# Patient Record
Sex: MALE | Race: WHITE | NOT HISPANIC OR LATINO | Employment: FULL TIME | URBAN - METROPOLITAN AREA
[De-identification: names, ages, dates, MRNs, and addresses within clinical notes are randomized per-mention and may not be internally consistent; named-entity substitution may affect disease eponyms.]

---

## 2018-07-14 ENCOUNTER — OFFICE VISIT (OUTPATIENT)
Dept: URGENT CARE | Facility: CLINIC | Age: 73
End: 2018-07-14
Payer: COMMERCIAL

## 2018-07-14 ENCOUNTER — APPOINTMENT (OUTPATIENT)
Dept: RADIOLOGY | Facility: CLINIC | Age: 73
End: 2018-07-14
Payer: COMMERCIAL

## 2018-07-14 VITALS
OXYGEN SATURATION: 98 % | RESPIRATION RATE: 18 BRPM | HEART RATE: 83 BPM | SYSTOLIC BLOOD PRESSURE: 170 MMHG | DIASTOLIC BLOOD PRESSURE: 85 MMHG | BODY MASS INDEX: 25.84 KG/M2 | WEIGHT: 195 LBS | TEMPERATURE: 98.7 F | HEIGHT: 73 IN

## 2018-07-14 DIAGNOSIS — M25.531 ACUTE PAIN OF RIGHT WRIST: Primary | ICD-10-CM

## 2018-07-14 PROCEDURE — 99203 OFFICE O/P NEW LOW 30 MIN: CPT | Performed by: PHYSICIAN ASSISTANT

## 2018-07-14 PROCEDURE — 73110 X-RAY EXAM OF WRIST: CPT

## 2018-07-14 PROCEDURE — S9088 SERVICES PROVIDED IN URGENT: HCPCS | Performed by: PHYSICIAN ASSISTANT

## 2018-07-14 NOTE — PATIENT INSTRUCTIONS
X-ray right wrist performed in office-no acute osseous abnormality visualized  Official radiology report pending  Will call if there are positive results  Apply ice to affected area, elevate right hand  Ace wrap applied to wrist in office  Tylenol/ibuprofen as needed for pain  Follow-up with an orthopedic physician if pain persists  Follow up with PCP in 3-5 days  Proceed to  ER if symptoms worsen  Wrist Sprain   AMBULATORY CARE:   A wrist sprain  happens when one or more ligaments in your wrist stretch or tear  Ligaments are tough tissues that connect bones and keep them in place, and support your joints  A fall onto your outstretched hand can cause a wrist sprain  An injury that causes your wrist to twist can also cause a sprain  Common symptoms include the following:   · Bruising or changes in skin color    · Pain and stiffness     · Popping sound in your wrist when you move it    · Swelling and tenderness  Seek care immediately if:   · You have severe pain or swelling  · Your injured wrist is red or has red streaks spreading from the injured area  · You have new trouble moving your hands, fingers, or wrist     · Your wrist, hand, or fingers feel cold or numb  · Your fingernails turn blue or gray  Contact your healthcare provider if:   · Your symptoms get worse  · Your sprain does not get better within 2 weeks  · You have questions or concerns about your condition or care  Treatment for a wrist sprain  depends on how severe your sprain is  You may need any of the following:  · NSAIDs , such as ibuprofen, help decrease swelling, pain, and fever  NSAIDs can cause stomach bleeding or kidney problems in certain people  If you take blood thinner medicine, always ask your healthcare provider if NSAIDs are safe for you  Always read the medicine label and follow directions  · Acetaminophen  decreases pain and fever  It is available without a doctor's order   Ask how much to take and how often to take it  Follow directions  Read the labels of all other medicines you are using to see if they also contain acetaminophen, or ask your doctor or pharmacist  Acetaminophen can cause liver damage if not taken correctly  Do not use more than 4 grams (4,000 milligrams) total of acetaminophen in one day  · A splint or cast  helps support your wrist and prevent more damage  Have your child wear his or her splint as directed  Ask for instructions on how your child should bathe while wearing a splint or cast     · Surgery  may be needed if you have a severe sprain  Arthroscopy may be done to examine the inside of your wrist joint and repair ligament damage  Arthroscopy uses a scope that is inserted through a small incision  You may need open surgery to reconnect torn ligaments to the bone  · Physical therapy  may be recommended  A physical therapist teaches you exercises to help improve movement and strength, and to decrease pain  Care for a wrist sprain:   · Rest  your wrist for at least 48 hours  Avoid activities that cause pain  · Ice  your wrist for 15 to 20 minutes every hour or as directed  Use an ice pack, or put crushed ice in a plastic bag  Cover it with a towel before you put it on your wrist  Ice helps prevent tissue damage and decreases swelling and pain  · Compress  your wrist with an elastic bandage  This will help decrease swelling, support your wrist, and help it heal  Wear your wrist wrap as directed  The elastic bandage should be snug but not tight  · Elevate  your wrist above the level of your heart as often as you can  This will help decrease swelling and pain  Prop your wrist on pillows or blankets to keep it elevated comfortably  Follow up with your healthcare provider as directed:  Write down your questions so you remember to ask them during your visits     © 2017 [employer] Information is for End User's use only and may not be sold, redistributed or otherwise used for commercial purposes  All illustrations and images included in CareNotes® are the copyrighted property of A [employer]  or Augustine Carrero  The above information is an  only  It is not intended as medical advice for individual conditions or treatments  Talk to your doctor, nurse or pharmacist before following any medical regimen to see if it is safe and effective for you

## 2018-07-14 NOTE — PROGRESS NOTES
[facility]        NAME: Kirby Arevalo is a 67 y o  male  : 1945    MRN: [id number]  DATE: 2018  TIME: 3:02 PM    Assessment and Plan   Acute pain of right wrist [M25 531]  1  Acute pain of right wrist  XR wrist 3+ vw right         Patient Instructions   X-ray right wrist performed in office-no acute osseous abnormality visualized  There appears to be an old fracture of the distal portion of the ulna  Official radiology report pending  Will call if there are positive results  Apply ice to affected area, elevate right hand  Ace wrap applied to wrist in office  Tylenol/ibuprofen as needed for pain  Follow-up with an orthopedic physician if pain persists  Follow up with PCP in 3-5 days  Proceed to  ER if symptoms worsen  Chief Complaint     Chief Complaint   Patient presents with    Wrist Injury     R wrist injury 10 a today  PT fell to ground in his home while goiing outside wiht his dog  History of Present Illness   The patient is a 42-year-old male who presents with right wrist pain for 1 day  He states that he tripped and fell with an outstretched hand onto his right wrist   He had immediate pain and continues to have pain  Negative obvious deformity  Positive swelling  Negative ecchymosis  He is unable to move his right wrist   Negative radiating pain  Negative numbness or tingling  Sensation is intact  Negative color change  The patient denies syncopal event or head trauma  There is no other injury to any other part of his body  HPI    Review of Systems   Review of Systems   Musculoskeletal: Positive for joint swelling  Right wrist pain     Skin: Negative for color change and wound  Neurological: Negative for dizziness, syncope and weakness  All other systems reviewed and are negative  Current Medications     No current outpatient prescriptions on file      Current Allergies     Allergies as of 2018    (No Known Allergies) The following portions of the patient's history were reviewed and updated as appropriate: allergies, current medications, past family history, past medical history, past social history, past surgical history and problem list      No past medical history on file  No past surgical history on file  No family history on file  Medications have been verified  Objective   /85   Pulse 83   Temp 98 7 °F (37 1 °C) (Temporal)   Resp 18   Ht 6' 1" (1 854 m)   Wt 88 5 kg (195 lb)   SpO2 98%   BMI 25 73 kg/m²        Physical Exam     Physical Exam   Constitutional: He is oriented to person, place, and time  He appears well-developed and well-nourished  No distress  Pulmonary/Chest: Effort normal  No respiratory distress  Musculoskeletal:        Right wrist: He exhibits decreased range of motion, tenderness, bony tenderness and swelling  He exhibits no effusion, no crepitus, no deformity and no laceration  Arms:  Neurological: He is alert and oriented to person, place, and time  Skin: Skin is warm and dry  No rash noted  He is not diaphoretic  No erythema  No pallor  Psychiatric: He has a normal mood and affect  His behavior is normal  Judgment and thought content normal    Nursing note and vitals reviewed

## 2018-07-15 ENCOUNTER — TELEPHONE (OUTPATIENT)
Dept: URGENT CARE | Facility: CLINIC | Age: 73
End: 2018-07-15

## 2018-07-15 ENCOUNTER — CLINICAL SUPPORT (OUTPATIENT)
Dept: URGENT CARE | Facility: CLINIC | Age: 73
End: 2018-07-15

## 2018-07-15 DIAGNOSIS — S52.501A NONDISPLACED FRACTURE OF DISTAL END OF RIGHT RADIUS: Primary | ICD-10-CM

## 2018-07-15 DIAGNOSIS — Z97.8 CAST IN PLACE ON EXTREMITY: ICD-10-CM

## 2018-07-15 NOTE — TELEPHONE ENCOUNTER
Patient presented to [facility] urgent care 7/14/2018 with a right wrist injury  I spoke with the radiologist this morning regarding the x-ray that was performed and he is unsure if there is a new nondisplaced fracture of the distal radius  The patient does have a history of a previous right radius fracture  I discussed the options with the patient regarding the uncertain findings  The patient will come to the office today to have a splint placed on his right wrist with referral to an orthopedic physician on Monday  The patient may require repeat imaging in 1 week or additional diagnostic imaging

## 2018-07-15 NOTE — PROGRESS NOTES
Assessment/Plan:      Diagnoses and all orders for this visit:    Nondisplaced fracture of distal end of right radius          Subjective:     Patient ID: Genevieve Mata is a 67 y o  male who return to the office today for placement of a right hand splint  X-ray right hand performed yesterday showed a potential nondisplaced fracture distal radius  The patient does have a history of a prior fracture of the distal radius  After speaking with the radiologist he is unsure if this is an acute or chronic fracture  The patient returns to the office today for splinting and follow-up with an orthopedic physician tomorrow for further evaluation  Cast application  Date/Time: 7/15/2018 9:52 AM  Performed by: José Luis Monte  Authorized by: José Luis Monte     Consent:     Consent obtained:  Verbal    Consent given by:  Patient    Risks discussed:  Discoloration, numbness, pain and swelling    Alternatives discussed:  Observation  Pre-procedure details:     Sensation:  Normal  Procedure details:     Laterality:  Right    Location:  Wrist    Wrist:  R wristCast type:  Short arm    Splint type:  Volar short arm    Supplies:  Cotton padding, elastic bandage and sling (Form splint)  Post-procedure details:     Pain:  Unchanged    Sensation:  Normal    Patient tolerance of procedure:   Tolerated well, no immediate complications      HPI    Review of Systems      Objective:     Physical Exam

## 2018-07-15 NOTE — LETTER
July 15, 2018     Patient: Benja Bailey   YOB: 1945   Date of Visit: 7/15/2018       To Whom it May Concern:    Jayde Guerrier is under my professional care  He was seen in my office on 7/15/2018  He should not participate in activities requiring use of his right hand until evaluated by a physician  If you have any questions or concerns, please don't hesitate to call           Sincerely,          GINNY Cole        CC: No Recipients

## 2018-07-16 ENCOUNTER — OFFICE VISIT (OUTPATIENT)
Dept: OBGYN CLINIC | Facility: MEDICAL CENTER | Age: 73
End: 2018-07-16
Payer: COMMERCIAL

## 2018-07-16 VITALS
DIASTOLIC BLOOD PRESSURE: 79 MMHG | HEART RATE: 85 BPM | SYSTOLIC BLOOD PRESSURE: 146 MMHG | HEIGHT: 73 IN | BODY MASS INDEX: 24.92 KG/M2 | WEIGHT: 188 LBS

## 2018-07-16 DIAGNOSIS — S52.501A CLOSED FRACTURE OF DISTAL ENDS OF RIGHT RADIUS AND ULNA, INITIAL ENCOUNTER: ICD-10-CM

## 2018-07-16 DIAGNOSIS — M25.531 RIGHT WRIST PAIN: Primary | ICD-10-CM

## 2018-07-16 DIAGNOSIS — S52.601A CLOSED FRACTURE OF DISTAL ENDS OF RIGHT RADIUS AND ULNA, INITIAL ENCOUNTER: ICD-10-CM

## 2018-07-16 PROCEDURE — 99204 OFFICE O/P NEW MOD 45 MIN: CPT | Performed by: FAMILY MEDICINE

## 2018-07-16 PROCEDURE — 25600 CLTX DST RDL FX/EPHYS SEP WO: CPT | Performed by: FAMILY MEDICINE

## 2018-07-16 RX ORDER — MULTIVIT-MIN/IRON FUM/FOLIC AC 7.5 MG-4
1 TABLET ORAL DAILY
COMMUNITY

## 2018-07-16 RX ORDER — ASPIRIN 325 MG
325 TABLET ORAL 2 TIMES DAILY
COMMUNITY
End: 2022-06-02

## 2018-07-16 NOTE — PROGRESS NOTES
Assessment:     1  Right wrist pain    2  Closed fracture of distal ends of right radius and ulna, initial encounter        Plan:     Problem List Items Addressed This Visit     Right wrist pain - Primary    Closed fracture of distal ends of right radius and ulna     Stable, nondisplaced fracture of the distal radius and ulna  Recommend short arm hard cast at this time  Return to the office for follow-up in 4 weeks for repeat x-rays with cast off to assess for callus formation              Subjective:     Patient ID: Chris Linda is a 67 y o  male  Chief Complaint:  Patient is a 70-year-old male right-hand dominant presenting today for evaluation of right wrist pain  He reports 2 days ago, falling on his outstretched right hand while carrying his dog through the doorway  He reported immediate onset pain in the hand and wrist regions  His pain continues today as a throbbing, achy pain along the dorsal aspect of the wrist  There is no radiation of symptoms  Anti-inflammatories and has provided minimal relief  Pain is reproduced with any attempted movement of the hand or wrist  He denies any numbness or tingling  Denies any warmth or crepitus  Allergy:  No Known Allergies  Medications:  all current active meds have been reviewed  Past Medical History:  No past medical history on file  Past Surgical History:  No past surgical history on file  Family History:  Family History   Problem Relation Age of Onset    Dementia Mother     Heart disease Father      Social History:  History   Alcohol Use No     History   Drug Use No     History   Smoking Status    Current Every Day Smoker   Smokeless Tobacco    Never Used     Review of Systems   Constitutional: Negative  HENT: Negative  Eyes: Negative  Respiratory: Negative  Cardiovascular: Negative  Gastrointestinal: Negative  Genitourinary: Negative  Musculoskeletal: Positive for arthralgias and myalgias  Skin: Negative  Allergic/Immunologic: Negative  Neurological: Negative  Hematological: Negative  Psychiatric/Behavioral: Negative  Objective:  BP Readings from Last 1 Encounters:   07/16/18 146/79      Wt Readings from Last 1 Encounters:   07/16/18 85 3 kg (188 lb)      BMI:   Estimated body mass index is 24 8 kg/m² as calculated from the following:    Height as of this encounter: 6' 1" (1 854 m)  Weight as of this encounter: 85 3 kg (188 lb)  BSA:   Estimated body surface area is 2 1 meters squared as calculated from the following:    Height as of this encounter: 6' 1" (1 854 m)  Weight as of this encounter: 85 3 kg (188 lb)  Physical Exam   Constitutional: He is oriented to person, place, and time  He appears well-developed and well-nourished  HENT:   Head: Normocephalic  Eyes: Pupils are equal, round, and reactive to light  Neck: Normal range of motion  Pulmonary/Chest: Effort normal    Musculoskeletal: He exhibits edema and tenderness  He exhibits no deformity  Neurological: He is alert and oriented to person, place, and time  Skin: Skin is warm  Psychiatric: He has a normal mood and affect  Right Hand Exam     Tenderness   The patient is experiencing tenderness in the dorsal area, joya area and radial area  Range of Motion     Wrist   Extension: abnormal   Flexion: abnormal   Pronation: abnormal   Supination: abnormal     Muscle Strength   Wrist Extension: 4/5   Wrist Flexion: 4/5   : 4/5     Tests   Phalens Sign: negative  Tinels Sign (Medial Nerve): negative  Finkelstein: negative    Other   Erythema: absent  Sensation: normal  Pulse: present      Left Hand Exam   Left hand exam is normal     Tenderness   The patient is experiencing no tenderness  Range of Motion   The patient has normal left wrist ROM  I have personally reviewed pertinent films in PACS  Stable, nondisplaced fracture along the medial aspect of the distal radius

## 2018-07-16 NOTE — ASSESSMENT & PLAN NOTE
Stable, nondisplaced fracture of the distal radius and ulna  Recommend short arm hard cast at this time    Return to the office for follow-up in 4 weeks for repeat x-rays with cast off to assess for callus formation

## 2018-08-13 ENCOUNTER — APPOINTMENT (OUTPATIENT)
Dept: RADIOLOGY | Facility: MEDICAL CENTER | Age: 73
End: 2018-08-13
Payer: COMMERCIAL

## 2018-08-13 ENCOUNTER — OFFICE VISIT (OUTPATIENT)
Dept: OBGYN CLINIC | Facility: MEDICAL CENTER | Age: 73
End: 2018-08-13

## 2018-08-13 VITALS
DIASTOLIC BLOOD PRESSURE: 96 MMHG | WEIGHT: 192.4 LBS | SYSTOLIC BLOOD PRESSURE: 193 MMHG | HEIGHT: 73 IN | BODY MASS INDEX: 25.5 KG/M2 | HEART RATE: 75 BPM

## 2018-08-13 DIAGNOSIS — S52.601D CLOSED FRACTURE OF DISTAL ENDS OF RIGHT RADIUS AND ULNA WITH ROUTINE HEALING, SUBSEQUENT ENCOUNTER: ICD-10-CM

## 2018-08-13 DIAGNOSIS — M25.531 RIGHT WRIST PAIN: Primary | ICD-10-CM

## 2018-08-13 DIAGNOSIS — S52.501D CLOSED FRACTURE OF DISTAL ENDS OF RIGHT RADIUS AND ULNA WITH ROUTINE HEALING, SUBSEQUENT ENCOUNTER: ICD-10-CM

## 2018-08-13 DIAGNOSIS — M25.531 RIGHT WRIST PAIN: ICD-10-CM

## 2018-08-13 PROCEDURE — 99024 POSTOP FOLLOW-UP VISIT: CPT | Performed by: FAMILY MEDICINE

## 2018-08-13 PROCEDURE — 73110 X-RAY EXAM OF WRIST: CPT

## 2018-08-13 NOTE — PROGRESS NOTES
Assessment:     1  Right wrist pain    2  Closed fracture of distal ends of right radius and ulna with routine healing, subsequent encounter        Plan:     Problem List Items Addressed This Visit     Right wrist pain - Primary    Relevant Orders    XR wrist 3+ vw right    Ambulatory referral to Physical Therapy    Closed fracture of distal ends of right radius and ulna    Relevant Orders    Ambulatory referral to Physical Therapy         Subjective:     Patient ID: Nga Lowery is a 68 y o  male  Chief Complaint:  Patient reports complete resolution of symptoms in the right wrist since being immobilized in a short-arm hard cast for the past 4 weeks  He is no longer experiencing any swelling in the wrist   He does reports some stiffness with wrist extension  He denies any numbness or tingling  He denies any warmth or crepitus  Allergy:  No Known Allergies  Medications:  all current active meds have been reviewed  Past Medical History:  No past medical history on file  Past Surgical History:  No past surgical history on file  Family History:  Family History   Problem Relation Age of Onset    Dementia Mother     Heart disease Father      Social History:  History   Alcohol Use No     History   Drug Use No     History   Smoking Status    Current Every Day Smoker   Smokeless Tobacco    Never Used     Review of Systems   Constitutional: Negative  HENT: Negative  Eyes: Negative  Respiratory: Negative  Cardiovascular: Negative  Gastrointestinal: Negative  Genitourinary: Negative  Musculoskeletal: Positive for arthralgias and myalgias  Skin: Negative  Allergic/Immunologic: Negative  Neurological: Negative  Hematological: Negative  Psychiatric/Behavioral: Negative            Objective:  BP Readings from Last 1 Encounters:   08/13/18 (!) 193/96      Wt Readings from Last 1 Encounters:   08/13/18 87 3 kg (192 lb 6 4 oz)      BMI:   Estimated body mass index is 25 38 kg/m² as calculated from the following:    Height as of this encounter: 6' 1" (1 854 m)  Weight as of this encounter: 87 3 kg (192 lb 6 4 oz)  BSA:   Estimated body surface area is 2 12 meters squared as calculated from the following:    Height as of this encounter: 6' 1" (1 854 m)  Weight as of this encounter: 87 3 kg (192 lb 6 4 oz)  Physical Exam   Constitutional: He is oriented to person, place, and time  He appears well-developed and well-nourished  HENT:   Head: Normocephalic  Eyes: Pupils are equal, round, and reactive to light  Neck: Normal range of motion  Pulmonary/Chest: Effort normal    Musculoskeletal: He exhibits no tenderness  Neurological: He is alert and oriented to person, place, and time  Skin: Skin is warm  Psychiatric: He has a normal mood and affect  Right Hand Exam     Tenderness   The patient is experiencing tenderness in the dorsal area  Range of Motion     Wrist   Extension: abnormal   Flexion: abnormal   Pronation: normal   Supination: normal     Muscle Strength   Wrist Extension: 4/5   Wrist Flexion: 4/5   : 4/5     Tests   Phalens Sign: negative    Other   Erythema: absent  Sensation: normal  Pulse: present            I have personally reviewed pertinent films in PACS     Appropriate callus formation and healing

## 2018-12-23 ENCOUNTER — OFFICE VISIT (OUTPATIENT)
Dept: URGENT CARE | Facility: CLINIC | Age: 73
End: 2018-12-23
Payer: COMMERCIAL

## 2018-12-23 ENCOUNTER — APPOINTMENT (OUTPATIENT)
Dept: RADIOLOGY | Facility: CLINIC | Age: 73
End: 2018-12-23
Payer: COMMERCIAL

## 2018-12-23 VITALS
WEIGHT: 182 LBS | HEART RATE: 128 BPM | BODY MASS INDEX: 24.12 KG/M2 | HEIGHT: 73 IN | TEMPERATURE: 99.4 F | RESPIRATION RATE: 16 BRPM | OXYGEN SATURATION: 99 % | DIASTOLIC BLOOD PRESSURE: 68 MMHG | SYSTOLIC BLOOD PRESSURE: 118 MMHG

## 2018-12-23 DIAGNOSIS — J18.9 COMMUNITY ACQUIRED PNEUMONIA OF RIGHT LUNG, UNSPECIFIED PART OF LUNG: Primary | ICD-10-CM

## 2018-12-23 DIAGNOSIS — R05.9 COUGH: ICD-10-CM

## 2018-12-23 PROCEDURE — S9088 SERVICES PROVIDED IN URGENT: HCPCS | Performed by: PHYSICIAN ASSISTANT

## 2018-12-23 PROCEDURE — 71046 X-RAY EXAM CHEST 2 VIEWS: CPT

## 2018-12-23 PROCEDURE — 99213 OFFICE O/P EST LOW 20 MIN: CPT | Performed by: PHYSICIAN ASSISTANT

## 2018-12-23 RX ORDER — ALBUTEROL SULFATE 90 UG/1
2 AEROSOL, METERED RESPIRATORY (INHALATION) EVERY 6 HOURS PRN
Qty: 8 G | Refills: 0 | Status: SHIPPED | OUTPATIENT
Start: 2018-12-23 | End: 2021-01-07 | Stop reason: ALTCHOICE

## 2018-12-23 RX ORDER — AMOXICILLIN AND CLAVULANATE POTASSIUM 875; 125 MG/1; MG/1
1 TABLET, FILM COATED ORAL EVERY 12 HOURS SCHEDULED
Qty: 14 TABLET | Refills: 0 | Status: SHIPPED | OUTPATIENT
Start: 2018-12-23 | End: 2018-12-30

## 2018-12-23 NOTE — PROGRESS NOTES
[facility]        NAME: Debbie Rojas is a 68 y o  male  : 1945    MRN: [id number]  DATE: 2018  TIME: 12:37 PM    Assessment and Plan   Community acquired pneumonia of right lung, unspecified part of lung [J18 9]  1  Community acquired pneumonia of right lung, unspecified part of lung  amoxicillin-clavulanate (AUGMENTIN) 875-125 mg per tablet    albuterol (PROVENTIL HFA,VENTOLIN HFA) 90 mcg/act inhaler   2  Cough  XR chest pa & lateral         Patient Instructions     Patient Instructions     Start on antibiotics  He can use albuterol for the wheezing and shortness of breath  Mucinex over-the-counter  Follow-up with PCP next week  If worse or fever go to the ER  Chest x-ray shows right middle lobe consolidation    Follow up with PCP in 3-5 days  Proceed to  ER if symptoms worsen  Chief Complaint     Chief Complaint   Patient presents with    Cough     x5 days with a productive cough, sob , there are chills/sweats and constant fatigue         History of Present Illness        61-year-old male presents with cough congestion shortness of breath chills and sweats for 5 days  He says when he lays down he feels pretty short of breath  No cough meds over-the-counter  No nausea or vomiting  He says when he takes a deep breath it feels tight  No history of inhaler use or asthma  No  D  He smokes occasionally at most 5 cigarettes a day  He has been smoking less since he got sick          Review of Systems   Review of Systems      Current Medications       Current Outpatient Prescriptions:     aspirin 325 mg tablet, Take 325 mg by mouth 2 (two) times a day, Disp: , Rfl:     Multiple Vitamins-Minerals (MULTIVITAMIN WITH MINERALS) tablet, Take 1 tablet by mouth daily, Disp: , Rfl:     albuterol (PROVENTIL HFA,VENTOLIN HFA) 90 mcg/act inhaler, Inhale 2 puffs every 6 (six) hours as needed for wheezing, Disp: 8 g, Rfl: 0    amoxicillin-clavulanate (AUGMENTIN) 875-125 mg per tablet, Take 1 tablet by mouth every 12 (twelve) hours for 7 days, Disp: 14 tablet, Rfl: 0    Current Allergies     Allergies as of 12/23/2018    (No Known Allergies)            The following portions of the patient's history were reviewed and updated as appropriate: allergies, current medications, past family history, past medical history, past social history, past surgical history and problem list      History reviewed  No pertinent past medical history  History reviewed  No pertinent surgical history  Family History   Problem Relation Age of Onset    Dementia Mother     Heart disease Father          Medications have been verified  Objective   /68   Pulse (!) 128   Temp 99 4 °F (37 4 °C) (Temporal)   Resp 16   Ht 6' 1" (1 854 m)   Wt 82 6 kg (182 lb)   SpO2 99%   BMI 24 01 kg/m²        Physical Exam     Physical Exam   Constitutional: He appears well-developed and well-nourished  No distress  HENT:   Right Ear: Tympanic membrane, external ear and ear canal normal    Left Ear: Tympanic membrane, external ear and ear canal normal    Nose: Nose normal  Right sinus exhibits no maxillary sinus tenderness and no frontal sinus tenderness  Left sinus exhibits no maxillary sinus tenderness and no frontal sinus tenderness  Mouth/Throat: Oropharynx is clear and moist  No posterior oropharyngeal erythema  Eyes: Pupils are equal, round, and reactive to light  Conjunctivae and EOM are normal  No scleral icterus  Neck: Normal range of motion  Neck supple  Cardiovascular: Normal rate, regular rhythm and normal heart sounds  Pulmonary/Chest: Effort normal  No respiratory distress  He has no decreased breath sounds  He has wheezes  He has no rhonchi  He has rales in the right lower field and the left lower field  Abdominal: Soft  Bowel sounds are normal  He exhibits no distension and no mass  There is no tenderness  There is no rebound and no guarding     Lymphadenopathy:     He has no cervical adenopathy  Skin: Skin is warm and dry  No rash noted

## 2018-12-23 NOTE — PATIENT INSTRUCTIONS
Start on antibiotics  He can use albuterol for the wheezing and shortness of breath  Mucinex over-the-counter  Follow-up with PCP next week  If worse or fever go to the ER

## 2019-01-03 ENCOUNTER — OFFICE VISIT (OUTPATIENT)
Dept: FAMILY MEDICINE CLINIC | Facility: CLINIC | Age: 74
End: 2019-01-03
Payer: COMMERCIAL

## 2019-01-03 VITALS
TEMPERATURE: 99 F | BODY MASS INDEX: 24.39 KG/M2 | DIASTOLIC BLOOD PRESSURE: 88 MMHG | HEIGHT: 73 IN | HEART RATE: 96 BPM | WEIGHT: 184 LBS | OXYGEN SATURATION: 96 % | SYSTOLIC BLOOD PRESSURE: 138 MMHG

## 2019-01-03 DIAGNOSIS — F17.200 CURRENT EVERY DAY SMOKER: ICD-10-CM

## 2019-01-03 DIAGNOSIS — Z76.89 ENCOUNTER TO ESTABLISH CARE: Primary | ICD-10-CM

## 2019-01-03 DIAGNOSIS — J18.9 PNEUMONIA OF RIGHT LOWER LOBE DUE TO INFECTIOUS ORGANISM: ICD-10-CM

## 2019-01-03 PROBLEM — S52.601A CLOSED FRACTURE OF DISTAL ENDS OF RIGHT RADIUS AND ULNA: Status: RESOLVED | Noted: 2018-07-16 | Resolved: 2019-01-03

## 2019-01-03 PROBLEM — M25.531 RIGHT WRIST PAIN: Status: RESOLVED | Noted: 2018-07-16 | Resolved: 2019-01-03

## 2019-01-03 PROBLEM — S52.501A CLOSED FRACTURE OF DISTAL ENDS OF RIGHT RADIUS AND ULNA: Status: RESOLVED | Noted: 2018-07-16 | Resolved: 2019-01-03

## 2019-01-03 PROCEDURE — 99203 OFFICE O/P NEW LOW 30 MIN: CPT | Performed by: FAMILY MEDICINE

## 2019-01-03 PROCEDURE — 3008F BODY MASS INDEX DOCD: CPT | Performed by: FAMILY MEDICINE

## 2019-01-03 NOTE — PROGRESS NOTES
Assessment/Plan:    No problem-specific Assessment & Plan notes found for this encounter  Diagnoses and all orders for this visit:    Encounter to establish care    Current every day smoker    Pneumonia of right lower lobe due to infectious organism ([facility] [address] )  -     XR chest pa & lateral; Future        Repeat CXR in 4 wks  Advised pneumonia vaccine at that time  Declined flu vaccine  Declined screening labs  Is not willing to quit smoking  Counseled  Subjective:      Patient ID: Holly Franklin is a 68 y o  male  Here to establish care  Was recently in care now on 12/23/18, had pneumonia  Treated with Augmentin  He feels much better  Denies cough, fever, or SOB  He is a smoker, does not want to quit  No other complaints  The following portions of the patient's history were reviewed and updated as appropriate:   He  has no past medical history on file  He   Patient Active Problem List    Diagnosis Date Noted    Encounter to establish care 01/03/2019    Current every day smoker 01/03/2019    Pneumonia of right lower lobe due to infectious organism ([facility] [address] ) 01/03/2019     He  has no past surgical history on file  His family history includes Dementia in his mother; Heart disease in his father  He  reports that he has been smoking  He has never used smokeless tobacco  He reports that he does not drink alcohol or use drugs  Current Outpatient Prescriptions   Medication Sig Dispense Refill    albuterol (PROVENTIL HFA,VENTOLIN HFA) 90 mcg/act inhaler Inhale 2 puffs every 6 (six) hours as needed for wheezing 8 g 0    aspirin 325 mg tablet Take 325 mg by mouth 2 (two) times a day      Multiple Vitamins-Minerals (MULTIVITAMIN WITH MINERALS) tablet Take 1 tablet by mouth daily       No current facility-administered medications for this visit        Current Outpatient Prescriptions on File Prior to Visit   Medication Sig    albuterol (PROVENTIL HFA,VENTOLIN HFA) 90 mcg/act inhaler Inhale 2 puffs every 6 (six) hours as needed for wheezing    aspirin 325 mg tablet Take 325 mg by mouth 2 (two) times a day    Multiple Vitamins-Minerals (MULTIVITAMIN WITH MINERALS) tablet Take 1 tablet by mouth daily     No current facility-administered medications on file prior to visit  He has No Known Allergies       Review of Systems   Constitutional: Negative for activity change, appetite change, chills, fatigue, fever and unexpected weight change  HENT: Negative for congestion, ear discharge, ear pain, postnasal drip, sinus pressure and sore throat  Eyes: Negative for discharge and visual disturbance  Respiratory: Negative for cough, shortness of breath and wheezing  Cardiovascular: Negative for chest pain, palpitations and leg swelling  Gastrointestinal: Negative for abdominal pain, constipation, diarrhea, nausea and vomiting  Endocrine: Negative for cold intolerance, heat intolerance, polydipsia and polyuria  Genitourinary: Negative for difficulty urinating and frequency  Musculoskeletal: Negative for arthralgias, back pain, joint swelling and myalgias  Skin: Negative for rash  Neurological: Negative for dizziness, weakness, light-headedness, numbness and headaches  Hematological: Negative for adenopathy  Psychiatric/Behavioral: Negative for behavioral problems, confusion, dysphoric mood, sleep disturbance and suicidal ideas  The patient is not nervous/anxious  Objective:      /88   Pulse 96   Temp 99 °F (37 2 °C)   Ht 6' 1" (1 854 m)   Wt 83 5 kg (184 lb)   SpO2 96%   BMI 24 28 kg/m²          Physical Exam   Constitutional: He is oriented to person, place, and time  He appears well-developed and well-nourished  No distress  HENT:   Head: Normocephalic and atraumatic  Cardiovascular: Normal rate, regular rhythm and normal heart sounds  Exam reveals no gallop and no friction rub  No murmur heard    Pulmonary/Chest: Effort normal and breath sounds normal  No respiratory distress  He has no wheezes  He has no rales  He exhibits no tenderness  Musculoskeletal: He exhibits no edema  Neurological: He is alert and oriented to person, place, and time  Skin: He is not diaphoretic  Psychiatric: He has a normal mood and affect  His behavior is normal  Judgment and thought content normal    Nursing note and vitals reviewed

## 2019-01-14 ENCOUNTER — TELEPHONE (OUTPATIENT)
Dept: FAMILY MEDICINE CLINIC | Facility: CLINIC | Age: 74
End: 2019-01-14

## 2019-01-14 DIAGNOSIS — R05.9 COUGH: Primary | ICD-10-CM

## 2019-01-14 RX ORDER — AMOXICILLIN AND CLAVULANATE POTASSIUM 875; 125 MG/1; MG/1
1 TABLET, FILM COATED ORAL EVERY 12 HOURS SCHEDULED
Qty: 14 TABLET | Refills: 0 | Status: SHIPPED | OUTPATIENT
Start: 2019-01-14 | End: 2019-01-14 | Stop reason: SINTOL

## 2019-01-14 NOTE — TELEPHONE ENCOUNTER
He had pneumonia recently  Now starting with head cold has runny nose and watery eyes and would like another antibiotic   Has appt here on the 31st for pneumovax    Can we rx antibiotic

## 2019-01-14 NOTE — TELEPHONE ENCOUNTER
I spoke to the patient discussed this is a viral URI   I had sent in Augmentin by mistake to [facility] can you please cancel it

## 2019-01-31 ENCOUNTER — CLINICAL SUPPORT (OUTPATIENT)
Dept: FAMILY MEDICINE CLINIC | Facility: CLINIC | Age: 74
End: 2019-01-31
Payer: COMMERCIAL

## 2019-01-31 DIAGNOSIS — Z23 NEED FOR PNEUMOCOCCAL VACCINATION: Primary | ICD-10-CM

## 2019-01-31 PROCEDURE — G0009 ADMIN PNEUMOCOCCAL VACCINE: HCPCS | Performed by: NURSE PRACTITIONER

## 2019-01-31 PROCEDURE — 4040F PNEUMOC VAC/ADMIN/RCVD: CPT | Performed by: NURSE PRACTITIONER

## 2019-01-31 PROCEDURE — 90670 PCV13 VACCINE IM: CPT | Performed by: NURSE PRACTITIONER

## 2019-10-10 ENCOUNTER — APPOINTMENT (EMERGENCY)
Dept: CT IMAGING | Facility: HOSPITAL | Age: 74
DRG: 185 | End: 2019-10-10
Payer: COMMERCIAL

## 2019-10-10 ENCOUNTER — HOSPITAL ENCOUNTER (INPATIENT)
Facility: HOSPITAL | Age: 74
LOS: 1 days | Discharge: HOME HEALTH | DRG: 185 | End: 2019-10-11
Attending: EMERGENCY MEDICINE | Admitting: INTERNAL MEDICINE
Payer: COMMERCIAL

## 2019-10-10 DIAGNOSIS — Z72.0 TOBACCO ABUSE: Chronic | ICD-10-CM

## 2019-10-10 DIAGNOSIS — R93.89 ABNORMAL CT OF THE CHEST: ICD-10-CM

## 2019-10-10 DIAGNOSIS — S22.49XA MULTIPLE RIB FRACTURES INVOLVING FOUR OR MORE RIBS: Primary | ICD-10-CM

## 2019-10-10 DIAGNOSIS — N40.0 ENLARGED PROSTATE: ICD-10-CM

## 2019-10-10 LAB
ALBUMIN SERPL BCP-MCNC: 4.2 G/DL (ref 3.5–5)
ALP SERPL-CCNC: 68 U/L (ref 46–116)
ALT SERPL W P-5'-P-CCNC: 16 U/L (ref 12–78)
ANION GAP SERPL CALCULATED.3IONS-SCNC: 13 MMOL/L (ref 4–13)
APTT PPP: 28 SECONDS (ref 23–37)
AST SERPL W P-5'-P-CCNC: 12 U/L (ref 5–45)
ATRIAL RATE: 91 BPM
BASOPHILS # BLD AUTO: 0.06 THOUSANDS/ΜL (ref 0–0.1)
BASOPHILS NFR BLD AUTO: 1 % (ref 0–1)
BILIRUB DIRECT SERPL-MCNC: 0.16 MG/DL (ref 0–0.2)
BILIRUB SERPL-MCNC: 0.6 MG/DL (ref 0.2–1)
BUN SERPL-MCNC: 13 MG/DL (ref 5–25)
CALCIUM SERPL-MCNC: 9.1 MG/DL (ref 8.3–10.1)
CHLORIDE SERPL-SCNC: 102 MMOL/L (ref 100–108)
CO2 SERPL-SCNC: 25 MMOL/L (ref 21–32)
CREAT SERPL-MCNC: 0.72 MG/DL (ref 0.6–1.3)
EOSINOPHIL # BLD AUTO: 0.1 THOUSAND/ΜL (ref 0–0.61)
EOSINOPHIL NFR BLD AUTO: 1 % (ref 0–6)
ERYTHROCYTE [DISTWIDTH] IN BLOOD BY AUTOMATED COUNT: 14.2 % (ref 11.6–15.1)
GFR SERPL CREATININE-BSD FRML MDRD: 92 ML/MIN/1.73SQ M
GLUCOSE SERPL-MCNC: 150 MG/DL (ref 65–140)
HCT VFR BLD AUTO: 43.4 % (ref 36.5–49.3)
HGB BLD-MCNC: 14.8 G/DL (ref 12–17)
IMM GRANULOCYTES # BLD AUTO: 0.07 THOUSAND/UL (ref 0–0.2)
IMM GRANULOCYTES NFR BLD AUTO: 1 % (ref 0–2)
INR PPP: 1.04 (ref 0.84–1.19)
LYMPHOCYTES # BLD AUTO: 1.22 THOUSANDS/ΜL (ref 0.6–4.47)
LYMPHOCYTES NFR BLD AUTO: 11 % (ref 14–44)
MCH RBC QN AUTO: 31.5 PG (ref 26.8–34.3)
MCHC RBC AUTO-ENTMCNC: 34.1 G/DL (ref 31.4–37.4)
MCV RBC AUTO: 92 FL (ref 82–98)
MONOCYTES # BLD AUTO: 1.15 THOUSAND/ΜL (ref 0.17–1.22)
MONOCYTES NFR BLD AUTO: 11 % (ref 4–12)
NEUTROPHILS # BLD AUTO: 8.31 THOUSANDS/ΜL (ref 1.85–7.62)
NEUTS SEG NFR BLD AUTO: 75 % (ref 43–75)
NRBC BLD AUTO-RTO: 0 /100 WBCS
P AXIS: 39 DEGREES
PLATELET # BLD AUTO: 236 THOUSANDS/UL (ref 149–390)
PLATELET # BLD AUTO: 293 THOUSANDS/UL (ref 149–390)
PMV BLD AUTO: 10.7 FL (ref 8.9–12.7)
PMV BLD AUTO: 11.2 FL (ref 8.9–12.7)
POTASSIUM SERPL-SCNC: 4 MMOL/L (ref 3.5–5.3)
PR INTERVAL: 198 MS
PROT SERPL-MCNC: 7.7 G/DL (ref 6.4–8.2)
PROTHROMBIN TIME: 13.6 SECONDS (ref 11.6–14.5)
QRS AXIS: 38 DEGREES
QRSD INTERVAL: 134 MS
QT INTERVAL: 400 MS
QTC INTERVAL: 492 MS
RBC # BLD AUTO: 4.7 MILLION/UL (ref 3.88–5.62)
SODIUM SERPL-SCNC: 140 MMOL/L (ref 136–145)
T WAVE AXIS: 20 DEGREES
TROPONIN I SERPL-MCNC: <0.02 NG/ML
VENTRICULAR RATE: 91 BPM
WBC # BLD AUTO: 10.91 THOUSAND/UL (ref 4.31–10.16)

## 2019-10-10 PROCEDURE — 70450 CT HEAD/BRAIN W/O DYE: CPT

## 2019-10-10 PROCEDURE — 85610 PROTHROMBIN TIME: CPT | Performed by: EMERGENCY MEDICINE

## 2019-10-10 PROCEDURE — G8988 SELF CARE GOAL STATUS: HCPCS

## 2019-10-10 PROCEDURE — 99285 EMERGENCY DEPT VISIT HI MDM: CPT

## 2019-10-10 PROCEDURE — 85049 AUTOMATED PLATELET COUNT: CPT | Performed by: INTERNAL MEDICINE

## 2019-10-10 PROCEDURE — 93005 ELECTROCARDIOGRAM TRACING: CPT

## 2019-10-10 PROCEDURE — 74176 CT ABD & PELVIS W/O CONTRAST: CPT

## 2019-10-10 PROCEDURE — 80048 BASIC METABOLIC PNL TOTAL CA: CPT | Performed by: EMERGENCY MEDICINE

## 2019-10-10 PROCEDURE — 80076 HEPATIC FUNCTION PANEL: CPT | Performed by: EMERGENCY MEDICINE

## 2019-10-10 PROCEDURE — 93010 ELECTROCARDIOGRAM REPORT: CPT | Performed by: INTERNAL MEDICINE

## 2019-10-10 PROCEDURE — 84484 ASSAY OF TROPONIN QUANT: CPT | Performed by: EMERGENCY MEDICINE

## 2019-10-10 PROCEDURE — 97535 SELF CARE MNGMENT TRAINING: CPT

## 2019-10-10 PROCEDURE — 36415 COLL VENOUS BLD VENIPUNCTURE: CPT | Performed by: EMERGENCY MEDICINE

## 2019-10-10 PROCEDURE — 99285 EMERGENCY DEPT VISIT HI MDM: CPT | Performed by: EMERGENCY MEDICINE

## 2019-10-10 PROCEDURE — 85025 COMPLETE CBC W/AUTO DIFF WBC: CPT | Performed by: EMERGENCY MEDICINE

## 2019-10-10 PROCEDURE — 85730 THROMBOPLASTIN TIME PARTIAL: CPT | Performed by: EMERGENCY MEDICINE

## 2019-10-10 PROCEDURE — 99223 1ST HOSP IP/OBS HIGH 75: CPT | Performed by: INTERNAL MEDICINE

## 2019-10-10 PROCEDURE — 71250 CT THORAX DX C-: CPT

## 2019-10-10 PROCEDURE — 97166 OT EVAL MOD COMPLEX 45 MIN: CPT

## 2019-10-10 PROCEDURE — G8987 SELF CARE CURRENT STATUS: HCPCS

## 2019-10-10 RX ORDER — OXYCODONE HYDROCHLORIDE 5 MG/1
5 TABLET ORAL EVERY 6 HOURS PRN
Status: DISCONTINUED | OUTPATIENT
Start: 2019-10-10 | End: 2019-10-11 | Stop reason: HOSPADM

## 2019-10-10 RX ORDER — ACETAMINOPHEN 325 MG/1
975 TABLET ORAL ONCE
Status: COMPLETED | OUTPATIENT
Start: 2019-10-10 | End: 2019-10-10

## 2019-10-10 RX ORDER — HEPARIN SODIUM 5000 [USP'U]/ML
5000 INJECTION, SOLUTION INTRAVENOUS; SUBCUTANEOUS EVERY 8 HOURS SCHEDULED
Status: DISCONTINUED | OUTPATIENT
Start: 2019-10-10 | End: 2019-10-11 | Stop reason: HOSPADM

## 2019-10-10 RX ORDER — OXYCODONE HYDROCHLORIDE AND ACETAMINOPHEN 5; 325 MG/1; MG/1
1 TABLET ORAL EVERY 4 HOURS PRN
Status: DISCONTINUED | OUTPATIENT
Start: 2019-10-10 | End: 2019-10-10

## 2019-10-10 RX ORDER — FENTANYL CITRATE 50 UG/ML
25 INJECTION, SOLUTION INTRAMUSCULAR; INTRAVENOUS EVERY 2 HOUR PRN
Status: DISCONTINUED | OUTPATIENT
Start: 2019-10-10 | End: 2019-10-11 | Stop reason: HOSPADM

## 2019-10-10 RX ORDER — ASPIRIN 325 MG
325 TABLET ORAL 2 TIMES DAILY
Status: DISCONTINUED | OUTPATIENT
Start: 2019-10-10 | End: 2019-10-11 | Stop reason: HOSPADM

## 2019-10-10 RX ORDER — HYDRALAZINE HYDROCHLORIDE 20 MG/ML
5 INJECTION INTRAMUSCULAR; INTRAVENOUS EVERY 6 HOURS PRN
Status: DISCONTINUED | OUTPATIENT
Start: 2019-10-10 | End: 2019-10-11 | Stop reason: HOSPADM

## 2019-10-10 RX ORDER — ACETAMINOPHEN 325 MG/1
975 TABLET ORAL EVERY 6 HOURS SCHEDULED
Status: DISCONTINUED | OUTPATIENT
Start: 2019-10-10 | End: 2019-10-11 | Stop reason: HOSPADM

## 2019-10-10 RX ORDER — ALBUTEROL SULFATE 90 UG/1
2 AEROSOL, METERED RESPIRATORY (INHALATION) EVERY 6 HOURS PRN
Status: DISCONTINUED | OUTPATIENT
Start: 2019-10-10 | End: 2019-10-11 | Stop reason: HOSPADM

## 2019-10-10 RX ORDER — LIDOCAINE 50 MG/G
1 PATCH TOPICAL ONCE
Status: COMPLETED | OUTPATIENT
Start: 2019-10-10 | End: 2019-10-10

## 2019-10-10 RX ADMIN — ACETAMINOPHEN 975 MG: 325 TABLET ORAL at 18:08

## 2019-10-10 RX ADMIN — LIDOCAINE 1 PATCH: 50 PATCH TOPICAL at 06:40

## 2019-10-10 RX ADMIN — HEPARIN SODIUM 5000 UNITS: 5000 INJECTION INTRAVENOUS; SUBCUTANEOUS at 09:45

## 2019-10-10 RX ADMIN — HEPARIN SODIUM 5000 UNITS: 5000 INJECTION INTRAVENOUS; SUBCUTANEOUS at 22:17

## 2019-10-10 RX ADMIN — Medication 1 TABLET: at 09:45

## 2019-10-10 RX ADMIN — ASPIRIN 325 MG ORAL TABLET 325 MG: 325 PILL ORAL at 18:08

## 2019-10-10 RX ADMIN — HEPARIN SODIUM 5000 UNITS: 5000 INJECTION INTRAVENOUS; SUBCUTANEOUS at 14:30

## 2019-10-10 RX ADMIN — ACETAMINOPHEN 975 MG: 325 TABLET, FILM COATED ORAL at 06:40

## 2019-10-10 RX ADMIN — ASPIRIN 325 MG ORAL TABLET 325 MG: 325 PILL ORAL at 09:45

## 2019-10-10 NOTE — OCCUPATIONAL THERAPY NOTE
OccupationalTherapy Evaluation and Treatment Note     Patient Name: Saroj Noel  ZHTQY'M Date: 10/10/2019  Problem List  Principal Problem:    Rib fractures  Active Problems:    Tobacco abuse    Past Medical History  History reviewed  No pertinent past medical history  Past Surgical History  History reviewed  No pertinent surgical history  10/10/19 1435   Note Type   Note type Eval/Treat   Restrictions/Precautions   Other Precautions Pain; Fall Risk  (back safety/log roll technique for bed mobility d/t rib frx)   Pain Assessment   Pain Assessment 0-10   Pain Score 4   Pain Type Acute pain   Pain Location Rib cage   Pain Orientation Right   Pain Descriptors [facility] Pain Intervention(s) Repositioned; Emotional support; Ambulation/increased activity   Multiple Pain Sites No   Home Living   Type of Home House   Home Layout Stairs to enter with rails; Laundry in basement;Performs ADLs on one level; Able to live on main level with bedroom/bathroom  (bi-level; 1 + 8 TANI with b/l rails or 5 TANI with b/l rails)   Bathroom Shower/Tub Tub/shower unit  (has shower stall too)   Bathroom Toilet Raised   Bathroom Equipment Toilet raiser;Grab bars in [employer] [address] Other (Comment)  (no AD for functional mobility)   Prior Function   Level of [place] Independent with ADLs and functional mobility   Lives With Alone  (lost spouse in April)   Receives Help From [address]  (friends/neighbors "keep an eye on me")   ADL [address] in the last 6 months 1 to 4   Vocational Full time employment   Comments Pt drives     Lifestyle   Autonomy Pt is INDEP with ADLs, IADLs, and functional mobility   Reciprocal Relationships Pt reports supportive dtrs; friends/neighbors are "a phone call away"   Service to Others 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[employer]; 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   Intrinsic Gratification Pt enjoys fishing   Psychosocial   Psychosocial (WDL) WDL   Subjective   Subjective Pt is very pleasant and cooperative   ADL   Eating Assistance 7  Independent   Grooming Assistance 5  Supervision/Setup   Grooming Deficit Setup   UB Bathing Assistance 5  Supervision/Setup   UB Bathing Deficit Setup   LB Bathing Assistance 4  Minimal Assistance   LB Bathing Deficit Right lower leg including foot  (simulated w/o AE)   UB Dressing Assistance 5  Supervision/Setup   UB Dressing Deficit Setup   LB Dressing Assistance 4  Minimal Assistance   LB Dressing Deficit Don/doff R sock   Toileting Assistance  4  Minimal Assistance   Toileting Deficit Other (Comment);Steadying;Supervison/safety;Grab bar use  (CGA standard height toilet)   Bed Mobility   Rolling L 4  Minimal assistance   Additional items Assist x 1;LE management;Verbal cues  (VCs for log roll technique)   Supine to Sit 4  Minimal assistance   Additional items Assist x 1; Increased time required;Verbal cues;LE management   Sit to Supine 4  Minimal assistance   Additional items Assist x 1; Increased time required;Verbal cues;LE management   Additional Comments Pt supine in bed at start of session and agreeable to OT  Pt required Min A for bed mobility tasks for safe technique and sequencing of Log roll technique for joint protection; as well as for LE management  Pt supine in bed at end of session with all needs met, call bell within reach  Transfers   Sit to Stand 5  Supervision  (close SBA)   Additional items Assist x 1; Increased time required;Verbal cues   Stand to Sit 5  Supervision  (close SBA)   Additional items Assist x 1; Increased time required;Verbal cues   Toilet transfer 4  Minimal assistance   Additional items Assist x 1; Increased time required;Verbal cues;Standard toilet  (CGA using grab bars)   Functional Mobility   Functional Mobility 4  Minimal assistance   Additional Comments CGA without AD from pt room <> bathroom; no overt LOB noted however appeared to be shuffling   Balance   Static Sitting Good   Dynamic Sitting Fair +   Static Standing Fair   Dynamic Standing Fair -   Ambulatory Fair -   Activity Tolerance   Activity Tolerance Patient limited by fatigue;Patient limited by pain   Nurse Made Aware JUDSON Christopher   RUE Assessment   RUE Assessment X   RUE Overall AROM   R Shoulder Flexion WFL   R Elbow Flexion WFL   R Wrist Flexion WFL   R Mass Grasp WFL   RUE Strength   RUE Overall Strength Within Functional Limits - strength 5/5   R Shoulder Flexion 5/5   R Elbow Extension 5/5   LUE Assessment   LUE Assessment X   LUE Overall AROM   L Shoulder Flexion impaired to ~15*; PROM limited to ~90*; residual deficits present from hx of polio   L Elbow Flexion impaired   L Wrist Flexion WFL   L Mass Grasp WFL   LUE Strength   LUE Overall Strength Deficits   L Shoulder Flexion 3-/5   L Elbow Extension 2+/5   Hand Function   Gross Motor Coordination Functional   Fine Motor Coordination Functional   Vision-Basic Assessment   Current Vision Wears glasses only for reading   Patient Visual Report Other (Comment)  (reports no acute visual changes)   Vision - Complex Assessment   Acuity Able to read clock/calendar on wall without difficulty; Able to read employee name badge without difficulty   Cognition   Overall Cognitive Status [facility]   Arousal/Participation Alert; Responsive;Arousable; Cooperative   Attention Within functional limits   Orientation Level Oriented X4   Memory Within functional limits   Following Commands Follows all commands and directions without difficulty   Comments Pt able to identify self by full name and birthdate  Pt able to provide detailed social hx without difficulty  Pt demo'd no safety awareness concerns  Assessment   Limitation Decreased ADL status; Decreased endurance;Decreased high-level ADLs   Assessment Pt is a 76 y o  male seen for OT evaluation (time 3315-9173) s/p admit to [facility] on 10/10/2019 w/ Rib fractures    Comorbidities affecting pt's functional performance at time of assessment include: tobacco abuse, hx of polio  Personal factors affecting pt at time of IE include:steps to enter environment, limited home support, difficulty performing ADLS, difficulty performing IADLS , health management  and environment  Prior to admission, pt was INDEP with functional mobility, ADLs, and IADLs  Upon evaluation: Pt requires S for grooming and UB ADLs, Min A for LB ADLs, CGA for toileting, Min A x 1 for bed mobility to utilize log roll technique, SBA x 1 for sit<>stand transfers, CGA for functional mobility without AD <>bathroom 2* the following deficits impacting occupational performance: weakness, decreased ROM, decreased strength, decreased balance, decreased tolerance, increased pain and decreased coping skills  Cognition appears to be [facility]  Vision is [facility]  Development of pt POC requires clinical decision making of moderate complexity  Pt to benefit from continued skilled OT tx while in the hospital to address deficits as defined above and maximize level of functional independence w ADL's and functional mobility  Occupational Performance areas to address include: grooming, bathing/shower, toilet hygiene, dressing, medication management, socialization, health maintenance, functional mobility, community mobility, clothing management, cleaning, meal prep, money management, household maintenance, job performance/volunteering and social participation  From OT standpoint, recommendation at time of d/c would be short term rehab vs 24/7 supervision and assistance, as pt is requiring hands-on assistance for safety due to fall risk during daily routine, and pt reported that he lives alone; recommendation pending progression during acute care stay as well as interdisciplinary recommendations  Goals   Patient Goals "to get back to building a memorial garden for my wife, she loved ruano'   Plan   Treatment Interventions ADL retraining; Endurance training;Equipment evaluation/education; Compensatory technique education;Continued evaluation; Energy conservation; Activityengagement   Goal Expiration Date 10/18/19   OT Treatment Day 1   OT Frequency 3-5x/wk   Additional Treatment Session   Start Time 9813   End Time 5413   Treatment Assessment Patient participated in Skilled OT session (time 0705-5390) this date with interventions consisting of ADL retraining with the use of correct body mechanics, long handled adaptive equipment training and maintaining back safety precautions  Educated pt on purpose of all items in hip kit, due to pt having difficulty with LB dressing due to increased pain and to ensure adequate joint protection  Educated pt on use of dressing stick to doff socks, sock aid to don socks, reacher for light item retrieval (enforced no heavy items for safety), and long handled sponge to limit twisting when washing back and to limit bending when washing feet  Pt receptive to education and appeared enthusiastic to utilize sock aid especially  Patient continues to be functioning below baseline level, occupational performance remains limited secondary to factors listed above and increased risk for falls and injury  From OT standpoint, recommendation at time of d/c would be short term rehab vs 24/7 supervision and assistance, as pt is requiring hands-on assistance for safety due to fall risk during daily routine, and pt reported that he lives alone; recommendation pending progression during acute care stay as well as interdisciplinary recommendations  Patient to benefit from continued Occupational Therapy treatment while in the hospital to address deficits as defined above and maximize level of functional independence with ADLs and functional mobility      Recommendation   OT Discharge Recommendation Other (Comment)  (please refer to assessment)   Equipment Recommended Tub seat with back   OT - OK to Discharge   (once medically cleared)   Barthel Index   Feeding 10 Bathing 0   Grooming Score 5   Dressing Score 5   Bladder Score 10   Bowels Score 10   Toilet Use Score 5   Transfers (Bed/Chair) Score 10   Mobility (Level Surface) Score 0   Stairs Score 0   Barthel Index Score 55   Modified [place] Scale   Modified [place] Scale 4     Goals to be met within 8 days:      Pt will complete grooming/oral hygiene tasks Mod I while standing at sink    Pt will complete UB bathing/dressing Mod I while standing    Pt will complete LB bathing/dressing Mod I while seated using LHAE    Pt will improve functional activity tolerance while standing at sink to 10+ minutes in order to increase safety and independence during functional transfers and ADL tasks  Pt will improve dynamic sitting/standing balance to good to increase safety and independence during functional transfers and ADL and decrease risk for falls  Pt will increase independence during STS transfers with or without AD Mod I     Pt will complete transfer to raised toilet with grab bars with Mod I     Pt will complete toileting tasks (clothing management up/down, hygiene) Mod I     Pt will complete tub/shower transfer without LOB with Mod I using grab bars    Pt will attend to continued cognitive assessment 100% of the time in order to provide most appropriate recommendations for d/c plans  Pt will complete simulated meal prep task Mod I without LOB or concerns for safety with 100% carryover of learned energy conservation and work simplification techniques  Pt will identify and implement at least 3 healthy coping strategies to increase participation in meaningful activities and decrease risk for readmission      ALBERTA Thomas/BRAULIO

## 2019-10-10 NOTE — ED NOTES
Pt with no complaints at this time, resting comfortably in bed         Magnolia Sales RN  10/10/19 5315

## 2019-10-10 NOTE — H&P
H&P- Jayce Villalpando 1945, 76 y o  male MRN: [id number]    Unit/Bed#: ED 29 Encounter: [id number]    Primary Care Provider: Vee Bauman MD   Date and time admitted to hospital: 10/10/2019  4:43 AM        Tobacco abuse  Assessment & Plan  Cessation counseled  Replacement offered and refused    * Rib fractures  Assessment & Plan  Secondary to mechanical fall  PT OT evaluate and treat  Pain control  Would recommend contacting anesthesia see if therapy willing to do a nerve block for better control the patient's pain  Incentive spirometry to bedside  Fall precautions      VTE Prophylaxis: Heparin  / sequential compression device   Code Status:  Full code  POLST: There is no POLST form on file for this patient (pre-hospital)  Discussion with family:     Anticipated Length of Stay:  Patient will be admitted on an Inpatient basis with an anticipated length of stay of  more than 2 midnights  Justification for Hospital Stay:  Mechanical fall with 5 right ribs fracture    Total Time for Visit, including Counseling / Coordination of Care: 30 minutes  Greater than 50% of this total time spent on direct patient counseling and coordination of care  Chief Complaint:   Fall    History of Present Illness:    Jayce Villalpando is a 76 y o  male no significant past medical history presents with complaint of fall from home  Patient was in his usual state of health, he ambulates without assist device and lives independently  He was coming down stairs with a hose when his feet became entangled in the hose and he fell 3 steps onto a lawn chair onto his right side  Patient presented post fall with right-sided pain CT of the chest results reveals 5 nondisplaced rib fractures without evidence of pneumothorax    Patient self is seen at bedside reports that the pain on his right side as well controlled, he denies shortness of breath he denies loss of consciousness lightheadedness dizziness vision loss or change, abdominal pain, bowel disturbance, trauma to the head     Patient was initially offered transfer to [place] but refused due to social issues  Patient will be admitted for management of mechanical fall with right-sided nondisplaced rib fractures  Review of Systems:    Review of Systems   Constitutional: Negative  Negative for activity change, appetite change, chills, diaphoresis, fatigue, fever and unexpected weight change  HENT: Negative  Negative for congestion, rhinorrhea, sinus pressure, sinus pain and sore throat  Eyes: Negative  Negative for photophobia, pain, discharge and visual disturbance  Respiratory: Negative  Negative for cough, chest tightness, shortness of breath, wheezing and stridor  Cardiovascular: Negative  Negative for chest pain, palpitations and leg swelling  Gastrointestinal: Negative  Negative for abdominal distention, abdominal pain, constipation, diarrhea, nausea and vomiting  Endocrine: Negative  Negative for cold intolerance, heat intolerance and polyuria  Genitourinary: Negative  Negative for difficulty urinating, dysuria, flank pain, frequency, hematuria and urgency  Musculoskeletal: Negative  Negative for arthralgias, gait problem, joint swelling, myalgias and neck stiffness  Skin: Negative  Negative for color change, pallor, rash and wound  Allergic/Immunologic: Negative  Negative for immunocompromised state  Neurological: Negative  Negative for dizziness, seizures, syncope, weakness, light-headedness, numbness and headaches  Hematological: Negative  Negative for adenopathy  Does not bruise/bleed easily  Psychiatric/Behavioral: Negative  Negative for confusion and hallucinations  The patient is not nervous/anxious  Past Medical and Surgical History:     History reviewed  No pertinent past medical history  History reviewed  No pertinent surgical history      Meds/Allergies:    Prior to Admission medications    Medication Sig Start Date End Date Taking? Authorizing Provider   albuterol (PROVENTIL HFA,VENTOLIN HFA) 90 mcg/act inhaler Inhale 2 puffs every 6 (six) hours as needed for wheezing 12/23/18   Shania Kapoor PA-C   aspirin 325 mg tablet Take 325 mg by mouth 2 (two) times a day    Historical Provider, MD   Multiple Vitamins-Minerals (MULTIVITAMIN WITH MINERALS) tablet Take 1 tablet by mouth daily    Historical Provider, MD     I have reviewed home medications using allscripts  Allergies: No Known Allergies    Social History:     Marital Status: /Civil Union   Occupation:  Patient works in parts delivery  Patient Pre-hospital Living Situation:  Independent  Patient Pre-hospital Level of Mobility:  Ambulates without assist device  Patient Pre-hospital Diet Restrictions:  None  Substance Use History:   Social History     Substance and Sexual Activity   Alcohol Use No     Social History     Tobacco Use   Smoking Status Current Every Day Smoker    Packs/day: 1 00    Types: Cigarettes   Smokeless Tobacco Never Used     Social History     Substance and Sexual Activity   Drug Use No       Family History:    non-contributory    Physical Exam:     Vitals:   Blood Pressure: (!) 193/147 (10/10/19 0630)  Pulse: 96 (10/10/19 0630)  Temperature: 98 °F (36 7 °C) (10/10/19 0529)  Temp Source: Oral (10/10/19 0529)  Respirations: 20 (10/10/19 0630)  Height: 6' 1" (185 4 cm) (10/10/19 0448)  Weight - Scale: 78 9 kg (173 lb 15 1 oz) (10/10/19 0448)  SpO2: 97 % (10/10/19 0630)    Physical Exam   Constitutional: He is oriented to person, place, and time  He appears well-developed and well-nourished  No distress  HENT:   Head: Normocephalic and atraumatic  Right Ear: External ear normal    Left Ear: External ear normal    Nose: Nose normal    Mouth/Throat: Oropharynx is clear and moist  No oropharyngeal exudate  Eyes: Conjunctivae are normal  Right eye exhibits no discharge  Left eye exhibits no discharge  No scleral icterus  Neck: Normal range of motion  No JVD present  No tracheal deviation present  No thyromegaly present  Cardiovascular: Normal rate, regular rhythm, normal heart sounds and intact distal pulses  Exam reveals no gallop and no friction rub  No murmur heard  Pulmonary/Chest: Effort normal and breath sounds normal  No stridor  No respiratory distress  He has no wheezes  He has no rales  Abdominal: Soft  Bowel sounds are normal  He exhibits no distension  There is no tenderness  There is no rebound and no guarding  Musculoskeletal: Normal range of motion  He exhibits no edema, tenderness or deformity  Neurological: He is alert and oriented to person, place, and time  He has normal reflexes  He exhibits normal muscle tone  Coordination normal    Skin: Skin is warm and dry  No rash noted  He is not diaphoretic  No erythema  No pallor  Psychiatric: He has a normal mood and affect  His behavior is normal  Judgment and thought content normal    Nursing note and vitals reviewed  Additional Data:     Lab Results: I have personally reviewed pertinent reports  Results from last 7 days   Lab Units 10/10/19  0527   WBC Thousand/uL 10 91*   HEMOGLOBIN g/dL 14 8   HEMATOCRIT % 43 4   PLATELETS Thousands/uL 293   NEUTROS PCT % 75   LYMPHS PCT % 11*   MONOS PCT % 11   EOS PCT % 1     Results from last 7 days   Lab Units 10/10/19  0702   SODIUM mmol/L 140   POTASSIUM mmol/L 4 0   CHLORIDE mmol/L 102   CO2 mmol/L 25   BUN mg/dL 13   CREATININE mg/dL 0 72   ANION GAP mmol/L 13   CALCIUM mg/dL 9 1   ALBUMIN g/dL 4 2   TOTAL BILIRUBIN mg/dL 0 60   ALK PHOS U/L 68   ALT U/L 16   AST U/L 12   GLUCOSE RANDOM mg/dL 150*     Results from last 7 days   Lab Units 10/10/19  0701   INR  1 04                   Imaging: I have personally reviewed pertinent reports  CT chest abdomen pelvis wo contrast   Final Result by Kartik Hernandez DO (10/10 5081)   1    No traumatic solid or visceral organ injury, however intravenous contrast material not administered  2   Nondisplaced fractures of the right ribs without evidence of pneumothorax  3   Nodular infiltrate right lower lobe  Differential would include atelectasis, pulmonary contusion or chronic infection such as FRANCISCO  Follow-up CT scan of the chest in 3-6 months is recommended for further evaluation  4   Prostatomegaly with bilateral ureteroceles, left side larger than right  Follow-up urology consultation recommended  I personally discussed this study with [address] on 10/10/2019 at 6:27 AM                Workstation performed: [id number]         CT head without contrast   Final Result by Natalio Brown DO (10/10 4440)   1  Cerebral atrophy with chronic small vessel ischemic change  No acute intracranial abnormality  2   Bifrontal encephalomalacia and gliosis, suggesting the presence of prior trauma  Workstation performed: [id number]             EKG, Pathology, and Other Studies Reviewed on Admission:   · EKG:      Allscripts / Epic Records Reviewed: Yes     ** Please Note: This note has been constructed using a voice recognition system   **

## 2019-10-10 NOTE — ED NOTES
Unable to obtain IV and blood work at this time, provider made aware          Kaya Turner, JUDSON  10/10/19 9201

## 2019-10-10 NOTE — ED NOTES
Pt ambulatory to bathroom with steady gait, given menu to order lunch      Rosio Salazar, RN  10/10/19 6065

## 2019-10-10 NOTE — ED NOTES
CC- Fall    Admission related to injury?- Fractures of the R ribs    Orientation status- A&Ox4    Abnormal labs/abnormal focused assessment/vitals- Refer to recent lab values    Medication/drips- N/A    Last time narcotics given- N/A     IV lines/drains/etc- 20 R AC    Isolation status- N/A    Skin- Intact    Ambulation- Ambulatory     ED nurse's name and phone number- #[phone]       Ulysses Manis, RN  10/10/19 1954

## 2019-10-10 NOTE — ED NOTES
Unable to obtain IV access, provider made aware   Pt agreeable to another attempt at IV access     Diamond Espinoza RN  10/10/19 6645

## 2019-10-10 NOTE — ASSESSMENT & PLAN NOTE
Secondary to mechanical fall  PT OT evaluate and treat  Pain control  Would recommend contacting anesthesia see if therapy willing to do a nerve block for better control the patient's pain  Incentive spirometry to bedside  Fall precautions

## 2019-10-10 NOTE — ED PROVIDER NOTES
History  Chief Complaint   Patient presents with   Coretta Minor     pt reports tripping over a hose and falling at home around 5pm  pt reports right sided rib pain, pt denies LOC and denies hitting his head     History of Present Illness   76 y o  male presents to the ED after fall  Patient denies striking his head and denies any loss of consciousness  Patient states the fall occurred at home at 1700 hours when he tripped over a hose and struck his right side on a chair while walking outside  Patient currently complains of right anteriolateral inferior chest pain in the area of the inferior ribs  ROS: Patient denies any headache, extremity pain, fever/chills, neck pain, back pain, nausea/vomiting, visual changes, diarrhea, dyspnea, cough, arthralgia, dysuria  All other systems reviewed and are negative  PHYSICAL EXAM:   Primary Exam   A: Patent   B: Bilateral equal breath sounds   C: Pulses intact in all extremities, no active bleeding   D: No signs of gross motor or cognitive neurologic impairment     Secondary Exam   Constitutional: No acute distress  HENT: Normocephalic and atraumatic  Normal pharyngeal exam  No hemotympanum, raccoon eyes or Borrero sign  Eyes: No hyphema  EOMI  PERRL  Neck: No midline tenderness, supple  CV: Regular rate and rhythm, no murmur  Peripheral pulses intact  Respiratory: No traumatic findings  Lungs clear to auscultation bilaterally  Chest exquisitely tender to palpation on the right anteriolateral inferior lower ribs to palpation  Abdomen: No traumatic findings  Soft, Non-tender, non-distended  Back: No vertebral tenderness, step-offs or crepitus  Skin: Normal color, warm and dry   Extremities: Non-tender, no deformities     Neuro: Awake, alert, no gross sensory or motor deficits     Medical Decision Making   70-year-old male presenting status post fall at home approximately 12 hours ago with right-sided chest wall pain clinically concerning for rib fractures  Regarding patient's traumatic injuries:     Head: Will obtain CT imaging of patient's head to evaluate for intracranial process though patient denies striking his head and has no headache at present  Patient is on aspirin daily and is over 65 making clinical clearance not possible  Neck: Patient is alert and without any evidence of neurologic injury, intoxication, altered mental status, or distracting injury  Patient has no cervical spine tenderness upon physical exam and was able to range neck 45 degrees in each direction  Patients cervical spine was cleared by NEXUS criteria  Patient does not have injury mechanism that would warrant imaging despite nexus clearance  Chest:  Patient has discrete tenderness concerning for potential rib fracture, will obtain CT imaging of patient's chest to evaluate for intrathoracic injury  Will obtain EKG and troponin per [place] guidelines to evaluate for potential blunt cardiac injury that this is less likely based upon initial evaluation  Patient started with incentive spirometer with concerns for potential rib or cartilaginous injuries  Patient currently declining analgesia though I have encouraged him to take pain medicine to allow his breathing to improve and I have discussed the risks of inadequate pain control  Abdomen/pelvis:  Patient does not have discrete abdominal tenderness though patient's pain is located directly over the area of the hepatic flexure so will include CT imaging of patient's abdomen and pelvis to evaluate for potential intra-abdominal injury  Extremities:  Nontender and without pain  Patient ambulating only with difficulty secondary to chest pain  Will monitor and reassess          History provided by:  Patient  Fall   Mechanism of injury: fall    Fall:     Fall occurred:  Down stairs    Impact surface:  [employer] of impact: chest   Associated symptoms: chest pain    Associated symptoms: no abdominal pain, no back pain, no blindness, no difficulty breathing, no headaches, no hearing loss, no loss of consciousness, no nausea, no neck pain, no seizures and no vomiting        Prior to Admission Medications   Prescriptions Last Dose Informant Patient Reported? Taking? Multiple Vitamins-Minerals (MULTIVITAMIN WITH MINERALS) tablet  Self Yes No   Sig: Take 1 tablet by mouth daily   albuterol (PROVENTIL HFA,VENTOLIN HFA) 90 mcg/act inhaler   No No   Sig: Inhale 2 puffs every 6 (six) hours as needed for wheezing   aspirin 325 mg tablet  Self Yes No   Sig: Take 325 mg by mouth 2 (two) times a day      Facility-Administered Medications: None       History reviewed  No pertinent past medical history  History reviewed  No pertinent surgical history  Family History   Problem Relation Age of Onset    Dementia Mother     Heart disease Father      I have reviewed and agree with the history as documented  Social History     Tobacco Use    Smoking status: Current Every Day Smoker     Packs/day: 1 00     Types: Cigarettes    Smokeless tobacco: Never Used   Substance Use Topics    Alcohol use: Never     Alcohol/week: 0 0 standard drinks     Frequency: Never     Binge frequency: Never     Comment: 0    Drug use: No        Review of Systems   HENT: Negative for hearing loss  Eyes: Negative for blindness  Cardiovascular: Positive for chest pain  Gastrointestinal: Negative for abdominal pain, nausea and vomiting  Musculoskeletal: Negative for back pain and neck pain  Neurological: Negative for seizures, loss of consciousness and headaches  All other systems reviewed and are negative  Physical Exam  Physical Exam   Constitutional: He appears well-developed and well-nourished  He appears distressed  HENT:   Head: Normocephalic and atraumatic  Eyes: Pupils are equal, round, and reactive to light  EOM are normal    Neck: Neck supple  Cardiovascular: Normal rate and regular rhythm     Pulmonary/Chest: Effort normal and breath sounds normal  He exhibits tenderness  Abdominal: Soft  Bowel sounds are normal  He exhibits no distension  There is no tenderness  Musculoskeletal: He exhibits no tenderness or deformity  Neurological: He is alert  Skin: Skin is warm and dry  He is not diaphoretic  Psychiatric: He has a normal mood and affect  Vitals reviewed        Vital Signs  ED Triage Vitals   Temperature Pulse Respirations Blood Pressure SpO2   10/10/19 0529 10/10/19 0448 10/10/19 0448 10/10/19 0448 10/10/19 0448   98 °F (36 7 °C) 90 18 (!) 219/107 99 %      Temp Source Heart Rate Source Patient Position - Orthostatic VS BP Location FiO2 (%)   10/10/19 0529 10/10/19 0448 10/10/19 0448 10/10/19 0448 --   Oral Monitor Sitting Right arm       Pain Score       10/10/19 0448       8           Vitals:    10/10/19 1416 10/10/19 1956 10/10/19 2219 10/11/19 0742   BP: 140/71 162/79 170/81 157/84   Pulse: 81 71 70    Patient Position - Orthostatic VS: Sitting Lying           Visual Acuity  Visual Acuity      Most Recent Value   L Pupil Size (mm)  2   R Pupil Size (mm)  2          ED Medications  Medications   lidocaine (LIDODERM) 5 % patch 1 patch (1 patch Topical Patch Removed 10/10/19 1840)   acetaminophen (TYLENOL) tablet 975 mg (975 mg Oral Given 10/10/19 0640)       Diagnostic Studies  Results Reviewed     Procedure Component Value Units Date/Time    Basic metabolic panel [[id number]] Collected:  10/11/19 0531    Lab Status:  Final result Specimen:  Blood from Arm, Right Updated:  10/11/19 6421     Sodium 141 mmol/L      Potassium 4 4 mmol/L      Chloride 106 mmol/L      CO2 24 mmol/L      ANION GAP 11 mmol/L      BUN 13 mg/dL      Creatinine 0 76 mg/dL      Glucose 130 mg/dL      Calcium 8 9 mg/dL      eGFR 90 ml/min/1 73sq m     Narrative:       [place] guidelines for Chronic Kidney Disease (CKD):     Stage 1 with normal or high GFR (GFR > 90 mL/min/1 73 square meters)    Stage 2 Mild CKD (GFR = 60-89 mL/min/1 73 square meters)    Stage 3A Moderate CKD (GFR = 45-59 mL/min/1 73 square meters)    Stage 3B Moderate CKD (GFR = 30-44 mL/min/1 73 square meters)    Stage 4 Severe CKD (GFR = 15-29 mL/min/1 73 square meters)    Stage 5 End Stage CKD (GFR <15 mL/min/1 73 square meters)  Note: GFR calculation is accurate only with a steady state creatinine    CBC and differential [[id number]] Collected:  10/11/19 0531    Lab Status:  Final result Specimen:  Blood from Arm, Right Updated:  10/11/19 0538     WBC 7 60 Thousand/uL      RBC 4 55 Million/uL      Hemoglobin 14 3 g/dL      Hematocrit 42 3 %      MCV 93 fL      MCH 31 4 pg      MCHC 33 8 g/dL      RDW 14 2 %      MPV 10 7 fL      Platelets 326 Thousands/uL      nRBC 0 /100 WBCs      Neutrophils Relative 61 %      Immat GRANS % 1 %      Lymphocytes Relative 23 %      Monocytes Relative 12 %      Eosinophils Relative 2 %      Basophils Relative 1 %      Neutrophils Absolute 4 73 Thousands/µL      Immature Grans Absolute 0 06 Thousand/uL      Lymphocytes Absolute 1 74 Thousands/µL      Monocytes Absolute 0 88 Thousand/µL      Eosinophils Absolute 0 14 Thousand/µL      Basophils Absolute 0 05 Thousands/µL     Platelet count [[id number]]  (Normal) Collected:  10/10/19 2000    Lab Status:  Final result Specimen:  Blood from Arm, Right Updated:  10/10/19 2004     Platelets 410 Thousands/uL      MPV 10 7 fL     Troponin I [[id number]]  (Normal) Collected:  10/10/19 0659    Lab Status:  Final result Specimen:  Blood from Arm, Right Updated:  10/10/19 0728     Troponin I <0 02 ng/mL     Basic metabolic panel [[id number]]  (Abnormal) Collected:  10/10/19 0702    Lab Status:  Final result Specimen:  Blood from Arm, Right Updated:  10/10/19 0726     Sodium 140 mmol/L      Potassium 4 0 mmol/L      Chloride 102 mmol/L      CO2 25 mmol/L      ANION GAP 13 mmol/L      BUN 13 mg/dL      Creatinine 0 72 mg/dL      Glucose 150 mg/dL      Calcium 9 1 mg/dL      eGFR 92 ml/min/1 73sq m     Narrative:       National Kidney Disease Foundation guidelines for Chronic Kidney Disease (CKD):     Stage 1 with normal or high GFR (GFR > 90 mL/min/1 73 square meters)    Stage 2 Mild CKD (GFR = 60-89 mL/min/1 73 square meters)    Stage 3A Moderate CKD (GFR = 45-59 mL/min/1 73 square meters)    Stage 3B Moderate CKD (GFR = 30-44 mL/min/1 73 square meters)    Stage 4 Severe CKD (GFR = 15-29 mL/min/1 73 square meters)    Stage 5 End Stage CKD (GFR <15 mL/min/1 73 square meters)  Note: GFR calculation is accurate only with a steady state creatinine    Hepatic function panel [[id number]]  (Normal) Collected:  10/10/19 0702    Lab Status:  Final result Specimen:  Blood from Arm, Right Updated:  10/10/19 0726     Total Bilirubin 0 60 mg/dL      Bilirubin, Direct 0 16 mg/dL      Alkaline Phosphatase 68 U/L      AST 12 U/L      ALT 16 U/L      Total Protein 7 7 g/dL      Albumin 4 2 g/dL     Protime-INR [[id number]]  (Normal) Collected:  10/10/19 0701    Lab Status:  Final result Specimen:  Blood from Arm, Right Updated:  10/10/19 0720     Protime 13 6 seconds      INR 1 04    APTT [[id number]]  (Normal) Collected:  10/10/19 0701    Lab Status:  Final result Specimen:  Blood from Arm, Right Updated:  10/10/19 0720     PTT 28 seconds     CBC and differential [[id number]]  (Abnormal) Collected:  10/10/19 0527    Lab Status:  Final result Specimen:  Blood from Arm, Right Updated:  10/10/19 0541     WBC 10 91 Thousand/uL      RBC 4 70 Million/uL      Hemoglobin 14 8 g/dL      Hematocrit 43 4 %      MCV 92 fL      MCH 31 5 pg      MCHC 34 1 g/dL      RDW 14 2 %      MPV 11 2 fL      Platelets 484 Thousands/uL      nRBC 0 /100 WBCs      Neutrophils Relative 75 %      Immat GRANS % 1 %      Lymphocytes Relative 11 %      Monocytes Relative 11 %      Eosinophils Relative 1 %      Basophils Relative 1 %      Neutrophils Absolute 8 31 Thousands/µL      Immature Grans Absolute 0 07 Thousand/uL      Lymphocytes Absolute 1 22 Thousands/µL      Monocytes Absolute 1 15 Thousand/µL      Eosinophils Absolute 0 10 Thousand/µL      Basophils Absolute 0 06 Thousands/µL                  CT chest abdomen pelvis wo contrast   Final Result by Fanny Padilla DO (10/10 Edi Rubinstein)   1  No traumatic solid or visceral organ injury, however intravenous contrast material not administered  2   Nondisplaced fractures of the right ribs without evidence of pneumothorax  3   Nodular infiltrate right lower lobe  Differential would include atelectasis, pulmonary contusion or chronic infection such as FRANCISCO  Follow-up CT scan of the chest in 3-6 months is recommended for further evaluation  4   Prostatomegaly with bilateral ureteroceles, left side larger than right  Follow-up urology consultation recommended  I personally discussed this study with [address] on 10/10/2019 at 6:27 AM                Workstation performed: [id number]         CT head without contrast   Final Result by Fanny Padilla DO (10/10 1921)   1  Cerebral atrophy with chronic small vessel ischemic change  No acute intracranial abnormality  2   Bifrontal encephalomalacia and gliosis, suggesting the presence of prior trauma  Workstation performed: [id number]                    Procedures  Procedures       ED Course  ED Course as of Oct 13 2232   Thu Oct 10, 2019   0500 EKG demonstrates normal sinus rhythm with a right bundle-branch block  No prior to compare  No acute TANI       0533 Multiple attempts to obtain IV access by nursing were unsuccessful  Patient adamantly refusing to allow us to obtain additional blood work or IV access  Explained limitations of CT imaging without contrast the patient in detail and he is adamantly refusing additional attempts  Offered additional anesthesia or alternatives any continues to refuse after discussion of the risks associated with limited evaluation without full diagnostic evaluation  Patient appears clear and competent making medical decisions and is continuing to refuse full evaluation  Obtain CT imaging without contrast though I have explained to the patient the limitations of the study  8683 Patient has multiple nondisplaced rib fractures  I explained this to the patient, suggested transfer to [facility] for continued monitoring and evaluation  Patient does not wish to be transferred as he has familial obligations in the area  He is amenable to admission at [facility]  I discussed with Trauma who agrees patient would be better served with transfer to [facility] however they agree it would be in the patient's interest if he stays at [facility] if he continues to refuse transfer to [facility]  Again discussed with the patient and again emphasized the benefit of transfer to [facility] however he continues to decline transfer to [facility] but is agreeable for admission and at [facility]  Will order appropriate analgesia for the patient and admit to Medicine for continued monitoring and evaluation  Identification of Seniors at Risk      Most Recent Value   (ISAR) Identification of Seniors at Risk   Before the illness or injury that brought you to the Emergency, did you need someone to help you on a regular basis? 0 Filed at: 10/10/2019 0451   In the last 24 hours, have you needed more help than usual?  0 Filed at: 10/10/2019 0451   Have you been hospitalized for one or more nights during the past 6 months? 1 Filed at: 10/10/2019 0451   In general, do you see well?  0 Filed at: 10/10/2019 0451   In general, do you have serious problems with your memory? 0 Filed at: 10/10/2019 0451   Do you take more than three different medications every day?  0 Filed at: 10/10/2019 0451   ISAR Score  1 Filed at: 10/10/2019 0451              Initial Sepsis Screening     Row Name 10/10/19 8664                Is the patient's history suggestive of a new or worsening infection?   No  -CE Suspected source of infection          Are two or more of the following signs & symptoms of infection both present and new to the patient? No  -CE        Indicate SIRS criteria          If the answer is yes to both questions, suspicion of sepsis is present          If severe sepsis is present AND tissue hypoperfusion perists in the hour after fluid resuscitation or lactate > 4, the patient meets criteria for SEPTIC SHOCK          Are any of the following organ dysfunction criteria present within 6 hours of suspected infection and SIRS criteria that are NOT considered to be chronic conditions?         Organ dysfunction          Date of presentation of severe sepsis          Time of presentation of severe sepsis          Tissue hypoperfusion persists in the hour after crystalloid fluid administration, evidenced, by either:          Was hypotension present within one hour of the conclusion of crystalloid fluid administration?           Date of presentation of septic shock          Time of presentation of septic shock            User Key  (r) = Recorded By, (t) = Taken By, (c) = Cosigned By    Initials Name Provider Type    CE Angel Gilbert MD Physician                  MDM    Disposition  Final diagnoses:   Multiple rib fractures involving four or more ribs   Enlarged prostate   Abnormal CT of the chest     Time reflects when diagnosis was documented in both MDM as applicable and the Disposition within this note     Time User Action Codes Description Comment    10/10/2019  6:53 AM Spring Ochoa Add [S22 49XA] Multiple rib fractures involving four or more ribs     10/10/2019  6:53 AM Spring Ochoa Add [N40 0] Enlarged prostate     10/10/2019  6:53 AM Spring Ochoa Add [R93 89] Abnormal CT of the chest     10/11/2019  2:48 PM Abraham RAMSEY Add [Z72 0] Tobacco abuse       ED Disposition     ED Disposition Condition Date/Time Comment    Admit Stable Thu Oct 10, 2019  6:53 AM Case was discussed with SLIM and the patient's admission status was agreed to be Admission Status: inpatient status to the service of   [facility]           Follow-up Information    None         Discharge Medication List as of 10/11/2019  2:12 PM      CONTINUE these medications which have NOT CHANGED    Details   albuterol (PROVENTIL HFA,VENTOLIN HFA) 90 mcg/act inhaler Inhale 2 puffs every 6 (six) hours as needed for wheezing, Starting Sun 12/23/2018, Normal      aspirin 325 mg tablet Take 325 mg by mouth 2 (two) times a day, Historical Med      Multiple Vitamins-Minerals (MULTIVITAMIN WITH MINERALS) tablet Take 1 tablet by mouth daily, Historical Med           Outpatient Discharge Orders   Discharge Diet     Activity as tolerated     Lifting restrictions       ED Provider  Electronically Signed by           Flaco Zaman MD  10/13/19 2205

## 2019-10-10 NOTE — PLAN OF CARE
Problem: OCCUPATIONAL THERAPY ADULT  Goal: Performs self-care activities at highest level of function for planned discharge setting  See evaluation for individualized goals  Description  Treatment Interventions: ADL retraining, Endurance training, Equipment evaluation/education, Compensatory technique education, Continued evaluation, Energy conservation, Activityengagement  Equipment Recommended: Tub seat with back       See flowsheet documentation for full assessment, interventions and recommendations  Note:   Limitation: Decreased ADL status, Decreased endurance, Decreased high-level ADLs     Assessment: Pt is a 76 y o  male seen for OT evaluation (time 3929-8415) [place] [facility] [place] w/ Rib fractures  Comorbidities affecting pt's functional performance at time of assessment include: tobacco abuse, hx of polio  Personal factors affecting pt at time of IE include:steps to enter environment, limited home support, difficulty performing ADLS, difficulty performing IADLS , health management  and environment  Prior to admission, pt was INDEP with functional mobility, ADLs, and IADLs  Upon evaluation: Pt requires S for grooming and UB ADLs, Min A for LB ADLs, CGA for toileting, Min A x 1 for bed mobility to utilize log roll technique, SBA for sit<>stand transfers, CGA for functional mobility without AD <>bathroom 2* the following deficits impacting occupational performance: weakness, decreased ROM, decreased strength, decreased balance, decreased tolerance, increased pain and decreased coping skills  Cognition appears to be [facility]  Vision is [facility]  Development of pt POC requires clinical decision making of moderate complexity  Pt to benefit from continued skilled OT tx while in the hospital to address deficits as defined above and maximize level of functional independence w ADL's and functional mobility   Occupational Performance areas to address include: grooming, bathing/shower, toilet hygiene, dressing, medication management, socialization, health maintenance, functional mobility, community mobility, clothing management, cleaning, meal prep, money management, household maintenance, job performance/volunteering and social participation  From OT standpoint, recommendation at time of d/c would be short term rehab vs 24/7 supervision and assistance, as pt is requiring hands-on assistance for safety due to fall risk during daily routine, and pt reported that he lives alone; recommendation pending progression during acute care stay as well as interdisciplinary recommendations  Tx assessment:  Patient participated in Skilled OT session (time 2211-0723) this date with interventions consisting of ADL retraining with the use of correct body mechanics, long handled adaptive equipment training and maintaining back safety precautions  Educated pt on purpose of all items in hip kit, due to pt having difficulty with LB dressing due to increased pain and to ensure adequate joint protection  Educated pt on use of dressing stick to doff socks, sock aid to don socks, reacher for light item retrieval (enforced no heavy items for safety), and long handled sponge to limit twisting when washing back and to limit bending when washing feet  Pt receptive to education and appeared enthusiastic to utilize sock aid especially  Patient continues to be functioning below baseline level, occupational performance remains limited secondary to factors listed above and increased risk for falls and injury  From OT standpoint, recommendation at time of d/c would be short term rehab vs 24/7 supervision and assistance, as pt is requiring hands-on assistance for safety due to fall risk during daily routine, and pt reported that he lives alone; recommendation pending progression during acute care stay as well as interdisciplinary recommendations   Patient to benefit from continued Occupational Therapy treatment while in the hospital to address deficits as defined above and maximize level of functional independence with ADLs and functional mobility       OT Discharge Recommendation: Other (Comment)(please refer to assessment)  OT - OK to Discharge: (once medically cleared)

## 2019-10-11 ENCOUNTER — TRANSITIONAL CARE MANAGEMENT (OUTPATIENT)
Dept: FAMILY MEDICINE CLINIC | Facility: CLINIC | Age: 74
End: 2019-10-11

## 2019-10-11 VITALS
HEIGHT: 73 IN | DIASTOLIC BLOOD PRESSURE: 84 MMHG | RESPIRATION RATE: 18 BRPM | SYSTOLIC BLOOD PRESSURE: 157 MMHG | TEMPERATURE: 97 F | BODY MASS INDEX: 23.05 KG/M2 | OXYGEN SATURATION: 99 % | HEART RATE: 70 BPM | WEIGHT: 173.94 LBS

## 2019-10-11 LAB
ANION GAP SERPL CALCULATED.3IONS-SCNC: 11 MMOL/L (ref 4–13)
BASOPHILS # BLD AUTO: 0.05 THOUSANDS/ΜL (ref 0–0.1)
BASOPHILS NFR BLD AUTO: 1 % (ref 0–1)
BUN SERPL-MCNC: 13 MG/DL (ref 5–25)
CALCIUM SERPL-MCNC: 8.9 MG/DL (ref 8.3–10.1)
CHLORIDE SERPL-SCNC: 106 MMOL/L (ref 100–108)
CO2 SERPL-SCNC: 24 MMOL/L (ref 21–32)
CREAT SERPL-MCNC: 0.76 MG/DL (ref 0.6–1.3)
EOSINOPHIL # BLD AUTO: 0.14 THOUSAND/ΜL (ref 0–0.61)
EOSINOPHIL NFR BLD AUTO: 2 % (ref 0–6)
ERYTHROCYTE [DISTWIDTH] IN BLOOD BY AUTOMATED COUNT: 14.2 % (ref 11.6–15.1)
GFR SERPL CREATININE-BSD FRML MDRD: 90 ML/MIN/1.73SQ M
GLUCOSE SERPL-MCNC: 130 MG/DL (ref 65–140)
HCT VFR BLD AUTO: 42.3 % (ref 36.5–49.3)
HGB BLD-MCNC: 14.3 G/DL (ref 12–17)
IMM GRANULOCYTES # BLD AUTO: 0.06 THOUSAND/UL (ref 0–0.2)
IMM GRANULOCYTES NFR BLD AUTO: 1 % (ref 0–2)
LYMPHOCYTES # BLD AUTO: 1.74 THOUSANDS/ΜL (ref 0.6–4.47)
LYMPHOCYTES NFR BLD AUTO: 23 % (ref 14–44)
MCH RBC QN AUTO: 31.4 PG (ref 26.8–34.3)
MCHC RBC AUTO-ENTMCNC: 33.8 G/DL (ref 31.4–37.4)
MCV RBC AUTO: 93 FL (ref 82–98)
MONOCYTES # BLD AUTO: 0.88 THOUSAND/ΜL (ref 0.17–1.22)
MONOCYTES NFR BLD AUTO: 12 % (ref 4–12)
NEUTROPHILS # BLD AUTO: 4.73 THOUSANDS/ΜL (ref 1.85–7.62)
NEUTS SEG NFR BLD AUTO: 61 % (ref 43–75)
NRBC BLD AUTO-RTO: 0 /100 WBCS
PLATELET # BLD AUTO: 241 THOUSANDS/UL (ref 149–390)
PMV BLD AUTO: 10.7 FL (ref 8.9–12.7)
POTASSIUM SERPL-SCNC: 4.4 MMOL/L (ref 3.5–5.3)
RBC # BLD AUTO: 4.55 MILLION/UL (ref 3.88–5.62)
SODIUM SERPL-SCNC: 141 MMOL/L (ref 136–145)
WBC # BLD AUTO: 7.6 THOUSAND/UL (ref 4.31–10.16)

## 2019-10-11 PROCEDURE — G8978 MOBILITY CURRENT STATUS: HCPCS

## 2019-10-11 PROCEDURE — 97110 THERAPEUTIC EXERCISES: CPT

## 2019-10-11 PROCEDURE — G8979 MOBILITY GOAL STATUS: HCPCS

## 2019-10-11 PROCEDURE — 97163 PT EVAL HIGH COMPLEX 45 MIN: CPT

## 2019-10-11 PROCEDURE — 85025 COMPLETE CBC W/AUTO DIFF WBC: CPT | Performed by: INTERNAL MEDICINE

## 2019-10-11 PROCEDURE — 99238 HOSP IP/OBS DSCHRG MGMT 30/<: CPT | Performed by: INTERNAL MEDICINE

## 2019-10-11 PROCEDURE — 80048 BASIC METABOLIC PNL TOTAL CA: CPT | Performed by: INTERNAL MEDICINE

## 2019-10-11 PROCEDURE — 97530 THERAPEUTIC ACTIVITIES: CPT

## 2019-10-11 PROCEDURE — 97535 SELF CARE MNGMENT TRAINING: CPT

## 2019-10-11 RX ORDER — NICOTINE 21 MG/24HR
1 PATCH, TRANSDERMAL 24 HOURS TRANSDERMAL DAILY
Qty: 28 PATCH | Refills: 0 | Status: SHIPPED | OUTPATIENT
Start: 2019-10-11 | End: 2019-10-18 | Stop reason: ALTCHOICE

## 2019-10-11 RX ADMIN — ACETAMINOPHEN 975 MG: 325 TABLET ORAL at 01:05

## 2019-10-11 RX ADMIN — ACETAMINOPHEN 975 MG: 325 TABLET ORAL at 11:02

## 2019-10-11 RX ADMIN — Medication 1 TABLET: at 08:21

## 2019-10-11 RX ADMIN — HEPARIN SODIUM 5000 UNITS: 5000 INJECTION INTRAVENOUS; SUBCUTANEOUS at 05:41

## 2019-10-11 RX ADMIN — ASPIRIN 325 MG ORAL TABLET 325 MG: 325 PILL ORAL at 08:21

## 2019-10-11 NOTE — OCCUPATIONAL THERAPY NOTE
OCCUPATIONAL THERAPY TREATMENT  NOTE       10/11/19 0932   Restrictions/Precautions   Weight Bearing Precautions Per Order No   Other Precautions Fall Risk;Pain   Pain Assessment   Pain Assessment 0-10   Pain Score 5   Pain Type Acute pain   Pain Location Rib cage   Pain Orientation Right   Pain Descriptors Aching   Pain Frequency Constant/continuous   ADL   Where Assessed Sitting at sink   Equipment Provided Reacher;Sock aid   Grooming Assistance 6  Modified Independent   Grooming Deficit Setup; Increased time to complete   UB Bathing Assistance 5  Supervision/Setup   UB Bathing Deficit Setup;Verbal cueing;Supervision/safety; Increased time to complete   LB Bathing Assistance 4  Minimal Assistance   LB Bathing Deficit Setup;Verbal cueing;Supervision/safety; Increased time to complete  (use of LHS rec )   UB Dressing Assistance 5  Supervision/Setup   UB Dressing Deficit Setup;Verbal cueing;Supervision/safety; Increased time to complete   LB Dressing Assistance 4  Minimal Assistance   LB Dressing Deficit Setup;Verbal cueing;Supervision/safety; Increased time to complete; Don/doff R sock; Thread RLE into pants;Use of adaptive equipment   Bed Mobility   Additional Comments pt recv'd OOB   Transfers   Sit to Stand 5  Supervision   Additional items Assist x 1; Armrests;Trapeze bar  (CGA)   Stand to Sit 5  Supervision   Additional items Assist x 1; Armrests; Increased time required;Verbal cues  (vc's to sit "softly")   Functional Mobility   Functional Mobility 4  Minimal assistance   Additional Comments CGA    Additional items   (No AD needed)   Therapeutic Excerise-Strength   UE Strength Yes   Right Upper Extremity- Strength   R Shoulder Flexion;ABduction; Extension;Horizontal ABduction; External rotation; Internal rotation   R Elbow Elbow flexion;Elbow extension   R Weight/Reps/Sets 10 reps x 2   Left Upper Extremity-Strength   L Shoulder Flexion;ABduction; Extension;Horizontal ABduction; External rotation; Internal rotation  (due to impaired ROM , pt uses RUE to A with LUE mvmt )   L Elbow Elbow flexion;Elbow extension   L Weights/Reps/Sets 10 reps x 2   Cognition   Overall Cognitive Status WFL   Arousal/Participation Alert; Cooperative   Attention Within functional limits   Orientation Level Oriented X4   Memory Within functional limits   Following Commands Follows all commands and directions without difficulty   Comments pt agreeable to skilled OT tx   Additional Activities   Additional Activities Other (Comment)   Additional Activities Comments Ed /trialing of SA and LHR for LB dressing   Activity Tolerance   Activity Tolerance Patient tolerated treatment well   Medical Staff Made Aware Yes  JUDSON Smith made aware   Assessment   Assessment Pt cooperative and agreeable to skilled OT services with focus on improving safety and independence during functional mobility and self care skills  Pt seated in recliner  upon start of session with no report of pain Sit >stand with CGA with pt stating that his muscles felt stiff from sitting too long  Pt able to ambulate in/out bathroom without AD/CGA demonstrating no LOB, however gait was slow and stiff  Pt performed ADL seated at sink   See note for details  Pt ambulate > recliner with CGA and vc's to sit "softly" due to pt's tendency to flop back into chair even with use of hands on arm rests to A  Pt utilized LHR to don R leg into pants and SA to A with donning R sock  Pt demonstrated good during presented tasks  Pt reviewed/ performed HEP previously recommended to her by prior therapist See note for details  Pt  educated in energy conservation techniques and reinforcement of safe txfer technique  Pt performance demonstrated improving carryover of learned techniques and strategies to facilitate safety during self care and daily routine, however pt continues to demonstrate deficits in the areas of self care and functional mobility   Pt would continue to benefit from skilled OT POC to facilitate return to PLOF  Plan   Treatment Interventions ADL retraining;Visual perceptual retraining;Functional transfer training;UE strengthening/ROM; Endurance training;Patient/family training;Equipment evaluation/education; Fine motor coordination activities; Compensatory technique education;Continued evaluation; Energy conservation; Activityengagement   Goal Expiration Date 10/18/19   OT Frequency 3-5x/wk   Recommendation   OT Discharge Recommendation Short Term Rehab  (vs 24 -hr supervision)   OT - OK to Discharge Yes  (when medically cleared)                                                       PIPPA Bajwa/L

## 2019-10-11 NOTE — PHYSICAL THERAPY NOTE
Physical Therapy Evaluation     Patient's Name: Marquita Nolan    Admitting Diagnosis  Enlarged prostate [N40 0]  Side pain [R10 9]  Abnormal CT of the chest [R93 89]  Multiple rib fractures involving four or more ribs [S22 49XA]    Problem List  Patient Active Problem List   Diagnosis    Encounter to establish care    Current every day smoker    Pneumonia of right lower lobe due to infectious organism ([address] )    Tobacco abuse    Rib fractures       Past Medical History  History reviewed  No pertinent past medical history  Past Surgical History  History reviewed  No pertinent surgical history  10/11/19 0841   Note Type   Note type Eval/Treat   Pain Assessment   Pain Assessment 0-10   Pain Score 4   Pain Type Acute pain   Pain Location Rib cage   Pain Orientation Right   Pain Descriptors Aching   Pain Frequency Constant/continuous   Pain Onset Ongoing   Clinical Progression Gradually improving   Patient's Stated Pain Goal No pain   Hospital Pain Intervention(s) Ambulation/increased activity;Repositioned; Rest   Home Living   Type of [place] to enter with rails; Laundry in basement;Performs ADLs on one level; Able to live on main level with bedroom/bathroom  (bi-level; 1 + 8 TNAI with b/l rails or 5 TANI with b/l rails)   Bathroom Shower/Tub Tub/shower unit   Bathroom Toilet Raised   Bathroom Equipment Toilet raiser;Grab bars in shower   [address]   (no AD for functional mobility)   Prior Function   Level of [place] Independent with ADLs and functional mobility   Lives With Alone   Receives Help From Neighbor  (friends/neighbors "keep an eye on me")   ADL Assistance Independent   IADLs Independent   Falls in the last 6 months 1 to 4   Vocational Full time employment   Comments pt drives   Restrictions/Precautions   Weight Bearing Precautions Per Order No   Other Precautions Fall Risk;Pain   General   Family/Caregiver Present No   Cognition Overall Cognitive Status WFL   Arousal/Participation Alert   Attention Within functional limits   Orientation Level Oriented X4   Memory Within functional limits   Following Commands Follows all commands and directions without difficulty   Comments Pt able to identify self by full name and birthdate  Pt able to provide detailed social hx without difficulty  Pt demo'd no safety awareness concerns  RUE Assessment   RUE Assessment WFL   LUE Assessment   LUE Assessment WFL   RLE Assessment   RLE Assessment WFL   LLE Assessment   LLE Assessment WFL   Coordination   Movements are Fluid and Coordinated 1   Sensation WFL  (pt denies numbness/tingling)   Light Touch   RLE Light Touch Grossly intact   LLE Light Touch Grossly intact   Bed Mobility   Supine to Sit 6  Modified independent   Additional items HOB elevated   Sit to Supine 6  Modified independent   Additional items HOB elevated   Transfers   Sit to Stand 5  Supervision   Additional items Assist x 1;Verbal cues   Stand to Sit 5  Supervision   Additional items Assist x 1;Verbal cues   Ambulation/Elevation   Gait pattern R Knee [place]; Improper Weight shift; Shuffling; Short stride   Gait Assistance 5  Supervision   Additional items Assist x 1;Verbal cues   Assistive Device None   Distance 150 ft   Stair Management Assistance Not tested   Balance   Static Sitting Normal   Dynamic Sitting Good   Static Standing Fair +   Dynamic Standing Fair   Ambulatory Fair   Endurance Deficit   Endurance Deficit Yes   Activity Tolerance   Activity Tolerance Patient limited by fatigue;Patient limited by pain   Nurse Made Aware Yes, RN Matt Champ verbalized pt appropriate for PT session, made aware of outcomes   Assessment   Prognosis Good   Problem List Decreased strength;Decreased endurance;Decreased mobility;Pain   Assessment Pt is 76 y o  male seen for PT evaluation on 10/11/2019 s/p admit to [facility] on 10/10/2019 w/ Rib fractures 2/2 mechanical fall   PT consulted to assess pt's functional mobility and d/c needs  Order placed for PT eval and tx, w/ up w/ A order  Performed at least 2 patient identifiers during session: Name and wristband  Comorbidities affecting pt's physical performance at time of assessment include: tobacco abuse, hx of polio  PTA, pt was independent w/ all functional mobility w/ no AD, ambulates unrestricted distances and all terrain, has 8+1 vs 5 TANI, lives alone in bi- level home and works full time  Personal factors affecting pt at time of IE include: unable to perform dynamic tasks in community, positive fall history and unable to perform physical activity  Please find objective findings from PT assessment regarding body systems outlined above with impairments and limitations including impaired balance, decreased endurance, pain, decreased activity tolerance, decreased functional mobility tolerance and fall risk, as well as mobility assessment (need for cueing for mobility technique)  The following objective measures performed on IE also reveal limitations: Barthel Index: 65/100  Pt's clinical presentation is currently unstable/unpredictable seen in pt's presentation of ongoing medical assessment  Pt to benefit from continued PT tx to address deficits as defined above and maximize level of functional independent mobility and consistency  From PT/mobility standpoint, recommendation at time of d/c would be anticipate no needs pending progress in order to facilitate return to PLOF     Barriers to Discharge None   Goals   Patient Goals "to return home"   STG Expiration Date 10/21/19   Short Term Goal #1 In 7-10 days: Increase bilateral LE strength 1/2 grade to facilitate independent mobility, Perform all transfers modified independent to improve independence, Ambulate > 250 ft  with least restrictive assistive device modified independent w/o LOB and w/ normalized gait pattern 100% of the time, Navigate 8+1 stairs modified independent with unilateral handrail to facilitate return to previous living environment, Increase all balance 1/2 grade to decrease risk for falls, Complete exercise program independently and Tolerate 4 hr OOB to faciliate upright tolerance   PT Treatment Day 0   Plan   Treatment/Interventions Functional transfer training;Elevations; Therapeutic exercise;Patient/family training;Gait training;Spoke to nursing   PT Frequency 2-3x/wk   Recommendation   Recommendation Home with family support  (anticipate no skilled PT needs)   Equipment Recommended   (none at this time)   PT - OK to Discharge Yes  (when medically cleared)   Barthel Index   Feeding 10   Bathing 0   Grooming Score 5   Dressing Score 5   Bladder Score 10   Bowels Score 10   Toilet Use Score 5   Transfers (Bed/Chair) Score 10   Mobility (Level Surface) Score 10   Stairs Score 0  (DNT)   Barthel Index Score 65         Braxton Pablo, PT

## 2019-10-11 NOTE — DISCHARGE SUMMARY
Discharge Summary - [address] [facility]    Patient Information: Zuleima Davey 76 y o  male MRN: [id number]  Unit/Bed#: -01 Encounter: [id number]    Discharging Physician / Practitioner: Avni Condon MD  PCP: Jey Suh MD  Admission Date: 10/10/2019  Discharge Date: 10/11/19    Reason for Admission: right side pain    Discharge Diagnoses:     Principal Problem:  ·   Rib fractures: clinically stable overnight  Pain reasonably managed  Patient feels confident going home  Will follow up with his pcp in next week   To check on blood pressure  Instructed not do lifting for at lest next two weeks  Active Problems:  ·   Tobacco abuse; counseled against        Consultations During Hospital Stay:  · None    Procedures Performed:     · CT head:  Cerebral atrophy with chronic small vessel ischemic change  On bifrontal encephalomalacia with gliosis suggesting presence of prior trauma  · CT chest abdomen pelvis:  1  No traumatic solid or visceral organ injury, however intravenous contrast material not administered  2   Nondisplaced fractures of the right ribs without evidence of pneumothorax  3   Nodular infiltrate right lower lobe  Differential would include atelectasis, pulmonary contusion or chronic infection such as FRANCISCO  Follow-up CT scan of the chest in 3-6 months is recommended for further evaluation  · 4  Prostatomegaly with bilateral ureteroceles, left side larger than right  Follow-up urology consultation recommended    Significant Findings / Test Results:     · As above  · Discharging creatinine 0 76  · Discharging white count 7 60  · Discharging hemoglobin 14 3    Incidental Findings:   · Bilateral ureterocele is a left larger than right (patient reports no discomfort)    Test Results Pending at Discharge (will require follow up):    · None     Outpatient Tests Requested:  · None    Complications:  None    Hospital Course:     Zuleima Davey is a 76 y o  male patient who originally presented to the hospital on 10/10/2019 due to right-sided pain  Patient evidently took a spill down the stairs holding a garden hose and hit his side on the porch chair  Patient states he went inside and took care of his dogs and subsequently fell asleep  When he woke up he was in excruciating pain and so he brought himself to the emergency room  Patient was found to have 3 cracked ribs on the right nondisplaced  He was monitored overnight  Pain is reasonably controlled  Patient without evidence for pneumonia  Course was complicated by slight blood pressure elevations which have resolved  Patient should follow up with his PCP to re-evaluate his blood pressures in the next couple of weeks  Condition at Discharge: good     Discharge Day Visit / Exam:     Subjective:  Patient states pain is reasonably controlled no new complaints  Eight well  Vitals: Blood Pressure: 157/84 (10/11/19 0742)  Pulse: 70 (10/10/19 2219)  Temperature: (!) 97 °F (36 1 °C) (10/11/19 0742)  Temp Source: Oral (10/10/19 0529)  Respirations: 18 (10/11/19 0742)  Height: 6' 1" (185 4 cm) (10/10/19 2220)  Weight - Scale: 78 9 kg (173 lb 15 1 oz) (10/10/19 2220)  SpO2: 99 % (10/10/19 2219)  Exam:   Physical Exam    General well-developed reasonably nourished male no acute distress normocephalic atraumatic pupils equal round reactive to light extraocular muscles intact mucous membranes are moist neck is supple there is no JVD no lymph nodes no carotid bruits chest is decreased but clear to auscultation is no rhonchi rales or wheezes  Cardiovascular regular rate rhythm positive S1 and S2 no S3-S4 murmur or gallop  Abdomen soft nontender nondistended with positive bowel sounds no hepatosplenomegaly no guarding or rebound  Neurologically awake alert oriented cranial nerves 2-12 intact    Discharge instructions/Information to patient and family:   See after visit summary for information provided to patient and family  Provisions for Follow-Up Care:  See after visit summary for information related to follow-up care and any pertinent home health orders  Disposition:     Home    For Discharges to [address] [facility]:   · Not Applicable to this Patient - Not Applicable to this Patient    Planned Readmission:  None     Discharge Statement:  I spent 25 minutes discharging the patient  This time was spent on the day of discharge  I had direct contact with the patient on the day of discharge  Greater than 50% of the total time was spent examining patient, answering all patient questions, arranging and discussing plan of care with patient as well as directly providing post-discharge instructions  Additional time then spent on discharge activities  Discharge Medications:  See after visit summary for reconciled discharge medications provided to patient and family        ** Please Note: This note has been constructed using a voice recognition system **

## 2019-10-11 NOTE — UTILIZATION REVIEW
Initial Clinical Review    Admission: Date/Time/Statement: Inpatient Admission Orders (From admission, onward)     Ordered        10/10/19 0656  Inpatient Admission  Once                   Orders Placed This Encounter   Procedures    Inpatient Admission     Standing Status:   Standing     Number of Occurrences:   1     Order Specific Question:   Admitting Physician     Answer:   Azul Grossman [[id number]]     Order Specific Question:   Level of Care     Answer:   Med Surg [[id number]]     Order Specific Question:   Estimated length of stay     Answer:   More than 2 Midnights     Order Specific Question:   Certification     Answer:   I certify that inpatient services are medically necessary for this patient for a duration of greater than two midnights  See H&P and MD Progress Notes for additional information about the patient's course of treatment  ED Arrival Information     Expected Arrival Acuity Means of Arrival Escorted By Service Admission Type    - 10/10/2019 04:36 Urgent Walk-In Self General Medicine Urgent    Arrival Complaint    fall/side pain        Chief Complaint   Patient presents with   Lulu Garciaad     pt reports tripping over a hose and falling at home around 5pm  pt reports right sided rib pain, pt denies LOC and denies hitting his head     Assessment/Plan: 76year old male to the ED from home after sustaining fall after tripping, sustaining rib injuries about 12 hours prior to his arrival   Admitted to inpatient for rib fractures  He has chest pian and tenderness  Lungs clear  Found to have 5 rib fractures  PT/OT evaluation  Found to have elevated BP  Treat with prn IV medications  PRN pain medications     ED Triage Vitals   Temperature Pulse Respirations Blood Pressure SpO2   10/10/19 0529 10/10/19 0448 10/10/19 0448 10/10/19 0448 10/10/19 0448   98 °F (36 7 °C) 90 18 (!) 219/107 99 %      Temp Source Heart Rate Source Patient Position - Orthostatic VS BP Location FiO2 (%)   10/10/19 0529 10/10/19 0448 10/10/19 0448 10/10/19 0448 --   Oral Monitor Sitting Right arm       Pain Score       10/10/19 0448       8        Wt Readings from Last 1 Encounters:   10/10/19 78 9 kg (173 lb 15 1 oz)     Additional Vital Signs:   /Time  Temp  Pulse  Resp  BP  MAP (mmHg)  SpO2  O2 Device  Patient Position - Orthostatic VS   10/11/19 0825              None (Room air)     10/11/19 07:42:17  97 °F (36 1 °C)Abnormal     18  157/84  108         10/11/19 0106              None (Room air)     10/10/19 22:19:13  97 1 °F (36 2 °C)Abnormal   70  19  170/81  111  99 %       10/10/19 1956    71  18  162/79    97 %  None (Room air)  Lying   10/10/19 1416    81  16  140/71    98 %  None (Room air)  Sitting   10/10/19 0630    96  20  193/147Abnormal     97 %    Sitting   10/10/19 0600    87  19  194/93Abnormal     96 %  None (Room air)  Lying   10/10/19 0530    88  23Abnormal   190/89Abnormal     96 %    Lying   10/10/19 0529  98 °F (36 7 °C)                 10/10/19 0500    96  13  219/107Abnormal     96 %    Lying   10/10/19 0448    90  18  219/107Abnormal              Pain Assessment     Row Name  10/11/19  1102  10/11/19  0932  10/11/19  0841   10/11/19  0105   Pain Assessment   Pain Assessment  0-10  0-10  0-10      Pain Score  4  5  4   5   Pain Type    Acute pain  Acute pain      Pain Location  Rib cage  Rib cage  Rib cage   Rib cage   Pain Orientation  Right  Right  Right   Right   Pain Descriptors    Aching  Aching (P)    Aching   Pain Frequency    Constant/gabby-  nuous  Constant/gabby-  nuous (P)       Pain Onset      Ongoing (P)       Clinical Progression      Gradually impr-  oving (P)       Patient's Stated Pain Goal      No pain (P)       Hospital Pain Intervention(s)      Ambulation/inc-  reased activit-  y;Repositioned;   Rest (P)         Pertinent Labs/Diagnostic Test Results:   CT CEst/A/P 10/10:  No traumatic solid or visceral organ injury, however intravenous contrast material not administered   Nondisplaced fractures of the right ribs without evidence of pneumothorax  Nodular infiltrate right lower lobe   Differential would include atelectasis, pulmonary contusion or chronic infection such as FRANCISCO     Prostatomegaly with bilateral ureteroceles, left side larger than right  CT head 10/10: Cerebral atrophy with chronic small vessel ischemic change   No acute intracranial abnormality     Bifrontal encephalomalacia and gliosis, suggesting the presence of prior trauma    Results from last 7 days   Lab Units 10/11/19  0531 10/10/19  2000 10/10/19  0527   WBC Thousand/uL 7 60  --  10 91*   HEMOGLOBIN g/dL 14 3  --  14 8   HEMATOCRIT % 42 3  --  43 4   PLATELETS Thousands/uL 241 236 293   NEUTROS ABS Thousands/µL 4 73  --  8 31*         Results from last 7 days   Lab Units 10/11/19  0531 10/10/19  0702   SODIUM mmol/L 141 140   POTASSIUM mmol/L 4 4 4 0   CHLORIDE mmol/L 106 102   CO2 mmol/L 24 25   ANION GAP mmol/L 11 13   BUN mg/dL 13 13   CREATININE mg/dL 0 76 0 72   EGFR ml/min/1 73sq m 90 92   CALCIUM mg/dL 8 9 9 1     Results from last 7 days   Lab Units 10/10/19  0702   AST U/L 12   ALT U/L 16   ALK PHOS U/L 68   TOTAL PROTEIN g/dL 7 7   ALBUMIN g/dL 4 2   TOTAL BILIRUBIN mg/dL 0 60   BILIRUBIN DIRECT mg/dL 0 16         Results from last 7 days   Lab Units 10/11/19  0531 10/10/19  0702   GLUCOSE RANDOM mg/dL 130 150*       Results from last 7 days   Lab Units 10/10/19  0659   TROPONIN I ng/mL <0 02         Results from last 7 days   Lab Units 10/10/19  0701   PROTIME seconds 13 6   INR  1 04   PTT seconds 28       ED Treatment:   Medication Administration from 10/10/2019 0436 to 10/10/2019 2159       Date/Time Order Dose Route Action     10/10/2019 1840 lidocaine (LIDODERM) 5 % patch 1 patch 1 patch Topical Patch Removed     10/10/2019 0640 lidocaine (LIDODERM) 5 % patch 1 patch 1 patch Topical Medication Applied     10/10/2019 0640 acetaminophen (TYLENOL) tablet 975 mg 975 mg Oral Given     10/10/2019 1808 acetaminophen (TYLENOL) tablet 975 mg 975 mg Oral Given     10/10/2019 1215 acetaminophen (TYLENOL) tablet 975 mg 0 mg Oral Hold     10/10/2019 1808 aspirin tablet 325 mg 325 mg Oral Given     10/10/2019 0945 aspirin tablet 325 mg 325 mg Oral Given     10/10/2019 0945 multivitamin-minerals (CENTRUM) tablet 1 tablet 1 tablet Oral Given     10/10/2019 1430 heparin (porcine) subcutaneous injection 5,000 Units 5,000 Units Subcutaneous Given     10/10/2019 0945 heparin (porcine) subcutaneous injection 5,000 Units 5,000 Units Subcutaneous Given          Admitting Diagnosis: Enlarged prostate [N40 0]  Side pain [R10 9]  Abnormal CT of the chest [R93 89]  Multiple rib fractures involving four or more ribs [S22 49XA]  Age/Sex: 76 y o  male  Admission Orders:  Incentive spirometer  I/O  SCds  Up with assist    Medications:  acetaminophen 975 mg Oral Q6H [address]   aspirin 325 mg Oral BID   heparin (porcine) 5,000 Units Subcutaneous Q8H [address]   multivitamin-minerals 1 tablet Oral Daily          albuterol 2 puff Inhalation Q6H PRN   fentanyl citrate (PF) 25 mcg Intravenous Q2H PRN   hydrALAZINE 5 mg Intravenous Q6H PRN   oxyCODONE 5 mg Oral Q6H PRN     Network Utilization Review Department  Phone: [phone]; Fax [phone]  [email or web address]  ATTENTION: Please call with any questions or concerns to [phone]  and carefully listen to the prompts so that you are directed to the right person  Send all requests for admission clinical reviews, approved or denied determinations and any other requests to fax [phone]   All voicemails are confidential

## 2019-10-11 NOTE — PLAN OF CARE
Problem: Potential for Falls  Goal: Patient will remain free of falls  Description  INTERVENTIONS:  - Assess patient frequently for physical needs  -  Identify cognitive and physical deficits and behaviors that affect risk of falls    -  [place] fall precautions as indicated by assessment   - Educate patient/family on patient safety including physical limitations  - Instruct patient to call for assistance with activity based on assessment  - Modify environment to reduce risk of injury  - Consider OT/PT consult to assist with strengthening/mobility  Outcome: Progressing     Problem: RESPIRATORY - ADULT  Goal: Achieves optimal ventilation and oxygenation  Description  INTERVENTIONS:  - Assess for changes in respiratory status  - Assess for changes in mentation and behavior  - Position to facilitate oxygenation and minimize respiratory effort  - Oxygen administered by appropriate delivery if ordered  - Initiate smoking cessation education as indicated  - Encourage broncho-pulmonary hygiene including cough, deep breathe, Incentive Spirometry  - Assess the need for suctioning and aspirate as needed  - Assess and instruct to report SOB or any respiratory difficulty  - Respiratory Therapy support as indicated  Outcome: Progressing

## 2019-10-11 NOTE — PLAN OF CARE
Problem: PHYSICAL THERAPY ADULT  Goal: Performs mobility at highest level of function for planned discharge setting  See evaluation for individualized goals  Description  Treatment/Interventions: (P) Functional transfer training, Elevations, Therapeutic exercise, Patient/family training, Gait training, Spoke to nursing  Equipment Recommended: (P) (none at this time)       See flowsheet documentation for full assessment, interventions and recommendations  Note:   Prognosis: (P) Good  Problem List: (P) Decreased strength, Decreased endurance, Decreased mobility, Pain  Assessment: (P) Pt is 76 y o  male seen for PT evaluation on 10/11/2019 s/p admit to [facility] on 10/10/2019 w/ Rib fractures 2/2 mechanical fall  PT consulted to assess pt's functional mobility and d/c needs  Order placed for PT eval and tx, w/ up w/ A order  Performed at least 2 patient identifiers during session: Name and wristband  Comorbidities affecting pt's physical performance at time of assessment include: tobacco abuse, hx of polio  PTA, pt was independent w/ all functional mobility w/ no AD, ambulates unrestricted distances and all terrain, has 8+1 vs 5 TANI, lives alone in bi- level home and works full time  Personal factors affecting pt at time of IE include: unable to perform dynamic tasks in community, positive fall history and unable to perform physical activity  Please find objective findings from PT assessment regarding body systems outlined above with impairments and limitations including impaired balance, decreased endurance, pain, decreased activity tolerance, decreased functional mobility tolerance and fall risk, as well as mobility assessment (need for cueing for mobility technique)  The following objective measures performed on IE also reveal limitations: Barthel Index: 65/100  Pt's clinical presentation is currently unstable/unpredictable seen in pt's presentation of ongoing medical assessment   Pt to benefit from continued PT tx to address deficits as defined above and maximize level of functional independent mobility and consistency  From PT/mobility standpoint, recommendation at time of d/c would be anticipate no needs pending progress in order to facilitate return to PLOF  Barriers to Discharge: (P) None     Recommendation: (P) Home with family support(anticipate no skilled PT needs)     PT - OK to Discharge: (P) Yes(when medically cleared)    See flowsheet documentation for full assessment

## 2019-10-11 NOTE — PLAN OF CARE
Problem: OCCUPATIONAL THERAPY ADULT  Goal: Performs self-care activities at highest level of function for planned discharge setting  See evaluation for individualized goals  Description  Treatment Interventions: ADL retraining, Endurance training, Equipment evaluation/education, Compensatory technique education, Continued evaluation, Energy conservation, Activityengagement  Equipment Recommended: Tub seat with back       See flowsheet documentation for full assessment, interventions and recommendations  Outcome: Progressing  Note:   Limitation: Decreased ADL status, Decreased endurance, Decreased high-level ADLs     Assessment: Pt cooperative and agreeable to skilled OT services with focus on improving safety and independence during functional mobility and self care skills  Pt seated in recliner  upon start of session with no report of pain Sit >stand with CGA with pt stating that his muscles felt stiff from sitting too long  Pt able to ambulate in/out bathroom without AD/CGA demonstrating no LOB, however gait was slow and stiff  Pt performed ADL seated at sink   See note for details  Pt ambulate > recliner with CGA and vc's to sit "softly" due to pt's tendency to flop back into chair even with use of hands on arm rests to A  Pt utilized LHR to don R leg into pants and SA to A with donning R sock  Pt demonstrated good during presented tasks  Pt reviewed/ performed HEP previously recommended to her by prior therapist See note for details  Pt  educated in energy conservation techniques and reinforcement of safe txfer technique  Pt performance demonstrated improving carryover of learned techniques and strategies to facilitate safety during self care and daily routine, however pt continues to demonstrate deficits in the areas of self care and functional mobility  Pt would continue to benefit from skilled OT POC to facilitate return to PLOF        OT Discharge Recommendation: Short Term Rehab(vs 24 -hr supervision)  OT - OK to Discharge: Yes(when medically cleared)

## 2019-10-11 NOTE — QUICK NOTE
Patient seen in follow up to this early am admission  Describes pain as 4/10   No new issues over the course of the day  Noted to have elevated BP which we will be watching and treating with prn medications   No new recommendations at this time

## 2019-10-12 ENCOUNTER — TELEPHONE (OUTPATIENT)
Dept: PHYSICAL THERAPY | Facility: HOSPITAL | Age: 74
End: 2019-10-12

## 2019-10-12 NOTE — TELEPHONE ENCOUNTER
Telephone call was received from pt @ 10:01AM 10/12/19  Pt had question regarding set up for [facility]  PT communicated pt's concerns to Michelle Ha to follow up with pt   Pt also had question whether he could take excedrin, PT informed pt that this will be a question for his MD Johnnie Leiva, PT, DPT

## 2019-10-14 NOTE — UTILIZATION REVIEW
Notification of Discharge  This is a Notification of Discharge from our facility [address]  Please be advised that this patient has been discharge from our facility  Below you will find the admission and discharge date and time including the patients disposition  PRESENTATION DATE: 10/10/2019  4:43 AM  OBS ADMISSION DATE:   IP ADMISSION DATE: 10/10/19 0655   DISCHARGE DATE: 10/11/2019  3:06 PM  DISPOSITION: Home/Self Care Home/Self Care   Admission Orders listed below:  Admission Orders (From admission, onward)     Ordered        10/10/19 0656  Inpatient Admission  Once                   Please contact the UR Department if additional information is required to close this patient's authorization/case  [facility]  Phone: [phone]; Fax [phone]  [email or web address]  ATTENTION: Please call with any questions or concerns to [phone]  and carefully listen to the prompts so that you are directed to the right person  Send all requests for admission clinical reviews, approved or denied determinations and any other requests to fax [phone]   All voicemails are confidential

## 2019-10-18 ENCOUNTER — OFFICE VISIT (OUTPATIENT)
Dept: FAMILY MEDICINE CLINIC | Facility: CLINIC | Age: 74
End: 2019-10-18
Payer: COMMERCIAL

## 2019-10-18 VITALS
WEIGHT: 173.8 LBS | OXYGEN SATURATION: 98 % | TEMPERATURE: 96.8 F | HEIGHT: 73 IN | RESPIRATION RATE: 18 BRPM | HEART RATE: 76 BPM | BODY MASS INDEX: 23.03 KG/M2 | DIASTOLIC BLOOD PRESSURE: 70 MMHG | SYSTOLIC BLOOD PRESSURE: 150 MMHG

## 2019-10-18 DIAGNOSIS — S22.41XD CLOSED FRACTURE OF MULTIPLE RIBS OF RIGHT SIDE WITH ROUTINE HEALING, SUBSEQUENT ENCOUNTER: Primary | ICD-10-CM

## 2019-10-18 DIAGNOSIS — Z23 NEED FOR INFLUENZA VACCINATION: ICD-10-CM

## 2019-10-18 PROBLEM — Z76.89 ENCOUNTER TO ESTABLISH CARE: Status: RESOLVED | Noted: 2019-01-03 | Resolved: 2019-10-18

## 2019-10-18 PROCEDURE — 90662 IIV NO PRSV INCREASED AG IM: CPT | Performed by: FAMILY MEDICINE

## 2019-10-18 PROCEDURE — G0008 ADMIN INFLUENZA VIRUS VAC: HCPCS | Performed by: FAMILY MEDICINE

## 2019-10-18 PROCEDURE — 99495 TRANSJ CARE MGMT MOD F2F 14D: CPT | Performed by: FAMILY MEDICINE

## 2019-10-18 NOTE — LETTER
October 18, 2019     Patient: Disha Ibarra   YOB: 1945   Date of Visit: 10/18/2019       To Whom it May Concern:    Funmi Solomon is under my professional care  He was seen in my office on 10/18/2019  He may return to work on 10/21/2019  If you have any questions or concerns, please don't hesitate to call           Sincerely,          Juan M Galvin MD        CC: No Recipients

## 2019-10-18 NOTE — PROGRESS NOTES
Assessment/Plan:          Problem List Items Addressed This Visit        Musculoskeletal and Integument    Rib fractures - Primary     Progressing well, pain well controlled, some bruising noted on exam  Pt to return to work on 10/21           Other Visit Diagnoses     Need for influenza vaccination        Relevant Orders    influenza vaccine, [id number], high-dose, PF 0 5 mL (FLUZONE HIGH-DOSE) (Completed)           Subjective:     Patient ID: Pamela Byrne is a 76 y o  male  Pamela Byrne is a 76 y o  male patient who originally presented to the hospital on 10/10/2019 due to right-sided pain  Patient took a spill down the stairs holding a garden hose and hit his side on the porch chair  Patient states he went inside and took care of his dogs and subsequently fell asleep  When he woke up he was in excruciating pain and so he brought himself to the emergency room  Patient was found to have multiple cracked ribs on the right nondisplaced  He was monitored overnight and pain was controlled  He had slightly elevated BP in the hospital     10/19/2019  Pt here for TCM in the office today  His pain is well controlled  He states his pain is 2/10  He has not taken anything for pain  Denies SOB or dyspnea  Sleeping well  No back pain or difficulty with moving his bowels or bladder  Denies numbness  He received a home assessment and fall assessment in the home and he has since completed this course without issue  His BP today in the office is 150/70  He denies elevated blood pressures during his home readings  Review of Systems   Constitutional: Negative for activity change, appetite change, chills, fatigue, fever and unexpected weight change  HENT: Negative for congestion, ear discharge, ear pain, postnasal drip, sinus pressure and sore throat  Eyes: Negative for discharge and visual disturbance  Respiratory: Negative for cough, shortness of breath and wheezing      Cardiovascular: Negative for chest pain, palpitations and leg swelling  Gastrointestinal: Negative for abdominal pain, constipation, diarrhea, nausea and vomiting  Endocrine: Negative for cold intolerance, heat intolerance, polydipsia and polyuria  Genitourinary: Negative for difficulty urinating, frequency and hematuria  Musculoskeletal: Negative for arthralgias, back pain, joint swelling and myalgias  Chest wall tenderness on lateral aspect consistent with his rib fx diagnosis   Skin: Negative for rash  Neurological: Negative for dizziness, weakness, light-headedness, numbness and headaches  Hematological: Negative for adenopathy  Psychiatric/Behavioral: Negative for behavioral problems, confusion, dysphoric mood, sleep disturbance and suicidal ideas  The patient is not nervous/anxious  Objective:     Physical Exam   Constitutional: He is oriented to person, place, and time  He appears well-developed and well-nourished  No distress  HENT:   Head: Normocephalic and atraumatic  Cardiovascular: Normal rate, regular rhythm and normal heart sounds  Exam reveals no gallop and no friction rub  No murmur heard  Pulmonary/Chest: Effort normal and breath sounds normal  No respiratory distress  He has no wheezes  He has no rales  He exhibits tenderness  Abdominal: Soft  He exhibits no distension and no mass  There is no tenderness  There is no rebound and no guarding  No hernia  Musculoskeletal: He exhibits tenderness  He exhibits no edema  Chest wall tenderness over lateral aspect of thoracic area consistent with his rib fractures  There is some bruising in this area  Neurological: He is alert and oriented to person, place, and time  Skin: He is not diaphoretic  Ecchymosis R lateral ribs   Psychiatric: He has a normal mood and affect  His behavior is normal  Judgment and thought content normal    Nursing note and vitals reviewed          Vitals:    10/18/19 1150   BP: 150/70   Pulse: 76   Resp: 18   Temp: (!) 96 8 °F (36 °C)   SpO2: 98%   Weight: 78 8 kg (173 lb 12 8 oz)   Height: 6' 1" (1 854 m)       Transitional Care Management Review:  Thi Valadez is a 76 y o  male here for TCM follow up  During the TCM phone call patient stated:    TCM Call (since 9/17/2019)     Date and time call was made  10/11/2019  4:29 PM    Hospital care reviewed  Records reviewed    Patient [facility] hospitial[facility]  [facility]    Date of Admission  10/10/19    Date of discharge  10/11/19    Diagnosis  RIB FX    Disposition  Home    Were the patients medications reviewed and updated  No    Current Symptoms  None      TCM Call (since 9/17/2019)     Post hospital issues  Reduced activity    Should patient be enrolled in anticoag monitoring? No    Scheduled for follow up? Yes    Did you obtain your prescribed medications  Yes    Do you need help managing your prescriptions or medications  No    Is transportation to your appointment needed  No    I have advised the patient to call PCP with any new or worsening symptoms  MARCIA GALINDO MA        Falls Plan of Care: Balance, strength, and gait training instructions were provided  Patient referred to physical therapy    he is doing home PT    Carlos Vang MD

## 2020-09-29 ENCOUNTER — TELEPHONE (OUTPATIENT)
Dept: FAMILY MEDICINE CLINIC | Facility: CLINIC | Age: 75
End: 2020-09-29

## 2020-10-01 ENCOUNTER — IMMUNIZATIONS (OUTPATIENT)
Dept: FAMILY MEDICINE CLINIC | Facility: CLINIC | Age: 75
End: 2020-10-01
Payer: COMMERCIAL

## 2020-10-01 DIAGNOSIS — Z23 ENCOUNTER FOR IMMUNIZATION: ICD-10-CM

## 2020-10-01 PROCEDURE — G0008 ADMIN INFLUENZA VIRUS VAC: HCPCS

## 2020-10-01 PROCEDURE — 90662 IIV NO PRSV INCREASED AG IM: CPT

## 2020-12-09 ENCOUNTER — TELEMEDICINE (OUTPATIENT)
Dept: FAMILY MEDICINE CLINIC | Facility: CLINIC | Age: 75
End: 2020-12-09
Payer: COMMERCIAL

## 2020-12-09 VITALS — HEIGHT: 73 IN | BODY MASS INDEX: 23.19 KG/M2 | WEIGHT: 175 LBS

## 2020-12-09 DIAGNOSIS — Z20.822 EXPOSURE TO COVID-19 VIRUS: ICD-10-CM

## 2020-12-09 DIAGNOSIS — J02.9 SORE THROAT: ICD-10-CM

## 2020-12-09 DIAGNOSIS — Z20.822 EXPOSURE TO COVID-19 VIRUS: Primary | ICD-10-CM

## 2020-12-09 PROBLEM — J18.9 PNEUMONIA OF RIGHT LOWER LOBE DUE TO INFECTIOUS ORGANISM: Status: RESOLVED | Noted: 2019-01-03 | Resolved: 2020-12-09

## 2020-12-09 PROBLEM — S22.49XA RIB FRACTURES: Status: RESOLVED | Noted: 2019-10-10 | Resolved: 2020-12-09

## 2020-12-09 PROCEDURE — U0003 INFECTIOUS AGENT DETECTION BY NUCLEIC ACID (DNA OR RNA); SEVERE ACUTE RESPIRATORY SYNDROME CORONAVIRUS 2 (SARS-COV-2) (CORONAVIRUS DISEASE [COVID-19]), AMPLIFIED PROBE TECHNIQUE, MAKING USE OF HIGH THROUGHPUT TECHNOLOGIES AS DESCRIBED BY CMS-2020-01-R: HCPCS | Performed by: FAMILY MEDICINE

## 2020-12-09 PROCEDURE — 3725F SCREEN DEPRESSION PERFORMED: CPT | Performed by: FAMILY MEDICINE

## 2020-12-09 PROCEDURE — 3008F BODY MASS INDEX DOCD: CPT | Performed by: FAMILY MEDICINE

## 2020-12-09 PROCEDURE — 99214 OFFICE O/P EST MOD 30 MIN: CPT | Performed by: FAMILY MEDICINE

## 2020-12-09 PROCEDURE — 1160F RVW MEDS BY RX/DR IN RCRD: CPT | Performed by: FAMILY MEDICINE

## 2020-12-10 LAB — SARS-COV-2 RNA SPEC QL NAA+PROBE: NOT DETECTED

## 2021-01-07 ENCOUNTER — OFFICE VISIT (OUTPATIENT)
Dept: FAMILY MEDICINE CLINIC | Facility: CLINIC | Age: 76
End: 2021-01-07
Payer: COMMERCIAL

## 2021-01-07 VITALS
SYSTOLIC BLOOD PRESSURE: 136 MMHG | HEIGHT: 73 IN | HEART RATE: 87 BPM | WEIGHT: 178.4 LBS | TEMPERATURE: 97.8 F | DIASTOLIC BLOOD PRESSURE: 84 MMHG | OXYGEN SATURATION: 97 % | BODY MASS INDEX: 23.64 KG/M2

## 2021-01-07 DIAGNOSIS — F17.200 CURRENT EVERY DAY SMOKER: ICD-10-CM

## 2021-01-07 DIAGNOSIS — Z00.00 MEDICARE ANNUAL WELLNESS VISIT, INITIAL: Primary | ICD-10-CM

## 2021-01-07 DIAGNOSIS — Z23 NEED FOR PNEUMOCOCCAL VACCINATION: ICD-10-CM

## 2021-01-07 PROBLEM — J02.9 SORE THROAT: Status: RESOLVED | Noted: 2020-12-09 | Resolved: 2021-01-07

## 2021-01-07 PROBLEM — Z72.0 TOBACCO ABUSE: Chronic | Status: RESOLVED | Noted: 2019-10-10 | Resolved: 2021-01-07

## 2021-01-07 PROBLEM — Z20.822 EXPOSURE TO COVID-19 VIRUS: Status: RESOLVED | Noted: 2020-12-09 | Resolved: 2021-01-07

## 2021-01-07 PROCEDURE — 1125F AMNT PAIN NOTED PAIN PRSNT: CPT | Performed by: FAMILY MEDICINE

## 2021-01-07 PROCEDURE — G0438 PPPS, INITIAL VISIT: HCPCS | Performed by: FAMILY MEDICINE

## 2021-01-07 PROCEDURE — 3288F FALL RISK ASSESSMENT DOCD: CPT | Performed by: FAMILY MEDICINE

## 2021-01-07 PROCEDURE — 90732 PPSV23 VACC 2 YRS+ SUBQ/IM: CPT

## 2021-01-07 PROCEDURE — 1170F FXNL STATUS ASSESSED: CPT | Performed by: FAMILY MEDICINE

## 2021-01-07 PROCEDURE — 3725F SCREEN DEPRESSION PERFORMED: CPT | Performed by: FAMILY MEDICINE

## 2021-01-07 PROCEDURE — 4040F PNEUMOC VAC/ADMIN/RCVD: CPT | Performed by: FAMILY MEDICINE

## 2021-01-07 PROCEDURE — 1160F RVW MEDS BY RX/DR IN RCRD: CPT | Performed by: FAMILY MEDICINE

## 2021-01-07 PROCEDURE — G0009 ADMIN PNEUMOCOCCAL VACCINE: HCPCS

## 2021-01-07 RX ORDER — ASCORBIC ACID 500 MG
500 TABLET ORAL DAILY
COMMUNITY

## 2021-01-07 NOTE — PROGRESS NOTES
Assessment and Plan:     Problem List Items Addressed This Visit        Other    Current every day smoker     Patient is unwilling to quit  Counseling provided  Declined lung CA screening           Other Visit Diagnoses     Medicare annual wellness visit, initial    -  Primary    Need for pneumococcal vaccination        Relevant Orders    PNEUMOCOCCAL POLYSACCHARIDE VACCINE 23-VALENT =>3YO SQ IM (Completed)           Preventive health issues were discussed with patient, and age appropriate screening tests were ordered as noted in patient's After Visit Summary  Personalized health advice and appropriate referrals for health education or preventive services given if needed, as noted in patient's After Visit Summary  History of Present Illness:     Patient presents for Medicare Annual Wellness visit    Patient Care Team:  Ryan Blancas MD as PCP - General (Family Medicine)    Review of Systems   Constitutional: Negative for activity change, appetite change, chills, fatigue, fever and unexpected weight change  HENT: Negative for congestion, ear discharge, ear pain, postnasal drip, sinus pressure and sore throat  Eyes: Negative for discharge and visual disturbance  Respiratory: Negative for cough, shortness of breath and wheezing  Cardiovascular: Negative for chest pain, palpitations and leg swelling  Gastrointestinal: Negative for abdominal pain, constipation, diarrhea, nausea and vomiting  Endocrine: Negative for cold intolerance, heat intolerance, polydipsia and polyuria  Genitourinary: Negative for difficulty urinating and frequency  Musculoskeletal: Positive for back pain  Negative for arthralgias, joint swelling and myalgias  Skin: Negative for rash  Neurological: Negative for dizziness, weakness, light-headedness, numbness and headaches  Hematological: Negative for adenopathy     Psychiatric/Behavioral: Negative for behavioral problems, confusion, dysphoric mood, sleep disturbance and suicidal ideas  The patient is not nervous/anxious  Problem List:     Patient Active Problem List   Diagnosis    Current every day smoker      Past Medical and Surgical History:     History reviewed  No pertinent past medical history  History reviewed  No pertinent surgical history     Family History:     Family History   Problem Relation Age of Onset    Dementia Mother     Heart disease Father       Social History:        Social History     Socioeconomic History    Marital status: /Civil Union     Spouse name: None    Number of children: None    Years of education: None    Highest education level: None   Occupational History    None   Social Needs    Financial resource strain: None    Food insecurity     Worry: None     Inability: None    Transportation needs     Medical: None     Non-medical: None   Tobacco Use    Smoking status: Current Every Day Smoker     Packs/day: 1 00     Types: Cigarettes    Smokeless tobacco: Never Used   Substance and Sexual Activity    Alcohol use: Never     Alcohol/week: 0 0 standard drinks     Frequency: Never     Binge frequency: Never     Comment: 0    Drug use: No    Sexual activity: Not Currently   Lifestyle    Physical activity     Days per week: None     Minutes per session: None    Stress: None   Relationships    Social connections     Talks on phone: None     Gets together: None     Attends [employer] service: None     Active member of club or organization: None     Attends meetings of clubs or organizations: None     Relationship status: None    Intimate partner violence     Fear of current or ex partner: None     Emotionally abused: None     Physically abused: None     Forced sexual activity: None   Other Topics Concern    None   Social History Narrative    None      Medications and Allergies:     Current Outpatient Medications   Medication Sig Dispense Refill    ascorbic acid (VITAMIN C) 500 mg tablet Take 500 mg by mouth daily      aspirin 325 mg tablet Take 325 mg by mouth 2 (two) times a day      Cholecalciferol (VITAMIN D3 PO) Take by mouth      cyanocobalamin (VITAMIN B-12) 500 MCG tablet Take 500 mcg by mouth daily      Multiple Vitamins-Minerals (MULTIVITAMIN WITH MINERALS) tablet Take 1 tablet by mouth daily       No current facility-administered medications for this visit  No Known Allergies   Immunizations:     Immunization History   Administered Date(s) Administered    Influenza, high dose seasonal 0 7 mL 10/18/2019, 10/01/2020    Pneumococcal Conjugate 13-Valent 01/31/2019    Pneumococcal Polysaccharide PPV23 01/07/2021      Health Maintenance:         Topic Date Due    Hepatitis C Screening  1945    Colorectal Cancer Screening  07/18/1995         Topic Date Due    DTaP,Tdap,and Td Vaccines (1 - Tdap) 07/18/1966      Medicare Health Risk Assessment:     /84   Pulse 87   Temp 97 8 °F (36 6 °C) (Temporal)   Ht 6' 1" (1 854 m)   Wt 80 9 kg (178 lb 6 4 oz)   SpO2 97%   BMI 23 54 kg/m²      Geoff Record is here for his Subsequent Wellness visit  Last Medicare Wellness visit information reviewed, patient interviewed, no change since last AWV  Health Risk Assessment:   Patient rates overall health as very good  Patient feels that their physical health rating is same  Eyesight was rated as same  Hearing was rated as same  Patient feels that their emotional and mental health rating is same  Pain experienced in the last 7 days has been some  Patient's pain rating has been 1/10  Patient states that he has experienced no weight loss or gain in last 6 months  Sciatic nerve pain    Depression Screening:   PHQ-2 Score: 0      Fall Risk Screening: In the past year, patient has experienced: no history of falling in past year      Home Safety:  Patient has trouble with stairs inside or outside of their home  Patient has working smoke alarms and has working carbon monoxide detector       Nutrition:   Current diet is Regular  Medications:   Patient is not currently taking any over-the-counter supplements  Patient is able to manage medications  Activities of Daily Living (ADLs)/Instrumental Activities of Daily Living (IADLs):   Walk and transfer into and out of bed and chair?: Yes  Dress and groom yourself?: Yes    Bathe or shower yourself?: Yes    Feed yourself? Yes  Do your laundry/housekeeping?: Yes  Manage your money, pay your bills and track your expenses?: Yes  Make your own meals?: Yes    Do your own shopping?: Yes    Previous Hospitalizations:   Any hospitalizations or ED visits within the last 12 months?: No      PREVENTIVE SCREENINGS      Cardiovascular Screening:    General: Risks and Benefits Discussed and Patient Declines      Diabetes Screening:     General: Risks and Benefits Discussed and Patient Declines      Colorectal Cancer Screening:     General: Patient Declines and Risks and Benefits Discussed      Prostate Cancer Screening:    General: Screening Not Indicated, Risks and Benefits Discussed and Patient Declines      Osteoporosis Screening:    General: Screening Not Indicated      Abdominal Aortic Aneurysm (AAA) Screening:    Risk factors include: age between 73-69 yo and tobacco use        General: Screening Not Indicated      Lung Cancer Screening:     General: Screening Not Indicated and Risks and Benefits Discussed      Hepatitis C Screening:    General: Patient Declines and Risks and Benefits Discussed    Other Counseling Topics:   Calcium and vitamin D intake and regular weightbearing exercise  Physical Exam  Vitals signs and nursing note reviewed  Constitutional:       General: He is not in acute distress  Appearance: He is well-developed  He is not diaphoretic  HENT:      Head: Normocephalic and atraumatic  Right Ear: Tympanic membrane, ear canal and external ear normal  There is no impacted cerumen        Left Ear: Tympanic membrane, ear canal and external ear normal  There is no impacted cerumen  Cardiovascular:      Rate and Rhythm: Normal rate and regular rhythm  Heart sounds: Normal heart sounds  No murmur  No friction rub  No gallop  Comments: No carotid bruits  Pulmonary:      Effort: Pulmonary effort is normal  No respiratory distress  Breath sounds: Normal breath sounds  No stridor  No wheezing or rales  Chest:      Chest wall: No tenderness  Musculoskeletal:      Lumbar back: He exhibits decreased range of motion  Skin:     Findings: No rash  Neurological:      General: No focal deficit present  Mental Status: He is alert  Mental status is at baseline  Cranial Nerves: No cranial nerve deficit  Psychiatric:         Behavior: Behavior normal          Thought Content:  Thought content normal          Judgment: Judgment normal          Sarah Butcher MD

## 2021-01-07 NOTE — PATIENT INSTRUCTIONS
Medicare Preventive Visit Patient Instructions  Thank you for completing your Welcome to Medicare Visit or Medicare Annual Wellness Visit today  Your next wellness visit will be due in one year (1/7/2022)  The screening/preventive services that you may require over the next 5-10 years are detailed below  Some tests may not apply to you based off risk factors and/or age  Screening tests ordered at today's visit but not completed yet may show as past due  Also, please note that scanned in results may not display below  Preventive Screenings:  Service Recommendations Previous Testing/Comments   Colorectal Cancer Screening  · Colonoscopy    · Fecal Occult Blood Test (FOBT)/Fecal Immunochemical Test (FIT)  · Fecal DNA/Cologuard Test  · Flexible Sigmoidoscopy Age: 54-65 years old   Colonoscopy: every 10 years (May be performed more frequently if at higher risk)  OR  FOBT/FIT: every 1 year  OR  Cologuard: every 3 years  OR  Sigmoidoscopy: every 5 years  Screening may be recommended earlier than age 48 if at higher risk for colorectal cancer  Also, an individualized decision between you and your healthcare provider will decide whether screening between the ages of 74-80 would be appropriate   Colonoscopy: Not on file  FOBT/FIT: Not on file  Cologuard: Not on file  Sigmoidoscopy: Not on file         Prostate Cancer Screening Individualized decision between patient and health care provider in men between ages of 53-78   Medicare will cover every 12 months beginning on the day after your 50th birthday PSA: No results in last 5 years     Screening Not Indicated     Hepatitis C Screening Once for adults born between [facility]  More frequently in patients at high risk for Hepatitis C Hep C Antibody: Not on file       Diabetes Screening 1-2 times per year if you're at risk for diabetes or have pre-diabetes Fasting glucose: No results in last 5 years   A1C: No results in last 5 years       Cholesterol Screening Once every 5 years if you don't have a lipid disorder  May order more often based on risk factors  Lipid panel: Not on file          Other Preventive Screenings Covered by Medicare:  1  Abdominal Aortic Aneurysm (AAA) Screening: covered once if your at risk  You're considered to be at risk if you have a family history of AAA or a male between the age of 73-68 who smoking at least 100 cigarettes in your lifetime  2  Lung Cancer Screening: covers low dose CT scan once per year if you meet all of the following conditions: (1) Age 50-69; (2) No signs or symptoms of lung cancer; (3) Current smoker or have quit smoking within the last 15 years; (4) You have a tobacco smoking history of at least 30 pack years (packs per day x number of years you smoked); (5) You get a written order from a healthcare provider  3  Glaucoma Screening: covered annually if you're considered high risk: (1) You have diabetes OR (2) Family history of glaucoma OR (3)  aged 48 and older OR (3)  American aged 72 and older  3  Osteoporosis Screening: covered every 2 years if you meet one of the following conditions: (1) Have a vertebral abnormality; (2) On glucocorticoid therapy for more than 3 months; (3) Have primary hyperparathyroidism; (4) On osteoporosis medications and need to assess response to drug therapy  5  HIV Screening: covered annually if you're between the age of 12-76  Also covered annually if you are younger than 13 and older than 72 with risk factors for HIV infection  For pregnant patients, it is covered up to 3 times per pregnancy      Immunizations:  Immunization Recommendations   Influenza Vaccine Annual influenza vaccination during flu season is recommended for all persons aged >= 6 months who do not have contraindications   Pneumococcal Vaccine (Prevnar and Pneumovax)  * Prevnar = PCV13  * Pneumovax = PPSV23 Adults 25-60 years old: 1-3 doses may be recommended based on certain risk factors  Adults 72 years old: Prevnar (PCV13) vaccine recommended followed by Pneumovax (PPSV23) vaccine  If already received PPSV23 since turning 65, then PCV13 recommended at least one year after PPSV23 dose  Hepatitis B Vaccine 3 dose series if at intermediate or high risk (ex: diabetes, end stage renal disease, liver disease)   Tetanus (Td) Vaccine - COST NOT COVERED BY MEDICARE PART B Following completion of primary series, a booster dose should be given every 10 years to maintain immunity against tetanus  Td may also be given as tetanus wound prophylaxis  Tdap Vaccine - COST NOT COVERED BY MEDICARE PART B Recommended at least once for all adults  For pregnant patients, recommended with each pregnancy  Shingles Vaccine (Shingrix) - COST NOT COVERED BY MEDICARE PART B  2 shot series recommended in those aged 48 and above     Health Maintenance Due:      Topic Date Due    Hepatitis C Screening  1945    Colorectal Cancer Screening  07/18/1995     Immunizations Due:      Topic Date Due    DTaP,Tdap,and Td Vaccines (1 - Tdap) 07/18/1966    Pneumococcal Vaccine: 65+ Years (2 of 2 - PPSV23) 01/31/2020     Advance Directives   What are advance directives? Advance directives are legal documents that state your wishes and plans for medical care  These plans are made ahead of time in case you lose your ability to make decisions for yourself  Advance directives can apply to any medical decision, such as the treatments you want, and if you want to donate organs  What are the types of advance directives? There are many types of advance directives, and each state has rules about how to use them  You may choose a combination of any of the following:  · Living will: This is a written record of the treatment you want  You can also choose which treatments you do not want, which to limit, and which to stop at a certain time  This includes surgery, medicine, IV fluid, and tube feedings     · Durable power of  for healthcare [facility]): This is a written record that states who you want to make healthcare choices for you when you are unable to make them for yourself  This person, called a proxy, is usually a family member or a friend  You may choose more than 1 proxy  · Do not resuscitate (DNR) order:  A DNR order is used in case your heart stops beating or you stop breathing  It is a request not to have certain forms of treatment, such as CPR  A DNR order may be included in other types of advance directives  · Medical directive: This covers the care that you want if you are in a coma, near death, or unable to make decisions for yourself  You can list the treatments you want for each condition  Treatment may include pain medicine, surgery, blood transfusions, dialysis, IV or tube feedings, and a ventilator (breathing machine)  · Values history: This document has questions about your views, beliefs, and how you feel and think about life  This information can help others choose the care that you would choose  Why are advance directives important? An advance directive helps you control your care  Although spoken wishes may be used, it is better to have your wishes written down  Spoken wishes can be misunderstood, or not followed  Treatments may be given even if you do not want them  An advance directive may make it easier for your family to make difficult choices about your care  Cigarette Smoking and Your Health   Risks to your health if you smoke:  Nicotine and other chemicals found in tobacco damage every cell in your body  Even if you are a light smoker, you have an increased risk for cancer, heart disease, and lung disease  If you are pregnant or have diabetes, smoking increases your risk for complications  Benefits to your health if you stop smoking:   · You decrease respiratory symptoms such as coughing, wheezing, and shortness of breath     · You reduce your risk for cancers of the lung, mouth, throat, kidney, bladder, pancreas, stomach, and cervix  If you already have cancer, you increase the benefits of chemotherapy  You also reduce your risk for cancer returning or a second cancer from developing  · You reduce your risk for heart disease, blood clots, heart attack, and stroke  · You reduce your risk for lung infections, and diseases such as pneumonia, asthma, chronic bronchitis, and emphysema  · Your circulation improves  More oxygen can be delivered to your body  If you have diabetes, you lower your risk for complications, such as kidney, artery, and eye diseases  You also lower your risk for nerve damage  Nerve damage can lead to amputations, poor vision, and blindness  · You improve your body's ability to heal and to fight infections  For more information and support to stop smoking:   · Smokefree  gov  Phone: 0- 200 - 757-6774  Web Address: www [employer]  [facility] [employer] 2018 Information is for End User's use only and may not be sold, redistributed or otherwise used for commercial purposes   All illustrations and images included in CareNotes® are the copyrighted property of A D A M , Inc  or [address] [place] [address]

## 2021-02-09 ENCOUNTER — IMMUNIZATIONS (OUTPATIENT)
Dept: FAMILY MEDICINE CLINIC | Facility: HOSPITAL | Age: 76
End: 2021-02-09

## 2021-02-09 DIAGNOSIS — Z23 ENCOUNTER FOR IMMUNIZATION: Primary | ICD-10-CM

## 2021-02-09 PROCEDURE — 0011A SARS-COV-2 / COVID-19 MRNA VACCINE (MODERNA) 100 MCG: CPT

## 2021-02-09 PROCEDURE — 91301 SARS-COV-2 / COVID-19 MRNA VACCINE (MODERNA) 100 MCG: CPT

## 2021-03-10 ENCOUNTER — IMMUNIZATIONS (OUTPATIENT)
Dept: FAMILY MEDICINE CLINIC | Facility: HOSPITAL | Age: 76
End: 2021-03-10

## 2021-03-10 DIAGNOSIS — Z23 ENCOUNTER FOR IMMUNIZATION: Primary | ICD-10-CM

## 2021-03-10 PROCEDURE — 0012A SARS-COV-2 / COVID-19 MRNA VACCINE (MODERNA) 100 MCG: CPT

## 2021-03-10 PROCEDURE — 91301 SARS-COV-2 / COVID-19 MRNA VACCINE (MODERNA) 100 MCG: CPT

## 2021-12-15 ENCOUNTER — CLINICAL SUPPORT (OUTPATIENT)
Dept: FAMILY MEDICINE CLINIC | Facility: CLINIC | Age: 76
End: 2021-12-15
Payer: COMMERCIAL

## 2021-12-15 DIAGNOSIS — Z23 NEED FOR INFLUENZA VACCINATION: Primary | ICD-10-CM

## 2021-12-15 PROCEDURE — G0008 ADMIN INFLUENZA VIRUS VAC: HCPCS | Performed by: FAMILY MEDICINE

## 2021-12-15 PROCEDURE — 90662 IIV NO PRSV INCREASED AG IM: CPT | Performed by: FAMILY MEDICINE

## 2021-12-29 ENCOUNTER — TELEPHONE (OUTPATIENT)
Dept: FAMILY MEDICINE CLINIC | Facility: CLINIC | Age: 76
End: 2021-12-29

## 2021-12-29 PROCEDURE — U0005 INFEC AGEN DETEC AMPLI PROBE: HCPCS | Performed by: FAMILY MEDICINE

## 2021-12-29 PROCEDURE — U0003 INFECTIOUS AGENT DETECTION BY NUCLEIC ACID (DNA OR RNA); SEVERE ACUTE RESPIRATORY SYNDROME CORONAVIRUS 2 (SARS-COV-2) (CORONAVIRUS DISEASE [COVID-19]), AMPLIFIED PROBE TECHNIQUE, MAKING USE OF HIGH THROUGHPUT TECHNOLOGIES AS DESCRIBED BY CMS-2020-01-R: HCPCS | Performed by: FAMILY MEDICINE

## 2021-12-30 ENCOUNTER — VBI (OUTPATIENT)
Dept: ADMINISTRATIVE | Facility: OTHER | Age: 76
End: 2021-12-30

## 2022-01-01 ENCOUNTER — LAB REQUISITION (OUTPATIENT)
Dept: LAB | Facility: HOSPITAL | Age: 77
End: 2022-01-01

## 2022-01-01 DIAGNOSIS — C78.6 SECONDARY MALIGNANT NEOPLASM OF RETROPERITONEUM AND PERITONEUM (HCC): ICD-10-CM

## 2022-01-01 LAB — SCAN RESULT: NORMAL

## 2022-01-03 ENCOUNTER — TELEPHONE (OUTPATIENT)
Dept: FAMILY MEDICINE CLINIC | Facility: CLINIC | Age: 77
End: 2022-01-03

## 2022-01-06 ENCOUNTER — APPOINTMENT (OUTPATIENT)
Dept: LAB | Facility: CLINIC | Age: 77
End: 2022-01-06
Payer: COMMERCIAL

## 2022-01-06 ENCOUNTER — RA CDI HCC (OUTPATIENT)
Dept: OTHER | Facility: HOSPITAL | Age: 77
End: 2022-01-06

## 2022-01-06 ENCOUNTER — OFFICE VISIT (OUTPATIENT)
Dept: FAMILY MEDICINE CLINIC | Facility: CLINIC | Age: 77
End: 2022-01-06
Payer: COMMERCIAL

## 2022-01-06 VITALS
OXYGEN SATURATION: 99 % | WEIGHT: 145 LBS | DIASTOLIC BLOOD PRESSURE: 82 MMHG | HEART RATE: 84 BPM | BODY MASS INDEX: 19.22 KG/M2 | SYSTOLIC BLOOD PRESSURE: 132 MMHG | HEIGHT: 73 IN | TEMPERATURE: 98.2 F

## 2022-01-06 DIAGNOSIS — R13.10 DYSPHAGIA, UNSPECIFIED TYPE: Primary | ICD-10-CM

## 2022-01-06 DIAGNOSIS — R63.4 WEIGHT LOSS: ICD-10-CM

## 2022-01-06 DIAGNOSIS — L30.9 DERMATITIS: ICD-10-CM

## 2022-01-06 LAB
ALBUMIN SERPL BCP-MCNC: 4.2 G/DL (ref 3.5–5)
ALP SERPL-CCNC: 75 U/L (ref 46–116)
ALT SERPL W P-5'-P-CCNC: 17 U/L (ref 12–78)
ANION GAP SERPL CALCULATED.3IONS-SCNC: 6 MMOL/L (ref 4–13)
AST SERPL W P-5'-P-CCNC: 10 U/L (ref 5–45)
BASOPHILS # BLD AUTO: 0.12 THOUSANDS/ΜL (ref 0–0.1)
BASOPHILS NFR BLD AUTO: 1 % (ref 0–1)
BILIRUB SERPL-MCNC: 1.44 MG/DL (ref 0.2–1)
BUN SERPL-MCNC: 14 MG/DL (ref 5–25)
CALCIUM SERPL-MCNC: 10 MG/DL (ref 8.3–10.1)
CHLORIDE SERPL-SCNC: 104 MMOL/L (ref 100–108)
CO2 SERPL-SCNC: 29 MMOL/L (ref 21–32)
CREAT SERPL-MCNC: 0.77 MG/DL (ref 0.6–1.3)
EOSINOPHIL # BLD AUTO: 0.1 THOUSAND/ΜL (ref 0–0.61)
EOSINOPHIL NFR BLD AUTO: 1 % (ref 0–6)
ERYTHROCYTE [DISTWIDTH] IN BLOOD BY AUTOMATED COUNT: 14.7 % (ref 11.6–15.1)
GFR SERPL CREATININE-BSD FRML MDRD: 88 ML/MIN/1.73SQ M
GLUCOSE SERPL-MCNC: 116 MG/DL (ref 65–140)
HCT VFR BLD AUTO: 40.7 % (ref 36.5–49.3)
HCV AB SER QL: NORMAL
HGB BLD-MCNC: 13.3 G/DL (ref 12–17)
IMM GRANULOCYTES # BLD AUTO: 0.13 THOUSAND/UL (ref 0–0.2)
IMM GRANULOCYTES NFR BLD AUTO: 1 % (ref 0–2)
LYMPHOCYTES # BLD AUTO: 1.28 THOUSANDS/ΜL (ref 0.6–4.47)
LYMPHOCYTES NFR BLD AUTO: 11 % (ref 14–44)
MCH RBC QN AUTO: 29 PG (ref 26.8–34.3)
MCHC RBC AUTO-ENTMCNC: 32.7 G/DL (ref 31.4–37.4)
MCV RBC AUTO: 89 FL (ref 82–98)
MONOCYTES # BLD AUTO: 1.04 THOUSAND/ΜL (ref 0.17–1.22)
MONOCYTES NFR BLD AUTO: 9 % (ref 4–12)
NEUTROPHILS # BLD AUTO: 9.25 THOUSANDS/ΜL (ref 1.85–7.62)
NEUTS SEG NFR BLD AUTO: 77 % (ref 43–75)
NRBC BLD AUTO-RTO: 0 /100 WBCS
PLATELET # BLD AUTO: 362 THOUSANDS/UL (ref 149–390)
PMV BLD AUTO: 11.3 FL (ref 8.9–12.7)
POTASSIUM SERPL-SCNC: 4 MMOL/L (ref 3.5–5.3)
PROT SERPL-MCNC: 7.8 G/DL (ref 6.4–8.2)
RBC # BLD AUTO: 4.58 MILLION/UL (ref 3.88–5.62)
SODIUM SERPL-SCNC: 139 MMOL/L (ref 136–145)
TSH SERPL DL<=0.05 MIU/L-ACNC: 1.52 UIU/ML (ref 0.36–3.74)
WBC # BLD AUTO: 11.92 THOUSAND/UL (ref 4.31–10.16)

## 2022-01-06 PROCEDURE — 80053 COMPREHEN METABOLIC PANEL: CPT

## 2022-01-06 PROCEDURE — 36415 COLL VENOUS BLD VENIPUNCTURE: CPT

## 2022-01-06 PROCEDURE — 84443 ASSAY THYROID STIM HORMONE: CPT

## 2022-01-06 PROCEDURE — 86803 HEPATITIS C AB TEST: CPT

## 2022-01-06 PROCEDURE — 99214 OFFICE O/P EST MOD 30 MIN: CPT | Performed by: FAMILY MEDICINE

## 2022-01-06 PROCEDURE — 85025 COMPLETE CBC W/AUTO DIFF WBC: CPT

## 2022-01-06 RX ORDER — TRIAMCINOLONE ACETONIDE 5 MG/G
CREAM TOPICAL 2 TIMES DAILY
Qty: 30 G | Refills: 0 | Status: SHIPPED | OUTPATIENT
Start: 2022-01-06 | End: 2022-06-02

## 2022-01-06 RX ORDER — PANTOPRAZOLE SODIUM 40 MG/1
40 TABLET, DELAYED RELEASE ORAL
Qty: 90 TABLET | Refills: 0 | Status: SHIPPED | OUTPATIENT
Start: 2022-01-06 | End: 2022-02-12 | Stop reason: HOSPADM

## 2022-01-06 NOTE — ASSESSMENT & PLAN NOTE
Recommend Gastro consult for possible EGD  Patient is reluctant - advised him this is very important  Start Protonix

## 2022-01-06 NOTE — PROGRESS NOTES
Assessment/Plan:         Problem List Items Addressed This Visit        Digestive    Dysphagia - Primary     Recommend Gastro consult for possible EGD  Patient is reluctant - advised him this is very important  Start Protonix  Relevant Medications    pantoprazole (PROTONIX) 40 mg tablet    Other Relevant Orders    Ambulatory referral to Gastroenterology    CT chest abdomen pelvis w contrast       Musculoskeletal and Integument    Dermatitis     Start triamcinolone          Relevant Medications    triamcinolone (KENALOG) 0 5 % cream       Other    Weight loss     Check labs and CT scan  Referral to gastro         Relevant Orders    CBC and differential    Comprehensive metabolic panel    TSH, 3rd generation with Free T4 reflex    Hepatitis C antibody    CT chest abdomen pelvis w contrast            Subjective:      Patient ID: Honorio Sanchez is a 68 y o  male  68year old here states about 6 weeks ago he was eating and "excess fluid" came up  Came and went over next several days  Last week he says he couldn't keep down more than 2 bites  +nausea  Regurgitating liquid (clear)  Non bloody  No diarrhea  No abdominal pain  No sore throat  No cough  Some nasal drip  He has lost 30 lbs over the past year, but states he intentionally dropped weight in the beginning of the year  He lost 15 lbs since November (unintentional)  +Smoker  Not a drinker  He also has a rash on the L ankle  Itches it and it hurts         The following portions of the patient's history were reviewed and updated as appropriate:   Past Medical History:  He has no past medical history on file ,  _______________________________________________________________________  Medical Problems:  does not have any pertinent problems on file ,  _______________________________________________________________________  Past Surgical History:   has no past surgical history on file ,  _______________________________________________________________________  Family History:  family history includes Dementia in his mother; Heart disease in his father ,  _______________________________________________________________________  Social History:   reports that he has been smoking cigarettes  He has been smoking about 1 00 pack per day  He has never used smokeless tobacco  He reports that he does not drink alcohol and does not use drugs  ,  _______________________________________________________________________  Allergies:  has No Known Allergies     _______________________________________________________________________  Current Outpatient Medications   Medication Sig Dispense Refill    ascorbic acid (VITAMIN C) 500 mg tablet Take 500 mg by mouth daily      aspirin 325 mg tablet Take 325 mg by mouth 2 (two) times a day      Cholecalciferol (VITAMIN D3 PO) Take by mouth      cyanocobalamin (VITAMIN B-12) 500 MCG tablet Take 500 mcg by mouth daily      Multiple Vitamins-Minerals (MULTIVITAMIN WITH MINERALS) tablet Take 1 tablet by mouth daily      pantoprazole (PROTONIX) 40 mg tablet Take 1 tablet (40 mg total) by mouth daily before breakfast 90 tablet 0    triamcinolone (KENALOG) 0 5 % cream Apply topically 2 (two) times a day 30 g 0     No current facility-administered medications for this visit      _______________________________________________________________________  Review of Systems   Constitutional: Positive for unexpected weight change  Negative for appetite change, chills, fatigue and fever  HENT: Positive for postnasal drip  Negative for congestion  Respiratory: Negative for cough  Gastrointestinal: Positive for nausea  Negative for abdominal distention, abdominal pain, anal bleeding, blood in stool, constipation, diarrhea, rectal pain and vomiting  Regurgitation   Skin: Positive for rash           Objective:  Vitals:    01/06/22 1343   BP: 132/82   Pulse: 84   Temp: 98 2 °F (36 8 °C)   SpO2: 99%   Weight: 65 8 kg (145 lb)   Height: 6' 1" (1 854 m)     Body mass index is 19 13 kg/m²  Physical Exam  Vitals and nursing note reviewed  Constitutional:       General: He is not in acute distress  Appearance: Normal appearance  He is well-developed  He is not ill-appearing or diaphoretic  Comments: Thin male   HENT:      Head: Normocephalic and atraumatic  Right Ear: Tympanic membrane, ear canal and external ear normal       Left Ear: Tympanic membrane, ear canal and external ear normal       Mouth/Throat:      Mouth: Mucous membranes are moist       Dentition: Has dentures  Pharynx: Oropharynx is clear  No oropharyngeal exudate  Cardiovascular:      Rate and Rhythm: Normal rate and regular rhythm  Heart sounds: Normal heart sounds  No murmur heard  No friction rub  No gallop  Pulmonary:      Effort: Pulmonary effort is normal  No respiratory distress  Breath sounds: Normal breath sounds  No wheezing or rales  Chest:      Chest wall: No tenderness  Abdominal:      General: There is no distension  Palpations: Abdomen is soft  Tenderness: There is no abdominal tenderness  Musculoskeletal:         General: No swelling  Right lower leg: No edema  Left lower leg: No edema  Neurological:      General: No focal deficit present  Mental Status: He is alert and oriented to person, place, and time  Mental status is at baseline  Psychiatric:         Mood and Affect: Mood normal          Behavior: Behavior normal          Thought Content:  Thought content normal          Judgment: Judgment normal

## 2022-01-07 NOTE — PROGRESS NOTES
[facility]  coding opportunities       Chart reviewed, no opportunity found: CHART REVIEWED, NO OPPORTUNITY FOUND                        Patients insurance company: [employer]

## 2022-01-10 ENCOUNTER — OFFICE VISIT (OUTPATIENT)
Dept: GASTROENTEROLOGY | Facility: CLINIC | Age: 77
End: 2022-01-10
Payer: COMMERCIAL

## 2022-01-10 VITALS
HEIGHT: 73 IN | WEIGHT: 146.4 LBS | DIASTOLIC BLOOD PRESSURE: 92 MMHG | BODY MASS INDEX: 19.4 KG/M2 | SYSTOLIC BLOOD PRESSURE: 170 MMHG

## 2022-01-10 DIAGNOSIS — R13.10 DYSPHAGIA, UNSPECIFIED TYPE: Primary | ICD-10-CM

## 2022-01-10 DIAGNOSIS — R11.0 NAUSEA: ICD-10-CM

## 2022-01-10 DIAGNOSIS — R11.10 REGURGITATION OF FOOD: ICD-10-CM

## 2022-01-10 PROCEDURE — 99203 OFFICE O/P NEW LOW 30 MIN: CPT | Performed by: PHYSICIAN ASSISTANT

## 2022-01-10 PROCEDURE — 1160F RVW MEDS BY RX/DR IN RCRD: CPT | Performed by: PHYSICIAN ASSISTANT

## 2022-01-10 NOTE — PROGRESS NOTES
[facility] - Outpatient Consultation  Wilberto Crawford 68 y o  male MRN: [id number]  Encounter: [id number]          ASSESSMENT AND PLAN:      1  Dysphagia, unspecified type  2  Regurgitation of food  3  Nausea  -Will plan EGD with biopsy     -Patient will continue pantoprazole 40 mg daily     -Further recommendations will be made after patient's EGD is complete     -Patient will continue to monitor weight   ______________________________________________________________________    HPI:    68-year-old male who presents with a past medical history significant for tobacco abuse and dermatitis  Patient reports that the week before Thanksgiving he started to experience an issue with eating  He reports that when he would go to ED would take several bites and then he would have significant regurgitation of a clear liquid  He reports that he did not regurgitate solid food  He reports an associated nausea  He reports when this 1st started he felt like the food could not go down  He reports that his symptoms are sporadic and do not happen every day  He reports that it has happened several more times over the past month and a half  He is also reporting an 18 lb weight loss  Patient reports he has not been utilizing NSAIDs  Patient has never had upper endoscopy  Patient has never had a colonoscopy  Patient reports a family history of peptic ulcer disease in his father  REVIEW OF SYSTEMS:    CONSTITUTIONAL: Denies any fever, chills, rigors, and weight loss  HEENT: No earache or tinnitus  Denies hearing loss or visual disturbances  CARDIOVASCULAR: No chest pain or palpitations  RESPIRATORY: Denies any cough, hemoptysis, shortness of breath or dyspnea on exertion  GASTROINTESTINAL: As noted in the History of Present Illness  GENITOURINARY: No problems with urination  Denies any hematuria or dysuria  NEUROLOGIC: No dizziness or vertigo, denies headaches     MUSCULOSKELETAL: Denies any muscle or joint pain  SKIN: Denies skin rashes or itching  ENDOCRINE: Denies excessive thirst  Denies intolerance to heat or cold  PSYCHOSOCIAL: Denies depression or anxiety  Denies any recent memory loss  Historical Information   Past Medical History:   Diagnosis Date    Pneumonia     Rib fractures      History reviewed  No pertinent surgical history  Social History   Social History     Substance and Sexual Activity   Alcohol Use Never    Alcohol/week: 0 0 standard drinks    Comment: 0     Social History     Substance and Sexual Activity   Drug Use No     Social History     Tobacco Use   Smoking Status Current Every Day Smoker    Packs/day: 1 00    Types: Cigarettes   Smokeless Tobacco Never Used     Family History   Problem Relation Age of Onset    Dementia Mother     Heart disease Father        Meds/Allergies       Current Outpatient Medications:     ascorbic acid (VITAMIN C) 500 mg tablet    aspirin 325 mg tablet    Cholecalciferol (VITAMIN D3 PO)    cyanocobalamin (VITAMIN B-12) 500 MCG tablet    Multiple Vitamins-Minerals (MULTIVITAMIN WITH MINERALS) tablet    pantoprazole (PROTONIX) 40 mg tablet    triamcinolone (KENALOG) 0 5 % cream    No Known Allergies        Objective     Blood pressure 170/92, height 6' 1" (1 854 m), weight 66 4 kg (146 lb 6 4 oz)  Body mass index is 19 32 kg/m²  PHYSICAL EXAM:      General Appearance:   Alert, cooperative, no distress   HEENT:   Normocephalic, atraumatic, anicteric      Neck:  Supple, symmetrical, trachea midline   Lungs:   Clear to auscultation bilaterally; no rales, rhonchi or wheezing; respirations unlabored    Heart[de-identified]   Regular rate and rhythm; no murmur, rub, or gallop     Abdomen:   Soft, non-tender, non-distended; normal bowel sounds; no masses, no organomegaly    Genitalia:   Deferred    Rectal:   Deferred    Extremities:  No cyanosis, clubbing or edema    Pulses:  2+ and symmetric    Skin:  No jaundice, rashes, or lesions  Lymph nodes:  No palpable cervical lymphadenopathy        Lab Results:   No visits with results within 1 Day(s) from this visit  Latest known visit with results is:   Appointment on 01/06/2022   Component Date Value    WBC 01/06/2022 11 92*    RBC 01/06/2022 4 58     Hemoglobin 01/06/2022 13 3     Hematocrit 01/06/2022 40 7     MCV 01/06/2022 89     MCH 01/06/2022 29 0     MCHC 01/06/2022 32 7     RDW 01/06/2022 14 7     MPV 01/06/2022 11 3     Platelets 76/62/4156 362     nRBC 01/06/2022 0     Neutrophils Relative 01/06/2022 77*    Immat GRANS % 01/06/2022 1     Lymphocytes Relative 01/06/2022 11*    Monocytes Relative 01/06/2022 9     Eosinophils Relative 01/06/2022 1     Basophils Relative 01/06/2022 1     Neutrophils Absolute 01/06/2022 9 25*    Immature Grans Absolute 01/06/2022 0 13     Lymphocytes Absolute 01/06/2022 1 28     Monocytes Absolute 01/06/2022 1 04     Eosinophils Absolute 01/06/2022 0 10     Basophils Absolute 01/06/2022 0 12*    Sodium 01/06/2022 139     Potassium 01/06/2022 4 0     Chloride 01/06/2022 104     CO2 01/06/2022 29     ANION GAP 01/06/2022 6     BUN 01/06/2022 14     Creatinine 01/06/2022 0 77     Glucose 01/06/2022 116     Calcium 01/06/2022 10 0     AST 01/06/2022 10     ALT 01/06/2022 17     Alkaline Phosphatase 01/06/2022 75     Total Protein 01/06/2022 7 8     Albumin 01/06/2022 4 2     Total Bilirubin 01/06/2022 1 44*    eGFR 01/06/2022 88     TSH 3RD GENERATON 01/06/2022 1 520     Hepatitis C Ab 01/06/2022 Non-reactive          Radiology Results:   No results found

## 2022-01-10 NOTE — LETTER
January 10, 2022     Jabier Fernández MD  [address]  [facility]  [address]    Patient: Shari Arreaga   YOB: 1945   Date of Visit: 1/10/2022       Dear Dr Geeta Crenshaw: Thank you for referring Hillary Opitz to me for evaluation  Below are my notes for this consultation  If you have questions, please do not hesitate to call me  I look forward to following your patient along with you  Sincerely,        Danay Garcia PA-C        CC: No Recipients  Danay Garcia [place]  1/10/2022  1:31 PM  Sign when Signing Visit  [facility] - Outpatient Consultation  Shari Arreaga 68 y o  male MRN: [id number]  Encounter: [id number]          ASSESSMENT AND PLAN:      1  Dysphagia, unspecified type  2  Regurgitation of food  3  Nausea  -Will plan EGD with biopsy     -Patient will continue pantoprazole 40 mg daily     -Further recommendations will be made after patient's EGD is complete     -Patient will continue to monitor weight   ______________________________________________________________________    HPI:    44-year-old male who presents with a past medical history significant for tobacco abuse and dermatitis  Patient reports that the week before Thanksgiving he started to experience an issue with eating  He reports that when he would go to ED would take several bites and then he would have significant regurgitation of a clear liquid  He reports that he did not regurgitate solid food  He reports an associated nausea  He reports when this 1st started he felt like the food could not go down  He reports that his symptoms are sporadic and do not happen every day  He reports that it has happened several more times over the past month and a half  He is also reporting an 18 lb weight loss  Patient reports he has not been utilizing NSAIDs  Patient has never had upper endoscopy  Patient has never had a colonoscopy    Patient reports a family history of peptic ulcer disease in his father  REVIEW OF SYSTEMS:    CONSTITUTIONAL: Denies any fever, chills, rigors, and weight loss  HEENT: No earache or tinnitus  Denies hearing loss or visual disturbances  CARDIOVASCULAR: No chest pain or palpitations  RESPIRATORY: Denies any cough, hemoptysis, shortness of breath or dyspnea on exertion  GASTROINTESTINAL: As noted in the History of Present Illness  GENITOURINARY: No problems with urination  Denies any hematuria or dysuria  NEUROLOGIC: No dizziness or vertigo, denies headaches  MUSCULOSKELETAL: Denies any muscle or joint pain  SKIN: Denies skin rashes or itching  ENDOCRINE: Denies excessive thirst  Denies intolerance to heat or cold  PSYCHOSOCIAL: Denies depression or anxiety  Denies any recent memory loss  Historical Information   Past Medical History:   Diagnosis Date    Pneumonia     Rib fractures      History reviewed  No pertinent surgical history  Social History   Social History     Substance and Sexual Activity   Alcohol Use Never    Alcohol/week: 0 0 standard drinks    Comment: 0     Social History     Substance and Sexual Activity   Drug Use No     Social History     Tobacco Use   Smoking Status Current Every Day Smoker    Packs/day: 1 00    Types: Cigarettes   Smokeless Tobacco Never Used     Family History   Problem Relation Age of Onset    Dementia Mother     Heart disease Father        Meds/Allergies       Current Outpatient Medications:     ascorbic acid (VITAMIN C) 500 mg tablet    aspirin 325 mg tablet    Cholecalciferol (VITAMIN D3 PO)    cyanocobalamin (VITAMIN B-12) 500 MCG tablet    Multiple Vitamins-Minerals (MULTIVITAMIN WITH MINERALS) tablet    pantoprazole (PROTONIX) 40 mg tablet    triamcinolone (KENALOG) 0 5 % cream    No Known Allergies        Objective     Blood pressure 170/92, height 6' 1" (1 854 m), weight 66 4 kg (146 lb 6 4 oz)  Body mass index is 19 32 kg/m²          PHYSICAL EXAM:      General Appearance:   Alert, cooperative, no distress   HEENT:   Normocephalic, atraumatic, anicteric      Neck:  Supple, symmetrical, trachea midline   Lungs:   Clear to auscultation bilaterally; no rales, rhonchi or wheezing; respirations unlabored    Heart[de-identified]   Regular rate and rhythm; no murmur, rub, or gallop  Abdomen:   Soft, non-tender, non-distended; normal bowel sounds; no masses, no organomegaly    Genitalia:   Deferred    Rectal:   Deferred    Extremities:  No cyanosis, clubbing or edema    Pulses:  2+ and symmetric    Skin:  No jaundice, rashes, or lesions    Lymph nodes:  No palpable cervical lymphadenopathy        Lab Results:   No visits with results within 1 Day(s) from this visit     Latest known visit with results is:   Appointment on 01/06/2022   Component Date Value    WBC 01/06/2022 11 92*    RBC 01/06/2022 4 58     Hemoglobin 01/06/2022 13 3     Hematocrit 01/06/2022 40 7     MCV 01/06/2022 89     MCH 01/06/2022 29 0     MCHC 01/06/2022 32 7     RDW 01/06/2022 14 7     MPV 01/06/2022 11 3     Platelets 03/26/5152 362     nRBC 01/06/2022 0     Neutrophils Relative 01/06/2022 77*    Immat GRANS % 01/06/2022 1     Lymphocytes Relative 01/06/2022 11*    Monocytes Relative 01/06/2022 9     Eosinophils Relative 01/06/2022 1     Basophils Relative 01/06/2022 1     Neutrophils Absolute 01/06/2022 9 25*    Immature Grans Absolute 01/06/2022 0 13     Lymphocytes Absolute 01/06/2022 1 28     Monocytes Absolute 01/06/2022 1 04     Eosinophils Absolute 01/06/2022 0 10     Basophils Absolute 01/06/2022 0 12*    Sodium 01/06/2022 139     Potassium 01/06/2022 4 0     Chloride 01/06/2022 104     CO2 01/06/2022 29     ANION GAP 01/06/2022 6     BUN 01/06/2022 14     Creatinine 01/06/2022 0 77     Glucose 01/06/2022 116     Calcium 01/06/2022 10 0     AST 01/06/2022 10     ALT 01/06/2022 17     Alkaline Phosphatase 01/06/2022 75     Total Protein 01/06/2022 7 8     Albumin 01/06/2022 4 2     Total Bilirubin 01/06/2022 1 44*    eGFR 01/06/2022 88     TSH 3RD GENERATON 01/06/2022 1 520     Hepatitis C Ab 01/06/2022 Non-reactive          Radiology Results:   No results found

## 2022-01-10 NOTE — PATIENT INSTRUCTIONS
GERD (Gastroesophageal Reflux Disease)   WHAT YOU NEED TO KNOW:   Gastroesophageal reflux disease (GERD) is reflux that occurs more than twice a week for a few weeks  Reflux means acid and food in the stomach back up into the esophagus  It usually causes heartburn and other symptoms  GERD can cause other health problems over time if it is not treated  DISCHARGE INSTRUCTIONS:   Call your local emergency number (911 in the [address] [facility]) if:   · You have severe chest pain and sudden trouble breathing  Return to the emergency department if:   · You have trouble breathing after you vomit  · You have trouble swallowing, or pain with swallowing  · Your bowel movements are black, bloody, or tarry-looking  · Your vomit looks like coffee grounds or has blood in it  Call your doctor or gastroenterologist if:   · You feel full and cannot burp or vomit  · You vomit large amounts, or you vomit often  · You are losing weight without trying  · Your symptoms get worse or do not improve with treatment  · You have questions or concerns about your condition or care  Medicines:   · Medicines  are used to decrease stomach acid  Medicine may also be used to help your lower esophageal sphincter and stomach contract (tighten) more  · Take your medicine as directed  Contact your healthcare provider if you think your medicine is not helping or if you have side effects  Tell him of her if you are allergic to any medicine  Keep a list of the medicines, vitamins, and herbs you take  Include the amounts, and when and why you take them  Bring the list or the pill bottles to follow-up visits  Carry your medicine list with you in case of an emergency  Manage GERD:       · Do not have foods or drinks that may increase heartburn  These include chocolate, peppermint, fried or fatty foods, drinks that contain caffeine, or carbonated drinks (soda)   Other foods include spicy foods, onions, tomatoes, and tomato-based foods  Do not have foods or drinks that can irritate your esophagus, such as citrus fruits, juices, and alcohol  · Do not eat large meals  When you eat a lot of food at one time, your stomach needs more acid to digest it  Eat 6 small meals each day instead of 3 large ones, and eat slowly  Do not eat meals 2 to 3 hours before bedtime  · Elevate the head of your bed  Place 6-inch blocks under the head of your bed frame  You may also use more than one pillow under your head and shoulders while you sleep  · Maintain a healthy weight  If you are overweight, weight loss may help relieve symptoms of GERD  · Do not smoke  Smoking weakens the lower esophageal sphincter and increases the risk of GERD  Ask your healthcare provider for information if you currently smoke and need help to quit  E-cigarettes or smokeless tobacco still contain nicotine  Talk to your healthcare provider before you use these products  · Do not wear clothing that is tight around your waist   Tight clothing can put pressure on your stomach and cause or worsen GERD symptoms  Follow up with your doctor or gastroenterologist as directed:  Write down your questions so you remember to ask them during your visits  © Copyright [employer] 2021 Information is for End User's use only and may not be sold, redistributed or otherwise used for commercial purposes  All illustrations and images included in CareNotes® are the copyrighted property of [employer]  or Bethanie Bhat  The above information is an  only  It is not intended as medical advice for individual conditions or treatments  Talk to your doctor, nurse or pharmacist before following any medical regimen to see if it is safe and effective for you

## 2022-01-12 ENCOUNTER — TELEPHONE (OUTPATIENT)
Dept: SURGERY | Facility: HOSPITAL | Age: 77
End: 2022-01-12

## 2022-01-13 ENCOUNTER — HOSPITAL ENCOUNTER (OUTPATIENT)
Dept: GASTROENTEROLOGY | Facility: HOSPITAL | Age: 77
Setting detail: OUTPATIENT SURGERY
Discharge: HOME | End: 2022-01-13
Payer: COMMERCIAL

## 2022-01-13 ENCOUNTER — TREATMENT (OUTPATIENT)
Dept: GASTROENTEROLOGY | Facility: CLINIC | Age: 77
End: 2022-01-13

## 2022-01-13 ENCOUNTER — ANESTHESIA EVENT (OUTPATIENT)
Dept: GASTROENTEROLOGY | Facility: HOSPITAL | Age: 77
End: 2022-01-13

## 2022-01-13 ENCOUNTER — ANESTHESIA (OUTPATIENT)
Dept: GASTROENTEROLOGY | Facility: HOSPITAL | Age: 77
End: 2022-01-13

## 2022-01-13 VITALS
HEIGHT: 73 IN | DIASTOLIC BLOOD PRESSURE: 75 MMHG | OXYGEN SATURATION: 96 % | SYSTOLIC BLOOD PRESSURE: 158 MMHG | HEART RATE: 75 BPM | WEIGHT: 147.71 LBS | RESPIRATION RATE: 18 BRPM | BODY MASS INDEX: 19.58 KG/M2 | TEMPERATURE: 97.4 F

## 2022-01-13 DIAGNOSIS — C15.5 MALIGNANT NEOPLASM OF LOWER THIRD OF ESOPHAGUS (HCC): Primary | ICD-10-CM

## 2022-01-13 DIAGNOSIS — R13.10 DYSPHAGIA, UNSPECIFIED TYPE: ICD-10-CM

## 2022-01-13 PROCEDURE — 43239 EGD BIOPSY SINGLE/MULTIPLE: CPT | Performed by: INTERNAL MEDICINE

## 2022-01-13 PROCEDURE — 88342 IMHCHEM/IMCYTCHM 1ST ANTB: CPT | Performed by: PATHOLOGY

## 2022-01-13 PROCEDURE — 88305 TISSUE EXAM BY PATHOLOGIST: CPT | Performed by: PATHOLOGY

## 2022-01-13 PROCEDURE — 88360 TUMOR IMMUNOHISTOCHEM/MANUAL: CPT | Performed by: PATHOLOGY

## 2022-01-13 PROCEDURE — 88341 IMHCHEM/IMCYTCHM EA ADD ANTB: CPT | Performed by: PATHOLOGY

## 2022-01-13 RX ORDER — SODIUM CHLORIDE, SODIUM LACTATE, POTASSIUM CHLORIDE, CALCIUM CHLORIDE 600; 310; 30; 20 MG/100ML; MG/100ML; MG/100ML; MG/100ML
125 INJECTION, SOLUTION INTRAVENOUS CONTINUOUS
Status: DISCONTINUED | OUTPATIENT
Start: 2022-01-13 | End: 2022-01-17 | Stop reason: HOSPADM

## 2022-01-13 RX ORDER — SODIUM CHLORIDE, SODIUM LACTATE, POTASSIUM CHLORIDE, CALCIUM CHLORIDE 600; 310; 30; 20 MG/100ML; MG/100ML; MG/100ML; MG/100ML
INJECTION, SOLUTION INTRAVENOUS CONTINUOUS PRN
Status: DISCONTINUED | OUTPATIENT
Start: 2022-01-13 | End: 2022-01-13

## 2022-01-13 RX ORDER — PROPOFOL 10 MG/ML
INJECTION, EMULSION INTRAVENOUS AS NEEDED
Status: DISCONTINUED | OUTPATIENT
Start: 2022-01-13 | End: 2022-01-13

## 2022-01-13 RX ORDER — LIDOCAINE HYDROCHLORIDE 20 MG/ML
INJECTION, SOLUTION EPIDURAL; INFILTRATION; INTRACAUDAL; PERINEURAL AS NEEDED
Status: DISCONTINUED | OUTPATIENT
Start: 2022-01-13 | End: 2022-01-13

## 2022-01-13 RX ADMIN — SODIUM CHLORIDE, SODIUM LACTATE, POTASSIUM CHLORIDE, AND CALCIUM CHLORIDE: .6; .31; .03; .02 INJECTION, SOLUTION INTRAVENOUS at 12:26

## 2022-01-13 RX ADMIN — SODIUM CHLORIDE, SODIUM LACTATE, POTASSIUM CHLORIDE, AND CALCIUM CHLORIDE 125 ML/HR: .6; .31; .03; .02 INJECTION, SOLUTION INTRAVENOUS at 11:52

## 2022-01-13 RX ADMIN — LIDOCAINE HYDROCHLORIDE 100 MG: 20 INJECTION, SOLUTION EPIDURAL; INFILTRATION; INTRACAUDAL; PERINEURAL at 12:34

## 2022-01-13 RX ADMIN — PROPOFOL 130 MG: 10 INJECTION, EMULSION INTRAVENOUS at 12:34

## 2022-01-13 RX ADMIN — PROPOFOL 70 MG: 10 INJECTION, EMULSION INTRAVENOUS at 12:38

## 2022-01-13 RX ADMIN — PROPOFOL 50 MG: 10 INJECTION, EMULSION INTRAVENOUS at 12:41

## 2022-01-13 NOTE — H&P
History and Physical - [facility]  Honorio Sanchze 68 y o  male MRN: [id number]      HPI: Honorio Sanchez is a 68y o  year old male who presents for evaluation of nausea and dysphagia       REVIEW OF SYSTEMS: Per the HPI, and otherwise unremarkable  Historical Information   Past Medical History:   Diagnosis Date    Pneumonia     Polio     Rib fractures      History reviewed  No pertinent surgical history  Social History   Social History     Substance and Sexual Activity   Alcohol Use Never    Alcohol/week: 0 0 standard drinks    Comment: 0     Social History     Substance and Sexual Activity   Drug Use No     Social History     Tobacco Use   Smoking Status Current Every Day Smoker    Packs/day: 1 00    Types: Cigarettes   Smokeless Tobacco Never Used     Family History   Problem Relation Age of Onset    Dementia Mother     Heart disease Father        Meds/Allergies     (Not in a hospital admission)      No Known Allergies    Objective     Blood pressure (!) 180/79, pulse 80, temperature 97 8 °F (36 6 °C), temperature source Temporal, resp  rate 18, height 6' 1" (1 854 m), weight 67 kg (147 lb 11 3 oz), SpO2 99 %  PHYSICAL EXAM    Gen: NAD  CV: RRR  CHEST: Clear  ABD: soft, NT/ND  EXT: no edema      ASSESSMENT/PLAN:  This is a 68y o  year old male here for EGD with possible biopsy and dilation, and he is stable and optimized for his procedure

## 2022-01-13 NOTE — ANESTHESIA PREPROCEDURE EVALUATION
Procedure:  EGD    Relevant Problems   GI/HEPATIC   (+) Dysphagia        Physical Exam    Airway    Mallampati score: III  TM Distance: >3 FB  Neck ROM: full     Dental   upper dentures and lower dentures,     Cardiovascular  Rhythm: regular, Rate: normal, No murmur, Cardiovascular exam normal    Pulmonary  Pulmonary exam normal Breath sounds clear to auscultation, No wheezes,     Other Findings        Anesthesia Plan  ASA Score- 2     Anesthesia Type- IV sedation with anesthesia with ASA Monitors  Additional Monitors:   Airway Plan:           Plan Factors-Exercise tolerance (METS): >4 METS  Chart reviewed  EKG reviewed  Existing labs reviewed  Patient is a current smoker  Patient instructed to abstain from smoking on day of procedure  Patient smoked on day of surgery  There is medical exclusion for perioperative obstructive sleep apnea risk education  Induction- intravenous  Postoperative Plan-     Informed Consent- Anesthetic plan and risks discussed with patient  I personally reviewed this patient with the CRNA  Discussed and agreed on the Anesthesia Plan with the AARON Estes

## 2022-01-13 NOTE — ANESTHESIA POSTPROCEDURE EVALUATION
Post-Op Assessment Note    CV Status:  Stable  Pain Score: 0    Pain management: adequate     Mental Status:  Sleepy   Hydration Status:  Euvolemic   PONV Controlled:  Controlled   Airway Patency:  Patent      Post Op Vitals Reviewed: Yes      Staff: Anesthesiologist, CRNA         No complications documented      /72 (01/13/22 1243)    Temp      Pulse 78 (01/13/22 1243)   Resp      SpO2 95 % (01/13/22 1243)

## 2022-01-19 ENCOUNTER — TELEPHONE (OUTPATIENT)
Dept: HEMATOLOGY ONCOLOGY | Facility: CLINIC | Age: 77
End: 2022-01-19

## 2022-01-19 NOTE — TELEPHONE ENCOUNTER
Soft Intake Form   Patient Details   Blossom Harris     1945     [id number]     Reason For Appointment   Who is Calling? Patient    If not patient, Name? Who is the Referring Doctor? Dr Ilda العراقي    What is the diagnosis? esophageal ca    Has this diagnosis been confirmed by a biopsy/surgery? If yes, what is the date it was done? Yes 1/13   Biopsy done at [facility]? If not, where was it done? yes   Was imaging done, and was it done at [facility]? If not, where was it done? yes   Have you been seen by another Oncologist?  If so, who and where (name of facility, city and state) no   For 2nd Opinions Only: Are you currently undergoing treatment, or are you scheduled to start treatment? If yes, name of facility, city and state N/a    For "History Of" only: Have you completed treatment? N/a    Have you had Genetic Testing done in the past?  If so, advise to bring test results to their visit N/a    Record Gathering Information   Did you advise to have records faxed to [phone]? N/a    Did you advise to have disks sent to the proper address with imaging? ("History of" Patients)  5 years of imaging for breast patients-Mammos, US etc N/a    Scheduling Information   What is the best call back number? (If the [facility] is calling, please use their number) [phone]   Miscellaneous Information      scheduled by Leeann CULP RN

## 2022-01-20 NOTE — TELEPHONE ENCOUNTER
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

## 2022-01-24 ENCOUNTER — TELEPHONE (OUTPATIENT)
Dept: HEMATOLOGY ONCOLOGY | Facility: CLINIC | Age: 77
End: 2022-01-24

## 2022-01-24 ENCOUNTER — HOSPITAL ENCOUNTER (OUTPATIENT)
Dept: CT IMAGING | Facility: CLINIC | Age: 77
Discharge: HOME | End: 2022-01-24
Payer: COMMERCIAL

## 2022-01-24 DIAGNOSIS — R63.4 WEIGHT LOSS: ICD-10-CM

## 2022-01-24 DIAGNOSIS — K22.89 ESOPHAGEAL MASS: Primary | ICD-10-CM

## 2022-01-24 DIAGNOSIS — R13.10 DYSPHAGIA, UNSPECIFIED TYPE: ICD-10-CM

## 2022-01-24 PROCEDURE — 74177 CT ABD & PELVIS W/CONTRAST: CPT

## 2022-01-24 PROCEDURE — 71260 CT THORAX DX C+: CPT

## 2022-01-24 PROCEDURE — G1004 CDSM NDSC: HCPCS

## 2022-01-24 RX ADMIN — IOHEXOL 100 ML: 350 INJECTION, SOLUTION INTRAVENOUS at 12:40

## 2022-01-25 ENCOUNTER — TELEPHONE (OUTPATIENT)
Dept: GYNECOLOGIC ONCOLOGY | Facility: CLINIC | Age: 77
End: 2022-01-25

## 2022-01-25 NOTE — TELEPHONE ENCOUNTER
Intra-Department Referral    Patient Details:  Rodrigo Armijo  1945  [id number]   Background Information:  [address] starts by opening a telephone encounter and gathering the following information   What department is patient being referred to? Med onc    Who is calling to schedule and relationship?    Patient    Diagnosis Esophageal cancer    What department was patient seen in last?       surg onc 2/3   Miscellaneous:

## 2022-01-26 ENCOUNTER — TELEPHONE (OUTPATIENT)
Dept: GASTROENTEROLOGY | Facility: CLINIC | Age: 77
End: 2022-01-26

## 2022-01-26 NOTE — TELEPHONE ENCOUNTER
[employer], called     sending clinicals  Tracking number [id number], faxed to [phone] IMPRESSION:    #Hypotension secondary to sepsis vs hypovolemia   # JEEVAN w/out hyperkalemia or acidosis likely pre-renal secondary to diarrhea + overdiuresis   # Hyponatremia secondary to Hypovolemia vs hypothyroidism vs less likely SIADH (pt on SSRI/Lyrica)  #hx of GIB secondary to duodenal ulcer s/p EGD (clean based ulcer without intervention)  # Acute Anemia   #Hypothyroidism  #High lactate    PLAN:    CNS: Avoid sedative. Hold Lyrica/SSRI    HEENT: HOB at 45degrees. Oral care    PULMONARY: C/w inhalers. CXR in AM. Repeat ABG. Bipap prn    CARDIOVASCULAR:  Hold lasix and metoprolol. Accurate I&O's.     GI: Protonix + sucralfate.  NPO     RENAL: s/p 1.5L LR. Repeat BMP at 11.30. IF hyponatremia improves, start NS @60cc/hr. Serum osm, Uosm, u Na, UCL-, Urine protein:creatinine. renal US.     INFECTIOUS DISEASE: Send U/A, ucx, and blood culture. Send C.Diff/stool culture. Cefepime [id number]. Flagyl 500mg q8hr.     HEMATOLOGICAL:  SCD's     ENDOCRINE:  s/p solucortef and IV synthroid. Check TSH/freeT4. FS q4hr. Insulin ICU protocol.  c/w PO synthroid.    MUSCULOSKELETAL: Arterial Duplex to r/o PAD.     Diet: NPO IMPRESSION:    #sepsis present on admission  # JEEVAN w/out hyperkalemia or acidosis likely pre-renal secondary to diarrhea + overdiuresis   # Hyponatremia secondary to Hypovolemia vs hypothyroidism vs less likely SIADH (pt on SSRI/Lyrica)  #hx of GIB secondary to duodenal ulcer s/p EGD (clean based ulcer without intervention)  # Acute Anemia   #Hypothyroidism  #High lactate    PLAN:    CNS: Avoid sedative. Hold Lyrica/SSRI    HEENT: HOB at 45degrees. Oral care    PULMONARY: C/w inhalers. CXR in AM. Repeat ABG. Bipap prn    CARDIOVASCULAR:  Hold lasix and metoprolol. Accurate I&O's.     GI: Protonix + sucralfate.  feeding    RENAL: s/p 1.5L LR. Repeat BMP at 11.30. IF hyponatremia improves, start NS @60cc/hr. Serum osm, Uosm, u Na, UCL-, Urine protein:creatinine. renal US.     INFECTIOUS DISEASE: Send U/A, ucx, and blood culture. Send C.Diff/stool culture. Cefepime [id number]. Flagyl 500mg q8hr.     HEMATOLOGICAL:  SCD's     ENDOCRINE:  s/p solucortef and IV synthroid. Check TSH/freeT4. FS q4hr. Insulin ICU protocol.  c/w PO synthroid.    MUSCULOSKELETAL: Arterial Duplex to r/o PAD.     Diet: clear liquid

## 2022-01-26 NOTE — TELEPHONE ENCOUNTER
Called Nely for the [employer] in chart and no member is found    LMOM on patients voice mail stating important he calls back with current medical and prescription insurance information

## 2022-01-26 NOTE — TELEPHONE ENCOUNTER
Patient called back LMOM that he was RTRN your call about the insurance  Please RTRN call to [phone]    Thx

## 2022-01-27 ENCOUNTER — PATIENT OUTREACH (OUTPATIENT)
Dept: HEMATOLOGY ONCOLOGY | Facility: CLINIC | Age: 77
End: 2022-01-27

## 2022-01-27 ENCOUNTER — TELEPHONE (OUTPATIENT)
Dept: GASTROENTEROLOGY | Facility: CLINIC | Age: 77
End: 2022-01-27

## 2022-01-27 NOTE — TELEPHONE ENCOUNTER
Called [facility] again, spoke to a nurse reviewer and was approved until July 26, 2022  Auth:  [id number]

## 2022-01-27 NOTE — PROGRESS NOTES
Phone outreach to the pt to introduce myself and my role, go over upcoming appts as well as complete general assessment  The pt was driving, on the job when I called and had pulled over to talk  I asked him when a better time to talk would be, he stated that he works Monday through Friday from 730AM to 430PM  He asked if I could call later today and late tomorrow but I will be unavailable  We agreed that I would call him on Monday afternoon when he is off from work to complete the general assessment  I briefly d/w him his appts and he was aware of all of them  I will provide my contact information when I speak to him again since he had no way of taking it down  Pt was pleasant to speak with

## 2022-01-28 ENCOUNTER — TELEPHONE (OUTPATIENT)
Dept: GASTROENTEROLOGY | Facility: CLINIC | Age: 77
End: 2022-01-28

## 2022-01-28 NOTE — TELEPHONE ENCOUNTER
Jeremiah/Elyse patient - Patient RTRN call to inquire why we called and explained PET scan approved  He acknowledged he understood and said it was scheduled    Thx

## 2022-01-31 ENCOUNTER — PATIENT OUTREACH (OUTPATIENT)
Dept: HEMATOLOGY ONCOLOGY | Facility: CLINIC | Age: 77
End: 2022-01-31

## 2022-01-31 DIAGNOSIS — C15.9 ESOPHAGEAL ADENOCARCINOMA (HCC): Primary | ICD-10-CM

## 2022-01-31 NOTE — PROGRESS NOTES
Phone outreach to the pt, we completed the general assessment  Pt denies pain, states he has a support system (two daughters), describes his mood as an "emotional roller coaster"  Pt reports  that he is hungry but when he eats something solid he can only get two bites in and then he has to vomit  He is drinking Orgain- nutritional drink- he can eat foods that are soft  Pt states that he has lost about 30 pounds over the last 4 months  Referral put in for oncology dietician  I discussed with the pt about a port and that the appt details would be set up at his consult with Dr Ervin Mcdaniel on 2/3  Pt understood

## 2022-02-01 ENCOUNTER — RA CDI HCC (OUTPATIENT)
Dept: OTHER | Facility: HOSPITAL | Age: 77
End: 2022-02-01

## 2022-02-01 ENCOUNTER — HOSPITAL ENCOUNTER (OUTPATIENT)
Dept: RADIOLOGY | Age: 77
Discharge: HOME | End: 2022-02-01
Payer: COMMERCIAL

## 2022-02-01 ENCOUNTER — TELEPHONE (OUTPATIENT)
Dept: GASTROENTEROLOGY | Facility: CLINIC | Age: 77
End: 2022-02-01

## 2022-02-01 DIAGNOSIS — C15.5 MALIGNANT NEOPLASM OF LOWER THIRD OF ESOPHAGUS (HCC): ICD-10-CM

## 2022-02-01 LAB — GLUCOSE SERPL-MCNC: 141 MG/DL (ref 65–140)

## 2022-02-01 PROCEDURE — 78815 PET IMAGE W/CT SKULL-THIGH: CPT

## 2022-02-01 PROCEDURE — G1004 CDSM NDSC: HCPCS

## 2022-02-01 PROCEDURE — 82948 REAGENT STRIP/BLOOD GLUCOSE: CPT

## 2022-02-01 PROCEDURE — A9552 F18 FDG: HCPCS

## 2022-02-01 NOTE — PROGRESS NOTES
[facility]  [facility]       Chart reviewed, no opportunity found: CHART REVIEWED, NO OPPORTUNITY FOUND                        Patients insurance company: [employer]

## 2022-02-01 NOTE — TELEPHONE ENCOUNTER
Jeremiah Lamar from radiology phoned     There were findings on patient's pet scan  Malou Garzon [phone]

## 2022-02-02 PROBLEM — C15.5 MALIGNANT NEOPLASM OF LOWER THIRD OF ESOPHAGUS (HCC): Status: ACTIVE | Noted: 2022-01-13

## 2022-02-03 ENCOUNTER — TELEPHONE (OUTPATIENT)
Dept: NUTRITION | Facility: CLINIC | Age: 77
End: 2022-02-03

## 2022-02-03 ENCOUNTER — OFFICE VISIT (OUTPATIENT)
Dept: URGENT CARE | Facility: CLINIC | Age: 77
End: 2022-02-03
Payer: COMMERCIAL

## 2022-02-03 ENCOUNTER — TELEPHONE (OUTPATIENT)
Dept: GASTROENTEROLOGY | Facility: CLINIC | Age: 77
End: 2022-02-03

## 2022-02-03 ENCOUNTER — PATIENT OUTREACH (OUTPATIENT)
Dept: OTHER | Facility: CLINIC | Age: 77
End: 2022-02-03

## 2022-02-03 ENCOUNTER — CONSULT (OUTPATIENT)
Dept: SURGICAL ONCOLOGY | Facility: CLINIC | Age: 77
End: 2022-02-03
Payer: COMMERCIAL

## 2022-02-03 ENCOUNTER — PREP FOR PROCEDURE (OUTPATIENT)
Dept: GASTROENTEROLOGY | Facility: CLINIC | Age: 77
End: 2022-02-03

## 2022-02-03 VITALS
HEART RATE: 96 BPM | SYSTOLIC BLOOD PRESSURE: 134 MMHG | BODY MASS INDEX: 19.48 KG/M2 | DIASTOLIC BLOOD PRESSURE: 86 MMHG | TEMPERATURE: 98.2 F | OXYGEN SATURATION: 96 % | RESPIRATION RATE: 16 BRPM | HEIGHT: 73 IN | WEIGHT: 147 LBS

## 2022-02-03 VITALS — RESPIRATION RATE: 14 BRPM | OXYGEN SATURATION: 96 % | TEMPERATURE: 98 F | HEART RATE: 86 BPM

## 2022-02-03 DIAGNOSIS — L03.116 CELLULITIS OF LEFT ANKLE: Primary | ICD-10-CM

## 2022-02-03 DIAGNOSIS — E44.1 MILD PROTEIN-CALORIE MALNUTRITION (HCC): ICD-10-CM

## 2022-02-03 DIAGNOSIS — C15.5 MALIGNANT NEOPLASM OF LOWER THIRD OF ESOPHAGUS (HCC): Primary | ICD-10-CM

## 2022-02-03 DIAGNOSIS — C15.5 MALIGNANT NEOPLASM OF LOWER THIRD OF ESOPHAGUS (HCC): ICD-10-CM

## 2022-02-03 DIAGNOSIS — R63.4 WEIGHT LOSS: ICD-10-CM

## 2022-02-03 DIAGNOSIS — M25.572 ACUTE LEFT ANKLE PAIN: ICD-10-CM

## 2022-02-03 LAB
ATRIAL RATE: 84 BPM
ATRIAL RATE: 84 BPM
P AXIS: 92 DEGREES
P AXIS: 92 DEGREES
PR INTERVAL: 174 MS
PR INTERVAL: 174 MS
QRS AXIS: 26 DEGREES
QRS AXIS: 26 DEGREES
QRSD INTERVAL: 128 MS
QRSD INTERVAL: 128 MS
QT INTERVAL: 390 MS
QT INTERVAL: 390 MS
QTC INTERVAL: 460 MS
QTC INTERVAL: 460 MS
T WAVE AXIS: 23 DEGREES
T WAVE AXIS: 23 DEGREES
VENTRICULAR RATE: 84 BPM
VENTRICULAR RATE: 84 BPM

## 2022-02-03 PROCEDURE — 93010 ELECTROCARDIOGRAM REPORT: CPT

## 2022-02-03 PROCEDURE — 99213 OFFICE O/P EST LOW 20 MIN: CPT

## 2022-02-03 PROCEDURE — 99205 OFFICE O/P NEW HI 60 MIN: CPT | Performed by: SURGERY

## 2022-02-03 PROCEDURE — S9088 SERVICES PROVIDED IN URGENT: HCPCS

## 2022-02-03 PROCEDURE — 93005 ELECTROCARDIOGRAM TRACING: CPT

## 2022-02-03 RX ORDER — CEPHALEXIN 500 MG/1
500 CAPSULE ORAL EVERY 6 HOURS SCHEDULED
Qty: 28 CAPSULE | Refills: 0 | Status: SHIPPED | OUTPATIENT
Start: 2022-02-03 | End: 2022-02-12 | Stop reason: HOSPADM

## 2022-02-03 RX ORDER — HYDROCODONE BITARTRATE AND ACETAMINOPHEN 5; 325 MG/1; MG/1
1 TABLET ORAL EVERY 6 HOURS PRN
Qty: 10 TABLET | Refills: 0 | Status: SHIPPED | OUTPATIENT
Start: 2022-02-03 | End: 2022-02-03 | Stop reason: CLARIF

## 2022-02-03 RX ORDER — HYDROCODONE BITARTRATE AND ACETAMINOPHEN 5; 325 MG/1; MG/1
1 TABLET ORAL EVERY 6 HOURS PRN
Qty: 10 TABLET | Refills: 0 | Status: SHIPPED | OUTPATIENT
Start: 2022-02-03 | End: 2022-02-12 | Stop reason: HOSPADM

## 2022-02-03 NOTE — PROGRESS NOTES
[facility]        NAME: Elda Álvarez is a 68 y o  male  : 1945    MRN: [id number]  DATE: February 3, 2022  TIME: 1:30 PM    Assessment and Plan   Cellulitis of left ankle [L03 116]  1  Cellulitis of left ankle  cephalexin (KEFLEX) 500 mg capsule   2  Acute left ankle pain       Infection to posterior and medial aspect of foot  No drainage available to culture wound  Will start on antibiotics for symptoms  Educated to start ibuprofen for symptoms to decrease the swelling to the area -- also educated that he has a procedure upcoming and to follow their recommendations for stopping this medication before procedure start  Hopefully with decrease in infection and swelling, mobility to the foot will increase  Has an appointment with PCP tomorrow, will delay a few days to week to do a wound recheck  Patient Instructions     -Complete entire course of antibiotics  -Keep area clean and dry  -Go to emergency room if symptoms worsen such as area is warm to touch, fevers, or increased pain to site    -Follow up with PCP in 3-5 days   -Proceed to ER if symptoms worsen  Chief Complaint     Chief Complaint   Patient presents with    Ankle Pain     left ankle  3 weeks ago noted itchy bumps  Unable to bend or apply pressure, painful to touch  Open wound with slough noted on medial ankle, warm to touch  Patient states there is no ROM with ankle         History of Present Illness       Symptoms began 3 weeks ago, when he noted itchy bumps to the back/medial side of his left ankle  The area has gotten increasingly more painful despite trying topical antibiotics  He and his daughter feel, they are unable to bend or apply pressure, painful to touch  Claims there is not range of motion to the site  Has tried over the counter mupirocin for, without relief  Denies chills or fever  Review of Systems   Review of Systems   Constitutional: Negative for chills, fatigue and fever     Respiratory: Negative for cough and shortness of breath  Cardiovascular: Negative for chest pain, palpitations and leg swelling  Gastrointestinal: Negative for abdominal pain  Genitourinary: Negative for dysuria  Skin: Positive for wound  Negative for color change  Psychiatric/Behavioral: Negative for confusion  Current Medications       Current Outpatient Medications:     ascorbic acid (VITAMIN C) 500 mg tablet, Take 500 mg by mouth daily, Disp: , Rfl:     aspirin 325 mg tablet, Take 325 mg by mouth 2 (two) times a day, Disp: , Rfl:     Cholecalciferol (VITAMIN D3 PO), Take by mouth, Disp: , Rfl:     cyanocobalamin (VITAMIN B-12) 500 MCG tablet, Take 500 mcg by mouth daily, Disp: , Rfl:     HYDROcodone-acetaminophen (Norco) 5-325 mg per tablet, Take 1 tablet by mouth every 6 (six) hours as needed for pain Max Daily Amount: 4 tablets, Disp: 10 tablet, Rfl: 0    Multiple Vitamins-Minerals (MULTIVITAMIN WITH MINERALS) tablet, Take 1 tablet by mouth daily, Disp: , Rfl:     pantoprazole (PROTONIX) 40 mg tablet, Take 1 tablet (40 mg total) by mouth daily before breakfast, Disp: 90 tablet, Rfl: 0    triamcinolone (KENALOG) 0 5 % cream, Apply topically 2 (two) times a day, Disp: 30 g, Rfl: 0    cephalexin (KEFLEX) 500 mg capsule, Take 1 capsule (500 mg total) by mouth every 6 (six) hours for 7 days, Disp: 28 capsule, Rfl: 0    Current Allergies     Allergies as of 02/03/2022    (No Known Allergies)            The following portions of the patient's history were reviewed and updated as appropriate: allergies, current medications, past family history, past medical history, past social history, past surgical history and problem list      Past Medical History:   Diagnosis Date    Cancer ([facility] [address] )     esophageal ca    Pneumonia     Polio     Rib fractures        History reviewed  No pertinent surgical history      Family History   Problem Relation Age of Onset    Dementia Mother     Heart disease Father Medications have been verified  Objective   Pulse 86   Temp 98 °F (36 7 °C)   Resp 14   SpO2 96%   134/86 BP - from today     Physical Exam     Physical Exam  Vitals reviewed  Constitutional:       Appearance: Normal appearance  Cardiovascular:      Rate and Rhythm: Normal rate and regular rhythm  Pulses: Normal pulses  Dorsalis pedis pulses are 2+ on the right side and 2+ on the left side  Heart sounds: Normal heart sounds  No murmur heard  Pulmonary:      Effort: Pulmonary effort is normal  No respiratory distress  Breath sounds: Normal breath sounds  Musculoskeletal:        Feet:    Feet:      Left foot:      Skin integrity: Skin breakdown and warmth present  No erythema  Toenail Condition: Left toenails are normal       Comments: Wound bed is white, yellow and reddened  Left foot sensation intact  Able to wiggle toes, able to flex foot, limited extension of foot  Limited ability to bend at ankle  Full NIMCO 5/5 strength of knee  No calf tenderness, negative Homans sign  No discoloration to foot/toes   Skin:     General: Skin is warm and dry  Capillary Refill: Capillary refill takes less than 2 seconds  Neurological:      Mental Status: He is alert and oriented to person, place, and time  Gait: Abnormal gait: uses cane at baseline     Psychiatric:         Mood and Affect: Mood normal          Behavior: Behavior normal

## 2022-02-03 NOTE — PATIENT INSTRUCTIONS
-Complete entire course of antibiotics  -Keep area clean and dry  -Go to emergency room if symptoms worsen such as area is warm to touch, fevers, or increased pain to site  Cellulitis   AMBULATORY CARE:   Cellulitis  is a skin infection caused by bacteria  Cellulitis is common and can become severe  Cellulitis usually appears on the lower legs  It can also appear on the arms, face, and other areas  Cellulitis develops when bacteria enter a crack or break in your skin, such as a scratch, bite, or cut  Common signs and symptoms:  Signs and symptoms usually appear on one side of your body  You may have any of the following:  · A fever    · A red, warm, swollen area on your skin    · Pain when the area is touched    · Red spots, bumps, or blisters that may drain pus    · Bumpy, raised skin that feels like an orange peel    Seek care immediately if:   · Your wound gets larger and more painful  · You feel a crackling under your skin when you touch it  · You have purple dots or bumps on your skin, or you see bleeding under your skin  · You see red streaks coming from the infected area  Call your doctor if:   · The red, warm, swollen area gets larger  · Your fever or pain does not go away or gets worse  · The area does not get smaller after 3 days of antibiotics  · You have questions or concerns about your condition or care  Treatment:  You should start to see improvement in 3 days  If your cellulitis is severe, you may need IV antibiotics in the hospital  If cellulitis is not treated, the infection can spread through your body and become life-threatening  You may need any of the following medicines:  · Antibiotics  help treat the bacterial infection  · Acetaminophen  decreases pain and fever  It is available without a doctor's order  Ask how much to take and how often to take it  Follow directions   Read the labels of all other medicines you are using to see if they also contain acetaminophen, or ask your doctor or pharmacist  Acetaminophen can cause liver damage if not taken correctly  Do not use more than 4 grams (4,000 milligrams) total of acetaminophen in one day  · NSAIDs , such as ibuprofen, help decrease swelling, pain, and fever  This medicine is available with or without a doctor's order  NSAIDs can cause stomach bleeding or kidney problems in certain people  If you take blood thinner medicine, always ask your healthcare provider if NSAIDs are safe for you  Always read the medicine label and follow directions  · Take your medicine as directed  Contact your healthcare provider if you think your medicine is not helping or if you have side effects  Tell him or her if you are allergic to any medicine  Keep a list of the medicines, vitamins, and herbs you take  Include the amounts, and when and why you take them  Bring the list or the pill bottles to follow-up visits  Carry your medicine list with you in case of an emergency  Self-care:   · Wash the area with soap and water every day  Gently pat dry  Use bandages if directed by your healthcare provider  · Elevate the area above the level of your heart  as often as you can  This will help decrease swelling and pain  Prop the area on pillows or blankets to keep it elevated comfortably  · Place a cool, damp cloth on the area  Use clean cloths and clean water  You can do this as often as you need to  Cool, damp cloths may help decrease pain  · Apply cream or ointment as directed  These help protect the area  Most over-the-counter products, such as petroleum jelly, are good to use  Ask your healthcare provider about specific creams or ointments you should use  Prevent cellulitis:   · Do not scratch bug bites or areas of injury  You increase your risk for cellulitis by scratching these areas  · Do not share personal items, such as towels, clothing, and razors      · Clean exercise equipment  with germ-killing  before and after you use it  · Treat athlete's foot  This can help prevent the spread of a bacterial skin infection  · Wash your hands often  Use soap and water  Wash your hands after you use the bathroom, change a child's diapers, or sneeze  Wash your hands before you prepare or eat food  Use lotion to prevent dry, cracked skin  Follow up with your doctor within 3 days, or as directed:  He or she will check if your cellulitis is getting better  Write down your questions so you remember to ask them during your visits  © Copyright [employer] 2021 Information is for End User's use only and may not be sold, redistributed or otherwise used for commercial purposes  All illustrations and images included in CareNotes® are the copyrighted property of [employer]  or Bethanie Bhat  The above information is an  only  It is not intended as medical advice for individual conditions or treatments  Talk to your doctor, nurse or pharmacist before following any medical regimen to see if it is safe and effective for you

## 2022-02-03 NOTE — PROGRESS NOTES
Surgical Oncology Consult       [facility] [place]  [address] [facility]  TANI [address]  [address] [place] [id number] [phone]    Fatoumata Sequeira  1945  [id number]  [facility] [place]  600 [facility] 203  [place] [address]  [phone]    Diagnoses and all orders for this visit:    Malignant neoplasm of lower third of esophagus ([facility] )  -     Case request operating room: INSERTION VENOUS PORT (PORT-A-CATH), INSERTION JEJUNOSTOMY TUBE OPEN; Standing  -     EKG 12 lead; Future  -     HYDROcodone-acetaminophen (Norco) 5-325 mg per tablet; Take 1 tablet by mouth every 6 (six) hours as needed for pain Max Daily Amount: 4 tablets  -     Ambulatory Referral to Oncology Social Worker; Future  -     Ambulatory referral to Radiation Oncology; Future  -     Case request operating room: INSERTION VENOUS PORT (PORT-A-CATH), INSERTION JEJUNOSTOMY TUBE OPEN  -     Ambulatory Referral to Smoking Cessation Program; Future    Mild protein-calorie malnutrition [facility])        Chief Complaint   Patient presents with    Consult       No follow-ups on file  Oncology History   Malignant neoplasm of lower third of esophagus ([facility] )   1/13/2022 Initial Diagnosis    Malignant neoplasm of lower third of esophagus ([facility] )     1/13/2022 Biopsy    EGD:   Esophagus, distal:  - 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  RESULTS OF IMMUNOHISTOCHEMICAL ANALYSIS FOR MISMATCH REPAIR PROTEIN LOSS     INTERPRETATION: No loss of nuclear expression of MMR proteins: Low probability of MSI-H     Note: Background non-neoplastic tissue and/or internal control with intact nuclear expression        RESULTS:  Antibody          Clone               Description                           Results  MLH1               M1                   Mismatch repair protein Intact nuclear expression  MSH2              [id number]       Mismatch repair protein       Intact nuclear expression  MSH6              40                     Mismatch repair protein       Intact nuclear expression  PMS2              [id number]           Mismatch repair protein       Intact nuclear expression         History of Present Illness:  79-year-old male who in November of 2021 started to have some dysphagia where he could not handle his secretions and felt food would get stuck  He would then bring this back up, but the next day he would be completely fine  This progressively became worse to where he could only swallow soft foods or liquids  He has lost approximately 30 lb over the last 6 months  EGD from January 13, 2022 reveals a malignant appearing mass, 7 cm in size in the lower 3rd of the esophagus, 34 cm from the incisors  This involved the entire circumference  There were 3 fungating masses in the cardia covering 1/3 of the circumference as well  Pathology on the esophageal mass revealed 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  Mismatch repair genes were expressed  HER2 Kiley was negative  CT from January 24th revealed mid to distal esophageal wall thickening with pathologically enlarged lymph nodes  There was also mucus plugging  PET-CT from February 1st 2022 revealed FDG activity in the distal esophagus, proximal stomach as well as the [address] off a chill and celiac lymph nodes  There was also FDG activity in the colon  There was indeterminate a filling defect in the right lower lobe  The etiology was unclear  I personally reviewed the films  He comes in now to discuss further therapy  Review of Systems  Complete ROS Surg Onc:   Constitutional: The patient denies new or recent history of general fatigue, no recent weight loss, no change in appetite  Eyes: No complaints of visual problems, no scleral icterus     ENT: no complaints of ear pain, no hoarseness, no difficulty swallowing,  no tinnitus and no new masses in head, oral cavity, or neck  Cardiovascular: No complaints of chest pain, no palpitations, no ankle edema  Respiratory: No complaints of shortness of breath, no cough  Gastrointestinal: No complaints of jaundice, no bloody stools, no pale stools  Genitourinary: No complaints of dysuria, no hematuria, no nocturia, no frequent urination, no urethral discharge  Musculoskeletal: No complaints of weakness, paralysis, joint stiffness or arthralgias  Integumentary: No complaints of rash, no new lesions  Neurological: No complaints of convulsions, no seizures, no dizziness  Hematologic/Lymphatic: No complaints of easy bruising  Endocrine:  No hot or cold intolerance  No polydipsia, polyphagia, or polyuria  Allergy/immunology:  No environmental allergies  No food allergies  Not immunocompromised  Skin:  No pallor or rash  No wound  Patient Active Problem List   Diagnosis    Current every day smoker    Weight loss    Dysphagia    Dermatitis    Malignant neoplasm of lower third of esophagus (HCC)    Mild protein-calorie malnutrition (HCC)     Past Medical History:   Diagnosis Date    Pneumonia     Polio     Rib fractures      No past surgical history on file    Family History   Problem Relation Age of Onset    Dementia Mother     Heart disease Father      Social History     Socioeconomic History    Marital status: /Civil Union     Spouse name: Not on file    Number of children: Not on file    Years of education: Not on file    Highest education level: Not on file   Occupational History    Not on file   Tobacco Use    Smoking status: Current Every Day Smoker     Packs/day: 1 00     Types: Cigarettes    Smokeless tobacco: Never Used   Vaping Use    Vaping Use: Never used   Substance and Sexual Activity    Alcohol use: Never     Alcohol/week: 0 0 standard drinks     Comment: 0    Drug use: No    Sexual activity: Not Currently   Other Topics Concern    Not on file   Social History Narrative    Not on file     Social Determinants of Health     Financial Resource Strain: Not on file   Food Insecurity: Not on file   Transportation Needs: Not on file   Physical Activity: Not on file   Stress: Not on file   Social Connections: Not on file   Intimate Partner Violence: Not on file   Housing Stability: Not on file       Current Outpatient Medications:     ascorbic acid (VITAMIN C) 500 mg tablet, Take 500 mg by mouth daily, Disp: , Rfl:     aspirin 325 mg tablet, Take 325 mg by mouth 2 (two) times a day, Disp: , Rfl:     Cholecalciferol (VITAMIN D3 PO), Take by mouth, Disp: , Rfl:     cyanocobalamin (VITAMIN B-12) 500 MCG tablet, Take 500 mcg by mouth daily, Disp: , Rfl:     Multiple Vitamins-Minerals (MULTIVITAMIN WITH MINERALS) tablet, Take 1 tablet by mouth daily, Disp: , Rfl:     pantoprazole (PROTONIX) 40 mg tablet, Take 1 tablet (40 mg total) by mouth daily before breakfast, Disp: 90 tablet, Rfl: 0    triamcinolone (KENALOG) 0 5 % cream, Apply topically 2 (two) times a day, Disp: 30 g, Rfl: 0    HYDROcodone-acetaminophen (Norco) 5-325 mg per tablet, Take 1 tablet by mouth every 6 (six) hours as needed for pain Max Daily Amount: 4 tablets, Disp: 10 tablet, Rfl: 0  No Known Allergies  Vitals:    02/03/22 0956   BP: 134/86   Pulse: 96   Resp: 16   Temp: 98 2 °F (36 8 °C)   SpO2: 96%       Physical Exam   Constitutional: General appearance: The Patient is well-developed and well-nourished who appears the stated age in no acute distress  Patient is pleasant and talkative  HEENT:  Normocephalic  Sclerae are anicteric  Mucous membranes are moist  Neck is supple without adenopathy  No JVD  Chest: The lungs are clear to auscultation  Cardiac: Heart is regular rate  Abdomen: Abdomen is soft, non-tender, non-distended and without masses  Extremities: There is no clubbing or cyanosis  There is no edema  Symmetric  He has tenderness by his left ankle  Neuro: Grossly nonfocal  Gait is normal      Lymphatic: No evidence of cervical adenopathy bilaterally  No evidence of axillary adenopathy bilaterally  No evidence of inguinal adenopathy bilaterally  Skin: Warm, anicteric  Psych:  Patient is pleasant and talkative  Breasts:      Pathology:  Addendum   RESULTS OF IMMUNOHISTOCHEMICAL ANALYSIS FOR MISMATCH REPAIR PROTEIN LOSS     INTERPRETATION: No loss of nuclear expression of MMR proteins: Low probability of MSI-H     Note: Background non-neoplastic tissue and/or internal control with intact nuclear expression       RESULTS:  Antibody          Clone               Description                           Results  MLH1               M1                   Mismatch repair protein       Intact nuclear expression  MSH2              [id number]       Mismatch repair protein       Intact nuclear expression  OCB6              08                     Mismatch repair protein       Intact nuclear expression  BQX3              [id number]           Mismatch repair protein       Intact nuclear expression     Comment:   A positive control for each antibody has been reviewed and accepted      [facility] Specimen ID: [id number]  Evaluator:  BRAULIO England MD     These tests were developed and their performance characteristics determined by [facility]  Almita Yuen may not be cleared or approved by the [place]  Food and Drug Administration   The FDA determined that such clearance or approval is not necessary   These tests are used for clinical purposes  Almita Yuen should not be regarded as investigational or for research   This laboratory has been approved by CLIA 88, designated as a high-complexity laboratory and is qualified to perform these tests      Comments: Patients whose tumors demonstrate lack of expression of one or more DNA mismatch repair proteins might be at risk for Vega Syndrome   This cancer susceptibility syndrome greatly increases the risk of synchronous and/or metachronous cancers in the affected patients and their family members  Normal expression of all proteins does not completely rule out familial cancer predisposition      The [facility] Vega Syndrome Surveillance Program Task Forces recommends that all patients with lack of expression of one or more DNA mismatch repair proteins and those with concerning personal or family history should undergo thorough evaluation, counseling and possibly genetic testing      HER2 IMMUNOHISTOCHEMICAL ANALYSIS FOR GASTRIC/ESOPHAGEAL ADENOCARCINOMA     Test Description                                        Result                                                     HER2/AMIE by IHC (clone 4B5)                  Negative, 0            These immunohistochemical tests were performed at [facility] in [place], [place] and interpreted by Dr Casie Shelton   An electronic copy of this report will be kept on file in the Medical Records Department at [facility]      Comment:      Immunohistochemistry scoring for Her2 in gastric and gastroesophageal carcinoma (2016 CAP/ASCO guidelines)     Biopsy specimen staining pattern:     0 No reactivity or no membranous reactivity in any tumor cell  1+ Tumor cell cluster with a faint or barely perceptible membranous reactivity irrespective of percentage of tumor cells  2+ Tumor cell cluster with a weak to moderate complete, basolateral, or lateral membranous reactivity irrespective of  percentage of tumor cells stained  3+ Tumor cell cluster with a strong complete, basolateral, or lateral membranous reactivity irrespective of tumor cells stained     Surgical specimen staining pattern:     0 No reactivity or membranous reactivity in less than 10% of tumor cells  1+ Faint or barely perceptible membranous reactivity in 10% or more of tumor cells; cells are reactive only in part of their  membrane    2+ Weak to moderate complete, basolateral, or lateral membranous reactivity in 10% or more of tumor cells  3+ Strong complete, basolateral, or lateral membranous reactivity in 10% or more of tumor cells     A positive control for each antibody has been reviewed and accepted       Reference:   1  Natalie Cantu Ventura CB et al  HER2 Testing and Clinical Decision Making in Gastroesophageal Adenocarcinoma: Guideline from the [address], [address] for Clinical Pathology, and [address] of Clinical Oncology  Arch Pathol Lab Med  [facility]: 10 [id number]/arpa  [id number]  Addendum electronically signed by Anna Jose MD on 1/21/2022 at  3:39 PM   Final Diagnosis   A  Stomach, cardia:  - Gastric mucosa with reactive changes   - Separate fragment of necrotic debris with bacterial and fungal aggregates  - No definite malignancy identified       B  Esophagus, distal:  - 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  - MMR and Her2 studies are pending with results to follow in an addendum  Electronically signed by Anna Jose MD on 1/19/2022 at  1:10 PM         Labs:      Imaging  EGD    Addendum Date: 1/23/2022 Addendum:    [facility] [address] [place] [id number] [phone] DATE OF SERVICE: 1/13/22 PHYSICIAN(S): Mayela Guy DO - Attending Physician INDICATION: Dysphagia, unspecified type POST-OP DIAGNOSIS: See the impression below  PREPROCEDURE: Informed consent was obtained for the procedure, including sedation  Risks of perforation, hemorrhage, adverse drug reaction and aspiration were discussed  The patient was placed in the left lateral decubitus position  Patient was explained about the risks and benefits of the procedure  Risks including but not limited to bleeding, infection, and perforation were explained in detail   Also explained about less than 100% sensitivity with the exam and other alternatives  DETAILS OF PROCEDURE: Patient was taken to the procedure room where a time out was performed to confirm correct patient and correct procedure  The patient underwent monitored anesthesia care, which was administered by an anesthesia professional  The patient's blood pressure, heart rate, level of consciousness, respirations and oxygen were monitored throughout the procedure  The scope was advanced to the second part of the duodenum  Retroflexion was performed in the cardia and fundus  Prior to the procedure, the patient's H  Pylori status was unknown  The patient's estimated blood loss was minimal (<5 mL)  The procedure was not difficult  The patient tolerated the procedure well  There were no apparent complications  ANESTHESIA INFORMATION: ASA: II Anesthesia Type: IV Sedation with Anesthesia MEDICATIONS: No administrations occurring from 1230 to 1245 on 01/13/22 FINDINGS: Fungating and malignant-appearing mass (traversable) measuring 70 mm in the lower third of the esophagus (34 cm from the incisors), covering the whole circumference,; bleeding occurred before and after intervention; performed cold forceps biopsy 3 fungating and multilobular masses (traversable) in the cardia, covering one third of the circumference,; bleeding occurred after intervention; performed partial removal by cold forceps biopsy The fundus of the stomach, body of the stomach, incisura, antrum, prepyloric region and pylorus appeared normal  The duodenal bulb and 2nd part of the duodenum appeared normal  SPECIMENS: ID Type Source Tests Collected by Time Destination 1 : cardia Tissue Stomach TISSUE EXAM Pratibha Herron DO 1/13/2022 12:44 PM  2 : distal Tissue Esophagus TISSUE EXAM Pratibha Herron,  1/13/2022 12:45 PM  IMPRESSION: 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 with additional nodular lesions in the cardia of the stomach, biopsies pending, most likely adenocarcinoma RECOMMENDATION: 1   Patient will need a CT scan of the chest, abdomen, and pelvis with oral and IV contrast 2  Schedule a CT PET scan 3  Referral to Surgical Oncology  Janet Mccormick DO, [place], [place]    Result Date: 1/23/2022  Narrative:  [facility] [address] [phone] DATE OF SERVICE: 1/13/22 PHYSICIAN(S): Janet Mccormick DO - Attending Physician INDICATION: Dysphagia, unspecified type POST-OP DIAGNOSIS: See the impression below  PREPROCEDURE: Informed consent was obtained for the procedure, including sedation  Risks of perforation, hemorrhage, adverse drug reaction and aspiration were discussed  The patient was placed in the left lateral decubitus position  Patient was explained about the risks and benefits of the procedure  Risks including but not limited to bleeding, infection, and perforation were explained in detail  Also explained about less than 100% sensitivity with the exam and other alternatives  DETAILS OF PROCEDURE: Patient was taken to the procedure room where a time out was performed to confirm correct patient and correct procedure  The patient underwent monitored anesthesia care, which was administered by an anesthesia professional  The patient's blood pressure, heart rate, level of consciousness, respirations and oxygen were monitored throughout the procedure  The scope was advanced to the second part of the duodenum  Retroflexion was performed in the cardia and fundus  Prior to the procedure, the patient's H  Pylori status was unknown  The patient's estimated blood loss was minimal (<5 mL)  The procedure was not difficult  The patient tolerated the procedure well  There were no apparent complications   ANESTHESIA INFORMATION: ASA: II Anesthesia Type: IV Sedation with Anesthesia MEDICATIONS: No administrations occurring from 1230 to 1245 on 01/13/22 FINDINGS: Fungating and malignant-appearing mass (traversable) measuring 70 mm in the lower third of the esophagus (34 cm from the incisors), covering the whole circumference,; bleeding occurred before and after intervention; performed cold forceps biopsy 3 fungating and multilobular masses (traversable) in the cardia, covering one third of the circumference,; bleeding occurred after intervention; performed partial removal by cold forceps biopsy The fundus of the stomach, body of the stomach, incisura, antrum, prepyloric region and pylorus appeared normal  The duodenal bulb and 2nd part of the duodenum appeared normal  SPECIMENS: ID Type Source Tests Collected by Time Destination 1 : cardia Tissue Stomach TISSUE EXAM Pratibha Herron DO 1/13/2022 12:44 PM  2 : distal Tissue Esophagus TISSUE EXAM Pratibha Herron DO 1/13/2022 12:45 PM      Impression: 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 with additional nodular lesions in the cardia of the stomach, biopsies pending, most likely adenocarcinoma RECOMMENDATION: 1  Patient will need a CT scan of the chest, abdomen, and pelvis with oral and IV contrast 2  Schedule a CT PET scan 3  Referral to Surgical Oncology  Pratibha Herron DO, [place], [place]     CT chest abdomen pelvis w contrast    Result Date: 1/26/2022  Narrative: CT CHEST, ABDOMEN AND PELVIS WITH IV CONTRAST INDICATION:   R13 10: Dysphagia, unspecified R63 4: Abnormal weight loss  COMPARISON:  Compared with 10/10/2019 TECHNIQUE: CT examination of the chest, abdomen and pelvis was performed  Axial, sagittal, and coronal 2D reformatted images were created from the source data and submitted for interpretation  Radiation dose length product (DLP) for this visit:  812 mGy-cm   This examination, like all CT scans performed in the [facility], was performed utilizing techniques to minimize radiation dose exposure, including the use of iterative reconstruction and automated exposure control  IV Contrast:  100 mL of iohexol (OMNIPAQUE) Enteric Contrast: Enteric contrast was administered  FINDINGS: CHEST LUNGS:  Lungs are clear    There is no tracheal or endobronchial lesion  PLEURA:  Unremarkable  HEART/GREAT VESSELS: Heart is unremarkable for patient's age  No thoracic aortic aneurysm  MEDIASTINUM AND BRANDEE:  Esophagus is dilated with proximal and mid esophageal circumferential wall thickening with distal esophagus demonstrating an eccentric mass posteriorly measuring 1 8 x 2 2 x 3 6 cm extending into the GE junction  Posterior to this however this is a 1 3 cm soft tissue nodule in series 2 image 37  Lymph node with central necrosis anterior to the GE junction measuring 1 2 x 2 4 x 3 5 cm  Extension beyond the esophageal wall seen  Smaller lymph node measuring 1 0 cm seen inferiorly  Right infrahilar soft tissue density measuring 1 0 x 1 4 cm in series 301 image 33 appears to be within the bronchial lumen and demonstrates a branching pattern on the coronal views and was seen on prior CT  This is nonocclusive    CHEST WALL AND LOWER NECK:   Unremarkable  ABDOMEN LIVER/BILIARY TREE:  Unremarkable  GALLBLADDER:  No calcified gallstones  No pericholecystic inflammatory change  SPLEEN:  Unremarkable  PANCREAS:  Unremarkable  ADRENAL GLANDS:  Unremarkable  KIDNEYS/URETERS:  Both kidneys enhance symmetrically  Left kidney is smaller with a lobulated contour and unchanged  No hydronephrosis  STOMACH AND BOWEL:  Stool filled colon  APPENDIX:  No findings to suggest appendicitis  ABDOMINOPELVIC CAVITY:  No ascites  No pneumoperitoneum  No lymphadenopathy  VESSELS:  Aneurysmal infrarenal abdominal aorta measuring 3 3 cm and unchanged PELVIS REPRODUCTIVE ORGANS:  Enlarged heterogeneous prostate measuring 6 0 x 5 4 cm  URINARY BLADDER:  Unremarkable  ABDOMINAL WALL/INGUINAL REGIONS:  Unremarkable  OSSEOUS STRUCTURES: 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 and unchanged  Severe right hip degenerative changes  No acute fracture or destructive osseous lesion       Impression: New mid to distal esophageal wall thickening with large eccentric mass in the posterior distal esophagus extending into the GE junction and beyond the esophageal wall with pathologic lymph nodes anteriorly with central necrosis of esophageal malignancy   This correlates with the recent EGD finding of 1/13/2022  Right lower lobe endobronchial mass possibly mucous plugging with extension into the bronchial branches is chronic and was seen on prior CT of 10/10/2019 with no obstruction  This can be evaluated when PET scan is performed as indicated on EGD report  The study was marked in EPIC for significant notification  Workstation performed: [id number]     NM PET CT skull base to mid thigh    Result Date: 2/1/2022  Narrative: PET/CT SCAN INDICATION:  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   , distal esophageal carcinoma for staging MODIFIER: PI COMPARISON: CT of chest, abdomen, and pelvis dated 1/24/2022 CELL TYPE:  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 (bx distal esophagus 1/13/22) TECHNIQUE:   8 7 mCi F-18-FDG administered IV  Multiplanar attenuation corrected and non attenuation corrected PET images were acquired 60 minutes post injection  Contiguous, low dose, axial CT sections were obtained from the skull base through the femurs   Intravenous contrast material was not utilized  This examination, like all CT scans performed in the [facility], was performed utilizing techniques to minimize radiation dose exposure, including the use of iterative reconstruction and automated exposure control  Fasting serum glucose: 141 mg/dl FINDINGS: VISUALIZED BRAIN:   No acute abnormalities are seen  HEAD/NECK:   There is a physiologic distribution of FDG  No FDG avid cervical adenopathy is seen  CT images: Unremarkable   CHEST:   There is segmental FDG activity associated with mural thickening extending from the distal esophagus through the proximal stomach with max SUV of 10 9 consistent with patient's biopsy-proven esophageal carcinoma  On image 104 of series 3, there is mild FDG activity associated with soft tissue density posterior to the mid to distal esophagus and adjacent to the descending thoracic aorta measuring approximately 1 2 cm with Max SUV of 2 8 suspicious for possible posterior mediastinal/ paraesophageal andry metastatic disease  On image 97 of series 12, there is a small focus of mild FDG activity in the right hilar region without definite soft tissue correlate and with max SUV of 2 9 of uncertain clinical significance  This finding could reflect reactive/inflammatory lymph node although attention to this region on follow-up imaging is recommended to exclude developing andry metastatic disease  CT images: Atherosclerotic vascular calcifications including those of the coronary arteries are noted  Non-FDG avid filling defect within right lower lobe bronchus is noted on image 98 of series 3 with associated reticulonodular/tree-in-bud infiltrate most consistent with an infectious/inflammatory process such as pneumonia  ABDOMEN:   Best seen on image 135 is mildly hypermetabolic perigastric/gastrohepatic ligament soft tissue measuring 1 9 x 2 8 cm with Max SUV of 3 5 consistent with andry metastatic disease  Additional smaller non-FDG avid upper abdominal lymph nodes are noted which are suspicious for andry metastatic disease  There is nonuniform FDG activity involving the bowel with more prominent concentric FDG activity involving the cecum/proximal ascending colon with max SUV of 8 8 on image 196 of series 12  Consider correlation with screening colonoscopy as clinically indicated given the more localized nature of this FDG activity  CT images: Abdominal aortic aneurysm measuring 3 8 cm  Atherosclerotic vascular calcifications are noted  Asymmetric lobulated contour to the left kidney which appears mildly atrophic, particularly towards the upper pole  PELVIS: No FDG avid soft tissue lesions are seen   CT images: Enlarged prostate gland  OSSEOUS STRUCTURES: No FDG avid lesions are seen  CT images: Severe degenerative changes involving the right hip  Degenerative changes involving the spine  Possible osteopenia  Impression: 1  Hypermetabolic distal esophageal carcinoma which extends into the proximal stomach with paraesophageal/perigastric/upper abdominal andry metastatic disease  2   Concentric FDG activity involving the cecum/proximal ascending colon of uncertain clinical significance  Given the localized nature of FDG activity, consider correlation with screening colonoscopy to exclude underlying colonic malignancy  3   Indeterminant non-FDG avid avid filling defect within right lower lobe bronchus with associated more distal tree-in-bud infiltrate  Findings could reflect mucous plugging with obstructive malignant process of low metabolic activity not excluded  4   3 8 cm infrarenal abdominal aortic aneurysm  Please see above for details and additional findings  The study was marked in EPIC for significant notification  Workstation performed: [id number]     I reviewed the above laboratory and imaging data  Discussion/Summary:  68-year-old male with a clinical T3N1M0 adenocarcinoma of the esophagus  Based on his PET-CT, he has local regional disease  I would recommend that he undergo neoadjuvant chemo radiation  Follow-up imaging would then determine surgical intervention  I suspect he would tolerate esophagectomy, but he should have chemo radiation 1st   I explained the reasoning and rationale behind this approach  He is currently drinking nutritional supplements and is unsure how he is doing in terms of maintaining his weight  He may need a feeding tube to get through chemo radiation, but we will have him set up to discuss this with Radiation Oncology    I explained the risks of port placement and feeding jejunostomy to include bleeding, infection, need for further surgery, wound complications, port malfunction, DVT, pneumothorax, and adjacent organ injury  Informed consent was obtained  We will schedule this at our earliest mutual convenience  We will also get him set up with smoking cessation  I will see him back in 3 months  He is agreeable to this  All of his and his family's questions were answered

## 2022-02-03 NOTE — TELEPHONE ENCOUNTER
----- Message from Cathy Rodrigez, DO sent at 2/2/2022  8:00 AM EST -----  Please call the patient with a PET scan results  The PET scan shows uptake in the distal esophagus consistent with the cancer that I have already described  There is evidence of upper abdominal as where a as paraesophageal metastatic disease  This also evidence of uptake in the colon involving the cecum and the proximal ascending colon  This uptake is of uncertain clinical significance  We should proceed to a colonoscopy as well      Milad Gallo, please schedule this patient for colonoscopy

## 2022-02-03 NOTE — TELEPHONE ENCOUNTER
Outpatient Oncology Nutrition Education  Type of Consult:  Nutrition Education  Care Location: Telephone Call  - met w/pt & daughter Pauly Edwards)    Reason for referral: From SurgOn on 2/3/22 (Reason: Here is a new patient referred by Dr Elizabet Elizondo  Patient has esophageal cancer and can only eat soft food or liquids  He is drinking nutrition shakes with fruit and ice cream for extra calories  He stated he lost about 30 pounds since thanksgiving  Dr Elizabet Elizondo is placing a feeding tube on 2/16 at the [facility], we will need tube feed recommendations  Please let me know when you see him and I will continue the process to set him up with tube feeds)  Oncology Diagnosis & Treatments: Diagnosed with cancer of the lower third of esophagus 1/13/22  Pt is being recommended for neoadjuvant chemo radiation, followed by possible surgery  He has a consultation with [facility] on 2/14/22  Oncology History   Malignant neoplasm of lower third of esophagus ([address] )   1/13/2022 Initial Diagnosis    Malignant neoplasm of lower third of esophagus ([facility] [address] )     1/13/2022 Biopsy    EGD:   Esophagus, distal:  - 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  RESULTS OF IMMUNOHISTOCHEMICAL ANALYSIS FOR MISMATCH REPAIR PROTEIN LOSS     INTERPRETATION: No loss of nuclear expression of MMR proteins: Low probability of MSI-H     Note: Background non-neoplastic tissue and/or internal control with intact nuclear expression        RESULTS:  Antibody          Clone               Description                           Results  MLH1               M1                   Mismatch repair protein       Intact nuclear expression  MSH2              [id number]       Mismatch repair protein       Intact nuclear expression  MSH6              40                     Mismatch repair protein       Intact nuclear expression  PMS2              [id number]           Mismatch repair protein       Intact nuclear expression       Past Medical & Surgical Hx:   Patient Active Problem List   Diagnosis    Current every day smoker    Weight loss    Dysphagia    Dermatitis    Malignant neoplasm of lower third of esophagus (HCC)    Mild protein-calorie malnutrition (HCC)     Past Medical History:   Diagnosis Date    Pneumonia     Polio     Rib fractures      No past surgical history on file  Review of Medications:     Current Outpatient Medications:     ascorbic acid (VITAMIN C) 500 mg tablet, Take 500 mg by mouth daily, Disp: , Rfl:     aspirin 325 mg tablet, Take 325 mg by mouth 2 (two) times a day, Disp: , Rfl:     Cholecalciferol (VITAMIN D3 PO), Take by mouth, Disp: , Rfl:     cyanocobalamin (VITAMIN B-12) 500 MCG tablet, Take 500 mcg by mouth daily, Disp: , Rfl:     HYDROcodone-acetaminophen (Norco) 5-325 mg per tablet, Take 1 tablet by mouth every 6 (six) hours as needed for pain Max Daily Amount: 4 tablets, Disp: 10 tablet, Rfl: 0    Multiple Vitamins-Minerals (MULTIVITAMIN WITH MINERALS) tablet, Take 1 tablet by mouth daily, Disp: , Rfl:     pantoprazole (PROTONIX) 40 mg tablet, Take 1 tablet (40 mg total) by mouth daily before breakfast, Disp: 90 tablet, Rfl: 0    triamcinolone (KENALOG) 0 5 % cream, Apply topically 2 (two) times a day, Disp: 30 g, Rfl: 0    Most Recent Lab Results:   Lab Results   Component Value Date    WBC 11 92 (H) 01/06/2022    ALT 17 01/06/2022    AST 10 01/06/2022    K 4 0 01/06/2022    K 4 4 10/11/2019    BUN 14 01/06/2022    BUN 13 10/11/2019    CREATININE 0 77 01/06/2022    CREATININE 0 76 10/11/2019    CALCIUM 10 0 01/06/2022       Anthropometric Measurements:   Ht Readings from Last 1 Encounters:   02/03/22 6' 1" (1 854 m)     -Weight History:    Wt Readings from Last 15 Encounters:   02/03/22 66 7 kg (147 lb)   01/13/22 67 kg (147 lb 11 3 oz)   01/10/22 66 4 kg (146 lb 6 4 oz)   01/06/22 65 8 kg (145 lb)   01/07/21 80 9 kg (178 lb 6 4 oz)   12/09/20 79 4 kg (175 lb)   10/18/19 78 8 kg (173 lb 12 8 oz) 10/10/19 78 9 kg (173 lb 15 1 oz)   19 83 5 kg (184 lb)   18 82 6 kg (182 lb)   18 87 3 kg (192 lb 6 4 oz)   18 85 3 kg (188 lb)   18 88 5 kg (195 lb)     Home weights: (2021) ~177-180#    Oncology Nutrition-Anthropometrics      Telephone from 2/3/2022 in [address] [place] [address]   Patient age (years): 68 years   Patient (male) height (in): 68 in   Current weight (lbs): 147 lbs   Current weight to be used for anthropometric calculations (kg) 66 8 kg   BMI: 19 4   IBW male 184 lb   IBW (kg) male 83 6 kg   IBW % (male) 79 9 %   Adjusted BW (male): 174 8 lbs   Adjusted BW in kg (male): 79 5 kg   % weight change after 1 month: 1 4 %   Weight change after 1 month (lbs) 2 lbs   % weight change after 3 months: -16 9 %   Weight change after 3 months (lbs) -30 lbs   % weight change after 1 year: -17 6 %   Weight change after 1 year (lbs) -31 4 lbs        Oncology Nutrition-Estimated Needs      Telephone from 2/3/2022 in [address] [place] [address]   Weight type used Actual weight   Weight in kilograms (kg) used for estimated needs 66 8 kg   Energy needs formula:  35-40 kcal/kg   Energy needs based on 35 kcal/k kcal   Energy needs based on 40 kcal/k kcal   Protein needs formula: 1 5-2 g/kg   Protein needs based on 1 5 g/kg 100 g   Protein needs based on 2 g/kg 134 g   Fluid needs formula: 30-35 mL/kg   Fluid needs based on 30 mL/kg 2004 mL   Fluid needs in ounces 68 oz   Fluid needs based on 35 mL/kg 2338 mL   Fluid needs in ounces 79 oz        Discussion/Summary:  Saeed Souza was contacted today for nutrition education regarding wt loss, poor po intake and Esophageal Cancer  Spoke with Saeed Souza and his daughter Michelle Azar today  Saeed Souza is planned to undergo treatment for esophageal cancer in the near future   Pt is currently having significant difficulty eating solid foods, he is consuming mainly liquids and soft/pureed solids (ex: applesauce, banana, scrambled eggs, milkshakes)  Per [facility]urg[facility] pt is planned for a feeding tube placement on 2/16/22  [facility] states that they are unsure if Jemal Martinez will definitely have TF placed, they are waiting to discuss chemo/RT plan during these consultations  Pt is scheduled for HemOnc consultation on 2/14/22  Reviewed the importance of wt management throughout the tx process and the role of a high kcal/ high protein diet in managing wt and overall health  Discussed ways to increase calorie and protein intake, suggestions include: eating smaller/more frequent meals, including high calorie foods in diet (cheese, whole milk, peanut butter, heavy cream, sour cream), eating when feeling most hungry, utilizing oral nutrition supplements  Vanessaleilani London explains that Jemal Martinez is currently drinking Or[employer] nutrition drinks and sometimes mixes these with ice cream for additional calories  Will send Oncology Nutrition Milkshake and Smoothie Recipe packet for additional ideas for high calorie/protein smoothies  Reviewed more suggestions for liquids with calories to try: whole milk, Fairlife milk (higher protein/lactose-free milk), chocolate milk, 100% fruit juice, diluted juice, bone broth (higher protein broth), creamy soups, sports drinks (Gatorade, Poweraide, Pedialyte, etc ), [place] ice, popsicles, gelatin/Jello, etc  Reviewed soft/easy to swallow foods that are high in calories and protein to try, suggestions include: casseroles, chicken/egg/tuna salad with extra clark to add calories and moisture, oatmeal/cream of wheat made with whole milk, cottage cheese, greek yogurt, scrambled eggs with cheese, macaroni and cheese, mashed potatoes made with butter and whole milk, peanut butter, cream soups (cream of chicken, cream of mushroom, broccoli cheddar), pudding, custard, ice cream, banana, Review page 52 of Eating Hints Book for more ideas  Hermila Aguilar are appreciative of suggestions/information   Pt and daughter agreeable to RD reaching out to touch base x2 weeks after HemOnc consult before possible TF placement  In the event enteral nutrition is needed for Toro's sole source of nutrition, recommend:  Initiation: Recommend initiating Jevity 1 5 at 60 mL/hr via J-tube cycled over 16 hours (as tolerated) and advance rate by 10 mL every 8 hours (as tolerated) until goal rate is achieved  TF Goal: Jevity 1 5 at 110 mL/hr via J-tube cycled over 16-16 5 hours daily (until 7 5 cartons is infused)  Water Flushin mL free water flush at the beginning and end of TF infusion (250 mL total) plus 125 mL free water flush 5 times per day (625 mL total); (total of 875 mL/day in flushes; flushes should be spread throughout the day and every 2-3 hours during TF infusion; will need to adjust flushes prn for IV fluids)  Enteral Nutrition goal to provide: 1777 mL volume (7 5 cartons day), 2662 kcal, 113 grams protein, 1350 mL free water, 2225 mL total water daily  *Pt will require an enteral feeding pump to administer TF due to J-Tube  Materials Provided: Tube Feeding at Home booklet, Feeding Tube Use & Care booklet, Easy-to-Chew-and-Swallow Foods, Oncology Milkshake &  Smoothie Recipe Packet and How to Make a High Calorie Shake handout- emailed to [employer] email address '[email or web address] ' Orgain coupons mailed to pts home  All questions and concerns addressed during todays visit  Rut Gallardo has RD contact information        Follow Up Plan:  RD to reach out x2 weeks  Recommend Referral to Other Providers: none at this time

## 2022-02-03 NOTE — LETTER
February 3, 2022     Jerry Mcmillan MD  [address]  [address] [facility]  [address]    Patient: Heraclio Wiley   YOB: 1945   Date of Visit: 2/3/2022       Dear Dr Butch Yousif: Thank you for referring Amelia Borrego to me for evaluation  Below are my notes for this consultation  If you have questions, please do not hesitate to call me  I look forward to following your patient along with you  Sincerely,        René Buchanan MD        CC: Jennye Babinski, RN Geraline Chen, MD Judeen Rede, MD  2/3/2022 10:37 AM  Sign when Signing Visit               Surgical Oncology Consult       [facility] [address] [facility]  [address] [facility]  [address]  [place] [id number]  [address]  1945  [phone]  [facility] [address] [facility]  [address] [facility]  [address]  [place] [address] [place]  [phone]    Diagnoses and all orders for this visit:    Malign[facility] ne[facility] of lower third of es[facility] ([facility] )  -     Case request operating room: INSERTION VENOUS PORT (PORT-A-CATH), INSERTION JEJUNOSTOMY TUBE OPEN; Standing  -     EKG 12 lead; Future  -     HYDROcodone-acetaminophen (Norco) 5-325 mg per tablet; Take 1 tablet by mouth every 6 (six) hours as needed for pain Max Daily Amount: 4 tablets  -     Ambulatory Referral to Oncology Social Worker; Future  -     Ambulatory referral to Radiation Oncology; Future  -     Case request operating room: INSERTION VENOUS PORT (PORT-A-CATH), INSERTION JEJUNOSTOMY TUBE OPEN  -     Ambulatory Referral to Smoking Cessation Program; Future    Mild protein-calorie malnutrition [facility])        Chief Complaint   Patient presents with    Consult       No follow-ups on file      Oncology History   Malignant neoplasm of lower third of esophagus ([facility] )   1/13/2022 Initial Diagnosis    Malignant neoplasm of lower third of esophagus ([facility] [address] ) 1/13/2022 Biopsy    EGD:   Esophagus, distal:  - 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  RESULTS OF IMMUNOHISTOCHEMICAL ANALYSIS FOR MISMATCH REPAIR PROTEIN LOSS     INTERPRETATION: No loss of nuclear expression of MMR proteins: Low probability of MSI-H     Note: Background non-neoplastic tissue and/or internal control with intact nuclear expression  RESULTS:  Antibody          Clone               Description                           Results  MLH1               M1                   Mismatch repair protein       Intact nuclear expression  MSH2              [id number]       Mismatch repair protein       Intact nuclear expression  MSH6              40                     Mismatch repair protein       Intact nuclear expression  PMS2              [id number]           Mismatch repair protein       Intact nuclear expression         History of Present Illness:  68-year-old male who in November of 2021 started to have some dysphagia where he could not handle his secretions and felt food would get stuck  He would then bring this back up, but the next day he would be completely fine  This progressively became worse to where he could only swallow soft foods or liquids  He has lost approximately 30 lb over the last 6 months  EGD from January 13, 2022 reveals a malignant appearing mass, 7 cm in size in the lower 3rd of the esophagus, 34 cm from the incisors  This involved the entire circumference  There were 3 fungating masses in the cardia covering 1/3 of the circumference as well  Pathology on the esophageal mass revealed 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  Mismatch repair genes were expressed  HER2 Kiley was negative  CT from January 24th revealed mid to distal esophageal wall thickening with pathologically enlarged lymph nodes  There was also mucus plugging    PET-CT from February 1st 2022 revealed FDG activity in the distal esophagus, proximal stomach as well as the [address] off a chill and celiac lymph nodes  There was also FDG activity in the colon  There was indeterminate a filling defect in the right lower lobe  The etiology was unclear  I personally reviewed the films  He comes in now to discuss further therapy  Review of Systems  Complete ROS Surg Onc:   Constitutional: The patient denies new or recent history of general fatigue, no recent weight loss, no change in appetite  Eyes: No complaints of visual problems, no scleral icterus  ENT: no complaints of ear pain, no hoarseness, no difficulty swallowing,  no tinnitus and no new masses in head, oral cavity, or neck  Cardiovascular: No complaints of chest pain, no palpitations, no ankle edema  Respiratory: No complaints of shortness of breath, no cough  Gastrointestinal: No complaints of jaundice, no bloody stools, no pale stools  Genitourinary: No complaints of dysuria, no hematuria, no nocturia, no frequent urination, no urethral discharge  Musculoskeletal: No complaints of weakness, paralysis, joint stiffness or arthralgias  Integumentary: No complaints of rash, no new lesions  Neurological: No complaints of convulsions, no seizures, no dizziness  Hematologic/Lymphatic: No complaints of easy bruising  Endocrine:  No hot or cold intolerance  No polydipsia, polyphagia, or polyuria  Allergy/immunology:  No environmental allergies  No food allergies  Not immunocompromised  Skin:  No pallor or rash  No wound  Patient Active Problem List   Diagnosis    Current every day smoker    Weight loss    Dysphagia    Dermatitis    Malignant neoplasm of lower third of esophagus (HCC)    Mild protein-calorie malnutrition (HCC)     Past Medical History:   Diagnosis Date    Pneumonia     Polio     Rib fractures      No past surgical history on file    Family History   Problem Relation Age of Onset    Dementia Mother     Heart disease Father      Social History     Socioeconomic History    Marital status: /Civil Union     Spouse name: Not on file    Number of children: Not on file    Years of education: Not on file    Highest education level: Not on file   Occupational History    Not on file   Tobacco Use    Smoking status: Current Every Day Smoker     Packs/day: 1 00     Types: Cigarettes    Smokeless tobacco: Never Used   Vaping Use    Vaping Use: Never used   Substance and Sexual Activity    Alcohol use: Never     Alcohol/week: 0 0 standard drinks     Comment: 0    Drug use: No    Sexual activity: Not Currently   Other Topics Concern    Not on file   Social History Narrative    Not on file     Social Determinants of Health     Financial Resource Strain: Not on file   Food Insecurity: Not on file   Transportation Needs: Not on file   Physical Activity: Not on file   Stress: Not on file   Social Connections: Not on file   Intimate Partner Violence: Not on file   Housing Stability: Not on file       Current Outpatient Medications:     ascorbic acid (VITAMIN C) 500 mg tablet, Take 500 mg by mouth daily, Disp: , Rfl:     aspirin 325 mg tablet, Take 325 mg by mouth 2 (two) times a day, Disp: , Rfl:     Cholecalciferol (VITAMIN D3 PO), Take by mouth, Disp: , Rfl:     cyanocobalamin (VITAMIN B-12) 500 MCG tablet, Take 500 mcg by mouth daily, Disp: , Rfl:     Multiple Vitamins-Minerals (MULTIVITAMIN WITH MINERALS) tablet, Take 1 tablet by mouth daily, Disp: , Rfl:     pantoprazole (PROTONIX) 40 mg tablet, Take 1 tablet (40 mg total) by mouth daily before breakfast, Disp: 90 tablet, Rfl: 0    triamcinolone (KENALOG) 0 5 % cream, Apply topically 2 (two) times a day, Disp: 30 g, Rfl: 0    HYDROcodone-acetaminophen (Norco) 5-325 mg per tablet, Take 1 tablet by mouth every 6 (six) hours as needed for pain Max Daily Amount: 4 tablets, Disp: 10 tablet, Rfl: 0  No Known Allergies  Vitals:    02/03/22 0956   BP: 134/86   Pulse: 96 Resp: 16   Temp: 98 2 °F (36 8 °C)   SpO2: 96%       Physical Exam   Constitutional: General appearance: The Patient is well-developed and well-nourished who appears the stated age in no acute distress  Patient is pleasant and talkative  HEENT:  Normocephalic  Sclerae are anicteric  Mucous membranes are moist  Neck is supple without adenopathy  No JVD  Chest: The lungs are clear to auscultation  Cardiac: Heart is regular rate  Abdomen: Abdomen is soft, non-tender, non-distended and without masses  Extremities: There is no clubbing or cyanosis  There is no edema  Symmetric  He has tenderness by his left ankle  Neuro: Grossly nonfocal  Gait is normal      Lymphatic: No evidence of cervical adenopathy bilaterally  No evidence of axillary adenopathy bilaterally  No evidence of inguinal adenopathy bilaterally  Skin: Warm, anicteric  Psych:  Patient is pleasant and talkative  Breasts:      Pathology:  Addendum   RESULTS OF IMMUNOHISTOCHEMICAL ANALYSIS FOR MISMATCH REPAIR PROTEIN LOSS     INTERPRETATION: No loss of nuclear expression of MMR proteins: Low probability of MSI-H     Note: Background non-neoplastic tissue and/or internal control with intact nuclear expression       RESULTS:  Antibody          Clone               Description                           Results  MLH1               M1                   Mismatch repair protein       Intact nuclear expression  MSH2              [id number]       Mismatch repair protein       Intact nuclear expression  WUJ6              80                     Mismatch repair protein       Intact nuclear expression  QCU2              [id number]           Mismatch repair protein       Intact nuclear expression     Comment:   A positive control for each antibody has been reviewed and accepted      [facility] Specimen ID: [id number]  Evaluator:  BRAULIO Lozada MD     These tests were developed and their performance characteristics determined by [facility]  [facility] may not be cleared or approved by the [place]  Food and Drug Administration   The FDA determined that such clearance or approval is not necessary   These tests are used for clinical purposes  [facility] should not be regarded as investigational or for research   This laboratory has been approved by CLIA 88, designated as a high-complexity laboratory and is qualified to perform these tests      Comments: Patients whose tumors demonstrate lack of expression of one or more DNA mismatch repair proteins might be at risk for Vega Syndrome  This cancer susceptibility syndrome greatly increases the risk of synchronous and/or metachronous cancers in the affected patients and their family members  Normal expression of all proteins does not completely rule out familial cancer predisposition      The [facility] Vega Syndrome Surveillance Program Task Forces recommends that all patients with lack of expression of one or more DNA mismatch repair proteins and those with concerning personal or family history should undergo thorough evaluation, counseling and possibly genetic testing      HER2 IMMUNOHISTOCHEMICAL ANALYSIS FOR GASTRIC/ESOPHAGEAL ADENOCARCINOMA     Test Description                                        Result                                                     HER2/AMIE by IHC (clone 4B5)                  Negative, 0            These immunohistochemical tests were performed at [facility] in [facility] and interpreted by Dr Kandis Corbin    An electronic copy of this report will be kept on file in the Medical Records Department at [facility]      Comment:      Immunohistochemistry scoring for Her2 in gastric and gastroesophageal carcinoma (2016 CAP/ASCO guidelines)     Biopsy specimen staining pattern:     0 No reactivity or no membranous reactivity in any tumor cell  1+ Tumor cell cluster with a faint or barely perceptible membranous reactivity irrespective of percentage of tumor cells  2+ Tumor cell cluster with a weak to moderate complete, basolateral, or lateral membranous reactivity irrespective of  percentage of tumor cells stained  3+ Tumor cell cluster with a strong complete, basolateral, or lateral membranous reactivity irrespective of tumor cells stained     Surgical specimen staining pattern:     0 No reactivity or membranous reactivity in less than 10% of tumor cells  1+ Faint or barely perceptible membranous reactivity in 10% or more of tumor cells; cells are reactive only in part of their  membrane  2+ Weak to moderate complete, basolateral, or lateral membranous reactivity in 10% or more of tumor cells  3+ Strong complete, basolateral, or lateral membranous reactivity in 10% or more of tumor cells     A positive control for each antibody has been reviewed and accepted       Reference:   1  Pamela AN, Natalie, Js CB et al  HER2 Testing and Clinical Decision Making in Gastroesophageal Adenocarcinoma: Guideline from the [address], [address] for Clinical Pathology, and [address] of Clinical Oncology  [facility] Heri: 10 [phone]/arpa  [id number]  Addendum electronically signed by Yenni Coreas MD on 1/21/2022 at  3:39 PM   Final Diagnosis   A  Stomach, cardia:  - Gastric mucosa with reactive changes   - Separate fragment of necrotic debris with bacterial and fungal aggregates  - No definite malignancy identified       B  Esophagus, distal:  - 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 [phone] DATE OF SERVICE: 1/13/22 PHYSICIAN(S): Everton Boyer DO - Attending Physician INDICATION: Dysphagia, unspecified type POST-OP DIAGNOSIS: See the impression below  PREPROCEDURE: Informed consent was obtained for the procedure, including sedation  Risks of perforation, hemorrhage, adverse drug reaction and aspiration were discussed  The patient was placed in the left lateral decubitus position  Patient was explained about the risks and benefits of the procedure  Risks including but not limited to bleeding, infection, and perforation were explained in detail  Also explained about less than 100% sensitivity with the exam and other alternatives  DETAILS OF PROCEDURE: Patient was taken to the procedure room where a time out was performed to confirm correct patient and correct procedure  The patient underwent monitored anesthesia care, which was administered by an anesthesia professional  The patient's blood pressure, heart rate, level of consciousness, respirations and oxygen were monitored throughout the procedure  The scope was advanced to the second part of the duodenum  Retroflexion was performed in the cardia and fundus  Prior to the procedure, the patient's H  Pylori status was unknown  The patient's estimated blood loss was minimal (<5 mL)  The procedure was not difficult  The patient tolerated the procedure well  There were no apparent complications   ANESTHESIA INFORMATION: ASA: II Anesthesia Type: IV Sedation with Anesthesia MEDICATIONS: No administrations occurring from 1230 to 1245 on 01/13/22 FINDINGS: Fungating and malignant-appearing mass (traversable) measuring 70 mm in the lower third of the esophagus (34 cm from the incisors), covering the whole circumference,; bleeding occurred before and after intervention; performed cold forceps biopsy 3 fungating and multilobular masses (traversable) in the cardia, covering one third of the circumference,; bleeding occurred after intervention; performed partial removal by cold forceps biopsy The fundus of the stomach, body of the stomach, incisura, antrum, prepyloric region and pylorus appeared normal  The duodenal bulb and 2nd part of the duodenum appeared normal  SPECIMENS: ID Type Source Tests Collected by Time Destination 1 : cardia Tissue Stomach TISSUE EXAM Jennifer Woods DO 1/13/2022 12:44 PM  2 : distal Tissue Esophagus TISSUE EXAM Jennifer Woods DO 1/13/2022 12:45 PM  IMPRESSION: 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 with additional nodular lesions in the cardia of the stomach, biopsies pending, most likely adenocarcinoma RECOMMENDATION: 1  Patient will need a CT scan of the chest, abdomen, and pelvis with oral and IV contrast 2  Schedule a CT PET scan 3  Referral to Surgical Oncology  Jennifer Woods DO, [facility], [place]    Result Date: 1/23/2022  Narrative:  [facility] [address] [place] [id number] [phone] DATE OF SERVICE: 1/13/22 PHYSICIAN(S): Jennifer Woods DO - Attending Physician INDICATION: Dysphagia, unspecified type POST-OP DIAGNOSIS: See the impression below  PREPROCEDURE: Informed consent was obtained for the procedure, including sedation  Risks of perforation, hemorrhage, adverse drug reaction and aspiration were discussed  The patient was placed in the left lateral decubitus position  Patient was explained about the risks and benefits of the procedure  Risks including but not limited to bleeding, infection, and perforation were explained in detail  Also explained about less than 100% sensitivity with the exam and other alternatives  DETAILS OF PROCEDURE: Patient was taken to the procedure room where a time out was performed to confirm correct patient and correct procedure  The patient underwent monitored anesthesia care, which was administered by an anesthesia professional  The patient's blood pressure, heart rate, level of consciousness, respirations and oxygen were monitored throughout the procedure  The scope was advanced to the second part of the duodenum   Retroflexion was performed in the cardia and fundus  Prior to the procedure, the patient's H  Pylori status was unknown  The patient's estimated blood loss was minimal (<5 mL)  The procedure was not difficult  The patient tolerated the procedure well  There were no apparent complications  ANESTHESIA INFORMATION: ASA: II Anesthesia Type: IV Sedation with Anesthesia MEDICATIONS: No administrations occurring from 1230 to 1245 on 01/13/22 FINDINGS: Fungating and malignant-appearing mass (traversable) measuring 70 mm in the lower third of the esophagus (34 cm from the incisors), covering the whole circumference,; bleeding occurred before and after intervention; performed cold forceps biopsy 3 fungating and multilobular masses (traversable) in the cardia, covering one third of the circumference,; bleeding occurred after intervention; performed partial removal by cold forceps biopsy The fundus of the stomach, body of the stomach, incisura, antrum, prepyloric region and pylorus appeared normal  The duodenal bulb and 2nd part of the duodenum appeared normal  SPECIMENS: ID Type Source Tests Collected by Time Destination 1 : cardia Tissue Stomach TISSUE EXAM Marcello Tracy DO 1/13/2022 12:44 PM  2 : distal Tissue Esophagus TISSUE EXAM Marcello Tracy DO 1/13/2022 12:45 PM      Impression: 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 with additional nodular lesions in the cardia of the stomach, biopsies pending, most likely adenocarcinoma RECOMMENDATION: 1  Patient will need a CT scan of the chest, abdomen, and pelvis with oral and IV contrast 2  Schedule a CT PET scan 3  Referral to Surgical Oncology  Marcello Tracy DO, [facility], [place]     CT chest abdomen pelvis w contrast    Result Date: 1/26/2022  Narrative: CT CHEST, ABDOMEN AND PELVIS WITH IV CONTRAST INDICATION:   R13 10: Dysphagia, unspecified R63 4: Abnormal weight loss  COMPARISON:  Compared with 10/10/2019 TECHNIQUE: CT examination of the chest, abdomen and pelvis was performed   Axial, sagittal, and coronal 2D reformatted images were created from the source data and submitted for interpretation  Radiation dose length product (DLP) for this visit:  812 mGy-cm   This examination, like all CT scans performed in the [facility], was performed utilizing techniques to minimize radiation dose exposure, including the use of iterative reconstruction and automated exposure control  IV Contrast:  100 mL of iohexol (OMNIPAQUE) Enteric Contrast: Enteric contrast was administered  FINDINGS: CHEST LUNGS:  Lungs are clear  There is no tracheal or endobronchial lesion  PLEURA:  Unremarkable  HEART/GREAT VESSELS: Heart is unremarkable for patient's age  No thoracic aortic aneurysm  MEDIASTINUM AND BRANDEE:  Esophagus is dilated with proximal and mid esophageal circumferential wall thickening with distal esophagus demonstrating an eccentric mass posteriorly measuring 1 8 x 2 2 x 3 6 cm extending into the GE junction  Posterior to this however this is a 1 3 cm soft tissue nodule in series 2 image 37  Lymph node with central necrosis anterior to the GE junction measuring 1 2 x 2 4 x 3 5 cm  Extension beyond the esophageal wall seen  Smaller lymph node measuring 1 0 cm seen inferiorly  Right infrahilar soft tissue density measuring 1 0 x 1 4 cm in series 301 image 33 appears to be within the bronchial lumen and demonstrates a branching pattern on the coronal views and was seen on prior CT  This is nonocclusive    CHEST WALL AND LOWER NECK:   Unremarkable  ABDOMEN LIVER/BILIARY TREE:  Unremarkable  GALLBLADDER:  No calcified gallstones  No pericholecystic inflammatory change  SPLEEN:  Unremarkable  PANCREAS:  Unremarkable  ADRENAL GLANDS:  Unremarkable  KIDNEYS/URETERS:  Both kidneys enhance symmetrically  Left kidney is smaller with a lobulated contour and unchanged  No hydronephrosis  STOMACH AND BOWEL:  Stool filled colon  APPENDIX:  No findings to suggest appendicitis  ABDOMINOPELVIC CAVITY:  No ascites  No pneumoperitoneum  No lymphadenopathy  VESSELS:  Aneurysmal infrarenal abdominal aorta measuring 3 3 cm and unchanged PELVIS REPRODUCTIVE ORGANS:  Enlarged heterogeneous prostate measuring 6 0 x 5 4 cm  URINARY BLADDER:  Unremarkable  ABDOMINAL WALL/INGUINAL REGIONS:  Unremarkable  OSSEOUS STRUCTURES: 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 and unchanged  Severe right hip degenerative changes  No acute fracture or destructive osseous lesion  Impression: New mid to distal esophageal wall thickening with large eccentric mass in the posterior distal esophagus extending into the GE junction and beyond the esophageal wall with pathologic lymph nodes anteriorly with central necrosis of esophageal malignancy   This correlates with the recent EGD finding of 1/13/2022  Right lower lobe endobronchial mass possibly mucous plugging with extension into the bronchial branches is chronic and was seen on prior CT of 10/10/2019 with no obstruction  This can be evaluated when PET scan is performed as indicated on EGD report  The study was marked in EPIC for significant notification  Workstation performed: [id number]     NM PET CT skull base to mid thigh    Result Date: 2/1/2022  Narrative: PET/CT SCAN INDICATION:  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   , distal esophageal carcinoma for staging MODIFIER: PI COMPARISON: CT of chest, abdomen, and pelvis dated 1/24/2022 CELL TYPE:  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 (bx distal esophagus 1/13/22) TECHNIQUE:   8 7 mCi F-18-FDG administered IV  Multiplanar attenuation corrected and non attenuation corrected PET images were acquired 60 minutes post injection  Contiguous, low dose, axial CT sections were obtained from the skull base through the femurs   Intravenous contrast material was not utilized    This examination, like all CT scans performed in the [facility], was performed utilizing techniques to minimize radiation dose exposure, including the use of iterative reconstruction and automated exposure control  Fasting serum glucose: 141 mg/dl FINDINGS: VISUALIZED BRAIN:   No acute abnormalities are seen  HEAD/NECK:   There is a physiologic distribution of FDG  No FDG avid cervical adenopathy is seen  CT images: Unremarkable  CHEST:   There is segmental FDG activity associated with mural thickening extending from the distal esophagus through the proximal stomach with max SUV of 10 9 consistent with patient's biopsy-proven esophageal carcinoma  On image 104 of series 3, there is mild FDG activity associated with soft tissue density posterior to the mid to distal esophagus and adjacent to the descending thoracic aorta measuring approximately 1 2 cm with Max SUV of 2 8 suspicious for possible posterior mediastinal/ paraesophageal andry metastatic disease  On image 97 of series 12, there is a small focus of mild FDG activity in the right hilar region without definite soft tissue correlate and with max SUV of 2 9 of uncertain clinical significance  This finding could reflect reactive/inflammatory lymph node although attention to this region on follow-up imaging is recommended to exclude developing andry metastatic disease  CT images: Atherosclerotic vascular calcifications including those of the coronary arteries are noted  Non-FDG avid filling defect within right lower lobe bronchus is noted on image 98 of series 3 with associated reticulonodular/tree-in-bud infiltrate most consistent with an infectious/inflammatory process such as pneumonia  ABDOMEN:   Best seen on image 135 is mildly hypermetabolic perigastric/gastrohepatic ligament soft tissue measuring 1 9 x 2 8 cm with Max SUV of 3 5 consistent with andry metastatic disease  Additional smaller non-FDG avid upper abdominal lymph nodes are noted which are suspicious for andry metastatic disease    There is nonuniform FDG activity involving the bowel with more prominent concentric FDG activity involving the cecum/proximal ascending colon with max SUV of 8 8 on image 196 of series 12  Consider correlation with screening colonoscopy as clinically indicated given the more localized nature of this FDG activity  CT images: Abdominal aortic aneurysm measuring 3 8 cm  Atherosclerotic vascular calcifications are noted  Asymmetric lobulated contour to the left kidney which appears mildly atrophic, particularly towards the upper pole  PELVIS: No FDG avid soft tissue lesions are seen  CT images: Enlarged prostate gland  OSSEOUS STRUCTURES: No FDG avid lesions are seen  CT images: Severe degenerative changes involving the right hip  Degenerative changes involving the spine  Possible osteopenia  Impression: 1  Hypermetabolic distal esophageal carcinoma which extends into the proximal stomach with paraesophageal/perigastric/upper abdominal andry metastatic disease  2   Concentric FDG activity involving the cecum/proximal ascending colon of uncertain clinical significance  Given the localized nature of FDG activity, consider correlation with screening colonoscopy to exclude underlying colonic malignancy  3   Indeterminant non-FDG avid avid filling defect within right lower lobe bronchus with associated more distal tree-in-bud infiltrate  Findings could reflect mucous plugging with obstructive malignant process of low metabolic activity not excluded  4   3 8 cm infrarenal abdominal aortic aneurysm  Please see above for details and additional findings  The study was marked in EPIC for significant notification  Workstation performed: [id number]     I reviewed the above laboratory and imaging data  Discussion/Summary:  59-year-old male with a clinical [id number] adenocarcinoma of the esophagus  Based on his PET-CT, he has local regional disease  I would recommend that he undergo neoadjuvant chemo radiation    Follow-up imaging would then determine surgical intervention  I suspect he would tolerate esophagectomy, but he should have chemo radiation 1st   I explained the reasoning and rationale behind this approach  He is currently drinking nutritional supplements and is unsure how he is doing in terms of maintaining his weight  He may need a feeding tube to get through chemo radiation, but we will have him set up to discuss this with Radiation Oncology  I explained the risks of port placement and feeding jejunostomy to include bleeding, infection, need for further surgery, wound complications, port malfunction, DVT, pneumothorax, and adjacent organ injury  Informed consent was obtained  We will schedule this at our earliest mutual convenience  We will also get him set up with smoking cessation  I will see him back in 3 months  He is agreeable to this  All of his and his family's questions were answered

## 2022-02-03 NOTE — TELEPHONE ENCOUNTER
lmom with results advised to call with any questions and that Lisa Fuentes would be calling to schedule colonoscopy

## 2022-02-03 NOTE — H&P (VIEW-ONLY)
Surgical Oncology Consult       [facility]  [address] [facility]  [address]  [address] [address] [phone]    Debbie Rojas  1945  [id number]  [facility]  [address] [facility]  T[address]  Carol Watts [address] [place]  [phone]    Diagnoses and all orders for this visit:    Malignant neoplasm of lower third of esophagus ([facility] )  -     Case request operating room: INSERTION VENOUS PORT (PORT-A-CATH), INSERTION JEJUNOSTOMY TUBE OPEN; Standing  -     EKG 12 lead; Future  -     HYDROcodone-acetaminophen (Norco) 5-325 mg per tablet; Take 1 tablet by mouth every 6 (six) hours as needed for pain Max Daily Amount: 4 tablets  -     Ambulatory Referral to Oncology Social Worker; Future  -     Ambulatory referral to Radiation Oncology; Future  -     Case request operating room: INSERTION VENOUS PORT (PORT-A-CATH), INSERTION JEJUNOSTOMY TUBE OPEN  -     Ambulatory Referral to Smoking Cessation Program; Future    Mild protein-calorie malnutrition [facility])        Chief Complaint   Patient presents with    Consult       No follow-ups on file  Oncology History   Malignant neoplasm of lower third of esophagus ([facility] )   1/13/2022 Initial Diagnosis    Malignant neoplasm of lower third of esophagus ([facility] 75 )     1/13/2022 Biopsy    EGD:   Esophagus, distal:  - 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  RESULTS OF IMMUNOHISTOCHEMICAL ANALYSIS FOR MISMATCH REPAIR PROTEIN LOSS     INTERPRETATION: No loss of nuclear expression of MMR proteins: Low probability of MSI-H     Note: Background non-neoplastic tissue and/or internal control with intact nuclear expression        RESULTS:  Antibody          Clone               Description                           Results  MLH1               M1                   Mismatch repair protein Intact nuclear expression  MSH2              [id number]       Mismatch repair protein       Intact nuclear expression  MSH6              40                     Mismatch repair protein       Intact nuclear expression  PMS2              [id number]           Mismatch repair protein       Intact nuclear expression         History of Present Illness:  75-year-old male who in November of 2021 started to have some dysphagia where he could not handle his secretions and felt food would get stuck  He would then bring this back up, but the next day he would be completely fine  This progressively became worse to where he could only swallow soft foods or liquids  He has lost approximately 30 lb over the last 6 months  EGD from January 13, 2022 reveals a malignant appearing mass, 7 cm in size in the lower 3rd of the esophagus, 34 cm from the incisors  This involved the entire circumference  There were 3 fungating masses in the cardia covering 1/3 of the circumference as well  Pathology on the esophageal mass revealed 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  Mismatch repair genes were expressed  HER2 Kiley was negative  CT from January 24th revealed mid to distal esophageal wall thickening with pathologically enlarged lymph nodes  There was also mucus plugging  PET-CT from February 1st 2022 revealed FDG activity in the distal esophagus, proximal stomach as well as the [address] off a chill and celiac lymph nodes  There was also FDG activity in the colon  There was indeterminate a filling defect in the right lower lobe  The etiology was unclear  I personally reviewed the films  He comes in now to discuss further therapy  Review of Systems  Complete ROS Surg Onc:   Constitutional: The patient denies new or recent history of general fatigue, no recent weight loss, no change in appetite  Eyes: No complaints of visual problems, no scleral icterus     ENT: no complaints of ear pain, no hoarseness, no difficulty swallowing,  no tinnitus and no new masses in head, oral cavity, or neck  Cardiovascular: No complaints of chest pain, no palpitations, no ankle edema  Respiratory: No complaints of shortness of breath, no cough  Gastrointestinal: No complaints of jaundice, no bloody stools, no pale stools  Genitourinary: No complaints of dysuria, no hematuria, no nocturia, no frequent urination, no urethral discharge  Musculoskeletal: No complaints of weakness, paralysis, joint stiffness or arthralgias  Integumentary: No complaints of rash, no new lesions  Neurological: No complaints of convulsions, no seizures, no dizziness  Hematologic/Lymphatic: No complaints of easy bruising  Endocrine:  No hot or cold intolerance  No polydipsia, polyphagia, or polyuria  Allergy/immunology:  No environmental allergies  No food allergies  Not immunocompromised  Skin:  No pallor or rash  No wound  Patient Active Problem List   Diagnosis    Current every day smoker    Weight loss    Dysphagia    Dermatitis    Malignant neoplasm of lower third of esophagus (HCC)    Mild protein-calorie malnutrition (HCC)     Past Medical History:   Diagnosis Date    Pneumonia     Polio     Rib fractures      No past surgical history on file    Family History   Problem Relation Age of Onset    Dementia Mother     Heart disease Father      Social History     Socioeconomic History    Marital status: /Civil Union     Spouse name: Not on file    Number of children: Not on file    Years of education: Not on file    Highest education level: Not on file   Occupational History    Not on file   Tobacco Use    Smoking status: Current Every Day Smoker     Packs/day: 1 00     Types: Cigarettes    Smokeless tobacco: Never Used   Vaping Use    Vaping Use: Never used   Substance and Sexual Activity    Alcohol use: Never     Alcohol/week: 0 0 standard drinks     Comment: 0    Drug use: No    Sexual activity: Not Currently   Other Topics Concern    Not on file   Social History Narrative    Not on file     Social Determinants of Health     Financial Resource Strain: Not on file   Food Insecurity: Not on file   Transportation Needs: Not on file   Physical Activity: Not on file   Stress: Not on file   Social Connections: Not on file   Intimate Partner Violence: Not on file   Housing Stability: Not on file       Current Outpatient Medications:     ascorbic acid (VITAMIN C) 500 mg tablet, Take 500 mg by mouth daily, Disp: , Rfl:     aspirin 325 mg tablet, Take 325 mg by mouth 2 (two) times a day, Disp: , Rfl:     Cholecalciferol (VITAMIN D3 PO), Take by mouth, Disp: , Rfl:     cyanocobalamin (VITAMIN B-12) 500 MCG tablet, Take 500 mcg by mouth daily, Disp: , Rfl:     Multiple Vitamins-Minerals (MULTIVITAMIN WITH MINERALS) tablet, Take 1 tablet by mouth daily, Disp: , Rfl:     pantoprazole (PROTONIX) 40 mg tablet, Take 1 tablet (40 mg total) by mouth daily before breakfast, Disp: 90 tablet, Rfl: 0    triamcinolone (KENALOG) 0 5 % cream, Apply topically 2 (two) times a day, Disp: 30 g, Rfl: 0    HYDROcodone-acetaminophen (Norco) 5-325 mg per tablet, Take 1 tablet by mouth every 6 (six) hours as needed for pain Max Daily Amount: 4 tablets, Disp: 10 tablet, Rfl: 0  No Known Allergies  Vitals:    02/03/22 0956   BP: 134/86   Pulse: 96   Resp: 16   Temp: 98 2 °F (36 8 °C)   SpO2: 96%       Physical Exam   Constitutional: General appearance: The Patient is well-developed and well-nourished who appears the stated age in no acute distress  Patient is pleasant and talkative  HEENT:  Normocephalic  Sclerae are anicteric  Mucous membranes are moist  Neck is supple without adenopathy  No JVD  Chest: The lungs are clear to auscultation  Cardiac: Heart is regular rate  Abdomen: Abdomen is soft, non-tender, non-distended and without masses  Extremities: There is no clubbing or cyanosis  There is no edema  Symmetric  He has tenderness by his left ankle  Neuro: Grossly nonfocal  Gait is normal      Lymphatic: No evidence of cervical adenopathy bilaterally  No evidence of axillary adenopathy bilaterally  No evidence of inguinal adenopathy bilaterally  Skin: Warm, anicteric  Psych:  Patient is pleasant and talkative  Breasts:      Pathology:  Addendum   RESULTS OF IMMUNOHISTOCHEMICAL ANALYSIS FOR MISMATCH REPAIR PROTEIN LOSS     INTERPRETATION: No loss of nuclear expression of MMR proteins: Low probability of MSI-H     Note: Background non-neoplastic tissue and/or internal control with intact nuclear expression       RESULTS:  Antibody          Clone               Description                           Results  MLH1               M1                   Mismatch repair protein       Intact nuclear expression  MSH2              [id number]       Mismatch repair protein       Intact nuclear expression  LXI8              73                     Mismatch repair protein       Intact nuclear expression  GHM2              [id number]           Mismatch repair protein       Intact nuclear expression     Comment:   A positive control for each antibody has been reviewed and accepted      [facility] Specimen ID: [id number]  Evaluator:  BRAULIO Hale MD     These tests were developed and their performance characteristics determined by [facility]  Gaynelle Barefoot may not be cleared or approved by the [place]  Food and Drug Administration   The FDA determined that such clearance or approval is not necessary   These tests are used for clinical purposes  Gaynelle Barefoot should not be regarded as investigational or for research   This laboratory has been approved by CLIA 88, designated as a high-complexity laboratory and is qualified to perform these tests      Comments: Patients whose tumors demonstrate lack of expression of one or more DNA mismatch repair proteins might be at risk for Vega Syndrome   This cancer susceptibility syndrome greatly increases the risk of synchronous and/or metachronous cancers in the affected patients and their family members  Normal expression of all proteins does not completely rule out familial cancer predisposition      The [facility] Vega Syndrome Surveillance Program Task Forces recommends that all patients with lack of expression of one or more DNA mismatch repair proteins and those with concerning personal or family history should undergo thorough evaluation, counseling and possibly genetic testing      HER2 IMMUNOHISTOCHEMICAL ANALYSIS FOR GASTRIC/ESOPHAGEAL ADENOCARCINOMA     Test Description                                        Result                                                     HER2/AMIE by IHC (clone 4B5)                  Negative, 0            These immunohistochemical tests were performed at [facility] in [place], [place] and interpreted by Dr Blaine Carrillo   An electronic copy of this report will be kept on file in the Medical Records Department at [facility]      Comment:      Immunohistochemistry scoring for Her2 in gastric and gastroesophageal carcinoma (2016 CAP/ASCO guidelines)     Biopsy specimen staining pattern:     0 No reactivity or no membranous reactivity in any tumor cell  1+ Tumor cell cluster with a faint or barely perceptible membranous reactivity irrespective of percentage of tumor cells  2+ Tumor cell cluster with a weak to moderate complete, basolateral, or lateral membranous reactivity irrespective of  percentage of tumor cells stained  3+ Tumor cell cluster with a strong complete, basolateral, or lateral membranous reactivity irrespective of tumor cells stained     Surgical specimen staining pattern:     0 No reactivity or membranous reactivity in less than 10% of tumor cells  1+ Faint or barely perceptible membranous reactivity in 10% or more of tumor cells; cells are reactive only in part of their  membrane    2+ Weak to moderate complete, basolateral, or lateral membranous reactivity in 10% or more of tumor cells  3+ Strong complete, basolateral, or lateral membranous reactivity in 10% or more of tumor cells     A positive control for each antibody has been reviewed and accepted       Reference:   1  Natalie Cantu Ventura CB et al  HER2 Testing and Clinical Decision Making in Gastroesophageal Adenocarcinoma: Guideline from the [address], [address] for Clinical Pathology, and [address] of Clinical Oncology  Arch Pathol [facility] [place]: [id number]  [id number]  Addendum electronically signed by Savanna Pitt MD on 1/21/2022 at  3:39 PM   Final Diagnosis   A  Stomach, cardia:  - Gastric mucosa with reactive changes   - Separate fragment of necrotic debris with bacterial and fungal aggregates  - No definite malignancy identified       B  Esophagus, distal:  - 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  - MMR and Her2 studies are pending with results to follow in an addendum  Electronically signed by Savanna Pitt MD on 1/19/2022 at  1:10 PM         Labs:      Imaging  EGD    Addendum Date: 1/23/2022 Addendum:    [address] [facility] Endoscopy [address] [place] [address] [phone] DATE OF SERVICE: 1/13/22 PHYSICIAN(S): Deep Kunz DO - Attending Physician INDICATION: Dysphagia, unspecified type POST-OP DIAGNOSIS: See the impression below  PREPROCEDURE: Informed consent was obtained for the procedure, including sedation  Risks of perforation, hemorrhage, adverse drug reaction and aspiration were discussed  The patient was placed in the left lateral decubitus position  Patient was explained about the risks and benefits of the procedure  Risks including but not limited to bleeding, infection, and perforation were explained in detail   Also explained about less than 100% sensitivity with the exam and other alternatives  DETAILS OF PROCEDURE: Patient was taken to the procedure room where a time out was performed to confirm correct patient and correct procedure  The patient underwent monitored anesthesia care, which was administered by an anesthesia professional  The patient's blood pressure, heart rate, level of consciousness, respirations and oxygen were monitored throughout the procedure  The scope was advanced to the second part of the duodenum  Retroflexion was performed in the cardia and fundus  Prior to the procedure, the patient's H  Pylori status was unknown  The patient's estimated blood loss was minimal (<5 mL)  The procedure was not difficult  The patient tolerated the procedure well  There were no apparent complications  ANESTHESIA INFORMATION: ASA: II Anesthesia Type: IV Sedation with Anesthesia MEDICATIONS: No administrations occurring from 1230 to 1245 on 01/13/22 FINDINGS: Fungating and malignant-appearing mass (traversable) measuring 70 mm in the lower third of the esophagus (34 cm from the incisors), covering the whole circumference,; bleeding occurred before and after intervention; performed cold forceps biopsy 3 fungating and multilobular masses (traversable) in the cardia, covering one third of the circumference,; bleeding occurred after intervention; performed partial removal by cold forceps biopsy The fundus of the stomach, body of the stomach, incisura, antrum, prepyloric region and pylorus appeared normal  The duodenal bulb and 2nd part of the duodenum appeared normal  SPECIMENS: ID Type Source Tests Collected by Time Destination 1 : cardia Tissue Stomach TISSUE EXAM [place],  1/13/2022 12:44 PM  2 : distal Tissue Esophagus TISSUE EXAM [facility],  1/13/2022 12:45 PM  IMPRESSION: 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 with additional nodular lesions in the cardia of the stomach, biopsies pending, most likely adenocarcinoma RECOMMENDATION: 1   Patient will need a CT scan of the chest, abdomen, and pelvis with oral and IV contrast 2  Schedule a CT PET scan 3  Referral to Surgical Oncology  Latricia Avendaño DO, [place], [place]    Result Date: 1/23/2022  Narrative:  [facility] [address] [facility] [address] [phone] DATE OF SERVICE: 1/13/22 PHYSICIAN(S): Latricia Avendaño DO - Attending Physician INDICATION: Dysphagia, unspecified type POST-OP DIAGNOSIS: See the impression below  PREPROCEDURE: Informed consent was obtained for the procedure, including sedation  Risks of perforation, hemorrhage, adverse drug reaction and aspiration were discussed  The patient was placed in the left lateral decubitus position  Patient was explained about the risks and benefits of the procedure  Risks including but not limited to bleeding, infection, and perforation were explained in detail  Also explained about less than 100% sensitivity with the exam and other alternatives  DETAILS OF PROCEDURE: Patient was taken to the procedure room where a time out was performed to confirm correct patient and correct procedure  The patient underwent monitored anesthesia care, which was administered by an anesthesia professional  The patient's blood pressure, heart rate, level of consciousness, respirations and oxygen were monitored throughout the procedure  The scope was advanced to the second part of the duodenum  Retroflexion was performed in the cardia and fundus  Prior to the procedure, the patient's H  Pylori status was unknown  The patient's estimated blood loss was minimal (<5 mL)  The procedure was not difficult  The patient tolerated the procedure well  There were no apparent complications   ANESTHESIA INFORMATION: ASA: II Anesthesia Type: IV Sedation with Anesthesia MEDICATIONS: No administrations occurring from 1230 to 1245 on 01/13/22 FINDINGS: Fungating and malignant-appearing mass (traversable) measuring 70 mm in the lower third of the esophagus (34 cm from the incisors), covering the whole circumference,; bleeding occurred before and after intervention; performed cold forceps biopsy 3 fungating and multilobular masses (traversable) in the cardia, covering one third of the circumference,; bleeding occurred after intervention; performed partial removal by cold forceps biopsy The fundus of the stomach, body of the stomach, incisura, antrum, prepyloric region and pylorus appeared normal  The duodenal bulb and 2nd part of the duodenum appeared normal  SPECIMENS: ID Type Source Tests Collected by Time Destination 1 : cardia Tissue Stomach TISSUE EXAM Charlene Lee DO 1/13/2022 12:44 PM  2 : distal Tissue Esophagus TISSUE EXAM Charlene Lee DO 1/13/2022 12:45 PM      Impression: 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 with additional nodular lesions in the cardia of the stomach, biopsies pending, most likely adenocarcinoma RECOMMENDATION: 1  Patient will need a CT scan of the chest, abdomen, and pelvis with oral and IV contrast 2  Schedule a CT PET scan 3  Referral to Surgical Oncology  Charlene Lee DO, [place], [place]     CT chest abdomen pelvis w contrast    Result Date: 1/26/2022  Narrative: CT CHEST, ABDOMEN AND PELVIS WITH IV CONTRAST INDICATION:   R13 10: Dysphagia, unspecified R63 4: Abnormal weight loss  COMPARISON:  Compared with 10/10/2019 TECHNIQUE: CT examination of the chest, abdomen and pelvis was performed  Axial, sagittal, and coronal 2D reformatted images were created from the source data and submitted for interpretation  Radiation dose length product (DLP) for this visit:  812 mGy-cm   This examination, like all CT scans performed in the [facility], was performed utilizing techniques to minimize radiation dose exposure, including the use of iterative reconstruction and automated exposure control  IV Contrast:  100 mL of iohexol (OMNIPAQUE) Enteric Contrast: Enteric contrast was administered  FINDINGS: CHEST LUNGS:  Lungs are clear    There is no tracheal or endobronchial lesion  PLEURA:  Unremarkable  HEART/GREAT VESSELS: Heart is unremarkable for patient's age  No thoracic aortic aneurysm  MEDIASTINUM AND BRANDEE:  Esophagus is dilated with proximal and mid esophageal circumferential wall thickening with distal esophagus demonstrating an eccentric mass posteriorly measuring 1 8 x 2 2 x 3 6 cm extending into the GE junction  Posterior to this however this is a 1 3 cm soft tissue nodule in series 2 image 37  Lymph node with central necrosis anterior to the GE junction measuring 1 2 x 2 4 x 3 5 cm  Extension beyond the esophageal wall seen  Smaller lymph node measuring 1 0 cm seen inferiorly  Right infrahilar soft tissue density measuring 1 0 x 1 4 cm in series 301 image 33 appears to be within the bronchial lumen and demonstrates a branching pattern on the coronal views and was seen on prior CT  This is nonocclusive    CHEST WALL AND LOWER NECK:   Unremarkable  ABDOMEN LIVER/BILIARY TREE:  Unremarkable  GALLBLADDER:  No calcified gallstones  No pericholecystic inflammatory change  SPLEEN:  Unremarkable  PANCREAS:  Unremarkable  ADRENAL GLANDS:  Unremarkable  KIDNEYS/URETERS:  Both kidneys enhance symmetrically  Left kidney is smaller with a lobulated contour and unchanged  No hydronephrosis  STOMACH AND BOWEL:  Stool filled colon  APPENDIX:  No findings to suggest appendicitis  ABDOMINOPELVIC CAVITY:  No ascites  No pneumoperitoneum  No lymphadenopathy  VESSELS:  Aneurysmal infrarenal abdominal aorta measuring 3 3 cm and unchanged PELVIS REPRODUCTIVE ORGANS:  Enlarged heterogeneous prostate measuring 6 0 x 5 4 cm  URINARY BLADDER:  Unremarkable  ABDOMINAL WALL/INGUINAL REGIONS:  Unremarkable  OSSEOUS STRUCTURES: 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 and unchanged  Severe right hip degenerative changes  No acute fracture or destructive osseous lesion       Impression: New mid to distal esophageal wall thickening with large eccentric mass in the posterior distal esophagus extending into the GE junction and beyond the esophageal wall with pathologic lymph nodes anteriorly with central necrosis of esophageal malignancy   This correlates with the recent EGD finding of 1/13/2022  Right lower lobe endobronchial mass possibly mucous plugging with extension into the bronchial branches is chronic and was seen on prior CT of 10/10/2019 with no obstruction  This can be evaluated when PET scan is performed as indicated on EGD report  The study was marked in EPIC for significant notification  Workstation performed: [id number]     NM PET CT skull base to mid thigh    Result Date: 2/1/2022  Narrative: PET/CT SCAN INDICATION:  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   , distal esophageal carcinoma for staging MODIFIER: PI COMPARISON: CT of chest, abdomen, and pelvis dated 1/24/2022 CELL TYPE:  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 (bx distal esophagus 1/13/22) TECHNIQUE:   8 7 mCi F-18-FDG administered IV  Multiplanar attenuation corrected and non attenuation corrected PET images were acquired 60 minutes post injection  Contiguous, low dose, axial CT sections were obtained from the skull base through the femurs   Intravenous contrast material was not utilized  This examination, like all CT scans performed in the [facility], was performed utilizing techniques to minimize radiation dose exposure, including the use of iterative reconstruction and automated exposure control  Fasting serum glucose: 141 mg/dl FINDINGS: VISUALIZED BRAIN:   No acute abnormalities are seen  HEAD/NECK:   There is a physiologic distribution of FDG  No FDG avid cervical adenopathy is seen  CT images: Unremarkable   CHEST:   There is segmental FDG activity associated with mural thickening extending from the distal esophagus through the proximal stomach with max SUV of 10 9 consistent with patient's biopsy-proven esophageal carcinoma  On image 104 of series 3, there is mild FDG activity associated with soft tissue density posterior to the mid to distal esophagus and adjacent to the descending thoracic aorta measuring approximately 1 2 cm with Max SUV of 2 8 suspicious for possible posterior mediastinal/ paraesophageal andry metastatic disease  On image 97 of series 12, there is a small focus of mild FDG activity in the right hilar region without definite soft tissue correlate and with max SUV of 2 9 of uncertain clinical significance  This finding could reflect reactive/inflammatory lymph node although attention to this region on follow-up imaging is recommended to exclude developing andry metastatic disease  CT images: Atherosclerotic vascular calcifications including those of the coronary arteries are noted  Non-FDG avid filling defect within right lower lobe bronchus is noted on image 98 of series 3 with associated reticulonodular/tree-in-bud infiltrate most consistent with an infectious/inflammatory process such as pneumonia  ABDOMEN:   Best seen on image 135 is mildly hypermetabolic perigastric/gastrohepatic ligament soft tissue measuring 1 9 x 2 8 cm with Max SUV of 3 5 consistent with andry metastatic disease  Additional smaller non-FDG avid upper abdominal lymph nodes are noted which are suspicious for andry metastatic disease  There is nonuniform FDG activity involving the bowel with more prominent concentric FDG activity involving the cecum/proximal ascending colon with max SUV of 8 8 on image 196 of series 12  Consider correlation with screening colonoscopy as clinically indicated given the more localized nature of this FDG activity  CT images: Abdominal aortic aneurysm measuring 3 8 cm  Atherosclerotic vascular calcifications are noted  Asymmetric lobulated contour to the left kidney which appears mildly atrophic, particularly towards the upper pole  PELVIS: No FDG avid soft tissue lesions are seen   CT images: Enlarged prostate gland  OSSEOUS STRUCTURES: No FDG avid lesions are seen  CT images: Severe degenerative changes involving the right hip  Degenerative changes involving the spine  Possible osteopenia  Impression: 1  Hypermetabolic distal esophageal carcinoma which extends into the proximal stomach with paraesophageal/perigastric/upper abdominal andry metastatic disease  2   Concentric FDG activity involving the cecum/proximal ascending colon of uncertain clinical significance  Given the localized nature of FDG activity, consider correlation with screening colonoscopy to exclude underlying colonic malignancy  3   Indeterminant non-FDG avid avid filling defect within right lower lobe bronchus with associated more distal tree-in-bud infiltrate  Findings could reflect mucous plugging with obstructive malignant process of low metabolic activity not excluded  4   3 8 cm infrarenal abdominal aortic aneurysm  Please see above for details and additional findings  The study was marked in EPIC for significant notification  Workstation performed: [id number]     I reviewed the above laboratory and imaging data  Discussion/Summary:  77-year-old male with a clinical T3N1M0 adenocarcinoma of the esophagus  Based on his PET-CT, he has local regional disease  I would recommend that he undergo neoadjuvant chemo radiation  Follow-up imaging would then determine surgical intervention  I suspect he would tolerate esophagectomy, but he should have chemo radiation 1st   I explained the reasoning and rationale behind this approach  He is currently drinking nutritional supplements and is unsure how he is doing in terms of maintaining his weight  He may need a feeding tube to get through chemo radiation, but we will have him set up to discuss this with Radiation Oncology    I explained the risks of port placement and feeding jejunostomy to include bleeding, infection, need for further surgery, wound complications, port malfunction, DVT, pneumothorax, and adjacent organ injury  Informed consent was obtained  We will schedule this at our earliest mutual convenience  We will also get him set up with smoking cessation  I will see him back in 3 months  He is agreeable to this  All of his and his family's questions were answered

## 2022-02-05 ENCOUNTER — HOSPITAL ENCOUNTER (INPATIENT)
Facility: HOSPITAL | Age: 77
LOS: 6 days | Discharge: NURSING, REHAB OR LONG-TERM CARE | DRG: 557 | End: 2022-02-12
Attending: EMERGENCY MEDICINE | Admitting: INTERNAL MEDICINE
Payer: COMMERCIAL

## 2022-02-05 ENCOUNTER — APPOINTMENT (EMERGENCY)
Dept: RADIOLOGY | Facility: HOSPITAL | Age: 77
DRG: 557 | End: 2022-02-05
Payer: COMMERCIAL

## 2022-02-05 DIAGNOSIS — C15.9 ESOPHAGEAL CANCER (HCC): ICD-10-CM

## 2022-02-05 DIAGNOSIS — S91.002A WOUND OF LEFT ANKLE, INITIAL ENCOUNTER: ICD-10-CM

## 2022-02-05 DIAGNOSIS — R13.10 DYSPHAGIA, UNSPECIFIED TYPE: ICD-10-CM

## 2022-02-05 DIAGNOSIS — R11.2 NAUSEA & VOMITING: ICD-10-CM

## 2022-02-05 DIAGNOSIS — M25.572 LEFT ANKLE PAIN: Primary | ICD-10-CM

## 2022-02-05 DIAGNOSIS — C15.5 MALIGNANT NEOPLASM OF LOWER THIRD OF ESOPHAGUS (HCC): ICD-10-CM

## 2022-02-05 DIAGNOSIS — E44.1 MILD PROTEIN-CALORIE MALNUTRITION (HCC): ICD-10-CM

## 2022-02-05 DIAGNOSIS — R63.4 WEIGHT LOSS: ICD-10-CM

## 2022-02-05 DIAGNOSIS — R26.2 AMBULATORY DYSFUNCTION: ICD-10-CM

## 2022-02-05 LAB
ALBUMIN SERPL BCP-MCNC: 3.7 G/DL (ref 3.5–5)
ALP SERPL-CCNC: 66 U/L (ref 46–116)
ALT SERPL W P-5'-P-CCNC: 14 U/L (ref 12–78)
ANION GAP SERPL CALCULATED.3IONS-SCNC: 7 MMOL/L (ref 4–13)
AST SERPL W P-5'-P-CCNC: 10 U/L (ref 5–45)
ATRIAL RATE: 70 BPM
BASOPHILS # BLD AUTO: 0.07 THOUSANDS/ΜL (ref 0–0.1)
BASOPHILS NFR BLD AUTO: 1 % (ref 0–1)
BILIRUB DIRECT SERPL-MCNC: 0.14 MG/DL (ref 0–0.2)
BILIRUB SERPL-MCNC: 0.58 MG/DL (ref 0.2–1)
BUN SERPL-MCNC: 28 MG/DL (ref 5–25)
CALCIUM SERPL-MCNC: 9.6 MG/DL (ref 8.3–10.1)
CHLORIDE SERPL-SCNC: 103 MMOL/L (ref 100–108)
CO2 SERPL-SCNC: 31 MMOL/L (ref 21–32)
CREAT SERPL-MCNC: 1.09 MG/DL (ref 0.6–1.3)
EOSINOPHIL # BLD AUTO: 0.06 THOUSAND/ΜL (ref 0–0.61)
EOSINOPHIL NFR BLD AUTO: 1 % (ref 0–6)
ERYTHROCYTE [DISTWIDTH] IN BLOOD BY AUTOMATED COUNT: 14.6 % (ref 11.6–15.1)
GFR SERPL CREATININE-BSD FRML MDRD: 65 ML/MIN/1.73SQ M
GLUCOSE SERPL-MCNC: 133 MG/DL (ref 65–140)
HCT VFR BLD AUTO: 39.8 % (ref 36.5–49.3)
HGB BLD-MCNC: 12.9 G/DL (ref 12–17)
IMM GRANULOCYTES # BLD AUTO: 0.15 THOUSAND/UL (ref 0–0.2)
IMM GRANULOCYTES NFR BLD AUTO: 1 % (ref 0–2)
LACTATE SERPL-SCNC: 1.3 MMOL/L (ref 0.5–2)
LYMPHOCYTES # BLD AUTO: 0.79 THOUSANDS/ΜL (ref 0.6–4.47)
LYMPHOCYTES NFR BLD AUTO: 7 % (ref 14–44)
MAGNESIUM SERPL-MCNC: 2.3 MG/DL (ref 1.6–2.6)
MCH RBC QN AUTO: 28.7 PG (ref 26.8–34.3)
MCHC RBC AUTO-ENTMCNC: 32.4 G/DL (ref 31.4–37.4)
MCV RBC AUTO: 88 FL (ref 82–98)
MONOCYTES # BLD AUTO: 0.98 THOUSAND/ΜL (ref 0.17–1.22)
MONOCYTES NFR BLD AUTO: 9 % (ref 4–12)
NEUTROPHILS # BLD AUTO: 9.18 THOUSANDS/ΜL (ref 1.85–7.62)
NEUTS SEG NFR BLD AUTO: 81 % (ref 43–75)
NRBC BLD AUTO-RTO: 0 /100 WBCS
PLATELET # BLD AUTO: 290 THOUSANDS/UL (ref 149–390)
PMV BLD AUTO: 10.9 FL (ref 8.9–12.7)
POTASSIUM SERPL-SCNC: 3.8 MMOL/L (ref 3.5–5.3)
PROT SERPL-MCNC: 7.5 G/DL (ref 6.4–8.2)
QRS AXIS: 71 DEGREES
QRSD INTERVAL: 128 MS
QT INTERVAL: 426 MS
QTC INTERVAL: 460 MS
RBC # BLD AUTO: 4.5 MILLION/UL (ref 3.88–5.62)
SODIUM SERPL-SCNC: 141 MMOL/L (ref 136–145)
T WAVE AXIS: 28 DEGREES
VENTRICULAR RATE: 70 BPM
WBC # BLD AUTO: 11.23 THOUSAND/UL (ref 4.31–10.16)

## 2022-02-05 PROCEDURE — 99220 PR INITIAL OBSERVATION CARE/DAY 70 MINUTES: CPT | Performed by: INTERNAL MEDICINE

## 2022-02-05 PROCEDURE — 83605 ASSAY OF LACTIC ACID: CPT | Performed by: EMERGENCY MEDICINE

## 2022-02-05 PROCEDURE — 73610 X-RAY EXAM OF ANKLE: CPT

## 2022-02-05 PROCEDURE — 93010 ELECTROCARDIOGRAM REPORT: CPT | Performed by: INTERNAL MEDICINE

## 2022-02-05 PROCEDURE — 36415 COLL VENOUS BLD VENIPUNCTURE: CPT | Performed by: EMERGENCY MEDICINE

## 2022-02-05 PROCEDURE — 85025 COMPLETE CBC W/AUTO DIFF WBC: CPT | Performed by: EMERGENCY MEDICINE

## 2022-02-05 PROCEDURE — 96366 THER/PROPH/DIAG IV INF ADDON: CPT

## 2022-02-05 PROCEDURE — 93005 ELECTROCARDIOGRAM TRACING: CPT

## 2022-02-05 PROCEDURE — 96375 TX/PRO/DX INJ NEW DRUG ADDON: CPT

## 2022-02-05 PROCEDURE — 83735 ASSAY OF MAGNESIUM: CPT | Performed by: EMERGENCY MEDICINE

## 2022-02-05 PROCEDURE — 99285 EMERGENCY DEPT VISIT HI MDM: CPT | Performed by: EMERGENCY MEDICINE

## 2022-02-05 PROCEDURE — 99285 EMERGENCY DEPT VISIT HI MDM: CPT

## 2022-02-05 PROCEDURE — 80076 HEPATIC FUNCTION PANEL: CPT | Performed by: EMERGENCY MEDICINE

## 2022-02-05 PROCEDURE — 80048 BASIC METABOLIC PNL TOTAL CA: CPT | Performed by: EMERGENCY MEDICINE

## 2022-02-05 PROCEDURE — 96365 THER/PROPH/DIAG IV INF INIT: CPT

## 2022-02-05 RX ORDER — CEFAZOLIN SODIUM 2 G/50ML
2000 SOLUTION INTRAVENOUS EVERY 8 HOURS
Status: DISCONTINUED | OUTPATIENT
Start: 2022-02-05 | End: 2022-02-06

## 2022-02-05 RX ORDER — ONDANSETRON 2 MG/ML
4 INJECTION INTRAMUSCULAR; INTRAVENOUS ONCE
Status: COMPLETED | OUTPATIENT
Start: 2022-02-05 | End: 2022-02-05

## 2022-02-05 RX ORDER — ACETAMINOPHEN 325 MG/1
650 TABLET ORAL EVERY 6 HOURS PRN
Status: DISCONTINUED | OUTPATIENT
Start: 2022-02-05 | End: 2022-02-12 | Stop reason: HOSPADM

## 2022-02-05 RX ORDER — OXYCODONE HCL 5 MG/5 ML
5 SOLUTION, ORAL ORAL EVERY 4 HOURS PRN
Status: DISCONTINUED | OUTPATIENT
Start: 2022-02-05 | End: 2022-02-12 | Stop reason: HOSPADM

## 2022-02-05 RX ORDER — ONDANSETRON 2 MG/ML
4 INJECTION INTRAMUSCULAR; INTRAVENOUS EVERY 6 HOURS PRN
Status: DISCONTINUED | OUTPATIENT
Start: 2022-02-05 | End: 2022-02-12 | Stop reason: HOSPADM

## 2022-02-05 RX ORDER — KETOROLAC TROMETHAMINE 30 MG/ML
15 INJECTION, SOLUTION INTRAMUSCULAR; INTRAVENOUS ONCE
Status: COMPLETED | OUTPATIENT
Start: 2022-02-05 | End: 2022-02-05

## 2022-02-05 RX ADMIN — CEFAZOLIN SODIUM 2000 MG: 2 SOLUTION INTRAVENOUS at 18:08

## 2022-02-05 RX ADMIN — KETOROLAC TROMETHAMINE 15 MG: 30 INJECTION, SOLUTION INTRAMUSCULAR at 14:51

## 2022-02-05 RX ADMIN — ONDANSETRON 4 MG: 2 INJECTION INTRAMUSCULAR; INTRAVENOUS at 12:02

## 2022-02-05 RX ADMIN — ONDANSETRON 4 MG: 2 INJECTION INTRAMUSCULAR; INTRAVENOUS at 20:35

## 2022-02-05 RX ADMIN — ONDANSETRON 4 MG: 2 INJECTION INTRAMUSCULAR; INTRAVENOUS at 16:40

## 2022-02-05 RX ADMIN — SODIUM CHLORIDE, SODIUM LACTATE, POTASSIUM CHLORIDE, AND CALCIUM CHLORIDE 1000 ML: .6; .31; .03; .02 INJECTION, SOLUTION INTRAVENOUS at 12:01

## 2022-02-05 NOTE — ED PROVIDER NOTES
History  Chief Complaint   Patient presents with    Weakness - Generalized     pt c/o wekaness and nausea that started a few days ago, pt was dx with esophageal cancer 2 weeks ago    Cellulitis     pt also c/o cellulitis on left leg      HPI    Prior to Admission Medications   Prescriptions Last Dose Informant Patient Reported? Taking? Cholecalciferol (VITAMIN D3 PO)  Self Yes No   Sig: Take by mouth   HYDROcodone-acetaminophen (Norco) 5-325 mg per tablet   No No   Sig: Take 1 tablet by mouth every 6 (six) hours as needed for pain Max Daily Amount: 4 tablets   Multiple Vitamins-Minerals (MULTIVITAMIN WITH MINERALS) tablet  Self Yes No   Sig: Take 1 tablet by mouth daily   ascorbic acid (VITAMIN C) 500 mg tablet  Self Yes No   Sig: Take 500 mg by mouth daily   aspirin 325 mg tablet  Self Yes No   Sig: Take 325 mg by mouth 2 (two) times a day   cephalexin (KEFLEX) 500 mg capsule   No No   Sig: Take 1 capsule (500 mg total) by mouth every 6 (six) hours for 7 days   cyanocobalamin (VITAMIN B-12) 500 MCG tablet  Self Yes No   Sig: Take 500 mcg by mouth daily   pantoprazole (PROTONIX) 40 mg tablet  Self No No   Sig: Take 1 tablet (40 mg total) by mouth daily before breakfast   triamcinolone (KENALOG) 0 5 % cream  Self No No   Sig: Apply topically 2 (two) times a day      Facility-Administered Medications: None       Past Medical History:   Diagnosis Date    Cancer ([facility] [address] )     esophageal ca    Pneumonia     Polio     Rib fractures        History reviewed  No pertinent surgical history  Family History   Problem Relation Age of Onset    Dementia Mother     Heart disease Father      I have reviewed and agree with the history as documented      E-Cigarette/Vaping    E-Cigarette Use Never User      E-Cigarette/Vaping Substances    Nicotine No     THC No     CBD No     Flavoring No     Other No     Unknown No      Social History     Tobacco Use    Smoking status: Current Every Day Smoker     Packs/day: 1 00 Types: Cigarettes    Smokeless tobacco: Never Used   Vaping Use    Vaping Use: Never used   Substance Use Topics    Alcohol use: Never     Alcohol/week: 0 0 standard drinks     Comment: 0    Drug use: No       Review of Systems    Physical Exam  Physical Exam  Vitals and nursing note reviewed  Constitutional:       General: He is not in acute distress  Appearance: He is well-developed  He is cachectic  HENT:      Head: Normocephalic and atraumatic  Eyes:      Conjunctiva/sclera: Conjunctivae normal       Pupils: Pupils are equal, round, and reactive to light  Neck:      Trachea: No tracheal deviation  Cardiovascular:      Rate and Rhythm: Normal rate and regular rhythm  Heart sounds: Normal heart sounds  Pulmonary:      Effort: Pulmonary effort is normal  No respiratory distress  Breath sounds: Normal breath sounds  Abdominal:      General: There is no distension  Palpations: Abdomen is soft  Tenderness: There is no abdominal tenderness  Musculoskeletal:      Cervical back: Normal range of motion  Left lower leg: Tenderness (distal third of calf) present  No swelling or bony tenderness  No edema  Left ankle: No swelling, deformity or ecchymosis  Tenderness (Achilles, posterior ankle, to calcaneus >>medial ankle) present  Decreased range of motion (significant, due to pain, improves with passive movement but still limited)  Left Achilles Tendon: Tenderness present  No defects  Benavidez's test negative  Right foot: Deformity (chronic of toes; patient says is from polio) present  Left foot: Normal range of motion and normal capillary refill  Deformity (chronic of toes; patient says is from polio) present  No swelling, tenderness, bony tenderness or crepitus  Legs:         Feet:    Skin:     General: Skin is warm and dry  Neurological:      Mental Status: He is alert and oriented to person, place, and time  GCS: GCS eye subscore is 4   GCS verbal subscore is 5  GCS motor subscore is 6  Psychiatric:         Behavior: Behavior normal          Vital Signs  ED Triage Vitals   Temperature Pulse Respirations Blood Pressure SpO2   02/05/22 1115 02/05/22 1115 02/05/22 1115 02/05/22 1115 02/05/22 1115   98 1 °F (36 7 °C) 84 17 161/68 100 %      Temp Source Heart Rate Source Patient Position - Orthostatic VS BP Location FiO2 (%)   02/05/22 1115 02/05/22 1115 02/05/22 1115 02/05/22 1115 --   Oral Monitor Sitting Right arm       Pain Score       02/05/22 1451       8           Vitals:    02/05/22 1115 02/05/22 1454   BP: 161/68 149/67   Pulse: 84 72   Patient Position - Orthostatic VS: Sitting Sitting         Visual Acuity      ED Medications  Medications   acetaminophen (TYLENOL) tablet 650 mg (has no administration in time range)   ondansetron (ZOFRAN) injection 4 mg (has no administration in time range)   enoxaparin (LOVENOX) subcutaneous injection 40 mg (has no administration in time range)   ceFAZolin (ANCEF) IVPB (premix in dextrose) 2,000 mg 50 mL (2,000 mg Intravenous New Bag 2/5/22 1808)   oxyCODONE (ROXICODONE) oral solution 5 mg (has no administration in time range)   ondansetron (ZOFRAN) injection 4 mg (4 mg Intravenous Given 2/5/22 1202)   lactated ringers bolus 1,000 mL (0 mL Intravenous Stopped 2/5/22 1451)   ketorolac (TORADOL) injection 15 mg (15 mg Intravenous Given 2/5/22 1451)   ondansetron (ZOFRAN) injection 4 mg (4 mg Intravenous Given 2/5/22 1640)       Diagnostic Studies  Results Reviewed     Procedure Component Value Units Date/Time    Lactic acid [[id number]]  (Normal) Collected: 02/05/22 1203    Lab Status: Final result Specimen: Blood from Arm, Right Updated: 02/05/22 1232     LACTIC ACID 1 3 mmol/L     Narrative:      Result may be elevated if tourniquet was used during collection      Hepatic function panel [[id number]]  (Normal) Collected: 02/05/22 1203    Lab Status: Final result Specimen: Blood from Arm, Right Updated: 02/05/22 1226     Total Bilirubin 0 58 mg/dL      Bilirubin, Direct 0 14 mg/dL      Alkaline Phosphatase 66 U/L      AST 10 U/L      ALT 14 U/L      Total Protein 7 5 g/dL      Albumin 3 7 g/dL     Magnesium [[id number]]  (Normal) Collected: 02/05/22 1203    Lab Status: Final result Specimen: Blood from Arm, Right Updated: 02/05/22 1226     Magnesium 2 3 mg/dL     Basic metabolic panel [[id number]]  (Abnormal) Collected: 02/05/22 1203    Lab Status: Final result Specimen: Blood from Arm, Right Updated: 02/05/22 1225     Sodium 141 mmol/L      Potassium 3 8 mmol/L      Chloride 103 mmol/L      CO2 31 mmol/L      ANION GAP 7 mmol/L      BUN 28 mg/dL      Creatinine 1 09 mg/dL      Glucose 133 mg/dL      Calcium 9 6 mg/dL      eGFR 65 ml/min/1 73sq m     Narrative:      [place] guidelines for Chronic Kidney Disease (CKD):     Stage 1 with normal or high GFR (GFR > 90 mL/min/1 73 square meters)    Stage 2 Mild CKD (GFR = 60-89 mL/min/1 73 square meters)    Stage 3A Moderate CKD (GFR = 45-59 mL/min/1 73 square meters)    Stage 3B Moderate CKD (GFR = 30-44 mL/min/1 73 square meters)    Stage 4 Severe CKD (GFR = 15-29 mL/min/1 73 square meters)    Stage 5 End Stage CKD (GFR <15 mL/min/1 73 square meters)  Note: GFR calculation is accurate only with a steady state creatinine    CBC and differential [[id number]]  (Abnormal) Collected: 02/05/22 1203    Lab Status: Final result Specimen: Blood from Arm, Right Updated: 02/05/22 1209     WBC 11 23 Thousand/uL      RBC 4 50 Million/uL      Hemoglobin 12 9 g/dL      Hematocrit 39 8 %      MCV 88 fL      MCH 28 7 pg      MCHC 32 4 g/dL      RDW 14 6 %      MPV 10 9 fL      Platelets 572 Thousands/uL      nRBC 0 /100 WBCs      Neutrophils Relative 81 %      Immat GRANS % 1 %      Lymphocytes Relative 7 %      Monocytes Relative 9 %      Eosinophils Relative 1 %      Basophils Relative 1 %      Neutrophils Absolute 9 18 Thousands/µL      Immature Grans Absolute 0 15 Thousand/uL      Lymphocytes Absolute 0 79 Thousands/µL      Monocytes Absolute 0 98 Thousand/µL      Eosinophils Absolute 0 06 Thousand/µL      Basophils Absolute 0 07 Thousands/µL                  XR ankle 3+ views LEFT    (Results Pending)              Procedures  ECG 12 Lead Documentation Only    Date/Time: 2/5/2022 2:17 PM  Performed by: Cori Henderson MD  Authorized by: Cori Henderson MD     Indications / Diagnosis:  Generalized weakness  ECG reviewed by me, the ED Provider: yes    Patient location:  ED  Previous ECG:     Previous ECG:  Compared to current    Comparison ECG info:  02/03/22    Similarity:  No change  Interpretation:     Interpretation: abnormal    Rate:     ECG rate:  70    ECG rate assessment: normal    Rhythm:     Rhythm: sinus rhythm    Ectopy:     Ectopy: none    QRS:     QRS axis:  Normal    QRS intervals: Wide  Conduction:     Conduction: abnormal      Abnormal conduction: complete RBBB    ST segments:     ST segments:  Normal  T waves:     T waves: normal               ED Course                                             MDM  Number of Diagnoses or Management Options  Ambulatory dysfunction: new and requires workup  Esophageal cancer [facility]): new and requires workup  Left ankle pain: new and requires workup  Nausea & vomiting: new and requires workup  Wound of left ankle, initial encounter: new and requires workup  Diagnosis management comments: This is a 51-year-old male who presents here today with left ankle pain and nausea and vomiting  He says he was diagnosed with esophageal cancer a couple of weeks ago, but has been having issues with eating, nausea and vomiting since before Thanksgiving  Says he is able to tolerate liquids okay but is having a hard time with solids and some pills  He had been on a nausea medication that he does not know the name of that was not effective, so he stopped taking it    He also notes that for about a month and half he has been having pain in his left ankle  He says it started with a "corn" to the ball of his foot and blisters on his toes (he has a chronic deformity due to prior polio)  He says he was having pain with walking so was walking "funny" and began developing pain in his ankle  He had equal rash" associated with this  He says when he saw the podiatrist about the corn they did not seem very concerned about the rash by his ankle  He said he saw his primary care doctor who gave him a cream to put on it that did not seem to help  He says initially it was very itchy, but is less so  He says pain has been progressing  He denies swelling, redness, trauma to the area, though does scratch at it  He denies prior problems with the ankle  He has significant pain with touching it or trying to walk  He says the pain with walking or moving it makes it difficult for him to ambulate to try to be able to eat, as well as get around the house  He denies any falls related to this  He says he was seen at urgent care two days ago, diagnosed with "cellulitis" and prescribed antibiotics  He says since then he has vomited every attempt at taking the antibiotic, and is concerned the infection is going to get worse if he is not taking the antibiotic, prompting him to come today for evaluation  He denies fevers, rapid spread of redness, any significant change in appearance of the rash, other systemic symptoms  She was seen on 1/6/22 by her PCP with a diagnosis of "dermatitis and "for which she was prescribed triamcinolone  The note mentions that he was endorsing an itchy rash, however there is no mention or description of the rash in the physical exam     Review of systems:  Otherwise negative unless stated above    He is well-appearing, in no acute distress  He does dry heave while I am talking with him  He has significant tenderness to the ankle, worst over the Achilles tendon and lower calf muscles    He has decreased range of motion of the ankle due to pain, and though it improves with passive range of motion he still has significant pain with this  He has some scaling of the skin with minimal redness to the medial ankle, and a small circular scab to the center of this, which is not draining, indurated, fluctuant  Tenderness is more to the posterior ankle than it is over the area of cellulitis  He is neurovascularly intact distally  Exam is otherwise unremarkable  His dysphagia and vomiting are likely related to his cancer  We will get lab work to evaluate for dehydration, electrolyte abnormalities related to vomiting and decreased oral intake  The ankle does not appear significantly cellulitic, and degree of pain is out of proportion to the appearance of the rash  He probably has at least a component of Achilles tendinitis from walking abnormally due to the corn on the ball of his foot  He does not have any findings to suggest septic arthritis  Given duration of symptoms gout is less likely  We will get an x-ray to evaluate for significant bony abnormalities  Creatinine is up slightly from baseline, but he does not have any associated electrolyte abnormalities  Mild leukocytosis is unchanged from prior  X-ray was reviewed by myself, and shows no acute abnormalities  Despite pain medication and bracing, the patient is unable to bear weight in order to walk  He will be admitted for further management of his symptoms  I updated him on findings and plan of care, and he expresses understanding         Amount and/or Complexity of Data Reviewed  Clinical lab tests: ordered and reviewed  Tests in the radiology section of CPT®: reviewed and ordered  Decide to obtain previous medical records or to obtain history from someone other than the patient: yes  Review and summarize past medical records: yes  Discuss the patient with other providers: yes  Independent visualization of images, tracings, or specimens: yes        Disposition  Final diagnoses:   Left ankle pain   Ambulatory dysfunction   Wound of left ankle, initial encounter   Nausea & vomiting   Esophageal cancer ([facility] [address] )     Time reflects when diagnosis was documented in both MDM as applicable and the Disposition within this note     Time User Action Codes Description Comment    2/5/2022  4:37 PM Bhumika Nunn Add [M25 572] Left ankle pain     2/5/2022  4:37 PM Bhumika Nunn Add [R26 2] Ambulatory dysfunction     2/5/2022  4:37 PM Bhumika Nunn Add [S91 002A] Wound of left ankle, initial encounter     2/5/2022  4:37 PM Bhumika Nunn Add [R11 2] Nausea & vomiting     2/5/2022  4:37 PM Bhumika Nunn Add [C15 9] Esophageal cancer [facility])       ED Disposition     ED Disposition Condition Date/Time Comment    Admit Stable Sat Feb 5, 2022  4:37 PM Case was discussed with Dr Hazel Cochran and the patient's admission status was agreed to be Admission Status: observation status to the service of Dr Hazel Cochran  Follow-up Information    None         Patient's Medications   Discharge Prescriptions    No medications on file       No discharge procedures on file      PDMP Review       Value Time User    PDMP Reviewed  Yes 2/3/2022 10:34 AM Joce Booker MD          ED Provider  Electronically Signed by           Victoriano New MD  02/05/22 5880

## 2022-02-05 NOTE — ASSESSMENT & PLAN NOTE
- presents with worsening left ankle pain  - currently being treated as an outpatient with p o  Keflex for presumed cellulitis, however given his dysphagia nausea is not been able to keep antibiotics down    - severity of cellulitis does not correlate with severity of patient's pain  X-rays have been negative    Will consult Podiatry for further evaluation  - continue on IV cefazolin for now  - PT/OT eval given the ambulatory dysfunction

## 2022-02-05 NOTE — ASSESSMENT & PLAN NOTE
- recently diagnosed with esophageal cancer via EGD biopsy  - follows with Medical Oncology and Surgical Oncology as an outpatient  - does not have a formal treatment plan to date    - will trial on dysphagia 3 diet given his difficulty swallowing  - mealtime supplementations  - outpatient follow-up as previously scheduled

## 2022-02-05 NOTE — H&P
[address] 1945, 68 y o  male MRN: [id number]  Unit/Bed#: ED 22 Encounter: [id number]  Primary Care Provider: Enrique Cuello MD   Date and time admitted to hospital: 2/5/2022 11:08 AM    Left ankle pain  Assessment & Plan  - presents with worsening left ankle pain  - currently being treated as an outpatient with p o  Keflex for presumed cellulitis, however given his dysphagia nausea is not been able to keep antibiotics down    - severity of cellulitis does not correlate with severity of patient's pain  X-rays have been negative  Will consult Podiatry for further evaluation  - continue on IV cefazolin for now  - PT/OT eval given the ambulatory dysfunction    Malignant neoplasm of lower third of esophagus ([facility] )  Assessment & Plan  - recently diagnosed with esophageal cancer via EGD biopsy  - follows with Medical Oncology and Surgical Oncology as an outpatient  - does not have a formal treatment plan to date    - will trial on dysphagia 3 diet given his difficulty swallowing  - mealtime supplementations  - outpatient follow-up as previously scheduled      Mild protein-calorie malnutrition ([facility] )  Assessment & Plan  - will provide mealtime supplementation  - nutrition consult          VTE Pharmacologic Prophylaxis:   Subcutaneous Lovenox  Code Status: Level 1 - Full Code   Discussion with family: Patient declined call to   Anticipated Length of Stay: Patient will be admitted on an observation basis with an anticipated length of stay of less than 2 midnights secondary to Ambulatory dysfunction, ankle pain, dysphagia  Total Time for Visit, including Counseling / Coordination of Care: 45 minutes Greater than 50% of this total time spent on direct patient counseling and coordination of care      Chief Complaint:  Difficulty walking, ankle pain, nausea    History of Present Illness:  Sindy Aguero is a 68 y o  male with a PMH of esophageal cancer who presents to the [facility] ER on 02/05/2022 with chief complaint difficulty ambulating due to ankle pain, and nausea/vomiting  Patient recently was diagnosed with esophageal cancer 2 weeks ago and follows with Oncology as an outpatient  Sometime over the last week or so as well he developed severe left ankle pain and has been treated with p o  Keflex as an outpatient for cellulitis  However, he has not been able to keep any of the antibiotics down due to his persistent dysphagia and nausea from his esophageal cancer  He presents to the hospital due to the severity of his ankle pain and inability to ambulate  He has also been unable to keep anything down  In the emergency room workup is essentially unremarkable though given these underlying issues and inability to maintain adequate care at home he has been referred to the hospital service for observation and further management  REVIEW OF SYSTEMS  General Denies fevers or chills  Denies generalized weakness or fatigue  HEENT Denies hearing or vision changes  Cardiovascular Denies chest pain  Denies LE swelling  Denies palpitations  Denies dyspnea on exertion  Respiratory Denies cough  Denies difficulty breathing  Denies shortness of breath  Genitourinary Denies hematuria  Denies dysuria  Denies difficulty voiding  Denies incontinence  Gastrointestinal Denies or diarrhea  Denies hematochezia, melena, or hematemesis  Positive for nausea and vomiting  Positive for weight loss  Positive for difficulty swallowing  Musculoskeletal Denies arthralgias or myalgias  Denies joint swelling  Positive for left ankle pain  Psychiatric  Denies changes in mood  Denies anxiety or depression  Neurologic Denies headache  Denies lightheadedness/dizziness  Denies numbness/tingling  Denies weakness  Endocrine Denies weight loss or weight gain  Denies excessive thirst, sweating, urination         Past Medical and Surgical History:   Past Medical History:   Diagnosis Date    Cancer [facility])     esophageal ca    Pneumonia     Polio     Rib fractures        History reviewed  No pertinent surgical history  Meds/Allergies:  Prior to Admission medications    Medication Sig Start Date End Date Taking? Authorizing Provider   ascorbic acid (VITAMIN C) 500 mg tablet Take 500 mg by mouth daily    Historical Provider, MD   aspirin 325 mg tablet Take 325 mg by mouth 2 (two) times a day    Historical Provider, MD   cephalexin (KEFLEX) 500 mg capsule Take 1 capsule (500 mg total) by mouth every 6 (six) hours for 7 days 2/3/22 2/10/22  MADELINE Holt   Cholecalciferol (VITAMIN D3 PO) Take by mouth    Historical Provider, MD   cyanocobalamin (VITAMIN B-12) 500 MCG tablet Take 500 mcg by mouth daily    Historical Provider, MD   HYDROcodone-acetaminophen (Norco) 5-325 mg per tablet Take 1 tablet by mouth every 6 (six) hours as needed for pain Max Daily Amount: 4 tablets 2/3/22   Perry Sow MD   Multiple Vitamins-Minerals (MULTIVITAMIN WITH MINERALS) tablet Take 1 tablet by mouth daily    Historical Provider, MD   pantoprazole (PROTONIX) 40 mg tablet Take 1 tablet (40 mg total) by mouth daily before breakfast 1/6/22   Ginna Horton MD   triamcinolone (KENALOG) 0 5 % cream Apply topically 2 (two) times a day 1/6/22   Ginna Horton MD     I have reviewed home medications using recent Epic encounter      Allergies: No Known Allergies    Social History:  Marital Status: /Civil Union   Occupation: retired  Patient Pre-hospital Living Situation: Home  Patient Pre-hospital Level of Mobility: walks  Patient Pre-hospital Diet Restrictions: none  Substance Use History:   Social History     Substance and Sexual Activity   Alcohol Use Never    Alcohol/week: 0 0 standard drinks    Comment: 0     Social History     Tobacco Use   Smoking Status Current Every Day Smoker    Packs/day: 1 00    Types: Cigarettes   Smokeless Tobacco Never Used     Social History     Substance and Sexual Activity   Drug Use No       Family History:  Family History   Problem Relation Age of Onset    Dementia Mother     Heart disease Father        Physical Exam:     Vitals:   Blood Pressure: 149/67 (02/05/22 1454)  Pulse: 72 (02/05/22 1454)  Temperature: 98 1 °F (36 7 °C) (02/05/22 1115)  Temp Source: Oral (02/05/22 1115)  Respirations: 17 (02/05/22 1454)  Height: 6' 1" (185 4 cm) (02/05/22 1115)  Weight - Scale: 59 5 kg (131 lb 2 8 oz) (02/05/22 1115)  SpO2: 99 % (02/05/22 1454)    PHYSICAL EXAM:    Vitals signs reviewed  Constitutional   Awake and cooperative  NAD  Frail and ill nourished  Head/Neck   Normocephalic  Atraumatic  HEENT   No scleral icterus  EOMI  Heart   Regular rate and rhythm  No murmers  Lungs   Clear to auscultation bilaterally  Respirations unlaboured  Abdomen   Soft  Nontender  Nondistended  Skin   Skin color normal  No rashes  Extremities   Mild erythema with scab on the medial left ankle  No surrounding swelling  Severe tenderness to palpation in the Achilles region extending over the calcaneus  Neuro   Alert and oriented  No new deficits  Psych   Mood stable   Affect normal          Additional Data:     Lab Results:  Results from last 7 days   Lab Units 02/05/22  1203   WBC Thousand/uL 11 23*   HEMOGLOBIN g/dL 12 9   HEMATOCRIT % 39 8   PLATELETS Thousands/uL 290   NEUTROS PCT % 81*   LYMPHS PCT % 7*   MONOS PCT % 9   EOS PCT % 1     Results from last 7 days   Lab Units 02/05/22  1203   SODIUM mmol/L 141   POTASSIUM mmol/L 3 8   CHLORIDE mmol/L 103   CO2 mmol/L 31   BUN mg/dL 28*   CREATININE mg/dL 1 09   ANION GAP mmol/L 7   CALCIUM mg/dL 9 6   ALBUMIN g/dL 3 7   TOTAL BILIRUBIN mg/dL 0 58   ALK PHOS U/L 66   ALT U/L 14   AST U/L 10   GLUCOSE RANDOM mg/dL 133         Results from last 7 days   Lab Units 02/01/22  0930   POC GLUCOSE mg/dl 141*         Results from last 7 days   Lab Units 02/05/22  1203   LACTIC ACID mmol/L 1 3 Imaging: No pertinent imaging reviewed  XR ankle 3+ views LEFT    (Results Pending)         ** Please Note: This note has been constructed using a voice recognition system   **

## 2022-02-05 NOTE — Clinical Note
Case was discussed with Dr Roldan Joseph and the patient's admission status was agreed to be Admission Status: inpatient status to the service of Dr Roldan Joseph

## 2022-02-06 LAB
ANION GAP SERPL CALCULATED.3IONS-SCNC: 6 MMOL/L (ref 4–13)
BUN SERPL-MCNC: 33 MG/DL (ref 5–25)
CALCIUM SERPL-MCNC: 9.2 MG/DL (ref 8.3–10.1)
CHLORIDE SERPL-SCNC: 104 MMOL/L (ref 100–108)
CO2 SERPL-SCNC: 32 MMOL/L (ref 21–32)
CREAT SERPL-MCNC: 1.12 MG/DL (ref 0.6–1.3)
ERYTHROCYTE [DISTWIDTH] IN BLOOD BY AUTOMATED COUNT: 14.5 % (ref 11.6–15.1)
FLUAV RNA RESP QL NAA+PROBE: NEGATIVE
FLUBV RNA RESP QL NAA+PROBE: NEGATIVE
GFR SERPL CREATININE-BSD FRML MDRD: 63 ML/MIN/1.73SQ M
GLUCOSE SERPL-MCNC: 128 MG/DL (ref 65–140)
HCT VFR BLD AUTO: 37 % (ref 36.5–49.3)
HGB BLD-MCNC: 12 G/DL (ref 12–17)
MCH RBC QN AUTO: 29.4 PG (ref 26.8–34.3)
MCHC RBC AUTO-ENTMCNC: 32.4 G/DL (ref 31.4–37.4)
MCV RBC AUTO: 91 FL (ref 82–98)
PLATELET # BLD AUTO: 281 THOUSANDS/UL (ref 149–390)
PMV BLD AUTO: 11.3 FL (ref 8.9–12.7)
POTASSIUM SERPL-SCNC: 3.7 MMOL/L (ref 3.5–5.3)
RBC # BLD AUTO: 4.08 MILLION/UL (ref 3.88–5.62)
RSV RNA RESP QL NAA+PROBE: NEGATIVE
SARS-COV-2 RNA RESP QL NAA+PROBE: NEGATIVE
SODIUM SERPL-SCNC: 142 MMOL/L (ref 136–145)
WBC # BLD AUTO: 10.08 THOUSAND/UL (ref 4.31–10.16)

## 2022-02-06 PROCEDURE — 99222 1ST HOSP IP/OBS MODERATE 55: CPT | Performed by: NURSE PRACTITIONER

## 2022-02-06 PROCEDURE — 85027 COMPLETE CBC AUTOMATED: CPT | Performed by: INTERNAL MEDICINE

## 2022-02-06 PROCEDURE — C9113 INJ PANTOPRAZOLE SODIUM, VIA: HCPCS | Performed by: NURSE PRACTITIONER

## 2022-02-06 PROCEDURE — 0241U HB NFCT DS VIR RESP RNA 4 TRGT: CPT | Performed by: PHYSICIAN ASSISTANT

## 2022-02-06 PROCEDURE — 99232 SBSQ HOSP IP/OBS MODERATE 35: CPT | Performed by: NURSE PRACTITIONER

## 2022-02-06 PROCEDURE — 99223 1ST HOSP IP/OBS HIGH 75: CPT | Performed by: INTERNAL MEDICINE

## 2022-02-06 PROCEDURE — 36415 COLL VENOUS BLD VENIPUNCTURE: CPT | Performed by: INTERNAL MEDICINE

## 2022-02-06 PROCEDURE — 80048 BASIC METABOLIC PNL TOTAL CA: CPT | Performed by: INTERNAL MEDICINE

## 2022-02-06 RX ORDER — PANTOPRAZOLE SODIUM 40 MG/1
40 INJECTION, POWDER, FOR SOLUTION INTRAVENOUS EVERY 12 HOURS SCHEDULED
Status: DISCONTINUED | OUTPATIENT
Start: 2022-02-06 | End: 2022-02-12 | Stop reason: HOSPADM

## 2022-02-06 RX ADMIN — CEFAZOLIN SODIUM 2000 MG: 2 SOLUTION INTRAVENOUS at 01:12

## 2022-02-06 RX ADMIN — PANTOPRAZOLE SODIUM 40 MG: 40 INJECTION, POWDER, FOR SOLUTION INTRAVENOUS at 13:23

## 2022-02-06 RX ADMIN — PANTOPRAZOLE SODIUM 40 MG: 40 INJECTION, POWDER, FOR SOLUTION INTRAVENOUS at 20:00

## 2022-02-06 RX ADMIN — CEFAZOLIN SODIUM 2000 MG: 2 SOLUTION INTRAVENOUS at 10:04

## 2022-02-06 RX ADMIN — ONDANSETRON 4 MG: 2 INJECTION INTRAMUSCULAR; INTRAVENOUS at 13:28

## 2022-02-06 RX ADMIN — OXYCODONE HYDROCHLORIDE 5 MG: 5 SOLUTION ORAL at 23:54

## 2022-02-06 RX ADMIN — ENOXAPARIN SODIUM 40 MG: 40 INJECTION SUBCUTANEOUS at 10:04

## 2022-02-06 NOTE — ASSESSMENT & PLAN NOTE
Background:  Presented to the ED with worsening left ankle pain that he had been taking Keflex PO as an outpatient for however unable to keep it down  · Initially with IV cefazolin; discontinued on 02/06  · XR without osseous abnormality  · Podiatry consulted; input as noted below  · Pain and discoloration of thought to be secondary to vascular etiology  · LEADS ordered  · Vascular surgery consulted  · Do not feel Infectious  · PT/OT consulted; highly suspect will require rehab stay

## 2022-02-06 NOTE — ASSESSMENT & PLAN NOTE
68-year-old male, smoker, with recently diagnosed esophageal cancer, protein-calorie malnutrition and h/o polio presented with c/o N/V and LLE ankle pain (worse with applied pressure and standing) w/ decreased ROM  Vascular was consulted for evaluation and recommendations  Diagnostics:  - LEAD- ordered  - x-ray 2/5/22: No acute osseous abnormality    Plan:  - LLE ankle pain (worse when standing/ applied pressure) with medial, stable, superficial wound  Left foot is warm, perfused with diminished range of motion, and normal sensation w/ biphasic PT/ DP Doppler pulses     - LEAD ordered  - consider musculoskeletal/ortho Achilles involvement  - consider ASA/statin for optimal management of PAD   - formal plan/recommendations to follow s/p imaging  - will d/w Dr Jade Henry

## 2022-02-06 NOTE — CONSULTS
Consultation -  [facility]  Blossom Harris 68 y o  male MRN: [id number]  Unit/Bed#: ED 22 Encounter: [id number]         Reason for Consult / Principal Problem:  Newly diagnosed esophageal cancer, regurgitation of food    HPI:  Bhumi Mcintyre is a 63-year-old male with recently diagnosed esophageal adenocarcinoma  Patient presents to the hospital for continue regurgitation of food and liquid  Patient was diagnosed with esophageal malignancy in January after he had EGD with Dr Glynn Cole revealing a distal esophageal mass measuring 70 mm, with additional 3 fungating masses in the cardia covering 1/3 of the circumference  Biopsies revealing moderate to poorly differentiated adenocarcinoma  Patient then saw Dr Ilda العراقي last week for consultation  He suggested possibility of G-tube with chemo/radiation prior to considering esophagectomy  Patient reports that his 2 daughters are his support system after his wife passed away  Patient seen examined the bedside, and was regurgitating up scrambled eggs  He reports that he does better with liquid consistencies like cream of wheat and juice  Review of Systems:    CONSTITUTIONAL: Denies any fever, chills, or rigors  Good appetite, and no recent weight loss  HEENT: No earache or tinnitus  Denies hearing loss or visual disturbances  CARDIOVASCULAR: No chest pain or palpitations  RESPIRATORY: Denies any cough, hemoptysis, shortness of breath or dyspnea on exertion  GASTROINTESTINAL: As noted in the History of Present Illness  GENITOURINARY: No problems with urination  Denies any hematuria or dysuria  NEUROLOGIC: No dizziness or vertigo, denies headaches  MUSCULOSKELETAL: Denies any muscle or joint pain  SKIN: Denies skin rashes or itching  ENDOCRINE: Denies excessive thirst  Denies intolerance to heat or cold  PSYCHOSOCIAL: Denies depression or anxiety  Denies any recent memory loss         Historical Information   Past Medical History:   Diagnosis Date    Cancer [facility])     esophageal ca    Pneumonia     Polio     Rib fractures      History reviewed  No pertinent surgical history  Social History   Social History     Substance and Sexual Activity   Alcohol Use Never    Alcohol/week: 0 0 standard drinks    Comment: 0     Social History     Substance and Sexual Activity   Drug Use No     Social History     Tobacco Use   Smoking Status Current Every Day Smoker    Packs/day: 1 00    Types: Cigarettes   Smokeless Tobacco Never Used     Family History   Problem Relation Age of Onset    Dementia Mother     Heart disease Father         Meds/Allergies     Current Facility-Administered Medications   Medication Dose Route Frequency    acetaminophen (TYLENOL) tablet 650 mg  650 mg Oral Q6H PRN    ceFAZolin (ANCEF) IVPB (premix in dextrose) 2,000 mg 50 mL  2,000 mg Intravenous Q8H    enoxaparin (LOVENOX) subcutaneous injection 40 mg  40 mg Subcutaneous Daily    ondansetron (ZOFRAN) injection 4 mg  4 mg Intravenous Q6H PRN    oxyCODONE (ROXICODONE) oral solution 5 mg  5 mg Oral Q4H PRN       No Known Allergies      Objective     Blood pressure 135/65, pulse 68, temperature 98 1 °F (36 7 °C), temperature source Oral, resp  rate 15, height 6' 1" (1 854 m), weight 59 5 kg (131 lb 2 8 oz), SpO2 99 %  Intake/Output Summary (Last 24 hours) at 2/6/2022 1015  Last data filed at 2/5/2022 2022  Gross per 24 hour   Intake 1050 ml   Output --   Net 1050 ml         PHYSICAL EXAM:      General Appearance:   Alert and oriented x 3  Cooperative, and in no respiratory distress  Cachectic appearing  HEENT:   Normocephalic, atraumatic, anicteric      Neck:  Supple, symmetrical, trachea midline   Lungs:   Clear to auscultation bilaterally; no rales, rhonchi or wheezing; respirations unlabored    Heart[de-identified]   S1 and S2 normal; regular rate and rhythm; no murmur, rub, or gallop     Abdomen:   Soft, non-tender, non-distended; normal bowel sounds; no masses, no organomegaly  Genitalia:   Deferred    Rectal:   Deferred    Extremities:  No cyanosis, clubbing or edema    Pulses:  2+ and symmetric all extremities    Skin:  Skin color, texture, turgor normal, no rashes or lesions    Lymph nodes:  No palpable cervical or supraclavicular lymphadenopathy        Lab Results:   Results from last 7 days   Lab Units 02/06/22  0509 02/05/22  1203 02/05/22  1203   WBC Thousand/uL 10 08   < > 11 23*   HEMOGLOBIN g/dL 12 0   < > 12 9   HEMATOCRIT % 37 0   < > 39 8   PLATELETS Thousands/uL 281   < > 290   NEUTROS PCT %  --   --  81*   LYMPHS PCT %  --   --  7*   MONOS PCT %  --   --  9   EOS PCT %  --   --  1    < > = values in this interval not displayed  Results from last 7 days   Lab Units 02/06/22  0509 02/05/22  1203 02/05/22  1203   POTASSIUM mmol/L 3 7   < > 3 8   CHLORIDE mmol/L 104   < > 103   CO2 mmol/L 32   < > 31   BUN mg/dL 33*   < > 28*   CREATININE mg/dL 1 12   < > 1 09   CALCIUM mg/dL 9 2   < > 9 6   ALK PHOS U/L  --   --  66   ALT U/L  --   --  14   AST U/L  --   --  10    < > = values in this interval not displayed  Imaging Studies: I have personally reviewed pertinent imaging studies  XR ankle 3+ views LEFT    Result Date: 2/6/2022  Impression: No acute osseous abnormality  Workstation performed: [id number]       ASSESSMENT and PLAN:      1) Esophageal adenocarcinoma with additional masses in the cardia the stomach, regurgitation of food and failure to thrive - Patient had initial consultation with Dr Marlen Arriaga  Patient to see medical oncology to determine plans for chemo and radiation  Patient continues to regurgitate soft consistencies of food     - Patient cannot have PEG tube given history of masses in the cardia and plans for a ventral esophagectomy, therefore will speak with the patient regarding J-tube placement which can be done as early as tomorrow with Dr Damián Leger  - Liquid diet  - Would recommend patient remain upright, at least at a 45 degree angle even at night  - Continue follow-up with Surgical Oncology and Medical Oncology    The patient was seen and examined by Dr Haydee Buchanan, all mcdaniel medical decisions were made with Dr Haydee Buchanan  Thank you for allowing us to participate in the care of this pleasant patient  We will follow up with you closely

## 2022-02-06 NOTE — ASSESSMENT & PLAN NOTE
· BMI noted to be 17 31  · Supplementation ordered on admission  · Nutrition consulted; awaiting formal evaluation

## 2022-02-06 NOTE — CONSULTS
[address] 1945, 68 y o  male MRN: [id number]  Unit/Bed#: ED 22 Encounter: [id number]  Primary Care Provider: Jacki Da Silva MD   Date and time admitted to hospital: 2/5/2022 11:08 AM    Inpatient consult to Vascular Surgery  Consult performed by: MADELINE Lozada  Consult ordered by: Lois Colin DPM      Current every day smoker  Assessment & Plan  - current 1 5pk/day smoker  -discussed the pathophysiology and relationship of smoking and arterial occlusive disease  -encourage smoking cessation      * Left ankle pain  Assessment & Plan  51-year-old male, smoker, with recently diagnosed esophageal cancer, protein-calorie malnutrition and h/o polio presented with c/o N/V and LLE ankle pain (worse with applied pressure and standing) w/ decreased ROM  Vascular was consulted for evaluation and recommendations  Diagnostics:  - LEAD- ordered  - x-ray 2/5/22: No acute osseous abnormality    Plan:  - LLE ankle pain (worse when standing/ applied pressure) with medial, stable, superficial wound  Left foot is warm, perfused with diminished range of motion, and normal sensation w/ biphasic PT/ DP Doppler pulses  - LEAD ordered  - consider musculoskeletal/ortho Achilles involvement  - consider ASA/statin for optimal management of PAD   - formal plan/recommendations to follow s/p imaging  - will d/w Dr Kaye Smith        Consulting Service: Podiatry    Chief Complaint:  LLE ankle pain with wound and decreased ROM    HPI: Lizzie Bassett is a 68 y o  male who presents with complaints of left ankle/foot pain when standing and bearing weight  Patient reports this began few days ago  Patient was treated for cellulitis with Keflex as an outpatient  He denies claudication or rest pain  Patient has superficial stable wound to medial left ankle after scratching"  Biphasic Doppler PT/DP pulses bilaterally    Patient has foot contracture/hammertoes with remote history of polio  Patient was recently diagnosed with esophageal cancer and reports he has had about a 30 lb weight loss since Thanksgiving  He is a current everyday 1 5 pack/day smoker  On exam there is not concern for CLI, left foot is warm and perfused with full sensation  Patient has diminished range of motion/flexation at level of ankle  He reports pain from Achilles tendon to toes  We discussed pathophysiology of peripheral arterial disease  There is an arterial duplex ordered by Podiatry which is pending  Will make formal recommendations after imaging  Patient is without questions  Recommend ortho evaluation for musculoskeletal/Achilles etiology pending review of lower extremity arterial duplex  Review of Systems:  General: negative  Cardiovascular: no chest pain or dyspnea on exertion  Respiratory: no cough, shortness of breath, or wheezing  Gastrointestinal: positive for - appetite loss, nausea/vomiting and swallowing difficulty/pain  Genitourinary ROS: no dysuria, trouble voiding, or hematuria  Musculoskeletal ROS: positive for - gait disturbance, joint pain and pain in ankle - left  Neurological ROS: no TIA or stroke symptoms  Hematological and Lymphatic ROS: negative  Dermatological ROS: Venous stasis changes, left medial ankle wound  Psychological ROS: negative  Ophthalmic ROS: negative  ENT ROS: negative    Past Medical History:  Past Medical History:   Diagnosis Date    [facility]     es[facility]    Pneumonia     Polio     Rib fractures        Past Surgical History:  History reviewed  No pertinent surgical history      Social History:  Social History     Substance and Sexual Activity   Alcohol Use Never    Alcohol/week: 0 0 standard drinks    Comment: 0     Social History     Substance and Sexual Activity   Drug Use No     Social History     Tobacco Use   Smoking Status Current Every Day Smoker    Packs/day: 1 00    Types: Cigarettes   Smokeless Tobacco Never Used       Family History:  Family History   Problem Relation Age of Onset    Dementia Mother     Heart disease Father        Allergies:  No Known Allergies    Medications:  Current Facility-Administered Medications   Medication Dose Route Frequency    acetaminophen (TYLENOL) tablet 650 mg  650 mg Oral Q6H PRN    enoxaparin (LOVENOX) subcutaneous injection 40 mg  40 mg Subcutaneous Daily    ondansetron (ZOFRAN) injection 4 mg  4 mg Intravenous Q6H PRN    oxyCODONE (ROXICODONE) oral solution 5 mg  5 mg Oral Q4H PRN    pantoprazole (PROTONIX) injection 40 mg  40 mg Intravenous Q12H [address]       Vitals:  /65 (BP Location: Right arm)   Pulse 68   Temp 98 1 °F (36 7 °C) (Oral)   Resp 15   Ht 6' 1" (1 854 m)   Wt 59 5 kg (131 lb 2 8 oz)   SpO2 99%   BMI 17 31 kg/m²     I/Os:  I/O last 24 hours:   In: 1100 [IV Piggyback:1100]  Out: -     Lab Results and Cultures:   Lab Results   Component Value Date    WBC 10 08 02/06/2022    HGB 12 0 02/06/2022    HCT 37 0 02/06/2022    MCV 91 02/06/2022     02/06/2022     Lab Results   Component Value Date    CALCIUM 9 2 02/06/2022    K 3 7 02/06/2022    CO2 32 02/06/2022     02/06/2022    BUN 33 (H) 02/06/2022    CREATININE 1 12 02/06/2022     Lab Results   Component Value Date    INR 1 04 10/10/2019    PROTIME 13 6 10/10/2019       Lipid Panel: No results found for: CHOL,     Blood Culture: No results found for: BLOODCX,   Urinalysis: No results found for: Verta Wilkins, SPECGRAV, PHUR, LEUKOCYTESUR, NITRITE, PROTEINUA, GLUCOSEU, KETONESU, BILIRUBINUR, BLOODU,   Urine Culture: No results found for: URINECX,   Wound Culure: No results found for: WOUNDCULT    Imaging:  As above    Physical Exam:    General appearance: alert and oriented, in no acute distress , frail, weight loss cachectic,   Skin: Skin color, texture, turgor normal  No rashes or lesions or Left medial ankle wound  Neurologic: Mental status: Alert, oriented, thought content appropriate  Head: Normocephalic, without obvious abnormality, atraumatic  Lungs: clear to auscultation bilaterally  Chest wall: no tenderness  Heart: regular rate and rhythm, S1, S2 normal, no murmur, click, rub or gallop  Abdomen: soft, non-tender; bowel sounds normal; no masses,  no organomegaly  Extremities: extremities normal, warm and well-perfused; no cyanosis, clubbing, or edema and Contractures/hammertoes to bilateral feet  Left medial ankle with superficial wound    Left shoulder diminished range of motion the 2/2 polio   ROM/ankle flex motor diminished, sensory intact    Wound/Incision:    L medial ankle        Pulse exam:  Radial: Right: 2+ Left[de-identified] 2+  Femoral: Right: 2+ Left: 2+  DP: Right: doppler signal Left: doppler signal  PT: Right: doppler signal Left: doppler signal  Biphasic DP/PT Doppler signals    MADELINE Padilla  2/6/2022  [facility], [phone]

## 2022-02-06 NOTE — ASSESSMENT & PLAN NOTE
· Recently diagnosed with esophageal cancer via EGD biopsy  · Follows with Medical Oncology and Surgical Oncology OP;  Does not have a formal treatment plan to date  · With persistent dysphagia and ongoing emesis and the inability to hold down much nutrition at all  · Gastroenterology consulted; input as noted  · NPO at midnight  · Plan for EGD enteroscopy with J-tube placement on 02/07  · Liquid diet for now  · Nutritional consultation for initiation of tube feedings

## 2022-02-06 NOTE — PROGRESS NOTES
[facility]  Progress Note - Darrell Keen 1945, 68 y o  male MRN: [id number]  Unit/Bed#: ED 22 Encounter: [id number]  Primary Care Provider: Artis Leon MD   Date and time admitted to hospital: 2/5/2022 11:08 AM    * Left ankle pain  Assessment & Plan  Background:  Presented to the ED with worsening left ankle pain that he had been taking Keflex PO as an outpatient for however unable to keep it down  · Initially with IV cefazolin; discontinued on 02/06  · XR without osseous abnormality  · Podiatry consulted; input as noted below  · Pain and discoloration of thought to be secondary to vascular etiology  · LEADS ordered  · Vascular surgery consulted  · Do not feel Infectious  · PT/OT consulted; highly suspect will require rehab stay    Malignant neoplasm of lower third of esophagus ([facility] [address] )  Assessment & Plan  · Recently diagnosed with esophageal cancer via EGD biopsy  · Follows with Medical Oncology and Surgical Oncology OP; Does not have a formal treatment plan to date  · With persistent dysphagia and ongoing emesis and the inability to hold down much nutrition at all  · Gastroenterology consulted; input as noted  · NPO at midnight  · Plan for EGD enteroscopy with J-tube placement on 02/07  · Liquid diet for now  · Nutritional consultation for initiation of tube feedings    Mild protein-calorie malnutrition ([address] )  Assessment & Plan  · BMI noted to be 17 31  · Supplementation ordered on admission  · Nutrition consulted; awaiting formal evaluation       VTE Pharmacologic Prophylaxis:   Moderate Risk (Score 3-4) - Pharmacological DVT Prophylaxis Ordered: enoxaparin (Lovenox)  Patient Centered Rounds: I performed bedside rounds with nursing staff today  Discussions with Specialists or Other Care Team Provider:  Nursing staff, case management, Gastroenterology, and Podiatry    Education and Discussions with Family / Patient: Updated  (daughter) via phone      Time Spent for Care: 30 minutes  More than 50% of total time spent on counseling and coordination of care as described above  Current Length of Stay: 0 day(s)  Current Patient Status: Observation   Certification Statement: The patient will continue to require additional inpatient hospital stay due to Need for surgical intervention on  as well as ongoing vascular workup as above  Discharge Plan: Anticipate discharge in 48-72 hrs to Pending therapy evaluations however highly suspect will require rehab    Code Status: Level 1 - Full Code    Subjective:   Patient reporting ongoing inability to hold anything down however denies any abdominal pain  Reported profound left ankle pain and could barely tolerate mild palpation  Objective:     Vitals:   Temp (24hrs), Av 1 °F (36 7 °C), Min:98 1 °F (36 7 °C), Max:98 1 °F (36 7 °C)    Temp:  [98 1 °F (36 7 °C)] 98 1 °F (36 7 °C)  HR:  [68-84] 68  Resp:  [15-17] 15  BP: (135-161)/(65-68) 135/65  SpO2:  [99 %-100 %] 99 %  Body mass index is 17 31 kg/m²  Input and Output Summary (last 24 hours): Intake/Output Summary (Last 24 hours) at 2022 1114  Last data filed at 2022 1034  Gross per 24 hour   Intake 1100 ml   Output --   Net 1100 ml       Physical Exam:   Physical Exam  Constitutional:       Appearance: He is underweight  He is ill-appearing  Cardiovascular:      Rate and Rhythm: Normal rate and regular rhythm  Pulses: Normal pulses  Heart sounds: Normal heart sounds  Pulmonary:      Effort: Pulmonary effort is normal       Breath sounds: Normal breath sounds  Abdominal:      General: Bowel sounds are normal  There is no distension  Palpations: Abdomen is soft  Tenderness: There is no abdominal tenderness  Musculoskeletal:         General: Tenderness present  No swelling  Skin:     General: Skin is warm and dry  Capillary Refill: Capillary refill takes 2 to 3 seconds     Neurological:      Mental Status: He is alert and oriented to person, place, and time  Psychiatric:         Mood and Affect: Mood normal          Behavior: Behavior normal  Behavior is cooperative  Additional Data:     Labs:  Results from last 7 days   Lab Units 02/06/22  0509 02/05/22  1203 02/05/22  1203   WBC Thousand/uL 10 08   < > 11 23*   HEMOGLOBIN g/dL 12 0   < > 12 9   HEMATOCRIT % 37 0   < > 39 8   PLATELETS Thousands/uL 281   < > 290   NEUTROS PCT %  --   --  81*   LYMPHS PCT %  --   --  7*   MONOS PCT %  --   --  9   EOS PCT %  --   --  1    < > = values in this interval not displayed  Results from last 7 days   Lab Units 02/06/22  0509 02/05/22  1203 02/05/22  1203   SODIUM mmol/L 142   < > 141   POTASSIUM mmol/L 3 7   < > 3 8   CHLORIDE mmol/L 104   < > 103   CO2 mmol/L 32   < > 31   BUN mg/dL 33*   < > 28*   CREATININE mg/dL 1 12   < > 1 09   ANION GAP mmol/L 6   < > 7   CALCIUM mg/dL 9 2   < > 9 6   ALBUMIN g/dL  --   --  3 7   TOTAL BILIRUBIN mg/dL  --   --  0 58   ALK PHOS U/L  --   --  66   ALT U/L  --   --  14   AST U/L  --   --  10   GLUCOSE RANDOM mg/dL 128   < > 133    < > = values in this interval not displayed           Results from last 7 days   Lab Units 02/01/22  0930   POC GLUCOSE mg/dl 141*         Results from last 7 days   Lab Units 02/05/22  1203   LACTIC ACID mmol/L 1 3       Lines/Drains:  Invasive Devices  Report    Peripheral Intravenous Line            Peripheral IV 02/05/22 Right Antecubital <1 day              Imaging: Reviewed radiology reports from this admission including: xray(s)    Recent Cultures (last 7 days):         Last 24 Hours Medication List:   Current Facility-Administered Medications   Medication Dose Route Frequency Provider Last Rate    acetaminophen  650 mg Oral Q6H PRN Marija Asper Starsinic, DO      enoxaparin  40 mg Subcutaneous Daily Marija Asper Starsinic, DO      ondansetron  4 mg Intravenous Q6H PRN Marija Asper Starsinic, DO      oxyCODONE  5 mg Oral Q4H PRN Marija Asper Starsinic, DO Today, Patient Was Seen By: MADELINE Gunter    **Please Note: This note may have been constructed using a voice recognition system  **

## 2022-02-06 NOTE — PLAN OF CARE
Problem: PAIN - ADULT  Goal: Verbalizes/displays adequate comfort level or baseline comfort level  Description: Interventions:  - Encourage patient to monitor pain and request assistance  - Assess pain using appropriate pain scale  - Administer analgesics based on type and severity of pain and evaluate response  - Implement non-pharmacological measures as appropriate and evaluate response  - Consider cultural and social influences on pain and pain management  - Notify physician/advanced practitioner if interventions unsuccessful or patient reports new pain  Outcome: Progressing     Problem: INFECTION - ADULT  Goal: Absence or prevention of progression during hospitalization  Description: INTERVENTIONS:  - Assess and monitor for signs and symptoms of infection  - Monitor lab/diagnostic results  - Monitor all insertion sites, i e  indwelling lines, tubes, and drains  - Monitor endotracheal if appropriate and nasal secretions for changes in amount and color  - [place] appropriate cooling/warming therapies per order  - Administer medications as ordered  - Instruct and encourage patient and family to use good hand hygiene technique  - Identify and instruct in appropriate isolation precautions for identified infection/condition  Outcome: Progressing  Goal: Absence of fever/infection during neutropenic period  Description: INTERVENTIONS:  - Monitor WBC    Outcome: Progressing

## 2022-02-06 NOTE — UTILIZATION REVIEW
Initial Clinical Review    Admission: Date/Time/Statement:   Admission Orders (From admission, onward)     Ordered        02/05/22 1636  Place in Observation  Once                      02/06/22 1119  Inpatient Admission  Once        Transfer Service: Hospitalist       Question Answer Comment   Level of Care Med Surg    Estimated length of stay More than 2 Midnights    Certification I certify that inpatient services are medically necessary for this patient for a duration of greater than two midnights  See H&P and MD Progress Notes for additional information about the patient's course of treatment  02/06/22 1119   OBSERVATION 2/5  @  1637 CHANGED TO IP ADMISSION 2/6  @  1119   The patient will continue to require additional inpatient hospital stay due to Need for surgical intervention on 02/07 as well as ongoing vascular workup as above    ED Arrival Information     Expected Arrival Acuity    - 2/5/2022 11:08 Urgent         Means of arrival Escorted by Service Admission type    Ambulance [address]  [facility] Hospitalist Urgent         Arrival complaint    weakness,failure to thrive        Chief Complaint   Patient presents with    Weakness - Generalized     pt c/o wekaness and nausea that started a few days ago, pt was dx with esophageal cancer 2 weeks ago    Cellulitis     pt also c/o cellulitis on left leg        Initial Presentation: 68  Y O male presents to ED via  EMS from home with difficulty ambulating due to ankle pain, nausea and vomiting  Pain started abou 1 week ago, treated Op with po antibiotics for cellulitis  Recently diagnosed with esophageal cancer, has not been able to keep  meds down   Due to persistent dysphagia and nausea  Now unable to walk or keep any po down  ED work up essentially unremarkable  Admit  Observation with Left ankle pain and plan is  PT/OT, JATINDER for now, monitor labs and temps and podiatry consult           ED Triage Vitals   Temperature Pulse Respirations Blood Pressure SpO2   02/05/22 1115 02/05/22 1115 02/05/22 1115 02/05/22 1115 02/05/22 1115   98 1 °F (36 7 °C) 84 17 161/68 100 %      Temp Source Heart Rate Source Patient Position - Orthostatic VS BP Location FiO2 (%)   02/05/22 1115 02/05/22 1115 02/05/22 1115 02/05/22 1115 --   Oral Monitor Sitting Right arm       Pain Score       02/05/22 1451       8          Wt Readings from Last 1 Encounters:   02/05/22 59 5 kg (131 lb 2 8 oz)     Additional Vital Signs:    Temp Pulse Resp BP SpO2 O2 Device Patient Position - Orthostatic VS   02/05/22 2025 -- 68 15 135/65 99 % None (Room air) Sitting   02/05/22 1454 -- 72 17 149/67 99 % None (Room air) Sitting   02/05/22 1115 98 1 °F (36 7 °C) 84 17 161/68 100 % None (Room air) Sitting       Pertinent Labs/Diagnostic Test Results:       Results from last 7 days   Lab Units 02/06/22  0509 02/05/22  1203   WBC Thousand/uL 10 08 11 23*   HEMOGLOBIN g/dL 12 0 12 9   HEMATOCRIT % 37 0 39 8   PLATELETS Thousands/uL 281 290   NEUTROS ABS Thousands/µL  --  9 18*         Results from last 7 days   Lab Units 02/06/22  0509 02/05/22  1203   SODIUM mmol/L 142 141   POTASSIUM mmol/L 3 7 3 8   CHLORIDE mmol/L 104 103   CO2 mmol/L 32 31   ANION GAP mmol/L 6 7   BUN mg/dL 33* 28*   CREATININE mg/dL 1 12 1 09   EGFR ml/min/1 73sq m 63 65   CALCIUM mg/dL 9 2 9 6   MAGNESIUM mg/dL  --  2 3     Results from last 7 days   Lab Units 02/05/22  1203   AST U/L 10   ALT U/L 14   ALK PHOS U/L 66   TOTAL PROTEIN g/dL 7 5   ALBUMIN g/dL 3 7   TOTAL BILIRUBIN mg/dL 0 58   BILIRUBIN DIRECT mg/dL 0 14       Results from last 7 days   Lab Units 02/06/22  0509 02/05/22  1203   GLUCOSE RANDOM mg/dL 128 133           Results from last 7 days   Lab Units 02/05/22  1203   LACTIC ACID mmol/L 1 3                 ED Treatment:   Medication Administration from 02/05/2022 1108 to 02/06/2022 0905       Date/Time Order Dose Route Action Comments     02/05/2022 1202 ondansetron (ZOFRAN) injection 4 mg 4 mg Intravenous Given 02/05/2022 1451 lactated ringers bolus 1,000 mL 0 mL Intravenous Stopped      02/05/2022 1201 lactated ringers bolus 1,000 mL 1,000 mL Intravenous New Bag      02/05/2022 1451 ketorolac (TORADOL) injection 15 mg 15 mg Intravenous Given      02/05/2022 1640 ondansetron (ZOFRAN) injection 4 mg 4 mg Intravenous Given      02/05/2022 2035 ondansetron (ZOFRAN) injection 4 mg 4 mg Intravenous Given      02/06/2022 0112 ceFAZolin (ANCEF) IVPB (premix in dextrose) 2,000 mg 50 mL 2,000 mg Intravenous New Bag      02/05/2022 2022 ceFAZolin (ANCEF) IVPB (premix in dextrose) 2,000 mg 50 mL 0 mg Intravenous Stopped      02/05/2022 1808 ceFAZolin (ANCEF) IVPB (premix in dextrose) 2,000 mg 50 mL 2,000 mg Intravenous New Bag         Present on Admission:   Mild protein-calorie malnutrition ([address] )   Malignant neoplasm of lower third of esophagus ([facility] [address] )   Left ankle pain      Admitting Diagnosis: Weakness [R53 1]  Failure to thrive in adult [R62 7]  Age/Sex: 68 y o  male  Admission Orders:  Scheduled Medications:  cefazolin, 2,000 mg, Intravenous, Q8H  enoxaparin, 40 mg, Subcutaneous, Daily      Continuous IV Infusions:     PRN Meds:  acetaminophen, 650 mg, Oral, Q6H PRN  ondansetron, 4 mg, Intravenous, Q6H PRN  oxyCODONE, 5 mg, Oral, Q4H PRN        IP CONSULT TO PODIATRY    Network Utilization Review Department  ATTENTION: Please call with any questions or concerns to [phone] and carefully listen to the prompts so that you are directed to the right person  All voicemails are confidential   Meseret Justice all requests for admission clinical reviews, approved or denied determinations and any other requests to dedicated fax number below belonging to the campus where the patient is receiving treatment   List of dedicated fax numbers for the Facilities:  [address] DENIALS (Administrative/Medical Necessity) [phone]   [address] (Maternity/NICU/Pediatrics) [phone] 5000 [facility] Glenn Lawson [phone]   [address]  [phone]   [address] [facility] [address] [address] [place] [address] [address] Sánchez [address] [phone]   [address] [phone]

## 2022-02-06 NOTE — CONSULTS
Consultation - Wilberto Crawford 68 y o  male MRN: [id number]    Unit/Bed#: ED 22 Encounter: [id number]      Assessment/Plan     Assessment:  - Pain in the left ankle  - PAD  - Stable open lesion to the left ankle medially    Plan:  - Discussed with the patient that I believe that his pain is secondary to a Vascular issue, no pulses and color change with severe pain can alert a blockage either venous or arterial  - LEADs pending  - Vascular consult is ordered  - Reviewed Xrays, no osseous abnormality  - Discussed with Medicine  - Will follow     History of Present Illness     HPI: Wilberto Crawford is a 68y o  year old male who presents with pain in the left ankle  He states that he thought it was a rash as it was itchy  He scratched open two areas on the inner ankle  He saw his primary care who thought it might be dermatitis secondary to the rash  He saw Urgent care who thought it might be cellulitis  He was placed on antibiotics that made him vomit so he presented here  Consults    Review of Systems   No n/v/f/c/s  No dysuria  No CP  No SOB    Historical Information   Past Medical History:   Diagnosis Date    Cancer ([facility] [address] )     esophageal ca    Pneumonia     Polio     Rib fractures      History reviewed  No pertinent surgical history    Social History   Social History     Substance and Sexual Activity   Alcohol Use Never    Alcohol/week: 0 0 standard drinks    Comment: 0     Social History     Substance and Sexual Activity   Drug Use No     Social History     Tobacco Use   Smoking Status Current Every Day Smoker    Packs/day: 1 00    Types: Cigarettes   Smokeless Tobacco Never Used     E-Cigarette/Vaping    E-Cigarette Use Never User      E-Cigarette/Vaping Substances    Nicotine No     THC No     CBD No     Flavoring No     Other No     Unknown No       Family History:   Family History   Problem Relation Age of Onset    Dementia Mother     Heart disease Father        Meds/Allergies   all current active meds have been reviewed, current meds:   Current Facility-Administered Medications   Medication Dose Route Frequency    acetaminophen (TYLENOL) tablet 650 mg  650 mg Oral Q6H PRN    ceFAZolin (ANCEF) IVPB (premix in dextrose) 2,000 mg 50 mL  2,000 mg Intravenous Q8H    enoxaparin (LOVENOX) subcutaneous injection 40 mg  40 mg Subcutaneous Daily    ondansetron (ZOFRAN) injection 4 mg  4 mg Intravenous Q6H PRN    oxyCODONE (ROXICODONE) oral solution 5 mg  5 mg Oral Q4H PRN    and PTA meds:   Prior to Admission Medications   Prescriptions Last Dose Informant Patient Reported? Taking? Cholecalciferol (VITAMIN D3 PO)  Self Yes No   Sig: Take by mouth   HYDROcodone-acetaminophen (Norco) 5-325 mg per tablet   No No   Sig: Take 1 tablet by mouth every 6 (six) hours as needed for pain Max Daily Amount: 4 tablets   Multiple Vitamins-Minerals (MULTIVITAMIN WITH MINERALS) tablet  Self Yes No   Sig: Take 1 tablet by mouth daily   ascorbic acid (VITAMIN C) 500 mg tablet  Self Yes No   Sig: Take 500 mg by mouth daily   aspirin 325 mg tablet  Self Yes No   Sig: Take 325 mg by mouth 2 (two) times a day   cephalexin (KEFLEX) 500 mg capsule   No No   Sig: Take 1 capsule (500 mg total) by mouth every 6 (six) hours for 7 days   cyanocobalamin (VITAMIN B-12) 500 MCG tablet  Self Yes No   Sig: Take 500 mcg by mouth daily   pantoprazole (PROTONIX) 40 mg tablet  Self No No   Sig: Take 1 tablet (40 mg total) by mouth daily before breakfast   triamcinolone (KENALOG) 0 5 % cream  Self No No   Sig: Apply topically 2 (two) times a day      Facility-Administered Medications: None     No Known Allergies    Objective   Vitals: Blood pressure 135/65, pulse 68, temperature 98 1 °F (36 7 °C), temperature source Oral, resp  rate 15, height 6' 1" (1 854 m), weight 59 5 kg (131 lb 2 8 oz), SpO2 99 %      Intake/Output Summary (Last 24 hours) at 2/6/2022 1034  Last data filed at 2/5/2022 2022  Gross per 24 hour   Intake 1050 ml   Output -- Net 1050 ml     Invasive Devices  Report    Peripheral Intravenous Line            Peripheral IV 02/05/22 Right Antecubital <1 day                Physical Exam  Pulses are nonpalpable, no pedal hair, thin shiny skin, color change, darkening to the left LE, open lesions stable x 2 to the left ankle medially at the posterior triangle of the achilles, no purulence, no fluctuence, no probing, no tracking, no erythema, POP to the left ankle, no babinski, sensation is intact       XRAY  Narrative & Impression   LEFT ANKLE     INDICATION:   pain, decreased ROM      COMPARISON:  None     VIEWS:  XR ANKLE 3+ VW LEFT         FINDINGS:     There is no acute fracture or dislocation      No significant degenerative changes      No lytic or blastic osseous lesion      Soft tissues are unremarkable      IMPRESSION:     No acute osseous abnormality

## 2022-02-06 NOTE — ASSESSMENT & PLAN NOTE
- current 1 5pk/day smoker  -discussed the pathophysiology and relationship of smoking and arterial occlusive disease  -encourage smoking cessation

## 2022-02-07 ENCOUNTER — ANESTHESIA EVENT (INPATIENT)
Dept: GASTROENTEROLOGY | Facility: HOSPITAL | Age: 77
DRG: 557 | End: 2022-02-07
Payer: COMMERCIAL

## 2022-02-07 ENCOUNTER — APPOINTMENT (INPATIENT)
Dept: VASCULAR ULTRASOUND | Facility: HOSPITAL | Age: 77
DRG: 557 | End: 2022-02-07
Payer: COMMERCIAL

## 2022-02-07 ENCOUNTER — ANESTHESIA (INPATIENT)
Dept: GASTROENTEROLOGY | Facility: HOSPITAL | Age: 77
DRG: 557 | End: 2022-02-07
Payer: COMMERCIAL

## 2022-02-07 ENCOUNTER — APPOINTMENT (INPATIENT)
Dept: GASTROENTEROLOGY | Facility: HOSPITAL | Age: 77
DRG: 557 | End: 2022-02-07
Payer: COMMERCIAL

## 2022-02-07 PROBLEM — E43 SEVERE PROTEIN-CALORIE MALNUTRITION (HCC): Status: ACTIVE | Noted: 2022-02-07

## 2022-02-07 LAB
ALBUMIN SERPL BCP-MCNC: 3.5 G/DL (ref 3.5–5)
ALP SERPL-CCNC: 66 U/L (ref 46–116)
ALT SERPL W P-5'-P-CCNC: 9 U/L (ref 12–78)
ANION GAP SERPL CALCULATED.3IONS-SCNC: 10 MMOL/L (ref 4–13)
AST SERPL W P-5'-P-CCNC: 7 U/L (ref 5–45)
BASOPHILS # BLD AUTO: 0.07 THOUSANDS/ΜL (ref 0–0.1)
BASOPHILS NFR BLD AUTO: 1 % (ref 0–1)
BILIRUB SERPL-MCNC: 0.73 MG/DL (ref 0.2–1)
BUN SERPL-MCNC: 37 MG/DL (ref 5–25)
CALCIUM SERPL-MCNC: 9.6 MG/DL (ref 8.3–10.1)
CHLORIDE SERPL-SCNC: 108 MMOL/L (ref 100–108)
CO2 SERPL-SCNC: 29 MMOL/L (ref 21–32)
CREAT SERPL-MCNC: 1.04 MG/DL (ref 0.6–1.3)
EOSINOPHIL # BLD AUTO: 0.06 THOUSAND/ΜL (ref 0–0.61)
EOSINOPHIL NFR BLD AUTO: 1 % (ref 0–6)
ERYTHROCYTE [DISTWIDTH] IN BLOOD BY AUTOMATED COUNT: 14.4 % (ref 11.6–15.1)
GFR SERPL CREATININE-BSD FRML MDRD: 69 ML/MIN/1.73SQ M
GLUCOSE SERPL-MCNC: 129 MG/DL (ref 65–140)
HCT VFR BLD AUTO: 39.3 % (ref 36.5–49.3)
HGB BLD-MCNC: 12.6 G/DL (ref 12–17)
IMM GRANULOCYTES # BLD AUTO: 0.16 THOUSAND/UL (ref 0–0.2)
IMM GRANULOCYTES NFR BLD AUTO: 1 % (ref 0–2)
LYMPHOCYTES # BLD AUTO: 0.66 THOUSANDS/ΜL (ref 0.6–4.47)
LYMPHOCYTES NFR BLD AUTO: 6 % (ref 14–44)
MAGNESIUM SERPL-MCNC: 2.4 MG/DL (ref 1.6–2.6)
MCH RBC QN AUTO: 29 PG (ref 26.8–34.3)
MCHC RBC AUTO-ENTMCNC: 32.1 G/DL (ref 31.4–37.4)
MCV RBC AUTO: 91 FL (ref 82–98)
MONOCYTES # BLD AUTO: 0.94 THOUSAND/ΜL (ref 0.17–1.22)
MONOCYTES NFR BLD AUTO: 9 % (ref 4–12)
NEUTROPHILS # BLD AUTO: 9.22 THOUSANDS/ΜL (ref 1.85–7.62)
NEUTS SEG NFR BLD AUTO: 82 % (ref 43–75)
NRBC BLD AUTO-RTO: 0 /100 WBCS
PHOSPHATE SERPL-MCNC: 3.5 MG/DL (ref 2.3–4.1)
PLATELET # BLD AUTO: 331 THOUSANDS/UL (ref 149–390)
PMV BLD AUTO: 11.3 FL (ref 8.9–12.7)
POTASSIUM SERPL-SCNC: 3.7 MMOL/L (ref 3.5–5.3)
PROT SERPL-MCNC: 7.2 G/DL (ref 6.4–8.2)
RBC # BLD AUTO: 4.34 MILLION/UL (ref 3.88–5.62)
SODIUM SERPL-SCNC: 147 MMOL/L (ref 136–145)
WBC # BLD AUTO: 11.11 THOUSAND/UL (ref 4.31–10.16)

## 2022-02-07 PROCEDURE — 97163 PT EVAL HIGH COMPLEX 45 MIN: CPT

## 2022-02-07 PROCEDURE — 93923 UPR/LXTR ART STDY 3+ LVLS: CPT

## 2022-02-07 PROCEDURE — 93922 UPR/L XTREMITY ART 2 LEVELS: CPT | Performed by: SURGERY

## 2022-02-07 PROCEDURE — 93925 LOWER EXTREMITY STUDY: CPT | Performed by: SURGERY

## 2022-02-07 PROCEDURE — 97167 OT EVAL HIGH COMPLEX 60 MIN: CPT

## 2022-02-07 PROCEDURE — C9113 INJ PANTOPRAZOLE SODIUM, VIA: HCPCS | Performed by: NURSE PRACTITIONER

## 2022-02-07 PROCEDURE — 0D738ZZ DILATION OF LOWER ESOPHAGUS, VIA NATURAL OR ARTIFICIAL OPENING ENDOSCOPIC: ICD-10-PCS | Performed by: INTERNAL MEDICINE

## 2022-02-07 PROCEDURE — 99232 SBSQ HOSP IP/OBS MODERATE 35: CPT | Performed by: PHYSICIAN ASSISTANT

## 2022-02-07 PROCEDURE — 84100 ASSAY OF PHOSPHORUS: CPT | Performed by: NURSE PRACTITIONER

## 2022-02-07 PROCEDURE — C9113 INJ PANTOPRAZOLE SODIUM, VIA: HCPCS | Performed by: INTERNAL MEDICINE

## 2022-02-07 PROCEDURE — C1726 CATH, BAL DIL, NON-VASCULAR: HCPCS

## 2022-02-07 PROCEDURE — 80053 COMPREHEN METABOLIC PANEL: CPT | Performed by: NURSE PRACTITIONER

## 2022-02-07 PROCEDURE — 85025 COMPLETE CBC W/AUTO DIFF WBC: CPT | Performed by: NURSE PRACTITIONER

## 2022-02-07 PROCEDURE — 92610 EVALUATE SWALLOWING FUNCTION: CPT

## 2022-02-07 PROCEDURE — 0DHA3UZ INSERTION OF FEEDING DEVICE INTO JEJUNUM, PERCUTANEOUS APPROACH: ICD-10-PCS | Performed by: INTERNAL MEDICINE

## 2022-02-07 PROCEDURE — 83735 ASSAY OF MAGNESIUM: CPT | Performed by: NURSE PRACTITIONER

## 2022-02-07 PROCEDURE — 44372 SMALL BOWEL ENDOSCOPY: CPT | Performed by: INTERNAL MEDICINE

## 2022-02-07 PROCEDURE — 93925 LOWER EXTREMITY STUDY: CPT

## 2022-02-07 RX ORDER — SODIUM CHLORIDE, SODIUM LACTATE, POTASSIUM CHLORIDE, CALCIUM CHLORIDE 600; 310; 30; 20 MG/100ML; MG/100ML; MG/100ML; MG/100ML
INJECTION, SOLUTION INTRAVENOUS CONTINUOUS PRN
Status: DISCONTINUED | OUTPATIENT
Start: 2022-02-07 | End: 2022-02-07

## 2022-02-07 RX ORDER — LIDOCAINE HYDROCHLORIDE 20 MG/ML
INJECTION, SOLUTION EPIDURAL; INFILTRATION; INTRACAUDAL; PERINEURAL AS NEEDED
Status: DISCONTINUED | OUTPATIENT
Start: 2022-02-07 | End: 2022-02-07

## 2022-02-07 RX ORDER — HYDROMORPHONE HCL/PF 1 MG/ML
0.5 SYRINGE (ML) INJECTION ONCE
Status: COMPLETED | OUTPATIENT
Start: 2022-02-07 | End: 2022-02-07

## 2022-02-07 RX ORDER — CEFAZOLIN SODIUM 1 G/50ML
1000 SOLUTION INTRAVENOUS ONCE
Status: COMPLETED | OUTPATIENT
Start: 2022-02-07 | End: 2022-02-07

## 2022-02-07 RX ORDER — PROPOFOL 10 MG/ML
INJECTION, EMULSION INTRAVENOUS AS NEEDED
Status: DISCONTINUED | OUTPATIENT
Start: 2022-02-07 | End: 2022-02-07

## 2022-02-07 RX ORDER — DEXTROSE MONOHYDRATE 50 MG/ML
75 INJECTION, SOLUTION INTRAVENOUS CONTINUOUS
Status: DISCONTINUED | OUTPATIENT
Start: 2022-02-07 | End: 2022-02-08

## 2022-02-07 RX ADMIN — PROPOFOL 20 MG: 10 INJECTION, EMULSION INTRAVENOUS at 11:42

## 2022-02-07 RX ADMIN — SODIUM CHLORIDE, SODIUM LACTATE, POTASSIUM CHLORIDE, AND CALCIUM CHLORIDE: .6; .31; .03; .02 INJECTION, SOLUTION INTRAVENOUS at 11:20

## 2022-02-07 RX ADMIN — PROPOFOL 30 MG: 10 INJECTION, EMULSION INTRAVENOUS at 11:48

## 2022-02-07 RX ADMIN — LIDOCAINE HYDROCHLORIDE 80 MG: 20 INJECTION, SOLUTION EPIDURAL; INFILTRATION; INTRACAUDAL; PERINEURAL at 11:24

## 2022-02-07 RX ADMIN — PROPOFOL 20 MG: 10 INJECTION, EMULSION INTRAVENOUS at 11:45

## 2022-02-07 RX ADMIN — PANTOPRAZOLE SODIUM 40 MG: 40 INJECTION, POWDER, FOR SOLUTION INTRAVENOUS at 09:09

## 2022-02-07 RX ADMIN — PROPOFOL 20 MG: 10 INJECTION, EMULSION INTRAVENOUS at 11:34

## 2022-02-07 RX ADMIN — PROPOFOL 150 MG: 10 INJECTION, EMULSION INTRAVENOUS at 11:24

## 2022-02-07 RX ADMIN — ONDANSETRON 4 MG: 2 INJECTION INTRAMUSCULAR; INTRAVENOUS at 05:29

## 2022-02-07 RX ADMIN — PROPOFOL 20 MG: 10 INJECTION, EMULSION INTRAVENOUS at 11:38

## 2022-02-07 RX ADMIN — ONDANSETRON 4 MG: 2 INJECTION INTRAMUSCULAR; INTRAVENOUS at 11:14

## 2022-02-07 RX ADMIN — PANTOPRAZOLE SODIUM 40 MG: 40 INJECTION, POWDER, FOR SOLUTION INTRAVENOUS at 22:41

## 2022-02-07 RX ADMIN — PROPOFOL 20 MG: 10 INJECTION, EMULSION INTRAVENOUS at 11:29

## 2022-02-07 RX ADMIN — CEFAZOLIN SODIUM 1000 MG: 1 SOLUTION INTRAVENOUS at 11:18

## 2022-02-07 RX ADMIN — HYDROMORPHONE HYDROCHLORIDE 0.5 MG: 1 INJECTION, SOLUTION INTRAMUSCULAR; INTRAVENOUS; SUBCUTANEOUS at 05:29

## 2022-02-07 NOTE — OCCUPATIONAL THERAPY NOTE
Occupational Therapy Evaluation        Patient Name: Honorio Sanchez  [id number] Date: 2/7/2022 02/07/22 0837   OT Last Visit   OT Visit Date 02/07/22   Note Type   Note type Evaluation   Restrictions/Precautions   Weight Bearing Precautions Per Order No  (Left ankle NWB due to pain)   Braces or Orthoses Other (Comment)  (none at baseline, attempted use of air cast for ankle in ED)   Other Precautions Chair Alarm; Fall Risk;Pain   Pain Assessment   Pain Assessment Tool 0-10   Pain Score 5   Pain Location/Orientation Other (Comment); Orientation: Left; Location: Foot  (ankle)   Pain Onset/Description Onset: Ongoing;Frequency: Constant/Continuous   Home Living   Type of Home Apartment   Home Layout Multi-level;Bed/bath upstairs  (2 TANI, 8 interior stairs to bed/bath)   Bathroom Shower/Tub Tub/shower unit   Bathroom Toilet Standard   Bathroom Equipment Shower chair   Bathroom Accessibility Accessible   Home Equipment Cane   Additional Comments ambulatory with cane up to 2 weeks ago   Prior Function   Level of [place] Independent with ADLs and functional mobility   Lives With Alone   Receives Help From Neighbor;Family  (daughter currently staying with patient prior to admission)   ADL Assistance Independent   IADLs Independent   Falls in the last 6 months 0   Vocational Part time employment   Comments pt's daughter provides transportation   Lifestyle   Autonomy Patient reported he resides alone in a multi level apartment, 2 TANI, 8 interior stairs to bed/bath  patient was ambulatory with SPC, reported steady decline in strength and ability to ambulate in the last 2 months  Patient's daughter provides transportation and assistance with shopping     Reciprocal Relationships Supportive family   Service to Others Part time employment    Psychosocial   Psychosocial (WDL) WDL   ADL   Eating Assistance 5  Supervision/Setup   Eating Deficit Other (Comment)  (currently NPO for procedure)   Grooming Assistance 5 Supervision/Setup   Grooming Deficit Increased time to complete   UB Bathing Assistance 3  Moderate Assistance   UB Bathing Deficit Setup;Supervision/safety; Increased time to complete   LB Bathing Assistance 2  Maximal Assistance   LB Bathing Deficit Other (Comment)  (dependent for lower leg and foot)   UB Dressing Assistance 3  Moderate Assistance   LB Dressing Assistance 2  Maximal Assistance   LB Dressing Deficit Other (Comment)  (dependent for lower leg and foot)   Toileting Assistance  3  Moderate Assistance   Functional Assistance 2  Maximal Assistance   Bed Mobility   Rolling L 4  Minimal assistance   Additional items Assist x 2;HOB elevated; Increased time required;Verbal cues   Supine to Sit 4  Minimal assistance   Additional items Assist x 2; Increased time required;Verbal cues   Transfers   Sit to Stand 2  Maximal assistance   Additional items Assist x 2; Increased time required;Verbal cues  (able to stand briefly with assist of 2- RW/ LLE NWB/ pain)   Stand to Sit 3  Moderate assistance   Additional items Assist x 2; Increased time required;Verbal cues   Balance   Static Sitting Fair +   Dynamic Sitting Fair   Static Standing Poor +   Activity Tolerance   Activity Tolerance Patient limited by pain; Patient limited by fatigue   RUE Assessment   RUE Assessment X  (limited overhead movements)   RUE Overall PROM   R Shoulder Flexion ~90 degrees of flexion, limited overhead movements- chronic   RUE Strength   RUE Overall Strength Deficits  (3+/5)   LUE Assessment   LUE Assessment X  (limited overhead movements)   LUE Overall PROM   L Shoulder Flexion ~90 degrees of flexion, limited overhead movements- chronic   LUE Strength   LUE Overall Strength Deficits  (3+/5)   Hand Function   Gross Motor Coordination Impaired   Fine Motor Coordination Impaired   Sensation   Light Touch No apparent deficits  (BUEs)   Vision-Basic Assessment   Current Vision Does not wear glasses   Cognition   Overall Cognitive Status [facility] Arousal/Participation Alert; Responsive; Cooperative   Attention Within functional limits   Orientation Level Oriented X4   Memory Within functional limits   Following Commands Follows all commands and directions without difficulty   Assessment   Limitation Decreased ADL status; Decreased UE ROM; Decreased UE strength;Decreased endurance;Decreased self-care trans;Decreased high-level ADLs   Prognosis Good   Assessment Patient is a 68 y o  male seen for OT evaluation s/p admit to [id number] [facility] on 2/5/2022 w/Left ankle pain  Commorbidities affecting patient's functional performance at time of assessment include: Left ankle pain, Malignant neoplasm of lower third of esophagus, mild protein calorie malnutrition, presented to ED with  Difficulty walking, ankle pain, nausea  Orders placed for OT evaluation and treatment  Performed at least two patient identifiers during session including name and wristband  Prior to admission, Patient reported he resides alone in a multi level apartment, 2 TANI, 8 interior stairs to bed/bath  patient was ambulatory with SPC, reported steady decline in strength and ability to ambulate in the last 2 months  Patient's daughter provides transportation and assistance with shopping  Personal factors affecting patient at time of initial evaluation include: limited caregiver support, steps to enter, difficulty performing ADLs and difficulty performing IADLs  Upon evaluation, patient requires moderate assist for UB ADLs, maximal and total assist for LB ADLs  Occupational performance is affected by the following deficits: decreased functional use of BUEs, limited active ROM, decreased muscle strength, degenerative arthritic joint changes, impaired gross motor coordination, dynamic sit/ stand balance deficit with poor standing tolerance time for self care and functional mobility, decreased activity tolerance and increased pain    Patient to benefit from continued Occupational Therapy treatment while in the hospital to address deficits as defined above and maximize level of functional independence with ADLs and functional mobility  Occupational Performance areas to address include: grooming , bathing/ shower, dressing, toilet hygiene, transfer to all surfaces, functional ambulation, functional mobility, emergency response, health maintenance, IADLs: safety procedures and Leisure Participation  From OT standpoint, recommendation at time of d/c would be Short Term Rehab  Goals   Patient Goals "to get back to normal, I need to beat this cancer"   Plan   Treatment Interventions ADL retraining;Functional transfer training;UE strengthening/ROM; Endurance training;Patient/family training; Compensatory technique education;Continued evaluation;Cardiac education; Energy conservation   Goal Expiration Date 02/21/22   OT Frequency 3-5x/wk   Recommendation   OT Discharge Recommendation Post acute rehabilitation services   AM-PAC Daily Activity Inpatient   Lower Body Dressing 2   Bathing 2   Toileting 2   Upper Body Dressing 2   Grooming 3   Eating 4   Daily Activity Raw Score 15   Daily Activity Standardized Score (Calc for Raw Score >=11) 34 69   AM-PAC Applied Cognition Inpatient   Following a Speech/Presentation 4   Understanding Ordinary Conversation 4   Taking Medications 3   Remembering Where Things Are Placed or Put Away 4   Remembering List of 4-5 Errands 3   Taking Care of Complicated Tasks 3   Applied Cognition Raw Score 21   Applied Cognition Standardized Score 44 3   Barthel Index   Feeding 10   Bathing 0   Grooming Score 0   Dressing Score 5   Bladder Score 5   Bowels Score 10   Toilet Use Score 5   Transfers (Bed/Chair) Score 5   Mobility (Level Surface) Score 0   Stairs Score 0   Barthel Index Score 40       1 - Patient will verbalize and demonstrate use of energy conservation/ deep breathing technique and work simplification skills during functional activity with no verbal cues      2 - Patient will verbalize and demonstrate good body mechanics and joint protection techniques during  ADLs/ IADLs with no verbal cues  3 - Patient will increase OOB/ sitting tolerance to 2-4 hours per day for increased participation in self care and leisure tasks with no s/s of exertion  4 - Patient will increase standing tolerance time to 5  minutes with unilateral UE support to complete sink level ADLs@ mod I level  5 - Patient will increase sitting tolerance at edge of bed to 20 minutes to complete UB ADLs @ set up assist level  6 - Patient will transfer bed to Chair / toilet at Set up assist level with AD as indicated  7 - Patient will complete UB ADLs with set up assist      8 - Patient will complete LB ADLs with min assist with the use of adaptive equipment       9 - Patient will complete toileting hygiene with set up assist/ supervision for thoroughness    10 - Patient/ Family  will demonstrate competency with UE Home Exercise Program

## 2022-02-07 NOTE — SPEECH THERAPY NOTE
Speech-Language Pathology Bedside Swallow Evaluation        Patient Name: Darrell ROJO Date: 2/7/2022     Problem List  Principal Problem:    Left ankle pain  Active Problems:    Malignant neoplasm of lower third of esophagus ([facility] [address] )    Severe protein-calorie malnutrition [facility])         Past Medical History  Past Medical History:   Diagnosis Date    Cancer ([facility] )     esophageal ca    Pneumonia     Polio     Rib fractures        Past Surgical History  History reviewed  No pertinent surgical history  Summary/Impressions:     Bedside observations support grossly intact oropharyngeal swallow function across small quantities of thin liquids and ice chips  Self-fed trials with no s/s of dysphagia or distress  Patient denies difficulties or changes from baseline function  Recommendations:   Diet: thin liquids   Meds: non-oral if possible   Feeding assistance: As needed  Frequent Oral care: 2-4x/day  Aspiration precautions and compensatory swallowing strategies: upright posture, slow rate of feeding and small bites/sips  Other Recommendations/ considerations: Maintain strict reflux precautions  Current Medical Status  Pt is a 63-year-old male who was 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 with Dr Savanah Ortega who came into our emergency room yesterday reporting inability to eat and ankle pain  The patient reports for the last few days he has been able to drink liquids if he drinks then very slowly but if he eats solids at all he regurgitates or vomits them   I suspect he has an obstruction at the GE junction secondary to his known malignancy   The patient has seen Dr Codie Harden and had PET-CT imaging that shows no metastases   He is a surgical candidate and neoadjuvant chemotherapy and radiation has been recommended  Patient seen for bedside dysphagia evaluation following J-Tube placement       Past medical history:  Please see H&P for details    Special Studies:  PET CT 2/1/22: IMPRESSION:  1  Hypermetabolic distal esophageal carcinoma which extends into the proximal stomach with paraesophageal/perigastric/upper abdominal andry metastatic disease  2   Concentric FDG activity involving the cecum/proximal ascending colon of uncertain clinical significance  Given the localized nature of FDG activity, consider correlation with screening colonoscopy to exclude underlying colonic malignancy  3   Indeterminant non-FDG avid avid filling defect within right lower lobe bronchus with associated more distal tree-in-bud infiltrate  Findings could reflect mucous plugging with obstructive malignant process of low metabolic activity not excluded  4   3 8 cm infrarenal abdominal aortic aneurysm  Social/Education/Vocational Hx:  Pt lives alone    Swallow Information   Current Risks for Dysphagia & Aspiration: known history of dysphagia  Current Symptoms/Concerns: vomiting/regurgitation 2/2 esophgeal dysphagia  Current Diet: thin liquids / J tube  Baseline Diet: thin liquids - patient reports he is unable to tolerate anything other than thin liquids  Baseline Assessment   Behavior/Cognition: alert  Speech/Language Status: able to participate in conversation  Patient Positioning: upright in bed    Swallow Mechanism Exam   Comfortable on room air  All structures appear grossly functional for oral feeding  Consistencies Assessed and Performance   Consistencies Administered: ice chips and thin liquids    Oral Stage: WFL  Patient presents with adequate bolus acceptance, containment and transit  Mastication/manipuatlion of ice judged to be efficient and complete  Pharyngeal Stage: WFL  Swallow response appears timely upon laryngeal palpation  Hyolaryngeal excursion seems slightly reduced, though adequate  No overt s/s of aspiration or distress  Vocal quality remains dry  Esophageal Concerns: Esophageal dysphagia secondary to malignancy in lower 1/3 portion of esophagus  Patient did not exhibit signs/symptoms of regurgitation, or backflow during this evaluation  Reports good understanding of limites well as safe feeding strategies and reflux precautions  See chart for additional details re: esophogeal dysphagia  Plan  No additional follow-up at this time  Please re-order should pt exhibit change in status or concerns arise       Reviewed various strategies including small quantities, sticking with thin liquids, upright positioning, etc      Results Reviewed with: patient and MD       Speech Therapy Prognosis   Prognosis: deferred  Prognosis Considerations: medical status      Ashley Smith [address], [address] [facility]  Speech-Language Pathologist  [place] #[id number]  [place] #[id number]

## 2022-02-07 NOTE — ANESTHESIA PREPROCEDURE EVALUATION
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 with Dr Maria Kawasaki who came into our emergency room yesterday reporting inability to eat  The patient reports for the last few days he has been able to drink liquids if he drinks then very slowly but if he eats solids at all he regurgitates or vomits them  I suspect he has an obstruction at the GE junction secondary to his known malignancy  The patient has seen Dr Porter Toney and had PET-CT imaging that shows no metastases  He is a surgical candidate and neoadjuvant chemotherapy and radiation has been recommended  We will schedule an EGD enteroscopy with J-tube placement; it is imperative that he have a J-tube and not a G tube so that he remains a surgical candidate  His medical oncology appointment is February 14th for neoadjuvant chemotherapy and radiation coordination and treatment  Liquid diet today  NPO after midnight  PPI  Nutrition consultation       Physical Exam    Airway    Mallampati score: III  TM Distance: >3 FB  Neck ROM: full     Dental       Cardiovascular  Cardiovascular exam normal    Pulmonary  Pulmonary exam normal     Other Findings      Left ankle pain  Assessment & Plan  - presents with worsening left ankle pain  - currently being treated as an outpatient with p o  Keflex for presumed cellulitis, however given his dysphagia nausea is not been able to keep antibiotics down     - severity of cellulitis does not correlate with severity of patient's pain  X-rays have been negative    Will consult Podiatry for further evaluation  - continue on IV cefazolin for now  - PT/OT eval given the ambulatory dysfunction     Malignant neoplasm of lower third of esophagus ([address] )  Assessment & Plan  - recently diagnosed with esophageal cancer via EGD biopsy  - follows with Medical Oncology and Surgical Oncology as an outpatient  - does not have a formal treatment plan to date     - will trial on dysphagia 3 diet given his difficulty swallowing  - mealtime supplementations  - outpatient follow-up as previously scheduled        Mild protein-calorie malnutrition ([address] )  Assessment & Plan  - will provide mealtime supplementation  - nutrition consult  Anesthesia Plan  ASA Score- 4     Anesthesia Type- IV sedation with anesthesia with ASA Monitors  Additional Monitors:   Airway Plan:           Plan Factors-Exercise tolerance (METS): <4 METS  Chart reviewed  EKG reviewed  Existing labs reviewed  Patient summary reviewed  Patient is a current smoker  Induction- intravenous  Postoperative Plan-     Informed Consent- Anesthetic plan and risks discussed with patient  I personally reviewed this patient with the CRNA  Discussed and agreed on the Anesthesia Plan with the CRNA  Sylvia Hernandez

## 2022-02-07 NOTE — ASSESSMENT & PLAN NOTE
· Recently diagnosed with esophageal cancer via EGD biopsy  · Follows with Medical Oncology and Surgical Oncology OP;  Does not have a formal treatment plan to date  · With persistent dysphagia and ongoing emesis and the inability to hold down much nutrition at all  · Gastroenterology consulted; input as noted  · Plan for EGD enteroscopy with J-tube placement today 02/07  · Liquid oral intake as tolerated  · Nutritional consultation for initiation of tube feedings

## 2022-02-07 NOTE — SPEECH THERAPY NOTE
Speech Language/Pathology Missed Visit Note    Dysphagia evaluation orders received and appreciated  Evaluation deferred as patient is NPO for J-tube placement today  Will re-attempt as patient status and scheduling permits          Ashley Silva [address], [address] [facility]  Speech-Language Pathologist  [place] #[id number]  [place] #[id number]

## 2022-02-07 NOTE — PLAN OF CARE
Problem: OCCUPATIONAL THERAPY ADULT  Goal: Performs self-care activities at highest level of function for planned discharge setting  See evaluation for individualized goals  Description: Treatment Interventions: ADL retraining,Functional transfer training,UE strengthening/ROM,Endurance training,Patient/family training,Compensatory technique education,Continued evaluation,Cardiac education,Energy conservation          See flowsheet documentation for full assessment, interventions and recommendations  Note: Limitation: Decreased ADL status,Decreased UE ROM,Decreased UE strength,Decreased endurance,Decreased self-care trans,Decreased high-level ADLs  Prognosis: Good  Assessment: Patient is a 68 y o  male seen for OT evaluation s/p admit to [id number] [place] [address] on 2/5/2022 w/Left ankle pain  Commorbidities affecting patient's functional performance at time of assessment include: Left ankle pain, Malignant neoplasm of lower third of esophagus, mild protein calorie malnutrition, presented to ED with  Difficulty walking, ankle pain, nausea  Orders placed for OT evaluation and treatment  Performed at least two patient identifiers during session including name and wristband  Prior to admission, Patient reported he resides alone in a multi level apartment, 2 TANI, 8 interior stairs to bed/bath  patient was ambulatory with SPC, reported steady decline in strength and ability to ambulate in the last 2 months  Patient's daughter provides transportation and assistance with shopping  Personal factors affecting patient at time of initial evaluation include: limited caregiver support, steps to enter, difficulty performing ADLs and difficulty performing IADLs  Upon evaluation, patient requires moderate assist for UB ADLs, maximal and total assist for LB ADLs   Occupational performance is affected by the following deficits: decreased functional use of BUEs, limited active ROM, decreased muscle strength, degenerative arthritic joint changes, impaired gross motor coordination, dynamic sit/ stand balance deficit with poor standing tolerance time for self care and functional mobility, decreased activity tolerance and increased pain  Patient to benefit from continued Occupational Therapy treatment while in the hospital to address deficits as defined above and maximize level of functional independence with ADLs and functional mobility  Occupational Performance areas to address include: grooming , bathing/ shower, dressing, toilet hygiene, transfer to all surfaces, functional ambulation, functional mobility, emergency response, health maintenance, IADLs: safety procedures and Leisure Participation  From OT standpoint, recommendation at time of d/c would be Short Term Rehab         OT Discharge Recommendation: Post acute rehabilitation services

## 2022-02-07 NOTE — ASSESSMENT & PLAN NOTE
Malnutrition Findings:   Adult Malnutrition type: Chronic illness  Adult Degree of Malnutrition: Other severe protein calorie malnutrition (related to inadequate energy intakes of less than 75% of estimated needs for >1 month, significant weight loss of 11 2% x 3 weeks, 26 4% x 1 year, visible wasting around Clavicle, Shoulders, Ribs; Treat-Enteral Nutrition via planned J-Tube)    BMI Findings:  Adult BMI Classifications: Underweight < 18 5     Body mass index is 17 31 kg/m²       J tube for feeding, to be placed today  Nutrition consult appreciated

## 2022-02-07 NOTE — PROGRESS NOTES
Appreciate plan for J-Tube placement for Enteral Nutrition  When able to start EN, recommend Jevity 1 5 start at 20 ml/hour, increasing as tolerates to goal 95 ml/hour x 16 hours (off from [id number]), with 250 ml free water flushes every 6 hours  Please see Nutrition Note 2/7/22 for more details

## 2022-02-07 NOTE — PROGRESS NOTES
[facility] Progress Note - Wilberto Rome 1945, 68 y o  male MRN: [id number]  Unit/Bed#: -[place] Encounter: [id number]  Primary Care Provider: Melyssa Ramirez MD   Date and time admitted to hospital: 2/5/2022 11:08 AM    * Left ankle pain  Assessment & Plan  Background:  Presented to the ED with worsening left ankle pain that he had been taking Keflex PO as an outpatient for however unable to keep it down  · Initially with IV cefazolin; discontinued on 02/06; unlikely that this is infectious  · XR without osseous abnormality  · Podiatry consulted; input as noted below  · Pain and discoloration of thought to be secondary to vascular etiology  · Vascular surgery consulted  · Consider musculoskeletal/ortho achilles involvement  · LEADS ordered, not yet completed   · PT/OT consulted    Malignant neoplasm of lower third of esophagus [facility])  Assessment & Plan  · Recently diagnosed with esophageal cancer via EGD biopsy  · Follows with Medical Oncology and Surgical Oncology OP; Does not have a formal treatment plan to date  · With persistent dysphagia and ongoing emesis and the inability to hold down much nutrition at all  · Gastroenterology consulted; input as noted  · Plan for EGD enteroscopy with J-tube placement today 02/07  · Liquid oral intake as tolerated  · Nutritional consultation for initiation of tube feedings    Severe protein-calorie malnutrition ([address] )  Assessment & Plan  Malnutrition Findings:   Adult Malnutrition type: Chronic illness  Adult Degree of Malnutrition: Other severe protein calorie malnutrition (related to inadequate energy intakes of less than 75% of estimated needs for >1 month, significant weight loss of 11 2% x 3 weeks, 26 4% x 1 year, visible wasting around Clavicle, Shoulders, Ribs; Treat-Enteral Nutrition via planned J-Tube)    BMI Findings:  Adult BMI Classifications: Underweight < 18 5     Body mass index is 17 31 kg/m²       J tube for feeding, to be placed today  Nutrition consult appreciated      Mild protein-calorie malnutrition ([facility] [address] )  Assessment & Plan  · BMI noted to be 17 31  · Supplementation ordered on admission  · Nutrition consulted; awaiting formal evaluation     VTE Pharmacologic Prophylaxis: VTE Score: 3 Moderate Risk (Score 3-4) - Pharmacological DVT Prophylaxis Ordered: enoxaparin (Lovenox)  Patient Centered Rounds: I performed bedside rounds with nursing staff today  Discussions with Specialists or Other Care Team Provider: vascular pending; noted reviewed    Education and Discussions with Family / Patient: Updated  (daughter, Leo Armenta) via phone  Time Spent for Care: 20 minutes  More than 50% of total time spent on counseling and coordination of care as described above  Current Length of Stay: 1 day(s)  Current Patient Status: Inpatient   Certification Statement: The patient will continue to require additional inpatient hospital stay due to EGD today for J tube placement, vascular workup pending   Discharge Plan: Anticipate discharge in 48-72 hrs to discharge location to be determined pending rehab evaluations  high suspicion will require rehab on discharge     Code Status: Level 1 - Full Code    Subjective:   CC "Can I have ice chips when I get back?"    Patient seen and examined just prior to going for EGD  He wants to know when he will be able to have ice chips and try water again, otherwise denies any new pain or vomiting  He does have nausea  Per nursing staff, no acute issues overnight  Objective:     Vitals:   Temp (24hrs), Av 9 °F (36 6 °C), Min:97 6 °F (36 4 °C), Max:98 2 °F (36 8 °C)    Temp:  [97 6 °F (36 4 °C)-98 2 °F (36 8 °C)] 97 7 °F (36 5 °C)  HR:  [74-89] 86  Resp:  [18] 18  BP: (103-147)/(68-89) 121/69  SpO2:  [96 %-100 %] 100 %  Body mass index is 17 31 kg/m²  Input and Output Summary (last 24 hours):      Intake/Output Summary (Last 24 hours) at 2022 1148  Last data filed at 2022 4287  Gross per 24 hour   Intake 0 ml   Output 300 ml   Net -300 ml       Physical Exam:   Physical Exam  Vitals and nursing note reviewed  Constitutional:       General: He is not in acute distress  Appearance: He is cachectic  He is ill-appearing  He is not toxic-appearing  Cardiovascular:      Rate and Rhythm: Normal rate and regular rhythm  Pulmonary:      [place]: Pulmonary effort is normal       Breath sounds: Normal breath sounds  Abdominal:      General: Abdomen is flat  Bowel sounds are normal  There is no distension  Palpations: Abdomen is soft  Musculoskeletal:      Right lower leg: No edema  Left lower leg: No edema  Neurological:      Mental Status: He is alert  Additional Data:     Labs:  Results from last 7 days   Lab Units 02/07/22  0535   WBC Thousand/uL 11 11*   HEMOGLOBIN g/dL 12 6   HEMATOCRIT % 39 3   PLATELETS Thousands/uL 331   NEUTROS PCT % 82*   LYMPHS PCT % 6*   MONOS PCT % 9   EOS PCT % 1     Results from last 7 days   Lab Units 02/07/22  0535   SODIUM mmol/L 147*   POTASSIUM mmol/L 3 7   CHLORIDE mmol/L 108   CO2 mmol/L 29   BUN mg/dL 37*   CREATININE mg/dL 1 04   ANION GAP mmol/L 10   CALCIUM mg/dL 9 6   ALBUMIN g/dL 3 5   TOTAL BILIRUBIN mg/dL 0 73   ALK PHOS U/L 66   ALT U/L 9*   AST U/L 7   GLUCOSE RANDOM mg/dL 129         Results from last 7 days   Lab Units 02/01/22  0930   POC GLUCOSE mg/dl 141*         Results from last 7 days   Lab Units 02/05/22  1203   LACTIC ACID mmol/L 1 3       Lines/Drains:  Invasive Devices  Report    Peripheral Intravenous Line            Peripheral IV 02/05/22 Right Antecubital 1 day    Peripheral IV 02/07/22 Left Forearm <1 day                      Imaging: No pertinent imaging reviewed      Recent Cultures (last 7 days):         Last 24 Hours Medication List:   Current Facility-Administered Medications   Medication Dose Route Frequency Provider Last Rate    acetaminophen  650 mg Oral Q6H PRN Jania Pollen Ambrosio DO  dextrose  75 mL/hr Intravenous Continuous Gayle Ellison PA-C      enoxaparin  40 mg Subcutaneous Daily Richmondhaseeb Valente, DO      ondansetron  4 mg Intravenous Q6H PRN Richmond Luis Eduardo Valente, DO      oxyCODONE  5 mg Oral Q4H PRN Eduardohaseeb Valente, DO      pantoprazole  40 mg Intravenous Q12H [facility]       Facility-Administered Medications Ordered in Other Encounters   Medication Dose Route Frequency Provider Last Rate    lactated ringers   Intravenous Continuous PRN Kaleta Genera, CRNA      lidocaine (PF)   Intravenous PRN Kaleta Genera, CRNA      propofol   Intravenous PRN Kaleta Genera, CRNA          Today, Patient Was Seen By: Corin Mccall PA-C    **Please Note: This note may have been constructed using a voice recognition system  **

## 2022-02-07 NOTE — PLAN OF CARE
Problem: PHYSICAL THERAPY ADULT  Goal: Performs mobility at highest level of function for planned discharge setting  See evaluation for individualized goals  Description: Treatment/Interventions: Functional transfer training,LE strengthening/ROM,Therapeutic exercise,Endurance training,Patient/family training,Equipment eval/education,Bed mobility,Continued evaluation,Spoke to nursing,OT          See flowsheet documentation for full assessment, interventions and recommendations  Note: Prognosis: Fair  Problem List: Decreased strength,Impaired balance,Decreased mobility,Decreased range of motion,Decreased endurance,Pain  Assessment: Pt is 68 y o  male seen for PT evaluation s/p admit to [facility] on 2022 w/ Left ankle pain  PT consulted to assess pt's functional mobility and d/c needs  Order placed for PT eval and tx, w/ up w/ A order  Performed at least 2 patient identifiers during session: Name and   Comorbidities affecting pt's physical performance at time of assessment include: Severe protein-calorie malnutrition, Malignant neoplasm of lower third of esophagus , Dysphagia, dermatitis  PTA, pt was independent w/ all functional mobility w/ SPC, ambulates community distances and elevations, has 2 TANI, lives alone in multi-level house and works part time  Personal factors affecting pt at time of IE include: inaccessible home environment, stairs to enter home, inability to ambulate household distances, inability to navigate community distances, inability to navigate level surfaces w/o external assistance, unable to perform dynamic tasks in community, inability to perform current job functions, inability to perform IADLs and inability to perform ADLs   Please find objective findings from PT assessment regarding body systems outlined above with impairments and limitations including weakness, decreased ROM, impaired balance, decreased endurance, pain, decreased activity tolerance, decreased functional mobility tolerance and fall risk  The following objective measures performed on IE also reveal limitations: Barthel Index: 35/100, Modified Hemphill: 4 (moderate/severe disability) and AM-PAC 6-Clicks: 6/08  Pt's clinical presentation is currently unstable/unpredictable seen in pt's presentation of ongoing medical management/monitoring, evident in need for assist w/ all phases of mobility when usually mobilizing independently, and fatigue and pain impacting overall mobility status  Pt to benefit from continued PT tx to address deficits as defined above and maximize level of functional independent mobility and consistency  From PT/mobility standpoint, recommendation at time of d/c would be post acute rehabilitation services pending progress in order to facilitate return to PLOF  Barriers to Discharge: Decreased caregiver support,Inaccessible home environment        PT Discharge Recommendation: Post acute rehabilitation services          See flowsheet documentation for full assessment

## 2022-02-07 NOTE — CASE MANAGEMENT
Case Management Assessment & Discharge Planning Note    Patient name Carmen Santoro  Location [address] 317/-[id number] MRN [id number]  : 1945 Date 2022       Current Admission Date: 2022  Current Admission Diagnosis:Left ankle pain   Patient Active Problem List    Diagnosis Date Noted    Severe protein-calorie malnutrition ([facility] [address] ) 2022    Left ankle pain 2022    Mild protein-calorie malnutrition ([facility] [address] ) 2022    Malignant neoplasm of lower third of esophagus ([facility] [address] ) 2022    Weight loss 2022    Dysphagia 2022    Dermatitis 2022    Current every day smoker 2019      LOS (days): 1  Geometric Mean LOS (GMLOS) (days):   Days to GMLOS:     OBJECTIVE:    Risk of Unplanned Readmission Score: 13         Current admission status: Inpatient       Preferred Pharmacy:   [facility]/pharmacy #[id number]- [place], [place] - [address]  Phone: [phone] Fax: [phone]    Primary Care Provider: Savita Sebastian MD    Primary Insurance: Karen Resendiz [facility]  Secondary Insurance:     ASSESSMENT:  [address] [facility], [address] - Daughter   Primary Phone: [phone] (Mobile)               Advance Directives  Does patient have a [address]?: Yes  Does patient have Advance Directives?: Yes  Advance Directives: Power of  for health care,Living will  Primary Contact: Shannen Dale (Daughter) [phone] (M)         Readmission Root Cause  30 Day Readmission: No    Patient Information  Admitted from[de-identified] Home  Mental Status: Alert  During Assessment patient was accompanied by: Not accompanied during assessment  Assessment information provided by[de-identified] Daughter  Primary Caregiver: Self  Support Systems: [address] of Residence: [place] [address] do you live in?: Chika Marie entry access options   Select all that apply : Stairs  Number of steps to enter home : 7  Do the steps have railings?: Yes  Type of Current Residence: Bi-level  Upon entering residence, is there a bedroom on the main floor (no further steps)?: No  A bedroom is located on the following floor levels of residence (select all that apply):: 2nd Floor  Upon entering residence, is there a bathroom on the main floor (no further steps)?: Yes  Number of steps to 2nd floor from main floor: 6  In the last 12 months, was there a time when you were not able to pay the mortgage or rent on time?: No  In the last 12 months, how many places have you lived?: 1  In the last 12 months, was there a time when you did not have a steady place to sleep or slept in a shelter (including now)?: No  Homeless/housing insecurity resource given?: N/A  Living Arrangements: Lives Alone  Is patient a ?: No    Activities of Daily Living Prior to Admission  Functional Status: Independent  Ambulates independently?: Yes  Does patient use assisted devices?: Yes  Assisted Devices (DME) used: Straight Cane  Does patient currently own DME?: Yes  What DME does the patient currently own?: Straight Cane  Does patient have a history of Outpatient Therapy (PT/OT)?: No  Does the patient have a history of Short-Term Rehab?: No  Does patient have a history of HHC?: No  Does patient currently have [facility]?: No         Patient Information Continued  Income Source: Employed  Does patient have prescription coverage?: Yes  Within the past 12 months, you worried that your food would run out before you got the money to buy more : Never true  Within the past 12 months, the food you bought just didnt last and you didnt have money to get more : Never true  Food insecurity resource given?: N/A  Does patient receive dialysis treatments?: No  Does patient have a history of substance abuse?: No  Does patient have a history of Mental Health Diagnosis?: No         Means of Transportation  In the past 12 months, has lack of transportation kept you from medical appointments or from getting medications?: No  In the past 12 months, has lack of transportation kept you from meetings, work, or from getting things needed for daily living?: No  Was application for public transport provided?: N/A        DISCHARGE DETAILS:    Discharge planning discussed with[de-identified] Shannen Dale (Daughter) [phone] Miki Hernandez  Freedom of Choice: Yes  Comments - Freedom of Choice: CM DISCUSSED FREEDOM OF CHOICE   WANTS [facility] FOR PT   CM contacted family/caregiver?: Yes  Were Treatment Team discharge recommendations reviewed with patient/caregiver?: Yes  Did patient/caregiver verbalize understanding of patient care needs?: Yes  Were patient/caregiver advised of the risks associated with not following Treatment Team discharge recommendations?: Yes    Contacts  Patient Contacts: Emilia:Daughter  Relationship to Patient[de-identified] Family  Contact Method: Phone  Phone Number: Shannen Dale (Daughter) [phone] Miki Hernandez  Reason/Outcome: Continuity of [address]         Is the patient interested in [address] at discharge?: Yes  [address] requested[de-identified] [address] Name[de-identified] [address] [place] [address] Provider[de-identified] Referring Provider  Home Health Services Needed[de-identified] Evaluate Functional Status and Safety,Gait/ADL Training,Strengthening/Theraputic Exercises to Improve Function  Homebound Criteria Met[de-identified] Uses an Assist Device (i e  cane, walker, etc)  Supporting Clincal Findings[de-identified] Limited Endurance    DME Referral Provided  Referral made for DME?: No    Other Referral/Resources/Interventions Provided:  Interventions: C         Treatment Team Recommendation: Home,Home with Home Health Care  Discharge Destination Plan[de-identified] Home,Home with [place] at Discharge : Family

## 2022-02-07 NOTE — PHYSICAL THERAPY NOTE
Physical Therapy Evaluation     Patient's Name: Hussein Sainz    Admitting Diagnosis  Malignant neoplasm of lower third of esophagus ([address] ) [C15 5]  Esophageal cancer ([address] ) [C15 9]  Weakness [R53 1]  Nausea & vomiting [R11 2]  Weight loss [R63 4]  Left ankle pain [M25 572]  Failure to thrive in adult [R62 7]  Mild protein-calorie malnutrition ([address] ) [E44 1]  Ambulatory dysfunction [R26 2]  Wound of left ankle, initial encounter [S91 002A]  Dysphagia, unspecified type [R13 10]    Problem List  Patient Active Problem List   Diagnosis    Current every day smoker    Weight loss    Dysphagia    Dermatitis    Malignant neoplasm of lower third of esophagus ([address] )    Mild protein-calorie malnutrition ([address] )    Left ankle pain    Severe protein-calorie malnutrition ([address] )       Past Medical History  Past Medical History:   Diagnosis Date    Cancer ([address] )     esophageal ca    Pneumonia     Polio     Rib fractures        Past Surgical History  History reviewed  No pertinent surgical history  02/07/22 0823   PT Last Visit   PT Visit Date 02/07/22   Note Type   Note type Evaluation   Pain Assessment   Pain Assessment Tool 0-10   Pain Score 5   Pain Location/Orientation Other (Comment); Orientation: Left; Location: Foot  (Location: Ankle)   Pain Onset/Description Onset: Ongoing;Frequency: Constant/Continuous   Hospital Pain Intervention(s) Repositioned   Multiple Pain Sites No   Restrictions/Precautions   Weight Bearing Precautions Per Order No  (maintained NWB LLE secondary to ongoing pain)   Braces or Orthoses Other (Comment)  (none at baseline, attempted use of air cast for ankle in ED)   Other Precautions Chair Alarm; Fall Risk;Pain;Bed Alarm;Multiple lines   Home Living   Type of Home Apartment   Home Layout Multi-level;Bed/bath upstairs  (2 TANI, 8 interior stairs to bed/bath)   Bathroom Shower/Tub Tub/shower unit   Bathroom Toilet Standard   Bathroom Equipment Shower chair   Bathroom Accessibility Accessible Home Equipment Cane   Prior Function   Level of [place] Independent with ADLs and functional mobility   Lives With Alone   Receives Help From Neighbor;Family  (daughter currently staying with patient prior to admission)   ADL Assistance Independent   IADLs Independent   Falls in the last 6 months 0   Vocational Part time employment   Comments pt's daughter provides transportation   General   Family/Caregiver Present No   Cognition   Overall Cognitive Status WFL   Arousal/Participation Cooperative   Attention Within functional limits   Orientation Level Oriented X4   Memory Within functional limits   Following Commands Follows all commands and directions without difficulty   Comments patient agreeable to PT eval   RUE Assessment   RUE Assessment X   RUE Strength   RUE Overall Strength Deficits   LUE Assessment   LUE Assessment X   LUE Strength   LUE Overall Strength Deficits   RLE Assessment   RLE Assessment X  (grossly assessed with functional mobility: at least 3/5)   LLE Assessment   LLE Assessment X   Strength LLE   L Ankle Dorsiflexion 2+/5   L Ankle Plantar Flexion 2+/5   Vision-Basic Assessment   Current Vision Does not wear glasses   Bed Mobility   Rolling L 4  Minimal assistance   Additional items Assist x 2; Increased time required;Verbal cues;LE management;HOB elevated   Supine to Sit 4  Minimal assistance   Additional items Increased time required;Verbal cues;HOB elevated;Assist x 1   Transfers   Sit to Stand 2  Maximal assistance   Additional items Assist x 2; Increased time required;Verbal cues  (maintained NWB LLE secondary to ankle pain)   Stand to Sit 3  Moderate assistance   Additional items Assist x 2; Increased time required;Verbal cues   Ambulation/Elevation   Gait pattern Not appropriate; Not tested   Balance   Static Sitting Fair +   Dynamic Sitting Fair   Static Standing Poor   Endurance Deficit   Endurance Deficit Yes   Activity Tolerance   Activity Tolerance Patient limited by pain;Patient limited by fatigue   Medical Staff Made Aware MADY Mott   Nurse Made Aware JUDSON Clark   Assessment   Prognosis Fair   Problem List Decreased strength; Impaired balance;Decreased mobility; Decreased range of motion;Decreased endurance;Pain   Assessment Pt is 68 y o  male seen for PT evaluation s/p admit to [facility] on 2022 w/ Left ankle pain  PT consulted to assess pt's functional mobility and d/c needs  Order placed for PT eval and tx, w/ up w/ A order  Performed at least 2 patient identifiers during session: Name and   Comorbidities affecting pt's physical performance at time of assessment include: Severe protein-calorie malnutrition, Malignant neoplasm of lower third of esophagus , Dysphagia, dermatitis  PTA, pt was independent w/ all functional mobility w/ SPC, ambulates community distances and elevations, has 2 TANI, lives alone in multi-level house and works part time  Personal factors affecting pt at time of IE include: inaccessible home environment, stairs to enter home, inability to ambulate household distances, inability to navigate community distances, inability to navigate level surfaces w/o external assistance, unable to perform dynamic tasks in community, inability to perform current job functions, inability to perform IADLs and inability to perform ADLs  Please find objective findings from PT assessment regarding body systems outlined above with impairments and limitations including weakness, decreased ROM, impaired balance, decreased endurance, pain, decreased activity tolerance, decreased functional mobility tolerance and fall risk  The following objective measures performed on IE also reveal limitations: Barthel Index: 35/100, Modified [place]: 4 (moderate/severe disability) and AM-PAC 6-Clicks:    Pt's clinical presentation is currently unstable/unpredictable seen in pt's presentation of ongoing medical management/monitoring, evident in need for assist w/ all phases of mobility when usually mobilizing independently, and fatigue and pain impacting overall mobility status  Pt to benefit from continued PT tx to address deficits as defined above and maximize level of functional independent mobility and consistency  From PT/mobility standpoint, recommendation at time of d/c would be post acute rehabilitation services pending progress in order to facilitate return to PLOF  Barriers to Discharge Decreased caregiver support; Inaccessible home environment   Goals   Patient Goals "to get back to normal, I need to beat this cancer"   STG Expiration Date 02/17/22   Short Term Goal #1 In 7-10 days: Increase bilateral LE strength 1/2 grade to facilitate independent mobility, Perform all bed mobility tasks modified independent to decrease caregiver burden, Perform all transfers with min A of 1 to improve independence and PT to see and establish goals for dynamic standing balance, ambulation and elevations when appropriate   PT Treatment Day 0   Plan   Treatment/Interventions Functional transfer training;LE strengthening/ROM; Therapeutic exercise; Endurance training;Patient/family training;Equipment eval/education; Bed mobility;Continued evaluation;Spoke to nursing;OT   PT Frequency 3-5x/wk   Recommendation   PT Discharge Recommendation Post acute rehabilitation services   Additional Comments The patient's AM-PAC Basic Mobility Inpatient Short Form Raw Score is 9  A Raw score of less than or equal to 16 suggests the patient may benefit from discharge to post-acute rehabilitation services  Please also refer to the recommendation of the Physical Therapist for safe discharge planning     AM-PAC Basic Mobility Inpatient   Turning in Bed Without Bedrails 3   Lying on Back to Sitting on Edge of Flat Bed 2   Moving Bed to Chair 1   Standing Up From Chair 1   Walk in Room 1   Climb 3-5 Stairs 1   Basic Mobility Inpatient Raw Score 9   Turning Head Towards Sound 4   Follow Simple Instructions 4   Low Function Basic Mobility Raw Score 17   Low Function Basic Mobility Standardized Score 27 46   Highest Level Of Mobility   [facility] Goal 3: Sit at edge of bed   [facility] Highest Level of Mobility 3: Sit at edge of bed   [facility] Goal Achieved Yes   Modified [place] Scale   Modified [place] Scale 4   Barthel Index   Feeding 0  (NPO - intermittent vomiting during evaluation)   Bathing 0   Grooming Score 5   Dressing Score 5   Bladder Score 5   Bowels Score 10   Toilet Use Score 5   Transfers (Bed/Chair) Score 5   Mobility (Level Surface) Score 0   Stairs Score 0   Barthel Index Score 35       Dat Sethi, PT, DPT

## 2022-02-07 NOTE — ASSESSMENT & PLAN NOTE
Background:  Presented to the ED with worsening left ankle pain that he had been taking Keflex PO as an outpatient for however unable to keep it down  · Initially with IV cefazolin; discontinued on 02/06; unlikely that this is infectious  · XR without osseous abnormality  · Podiatry consulted; input as noted below  · Pain and discoloration of thought to be secondary to vascular etiology  · Vascular surgery consulted  · Consider musculoskeletal/ortho achilles involvement    · LEADS ordered, not yet completed   · PT/OT consulted

## 2022-02-07 NOTE — UTILIZATION REVIEW
Inpatient Admission Authorization Request   NOTIFICATION OF INPATIENT ADMISSION/INPATIENT AUTHORIZATION REQUEST   SERVICING FACILITY:   [address] [place] [address]  Tax ID: [id number]  NPI: [id number]  Place of Service: Inpatient [facility]  [address]  Place of Service Code: 24     ATTENDING PROVIDER:  Attending Name and NPI#: Edi Palm [[id number]]  Address: [address] [place] [address]  Phone: [phone]     UTILIZATION REVIEW CONTACT:  Sean Hoffman Utilization   Network Utilization Review Department  Phone: [phone]  Fax [phone]  Email: [email or web address]     PHYSICIAN ADVISORY SERVICES:  FOR JBAI-UI-BKPJ REVIEW - MEDICAL NECESSITY DENIAL  Phone: [phone]  Fax: [phone]  Email: [email or web address]     TYPE OF REQUEST:  Inpatient Status     ADMISSION INFORMATION:  ADMISSION DATE/TIME: 2/6/22 11:19 AM  PATIENT DIAGNOSIS CODE/DESCRIPTION:  Malignant neoplasm of lower third of esophagus (HCC) [C15 5]  Esophageal cancer (HCC) [C15 9]  Weakness [R53 1]  Nausea & vomiting [R11 2]  Weight loss [R63 4]  Left ankle pain [M25 572]  Failure to thrive in adult [R62 7]  Mild protein-calorie malnutrition (HCC) [E44 1]  Ambulatory dysfunction [R26 2]  Wound of left ankle, initial encounter [S91 002A]  Dysphagia, unspecified type [R13 10]  DISCHARGE DATE/TIME: No discharge date for patient encounter  DISCHARGE DISPOSITION (IF DISCHARGED): Home with Home Health Care     IMPORTANT INFORMATION:  Please contact the Sean Hoffman directly with any questions or concerns regarding this request  Department voicemails are confidential     Send requests for admission clinical reviews, concurrent reviews, approvals, and administrative denials due to lack of clinical to fax [phone]

## 2022-02-07 NOTE — UTILIZATION REVIEW
Continued Stay Review    Date: 2/7                          Current Patient Class: IP  Current Level of Care: MS    HPI:76 y o  male initially admitted on  2/6    Assessment/Plan: IV abx stopped on 2/6 , L ankle that it is infectious  LEADs not yet completed  Plan for J tube placement 2/7 2/6 GI Consult   Recent dx esophageal adenocarcinoma  Cont regurgitation of food and liquid   Failure to thrive  Plan   schedule an EGD enteroscopy with J-tube placement; it is imperative that he have a J-tube and not a G tube so that he remains a surgical candidate  Cont liq diet , NPO after MN , PPI       2/6 Podiatry consult   Pain L ankle , PAD , stable open lesion L ankle   LEADs pending , vascular consult   2/6 Vascular Consult   c/o N/V and LLE ankle pain (worse with applied pressure and standing) w/ decreased ROM  Plan LEAD , consider asa, statin , f/u imaging        Vital Signs:     Pertinent Labs/Diagnostic Results:   Results from last 7 days   Lab Units 02/06/22  1304   SARS-COV-2  Negative     Results from last 7 days   Lab Units 02/07/22  0535 02/06/22  0509 02/05/22  1203   WBC Thousand/uL 11 11* 10 08 11 23*   HEMOGLOBIN g/dL 12 6 12 0 12 9   HEMATOCRIT % 39 3 37 0 39 8   PLATELETS Thousands/uL 331 281 290   NEUTROS ABS Thousands/µL 9 22*  --  9 18*     Results from last 7 days   Lab Units 02/07/22  0535 02/06/22  0509 02/05/22  1203   SODIUM mmol/L 147* 142 141   POTASSIUM mmol/L 3 7 3 7 3 8   CHLORIDE mmol/L 108 104 103   CO2 mmol/L 29 32 31   ANION GAP mmol/L 10 6 7   BUN mg/dL 37* 33* 28*   CREATININE mg/dL 1 04 1 12 1 09   EGFR ml/min/1 73sq m 69 63 65   CALCIUM mg/dL 9 6 9 2 9 6   MAGNESIUM mg/dL 2 4  --  2 3   PHOSPHORUS mg/dL 3 5  --   --      Results from last 7 days   Lab Units 02/07/22  0535 02/05/22  1203   AST U/L 7 10   ALT U/L 9* 14   ALK PHOS U/L 66 66   TOTAL PROTEIN g/dL 7 2 7 5   ALBUMIN g/dL 3 5 3 7   TOTAL BILIRUBIN mg/dL 0 73 0 58   BILIRUBIN DIRECT mg/dL  --  0 14     Results from last 7 days   Lab Units 02/01/22  0930   POC GLUCOSE mg/dl 141*     Results from last 7 days   Lab Units 02/07/22  0535 02/06/22  0509 02/05/22  1203   GLUCOSE RANDOM mg/dL 129 128 133     Results from last 7 days   Lab Units 02/05/22  1203   LACTIC ACID mmol/L 1 3     Results from last 7 days   Lab Units 02/06/22  1304   INFLUENZA A PCR  Negative   INFLUENZA B PCR  Negative   RSV PCR  Negative       Medications:   Scheduled Medications:  enoxaparin, 40 mg, Subcutaneous, Daily  pantoprazole, 40 mg, Intravenous, Q12H [facility]      Continuous IV Infusions:     PRN Meds:  acetaminophen, 650 mg, Oral, Q6H PRN  ondansetron, 4 mg, Intravenous, Q6H PRN  2/7 x2  oxyCODONE, 5 mg, Oral, Q4H PRN        Discharge Plan: TBD     Network Utilization Review Department  ATTENTION: Please call with any questions or concerns to [phone] and carefully listen to the prompts so that you are directed to the right person  All voicemails are confidential   Beth Roe all requests for admission clinical reviews, approved or denied determinations and any other requests to dedicated fax number below belonging to the campus where the patient is receiving treatment   List of dedicated fax numbers for the Facilities:  [address] DENIALS (Administrative/Medical Necessity) [phone]   [address] (Maternity/NICU/Pediatrics) [phone]   [address]  [phone]   [address] [address] [place] [address] [address] [phone] [address] [place] [address] [phone]

## 2022-02-07 NOTE — MALNUTRITION/BMI
This medical record reflects one or more clinical indicators suggestive of malnutrition and/or morbid obesity  Malnutrition Findings:   Adult Malnutrition type: Chronic illness  Adult Degree of Malnutrition: Other severe protein calorie malnutrition (related to inadequate energy intakes of less than 75% of estimated needs for >1 month, significant weight loss of 11 2% x 3 weeks, 26 4% x 1 year, visible wasting around Clavicle, Shoulders, Ribs; Treat-Enteral Nutrition via planned J-Tube)  Malnutrition Characteristics: Inadequate energy,Weight loss,Muscle loss,Fat loss    BMI Findings:  Adult BMI Classifications: Underweight < 18 5     Body mass index is 17 31 kg/m²  See Nutrition note dated 2/7/22 for additional details  Completed nutrition assessment is viewable in the nutrition documentation

## 2022-02-08 ENCOUNTER — TELEPHONE (OUTPATIENT)
Dept: VASCULAR SURGERY | Facility: CLINIC | Age: 77
End: 2022-02-08

## 2022-02-08 LAB
ANION GAP SERPL CALCULATED.3IONS-SCNC: 7 MMOL/L (ref 4–13)
BUN SERPL-MCNC: 35 MG/DL (ref 5–25)
CALCIUM SERPL-MCNC: 9 MG/DL (ref 8.3–10.1)
CHLORIDE SERPL-SCNC: 105 MMOL/L (ref 100–108)
CO2 SERPL-SCNC: 25 MMOL/L (ref 21–32)
CREAT SERPL-MCNC: 0.9 MG/DL (ref 0.6–1.3)
ERYTHROCYTE [DISTWIDTH] IN BLOOD BY AUTOMATED COUNT: 14.3 % (ref 11.6–15.1)
GFR SERPL CREATININE-BSD FRML MDRD: 82 ML/MIN/1.73SQ M
GLUCOSE SERPL-MCNC: 154 MG/DL (ref 65–140)
HCT VFR BLD AUTO: 43.7 % (ref 36.5–49.3)
HGB BLD-MCNC: 12.9 G/DL (ref 12–17)
MCH RBC QN AUTO: 29.9 PG (ref 26.8–34.3)
MCHC RBC AUTO-ENTMCNC: 29.5 G/DL (ref 31.4–37.4)
MCV RBC AUTO: 101 FL (ref 82–98)
PLATELET # BLD AUTO: 311 THOUSANDS/UL (ref 149–390)
PMV BLD AUTO: 11.4 FL (ref 8.9–12.7)
POTASSIUM SERPL-SCNC: 4.1 MMOL/L (ref 3.5–5.3)
RBC # BLD AUTO: 4.31 MILLION/UL (ref 3.88–5.62)
SODIUM SERPL-SCNC: 137 MMOL/L (ref 136–145)
WBC # BLD AUTO: 14.66 THOUSAND/UL (ref 4.31–10.16)

## 2022-02-08 PROCEDURE — 99232 SBSQ HOSP IP/OBS MODERATE 35: CPT | Performed by: NURSE PRACTITIONER

## 2022-02-08 PROCEDURE — 85027 COMPLETE CBC AUTOMATED: CPT | Performed by: INTERNAL MEDICINE

## 2022-02-08 PROCEDURE — 99231 SBSQ HOSP IP/OBS SF/LOW 25: CPT | Performed by: INTERNAL MEDICINE

## 2022-02-08 PROCEDURE — 80048 BASIC METABOLIC PNL TOTAL CA: CPT | Performed by: INTERNAL MEDICINE

## 2022-02-08 PROCEDURE — NC001 PR NO CHARGE: Performed by: PHYSICIAN ASSISTANT

## 2022-02-08 PROCEDURE — 99232 SBSQ HOSP IP/OBS MODERATE 35: CPT | Performed by: PHYSICIAN ASSISTANT

## 2022-02-08 PROCEDURE — C9113 INJ PANTOPRAZOLE SODIUM, VIA: HCPCS | Performed by: INTERNAL MEDICINE

## 2022-02-08 RX ORDER — LIDOCAINE 50 MG/G
1 PATCH TOPICAL DAILY
Status: DISCONTINUED | OUTPATIENT
Start: 2022-02-08 | End: 2022-02-12 | Stop reason: HOSPADM

## 2022-02-08 RX ORDER — ASPIRIN 81 MG/1
81 TABLET, CHEWABLE ORAL DAILY
Status: DISCONTINUED | OUTPATIENT
Start: 2022-02-08 | End: 2022-02-12 | Stop reason: HOSPADM

## 2022-02-08 RX ORDER — FAMOTIDINE 20 MG/1
20 TABLET, FILM COATED ORAL DAILY
Status: DISCONTINUED | OUTPATIENT
Start: 2022-02-08 | End: 2022-02-08

## 2022-02-08 RX ORDER — FAMOTIDINE 40 MG/5ML
20 POWDER, FOR SUSPENSION ORAL DAILY
Status: DISCONTINUED | OUTPATIENT
Start: 2022-02-08 | End: 2022-02-12 | Stop reason: HOSPADM

## 2022-02-08 RX ADMIN — PANTOPRAZOLE SODIUM 40 MG: 40 INJECTION, POWDER, FOR SOLUTION INTRAVENOUS at 08:00

## 2022-02-08 RX ADMIN — ASPIRIN 81 MG: 81 TABLET, CHEWABLE ORAL at 12:49

## 2022-02-08 RX ADMIN — DICLOFENAC SODIUM 2 G: 10 GEL TOPICAL at 17:40

## 2022-02-08 RX ADMIN — LIDOCAINE 5% 1 PATCH: 700 PATCH TOPICAL at 11:03

## 2022-02-08 RX ADMIN — PANTOPRAZOLE SODIUM 40 MG: 40 INJECTION, POWDER, FOR SOLUTION INTRAVENOUS at 22:16

## 2022-02-08 RX ADMIN — FAMOTIDINE 20 MG: 40 POWDER, FOR SUSPENSION ORAL at 12:49

## 2022-02-08 RX ADMIN — ENOXAPARIN SODIUM 40 MG: 40 INJECTION SUBCUTANEOUS at 08:00

## 2022-02-08 RX ADMIN — DICLOFENAC SODIUM 2 G: 10 GEL TOPICAL at 22:16

## 2022-02-08 RX ADMIN — DICLOFENAC SODIUM 2 G: 10 GEL TOPICAL at 12:49

## 2022-02-08 NOTE — ASSESSMENT & PLAN NOTE
Malnutrition Findings:   Adult Malnutrition type: Chronic illness  Adult Degree of Malnutrition: Other severe protein calorie malnutrition (related to inadequate energy intakes of less than 75% of estimated needs for >1 month, significant weight loss of 11 2% x 3 weeks, 26 4% x 1 year, visible wasting around Clavicle, Shoulders, Ribs; Treat-Enteral Nutrition via planned J-Tube)    BMI Findings:  Adult BMI Classifications: Underweight < 18 5     Body mass index is 17 31 kg/m²       J tube for feeding  Nutrition consult appreciated

## 2022-02-08 NOTE — PROGRESS NOTES
Progress Note  Wilberto Crawford 68 y o  male MRN: [id number]  Unit/Bed#: -01 Encounter: [id number]    Subjective:  Patient denies abdominal pain with the exception of some soreness at the PEJ site  No nausea/vomiting  No fevers  Objective:     Vitals:   Vitals:    02/08/22 0811   BP: 116/72   Pulse: 76   Resp:    Temp: 97 6 °F (36 4 °C)   SpO2: 98%   ,Body mass index is 17 31 kg/m²        Intake/Output Summary (Last 24 hours) at 2/8/2022 0942  Last data filed at 2/7/2022 2111  Gross per 24 hour   Intake 380 ml   Output 400 ml   Net -20 ml       Physical Exam:     General Appearance: Alert, appears stated age and cooperative  Lungs: Clear to auscultation bilaterally, no rales or rhonchi, no labored breathing/accessory muscle use  Heart: Regular rate and rhythm, S1, S2 normal, no murmur, click, rub or gallop  Abdomen: Soft, non-tender, non-distended; bowel sounds normal; no masses or no organomegaly, s/p PEJ c/d/i  Extremities: No cyanosis, edema    Invasive Devices  Report    Peripheral Intravenous Line            Peripheral IV 02/05/22 Right Antecubital 2 days    Peripheral IV 02/07/22 Left Forearm <1 day          Drain            Gastrostomy/Enterostomy PEG-jejunostomy 20 Fr  LLQ <1 day                Lab Results:  Admission on 02/05/2022   Component Date Value    WBC 02/05/2022 11 23*    RBC 02/05/2022 4 50     Hemoglobin 02/05/2022 12 9     Hematocrit 02/05/2022 39 8     MCV 02/05/2022 88     MCH 02/05/2022 28 7     MCHC 02/05/2022 32 4     RDW 02/05/2022 14 6     MPV 02/05/2022 10 9     Platelets 24/74/7909 290     nRBC 02/05/2022 0     Neutrophils Relative 02/05/2022 81*    Immat GRANS % 02/05/2022 1     Lymphocytes Relative 02/05/2022 7*    Monocytes Relative 02/05/2022 9     Eosinophils Relative 02/05/2022 1     Basophils Relative 02/05/2022 1     Neutrophils Absolute 02/05/2022 9 18*    Immature Grans Absolute 02/05/2022 0 15     Lymphocytes Absolute 02/05/2022 0 79     Monocytes Absolute 02/05/2022 0 98     Eosinophils Absolute 02/05/2022 0 06     Basophils Absolute 02/05/2022 0 07     Sodium 02/05/2022 141     Potassium 02/05/2022 3 8     Chloride 02/05/2022 103     CO2 02/05/2022 31     ANION GAP 02/05/2022 7     BUN 02/05/2022 28*    Creatinine 02/05/2022 1 09     Glucose 02/05/2022 133     Calcium 02/05/2022 9 6     eGFR 02/05/2022 65     Total Bilirubin 02/05/2022 0 58     Bilirubin, Direct 02/05/2022 0 14     Alkaline Phosphatase 02/05/2022 66     AST 02/05/2022 10     ALT 02/05/2022 14     Total Protein 02/05/2022 7 5     Albumin 02/05/2022 3 7     LACTIC ACID 02/05/2022 1 3     Magnesium 02/05/2022 2 3     Ventricular Rate 02/05/2022 70     Atrial Rate 02/05/2022 70     QRSD Interval 02/05/2022 128     QT Interval 02/05/2022 426     QTC Interval 02/05/2022 460     QRS Axis 02/05/2022 71     T Wave Axis 02/05/2022 28     Sodium 02/06/2022 142     Potassium 02/06/2022 3 7     Chloride 02/06/2022 104     CO2 02/06/2022 32     ANION GAP 02/06/2022 6     BUN 02/06/2022 33*    Creatinine 02/06/2022 1 12     Glucose 02/06/2022 128     Calcium 02/06/2022 9 2     eGFR 02/06/2022 63     WBC 02/06/2022 10 08     RBC 02/06/2022 4 08     Hemoglobin 02/06/2022 12 0     Hematocrit 02/06/2022 37 0     MCV 02/06/2022 91     MCH 02/06/2022 29 4     MCHC 02/06/2022 32 4     RDW 02/06/2022 14 5     Platelets 54/89/3059 281     MPV 02/06/2022 11 3     SARS-CoV-2 02/06/2022 Negative     INFLUENZA A PCR 02/06/2022 Negative     INFLUENZA B PCR 02/06/2022 Negative     RSV PCR 02/06/2022 Negative     WBC 02/07/2022 11 11*    RBC 02/07/2022 4 34     Hemoglobin 02/07/2022 12 6     Hematocrit 02/07/2022 39 3     MCV 02/07/2022 91     MCH 02/07/2022 29 0     MCHC 02/07/2022 32 1     RDW 02/07/2022 14 4     MPV 02/07/2022 11 3     Platelets 49/43/4000 331     nRBC 02/07/2022 0     Neutrophils Relative 02/07/2022 82*    Immat GRANS % 02/07/2022 1  Lymphocytes Relative 02/07/2022 6*    Monocytes Relative 02/07/2022 9     Eosinophils Relative 02/07/2022 1     Basophils Relative 02/07/2022 1     Neutrophils Absolute 02/07/2022 9 22*    Immature Grans Absolute 02/07/2022 0 16     Lymphocytes Absolute 02/07/2022 0 66     Monocytes Absolute 02/07/2022 0 94     Eosinophils Absolute 02/07/2022 0 06     Basophils Absolute 02/07/2022 0 07     Sodium 02/07/2022 147*    Potassium 02/07/2022 3 7     Chloride 02/07/2022 108     CO2 02/07/2022 29     ANION GAP 02/07/2022 10     BUN 02/07/2022 37*    Creatinine 02/07/2022 1 04     Glucose 02/07/2022 129     Calcium 02/07/2022 9 6     AST 02/07/2022 7     ALT 02/07/2022 9*    Alkaline Phosphatase 02/07/2022 66     Total Protein 02/07/2022 7 2     Albumin 02/07/2022 3 5     Total Bilirubin 02/07/2022 0 73     eGFR 02/07/2022 69     Magnesium 02/07/2022 2 4     Phosphorus 02/07/2022 3 5     Sodium 02/08/2022 137     Potassium 02/08/2022 4 1     Chloride 02/08/2022 105     CO2 02/08/2022 25     ANION GAP 02/08/2022 7     BUN 02/08/2022 35*    Creatinine 02/08/2022 0 90     Glucose 02/08/2022 154*    Calcium 02/08/2022 9 0     eGFR 02/08/2022 82     WBC 02/08/2022 14 66*    RBC 02/08/2022 4 31     Hemoglobin 02/08/2022 12 9     Hematocrit 02/08/2022 43 7     MCV 02/08/2022 101*    MCH 02/08/2022 29 9     MCHC 02/08/2022 29 5*    RDW 02/08/2022 14 3     Platelets 55/81/6172 311     MPV 02/08/2022 11 4        Imaging Studies:   I have personally reviewed pertinent imaging studies  EGD    Addendum Date: 1/23/2022 Addendum:    [address] [address] [phone] DATE OF SERVICE: 1/13/22 PHYSICIAN(S): Deep Kunz DO - Attending Physician INDICATION: Dysphagia, unspecified type POST-OP DIAGNOSIS: See the impression below  PREPROCEDURE: Informed consent was obtained for the procedure, including sedation    Risks of perforation, hemorrhage, adverse drug reaction and aspiration were discussed  The patient was placed in the left lateral decubitus position  Patient was explained about the risks and benefits of the procedure  Risks including but not limited to bleeding, infection, and perforation were explained in detail  Also explained about less than 100% sensitivity with the exam and other alternatives  DETAILS OF PROCEDURE: Patient was taken to the procedure room where a time out was performed to confirm correct patient and correct procedure  The patient underwent monitored anesthesia care, which was administered by an anesthesia professional  The patient's blood pressure, heart rate, level of consciousness, respirations and oxygen were monitored throughout the procedure  The scope was advanced to the second part of the duodenum  Retroflexion was performed in the cardia and fundus  Prior to the procedure, the patient's H  Pylori status was unknown  The patient's estimated blood loss was minimal (<5 mL)  The procedure was not difficult  The patient tolerated the procedure well  There were no apparent complications   ANESTHESIA INFORMATION: ASA: II Anesthesia Type: IV Sedation with Anesthesia MEDICATIONS: No administrations occurring from 1230 to 1245 on 01/13/22 FINDINGS: Fungating and malignant-appearing mass (traversable) measuring 70 mm in the lower third of the esophagus (34 cm from the incisors), covering the whole circumference,; bleeding occurred before and after intervention; performed cold forceps biopsy 3 fungating and multilobular masses (traversable) in the cardia, covering one third of the circumference,; bleeding occurred after intervention; performed partial removal by cold forceps biopsy The fundus of the stomach, body of the stomach, incisura, antrum, prepyloric region and pylorus appeared normal  The duodenal bulb and 2nd part of the duodenum appeared normal  SPECIMENS: ID Type Source Tests Collected by Time Destination 1 : cardia Tissue Stomach TISSUE Desirae Beth DO 1/13/2022 12:44 PM  2 : distal Tissue Esophagus TISSUE EXAM Layo Harden DO 1/13/2022 12:45 PM  IMPRESSION: 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 with additional nodular lesions in the cardia of the stomach, biopsies pending, most likely adenocarcinoma RECOMMENDATION: 1  Patient will need a CT scan of the chest, abdomen, and pelvis with oral and IV contrast 2  Schedule a CT PET scan 3  Referral to Surgical Oncology  Layo Harden DO, [facility] [place]    Result Date: 1/23/2022  Narrative:  [id number] [facility] [address] [place] [address] [phone] DATE OF SERVICE: 1/13/22 PHYSICIAN(S): Layo Harden DO - Attending Physician INDICATION: Dysphagia, unspecified type POST-OP DIAGNOSIS: See the impression below  PREPROCEDURE: Informed consent was obtained for the procedure, including sedation  Risks of perforation, hemorrhage, adverse drug reaction and aspiration were discussed  The patient was placed in the left lateral decubitus position  Patient was explained about the risks and benefits of the procedure  Risks including but not limited to bleeding, infection, and perforation were explained in detail  Also explained about less than 100% sensitivity with the exam and other alternatives  DETAILS OF PROCEDURE: Patient was taken to the procedure room where a time out was performed to confirm correct patient and correct procedure  The patient underwent monitored anesthesia care, which was administered by an anesthesia professional  The patient's blood pressure, heart rate, level of consciousness, respirations and oxygen were monitored throughout the procedure  The scope was advanced to the second part of the duodenum  Retroflexion was performed in the cardia and fundus  Prior to the procedure, the patient's H  Pylori status was unknown  The patient's estimated blood loss was minimal (<5 mL)  The procedure was not difficult   The patient tolerated the procedure well  There were no apparent complications  ANESTHESIA INFORMATION: ASA: II Anesthesia Type: IV Sedation with Anesthesia MEDICATIONS: No administrations occurring from 1230 to 1245 on 01/13/22 FINDINGS: Fungating and malignant-appearing mass (traversable) measuring 70 mm in the lower third of the esophagus (34 cm from the incisors), covering the whole circumference,; bleeding occurred before and after intervention; performed cold forceps biopsy 3 fungating and multilobular masses (traversable) in the cardia, covering one third of the circumference,; bleeding occurred after intervention; performed partial removal by cold forceps biopsy The fundus of the stomach, body of the stomach, incisura, antrum, prepyloric region and pylorus appeared normal  The duodenal bulb and 2nd part of the duodenum appeared normal  SPECIMENS: ID Type Source Tests Collected by Time Destination 1 : cardia Tissue Stomach TISSUE EXAM Layo Harden DO 1/13/2022 12:44 PM  2 : distal Tissue Esophagus TISSUE EXAM Layo Harden DO 1/13/2022 12:45 PM      Impression: 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 with additional nodular lesions in the cardia of the stomach, biopsies pending, most likely adenocarcinoma RECOMMENDATION: 1  Patient will need a CT scan of the chest, abdomen, and pelvis with oral and IV contrast 2  Schedule a CT PET scan 3  Referral to Surgical Oncology  Layo Harden DO, [facility] [place], [place]     XR ankle 3+ views LEFT    Result Date: 2/6/2022  Narrative: LEFT ANKLE INDICATION:   pain, decreased ROM  COMPARISON:  None VIEWS:  XR ANKLE 3+ VW LEFT FINDINGS: There is no acute fracture or dislocation  No significant degenerative changes  No lytic or blastic osseous lesion  Soft tissues are unremarkable  Impression: No acute osseous abnormality   Workstation performed: [id number]     CT chest abdomen pelvis w contrast    Result Date: 1/26/2022  Narrative: CT CHEST, ABDOMEN AND PELVIS WITH IV CONTRAST INDICATION: R13 10: Dysphagia, unspecified R63 4: Abnormal weight loss  COMPARISON:  Compared with 10/10/2019 TECHNIQUE: CT examination of the chest, abdomen and pelvis was performed  Axial, sagittal, and coronal 2D reformatted images were created from the source data and submitted for interpretation  Radiation dose length product (DLP) for this visit:  812 mGy-cm   This examination, like all CT scans performed in the [facility], was performed utilizing techniques to minimize radiation dose exposure, including the use of iterative reconstruction and automated exposure control  IV Contrast:  100 mL of iohexol (OMNIPAQUE) Enteric Contrast: Enteric contrast was administered  FINDINGS: CHEST LUNGS:  Lungs are clear  There is no tracheal or endobronchial lesion  PLEURA:  Unremarkable  HEART/GREAT VESSELS: Heart is unremarkable for patient's age  No thoracic aortic aneurysm  MEDIASTINUM AND BRANDEE:  Esophagus is dilated with proximal and mid esophageal circumferential wall thickening with distal esophagus demonstrating an eccentric mass posteriorly measuring 1 8 x 2 2 x 3 6 cm extending into the GE junction  Posterior to this however this is a 1 3 cm soft tissue nodule in series 2 image 37  Lymph node with central necrosis anterior to the GE junction measuring 1 2 x 2 4 x 3 5 cm  Extension beyond the esophageal wall seen  Smaller lymph node measuring 1 0 cm seen inferiorly  Right infrahilar soft tissue density measuring 1 0 x 1 4 cm in series 301 image 33 appears to be within the bronchial lumen and demonstrates a branching pattern on the coronal views and was seen on prior CT  This is nonocclusive    CHEST WALL AND LOWER NECK:   Unremarkable  ABDOMEN LIVER/BILIARY TREE:  Unremarkable  GALLBLADDER:  No calcified gallstones  No pericholecystic inflammatory change  SPLEEN:  Unremarkable  PANCREAS:  Unremarkable  ADRENAL GLANDS:  Unremarkable  KIDNEYS/URETERS:  Both kidneys enhance symmetrically    Left kidney is smaller with a lobulated contour and unchanged  No hydronephrosis  STOMACH AND BOWEL:  Stool filled colon  APPENDIX:  No findings to suggest appendicitis  ABDOMINOPELVIC CAVITY:  No ascites  No pneumoperitoneum  No lymphadenopathy  VESSELS:  Aneurysmal infrarenal abdominal aorta measuring 3 3 cm and unchanged PELVIS REPRODUCTIVE ORGANS:  Enlarged heterogeneous prostate measuring 6 0 x 5 4 cm  URINARY BLADDER:  Unremarkable  ABDOMINAL WALL/INGUINAL REGIONS:  Unremarkable  OSSEOUS STRUCTURES: 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 and unchanged  Severe right hip degenerative changes  No acute fracture or destructive osseous lesion  Impression: New mid to distal esophageal wall thickening with large eccentric mass in the posterior distal esophagus extending into the GE junction and beyond the esophageal wall with pathologic lymph nodes anteriorly with central necrosis of esophageal malignancy   This correlates with the recent EGD finding of 1/13/2022  Right lower lobe endobronchial mass possibly mucous plugging with extension into the bronchial branches is chronic and was seen on prior CT of 10/10/2019 with no obstruction  This can be evaluated when PET scan is performed as indicated on EGD report  The study was marked in EPIC for significant notification  Workstation performed: [id number]     EGD with Push Enteroscopy    Addendum Date: 2/7/2022 Addendum:    [address] [facility] [address] [place] [id number] [phone] DATE OF SERVICE: 2/07/22 PHYSICIAN(S): Sujey Lim DO Proceduralist Obstructing esophageal cancer INDICATION: Malignant neoplasm of lower third of esophagus ([address] ), Weight loss, Mild protein-calorie malnutrition ([address] ), Dysphagia, unspecified type POST-OP DIAGNOSIS: See the impression below  PREPROCEDURE: Informed consent was obtained for the procedure, including sedation    Risks of perforation, hemorrhage, adverse drug reaction and aspiration were discussed  The patient was placed in the left lateral decubitus position  Patient was explained about the risks and benefits of the procedure  Risks including but not limited to bleeding, infection, and perforation were explained in detail  Also explained about less than 100% sensitivity with the exam and other alternatives  DETAILS OF PROCEDURE: Patient was taken to the procedure room where a time out was performed to confirm correct patient and correct procedure  The patient underwent monitored anesthesia care, which was administered by an anesthesia professional  The patient's blood pressure, oxygen, respirations, heart rate and level of consciousness were monitored throughout the procedure  The scope was advanced to the proximal part of the jejunum  The procedure was performed without an overtube  Fluoroscopy was not used  Retroflexion was performed in the cardia and fundus  The patient's estimated blood loss was minimal (<5 mL)  The procedure was moderately difficult due to esophageal cancer  The patient tolerated the procedure well  There were no apparent complications   ANESTHESIA INFORMATION: ASA: IV Anesthesia Type: IV Sedation with Anesthesia MEDICATIONS: No administrations occurring from 1121 to 1154 on 02/07/22 FINDINGS: Mass (traversable after intervention) measuring 8 mm in the lower third of the esophagus (32 cm from the incisors), covering the whole circumference; dilated with balloon dilator from 12 mm starting size Multiple fungating and malignant-appearing masses (traversable) in the cardia; bleeding occurred after intervention The fundus of the stomach, body of the stomach, incisura, antrum, prepyloric region and pylorus appeared normal  The duodenal bulb, 2nd part of the duodenum, 3rd part of the duodenum and 4th part of the duodenum appeared normal  The proximal jejunum appeared normal  PEJ tube placed using a deformable internal bolster via the pull technique after the site was identified via transillumination and needle passed through abdominal wall; distance from external bolster to external end of tube: 2 5 cm; scope reinserted to confirm placement SPECIMENS: * No specimens in log * IMPRESSION: 1  Obstructing esophageal cancer from 32-40 cm from the incisors; small masses appear in the cardia as well, status post through the scope balloon dilation of the esophagus to facilitate passage of the enteroscope 2  Status post feeding tube placement either into the distal duodenum or proximal jejunum RECOMMENDATION: 1  Can begin enteral feedings tomorrow 2  Okay to give water and meds through the PEJ tube  Woo Ortiz DO [facility] [place], [place]    Result Date: 2/7/2022  Narrative:  [facility] [address] [place] [id number] [phone] DATE OF SERVICE: 2/07/22 PHYSICIAN(S): Woo Ortiz DO Proceduralist Obstructing esophageal cancer INDICATION: Malignant neoplasm of lower third of esophagus ([address] ), Weight loss, Mild protein-calorie malnutrition ([address] ), Dysphagia, unspecified type POST-OP DIAGNOSIS: See the impression below  PREPROCEDURE: Informed consent was obtained for the procedure, including sedation  Risks of perforation, hemorrhage, adverse drug reaction and aspiration were discussed  The patient was placed in the left lateral decubitus position  Patient was explained about the risks and benefits of the procedure  Risks including but not limited to bleeding, infection, and perforation were explained in detail  Also explained about less than 100% sensitivity with the exam and other alternatives  DETAILS OF PROCEDURE: Patient was taken to the procedure room where a time out was performed to confirm correct patient and correct procedure   The patient underwent monitored anesthesia care, which was administered by an anesthesia professional  The patient's blood pressure, oxygen, respirations, heart rate and level of consciousness were monitored throughout the procedure  The scope was advanced to the proximal part of the jejunum  The procedure was performed without an overtube  Fluoroscopy was not used  Retroflexion was performed in the cardia and fundus  The patient's estimated blood loss was minimal (<5 mL)  The procedure was moderately difficult due to esophageal cancer  The patient tolerated the procedure well  There were no apparent complications  ANESTHESIA INFORMATION: ASA: IV Anesthesia Type: IV Sedation with Anesthesia MEDICATIONS: No administrations occurring from 1121 to 1154 on 02/07/22 FINDINGS: Mass (traversable after intervention) measuring 8 mm in the lower third of the esophagus (32 cm from the incisors), covering the whole circumference; dilated with balloon dilator from 12 mm starting size Multiple fungating and malignant-appearing masses (traversable) in the cardia; bleeding occurred after intervention The fundus of the stomach, body of the stomach, incisura, antrum, prepyloric region and pylorus appeared normal  The duodenal bulb, 2nd part of the duodenum, 3rd part of the duodenum and 4th part of the duodenum appeared normal  The proximal jejunum appeared normal  PEJ tube placed using a deformable internal bolster via the pull technique after the site was identified via transillumination and needle passed through abdominal wall; distance from external bolster to external end of tube: 2 5 cm; scope reinserted to confirm placement SPECIMENS: * No specimens in log *     Impression: 1  Obstructing esophageal cancer from 32-40 cm from the incisors; small masses appear in the cardia as well 2  Status post feeding tube placement either into the distal duodenum or proximal jejunum RECOMMENDATION: 1  Can begin enteral feedings tomorrow 2   Okay to give water and meds through the PEJ tube  Mason Callow, DO [facility] PET CT skull base to mid thigh    Result Date: 2/1/2022  Narrative: PET/CT SCAN INDICATION:  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   , distal esophageal carcinoma for staging MODIFIER: PI COMPARISON: CT of chest, abdomen, and pelvis dated 1/24/2022 CELL TYPE:  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 (bx distal esophagus 1/13/22) TECHNIQUE:   8 7 mCi F-18-FDG administered IV  Multiplanar attenuation corrected and non attenuation corrected PET images were acquired 60 minutes post injection  Contiguous, low dose, axial CT sections were obtained from the skull base through the femurs   Intravenous contrast material was not utilized  This examination, like all CT scans performed in the [facility], was performed utilizing techniques to minimize radiation dose exposure, including the use of iterative reconstruction and automated exposure control  Fasting serum glucose: 141 mg/dl FINDINGS: VISUALIZED BRAIN:   No acute abnormalities are seen  HEAD/NECK:   There is a physiologic distribution of FDG  No FDG avid cervical adenopathy is seen  CT images: Unremarkable  CHEST:   There is segmental FDG activity associated with mural thickening extending from the distal esophagus through the proximal stomach with max SUV of 10 9 consistent with patient's biopsy-proven esophageal carcinoma  On image 104 of series 3, there is mild FDG activity associated with soft tissue density posterior to the mid to distal esophagus and adjacent to the descending thoracic aorta measuring approximately 1 2 cm with Max SUV of 2 8 suspicious for possible posterior mediastinal/ paraesophageal andry metastatic disease  On image 97 of series 12, there is a small focus of mild FDG activity in the right hilar region without definite soft tissue correlate and with max SUV of 2 9 of uncertain clinical significance  This finding could reflect reactive/inflammatory lymph node although attention to this region on follow-up imaging is recommended to exclude developing andry metastatic disease   CT images: Atherosclerotic vascular calcifications including those of the coronary arteries are noted  Non-FDG avid filling defect within right lower lobe bronchus is noted on image 98 of series 3 with associated reticulonodular/tree-in-bud infiltrate most consistent with an infectious/inflammatory process such as pneumonia  ABDOMEN:   Best seen on image 135 is mildly hypermetabolic perigastric/gastrohepatic ligament soft tissue measuring 1 9 x 2 8 cm with Max SUV of 3 5 consistent with andry metastatic disease  Additional smaller non-FDG avid upper abdominal lymph nodes are noted which are suspicious for andry metastatic disease  There is nonuniform FDG activity involving the bowel with more prominent concentric FDG activity involving the cecum/proximal ascending colon with max SUV of 8 8 on image 196 of series 12  Consider correlation with screening colonoscopy as clinically indicated given the more localized nature of this FDG activity  CT images: Abdominal aortic aneurysm measuring 3 8 cm  Atherosclerotic vascular calcifications are noted  Asymmetric lobulated contour to the left kidney which appears mildly atrophic, particularly towards the upper pole  PELVIS: No FDG avid soft tissue lesions are seen  CT images: Enlarged prostate gland  OSSEOUS STRUCTURES: No FDG avid lesions are seen  CT images: Severe degenerative changes involving the right hip  Degenerative changes involving the spine  Possible osteopenia  Impression: 1  Hypermetabolic distal esophageal carcinoma which extends into the proximal stomach with paraesophageal/perigastric/upper abdominal andry metastatic disease  2   Concentric FDG activity involving the cecum/proximal ascending colon of uncertain clinical significance  Given the localized nature of FDG activity, consider correlation with screening colonoscopy to exclude underlying colonic malignancy   3   Indeterminant non-FDG avid avid filling defect within right lower lobe bronchus with associated more distal tree-in-bud infiltrate  Findings could reflect mucous plugging with obstructive malignant process of low metabolic activity not excluded  4   3 8 cm infrarenal abdominal aortic aneurysm  Please see above for details and additional findings  The study was marked in EPIC for significant notification  Workstation performed: [id number]     VAS lower limb arterial duplex, complete bilateral    Result Date: 2/7/2022  Narrative:  [facility] REPORT CLINICAL: Indications: Patient presents with an ulcer on his left ankle which started two months ago  The patient reports left lower extremity calf pain with rest  Operative History: No prior cardiovascular surgeries Risk Factors The patient has history of esophageal cancer and smoking (current) 1-2 ppd  Clinical Right Pressure:  141/ mm Hg, Left Pressure: IV site  FINDINGS:  Segment                Right                        Left                                          Impression  PSV (cm/s)  EDV  Impression  PSV (cm/s)  EDV  Common Femoral Artery                      76    0                      77    6  Prox Profunda          50-75%             153    0                      63    0  Prox SFA               Occluded             0    0                      79   14  Mid SFA                Occluded             0    0                     110   25  Dist SFA               Occluded             0    0  >75%               621  148  Proximal Pop                               65   10                      34    8  Distal Pop             >75%               136   21                      37   12  Tibioperoneal                              25    4                      70   16  Prox Post Tibial                            9    0                               Dist Post Tibial       Occluded             0    0                      28   18  Prox  Ant  Tibial                          25    6  >75%               199   48  Dist  Ant   Tibial 29   10                      32   17  Dist Peroneal                              20    6                                  CONCLUSION:  Impression:  RIGHT LOWER LIMB: Evidence suggestive of high grade stenosis vs occlusion of the proximal-distal superficial femoral artery with reconstitution at the proximal popliteal artery  50-75% stenosis noted in the profunda femoral artery and >75% stenosis noted in the distal popliteal artery  There is high grade stenosis vs occlusion of the distal posterior tibial artery  Evidence suggestive of Tibial-Peroneal diffuse disease  Ankle/Brachial index: 0 74 which is in the moderate disease category PVR/ PPG tracings are dampened  Metatarsal pressure of 99 mmHg Great toe pressure of 60 mmHg, within the healing range  LEFT LOWER LIMB: >75% stenosis noted in the distal superficial femoral artery and at the origin of the anterior tibial artery  Evidence suggestive of Tibial-Peroneal diffuse disease  Ankle/Brachial index: 0 56 which is in the severe disease category PVR/ PPG tracings are dampened  Metatarsal pressure of 51 mmHg Great toe pressure of 26 mmHg, below the healing range  No prior arterial duplex study for comparison  SIGNATURE: Electronically Signed by: Mellisa Canela MD, RPVI on 2022-02-07 09:34:18 PM        Assessment & Plan    GE junction adenocarcinoma  S/p PEJ    - Patient is status post push enteroscopy with PEJ placement 2/7   - Okay to begin enteral feedings today per nutrition recommendations   - PEJ care  - Follow up with medical oncology and surgical oncology as an outpatient for treatment of his GE junction adenocarcinoma  GI will sign off, please contact with any concerns/questions       Betsy Guillory PA-C  2/8/2022,9:42 AM

## 2022-02-08 NOTE — ASSESSMENT & PLAN NOTE
80-year-old male, smoker, with recently diagnosed esophageal cancer, protein-calorie malnutrition and h/o polio presented with c/o N/V and LLE ankle pain (worse with applied pressure and standing) w/ decreased ROM  Vascular was consulted for evaluation and recommendations  Diagnostics:  - LEAD 2/7/22: RIGHT:Evidence suggestive of high grade stenosis vs occlusion of the proximal-distal superficial femoral artery with reconstitution at the proximal popliteal artery  50-75% stenosis noted in the profunda femoral artery and >75% stenosis noted in  the distal popliteal artery  There is high grade stenosis vs occlusion of the distal posterior tibial artery  Evidence suggestive of Tibial-Peroneal diffuse disease  SUMMER 0 74/99/60   LEFT:>75% stenosis noted in the distal superficial femoral artery and at the origin of the anterior tibial artery  Evidence suggestive of Tibial-Peroneal diffuse disease  SUMMER: 0 56/51/26  - x-ray 2/5/22: No acute osseous abnormality    Reccomendations:    - Bilateral lower extremity PAD most likely chronic  LLE ankle pain unlikely r/t underlying PAD  Left foot is warm, perfused with diminished range of motion, and normal sensation w/ PT/ DP Doppler signals w/o rest pain  - consider Ortho consult for ankle/ achilles pain  - s/p J-tube placement 2/7/22  - local wound care per podiatry/medicine team  - consider ASA IF ok per GI and not otherwise contraindicated 2/2 comorbidities / esophageal ca  - consider statin  - smoking cessation   - will follow as out patient,scheduling in process  - d/w [facility] team  - d/w Podiatry   - discsused with Dr Susie Chadwick  - Please reach out if anything further is needed

## 2022-02-08 NOTE — ASSESSMENT & PLAN NOTE
Background:  Presented to the ED with worsening left ankle pain that he had been taking Keflex PO as an outpatient for however unable to keep it down  · Initially with IV cefazolin; discontinued on 02/06; unlikely that this is infectious  · XR without osseous abnormality  · Podiatry consulted; input as noted below  · Pain and discoloration of thought to be secondary to vascular etiology  · Vascular surgery consulted  · Consider musculoskeletal/ortho achilles involvement    · Arterial duplex 2/7 showing significant PAD, vascular to follow up outpatient   · Resume aspirin  · PT/OT consulted - recommending STR   · Suspect ankle pain component of plantar fasciitis - trial voltaren, lidoderm, ice, and therapy

## 2022-02-08 NOTE — WOUND OSTOMY CARE
Progress Note - Wound   Darrell Keen 68 y o  male MRN: [id number]  Unit/Bed#: -01 Encounter: [id number]      Assessment:   Patient admitted due to left ankle pain  History of cancer and polio  Wound care consulted for sacral wound  Patient agreeable to assessment, alert and oriented x4, continent of bowel and bladder, assist of 1-2 to turn for assessment, heels elevated off of mattress, PEG tube in place with tube feeds being administered, is and assist with care  Primary RN at bedside for assessment  Nutrition is following  Podiatry and vascular are following for left ankle wound  1  Pressure injury mid sacrum, stage 2-HA- Wound is oval/linear in shape, moist, partial thickness, 100% non-blanchable pink tissue, with scant amount of serous drainage  Cecile-wound is dry, intact, blanchable pink skin  2  Left buttock wound- Suspect due to friction based on appearance of wound  Wound is irregular in shape, has an abraded/bumpy appearance as if due to shearing  Scattered open aspects that are partial thickness, with 100% slow to christian beefy red tissue, scant amount of serosanguineous drainage  Cecile-wound is dry, intact, hyperpigmented skin  3  Left medial ankle- Being followed by podiatry and vascular surgery  Two small oval wounds that are covered in a yellow/brown scabbing, no drainage noted  Cecile-wound is dry, intact, hemosiderin staining  Educated patient on importance of frequent offloading of pressure via turning, repositioning, and weight-shifting  Verbalized understanding of plan of care  No induration, fluctuance, odor, warmth, redness, or purulence noted to the above noted wounds  New dressings applied  Patient tolerated well  Primary nurse aware of plan of care  See flow sheets for more detailed assessment findings  Will follow along for sacral and buttock wounds  Skin care Plan:  1-Cleanse sacro-buttocks with soap and water   Apply Allevyn foam to cover sacral and left buttock wounds (please ensure Ronelle Cassette makes contact with all skin and does not leave gaps or get placed over gluteal crease)  Hira with T for treatment  Change every three days and PRN  Peel back and check skin q-shift  2-Turn/reposition q2h for pressure re-distribution on skin using green wedges (place one green wedge approx  2 inches above buttock and another approx  2 inches below buttock so the buttock/sacrum is "floating" with no pressure on it from wedges)  3-Elevate heels to offload pressure  4-Moisturize skin daily with skin nourishing cream  5-Ehob cushion in chair when out of bed  6-Hydraguard to bilateral heels BID and PRN  7-Left ankle-Per podiatry  8-Wound care will follow along with patient weekly, please call or tiger text with questions and concerns       Wound 02/06/22 Foot Anterior; Left (Active)   Wound Image   02/08/22 1254   Wound Description Brown;Dry; Intact 02/08/22 1254   Cecile-wound Assessment Dry; Intact; Yellow-brown 02/08/22 1254   Wound Length (cm) 2 cm 02/08/22 1254   Wound Width (cm) 0 5 cm 02/08/22 1254   Wound Surface Area (cm^2) 1 cm^2 02/08/22 1254   Tunneling 0 cm 02/08/22 1254   Undermining 0 02/08/22 1254   Drainage Amount None 02/08/22 1254   Non-staged Wound Description Not applicable 39/18/60 9882   Treatments Cleansed;Site care 02/08/22 1254   Dressing Other (Comment) 02/08/22 1254   Wound packed? No 02/08/22 1254       Wound 02/08/22 Pressure Injury Sacrum Mid (Active)   Wound Image   02/08/22 1255   Wound Description Pink 02/08/22 1255   Pressure Injury Stage 2 02/08/22 1255   Cecile-wound Assessment Dry; Intact 02/08/22 1255   Wound Length (cm) 2 1 cm 02/08/22 1255   Wound Width (cm) 0 2 cm 02/08/22 1255   Wound Depth (cm) 0 1 cm 02/08/22 1255   Wound Surface Area (cm^2) 0 42 cm^2 02/08/22 1255   Wound Volume (cm^3) 0 042 cm^3 02/08/22 1255   Calculated Wound Volume (cm^3) 0 04 cm^3 02/08/22 1255   Tunneling 0 cm 02/08/22 1255   Undermining 0 02/08/22 1255   Drainage Amount Scant 02/08/22 1255   Drainage Description Serous 02/08/22 1255   Non-staged Wound Description Partial thickness 02/08/22 1255   Treatments Cleansed;Site care 02/08/22 1255   Dressing Foam, Silicon (eg  Allevyn, etc) 02/08/22 1255   Wound packed? No 02/08/22 1255   Dressing Changed Reinforced 02/08/22 1255   Patient Tolerance Tolerated well 02/08/22 1255   Dressing Status Clean;Dry; Intact 02/08/22 1255       Wound 02/08/22 Buttocks Left (Active)   Wound Image   02/08/22 1258   Wound Description Beefy red;Pink 02/08/22 1258   Cecile-wound Assessment Dry; Intact; Hyperpigmented 02/08/22 1258   Wound Length (cm) 1 5 cm 02/08/22 1258   Wound Width (cm) 0 5 cm 02/08/22 1258   Wound Depth (cm) 0 1 cm 02/08/22 1258   Wound Surface Area (cm^2) 0 75 cm^2 02/08/22 1258   Wound Volume (cm^3) 0 075 cm^3 02/08/22 1258   Calculated Wound Volume (cm^3) 0 08 cm^3 02/08/22 1258   Tunneling 0 cm 02/08/22 1258   Undermining 0 02/08/22 1258   Drainage Amount Scant 02/08/22 1258   Drainage Description Serosanguineous 02/08/22 1258   Non-staged Wound Description Partial thickness 02/08/22 1258   Treatments Cleansed;Site care 02/08/22 1258   Dressing Foam, Silicon (eg  Allevyn, etc) 02/08/22 1258   Wound packed? No 02/08/22 1258   Dressing Changed Reinforced 02/08/22 1258   Patient Tolerance Tolerated well 02/08/22 1258   Dressing Status Clean;Dry; Intact 02/08/22 1258     Call or tigertext with any questions  Wound Care will continue to follow    Duane White RN BSN  Wound care

## 2022-02-08 NOTE — DISCHARGE INSTR - OTHER ORDERS
Skin care Plan:  1-Cleanse sacro-buttocks with soap and water  Apply Allevyn foam to cover sacral and left buttock wounds (please ensure allevyn makes contact with all skin and does not leave gaps or get place over gluteal crease)  Hira with T for treatment  Change every three days and PRN  Peel back and check skin q-shift  2-Turn/reposition q2h for pressure re-distribution on skin using green wedges (place one green wedge approx  2 inches above buttock and another approx  2 inches below buttock so the buttock/sacrum is "floating" with no pressure on it from wedges)  3-Elevate heels to offload pressure  4-Moisturize skin daily with skin nourishing cream  5-Ehob cushion in chair when out of bed  6-Hydraguard to bilateral heels BID and PRN

## 2022-02-08 NOTE — ASSESSMENT & PLAN NOTE
· Recently diagnosed with esophageal cancer via EGD biopsy  · Follows with Medical Oncology and Surgical Oncology OP;  Does not have a formal treatment plan to date  · With persistent dysphagia and ongoing emesis and the inability to hold down much nutrition at all  · Gastroenterology consulted; input as noted  · s/p EGD and J-tube placement 2/7  · Liquid oral intake as tolerated - supervised, maintain aspiration precautions  · Nutritional consultation for initiation of tube feedings  · Start Jevity 1 5 rebekah today, goal 95 ml/hr x 16 hrs overnight; free water bolus 250 ml q6h

## 2022-02-08 NOTE — PROGRESS NOTES
[facility] Progress Note - Rehana Masker 1945, 68 y o  male MRN: [id number]  Unit/Bed#: -01 Encounter: [id number]  Primary Care Provider: Chris Chapa MD   Date and time admitted to hospital: 2/5/2022 11:08 AM    * Left ankle pain  Assessment & Plan  Background:  Presented to the ED with worsening left ankle pain that he had been taking Keflex PO as an outpatient for however unable to keep it down  · Initially with IV cefazolin; discontinued on 02/06; unlikely that this is infectious  · XR without osseous abnormality  · Podiatry consulted; input as noted below  · Pain and discoloration of thought to be secondary to vascular etiology  · Vascular surgery consulted  · Consider musculoskeletal/ortho achilles involvement  · Arterial duplex 2/7 showing significant PAD, vascular to follow up outpatient   · Start baby aspirin  · PT/OT consulted - recommending STR   · Suspect ankle pain component of plantar fasciitis - trial voltaren, lidoderm, ice, and therapy    Malignant neoplasm of lower third of esophagus (HCC)  Assessment & Plan  · Recently diagnosed with esophageal cancer via EGD biopsy  · Follows with Medical Oncology and Surgical Oncology OP;  Does not have a formal treatment plan to date  · With persistent dysphagia and ongoing emesis and the inability to hold down much nutrition at all  · Gastroenterology consulted; input as noted  · s/p EGD and J-tube placement 2/7  · Liquid oral intake as tolerated - supervised, maintain aspiration precautions  · Nutritional consultation for initiation of tube feedings  · Start Jevity 1 5 rebekah today, goal 95 ml/hr x 16 hrs overnight; free water bolus 250 ml q6h    Severe protein-calorie malnutrition ([address] )  Assessment & Plan  Malnutrition Findings:   Adult Malnutrition type: Chronic illness  Adult Degree of Malnutrition: Other severe protein calorie malnutrition (related to inadequate energy intakes of less than 75% of estimated needs for >1 month, significant weight loss of 11 2% x 3 weeks, 26 4% x 1 year, visible wasting around Clavicle, Shoulders, Ribs; Treat-Enteral Nutrition via planned J-Tube)    BMI Findings:  Adult BMI Classifications: Underweight < 18 5     Body mass index is 17 31 kg/m²  J tube for feeding  Nutrition consult appreciated          VTE Pharmacologic Prophylaxis: VTE Score: 3 Moderate Risk (Score 3-4) - Pharmacological DVT Prophylaxis Ordered: enoxaparin (Lovenox)  Patient Centered Rounds: I performed bedside rounds with nursing staff today  Discussions with Specialists or Other Care Team Provider: RACHEL re: dispo, vascular surgery     Education and Discussions with Family / Patient: Updated  (daughter, [place]) via phone  Time Spent for Care: 20 minutes  More than 50% of total time spent on counseling and coordination of care as described above  Current Length of Stay: 2 day(s)  Current Patient Status: Inpatient   Certification Statement: The patient will continue to require additional inpatient hospital stay due to pending vascular recommendations, STR placement and tube feed tolerance  Discharge Plan: Anticipate discharge in 48-72 hrs to rehab facility  Code Status: Level 1 - Full Code    Subjective:   CC "I'm doing alright"    Patient seen alongside primary nurse this morning  He is receiving wound care for his heels and his backside  Continues to have the left ankle pain  Continues to have some nausea though much better  Has a little bit of pain around the J-tube site but this is tolerable  Spoke with daughterDaisha by phone  She expresses concern about how patient will get 2 appointments  At his oncology appointment, they did apply for the shuttle service  She is unsure where he is in that process    She is also worried because the patient does live home alone, and the previously was able to care for himself, she is worried that during chemotherapy he may have trouble, especially if any adverse reactions such as nausea/vomiting  Objective:     Vitals:   Temp (24hrs), Av 1 °F (36 7 °C), Min:97 7 °F (36 5 °C), Max:98 6 °F (37 °C)    Temp:  [97 7 °F (36 5 °C)-98 6 °F (37 °C)] 98 1 °F (36 7 °C)  HR:  [77-86] 79  Resp:  [14-18] 18  BP: (121-163)/(68-82) 147/82  SpO2:  [95 %-100 %] 100 %  Body mass index is 17 31 kg/m²  Input and Output Summary (last 24 hours): Intake/Output Summary (Last 24 hours) at 2022 0747  Last data filed at 2022 2111  Gross per 24 hour   Intake 380 ml   Output 400 ml   Net -20 ml       Physical Exam:   Physical Exam  Vitals and nursing note reviewed  Constitutional:       General: He is not in acute distress  Appearance: He is ill-appearing  He is not toxic-appearing  Cardiovascular:      Rate and Rhythm: Normal rate and regular rhythm  Pulmonary:      Effort: Pulmonary effort is normal       Breath sounds: Normal breath sounds  Abdominal:      General: Bowel sounds are normal       Palpations: Abdomen is soft  Comments: J tube noted   Musculoskeletal:         General: Tenderness (left ankle, plantar foot, heal) present  No swelling  Skin:     Coloration: Skin is pale  Comments: Rutty arterial disease skin changes bilateral LEs   Neurological:      Mental Status: He is alert and oriented to person, place, and time  Psychiatric:         Mood and Affect: Mood normal          Behavior: Behavior normal            Additional Data:     Labs:  Results from last 7 days   Lab Units 22  0609 22  0535 22  0535   WBC Thousand/uL 14 66*   < > 11 11*   HEMOGLOBIN g/dL 12 9   < > 12 6   HEMATOCRIT % 43 7   < > 39 3   PLATELETS Thousands/uL 311   < > 331   NEUTROS PCT %  --   --  82*   LYMPHS PCT %  --   --  6*   MONOS PCT %  --   --  9   EOS PCT %  --   --  1    < > = values in this interval not displayed       Results from last 7 days   Lab Units 22  0609 22  0535 22  0535   SODIUM mmol/L 137   < > 147* POTASSIUM mmol/L 4 1   < > 3 7   CHLORIDE mmol/L 105   < > 108   CO2 mmol/L 25   < > 29   BUN mg/dL 35*   < > 37*   CREATININE mg/dL 0 90   < > 1 04   ANION GAP mmol/L 7   < > 10   CALCIUM mg/dL 9 0   < > 9 6   ALBUMIN g/dL  --   --  3 5   TOTAL BILIRUBIN mg/dL  --   --  0 73   ALK PHOS U/L  --   --  66   ALT U/L  --   --  9*   AST U/L  --   --  7   GLUCOSE RANDOM mg/dL 154*   < > 129    < > = values in this interval not displayed  Results from last 7 days   Lab Units 02/01/22  0930   POC GLUCOSE mg/dl 141*         Results from last 7 days   Lab Units 02/05/22  1203   LACTIC ACID mmol/L 1 3       Lines/Drains:  Invasive Devices  Report    Peripheral Intravenous Line            Peripheral IV 02/05/22 Right Antecubital 2 days    Peripheral IV 02/07/22 Left Forearm <1 day          Drain            Gastrostomy/Enterostomy PEG-jejunostomy 20 Fr  LLQ <1 day                Imaging: No pertinent imaging reviewed  Recent Cultures (last 7 days):         Last 24 Hours Medication List:   Current Facility-Administered Medications   Medication Dose Route Frequency Provider Last Rate    acetaminophen  650 mg Oral Q6H PRN Jarome Risen, DO      dextrose  75 mL/hr Intravenous Continuous Jarome Risen, DO Stopped (02/07/22 1902)    enoxaparin  40 mg Subcutaneous Daily Jarome Risen, DO      ondansetron  4 mg Intravenous Q6H PRN Jarome Risen, DO      oxyCODONE  5 mg Oral Q4H PRN Jarome Risen, DO      pantoprazole  40 mg Intravenous Q12H [address], DO          Today, Patient Was Seen By: Roscoe Cannon PA-C    **Please Note: This note may have been constructed using a voice recognition system  **

## 2022-02-08 NOTE — PROGRESS NOTES
[facility]  Progress Note - Wilberto  1945, 68 y o  male MRN: [id number]  Unit/Bed#: -[place] Encounter: [id number]  Primary Care Provider: Sharon Yuen MD   Date and time admitted to hospital: 2/5/2022 11:08 AM    * Left ankle pain  Assessment & Plan  27-year-old male, smoker, with recently diagnosed esophageal cancer, protein-calorie malnutrition and h/o polio presented with c/o N/V and LLE ankle pain (worse with applied pressure and standing) w/ decreased ROM  Vascular was consulted for evaluation and recommendations  Diagnostics:  - LEAD 2/7/22: RIGHT:Evidence suggestive of high grade stenosis vs occlusion of the proximal-distal superficial femoral artery with reconstitution at the proximal popliteal artery  50-75% stenosis noted in the profunda femoral artery and >75% stenosis noted in  the distal popliteal artery  There is high grade stenosis vs occlusion of the distal posterior tibial artery  Evidence suggestive of Tibial-Peroneal diffuse disease  SUMMER 0 74/99/60   LEFT:>75% stenosis noted in the distal superficial femoral artery and at the origin of the anterior tibial artery  Evidence suggestive of Tibial-Peroneal diffuse disease  SUMMER: 0 56/51/26  - x-ray 2/5/22: No acute osseous abnormality    Reccomendations:    - Bilateral lower extremity PAD most likely chronic  LLE ankle pain unlikely r/t underlying PAD  Left foot is warm, perfused with diminished range of motion, and normal sensation w/ PT/ DP Doppler signals w/o rest pain  - consider Ortho consult for ankle/ achilles pain  - s/p J-tube placement 2/7/22  - local wound care per podiatry/medicine team  - consider ASA IF ok per GI and not otherwise contraindicated 2/2 comorbidities / esophageal ca     - consider statin  - smoking cessation   - will follow as out patient,scheduling in process  - d/w SLIM team  - d/w Podiatry   - discsused with Dr Reg Ball  - Please reach out if anything further is needed  Vitals:  /72   Pulse 76   Temp 97 6 °F (36 4 °C)   Resp 18   Ht 6' 1" (1 854 m)   Wt 59 5 kg (131 lb 2 8 oz)   SpO2 98%   BMI 17 31 kg/m²     I/Os:  I/O last 3 completed shifts: In: 380 [P O :180; I V :200]  Out: 700 [Urine:700]  No intake/output data recorded      Lab Results and Cultures:   Lab Results   Component Value Date    WBC 14 66 (H) 02/08/2022    HGB 12 9 02/08/2022    HCT 43 7 02/08/2022     (H) 02/08/2022     02/08/2022     Lab Results   Component Value Date    CALCIUM 9 0 02/08/2022    K 4 1 02/08/2022    CO2 25 02/08/2022     02/08/2022    BUN 35 (H) 02/08/2022    CREATININE 0 90 02/08/2022     Lab Results   Component Value Date    INR 1 04 10/10/2019    PROTIME 13 6 10/10/2019        Blood Culture: No results found for: BLOODCX,   Urinalysis: No results found for: COLORU, CLARITYU, SPECGRAV, PHUR, LEUKOCYTESUR, NITRITE, PROTEINUA, GLUCOSEU, KETONESU, BILIRUBINUR, BLOODU,   Urine Culture: No results found for: URINECX,   Wound Culure: No results found for: WOUNDCULT    Medications:  Current Facility-Administered Medications   Medication Dose Route Frequency    acetaminophen (TYLENOL) tablet 650 mg  650 mg Oral Q6H PRN    dextrose 5 % infusion  75 mL/hr Intravenous Continuous    enoxaparin (LOVENOX) subcutaneous injection 40 mg  40 mg Subcutaneous Daily    ondansetron (ZOFRAN) injection 4 mg  4 mg Intravenous Q6H PRN    oxyCODONE (ROXICODONE) oral solution 5 mg  5 mg Oral Q4H PRN    pantoprazole (PROTONIX) injection 40 mg  40 mg Intravenous Q12H [facility]       MADELINE Veloz  2/8/2022  [facility], [phone]

## 2022-02-08 NOTE — PLAN OF CARE
Problem: Nutrition/Hydration-ADULT  Goal: Nutrient/Hydration intake appropriate for improving, restoring or maintaining nutritional needs  Description: Monitor and assess patient's nutrition/hydration status for malnutrition  Collaborate with interdisciplinary team and initiate plan and interventions as ordered  Monitor patient's weight and dietary intake as ordered or per policy  Utilize nutrition screening tool and intervene as necessary  Determine patient's food preferences and provide high-protein, high-caloric foods as appropriate       INTERVENTIONS:  - Monitor oral intake, urinary output, labs, and treatment plans  - Assess nutrition and hydration status and recommend course of action  - Evaluate amount of meals eaten  - Assist patient with eating if necessary   - Allow adequate time for meals  - Recommend/ encourage appropriate diets, oral nutritional supplements, and vitamin/mineral supplements  - Order, calculate, and assess calorie counts as needed  - Recommend, monitor, and adjust tube feedings and TPN/PPN based on assessed needs  - Assess need for intravenous fluids  - Provide specific nutrition/hydration education as appropriate  - Include patient/family/caregiver in decisions related to nutrition  Outcome: Progressing     Problem: PAIN - ADULT  Goal: Verbalizes/displays adequate comfort level or baseline comfort level  Description: Interventions:  - Encourage patient to monitor pain and request assistance  - Assess pain using appropriate pain scale  - Administer analgesics based on type and severity of pain and evaluate response  - Implement non-pharmacological measures as appropriate and evaluate response  - Consider cultural and social influences on pain and pain management  - Notify physician/advanced practitioner if interventions unsuccessful or patient reports new pain  Outcome: Progressing     Problem: INFECTION - ADULT  Goal: Absence or prevention of progression during hospitalization  Description: INTERVENTIONS:  - Assess and monitor for signs and symptoms of infection  - Monitor lab/diagnostic results  - Monitor all insertion sites, i e  indwelling lines, tubes, and drains  - Monitor endotracheal if appropriate and nasal secretions for changes in amount and color  - Green appropriate cooling/warming therapies per order  - Administer medications as ordered  - Instruct and encourage patient and family to use good hand hygiene technique  - Identify and instruct in appropriate isolation precautions for identified infection/condition  Outcome: Progressing  Goal: Absence of fever/infection during neutropenic period  Description: INTERVENTIONS:  - Monitor WBC    Outcome: Progressing     Problem: SAFETY ADULT  Goal: Patient will remain free of falls  Description: INTERVENTIONS:  - Educate patient/family on patient safety including physical limitations  - Instruct patient to call for assistance with activity   - Consult OT/PT to assist with strengthening/mobility   - Keep Call bell within reach  - Keep bed low and locked with side rails adjusted as appropriate  - Keep care items and personal belongings within reach  - Initiate and maintain comfort rounds  - Make Fall Risk Sign visible to staff  - Offer Toileting every 2 Hours, in advance of need  - Initiate/Maintain Bed alarm  - Obtain necessary fall risk management equipment: Bed Alarm  - Apply yellow socks and bracelet for high fall risk patients  - Consider moving patient to room near nurses station  Outcome: Progressing  Goal: Maintain or return to baseline ADL function  Description: INTERVENTIONS:  -  Assess patient's ability to carry out ADLs; assess patient's baseline for ADL function and identify physical deficits which impact ability to perform ADLs (bathing, care of mouth/teeth, toileting, grooming, dressing, etc )  - Assess/evaluate cause of self-care deficits   - Assess range of motion  - Assess patient's mobility; develop plan if impaired  - Assess patient's need for assistive devices and provide as appropriate  - Encourage maximum independence but intervene and supervise when necessary  - Involve family in performance of ADLs  - Assess for home care needs following discharge   - Consider OT consult to assist with ADL evaluation and planning for discharge  - Provide patient education as appropriate  Outcome: Progressing     Problem: DISCHARGE PLANNING  Goal: Discharge to home or other facility with appropriate resources  Description: INTERVENTIONS:  - Identify barriers to discharge w/patient and caregiver  - Arrange for needed discharge resources and transportation as appropriate  - Identify discharge learning needs (meds, wound care, etc )  - Arrange for interpretive services to assist at discharge as needed  - Refer to Case Management Department for coordinating discharge planning if the patient needs post-hospital services based on physician/advanced practitioner order or complex needs related to functional status, cognitive ability, or social support system  Outcome: Progressing     Problem: Knowledge Deficit  Goal: Patient/family/caregiver demonstrates understanding of disease process, treatment plan, medications, and discharge instructions  Description: Complete learning assessment and assess knowledge base    Interventions:  - Provide teaching at level of understanding  - Provide teaching via preferred learning methods  Outcome: Progressing     Problem: MOBILITY - ADULT  Goal: Maintain or return to baseline ADL function  Description: INTERVENTIONS:  -  Assess patient's ability to carry out ADLs; assess patient's baseline for ADL function and identify physical deficits which impact ability to perform ADLs (bathing, care of mouth/teeth, toileting, grooming, dressing, etc )  - Assess/evaluate cause of self-care deficits   - Assess range of motion  - Assess patient's mobility; develop plan if impaired  - Assess patient's need for assistive devices and provide as appropriate  - Encourage maximum independence but intervene and supervise when necessary  - Involve family in performance of ADLs  - Assess for home care needs following discharge   - Consider OT consult to assist with ADL evaluation and planning for discharge  - Provide patient education as appropriate  Outcome: Progressing  Goal: Maintains/Returns to pre admission functional level  Description: INTERVENTIONS:  - Perform BMAT or MOVE assessment daily    - Set and communicate daily mobility goal to care team and patient/family/caregiver  - Collaborate with rehabilitation services on mobility goals if consulted  - Perform Range of Motion 4 times a day  - Reposition patient every 2 hours    - Dangle patient 4 times a day  - Stand patient 4 times a day  - Ambulate patient 4 times a day  - Out of bed to chair 3 times a day   - Out of bed for meals 3 times a day  - Out of bed for toileting  - Record patient progress and toleration of activity level   Outcome: Progressing       Problem: Prexisting or High Potential for Compromised Skin Integrity  Goal: Skin integrity is maintained or improved  Description: INTERVENTIONS:  - Identify patients at risk for skin breakdown  - Assess and monitor skin integrity  - Assess and monitor nutrition and hydration status  - Monitor labs   - Assess for incontinence   - Turn and reposition patient  - Assist with mobility/ambulation  - Relieve pressure over bony prominences  - Avoid friction and shearing  - Provide appropriate hygiene as needed including keeping skin clean and dry  - Evaluate need for skin moisturizer/barrier cream  - Collaborate with interdisciplinary team   - Patient/family teaching  - Consider wound care consult   Outcome: Progressing

## 2022-02-08 NOTE — TELEPHONE ENCOUNTER
2/28/22 @ 10:15 am with Ananth Kay in [facility]      From: Uyen Romo <[email or web address]   Sent: Tuesday, February 8, 2022 10:18 AM  To: [facility] [email or web address]  Subject: out pt follow up     Hi! The below patient is a current in-pt at [facility]  Please set up for out pt office visit for L foot pain/PAD/ review NICK  Non-emergent, a few weeks out is ok  Thanks  Marlyn Schneider (Legal Name)

## 2022-02-09 ENCOUNTER — APPOINTMENT (INPATIENT)
Dept: CT IMAGING | Facility: HOSPITAL | Age: 77
DRG: 557 | End: 2022-02-09
Payer: COMMERCIAL

## 2022-02-09 LAB
ANION GAP SERPL CALCULATED.3IONS-SCNC: 6 MMOL/L (ref 4–13)
BUN SERPL-MCNC: 26 MG/DL (ref 5–25)
CALCIUM SERPL-MCNC: 8.9 MG/DL (ref 8.3–10.1)
CHLORIDE SERPL-SCNC: 103 MMOL/L (ref 100–108)
CO2 SERPL-SCNC: 30 MMOL/L (ref 21–32)
CREAT SERPL-MCNC: 0.83 MG/DL (ref 0.6–1.3)
ERYTHROCYTE [DISTWIDTH] IN BLOOD BY AUTOMATED COUNT: 14.2 % (ref 11.6–15.1)
GFR SERPL CREATININE-BSD FRML MDRD: 85 ML/MIN/1.73SQ M
GLUCOSE SERPL-MCNC: 148 MG/DL (ref 65–140)
HCT VFR BLD AUTO: 36 % (ref 36.5–49.3)
HGB BLD-MCNC: 11.5 G/DL (ref 12–17)
MCH RBC QN AUTO: 28.6 PG (ref 26.8–34.3)
MCHC RBC AUTO-ENTMCNC: 31.9 G/DL (ref 31.4–37.4)
MCV RBC AUTO: 90 FL (ref 82–98)
PLATELET # BLD AUTO: 275 THOUSANDS/UL (ref 149–390)
PMV BLD AUTO: 12 FL (ref 8.9–12.7)
POTASSIUM SERPL-SCNC: 3.8 MMOL/L (ref 3.5–5.3)
RBC # BLD AUTO: 4.02 MILLION/UL (ref 3.88–5.62)
SODIUM SERPL-SCNC: 139 MMOL/L (ref 136–145)
WBC # BLD AUTO: 10.15 THOUSAND/UL (ref 4.31–10.16)

## 2022-02-09 PROCEDURE — 73701 CT LOWER EXTREMITY W/DYE: CPT

## 2022-02-09 PROCEDURE — G1004 CDSM NDSC: HCPCS

## 2022-02-09 PROCEDURE — 80048 BASIC METABOLIC PNL TOTAL CA: CPT | Performed by: PHYSICIAN ASSISTANT

## 2022-02-09 PROCEDURE — 99232 SBSQ HOSP IP/OBS MODERATE 35: CPT | Performed by: NURSE PRACTITIONER

## 2022-02-09 PROCEDURE — C9113 INJ PANTOPRAZOLE SODIUM, VIA: HCPCS | Performed by: INTERNAL MEDICINE

## 2022-02-09 PROCEDURE — 85027 COMPLETE CBC AUTOMATED: CPT | Performed by: PHYSICIAN ASSISTANT

## 2022-02-09 PROCEDURE — 99222 1ST HOSP IP/OBS MODERATE 55: CPT | Performed by: PHYSICIAN ASSISTANT

## 2022-02-09 RX ADMIN — DICLOFENAC SODIUM 2 G: 10 GEL TOPICAL at 22:10

## 2022-02-09 RX ADMIN — PANTOPRAZOLE SODIUM 40 MG: 40 INJECTION, POWDER, FOR SOLUTION INTRAVENOUS at 21:59

## 2022-02-09 RX ADMIN — ENOXAPARIN SODIUM 40 MG: 40 INJECTION SUBCUTANEOUS at 10:28

## 2022-02-09 RX ADMIN — DICLOFENAC SODIUM 2 G: 10 GEL TOPICAL at 10:29

## 2022-02-09 RX ADMIN — IOHEXOL 100 ML: 350 INJECTION, SOLUTION INTRAVENOUS at 11:05

## 2022-02-09 RX ADMIN — ASPIRIN 81 MG: 81 TABLET, CHEWABLE ORAL at 10:28

## 2022-02-09 RX ADMIN — DICLOFENAC SODIUM 2 G: 10 GEL TOPICAL at 17:52

## 2022-02-09 RX ADMIN — PANTOPRAZOLE SODIUM 40 MG: 40 INJECTION, POWDER, FOR SOLUTION INTRAVENOUS at 10:28

## 2022-02-09 RX ADMIN — FAMOTIDINE 20 MG: 40 POWDER, FOR SUSPENSION ORAL at 10:32

## 2022-02-09 RX ADMIN — LIDOCAINE 5% 1 PATCH: 700 PATCH TOPICAL at 10:28

## 2022-02-09 NOTE — CASE MANAGEMENT
Case Management Discharge Planning Note    Patient name Ebenezer Washington  Location /-83 MRN [id number]  : 1945 Date 2022       Current Admission Date: 2022  Current Admission Diagnosis:Left ankle pain   Patient Active Problem List    Diagnosis Date Noted    Severe protein-calorie malnutrition [facility] [address] ) 2022    Left ankle pain 2022    Mild protein-calorie malnutrition ([facility] [address] ) 2022    Malignant neoplasm of lower third of esophagus ([facility] [address] ) 2022    Weight loss 2022    Dysphagia 2022    Dermatitis 2022    Current every day smoker 2019      LOS (days): 3  Geometric Mean LOS (GMLOS) (days): 4 60  Days to GMLOS:1 5     OBJECTIVE:  Risk of Unplanned Readmission Score: 15         Current admission status: Inpatient   Preferred Pharmacy:   [facility]/pharmacy #[id number]- [place], [place] - [address] [employer]  Phone: [phone] Fax: [phone]    Primary Care Provider: Gogo Rg MD    Primary Insurance: [facility]  Secondary Insurance:     DISCHARGE DETAILS:    Discharge planning discussed with[de-identified] Petey Cardoso (Daughter) [phone] (M)  Freedom of Choice: Yes  Comments - Freedom of Choice: cm discussed freedom of choice  wants str for pt no preference    CM contacted family/caregiver?: Yes  Were Treatment Team discharge recommendations reviewed with patient/caregiver?: Yes  Did patient/caregiver verbalize understanding of patient care needs?: Yes  Were patient/caregiver advised of the risks associated with not following Treatment Team discharge recommendations?: Yes    Contacts  Patient Contacts: Emilia:Daughter  Relationship to Patient[de-identified] Family  Contact Method: Phone  Phone Number: Petey Cardoso (Daughter) [phone] Monisha Michel  Reason/Outcome: Continuity of [address]         Is the patient interested in [address] at discharge?: No    DME Referral Provided  Referral made for DME?: No    Other Referral/Resources/Interventions Provided:  Interventions: Short Term Rehab  Referral Comments: REFERRAL SENT FOR STR [facility]IA [facility]  AWAIT ADMISSION DETERMINATION           Treatment Team Recommendation: Short Term Rehab  Discharge Destination Plan[de-identified] Short Term Rehab  Transport at Discharge : [facility]

## 2022-02-09 NOTE — ASSESSMENT & PLAN NOTE
Background:  Presented to the ED with worsening left ankle pain that he had been taking Keflex PO as an outpatient for however unable to keep it down  · Initially with IV cefazolin; discontinued on 02/06; unlikely that this is infectious  · XR without osseous abnormality  · Podiatry consulted; input as noted below  · Pain and discoloration of thought to be secondary to vascular etiology  · Vascular surgery consulted  · Consider musculoskeletal/ortho achilles involvement  · Arterial duplex 2/7 showing significant PAD, vascular to follow up outpatient   · Resume aspirin  · PT/OT consulted - recommending STR   · Suspect ankle pain component of plantar fasciitis - trial voltaren, lidoderm, ice, and therapy  · Due to ongoing pain/lack of ability to stand; Orthopedic surgery consult, appreciate input  · Will also check CT left lower extremity

## 2022-02-09 NOTE — CONSULTS
Orthopedics   Mellissa Forrest 68 y o  male MRN: [id number]  Unit/Bed#: -01      Chief Complaint:   Left ankle pain    HPI:  68 y o  male complaining of atraumatic ankle pain initiating approximately 2 months ago  Patient reports recent diagnosis of esophageal cancer s/p jejunstomy tube insertion as patient was c/o dysphagia  He admits to medial Left ankle pain after he noted two distinct lesions near medial malleolus  He states he did scratch them which made his ankle pain worse  He locates his pain to medial and posterior aspects of ankle and difficulty with range of motion  He states difficulty with weightbearing  Review Of Systems:   · Skin: Normal  · Neuro: See HPI  · Musculoskeletal: See HPI  · 14 point review of systems negative except as stated above     Past Medical History:   Past Medical History:   Diagnosis Date    Cancer ([facility] [address] )     esophageal ca    Pneumonia     Polio     Rib fractures        Past Surgical History:   History reviewed  No pertinent surgical history      Family History:  Family history reviewed and non-contributory  Family History   Problem Relation Age of Onset    Dementia Mother     Heart disease Father        Social History:  Social History     Socioeconomic History    Marital status: /Civil Union     Spouse name: None    Number of children: None    Years of education: None    Highest education level: None   Occupational History    None   Tobacco Use    Smoking status: Current Every Day Smoker     Packs/day: 1 00     Years: 40 00     Pack years: 40 00     Types: Cigarettes    Smokeless tobacco: Never Used   Vaping Use    Vaping Use: Never used   Substance and Sexual Activity    Alcohol use: Never     Alcohol/week: 0 0 standard drinks     Comment: 0    Drug use: No    Sexual activity: Not Currently   Other Topics Concern    None   Social History Narrative    None     Social Determinants of Health     Financial Resource Strain: Not on file   Food Insecurity: No Food Insecurity    Worried About Running Out of Food in the Last Year: Never true    Ran Out of Food in the Last Year: Never true   Transportation Needs: No Transportation Needs    Lack of Transportation (Medical): No    Lack of Transportation (Non-Medical):  No   Physical Activity: Not on file   Stress: Not on file   Social Connections: Not on file   Intimate Partner Violence: Not on file   Housing Stability: Low Risk     Unable to Pay for Housing in the Last Year: No    Number of Places Lived in the Last Year: 1    Unstable Housing in the Last Year: No       Allergies:   No Known Allergies        Labs:  0   Lab Value Date/Time    HCT 36 0 (L) 02/09/2022 0552    HCT 43 7 02/08/2022 0609    HCT 39 3 02/07/2022 0535    HGB 11 5 (L) 02/09/2022 0552    HGB 12 9 02/08/2022 0609    HGB 12 6 02/07/2022 0535    INR 1 04 10/10/2019 0701    WBC 10 15 02/09/2022 0552    WBC 14 66 (H) 02/08/2022 0609    WBC 11 11 (H) 02/07/2022 0535       Meds:    Current Facility-Administered Medications:     acetaminophen (TYLENOL) tablet 650 mg, 650 mg, Oral, Q6H PRN, Bisi Thibodeaux DO    aspirin chewable tablet 81 mg, 81 mg, Per J Tube, Daily, Gayle Ellison PA-C, 81 mg at 02/09/22 1028    Diclofenac Sodium (VOLTAREN) 1 % topical gel 2 g, 2 g, Topical, 4x Daily, Gayle Ellison PA-C, 2 g at 02/09/22 1029    enoxaparin (LOVENOX) subcutaneous injection 40 mg, 40 mg, Subcutaneous, Daily, Bisi Thibodeaux DO, 40 mg at 02/09/22 1028    famotidine (PEPCID) oral suspension 20 mg, 20 mg, Per J Tube, Daily, Gayle Ellison PA-C, 20 mg at 02/09/22 1032    lidocaine (LIDODERM) 5 % patch 1 patch, 1 patch, Topical, Daily, Gayle Ellison PA-C, 1 patch at 02/09/22 1028    ondansetron (ZOFRAN) injection 4 mg, 4 mg, Intravenous, Q6H PRN, Bisi Thibodeaux DO, 4 mg at 02/07/22 1114    oxyCODONE (ROXICODONE) oral solution 5 mg, 5 mg, Oral, Q4H PRN, Bisi Thibodeaux DO, 5 mg at 02/06/22 9494    pantoprazole (PROTONIX) injection 40 mg, 40 mg, Intravenous, Q12H [address], Kamran Daniels, , 40 mg at 02/09/22 1028    Blood Culture:   No results found for: BLOODCX    Wound Culture:   No results found for: WOUNDCULT    Ins and Outs:  I/O last 24 hours: In: 120 [P O :120]  Out: 1000 [Urine:1000]          Physical Exam:   /63   Pulse 77   Temp 97 7 °F (36 5 °C)   Resp 19   Ht 6' 1" (1 854 m)   Wt 59 5 kg (131 lb 2 8 oz)   SpO2 97%   BMI 17 31 kg/m²   Gen: Alert and oriented to person, place, time  HEENT: EOMI, eyes clear, moist mucus membranes, hearing intact  Respiratory: Bilateral chest rise  No audible wheezing found  Cardiovascular: Regular Rate and Rhythm  Abdomen: soft nontender/nondistended      Musculoskeletal:  Left ankle  · Skin as depicted below in clinical images  Two lesions at medial malleolus with scab formation  One lesion noted posterolateral aspect of ankle  These are tender to touch  No obvious signs of active drainage  · Mild tenderness over calf, yet soft and compressible  · Mild ATFL tenderness  · SILT s/s/sp/dp/t  · Motor intact with hip flexion/extension, knee flexion/extension, ankle dorsi/plantar flexion although painful, EHL/FHL  · 2+ DP pulse                      Tertiary: no tenderness over all other joints/long bones as except already stated  Radiology:   I personally reviewed the films  CT scan reviewed demonstrating tendonitis of peroneal tendons and subluxation of peroneus brevis  No acute fracture or dislocation      _*_*_*_*_*_*_*_*_*_*_*_*_*_*_*_*_*_*_*_*_*_*_*_*_*_*_*_*_*_*_*_*_*_*_*_*_*_*_*_*_*    Assessment:  76 y o male Left ankle lesions, peroneal tendonitis      Plan:   · Weightbearing as tolerated Left lower extremity, encouraged range of motion   · PT/OT  · Pain control per primary team  · DVT ppx per primary team  · Dispo: In depth discussion had with patient regarding CT scan and x-rays of Left ankle, no obvious fracture or signs of infection at this time   Encouraged patient to continue to perform range of motion activities with PT/OT in addition to ambulation in room and halls  Verbalized understanding of the above  Will defer continued evaluation to Podiatry and will place Wound care consult for continued treatment of lesions  No further orthopedic intervention necessary at this time  If additional questions or concerns, can reach out         Hansel Card PA-C

## 2022-02-09 NOTE — PLAN OF CARE
Problem: Nutrition/Hydration-ADULT  Goal: Nutrient/Hydration intake appropriate for improving, restoring or maintaining nutritional needs  Description: Monitor and assess patient's nutrition/hydration status for malnutrition  Collaborate with interdisciplinary team and initiate plan and interventions as ordered  Monitor patient's weight and dietary intake as ordered or per policy  Utilize nutrition screening tool and intervene as necessary  Determine patient's food preferences and provide high-protein, high-caloric foods as appropriate       INTERVENTIONS:  - Monitor oral intake, urinary output, labs, and treatment plans  - Assess nutrition and hydration status and recommend course of action  - Evaluate amount of meals eaten  - Assist patient with eating if necessary   - Allow adequate time for meals  - Recommend/ encourage appropriate diets, oral nutritional supplements, and vitamin/mineral supplements  - Order, calculate, and assess calorie counts as needed  - Recommend, monitor, and adjust tube feedings and TPN/PPN based on assessed needs  - Assess need for intravenous fluids  - Provide specific nutrition/hydration education as appropriate  - Include patient/family/caregiver in decisions related to nutrition  Outcome: Progressing     Problem: PAIN - ADULT  Goal: Verbalizes/displays adequate comfort level or baseline comfort level  Description: Interventions:  - Encourage patient to monitor pain and request assistance  - Assess pain using appropriate pain scale  - Administer analgesics based on type and severity of pain and evaluate response  - Implement non-pharmacological measures as appropriate and evaluate response  - Consider cultural and social influences on pain and pain management  - Notify physician/advanced practitioner if interventions unsuccessful or patient reports new pain  Outcome: Progressing     Problem: INFECTION - ADULT  Goal: Absence or prevention of progression during hospitalization  Description: INTERVENTIONS:  - Assess and monitor for signs and symptoms of infection  - Monitor lab/diagnostic results  - Monitor all insertion sites, i e  indwelling lines, tubes, and drains  - Monitor endotracheal if appropriate and nasal secretions for changes in amount and color  - [place] appropriate cooling/warming therapies per order  - Administer medications as ordered  - Instruct and encourage patient and family to use good hand hygiene technique  - Identify and instruct in appropriate isolation precautions for identified infection/condition  Outcome: Progressing  Goal: Absence of fever/infection during neutropenic period  Description: INTERVENTIONS:  - Monitor WBC  Goal: Maintain or return to baseline ADL function  Description: INTERVENTIONS:  -  Assess patient's ability to carry out ADLs; assess patient's baseline for ADL function and identify physical deficits which impact ability to perform ADLs (bathing, care of mouth/teeth, toileting, grooming, dressing, etc )  - Assess/evaluate cause of self-care deficits   - Assess range of motion  - Assess patient's mobility; develop plan if impaired  - Assess patient's need for assistive devices and provide as appropriate  - Encourage maximum independence but intervene and supervise when necessary  - Involve family in performance of ADLs  - Assess for home care needs following discharge   - Consider OT consult to assist with ADL evaluation and planning for discharge  - Provide patient education as appropriate  Outcome: Progressing     Problem: DISCHARGE PLANNING  Goal: Discharge to home or other facility with appropriate resources  Description: INTERVENTIONS:  - Identify barriers to discharge w/patient and caregiver  - Arrange for needed discharge resources and transportation as appropriate  - Identify discharge learning needs (meds, wound care, etc )  - Arrange for interpretive services to assist at discharge as needed  - Refer to Case Management Department for coordinating discharge planning if the patient needs post-hospital services based on physician/advanced practitioner order or complex needs related to functional status, cognitive ability, or social support system  Outcome: Progressing     Problem: Knowledge Deficit  Goal: Patient/family/caregiver demonstrates understanding of disease process, treatment plan, medications, and discharge instructions  Description: Complete learning assessment and assess knowledge base  Interventions:  - Provide teaching at level of understanding  - Provide teaching via preferred learning methods  Outcome: Progressing     Problem: MOBILITY - ADULT  Goal: Maintain or return to baseline ADL function  Description: INTERVENTIONS:  -  Assess patient's ability to carry out ADLs; assess patient's baseline for ADL function and identify physical deficits which impact ability to perform ADLs (bathing, care of mouth/teeth, toileting, grooming, dressing, etc )  - Assess/evaluate cause of self-care deficits   - Assess range of motion  - Assess patient's mobility; develop plan if impaired  - Assess patient's need for assistive devices and provide as appropriate  - Encourage maximum independence but intervene and supervise when necessary  - Involve family in performance of ADLs  - Assess for home care needs following discharge   - Consider OT consult to assist with ADL evaluation and planning for discharge  - Provide patient education as appropriate  Outcome: Progressing  Goal: Maintains/Returns to pre admission functional level  Description: INTERVENTIONS:  - Perform BMAT or MOVE assessment daily    - Set and communicate daily mobility goal to care team and patient/family/caregiver  - Collaborate with rehabilitation services on mobility goals if consulted  - Perform Range of Motion 4 times a day  - Reposition patient every 2 hours    - Dangle patient 4 times a day  - Stand patient 4 times a day  - Ambulate patient 3 times a day  - Out of bed to chair 3 times a day   - Out of bed for meals 3 times a day  - Out of bed for toileting  - Record patient progress and toleration of activity level   Outcome: Progressing     Problem: Prexisting or High Potential for Compromised Skin Integrity  Goal: Skin integrity is maintained or improved  Description: INTERVENTIONS:  - Identify patients at risk for skin breakdown  - Assess and monitor skin integrity  - Assess and monitor nutrition and hydration status  - Monitor labs   - Assess for incontinence   - Turn and reposition patient  - Assist with mobility/ambulation  - Relieve pressure over bony prominences  - Avoid friction and shearing  - Provide appropriate hygiene as needed including keeping skin clean and dry  - Evaluate need for skin moisturizer/barrier cream  - Collaborate with interdisciplinary team   - Patient/family teaching  - Consider wound care consult   Outcome: Progressing

## 2022-02-09 NOTE — PROGRESS NOTES
[facility]  Progress Note - Matteo Sisters 1945, 68 y o  male MRN: [id number]  Unit/Bed#: -[place] Encounter: [id number]  Primary Care Provider: Ginna Horton MD   Date and time admitted to hospital: 2/5/2022 11:08 AM    * Left ankle pain  Assessment & Plan  Background:  Presented to the ED with worsening left ankle pain that he had been taking Keflex PO as an outpatient for however unable to keep it down  · Initially with IV cefazolin; discontinued on 02/06; unlikely that this is infectious  · XR without osseous abnormality  · Podiatry consulted; input as noted below  · Pain and discoloration of thought to be secondary to vascular etiology  · Vascular surgery consulted  · Consider musculoskeletal/ortho achilles involvement  · Arterial duplex 2/7 showing significant PAD, vascular to follow up outpatient   · Resume aspirin  · PT/OT consulted - recommending STR   · Suspect ankle pain component of plantar fasciitis - trial voltaren, lidoderm, ice, and therapy  · Due to ongoing pain/lack of ability to stand; Orthopedic surgery consult, appreciate input  · Will also check CT left lower extremity  Malignant neoplasm of lower third of esophagus [facility])  Assessment & Plan  · Recently diagnosed with esophageal cancer via EGD biopsy  · Follows with Medical Oncology and Surgical Oncology OP;  Does not have a formal treatment plan to date  · With persistent dysphagia and ongoing emesis and the inability to hold down much nutrition at all  · Gastroenterology consulted; input as noted  · s/p EGD and J-tube placement 2/7  · Liquid oral intake as tolerated - supervised, maintain aspiration precautions  · Nutritional consultation for initiation of tube feedings  · Initiated on Jevity 1 5 rebekah on 2/8, goal 95 ml/hr x 16 hrs overnight; free water bolus 250 ml q6h    Severe protein-calorie malnutrition ([address] )  Assessment & Plan  Malnutrition Findings:   Adult Malnutrition type: Chronic illness  Adult Degree of Malnutrition: Other severe protein calorie malnutrition (related to inadequate energy intakes of less than 75% of estimated needs for >1 month, significant weight loss of 11 2% x 3 weeks, 26 4% x 1 year, visible wasting around Clavicle, Shoulders, Ribs; Treat-Enteral Nutrition via planned J-Tube)    BMI Findings:  Adult BMI Classifications: Underweight < 18 5     Body mass index is 17 31 kg/m²  J tube for feeding  Nutrition consult appreciated      VTE Pharmacologic Prophylaxis: VTE Score: 3 Moderate Risk (Score 3-4) - Pharmacological DVT Prophylaxis Ordered: enoxaparin (Lovenox)  Patient Centered Rounds: I performed bedside rounds with nursing staff today  Discussions with Specialists or Other Care Team Provider: Case Management, Reviewed GI Note, Vascular Surgery Note    Education and Discussions with Family / Patient: Updated  (daughter) via phone  Time Spent for Care: 20 minutes  More than 50% of total time spent on counseling and coordination of care as described above  Current Length of Stay: 3 day(s)  Current Patient Status: Inpatient   Certification Statement: The patient will continue to require additional inpatient hospital stay due to ongoing treatment in setting of ensure tolerating tube feedings  Discharge Plan: Anticipate discharge tomorrow to rehab facility  Code Status: Level 1 - Full Code    Subjective:   CC: "I'm on a roller coaster of emotion"    Patient resting in bed, denies complaints of chest pain, shortness of breath, fever, or chills at this time  He reports left ankle pain is still present, and denies that it is any better than it was when it first began  Discussed plan of care thus far; will ask orthopedic surgery to evaluate patient today      Objective:     Vitals:   Temp (24hrs), Av 7 °F (36 5 °C), Min:97 3 °F (36 3 °C), Max:98 4 °F (36 9 °C)    Temp:  [97 3 °F (36 3 °C)-98 4 °F (36 9 °C)] 97 4 °F (36 3 °C)  HR:  [71-85] 83  Resp:  [16-18] 18  BP: (119-131)/(62-72) 130/64  SpO2:  [97 %-100 %] 100 %  Body mass index is 17 31 kg/m²  Input and Output Summary (last 24 hours): Intake/Output Summary (Last 24 hours) at 2/9/2022 0955  Last data filed at 2/8/2022 1307  Gross per 24 hour   Intake --   Output 450 ml   Net -450 ml       Physical Exam:   Physical Exam  Vitals and nursing note reviewed  Constitutional:       General: He is not in acute distress  Appearance: He is cachectic  He is ill-appearing  Cardiovascular:      Rate and Rhythm: Normal rate  Pulses: Normal pulses  Heart sounds: Normal heart sounds  Pulmonary:      [place]: Pulmonary effort is normal       Breath sounds: Normal breath sounds  Abdominal:      General: Bowel sounds are normal       Palpations: Abdomen is soft  Musculoskeletal:         General: Tenderness (left ankle) present  Normal range of motion  Right lower leg: No edema  Left lower leg: No edema  Skin:     General: Skin is warm and dry  Capillary Refill: Capillary refill takes less than 2 seconds  Neurological:      Mental Status: He is alert and oriented to person, place, and time  Psychiatric:         Mood and Affect: Mood is depressed  Additional Data:     Labs:  Results from last 7 days   Lab Units 02/09/22 0552 02/08/22  0609 02/07/22  0535   WBC Thousand/uL 10 15   < > 11 11*   HEMOGLOBIN g/dL 11 5*   < > 12 6   HEMATOCRIT % 36 0*   < > 39 3   PLATELETS Thousands/uL 275   < > 331   NEUTROS PCT %  --   --  82*   LYMPHS PCT %  --   --  6*   MONOS PCT %  --   --  9   EOS PCT %  --   --  1    < > = values in this interval not displayed       Results from last 7 days   Lab Units 02/09/22 0552 02/08/22  0609 02/07/22  0535   SODIUM mmol/L 139   < > 147*   POTASSIUM mmol/L 3 8   < > 3 7   CHLORIDE mmol/L 103   < > 108   CO2 mmol/L 30   < > 29   BUN mg/dL 26*   < > 37*   CREATININE mg/dL 0 83   < > 1 04   ANION GAP mmol/L 6   < > 10   CALCIUM mg/dL 8 9   < > 9 6 ALBUMIN g/dL  --   --  3 5   TOTAL BILIRUBIN mg/dL  --   --  0 73   ALK PHOS U/L  --   --  66   ALT U/L  --   --  9*   AST U/L  --   --  7   GLUCOSE RANDOM mg/dL 148*   < > 129    < > = values in this interval not displayed  Results from last 7 days   Lab Units 02/05/22  1203   LACTIC ACID mmol/L 1 3       Lines/Drains:  Invasive Devices  Report    Peripheral Intravenous Line            Peripheral IV 02/05/22 Right Antecubital 3 days    Peripheral IV 02/07/22 Left Forearm 1 day          Drain            Gastrostomy/Enterostomy PEG-jejunostomy 20 Fr  LLQ 1 day                  Imaging: No pertinent imaging reviewed  Recent Cultures (last 7 days):         Last 24 Hours Medication List:   Current Facility-Administered Medications   Medication Dose Route Frequency Provider Last Rate    acetaminophen  650 mg Oral Q6H PRN Larry Christianne, DO      aspirin  81 mg Per J Tube Daily Jacinto Ward PA-C      Diclofenac Sodium  2 g Topical 4x Daily Gayle Ellison PA-C      enoxaparin  40 mg Subcutaneous Daily Kamran Daniels DO      famotidine  20 mg Per J Tube Daily Gayle Ellison PA-C      lidocaine  1 patch Topical Daily Jacinto Ward PA-C      ondansetron  4 mg Intravenous Q6H PRN Larry Christianne, DO      oxyCODONE  5 mg Oral Q4H PRN Larry Christianne, DO      pantoprazole  40 mg Intravenous Q12H [address], DO          Today, Patient Was Seen By: MADELINE Newsome    **Please Note: This note may have been constructed using a voice recognition system  **

## 2022-02-09 NOTE — ASSESSMENT & PLAN NOTE
· Recently diagnosed with esophageal cancer via EGD biopsy  · Follows with Medical Oncology and Surgical Oncology OP;  Does not have a formal treatment plan to date  · With persistent dysphagia and ongoing emesis and the inability to hold down much nutrition at all  · Gastroenterology consulted; input as noted  · s/p EGD and J-tube placement 2/7  · Liquid oral intake as tolerated - supervised, maintain aspiration precautions  · Nutritional consultation for initiation of tube feedings  · Initiated on Jevity 1 5 rebekah on 2/8, goal 95 ml/hr x 16 hrs overnight; free water bolus 250 ml q6h

## 2022-02-10 ENCOUNTER — PATIENT OUTREACH (OUTPATIENT)
Dept: OTHER | Facility: CLINIC | Age: 77
End: 2022-02-10

## 2022-02-10 LAB
FLUAV RNA RESP QL NAA+PROBE: NEGATIVE
FLUBV RNA RESP QL NAA+PROBE: NEGATIVE
RSV RNA RESP QL NAA+PROBE: NEGATIVE
SARS-COV-2 RNA RESP QL NAA+PROBE: NEGATIVE

## 2022-02-10 PROCEDURE — C9113 INJ PANTOPRAZOLE SODIUM, VIA: HCPCS | Performed by: INTERNAL MEDICINE

## 2022-02-10 PROCEDURE — 97110 THERAPEUTIC EXERCISES: CPT

## 2022-02-10 PROCEDURE — 99232 SBSQ HOSP IP/OBS MODERATE 35: CPT | Performed by: NURSE PRACTITIONER

## 2022-02-10 PROCEDURE — 97530 THERAPEUTIC ACTIVITIES: CPT

## 2022-02-10 PROCEDURE — 0241U HB NFCT DS VIR RESP RNA 4 TRGT: CPT | Performed by: NURSE PRACTITIONER

## 2022-02-10 RX ORDER — KETOROLAC TROMETHAMINE 30 MG/ML
15 INJECTION, SOLUTION INTRAMUSCULAR; INTRAVENOUS EVERY 6 HOURS SCHEDULED
Status: DISPENSED | OUTPATIENT
Start: 2022-02-10 | End: 2022-02-12

## 2022-02-10 RX ADMIN — DICLOFENAC SODIUM 2 G: 10 GEL TOPICAL at 17:26

## 2022-02-10 RX ADMIN — DICLOFENAC SODIUM 2 G: 10 GEL TOPICAL at 08:45

## 2022-02-10 RX ADMIN — LIDOCAINE 5% 1 PATCH: 700 PATCH TOPICAL at 08:16

## 2022-02-10 RX ADMIN — DICLOFENAC SODIUM 2 G: 10 GEL TOPICAL at 12:48

## 2022-02-10 RX ADMIN — DICLOFENAC SODIUM 2 G: 10 GEL TOPICAL at 21:23

## 2022-02-10 RX ADMIN — ENOXAPARIN SODIUM 40 MG: 40 INJECTION SUBCUTANEOUS at 08:17

## 2022-02-10 RX ADMIN — KETOROLAC TROMETHAMINE 15 MG: 30 INJECTION, SOLUTION INTRAMUSCULAR at 17:25

## 2022-02-10 RX ADMIN — FAMOTIDINE 20 MG: 40 POWDER, FOR SUSPENSION ORAL at 08:18

## 2022-02-10 RX ADMIN — PANTOPRAZOLE SODIUM 40 MG: 40 INJECTION, POWDER, FOR SOLUTION INTRAVENOUS at 08:18

## 2022-02-10 RX ADMIN — KETOROLAC TROMETHAMINE 15 MG: 30 INJECTION, SOLUTION INTRAMUSCULAR at 23:52

## 2022-02-10 RX ADMIN — ASPIRIN 81 MG: 81 TABLET, CHEWABLE ORAL at 08:18

## 2022-02-10 RX ADMIN — KETOROLAC TROMETHAMINE 15 MG: 30 INJECTION, SOLUTION INTRAMUSCULAR at 12:47

## 2022-02-10 RX ADMIN — PANTOPRAZOLE SODIUM 40 MG: 40 INJECTION, POWDER, FOR SOLUTION INTRAVENOUS at 20:21

## 2022-02-10 NOTE — PLAN OF CARE
Problem: PHYSICAL THERAPY ADULT  Goal: Performs mobility at highest level of function for planned discharge setting  See evaluation for individualized goals  Description: Treatment/Interventions: Functional transfer training,LE strengthening/ROM,Therapeutic exercise,Endurance training,Patient/family training,Equipment eval/education,Bed mobility,Spoke to nursing,Spoke to case management,Spoke to advanced practitioner  Equipment Recommended:  (TBD)       See flowsheet documentation for full assessment, interventions and recommendations  2/10/2022 1352 by Joelle Caldwell PT  Outcome: Progressing  Note: Prognosis: Fair  Problem List: Decreased strength,Impaired balance,Decreased mobility,Decreased range of motion,Decreased endurance,Pain  Assessment: Pt seen for PT treatment session this date, consisting of ther act focused on bed mobility, transfers, static sitting & standing posture/balance and ther ex focused on strengthening  Since previous session, pt has made good progress in terms of decreased A for all phases of mobility, initiation of LB exercise to tolerance  Pertinent barriers during this session include pain  Current goals and POC remain appropriate, pt continues to have rehab potential and is making progress towards STGs  Pt prognosis for achieving goals is fair, pending pt progress with hospitalization/medical status improvements, and indicated by Stimulability, ability to follow directions, recent history of independence with functional skills/High PLOF, learning potential and recent onset  Pt limited d/t the presence of intractable pain, fear of pain provocation and fear of falling  PT recommends post acute rehabilitation services upon discharge  Pt continues to be functioning below baseline level, and remains limited 2* factors listed above   PT will continue to see pt during current hospitalization in order to address the deficits listed above and provide interventions consistent w/ POC in effort to achieve STGs  Barriers to Discharge: Decreased caregiver support,Inaccessible home environment        PT Discharge Recommendation: Post acute rehabilitation services          See flowsheet documentation for full assessment  2/10/2022 1352 by Naseem Cary PT  Note: Prognosis: Fair  Problem List: Decreased strength,Impaired balance,Decreased mobility,Decreased range of motion,Decreased endurance,Pain  Assessment: Pt seen for PT treatment session this date, consisting of ther act focused on bed mobility, transfers, static sitting & standing posture/balance and ther ex focused on strengthening  Since previous session, pt has made good progress in terms of decreased A for all phases of mobility, initiation of LB exercise to tolerance  Pertinent barriers during this session include pain  Current goals and POC remain appropriate, pt continues to have rehab potential and is making progress towards STGs  Pt prognosis for achieving goals is fair, pending pt progress with hospitalization/medical status improvements, and indicated by Stimulability, ability to follow directions, recent history of independence with functional skills/High PLOF, learning potential and recent onset  Pt limited d/t the presence of intractable pain, fear of pain provocation and fear of falling  PT recommends post acute rehabilitation services upon discharge  Pt continues to be functioning below baseline level, and remains limited 2* factors listed above  PT will continue to see pt during current hospitalization in order to address the deficits listed above and provide interventions consistent w/ POC in effort to achieve STGs  Barriers to Discharge: Decreased caregiver support,Inaccessible home environment        PT Discharge Recommendation: Post acute rehabilitation services          See flowsheet documentation for full assessment

## 2022-02-10 NOTE — CASE MANAGEMENT
Case Management Discharge Planning Note    Patient name Hussein Sainz  Location /-87 MRN [id number]  : 1945 Date 2/10/2022       Current Admission Date: 2022  Current Admission Diagnosis:Left ankle pain   Patient Active Problem List    Diagnosis Date Noted    Severe protein-calorie malnutrition ([facility] [address] ) 2022    Left ankle pain 2022    Mild protein-calorie malnutrition ([facility] ) 2022    Malignant neoplasm of lower third of esophagus ([facility] [address] ) 2022    Weight loss 2022    Dysphagia 2022    Dermatitis 2022    Current every day smoker 2019      LOS (days): 4  Geometric Mean LOS (GMLOS) (days): 4 60  Days to GMLOS:0 7     OBJECTIVE:  Risk of Unplanned Readmission Score: 14         Current admission status: Inpatient   Preferred Pharmacy:   [id number]- [place], [place] - [address]  Phone: [phone] Fax: [phone]    Primary Care Provider: Ambrosio Cabrales MD    Primary Insurance: [facility]  Secondary Insurance:     DISCHARGE DETAILS:    Additional Comments: Patient reviewed with Isai Santos who informed that pt is likely medically stable for discharge to [facility]  Per [employer], [facility] likely able to accept  CM spoke with pt who is slightly hesitant but willing to accept bed at [facility]  Pt acknowledges that he does not feel he can return home alone at this time  CM spoke with pt's daughter who is in agreement with [place]  CM sent TT to therapy requesting updated notes as pt will require insurance authorization  CM department to follow

## 2022-02-10 NOTE — ASSESSMENT & PLAN NOTE
Background:  Presented to the ED with worsening left ankle pain that he had been taking Keflex PO as an outpatient for however unable to keep it down  · Initially with IV cefazolin; discontinued on 02/06; unlikely that this is infectious  · XR without osseous abnormality  · Podiatry consulted; input as noted below  · Pain and discoloration of thought to be secondary to vascular etiology  · Vascular surgery consulted  · Arterial duplex 2/7 showing significant PAD, vascular to follow up outpatient   · Resume aspirin  · PT/OT consulted - recommending STR   · CT Left Lower Extremity (2/9/22): No fracture or other acute osseous abnormality  Peroneal tendinosis identified with anterior subluxation of the brevis  Intact insertions are noted  No further suspected ligamentous or tendinous abnormality     · Orthopedic Surgery consult, appreciate input  · No surgical intervention indicated  · Recommending PT/OT/ROM Exercises  · WBAT

## 2022-02-10 NOTE — PLAN OF CARE
Problem: Nutrition/Hydration-ADULT  Goal: Nutrient/Hydration intake appropriate for improving, restoring or maintaining nutritional needs  Description: Monitor and assess patient's nutrition/hydration status for malnutrition  Collaborate with interdisciplinary team and initiate plan and interventions as ordered  Monitor patient's weight and dietary intake as ordered or per policy  Utilize nutrition screening tool and intervene as necessary  Determine patient's food preferences and provide high-protein, high-caloric foods as appropriate       INTERVENTIONS:  - Monitor oral intake, urinary output, labs, and treatment plans  - Assess nutrition and hydration status and recommend course of action  - Evaluate amount of meals eaten  - Assist patient with eating if necessary   - Allow adequate time for meals  - Recommend/ encourage appropriate diets, oral nutritional supplements, and vitamin/mineral supplements  - Order, calculate, and assess calorie counts as needed  - Recommend, monitor, and adjust tube feedings and TPN/PPN based on assessed needs  - Assess need for intravenous fluids  - Provide specific nutrition/hydration education as appropriate  - Include patient/family/caregiver in decisions related to nutrition  Outcome: Progressing     Problem: INFECTION - ADULT  Goal: Absence or prevention of progression during hospitalization  Description: INTERVENTIONS:  - Assess and monitor for signs and symptoms of infection  - Monitor lab/diagnostic results  - Monitor all insertion sites, i e  indwelling lines, tubes, and drains  - Monitor endotracheal if appropriate and nasal secretions for changes in amount and color  - [place] appropriate cooling/warming therapies per order  - Administer medications as ordered  - Instruct and encourage patient and family to use good hand hygiene technique  - Identify and instruct in appropriate isolation precautions for identified infection/condition  Outcome: Progressing  Goal: Absence of fever/infection during neutropenic period  Description: INTERVENTIONS:  - Monitor WBC    Outcome: Progressing     Problem: PAIN - ADULT  Goal: Verbalizes/displays adequate comfort level or baseline comfort level  Description: Interventions:  - Encourage patient to monitor pain and request assistance  - Assess pain using appropriate pain scale  - Administer analgesics based on type and severity of pain and evaluate response  - Implement non-pharmacological measures as appropriate and evaluate response  - Consider cultural and social influences on pain and pain management  - Notify physician/advanced practitioner if interventions unsuccessful or patient reports new pain  Outcome: Progressing

## 2022-02-10 NOTE — CASE MANAGEMENT
Case Management Discharge Planning Note    Patient name Wilberto   Location /-39 MRN [id number]  : 1945 Date 2/10/2022       Current Admission Date: 2022  Current Admission Diagnosis:Left ankle pain   Patient Active Problem List    Diagnosis Date Noted    Severe protein-calorie malnutrition ([facility] [address] ) 2022    Left ankle pain 2022    Mild protein-calorie malnutrition ([facility] [address] ) 2022    Malignant neoplasm of lower third of esophagus ([facility] [address] ) 2022    Weight loss 2022    Dysphagia 2022    Dermatitis 2022    Current every day smoker 2019      LOS (days): 4  Geometric Mean LOS (GMLOS) (days): 4 60  Days to GMLOS:0 4     OBJECTIVE:  Risk of Unplanned Readmission Score: 11         Current admission status: Inpatient   Preferred Pharmacy:   [facility]/pharmacy #[id number]- [place], [place] - [address]  Phone: [phone] Fax: [phone]    Primary Care Provider: Sharon Yuen MD    Primary Insurance: [facility] REP  Secondary Insurance:     DISCHARGE DETAILS:    IMM Given (Date):: 02/10/22  IMM Given to[de-identified] Patient     Additional Comments: IMM reviewed with pt who expressed verbal confirmation of understanding  IMM in scan bin to be entered into pt's EHR, copy at bedside  Auth for [place] to be submitted pending updated OT note  CM department to follow

## 2022-02-10 NOTE — PROGRESS NOTES
[address] [facility]  Progress Note - Margarita Kamara 1945, 68 y o  male MRN: [id number]  Unit/Bed#: -[facility] Encounter: [id number]  Primary Care Provider: Debbie Elam MD   Date and time admitted to hospital: 2/5/2022 11:08 AM    * Left ankle pain  Assessment & Plan  Background:  Presented to the ED with worsening left ankle pain that he had been taking Keflex PO as an outpatient for however unable to keep it down  · Initially with IV cefazolin; discontinued on 02/06; unlikely that this is infectious  · XR without osseous abnormality  · Podiatry consulted; input as noted below  · Pain and discoloration of thought to be secondary to vascular etiology  · Vascular surgery consulted  · Arterial duplex 2/7 showing significant PAD, vascular to follow up outpatient   · Resume aspirin  · PT/OT consulted - recommending STR   · CT Left Lower Extremity (2/9/22): No fracture or other acute osseous abnormality  Peroneal tendinosis identified with anterior subluxation of the brevis  Intact insertions are noted  No further suspected ligamentous or tendinous abnormality  · Orthopedic Surgery consult, appreciate input  · No surgical intervention indicated  · Recommending PT/OT/ROM Exercises  · WBAT    Malignant neoplasm of lower third of esophagus (HCC)  Assessment & Plan  · Recently diagnosed with esophageal cancer via EGD biopsy  · Follows with Medical Oncology and Surgical Oncology OP;  Does not have a formal treatment plan to date  · With persistent dysphagia and ongoing emesis and the inability to hold down much nutrition at all  · Gastroenterology consulted; input as noted  · s/p EGD and J-tube placement 2/7  · Liquid oral intake as tolerated - supervised, maintain aspiration precautions  · Nutritional consultation for initiation of tube feedings  · Initiated on Jevity 1 5 rebekah on 2/8, goal 95 ml/hr x 16 hrs overnight; free water bolus 250 ml q6h    Severe protein-calorie malnutrition ([address] )  Assessment & Plan  Malnutrition Findings:   Adult Malnutrition type: Chronic illness  Adult Degree of Malnutrition: Other severe protein calorie malnutrition (related to inadequate energy intakes of less than 75% of estimated needs for >1 month, significant weight loss of 11 2% x 3 weeks, 26 4% x 1 year, visible wasting around Clavicle, Shoulders, Ribs; Treat-Enteral Nutrition via planned J-Tube)    BMI Findings:  Adult BMI Classifications: Underweight < 18 5     Body mass index is 17 31 kg/m²  J tube for feeding  Nutrition consult appreciated      VTE Pharmacologic Prophylaxis: VTE Score: 3 Moderate Risk (Score 3-4) - Pharmacological DVT Prophylaxis Ordered: enoxaparin (Lovenox)  Patient Centered Rounds: I performed bedside rounds with nursing staff today  Discussions with Specialists or Other Care Team Provider: Case Management, Oncology    Education and Discussions with Family / Patient: Updated  (daughter) via phone  Time Spent for Care: 20 minutes  More than 50% of total time spent on counseling and coordination of care as described above  Current Length of Stay: 4 day(s)  Current Patient Status: Inpatient   Certification Statement: The patient will continue to require additional inpatient hospital stay due to ongoing treatment in setting of pending placement  Discharge Plan: TBD pending placement  Code Status: Level 1 - Full Code    Subjective:   CC: "my ankle is no different "    Patient resting in bed, denies complaints of chest pain, shortness of breath, fever, or chills  Reports that he is tolerating the tube feeds well, no nausea/vomiting  Discussed plan of transfer to [facility], hopefully tomorrow if we can obtain insurance auth/placement  Patient initially confused, thought he would get rehab here, discussed plan with him going forward; he verbalizes understanding at this time      Objective:     Vitals:   Temp (24hrs), Av 7 °F (36 5 °C), Min:97 7 °F (36 5 °C), Max:97 7 °F (36 5 °C)    Temp:  [97 7 °F (36 5 °C)] 97 7 °F (36 5 °C)  HR:  [68-77] 77  Resp:  [18-19] 18  BP: (115-124)/(60-66) 115/60  SpO2:  [96 %-98 %] 98 %  Body mass index is 17 31 kg/m²  Input and Output Summary (last 24 hours): Intake/Output Summary (Last 24 hours) at 2/10/2022 0959  Last data filed at 2/10/2022 7272  Gross per 24 hour   Intake 1612 ml   Output 1000 ml   Net 612 ml       Physical Exam:   Physical Exam  Vitals and nursing note reviewed  Constitutional:       General: He is not in acute distress  Appearance: He is cachectic  He is ill-appearing  Cardiovascular:      Rate and Rhythm: Normal rate  Pulses: Normal pulses  Heart sounds: Normal heart sounds  Pulmonary:      [place]: Pulmonary effort is normal       Breath sounds: Normal breath sounds  Abdominal:      General: Bowel sounds are normal       Palpations: Abdomen is soft  Comments: J Tube   Musculoskeletal:         General: Tenderness (Left Ankle) present  Normal range of motion  Right lower leg: No edema  Left lower leg: No edema  Skin:     General: Skin is warm and dry  Capillary Refill: Capillary refill takes less than 2 seconds  Neurological:      Mental Status: He is alert and oriented to person, place, and time  Psychiatric:         Mood and Affect: Mood normal           Additional Data:     Labs:  Results from last 7 days   Lab Units 02/09/22  0552 02/08/22  0609 02/07/22  0535   WBC Thousand/uL 10 15   < > 11 11*   HEMOGLOBIN g/dL 11 5*   < > 12 6   HEMATOCRIT % 36 0*   < > 39 3   PLATELETS Thousands/uL 275   < > 331   NEUTROS PCT %  --   --  82*   LYMPHS PCT %  --   --  6*   MONOS PCT %  --   --  9   EOS PCT %  --   --  1    < > = values in this interval not displayed       Results from last 7 days   Lab Units 02/09/22  0552 02/08/22  0609 02/07/22  0535   SODIUM mmol/L 139   < > 147*   POTASSIUM mmol/L 3 8   < > 3 7   CHLORIDE mmol/L 103   < > 108   CO2 mmol/L 30   < > 29   BUN mg/dL 26*   < > 37*   CREATININE mg/dL 0 83   < > 1 04   ANION GAP mmol/L 6   < > 10   CALCIUM mg/dL 8 9   < > 9 6   ALBUMIN g/dL  --   --  3 5   TOTAL BILIRUBIN mg/dL  --   --  0 73   ALK PHOS U/L  --   --  66   ALT U/L  --   --  9*   AST U/L  --   --  7   GLUCOSE RANDOM mg/dL 148*   < > 129    < > = values in this interval not displayed  Results from last 7 days   Lab Units 02/05/22  1203   LACTIC ACID mmol/L 1 3       Lines/Drains:  Invasive Devices  Report    Peripheral Intravenous Line            Peripheral IV 02/05/22 Right Antecubital 4 days    Peripheral IV 02/07/22 Left Forearm 2 days          Drain            Gastrostomy/Enterostomy PEG-jejunostomy 20 Fr  LLQ 2 days                      Imaging: Reviewed radiology reports from this admission including: CT LLE    Recent Cultures (last 7 days):         Last 24 Hours Medication List:   Current Facility-Administered Medications   Medication Dose Route Frequency Provider Last Rate    acetaminophen  650 mg Oral Q6H PRN Marcello Tracy, DO      aspirin  81 mg Per J Tube Daily Gayle Ellison PA-C      Diclofenac Sodium  2 g Topical 4x Daily Gayle Ellison PA-C      enoxaparin  40 mg Subcutaneous Daily aKmran Daniels,       famotidine  20 mg Per J Tube Daily Gayle Ellison PA-C      ketorolac  15 mg Intravenous Q6H [address] MADELINE Sanderson      lidocaine  1 patch Topical Daily Sandro Slater PA-C      ondansetron  4 mg Intravenous Q6H PRN Marcello Tracy, DO      oxyCODONE  5 mg Oral Q4H PRN Marcello Tracy, DO      pantoprazole  40 mg Intravenous Q12H [address], DO          Today, Patient Was Seen By: MADELINE Sanderson    **Please Note: This note may have been constructed using a voice recognition system  **

## 2022-02-10 NOTE — PHYSICAL THERAPY NOTE
Physical Therapy Treatment Note       02/10/22 0932   PT Last Visit   PT Visit Date 02/10/22   Note Type   Note Type Treatment for insurance authorization   Pain Assessment   Pain Assessment Tool 0-10   Pain Score 6  (6/10 pre mobility, 8/10 post mobility)   Pain Location/Orientation Orientation: Left  (ankle)   Pain Onset/Description Onset: Gradual   Restrictions/Precautions   Weight Bearing Precautions Per Order Yes   LLE Weight Bearing Per Order WBAT  (per ortho)   Other Precautions Chair Alarm; Bed Alarm;Multiple lines; Fall Risk;Pain   General   Chart Reviewed Yes   Response to Previous Treatment Patient with no complaints from previous session  Family/Caregiver Present No   Cognition   Overall Cognitive Status WFL   Arousal/Participation Alert; Responsive; Cooperative   Attention Within functional limits   Orientation Level Oriented X4   Memory Within functional limits   Following Commands Follows all commands and directions without difficulty   Comments pt agreeable to PT   Subjective   Subjective "I can try"   Bed Mobility   Supine to Sit 4  Minimal assistance   Additional items Assist x 1;HOB elevated; Increased time required;Verbal cues;LE management   Transfers   Sit to Stand 4  Minimal assistance   Additional items Assist x 2;Armrests; Increased time required;Verbal cues   Stand to Sit 4  Minimal assistance   Additional items Assist x 2;Armrests; Increased time required;Verbal cues   Additional Comments pt trialed 2 STS attempts, able to tolerate static standing 20-30sec duration on 2 attempts c B UE support on RW  vc for weight shifting to offload LLE for enhanced comfort   A of 3rd to perform bed chair swap for enhanced pt transfer OOB   Ambulation/Elevation   Gait pattern Not appropriate  (pt unable to weight shift d/t L ankle pain)   Balance   Static Sitting Fair +   Dynamic Sitting Fair   Static Standing Poor +   Endurance Deficit   Endurance Deficit Yes   Activity Tolerance   Activity Tolerance Patient limited by pain; Patient limited by fatigue   Medical Staff Made Aware RACHEL Snow  discussed pt's concerns of feedings upon d/c at facility c [address] [place] [address] verbalized pt approrpiate to see, made aware of session outcome/recs   Exercises   Heelslides Sitting;10 reps;AROM; Bilateral   Glute Sets Sitting;10 reps;AROM; Bilateral   Hip Flexion Sitting;10 reps;AROM; Bilateral   Hip Abduction Sitting;10 reps;AROM; Bilateral   Knee AROM Short Arc Quad Sitting;10 reps;AROM; Bilateral   Ankle Pumps Sitting;AROM; Bilateral  (5 reps LLE (limited AROM), 10 reps RLE)   Assessment   Prognosis Fair   Problem List Decreased strength; Impaired balance;Decreased mobility; Decreased range of motion;Decreased endurance;Pain   Assessment Pt seen for PT treatment session this date, consisting of ther act focused on bed mobility, transfers, static sitting & standing posture/balance and ther ex focused on strengthening  Since previous session, pt has made good progress in terms of decreased A for all phases of mobility, initiation of LB exercise to tolerance  Pertinent barriers during this session include pain  Current goals and POC remain appropriate, pt continues to have rehab potential and is making progress towards STGs  Pt prognosis for achieving goals is fair, pending pt progress with hospitalization/medical status improvements, and indicated by Stimulability, ability to follow directions, recent history of independence with functional skills/High PLOF, learning potential and recent onset  Pt limited d/t the presence of intractable pain, fear of pain provocation and fear of falling  PT recommends post acute rehabilitation services upon discharge  Pt continues to be functioning below baseline level, and remains limited 2* factors listed above  PT will continue to see pt during current hospitalization in order to address the deficits listed above and provide interventions consistent w/ POC in effort to achieve STGs  Barriers to Discharge Decreased caregiver support; Inaccessible home environment   Goals   Patient Goals to decrease the pain   STG Expiration Date 02/17/22   PT Treatment Day 1   Plan   Treatment/Interventions Functional transfer training;LE strengthening/ROM; Therapeutic exercise; Endurance training;Patient/family training;Equipment eval/education; Bed mobility;Spoke to nursing;Spoke to case management;Spoke to advanced practitioner   Progress Slow progress, decreased activity tolerance   PT Frequency 3-5x/wk   Recommendation   PT Discharge Recommendation Post acute rehabilitation services   Equipment Recommended   (TBD)   AM-PAC Basic Mobility Inpatient   Turning in Bed Without Bedrails 3   Lying on Back to Sitting on Edge of Flat Bed 3   Moving Bed to Chair 2   Standing Up From Chair 2   Walk in Room 1   Climb 3-5 Stairs 1   Basic Mobility Inpatient Raw Score 12   Basic Mobility Standardized Score 32 23   Highest Level Of Mobility   JH-HLM Goal 4: Move to chair/commode   JH-HLM Highest Level of Mobility 4: Move to chair/commode   JH-HLM Goal Achieved Yes             Skyler Jackson PT, DPT    Time of PT treatment session: [id number] (total treatment time = 39 minutes)

## 2022-02-10 NOTE — PLAN OF CARE
Problem: Nutrition/Hydration-ADULT  Goal: Nutrient/Hydration intake appropriate for improving, restoring or maintaining nutritional needs  Description: Monitor and assess patient's nutrition/hydration status for malnutrition  Collaborate with interdisciplinary team and initiate plan and interventions as ordered  Monitor patient's weight and dietary intake as ordered or per policy  Utilize nutrition screening tool and intervene as necessary  Determine patient's food preferences and provide high-protein, high-caloric foods as appropriate       INTERVENTIONS:  - Monitor oral intake, urinary output, labs, and treatment plans  - Assess nutrition and hydration status and recommend course of action  - Evaluate amount of meals eaten  - Assist patient with eating if necessary   - Allow adequate time for meals  - Recommend/ encourage appropriate diets, oral nutritional supplements, and vitamin/mineral supplements  - Recommend, monitor, and adjust tube feedings based on assessed needs  - Provide specific nutrition/hydration education as appropriate  - Include patient/family/caregiver in decisions related to nutrition  Outcome: Progressing

## 2022-02-11 PROCEDURE — C9113 INJ PANTOPRAZOLE SODIUM, VIA: HCPCS | Performed by: INTERNAL MEDICINE

## 2022-02-11 PROCEDURE — 99232 SBSQ HOSP IP/OBS MODERATE 35: CPT | Performed by: NURSE PRACTITIONER

## 2022-02-11 PROCEDURE — 97535 SELF CARE MNGMENT TRAINING: CPT

## 2022-02-11 RX ADMIN — ASPIRIN 81 MG: 81 TABLET, CHEWABLE ORAL at 09:54

## 2022-02-11 RX ADMIN — DICLOFENAC SODIUM 2 G: 10 GEL TOPICAL at 09:54

## 2022-02-11 RX ADMIN — KETOROLAC TROMETHAMINE 15 MG: 30 INJECTION, SOLUTION INTRAMUSCULAR at 06:13

## 2022-02-11 RX ADMIN — LIDOCAINE 5% 1 PATCH: 700 PATCH TOPICAL at 09:53

## 2022-02-11 RX ADMIN — FAMOTIDINE 20 MG: 40 POWDER, FOR SUSPENSION ORAL at 09:56

## 2022-02-11 RX ADMIN — KETOROLAC TROMETHAMINE 15 MG: 30 INJECTION, SOLUTION INTRAMUSCULAR at 13:11

## 2022-02-11 RX ADMIN — DICLOFENAC SODIUM 2 G: 10 GEL TOPICAL at 21:54

## 2022-02-11 RX ADMIN — ENOXAPARIN SODIUM 40 MG: 40 INJECTION SUBCUTANEOUS at 09:53

## 2022-02-11 RX ADMIN — PANTOPRAZOLE SODIUM 40 MG: 40 INJECTION, POWDER, FOR SOLUTION INTRAVENOUS at 21:53

## 2022-02-11 RX ADMIN — DICLOFENAC SODIUM 2 G: 10 GEL TOPICAL at 13:11

## 2022-02-11 RX ADMIN — PANTOPRAZOLE SODIUM 40 MG: 40 INJECTION, POWDER, FOR SOLUTION INTRAVENOUS at 09:54

## 2022-02-11 NOTE — PLAN OF CARE
Problem: Nutrition/Hydration-ADULT  Goal: Nutrient/Hydration intake appropriate for improving, restoring or maintaining nutritional needs  Description: Monitor and assess patient's nutrition/hydration status for malnutrition  Collaborate with interdisciplinary team and initiate plan and interventions as ordered  Monitor patient's weight and dietary intake as ordered or per policy  Utilize nutrition screening tool and intervene as necessary  Determine patient's food preferences and provide high-protein, high-caloric foods as appropriate       INTERVENTIONS:  - Monitor oral intake, urinary output, labs, and treatment plans  - Assess nutrition and hydration status and recommend course of action  - Evaluate amount of meals eaten  - Assist patient with eating if necessary   - Allow adequate time for meals  - Recommend/ encourage appropriate diets, oral nutritional supplements, and vitamin/mineral supplements  - Recommend, monitor, and adjust tube feedings based on assessed needs  - Provide specific nutrition/hydration education as appropriate  - Include patient/family/caregiver in decisions related to nutrition  Outcome: Progressing     Problem: PAIN - ADULT  Goal: Verbalizes/displays adequate comfort level or baseline comfort level  Description: Interventions:  - Encourage patient to monitor pain and request assistance  - Assess pain using appropriate pain scale  - Administer analgesics based on type and severity of pain and evaluate response  - Implement non-pharmacological measures as appropriate and evaluate response  - Consider cultural and social influences on pain and pain management  - Notify physician/advanced practitioner if interventions unsuccessful or patient reports new pain  Outcome: Progressing     Problem: INFECTION - ADULT  Goal: Absence or prevention of progression during hospitalization  Description: INTERVENTIONS:  - Assess and monitor for signs and symptoms of infection  - Monitor lab/diagnostic results  - Monitor all insertion sites, i e  indwelling lines, tubes, and drains  - Monitor endotracheal if appropriate and nasal secretions for changes in amount and color  - [place] appropriate cooling/warming therapies per order  - Administer medications as ordered  - Instruct and encourage patient and family to use good hand hygiene technique  - Identify and instruct in appropriate isolation precautions for identified infection/condition  Outcome: Progressing  Goal: Absence of fever/infection during neutropenic period  Description: INTERVENTIONS:  - Monitor WBC    Outcome: Progressing

## 2022-02-11 NOTE — OCCUPATIONAL THERAPY NOTE
Occupational Therapy Progress Note     Patient Name: Sheyla Irizarry  USKBE'A Date: 2/11/2022  Problem List  Principal Problem:    Left ankle pain  Active Problems:    Malignant neoplasm of lower third of esophagus ([facility] [address] )    Severe protein-calorie malnutrition ([facility] [address] )          02/11/22 0847   OT Last Visit   OT Visit Date 02/11/22   Note Type   Note Type Treatment for insurance authorization   Restrictions/Precautions   Weight Bearing Precautions Per Order Yes   LLE Weight Bearing Per Order WBAT  (per ortho 2/9/22)   Other Precautions Fall Risk;Bed Alarm;Pain;Multiple lines   Pain Assessment   Pain Assessment Tool 0-10   Pain Score 6   Pain Location/Orientation Orientation: Left; Location: Foot   Multiple Pain Sites No   ADL   Eating Assistance 5  Supervision/Setup  (tub feed)   Grooming Assistance 5  Supervision/Setup   Grooming Comments Pt performed while seated at sink in recliner   UB Bathing Assistance 5  Supervision/Setup   UB Bathing Comments Pt completed while seated in b/s chair with set-up   LB Bathing Assistance 3  Moderate Assistance   LB Bathing Comments based on functional assessment of performance skills/deficits   UB Dressing Assistance 4  Minimal Assistance   UB Dressing Comments to doff/don hospital gown while seated   LB Dressing Assistance 2  Maximal Assistance   LB Dressing Comments pt required max a to don b/l socks while seated EOB - pt able to cross one LE over knee however unable to reach toes to don sock   Toileting Assistance  2  Maximal Assistance   Toileting Comments based on functional assessment of performance skills/deficits   Bed Mobility   Supine to Sit 4  Minimal assistance   Additional items Assist x 1; Increased time required;Verbal cues;HOB elevated   Sit to Supine 4  Minimal assistance   Additional items Assist x 1; Increased time required; Bedrails   Additional Comments Pt supine in bed at start of session and agreeable to OT   Pt requested to return to bed at end of session, pt demonstrating ability to reposition self in bed using b/l UEs  Pt supine in bed at end of session with all needs met, call bell within reach, RN present  Transfers   Sit to Stand 4  Minimal assistance  (3 trials during session)   Additional items Assist x 2; Increased time required;Verbal cues   Stand to Sit 4  Minimal assistance  (3 trials during session)   Additional items Assist x 2; Increased time required;Verbal cues  (A to control descent 3 trials)   Additional Comments RW for UE support; Pt required VCs for body mechanics and foot placement  Pt required Vcs for safe hand placement to push up from arm rests  Functional Mobility   Functional Mobility 4  Minimal assistance   Additional Comments Min Ax2 using RW for short functional distance EOB<>bedside chair  Vcs for safe RW management   Cognition   Overall Cognitive Status [facility]   Arousal/Participation Alert; Responsive; Cooperative   Attention Within functional limits   Orientation Level Oriented X4   Memory Within functional limits   Following Commands Follows all commands and directions without difficulty   Comments Pt able to identify self by full name and birthdate  Activity Tolerance   Activity Tolerance Patient limited by fatigue;Patient limited by pain   Medical Staff Made Aware JUDSON Rios verbalized pt appropriate for therapy, present and aware of outcomes   Assessment   Assessment Patient participated in Skilled OT session (time 7834-3972; 8453-4313) this date with interventions consisting of ADL retraining with the use of correct body mechanics, energy conservation technique education, safety awareness and fall prevention technique education and increase dynamic sit/stand balance during functional activity  Patient agreeable to OT treatment session, upon arrival patient was found supine in bed  In comparison to previous session, patient with improvements in functional transfers, mobility, activity tolerance, and overall independence with ADLs*    Pt performed ADL while seated in bedside chair requiring supervision for grooming, supervision for UB bathing, Mod A for LB bathing, Min A for UB dressing, Max A for LB dressing, Max A for toileting, Min A x 1 for supine<>sit, Min A x 2 for STS with RW, and Min a x 2 for RW for functional mobility short distance EOB<>b/s chair  Patient requiring verbal cues for safety and frequent rest periods  Patient continues to be functioning below baseline level, occupational performance remains limited secondary to factors listed above and increased risk for falls and injury  From OT standpoint, recommendation at time of d/c would be post acute rehab  Patient to benefit from continued Occupational Therapy treatment while in the hospital to address deficits as defined above and maximize level of functional independence with ADLs and functional mobility  Plan   Treatment Interventions ADL retraining;Functional transfer training;UE strengthening/ROM; Endurance training;Patient/family training;Equipment evaluation/education; Compensatory technique education;Continued evaluation; Energy conservation; Activityengagement   Goal Expiration Date 02/21/22   OT Treatment Day 1   OT Frequency 3-5x/wk   Recommendation   OT Discharge Recommendation Post acute rehabilitation services   OT - OK to Discharge   (once medically cleared)   AM-PAC Daily Activity Inpatient   Lower Body Dressing 2   Bathing 2   Toileting 2   Upper Body Dressing 2   Grooming 3   Eating 4   Daily Activity Raw Score 15   Daily Activity Standardized Score (Calc for Raw Score >=11) 34 69   AM-PAC Applied Cognition Inpatient   Following a Speech/Presentation 4   Understanding Ordinary Conversation 4   Taking Medications 3   Remembering Where Things Are Placed or Put Away 4   Remembering List of 4-5 Errands 3   Taking Care of Complicated Tasks 3   Applied Cognition Raw Score 21   Applied Cognition Standardized Score 44 3   Barthel Index   Feeding 10   Bathing 0   Grooming Score 0   Dressing Score 5   Bladder Score 5   Bowels Score 10   Toilet Use Score 5   Transfers (Bed/Chair) Score 5   Mobility (Level Surface) Score 0   Stairs Score 0   Barthel Index Score 40   Modified Stewart Scale   Modified Stewart Scale 4     Deisy Mares, OTR/L

## 2022-02-11 NOTE — PLAN OF CARE
Problem: OCCUPATIONAL THERAPY ADULT  Goal: Performs self-care activities at highest level of function for planned discharge setting  See evaluation for individualized goals  Description: Treatment Interventions: ADL retraining,Functional transfer training,UE strengthening/ROM,Endurance training,Patient/family training,Equipment evaluation/education,Compensatory technique education,Continued evaluation,Energy conservation,Activityengagement          See flowsheet documentation for full assessment, interventions and recommendations  Outcome: Progressing  Note: Limitation: Decreased ADL status,Decreased UE ROM,Decreased UE strength,Decreased endurance,Decreased self-care trans,Decreased high-level ADLs  Prognosis: Good  Assessment: Patient participated in Skilled OT session (time 2328-7672; 7206-0359) this date with interventions consisting of ADL retraining with the use of correct body mechanics, energy conservation technique education, safety awareness and fall prevention technique education and increase dynamic sit/stand balance during functional activity  Patient agreeable to OT treatment session, upon arrival patient was found supine in bed  In comparison to previous session, patient with improvements in functional transfers, mobility, activity tolerance, and overall independence with ADLs*   Pt performed ADL while seated in bedside chair requiring supervision for grooming, supervision for UB bathing, Mod A for LB bathing, Min A for UB dressing, Max A for LB dressing, Max A for toileting, Min A x 1 for supine<>sit, Min A x 2 for STS with RW, and Min a x 2 for RW for functional mobility short distance EOB<>b/s chair  Patient requiring verbal cues for safety and frequent rest periods  Patient continues to be functioning below baseline level, occupational performance remains limited secondary to factors listed above and increased risk for falls and injury     From OT standpoint, recommendation at time of d/c would be post acute rehab  Patient to benefit from continued Occupational Therapy treatment while in the hospital to address deficits as defined above and maximize level of functional independence with ADLs and functional mobility        OT Discharge Recommendation: Post acute rehabilitation services  OT - OK to Discharge:  (once medically cleared)

## 2022-02-11 NOTE — PLAN OF CARE
Problem: PAIN - ADULT  Goal: Verbalizes/displays adequate comfort level or baseline comfort level  Description: Interventions:  - Encourage patient to monitor pain and request assistance  - Assess pain using appropriate pain scale  - Administer analgesics based on type and severity of pain and evaluate response  - Implement non-pharmacological measures as appropriate and evaluate response  - Consider cultural and social influences on pain and pain management  - Notify physician/advanced practitioner if interventions unsuccessful or patient reports new pain  Outcome: Progressing     Problem: INFECTION - ADULT  Goal: Absence or prevention of progression during hospitalization  Description: INTERVENTIONS:  - Assess and monitor for signs and symptoms of infection  - Monitor lab/diagnostic results  - Monitor all insertion sites, i e  indwelling lines, tubes, and drains  - Monitor endotracheal if appropriate and nasal secretions for changes in amount and color  - [place] appropriate cooling/warming therapies per order  - Administer medications as ordered  - Instruct and encourage patient and family to use good hand hygiene technique  - Identify and instruct in appropriate isolation precautions for identified infection/condition  Outcome: Progressing  Goal: Absence of fever/infection during neutropenic period  Description: INTERVENTIONS:  - Monitor WBC    Outcome: Progressing

## 2022-02-11 NOTE — PROGRESS NOTES
[address] [facility]  Progress Note - Ashley Uribe 1945, 68 y o  male MRN: [id number]  Unit/Bed#: -01 Encounter: [id number]  Primary Care Provider: Garrison Frederick MD   Date and time admitted to hospital: 2/5/2022 11:08 AM    * Left ankle pain  Assessment & Plan  Background:  Presented to the ED with worsening left ankle pain that he had been taking Keflex PO as an outpatient for however unable to keep it down  · Initially with IV cefazolin; discontinued on 02/06; unlikely that this is infectious  · XR without osseous abnormality  · Podiatry consulted; input as noted below  · Pain and discoloration of thought to be secondary to vascular etiology  · Vascular surgery consulted  · Arterial duplex 2/7 showing significant PAD, vascular to follow up outpatient   · Resume aspirin  · PT/OT consulted - recommending STR   · CT Left Lower Extremity (2/9/22): No fracture or other acute osseous abnormality  Peroneal tendinosis identified with anterior subluxation of the brevis  Intact insertions are noted  No further suspected ligamentous or tendinous abnormality  · Orthopedic Surgery consult, appreciate input  · No surgical intervention indicated  · Recommending PT/OT/ROM Exercises  · WBAT    Malignant neoplasm of lower third of esophagus (HCC)  Assessment & Plan  · Recently diagnosed with esophageal cancer via EGD biopsy  · Follows with Medical Oncology and Surgical Oncology OP;  Does not have a formal treatment plan to date  · Will follow up with Onc outpatient after discharge from rehab for plan  · Persistent dysphagia and ongoing emesis and the inability to hold down much nutrition at all - GI consult  · Gastroenterology consulted; input as noted  · s/p EGD and J-tube placement 2/7  · Liquid oral intake as tolerated - supervised, maintain aspiration precautions  · Nutritional consultation for initiation of tube feedings  · Currently on Jevity 1 5 at 90 mL/hr with 250 mL water flush every 6 hours; tolerating well  Severe protein-calorie malnutrition ([facility] [address] )  Assessment & Plan  Malnutrition Findings:   Adult Malnutrition type: Chronic illness  Adult Degree of Malnutrition: Other severe protein calorie malnutrition (related to inadequate energy intakes of less than 75% of estimated needs for >1 month, significant weight loss of 11 2% x 3 weeks, 26 4% x 1 year, visible wasting around Clavicle, Shoulders, Ribs; Treat-Enteral Nutrition via planned J-Tube)    BMI Findings:  Adult BMI Classifications: Underweight < 18 5     Body mass index is 17 31 kg/m²  J tube for feeding  Nutrition consult appreciated      VTE Pharmacologic Prophylaxis: VTE Score: 3 Moderate Risk (Score 3-4) - Pharmacological DVT Prophylaxis Ordered: enoxaparin (Lovenox)  Patient Centered Rounds: I performed bedside rounds with nursing staff today  Discussions with Specialists or Other Care Team Provider: Case Management    Education and Discussions with Family / Patient: Updated  (daughter) via phone  Time Spent for Care: 15 minutes  More than 50% of total time spent on counseling and coordination of care as described above  Current Length of Stay: 5 day(s)  Current Patient Status: Inpatient   Certification Statement: The patient will continue to require additional inpatient hospital stay due to ongoing treatment in setting of pending placement  Discharge Plan: Anticipate discharge later today or tomorrow to rehab facility  Code Status: Level 1 - Full Code    Subjective:   CC: "I feel a little dry"    Patient resting in the recliner chair, preparing to get washed up  Denies complaints of chest pain, shortness of breath, fever, or chills  Denies complaints of abdominal pain, nausea/vomiting        Objective:     Vitals:   Temp (24hrs), Av 1 °F (36 7 °C), Min:97 7 °F (36 5 °C), Max:98 4 °F (36 9 °C)    Temp:  [97 7 °F (36 5 °C)-98 4 °F (36 9 °C)] 97 7 °F (36 5 °C)  HR:  [72-79] 79  Resp:  [18] 18  BP: (107-116)/59) 107/59  SpO2:  [96 %-98 %] 98 %  Body mass index is 17 31 kg/m²  Input and Output Summary (last 24 hours): Intake/Output Summary (Last 24 hours) at 2/11/2022 0945  Last data filed at 2/11/2022 0601  Gross per 24 hour   Intake 1385 ml   Output 650 ml   Net 735 ml       Physical Exam:    Physical Exam  Vitals and nursing note reviewed  Constitutional:       General: He is not in acute distress  Appearance: He is cachectic  He is ill-appearing  Cardiovascular:      Rate and Rhythm: Normal rate  Pulses: Normal pulses  Heart sounds: Normal heart sounds  Pulmonary:      [place]: Pulmonary effort is normal       Breath sounds: Normal breath sounds  Abdominal:      General: Bowel sounds are normal       Palpations: Abdomen is soft  Comments: J Tube - Jevity infusing at 95 mL/hr   Musculoskeletal:         General: Normal range of motion  Skin:     General: Skin is warm and dry  Capillary Refill: Capillary refill takes less than 2 seconds  Neurological:      Mental Status: He is alert  Mental status is at baseline  Psychiatric:         Mood and Affect: Mood normal           Additional Data:     Labs:  Results from last 7 days   Lab Units 02/09/22 0552 02/08/22  0609 02/07/22  0535   WBC Thousand/uL 10 15   < > 11 11*   HEMOGLOBIN g/dL 11 5*   < > 12 6   HEMATOCRIT % 36 0*   < > 39 3   PLATELETS Thousands/uL 275   < > 331   NEUTROS PCT %  --   --  82*   LYMPHS PCT %  --   --  6*   MONOS PCT %  --   --  9   EOS PCT %  --   --  1    < > = values in this interval not displayed       Results from last 7 days   Lab Units 02/09/22 0552 02/08/22  0609 02/07/22  0535   SODIUM mmol/L 139   < > 147*   POTASSIUM mmol/L 3 8   < > 3 7   CHLORIDE mmol/L 103   < > 108   CO2 mmol/L 30   < > 29   BUN mg/dL 26*   < > 37*   CREATININE mg/dL 0 83   < > 1 04   ANION GAP mmol/L 6   < > 10   CALCIUM mg/dL 8 9   < > 9 6   ALBUMIN g/dL  --   --  3 5   TOTAL BILIRUBIN mg/dL  --   --  0 73 ALK PHOS U/L  --   --  66   ALT U/L  --   --  9*   AST U/L  --   --  7   GLUCOSE RANDOM mg/dL 148*   < > 129    < > = values in this interval not displayed  Results from last 7 days   Lab Units 02/05/22  1203   LACTIC ACID mmol/L 1 3       Lines/Drains:  Invasive Devices  Report    Peripheral Intravenous Line            Peripheral IV 02/07/22 Left Forearm 3 days          Drain            Gastrostomy/Enterostomy PEG-jejunostomy 20 Fr  LLQ 3 days                  Imaging: No pertinent imaging reviewed  Recent Cultures (last 7 days):         Last 24 Hours Medication List:   Current Facility-Administered Medications   Medication Dose Route Frequency Provider Last Rate    acetaminophen  650 mg Oral Q6H PRN Everton Sides, DO      aspirin  81 mg Per J Tube Daily Fannie Cruz PA-C      Diclofenac Sodium  2 g Topical 4x Daily Gayle Ellison PA-C      enoxaparin  40 mg Subcutaneous Daily Kamran Daniels, DO      famotidine  20 mg Per J Tube Daily Gayle Ellison PA-C      ketorolac  15 mg Intravenous Q6H [address] MADELINE Del Real      lidocaine  1 patch Topical Daily Fannie Cruz PA-C      ondansetron  4 mg Intravenous Q6H PRN Everton Sides, DO      oxyCODONE  5 mg Oral Q4H PRN Everton Sides, DO      pantoprazole  40 mg Intravenous Q12H [address], DO          Today, Patient Was Seen By: MADELINE Del Real    **Please Note: This note may have been constructed using a voice recognition system  **

## 2022-02-11 NOTE — ASSESSMENT & PLAN NOTE
· Recently diagnosed with esophageal cancer via EGD biopsy  · Follows with Medical Oncology and Surgical Oncology OP; Does not have a formal treatment plan to date  · Will follow up with Onc outpatient after discharge from rehab for plan  · Persistent dysphagia and ongoing emesis and the inability to hold down much nutrition at all - GI consult  · Gastroenterology consulted; input as noted  · s/p EGD and J-tube placement 2/7  · Liquid oral intake as tolerated - supervised, maintain aspiration precautions  · Nutritional consultation for initiation of tube feedings  · Currently on Jevity 1 5 at 90 mL/hr with 250 mL water flush every 6 hours; tolerating well

## 2022-02-11 NOTE — DISCHARGE INSTRUCTIONS
Malnutrition   WHAT YOU NEED TO KNOW:   Malnutrition occurs when you do not get enough calories or nutrients to keep you healthy  Nutrients include protein, fat, carbohydrates, vitamins, and minerals  DISCHARGE INSTRUCTIONS:   Call your local emergency number (911 in the [address]) for any of the following:   · You have pain in your chest, back, neck, jaw, stomach, or down one or both arms  · You have shortness of breath  Call your doctor if:   · You lose a large amount of weight within a short amount of time  · You feel depressed, confused, tired, irritable, and you do not feel like eating  · You have questions or concerns about your condition or care  Medicines: You may need any of the following:  · Vitamins and minerals  may be needed to replace vitamins and minerals your body needs  They may be given in your IV, as a shot, or as a pill  · Appetite stimulants  are medicines that help improve your appetite so you will want to eat more  · Take your medicine as directed  Contact your healthcare provider if you think your medicine is not helping or if you have side effects  Tell him or her if you are allergic to any medicine  Keep a list of the medicines, vitamins, and herbs you take  Include the amounts, and when and why you take them  Bring the list or the pill bottles to follow-up visits  Carry your medicine list with you in case of an emergency  Self-care:   · Increase calories and nutrients  A dietitian may help you plan larger, healthy meals  If you have trouble eating larger meals, eat small meals throughout the day  You may need to include snacks between meals  You may need to eat or drink a nutrition supplement if you have trouble eating the right kinds and amounts of food  · Find support  If you cannot buy or prepare the right kinds of foods, talk to your healthcare provider  Ask for information about community programs that can help you      Follow up with your doctor as directed: Write down your questions so you remember to ask them during your visits  © Copyright [employer] 2021 Information is for End User's use only and may not be sold, redistributed or otherwise used for commercial purposes  All illustrations and images included in CareNotes® are the copyrighted property of A D A M , Inc  or Bethanie Bhat  The above information is an  only  It is not intended as medical advice for individual conditions or treatments  Talk to your doctor, nurse or pharmacist before following any medical regimen to see if it is safe and effective for you

## 2022-02-11 NOTE — CASE MANAGEMENT
Case Management Discharge Planning Note    Patient name Wilberto   Location /-43 MRN [id number]  : 1945 Date 2022       Current Admission Date: 2022  Current Admission Diagnosis:Left ankle pain   Patient Active Problem List    Diagnosis Date Noted    Severe protein-calorie malnutrition ([facility] [address] ) 2022    Left ankle pain 2022    Mild protein-calorie malnutrition ([facility] [address] ) 2022    Malignant neoplasm of lower third of esophagus ([facility] [address] ) 2022    Weight loss 2022    Dysphagia 2022    Dermatitis 2022    Current every day smoker 2019      LOS (days): 5  Geometric Mean LOS (GMLOS) (days): 4 60  Days to GMLOS:-0 6     OBJECTIVE:  Risk of Unplanned Readmission Score: 11         Current admission status: Inpatient   Preferred Pharmacy:   [id number]- [place], [place] - [address]  Phone: [phone] Fax: [phone]    Primary Care Provider: Sharon Yuen MD    Primary Insurance: [facility]  Secondary Insurance:     DISCHARGE DETAILS:    Additional Comments: Per [facility] communication[facility] [place]   unable to arrange transportation for this evening  Request sent to [facility] via [facility] for BLS, awaiting confirmation of transportation time  CMN in scan bin to be entered into pt's EHR, copy in paper chart  CM department to follow

## 2022-02-12 VITALS
RESPIRATION RATE: 18 BRPM | TEMPERATURE: 98 F | HEIGHT: 73 IN | SYSTOLIC BLOOD PRESSURE: 102 MMHG | WEIGHT: 131.17 LBS | BODY MASS INDEX: 17.38 KG/M2 | OXYGEN SATURATION: 99 % | HEART RATE: 68 BPM | DIASTOLIC BLOOD PRESSURE: 56 MMHG

## 2022-02-12 PROCEDURE — 99239 HOSP IP/OBS DSCHRG MGMT >30: CPT | Performed by: NURSE PRACTITIONER

## 2022-02-12 PROCEDURE — C9113 INJ PANTOPRAZOLE SODIUM, VIA: HCPCS | Performed by: INTERNAL MEDICINE

## 2022-02-12 RX ORDER — LIDOCAINE 50 MG/G
1 PATCH TOPICAL DAILY
Refills: 0
Start: 2022-02-13 | End: 2022-06-02

## 2022-02-12 RX ORDER — OXYCODONE HCL 5 MG/5 ML
5 SOLUTION, ORAL ORAL EVERY 4 HOURS PRN
Qty: 300 ML | Refills: 0 | Status: SHIPPED | OUTPATIENT
Start: 2022-02-12 | End: 2022-02-22

## 2022-02-12 RX ORDER — PANTOPRAZOLE SODIUM 40 MG/1
40 TABLET, DELAYED RELEASE ORAL 2 TIMES DAILY
Refills: 0
Start: 2022-02-12

## 2022-02-12 RX ADMIN — LIDOCAINE 5% 1 PATCH: 700 PATCH TOPICAL at 08:25

## 2022-02-12 RX ADMIN — DICLOFENAC SODIUM 2 G: 10 GEL TOPICAL at 08:25

## 2022-02-12 RX ADMIN — ENOXAPARIN SODIUM 40 MG: 40 INJECTION SUBCUTANEOUS at 08:25

## 2022-02-12 RX ADMIN — PANTOPRAZOLE SODIUM 40 MG: 40 INJECTION, POWDER, FOR SOLUTION INTRAVENOUS at 08:25

## 2022-02-12 RX ADMIN — ASPIRIN 81 MG: 81 TABLET, CHEWABLE ORAL at 08:25

## 2022-02-12 RX ADMIN — FAMOTIDINE 20 MG: 40 POWDER, FOR SUSPENSION ORAL at 08:26

## 2022-02-12 NOTE — CASE MANAGEMENT
Case Management Discharge Planning Note    Patient name Genevieve Morocho  Location /-77 MRN [id number]  : 1945 Date 2022       Current Admission Date: 2022  Current Admission Diagnosis:Left ankle pain   Patient Active Problem List    Diagnosis Date Noted    Severe protein-calorie malnutrition ([facility] [address] ) 2022    Left ankle pain 2022    Mild protein-calorie malnutrition ([facility] [address] ) 2022    Malignant neoplasm of lower third of esophagus ([facility] [address] ) 2022    Weight loss 2022    Dysphagia 2022    Dermatitis 2022    Current every day smoker 2019      LOS (days): 6  Geometric Mean LOS (GMLOS) (days): 4 60  Days to GMLOS:-1 4     OBJECTIVE:  Risk of Unplanned Readmission Score: 9         Current admission status: Inpatient   Preferred Pharmacy:   [facility]/pharmacy #[id number]- [place], [place] - [address]  Phone: [phone] Fax: [phone]    Primary Care Provider: Carlos Ramirez MD    Primary Insurance: [facility]  Secondary Insurance:     DISCHARGE DETAILS:    Discharge planning discussed with[de-identified] Angelita Samuels (Daughter) [phone] (M)  Freedom of Choice: Yes  Comments - Freedom of Choice: CM DISCUSSED FREEDOM OF CHOICE W/ Angelita Samuels (Daughter) [phone] (M) WHO REMAINS IN AGREEMENT W  [address] OF PICKUP TIME OF 1430  CM contacted family/caregiver?: Yes  Were Treatment Team discharge recommendations reviewed with patient/caregiver?: Yes  Did patient/caregiver verbalize understanding of patient care needs?: Yes  Were patient/caregiver advised of the risks associated with not following Treatment Team discharge recommendations?: Yes    Contacts  Patient Contacts: Emilia:Daughter  Relationship to Patient[de-identified] Family  Contact Method: Phone  Phone Number: Angelita Samuels (Daughter) [phone] [place]  Reason/Outcome: Continuity of [address]         Is the patient interested in [facility] at discharge?: No    DME Referral Provided  Referral made for DME?: No    Other Referral/Resources/Interventions Provided:  Interventions: Short Term Rehab ([address])  Referral Comments: CM INFORMED [address] OF 1430         Treatment Team Recommendation: Short Term Rehab ([address])  Discharge Destination Plan[de-identified] Short Term Rehab ([address])  Transport at Discharge : [facility]  Dispatcher Contacted: Yes  Number/Name of Dispatcher: CATRINA RAMSEY  [facility] [place]     ETA of Transport (Time): 9620

## 2022-02-12 NOTE — ASSESSMENT & PLAN NOTE
Malnutrition Findings:   Adult Malnutrition type: Chronic illness  Adult Degree of Malnutrition: Other severe protein calorie malnutrition (related to inadequate energy intakes of less than 75% of estimated needs for >1 month, significant weight loss of 11 2% x 3 weeks, 26 4% x 1 year, visible wasting around Clavicle, Shoulders, Ribs; Treat-Enteral Nutrition via planned J-Tube)    BMI Findings:  Adult BMI Classifications: Underweight < 18 5     Body mass index is 17 31 kg/m²       · J tube for feeding  · Nutrition consult appreciated

## 2022-02-12 NOTE — CASE MANAGEMENT
Case Management Discharge Planning Note    Patient name Merlene North /-21 MRN [id number]  : 1945 Date 2022       Current Admission Date: 2022  Current Admission Diagnosis:Left ankle pain   Patient Active Problem List    Diagnosis Date Noted    Severe protein-calorie malnutrition ([facility] [address] ) 2022    Left ankle pain 2022    Mild protein-calorie malnutrition ([facility] [address] ) 2022    Malignant neoplasm of lower third of esophagus ([facility] [address] ) 2022    Weight loss 2022    Dysphagia 2022    Dermatitis 2022    Current every day smoker 2019      LOS (days): 6  Geometric Mean LOS (GMLOS) (days): 4 60  Days to GMLOS:-1 3     OBJECTIVE:  Risk of Unplanned Readmission Score: 9         Current admission status: Inpatient   Preferred Pharmacy:   [facility]/pharmacy #[id number]- [place], [place] - [address]  Phone: [phone] Fax: [phone]    Primary Care Provider: Ela Beaulieu MD    Primary Insurance: [facility]  Secondary Insurance:     DISCHARGE DETAILS:    Discharge planning discussed with[de-identified] Caryn Carrillo (Daughter) [phone] (M)  Freedom of Choice: Yes  Comments - Freedom of Choice: CM DISCUSSED FREEDOM OF CHOICE W/ Caryn Carrillo (Daughter) [phone] (M) WHO REMAINS IN AGREEMENT W  [address]  INFIORMED [place]IL[place] OF PICKUP TIEM OF 1100  CM contacted family/caregiver?: Yes  Were Treatment Team discharge recommendations reviewed with patient/caregiver?: Yes  Did patient/caregiver verbalize understanding of patient care needs?: Yes  Were patient/caregiver advised of the risks associated with not following Treatment Team discharge recommendations?: Yes    Contacts  Patient Contacts: Emilia:Daughter  Relationship to Patient[de-identified] Family  Contact Method: Phone  Phone Number: Caryn Carrillo (Daughter) [phone] [facility])    [address]         Is the patient interested in [facility] at discharge?: No    DME Referral Provided  Referral made for DME?: No    Other Referral/Resources/Interventions Provided:  Interventions: Short Term Rehab  Referral Comments: CM INFORMED [facility] ADMISSIONS OF PICKUP TIME OF 1100  Treatment Team Recommendation: Short Term Rehab ([address])  Discharge Destination Plan[de-identified] Short Term Rehab ([address])  Transport at Discharge : [facility] Ambulance  Dispatcher Contacted: Yes  Number/Name of Dispatcher: Yen Coreas  Transported by [employer] and Unit #):  SLETS  ETA of Transport (Date): 02/12/22  ETA of Transport (Time): 1100     Transfer Mode: Stretcher  Accompanied by: Alone

## 2022-02-12 NOTE — DISCHARGE SUMMARY
[address] [facility]  Discharge- Sindy Coahoma 1945, 68 y o  male MRN: [id number]  Unit/Bed#: -01 Encounter: [id number]  Primary Care Provider: Enrique Cuello MD   Date and time admitted to hospital: 2/5/2022 11:08 AM    * Left ankle pain  Assessment & Plan  Background:  Presented to the ED with worsening left ankle pain that he had been taking Keflex PO as an outpatient for however unable to keep it down  · Initially with IV cefazolin; discontinued on 02/06; unlikely that this is infectious  · XR without osseous abnormality  · Podiatry consulted; input as noted below  · Pain and discoloration of thought to be secondary to vascular etiology  · Vascular surgery consulted  · Arterial duplex 2/7 showing significant PAD, vascular to follow up outpatient   · Resume aspirin  · PT/OT consulted - recommending STR   · CT Left Lower Extremity (2/9/22): No fracture or other acute osseous abnormality  Peroneal tendinosis identified with anterior subluxation of the brevis  Intact insertions are noted  No further suspected ligamentous or tendinous abnormality  · Orthopedic Surgery consult, appreciate input  · No surgical intervention indicated  · Recommending PT/OT/ROM Exercises  · WBAT    Malignant neoplasm of lower third of esophagus (HCC)  Assessment & Plan  · Recently diagnosed with esophageal cancer via EGD biopsy  · Follows with Medical Oncology and Surgical Oncology OP;  Does not have a formal treatment plan to date  · Will follow up with Onc outpatient after discharge from rehab for plan  · Persistent dysphagia and ongoing emesis and the inability to hold down much nutrition at all - GI consult  · Gastroenterology consulted; input as noted  · s/p EGD and J-tube placement 2/7  · Liquid oral intake as tolerated - supervised, maintain aspiration precautions  · Nutritional consultation for initiation of tube feedings  · Currently on Jevity 1 5 at 95 mL/hr with 250 mL water flush every 6 hours; tolerating well  Severe protein-calorie malnutrition ([facility] [address] )  Assessment & Plan  Malnutrition Findings:   Adult Malnutrition type: Chronic illness  Adult Degree of Malnutrition: Other severe protein calorie malnutrition (related to inadequate energy intakes of less than 75% of estimated needs for >1 month, significant weight loss of 11 2% x 3 weeks, 26 4% x 1 year, visible wasting around Clavicle, Shoulders, Ribs; Treat-Enteral Nutrition via planned J-Tube)    BMI Findings:  Adult BMI Classifications: Underweight < 18 5     Body mass index is 17 31 kg/m²  · J tube for feeding  · Nutrition consult appreciated    Medical Problems             Resolved Problems  Date Reviewed: 2/10/2022    None              Discharging Physician / Practitioner: MADELINE Brown  PCP: Solange Lyman MD  Admission Date:   Admission Orders (From admission, onward)     Ordered        02/06/22 1119  Inpatient Admission  Once            02/05/22 1636  Place in Observation  Once                      Discharge Date: 02/12/22    Consultations During Hospital Stay:  IP CONSULT TO PODIATRY  IP CONSULT TO GASTROENTEROLOGY  IP CONSULT TO VASCULAR SURGERY  IP CONSULT TO NUTRITION SERVICES  IP CONSULT TO ORTHOPEDIC SURGERY    Procedures Performed:   · None    Significant Findings / Test Results:   CT lower extremity w contrast left   Final Result by Demetris Pratt MD (02/09 1209)   No fracture or other acute osseous abnormality  Peroneal tendinosis identified with anterior subluxation of the brevis  Intact insertions are noted  No further suspected ligamentous or tendinous abnormality  Workstation performed: [id number]         VAS lower limb arterial duplex, complete bilateral   Final Result by Rozina Sales MD (02/07 2134)      XR ankle 3+ views LEFT   Final Result by Jelani Riddle MD (02/06 6604)      No acute osseous abnormality              Workstation performed: [id number]             Incidental Findings: · None    Test Results Pending at Discharge (will require follow up): · None     Outpatient Tests Requested:  · Follow up with PCP within 1 week  · Follow up with Oncology  · Follow up with Palliative Care referral    Complications:  None    Reason for Admission: Left ankle pain; Ambulatory dysfunction    Hospital Course:   Rehana Rocha is a 68 y o  male patient with past medical history of esophageal cancer who originally presented to the hospital on 2/5/2022 due to worsening left ankle pain and ambulatory dysfunction  Patient was only diagnosed with esophageal cancer approximately 2 weeks ago and has been following with Oncology outpatient  Since then, patient developed left ankle pain and came to the ED  Patient was seen by Podiatry and vascular surgery during his stay for this left ankle pain with concerns that it could be related to a vascular issue  Patient did have a lower extremity arterial duplex completed and there were signs of some P 80 however not likely the cause of the left ankle pain  A CT scan of the left lower extremity was completed and it did indicate peroneal tendinosis, however, no tears or fractures noted  Orthopedic surgery was consulted for any recommendations  However no acute intervention indicated  Patient was also seen by PT/OT was recommended for short-term rehab  During his stay here in the hospital, patient also was noting some dysphagia and GI was consulted patient had a PEJ tube placed  He was initiated on tube feeds and is at his goal rate of 95 mL an hour and tolerating this well  Vital signs have remained stable, labs are stable  Patient is stable for discharge to short-term rehab today  Please see above list of diagnoses and related plan for additional information  Condition at Discharge: stable    Discharge Day Visit / Exam:   Subjective:  Patient resting in bed  States that he is ready to go to rehab so that he get started on chemo soon as he can  Denies any complaints of chest pain, shortness of breath, fever or chills  Left ankle pain is slightly improved  Vitals: Blood Pressure: 102/56 (02/12/22 0830)  Pulse: 73 (02/12/22 0830)  Temperature: 97 7 °F (36 5 °C) (02/12/22 0830)  Temp Source: Temporal (02/07/22 1048)  Respirations: 18 (02/12/22 0830)  Height: 6' 1" (185 4 cm) (02/06/22 1638)  Weight - Scale: 59 5 kg (131 lb 2 8 oz) (02/06/22 1638)  SpO2: 98 % (02/12/22 0830)     Exam:   Physical Exam  Vitals and nursing note reviewed  Constitutional:       General: He is not in acute distress  Appearance: He is cachectic  He is ill-appearing  Cardiovascular:      Pulses: Normal pulses  Heart sounds: Normal heart sounds  Pulmonary:      [place]: Pulmonary effort is normal       Breath sounds: Normal breath sounds  Abdominal:      General: Bowel sounds are normal       Palpations: Abdomen is soft  Comments: PEJ tube noted; tube feed infusing  Musculoskeletal:         General: Tenderness (left ankle, improved ) present  Normal range of motion  Right lower leg: No edema  Left lower leg: No edema  Skin:     General: Skin is warm and dry  Neurological:      Mental Status: He is alert and oriented to person, place, and time  Psychiatric:         Mood and Affect: Mood normal         Discussion with Family: Case management discussed plan with daughterRoberto Discharge instructions/Information to patient and family:   See after visit summary for information provided to patient and family  Provisions for Follow-Up Care:  See after visit summary for information related to follow-up care and any pertinent home health orders  Disposition:   [facility] Readmission: None     Discharge Statement:  I spent 35 minutes discharging the patient  This time was spent on the day of discharge  I had direct contact with the patient on the day of discharge   Greater than 50% of the total time was spent examining patient, answering all patient questions, arranging and discussing plan of care with patient as well as directly providing post-discharge instructions  Additional time then spent on discharge activities  Discharge Medications:  See after visit summary for reconciled discharge medications provided to patient and/or family        **Please Note: This note may have been constructed using a voice recognition system**

## 2022-02-12 NOTE — PLAN OF CARE
Problem: Nutrition/Hydration-ADULT  Goal: Nutrient/Hydration intake appropriate for improving, restoring or maintaining nutritional needs  Description: Monitor and assess patient's nutrition/hydration status for malnutrition  Collaborate with interdisciplinary team and initiate plan and interventions as ordered  Monitor patient's weight and dietary intake as ordered or per policy  Utilize nutrition screening tool and intervene as necessary  Determine patient's food preferences and provide high-protein, high-caloric foods as appropriate       INTERVENTIONS:  - Monitor oral intake, urinary output, labs, and treatment plans  - Assess nutrition and hydration status and recommend course of action  - Evaluate amount of meals eaten  - Assist patient with eating if necessary   - Allow adequate time for meals  - Recommend/ encourage appropriate diets, oral nutritional supplements, and vitamin/mineral supplements  - Recommend, monitor, and adjust tube feedings based on assessed needs  - Provide specific nutrition/hydration education as appropriate  - Include patient/family/caregiver in decisions related to nutrition  Outcome: Progressing     Problem: PAIN - ADULT  Goal: Verbalizes/displays adequate comfort level or baseline comfort level  Description: Interventions:  - Encourage patient to monitor pain and request assistance  - Assess pain using appropriate pain scale  - Administer analgesics based on type and severity of pain and evaluate response  - Implement non-pharmacological measures as appropriate and evaluate response  - Consider cultural and social influences on pain and pain management  - Notify physician/advanced practitioner if interventions unsuccessful or patient reports new pain  Outcome: Progressing

## 2022-02-12 NOTE — ASSESSMENT & PLAN NOTE
· Recently diagnosed with esophageal cancer via EGD biopsy  · Follows with Medical Oncology and Surgical Oncology OP; Does not have a formal treatment plan to date  · Will follow up with Onc outpatient after discharge from rehab for plan  · Persistent dysphagia and ongoing emesis and the inability to hold down much nutrition at all - GI consult  · Gastroenterology consulted; input as noted  · s/p EGD and J-tube placement 2/7  · Liquid oral intake as tolerated - supervised, maintain aspiration precautions  · Nutritional consultation for initiation of tube feedings  · Currently on Jevity 1 5 at 95 mL/hr with 250 mL water flush every 6 hours; tolerating well

## 2022-02-14 ENCOUNTER — CONSULT (OUTPATIENT)
Dept: HEMATOLOGY ONCOLOGY | Facility: CLINIC | Age: 77
End: 2022-02-14
Payer: COMMERCIAL

## 2022-02-14 ENCOUNTER — TRANSITIONAL CARE MANAGEMENT (OUTPATIENT)
Dept: FAMILY MEDICINE CLINIC | Facility: CLINIC | Age: 77
End: 2022-02-14

## 2022-02-14 VITALS
OXYGEN SATURATION: 97 % | TEMPERATURE: 96.7 F | RESPIRATION RATE: 16 BRPM | WEIGHT: 135 LBS | HEART RATE: 70 BPM | DIASTOLIC BLOOD PRESSURE: 62 MMHG | BODY MASS INDEX: 17.89 KG/M2 | SYSTOLIC BLOOD PRESSURE: 126 MMHG | HEIGHT: 73 IN

## 2022-02-14 DIAGNOSIS — C15.5 MALIGNANT NEOPLASM OF LOWER THIRD OF ESOPHAGUS (HCC): ICD-10-CM

## 2022-02-14 DIAGNOSIS — K22.89 ESOPHAGEAL MASS: Primary | ICD-10-CM

## 2022-02-14 PROCEDURE — 99205 OFFICE O/P NEW HI 60 MIN: CPT | Performed by: INTERNAL MEDICINE

## 2022-02-14 RX ORDER — BISACODYL 10 MG
10 SUPPOSITORY, RECTAL RECTAL DAILY
COMMUNITY
End: 2022-06-02

## 2022-02-14 RX ORDER — SODIUM CHLORIDE 9 MG/ML
20 INJECTION, SOLUTION INTRAVENOUS ONCE
Status: CANCELLED | OUTPATIENT
Start: 2022-03-15

## 2022-02-14 RX ORDER — SODIUM CHLORIDE 9 MG/ML
20 INJECTION, SOLUTION INTRAVENOUS ONCE
Status: CANCELLED | OUTPATIENT
Start: 2022-03-29

## 2022-02-14 RX ORDER — ACETAMINOPHEN 325 MG/1
325 TABLET ORAL EVERY 6 HOURS PRN
COMMUNITY

## 2022-02-14 RX ORDER — SODIUM CHLORIDE 9 MG/ML
20 INJECTION, SOLUTION INTRAVENOUS ONCE
Status: CANCELLED | OUTPATIENT
Start: 2022-03-22

## 2022-02-14 NOTE — PROGRESS NOTES
Hematology/Oncology Consult Note    Date of Service: 2/14/2022    [address] Levine, Susan. \Hospital [facility] a [facility] Name and Outlook.\"" [facility]   [address] [facility] [id number]    Reason for Consultation:  GE junction adenocarcinoma    AJCC 8th Edition Cancer Stage:   Cancer Staging  Cancer Staging  Malignant neoplasm of lower third of esophagus ([facility] [address] )  Staging form: Esophagus - Adenocarcinoma, AJCC 8th Edition  - Clinical stage from 2/14/2022: Stage III (cT3, cN1, cM0, G3) - Unsigned  Histologic grading system: 3 grade system    Referral Physician: Maki Salamanca DO    Oncology/Hematology History:  · January 6, 2022 patient had a follow-up with primary care physician complaining of 6 weeks of dysphagia  This started with solid food then progressed into liquid diet  It was associated with nausea  Patient had a 30 lb weight loss within 1 year  Patient is an active smoker  · January 10, 2022 patient consulted GI   EGD on January 13, 2022 showed: FINDINGS:  · Fungating and malignant-appearing mass (traversable) measuring 70 mm in the lower third of the esophagus (34 cm from the incisors), covering the whole circumference,; bleeding occurred before and after intervention; performed cold forceps biopsy  · 3 fungating and multilobular masses (traversable) in the cardia, covering one third of the circumference,; bleeding occurred after intervention; performed partial removal by cold forceps biopsy  · The fundus of the stomach, body of the stomach, incisura, antrum, prepyloric region and pylorus appeared normal   · The duodenal bulb and 2nd part of the duodenum appeared normal   IMPRESSION:  1  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 with additional nodular lesions in the cardia of the stomach, biopsies pending, most likely adenocarcinoma   · January 13, 2010 to pathology from the EGD shows:  Final Diagnosis   A   Stomach, cardia:  - Gastric mucosa with reactive changes   - Separate fragment of necrotic debris with bacterial and fungal aggregates  - No definite malignancy identified       B  Esophagus, distal:  - 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  - MMR and Her2 studies are pending with results to follow in an addendum  C  MMR are normally expressed , MSI-low  D  HER2 negative by IHC  · January 24, 2022 CT scan chest abdomen pelvis with contrast:  IMPRESSION:     New mid to distal esophageal wall thickening with large eccentric mass in the posterior distal esophagus extending into the GE junction and beyond the esophageal wall with pathologic lymph nodes anteriorly with central necrosis of esophageal malignancy   This correlates with the recent EGD finding of 1/13/2022      Right lower lobe endobronchial mass possibly mucous plugging with extension into the bronchial branches is chronic and was seen on prior CT of 10/10/2019 with no obstruction  This can be evaluated when PET scan is performed as indicated on EGD report  · February 1, 2022 PET-CT scan:  IMPRESSION:     1  Hypermetabolic distal esophageal carcinoma which extends into the proximal stomach with paraesophageal/perigastric/upper abdominal andry metastatic disease      2  Concentric FDG activity involving the cecum/proximal ascending colon of uncertain clinical significance  Given the localized nature of FDG activity, consider correlation with screening colonoscopy to exclude underlying colonic malignancy      3  Indeterminant non-FDG avid avid filling defect within right lower lobe bronchus with associated more distal tree-in-bud infiltrate  Findings could reflect mucous plugging with obstructive malignant process of low metabolic activity not excluded      4   3 8 cm infrarenal abdominal aortic aneurysm  · February 3, 2022 Dr Ayah Sarah consulted patient recommendation of concurrent chemo radiation therapy in the neoadjuvant setting    · February 6, 2022, EGD with J-tube placement  Dr Michele Connor DO  · February 7, 2022 duplex of lower extremity shows high-grade stenosis versus occlusion of the proximal distal superficial femoral artery reconstitution at the proximal popliteal artery  Stenosis in left lower extremity as well  ·   Assessment and Recommendations:     1  GE junction adenocarcinoma, cT3 cN1, cM0, grade 3, stage III  EGD with biopsy consistent with 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  MMR proteins are normally expressed  HER2 negative by IHC  Dr Ervin Mcdaniel consulted patient and recommended concurrent chemoradiation therapy  Patient currently stays in a nursing home  Poor performance status, ECOG 3/5  Patient has a J-tube placed for nutrition support  I recommend concurrent radiation therapy with weekly Carbo/Taxol in 3 weeks  Hopefully, by then the patient can have a good performance status and tolerate chemoradiation better  Patient already has an appointment with radiation oncologist to discuss the rationale, benefits and risks of radiation therapy tomorrow  I personally reviewed the lab results,  the image studies,  pathology report, other specialty/physicians consult notes,  and outside medical records  I had a lengthy discussion with the patient and patient's family  We discussed the lab results, the finding from the image studies, path report, diagnosis, cancer biology, disease stage, next step management plan, and treatment options  The patient and his daughters(participated in the discussion by phone) understood that patient has a locally advanced GE junction adenocarcinoma  The patient will benefit from neoadjuvant systemic chemoradiation  as per NCCN guideline  MediPort placement for chemo access to be placed by Dr Ervin Mcdaniel  Patient also understood that all the treatments are curative intent with pCR rate 39% based on the clinical trial data       We also discussed the risks or the side effects of the chemotherapy including but not limited to drug reaction including skin rash, anaphylactic reaction, hair loss, oral thrush, nailbed change, possibly hearing loss,  GI toxicity such as nausea or vomiting, poor appetite, abdominal pain, diarrhea or constipation, GI bleeding; headache, hypertension; bone marrow toxicity such as anemia, thrombocytopenia, neutropenia or neutropenic fever; peripheral neuropathy, renal toxicity such as increased creatinine and BUN, acute kidney injury, dehydration, electrolyte abnormality, and bleeding  Patient may also need to be hospitalized if patient develops neutropenic fever, severe thrombocytopenia with bleeding, or other life-threatening complications  Port infection can happen if patient has a port in place  The patient had a formal chemoeducation  Unfortunately, patient and patient's daughters did not want chemotherapy within 3 weeks  They refused to sign the chemo consent and to make follow-up appointment  Patient will call my office if they are ready to start treatment  2  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  J-tube is in place  The patient will continue tube feeding  3  Left ankl nonhealing ulcer  Wound Care team continues wound care  4  Normocytic normochromic anemia with hemoglobin 11 5  Patient denies bleeding anywhere  We continue to monitor  5  Follow-up  Patient and patient daughter will call the clinic to make an appointment  Greater than 50% of this 60 minute visit was spent in counseling, as detailed above  Thank you very much for your consultation and making us part of this nice patient's care  I will continue to follow closely with you  Please contact me with any additional questions  Disclaimer: This document was prepared using [employer] technology   If a word or phrase is confusing, or does not make sense, this is likely due to recognition error which was not discovered during the providers review  If you believe an error has occurred, please contact me through [employer] service for vicenta? cation  HPI:   Valentino Mike is a 68 y o  male with a past medical history of polio, tobacco abuse, pneumonia who complained of progressive dysphagia from a solid to liquid in the past 8 weeks  It was associated with nausea and 15 lb weight loss  EGD in January 10, 2022 showed fungating and malignant appearing mass measuring 7 mm in the lower 3rd of esophagus, covering the whole circumference  Three fungating and multilobular masses in the cardia, covering 1/3 of the circumference  Biopsy from the distal esophageal mass consistent with 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  MMR are normally expressed  HER2 negative by IHC  Biopsy from the gastric mucosa showed reactive change without definitive malignancy identified  Staging workup by CT chest abdomen pelvis in January 24, 2022 again showed new mid to distal esophageal wall thickening with large eccentric mass in the posterior distal esophagus extending into the GE junction and beyond the esophageal wall with pathologic lymph nodes anteriorly with central necrosis of esophageal malignancy  PET-CT scan in February 1, 2022 revealed hypermetabolic distal esophageal carcinoma which extends into the proximal stomach with periesophageal/perigastric/upper abdominal andry metastatic disease  There the 3 8 cm infrarenal abdominal aortic aneurysm  Dr Ej Ramirez consulted patient February 3, 2022 recommendation of concurrent chemoradiation therapy in the neoadjuvant setting  J-tube placement was done by Dr Malou Hogan for nutrition support on February 6, 2022  Patient is doing very well with tube feeding  The patient is referred to Medical Oncology for comanagement of locally advanced GE junction adenocarcinoma    Patient already has an appointment with radiation oncologist tomorrow to discuss the rationale, benefits, and risks of radiation therapy  Patient comes in wheelchair from nursing home  Patient feels tired  No fever or chills  No exertional chest pain, diaphoresis or shortness  of breath  No cough and phlegm, no hemoptysis  Patient uses tube feeding and tolerated very well  Patient denied nausea  and vomiting  No abdominal pain  No diarrhea or constipation  No  symptoms  No headache or blurred vision  No seizure activity  Appetite good  No significant weight loss or weight gain  The patient denies bleeding anywhere  Review of system:  12-point review of system was performed, pertinent positive and negative were detailed as above    Past Medical History:   Diagnosis Date    Cancer ([facility] [address] )     esophageal ca    Pneumonia     Polio     Rib fractures        No past surgical history on file      Family History   Problem Relation Age of Onset    Dementia Mother     Heart disease Father        Social History     Socioeconomic History    Marital status: /Civil Union     Spouse name: Not on file    Number of children: Not on file    Years of education: Not on file    Highest education level: Not on file   Occupational History    Not on file   Tobacco Use    Smoking status: Current Every Day Smoker     Packs/day: 1 00     Years: 40 00     Pack years: 40 00     Types: Cigarettes    Smokeless tobacco: Never Used   Vaping Use    Vaping Use: Never used   Substance and Sexual Activity    Alcohol use: Never     Alcohol/week: 0 0 standard drinks     Comment: 0    Drug use: No    Sexual activity: Not Currently   Other Topics Concern    Not on file   Social History Narrative    Not on file     Social Determinants of Health     Financial Resource Strain: Not on file   Food Insecurity: No Food Insecurity    Worried About Running Out of Food in the Last Year: Never true    Jeanmarie of Food in the Last Year: Never true   Transportation Needs: No Transportation Needs  Lack of Transportation (Medical): No    Lack of Transportation (Non-Medical): No   Physical Activity: Not on file   Stress: Not on file   Social Connections: Not on file   Intimate Partner Violence: Not on file   Housing Stability: Low Risk     Unable to Pay for Housing in the Last Year: No    Number of Places Lived in the Last Year: 1    Unstable Housing in the Last Year: No       No Known Allergies    Current Outpatient Medications   Medication Sig Dispense Refill    acetaminophen (TYLENOL) 325 mg tablet Take 325 mg by mouth every 6 (six) hours as needed for mild pain      ascorbic acid (VITAMIN C) 500 mg tablet Take 500 mg by mouth daily      aspirin 325 mg tablet Take 325 mg by mouth 2 (two) times a day      bisacodyl (Dulcolax) 10 mg suppository Insert 10 mg into the rectum daily      Cholecalciferol (VITAMIN D3 PO) Take by mouth      cyanocobalamin (VITAMIN B-12) 500 MCG tablet Take 500 mcg by mouth daily      lidocaine (LIDODERM) 5 % Apply 1 patch topically daily Remove & Discard patch within 12 hours or as directed by MD  0    magnesium hydroxide (MILK OF MAGNESIA) 400 mg/5 mL oral suspension Take 30 mL by mouth daily as needed for constipation      Multiple Vitamins-Minerals (MULTIVITAMIN WITH MINERALS) tablet Take 1 tablet by mouth daily      oxyCODONE (ROXICODONE) 5 mg/5 mL solution Take 5 mL (5 mg total) by mouth every 4 (four) hours as needed for moderate pain for up to 10 days Max Daily Amount: 30 mg 300 mL 0    pantoprazole (PROTONIX) 40 mg tablet Take 1 tablet (40 mg total) by mouth 2 (two) times a day  0    triamcinolone (KENALOG) 0 5 % cream Apply topically 2 (two) times a day 30 g 0     No current facility-administered medications for this visit         (Not in a hospital admission)      Objective:     24 Hour Vitals Assessment:  Vitals:    02/14/22 1127   BP: 126/62   Pulse: 70   Resp: 16   Temp: (!) 96 7 °F (35 9 °C)   SpO2: 97%       PHYSICIAN EXAM:    General: Appearance: Cachectic male, coming in wheelchair  Alert, cooperative, no distress  HEENT: Normocephalic, atraumatic  No scleral icterus  conjunctivae clear  PERRL, EOM's intact  No sinus drainage or tenderness  Chest: No tenderness  Lungs: Clear to auscultation bilaterally, Respirations unlabored  Cardiac: Regular rate and rhythm, S1and S2 are normal, no murmur  Abdomen: Soft, non-tender, non-distended  Bowel sounds are normal  No masses, no organomegaly  J-tube in place  No signs of infection  Pelvic: deferred  Extremities:  Left ankle wound is covered with dry dressing  Skin: Skin color, turgor are normal  No rashes  Lymphatics: no palpable adenopathy  Neurologic: Alert and oriented X 3,  no focal neuro deficits  DATA REVIEW:    Pathology Result:    Final Diagnosis   Date Value Ref Range Status   01/13/2022   Final    A  Stomach, cardia:  - Gastric mucosa with reactive changes   - Separate fragment of necrotic debris with bacterial and fungal aggregates  - No definite malignancy identified  B  Esophagus, distal:  - 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  - MMR and Her2 studies are pending with results to follow in an addendum  Image Results: They are reviewed and documented in Hematology/Oncology history    CT lower extremity w contrast left  Narrative: CT left tibia fibula with IV contrast    INDICATION: Severe left ankle pain  COMPARISON: Plain film of the left ankle dated 2/5/2022  TECHNIQUE: CT examination of the above was performed  This examination, like all CT scans performed in the [facility], was performed utilizing techniques to minimize radiation dose exposure, including the use of iterative reconstruction   and automated exposure control software  Sagittal and coronal two dimensional reconstructed images were also submitted for interpretation      IV Contrast: 100 mL of iohexol (OMNIPAQUE)    Rad dose  1361 mGy-cm     FINDINGS:    OSSEOUS STRUCTURES:  No fracture, dislocation or destructive osseous lesion  Age-appropriate degenerative changes noted across the ankle and knee  VISUALIZED MUSCULATURE:  Unremarkable  SOFT TISSUES:  Atherosclerotic disease noted  OTHER PERTINENT FINDINGS:  Thickened appearance to the peroneal tendons with anterior subluxation of the Proteus brevis at the lateral calcaneal tubercle there is a tendons appear intact but findings suggest tendinosis  Impression: No fracture or other acute osseous abnormality  Peroneal tendinosis identified with anterior subluxation of the brevis  Intact insertions are noted  No further suspected ligamentous or tendinous abnormality  Workstation performed: [id number]      LABS:  Lab data are reviewed and documented in HemOnc history  No results found for this or any previous visit (from the past 48 hour(s))      By:  Kimberly Burger MD, 2/14/2022, 5:52 PM                                  Primary Care Physician:  Sharon Yuen MD

## 2022-02-15 ENCOUNTER — TELEPHONE (OUTPATIENT)
Dept: PALLIATIVE MEDICINE | Age: 77
End: 2022-02-15

## 2022-02-15 ENCOUNTER — ANESTHESIA EVENT (OUTPATIENT)
Dept: PERIOP | Facility: HOSPITAL | Age: 77
End: 2022-02-15
Payer: COMMERCIAL

## 2022-02-15 NOTE — TELEPHONE ENCOUNTER
Spoke to Dinora  He is currently at [address] in [place]   Per patient ok to call back in a week to check status (around 2/25/22)

## 2022-02-16 ENCOUNTER — HOSPITAL ENCOUNTER (OUTPATIENT)
Facility: HOSPITAL | Age: 77
Setting detail: OUTPATIENT SURGERY
Discharge: HOME | End: 2022-02-16
Attending: SURGERY | Admitting: SURGERY
Payer: COMMERCIAL

## 2022-02-16 ENCOUNTER — ANESTHESIA (OUTPATIENT)
Dept: PERIOP | Facility: HOSPITAL | Age: 77
End: 2022-02-16
Payer: COMMERCIAL

## 2022-02-16 ENCOUNTER — HOSPITAL ENCOUNTER (OUTPATIENT)
Dept: RADIOLOGY | Facility: HOSPITAL | Age: 77
Setting detail: OUTPATIENT SURGERY
Discharge: HOME | End: 2022-02-16
Payer: COMMERCIAL

## 2022-02-16 ENCOUNTER — APPOINTMENT (OUTPATIENT)
Dept: RADIOLOGY | Facility: HOSPITAL | Age: 77
End: 2022-02-16
Payer: COMMERCIAL

## 2022-02-16 VITALS
WEIGHT: 129.6 LBS | DIASTOLIC BLOOD PRESSURE: 59 MMHG | HEIGHT: 73 IN | SYSTOLIC BLOOD PRESSURE: 100 MMHG | HEART RATE: 71 BPM | BODY MASS INDEX: 17.18 KG/M2 | TEMPERATURE: 98 F | RESPIRATION RATE: 18 BRPM | OXYGEN SATURATION: 100 %

## 2022-02-16 DIAGNOSIS — C15.5 MALIGNANT NEOPLASM OF LOWER THIRD OF ESOPHAGUS (HCC): ICD-10-CM

## 2022-02-16 PROBLEM — G89.29 CHRONIC PAIN: Status: ACTIVE | Noted: 2022-02-16

## 2022-02-16 PROCEDURE — 36571 INSERT PICVAD CATH: CPT | Performed by: SURGERY

## 2022-02-16 PROCEDURE — 77001 FLUOROGUIDE FOR VEIN DEVICE: CPT

## 2022-02-16 PROCEDURE — C1788 PORT, INDWELLING, IMP: HCPCS | Performed by: SURGERY

## 2022-02-16 PROCEDURE — 77001 FLUOROGUIDE FOR VEIN DEVICE: CPT | Performed by: SURGERY

## 2022-02-16 DEVICE — 8F PLASTIC PRO-FUSE® CT PORT W/ATTACHABLE CHRONOFLEX® POLYURETHANE CATHETER
Type: IMPLANTABLE DEVICE | Site: CHEST | Status: FUNCTIONAL
Brand: PRO-FUSE® LOW PROFILE CT PORT

## 2022-02-16 RX ORDER — SODIUM CHLORIDE, SODIUM LACTATE, POTASSIUM CHLORIDE, CALCIUM CHLORIDE 600; 310; 30; 20 MG/100ML; MG/100ML; MG/100ML; MG/100ML
125 INJECTION, SOLUTION INTRAVENOUS CONTINUOUS
Status: DISCONTINUED | OUTPATIENT
Start: 2022-02-16 | End: 2022-02-16 | Stop reason: HOSPADM

## 2022-02-16 RX ORDER — EPHEDRINE SULFATE 50 MG/ML
INJECTION INTRAVENOUS AS NEEDED
Status: DISCONTINUED | OUTPATIENT
Start: 2022-02-16 | End: 2022-02-16

## 2022-02-16 RX ORDER — METOCLOPRAMIDE HYDROCHLORIDE 5 MG/ML
10 INJECTION INTRAMUSCULAR; INTRAVENOUS ONCE AS NEEDED
Status: DISCONTINUED | OUTPATIENT
Start: 2022-02-16 | End: 2022-02-16 | Stop reason: HOSPADM

## 2022-02-16 RX ORDER — LIDOCAINE HYDROCHLORIDE 10 MG/ML
0.5 INJECTION, SOLUTION EPIDURAL; INFILTRATION; INTRACAUDAL; PERINEURAL ONCE AS NEEDED
Status: COMPLETED | OUTPATIENT
Start: 2022-02-16 | End: 2022-02-16

## 2022-02-16 RX ORDER — ACETAMINOPHEN 325 MG/1
650 TABLET ORAL EVERY 6 HOURS PRN
Status: DISCONTINUED | OUTPATIENT
Start: 2022-02-16 | End: 2022-02-16 | Stop reason: HOSPADM

## 2022-02-16 RX ORDER — DEXAMETHASONE SODIUM PHOSPHATE 10 MG/ML
INJECTION, SOLUTION INTRAMUSCULAR; INTRAVENOUS AS NEEDED
Status: DISCONTINUED | OUTPATIENT
Start: 2022-02-16 | End: 2022-02-16

## 2022-02-16 RX ORDER — FENTANYL CITRATE 50 UG/ML
INJECTION, SOLUTION INTRAMUSCULAR; INTRAVENOUS AS NEEDED
Status: DISCONTINUED | OUTPATIENT
Start: 2022-02-16 | End: 2022-02-16

## 2022-02-16 RX ORDER — ONDANSETRON 2 MG/ML
INJECTION INTRAMUSCULAR; INTRAVENOUS AS NEEDED
Status: DISCONTINUED | OUTPATIENT
Start: 2022-02-16 | End: 2022-02-16

## 2022-02-16 RX ORDER — LIDOCAINE HYDROCHLORIDE 10 MG/ML
0.5 INJECTION, SOLUTION EPIDURAL; INFILTRATION; INTRACAUDAL; PERINEURAL ONCE AS NEEDED
Status: CANCELLED | OUTPATIENT
Start: 2022-02-16

## 2022-02-16 RX ORDER — FENTANYL CITRATE/PF 50 MCG/ML
25 SYRINGE (ML) INJECTION
Status: DISCONTINUED | OUTPATIENT
Start: 2022-02-16 | End: 2022-02-16 | Stop reason: HOSPADM

## 2022-02-16 RX ORDER — CEFAZOLIN SODIUM 1 G/50ML
1000 SOLUTION INTRAVENOUS ONCE
Status: COMPLETED | OUTPATIENT
Start: 2022-02-16 | End: 2022-02-16

## 2022-02-16 RX ORDER — ONDANSETRON 2 MG/ML
4 INJECTION INTRAMUSCULAR; INTRAVENOUS ONCE AS NEEDED
Status: DISCONTINUED | OUTPATIENT
Start: 2022-02-16 | End: 2022-02-16 | Stop reason: HOSPADM

## 2022-02-16 RX ORDER — LIDOCAINE HYDROCHLORIDE 10 MG/ML
INJECTION, SOLUTION EPIDURAL; INFILTRATION; INTRACAUDAL; PERINEURAL AS NEEDED
Status: DISCONTINUED | OUTPATIENT
Start: 2022-02-16 | End: 2022-02-16

## 2022-02-16 RX ORDER — OXYCODONE HYDROCHLORIDE 5 MG/1
2.5 TABLET ORAL EVERY 4 HOURS PRN
Status: DISCONTINUED | OUTPATIENT
Start: 2022-02-16 | End: 2022-02-16 | Stop reason: HOSPADM

## 2022-02-16 RX ORDER — BUPIVACAINE HYDROCHLORIDE 2.5 MG/ML
INJECTION, SOLUTION EPIDURAL; INFILTRATION; INTRACAUDAL AS NEEDED
Status: DISCONTINUED | OUTPATIENT
Start: 2022-02-16 | End: 2022-02-16 | Stop reason: HOSPADM

## 2022-02-16 RX ORDER — PROPOFOL 10 MG/ML
INJECTION, EMULSION INTRAVENOUS AS NEEDED
Status: DISCONTINUED | OUTPATIENT
Start: 2022-02-16 | End: 2022-02-16

## 2022-02-16 RX ADMIN — PROPOFOL 120 MG: 10 INJECTION, EMULSION INTRAVENOUS at 10:41

## 2022-02-16 RX ADMIN — LIDOCAINE HYDROCHLORIDE 0.5 ML: 10 INJECTION, SOLUTION EPIDURAL; INFILTRATION; INTRACAUDAL; PERINEURAL at 09:48

## 2022-02-16 RX ADMIN — CEFAZOLIN SODIUM 1000 MG: 1 SOLUTION INTRAVENOUS at 10:30

## 2022-02-16 RX ADMIN — LIDOCAINE HYDROCHLORIDE 40 MG: 10 INJECTION, SOLUTION EPIDURAL; INFILTRATION; INTRACAUDAL; PERINEURAL at 10:41

## 2022-02-16 RX ADMIN — PHENYLEPHRINE HYDROCHLORIDE 40 MCG/MIN: 10 INJECTION INTRAVENOUS at 10:57

## 2022-02-16 RX ADMIN — ONDANSETRON 4 MG: 2 INJECTION INTRAMUSCULAR; INTRAVENOUS at 11:17

## 2022-02-16 RX ADMIN — Medication 25 MCG: at 12:12

## 2022-02-16 RX ADMIN — DEXAMETHASONE SODIUM PHOSPHATE 6 MG: 10 INJECTION, SOLUTION INTRAMUSCULAR; INTRAVENOUS at 10:46

## 2022-02-16 RX ADMIN — FENTANYL CITRATE 50 MCG: 50 INJECTION INTRAMUSCULAR; INTRAVENOUS at 10:40

## 2022-02-16 RX ADMIN — SODIUM CHLORIDE, SODIUM LACTATE, POTASSIUM CHLORIDE, AND CALCIUM CHLORIDE 125 ML/HR: .6; .31; .03; .02 INJECTION, SOLUTION INTRAVENOUS at 09:47

## 2022-02-16 RX ADMIN — EPHEDRINE SULFATE 10 MG: 50 INJECTION INTRAVENOUS at 10:48

## 2022-02-16 NOTE — ANESTHESIA PREPROCEDURE EVALUATION
Procedure:  INSERTION VENOUS PORT (PORT-A-CATH) (N/A Chest)  INSERTION JEJUNOSTOMY TUBE OPEN (N/A Abdomen)    Relevant Problems   ANESTHESIA (within normal limits)      CARDIO   (-) Chest pain   (-) Dysrhythmias   (-) HTN (hypertension)   (-) Murmur      GI/HEPATIC   (+) Dysphagia   (+) Malignant neoplasm of lower third of esophagus (HCC)      NEURO/PSYCH   (+) Chronic pain   (-) CVA (cerebral vascular accident) ([address] )   (-) Headache   (-) Paresthesia   (-) Seizures (HCC)      PULMONARY   (+) Smoking (says hasnt smoked in 2 weeks)   (-) Asthma   (-) Pneumonia      Sinus rhythm  Right bundle branch block  Abnormal ECG  When compared with ECG of 03-FEB-2022 13:16,  Wide QRS rhythm has replaced Sinus rhythm  Confirmed by Sri Valerio ([id number]) on 2/5/2022 2:12:41 PM      Physical Exam    Airway    Mallampati score: II  TM Distance: >3 FB  Neck ROM: full     Dental   upper dentures and lower dentures,     Cardiovascular  Rhythm: regular, Rate: normal,     Pulmonary  Breath sounds clear to auscultation,     Other Findings        Anesthesia Plan  ASA Score- 3     Anesthesia Type- general with ASA Monitors  Additional Monitors:   Airway Plan: LMA  Plan Factors-Exercise comment: Walk with walker, decrease strengthened secondary to malnutrition and esoph ca  Chart reviewed  EKG reviewed  Existing labs reviewed  Patient summary reviewed  Patient is a current smoker  Patient instructed to abstain from smoking on day of procedure  Patient did not smoke on day of surgery  Obstructive sleep apnea risk education given perioperatively  Induction- intravenous  Postoperative Plan-   Planned trial extubation    Informed Consent- Anesthetic plan and risks discussed with patient  I personally reviewed this patient with the CRNA  Discussed and agreed on the Anesthesia Plan with the CRNA  Carlin Madden

## 2022-02-16 NOTE — INTERVAL H&P NOTE
H&P reviewed  After examining the patient I find no changes in the patients condition since the H&P had been written  Vitals:    02/16/22 0922   BP: 133/75   Pulse: 67   Resp: 16   Temp: (!) 96 1 °F (35 6 °C)   SpO2: 100%     Patient had a feeding tube placed at another [place] by GI  Will plan on port placement only

## 2022-02-16 NOTE — ANESTHESIA POSTPROCEDURE EVALUATION
Post-Op Assessment Note    CV Status:  Stable  Pain Score: 0    Pain management: adequate     Mental Status:  Alert and awake   Hydration Status:  Euvolemic   PONV Controlled:  Controlled   Airway Patency:  Patent      Post Op Vitals Reviewed: Yes      Staff: CRNA         No complications documented      BP   112/57   Temp  98   Pulse  72   Resp   16   SpO2   100

## 2022-02-16 NOTE — OP NOTE
PERATIVE REPORT  PATIENT NAME: Merlene Dewitt    :    MRN: [id number]  Pt Location: BE OR ROOM 05    SURGERY DATE: 2022    Surgeon(s) and Role:     * Latanya Munson MD - Primary     * Kaleb Loomis MD - Assisting    Preop Diagnosis:  Malignant neoplasm of lower third of esophagus ([address] ) [C15 5]    Post-Op Diagnosis Codes:     * Malignant neoplasm of lower third of esophagus ([address] ) [C15 5]    Procedure(s) (LRB):  INSERTION VENOUS PORT (PORT-A-CATH) (Left)    Specimen(s):  * No specimens in log *    Estimated Blood Loss:   Minimal    Drains:  Gastrostomy/Enterostomy PEG-jejunostomy 20 Fr  LLQ (Active)   Surrounding Skin Dry; Intact 22   Drain Status Clamped 22   Dressing Status Clean;Dry; Intact 22   Dressing Type Split gauze 22   Number of days: 9       Anesthesia Type:   General    Operative Indications:  Malignant neoplasm of lower third of esophagus ([address] ) [C135]  43-year-old male who needs a port for chemotherapy  Risks and benefits were explained  A consent was obtained  Patient was brought to the operating room  Operative Findings:  Catheter tip at the cavoatrial junction  No ectopy  Complications:   None    Procedure and Technique:  After identifying the patient, general anesthesia was induced  Patient was prepped and draped in the usual sterile fashion  A time-out was performed  An incision was made in the left deltopectoral groove  This was taken down to the cephalic vein  The cephalic vein was encircled and tied off distally with 2-0 silk suture  A 2-0 silk was placed proximally around the vein to prevent any backbleeding  The catheter was introduced through the vein and confirmed to be in the superior vena cava using fluoroscopy  A subcutaneous port pocket was created using sharp dissection  There was excellent hemostasis  Three 2-0 Prolene sutures were placed in the port pocket and attached to the port   Using fluoroscopy, the catheter was manipulated into the cavoatrial junction  Catheter was cut and attached to the port and the port was secured using the previously placed Prolene sutures  The port withdrew blood easily and flushed very easily  The proximal tie was placed around the catheter and vein to prevent any backbleeding  Fluoroscopy confirmed the catheter tip to be at the cavoatrial junction  There was no ectopy  Wound was now copiously irrigated there was excellent hemostasis  The incision was approximated with interrupted 3-0 Vicryl suture subcutaneously and a running 4 Monocryl suture in a subcuticular fashion  Skin glue was used on the skin  Port was once again accessed and withdrew blood easily and flushed very easily and was ultimately flushed with the final Hep-Lock solution  The patient was extubated  Patient tolerated the procedure well and was taken to the recovery room in stable condition  I was present and participated in all aspects of this procedure  A chest x-ray was pending at the time of this dictation         I was present for the entire procedure    Patient Disposition:  PACU       SIGNATURE: Damián Silvestre MD  DATE: February 16, 2022  TIME: 11:19 AM

## 2022-02-16 NOTE — DISCHARGE INSTRUCTIONS
POST-OPERATIVE WOUND CARE INSTRUCTIONS    Your wound is closed with:   Dissolvable sutures/glue       Wound care: You may remove your dressing after 24 hours   You may shower using soap and water to clean your wound  Gently pat it dry  You may redress your wound for comfort as needed  Activity:   Did not perform any heavy lifting or strenuous physical activity for 7 days  Your activity restrictions will be reevaluated at your postoperative visit  Medications: You may resume all your preoperative medications and diet  Pain medication as directed on the prescription given in the office  Other:   May use ice on the wound for 24-48 hours as needed for comfort  Call the office at [phone] if you have any of the followin  Redness, swelling, heat, unusual drainage or heavy bleeding from the wound     2  Fever greater than 101°F    3  Pain not relieved by the prescribed pain medication

## 2022-02-17 ENCOUNTER — RADIATION ONCOLOGY CONSULT (OUTPATIENT)
Dept: RADIATION ONCOLOGY | Facility: CLINIC | Age: 77
End: 2022-02-17
Attending: RADIOLOGY
Payer: COMMERCIAL

## 2022-02-17 ENCOUNTER — PATIENT OUTREACH (OUTPATIENT)
Dept: CASE MANAGEMENT | Facility: HOSPITAL | Age: 77
End: 2022-02-17

## 2022-02-17 ENCOUNTER — DOCUMENTATION (OUTPATIENT)
Dept: NUTRITION | Facility: CLINIC | Age: 77
End: 2022-02-17

## 2022-02-17 VITALS
DIASTOLIC BLOOD PRESSURE: 80 MMHG | WEIGHT: 133 LBS | TEMPERATURE: 98.1 F | SYSTOLIC BLOOD PRESSURE: 120 MMHG | RESPIRATION RATE: 14 BRPM | BODY MASS INDEX: 17.55 KG/M2 | OXYGEN SATURATION: 99 % | HEART RATE: 58 BPM

## 2022-02-17 DIAGNOSIS — C15.5 MALIGNANT NEOPLASM OF LOWER THIRD OF ESOPHAGUS (HCC): ICD-10-CM

## 2022-02-17 PROCEDURE — 99204 OFFICE O/P NEW MOD 45 MIN: CPT | Performed by: RADIOLOGY

## 2022-02-17 PROCEDURE — 99211 OFF/OP EST MAY X REQ PHY/QHP: CPT | Performed by: RADIOLOGY

## 2022-02-17 NOTE — PROGRESS NOTES
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  He had consult with Dr Ervin Mcdaniel who recommended he undergo neoadjuvant chemo radiation then repeat imaging to determine surgical intervention  He is scheduled for port placement and insertion of jejunostomy tube 2/16/22  He is being referred by Dr Ervin Mcdaniel and presents today for consult  1/6/22 Dr Butch Yousif, PCP   Recommend [facility] consult for possible EGD     1/10/22 GI consult  -Will plan EGD with biopsy     -Patient will continue pantoprazole 40 mg daily  1/13/22 EGD with biopsy  Stomach, cardia:  - Gastric mucosa with reactive changes   - Separate fragment of necrotic debris with bacterial and fungal aggregates  - No definite malignancy identified  Esophagus, distal:  - 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  - MMR and Her2 studies are pending with results to follow in an addendum  HER2 IMMUNOHISTOCHEMICAL ANALYSIS FOR GASTRIC/ESOPHAGEAL ADENOCARCINOMA     Test Description                                        Result                                                     HER2/AMIE by IHC (clone 4B5)                  Negative, 0      1/24/22 CT C/A/P w contrast  New mid to distal esophageal wall thickening with large eccentric mass in the posterior distal esophagus extending into the GE junction and beyond the esophageal wall with pathologic lymph nodes anteriorly with central necrosis of esophageal malignancy   This correlates with the recent EGD finding of 1/13/2022    Right lower lobe endobronchial mass possibly mucous plugging with extension into the bronchial branches is chronic and was seen on prior CT of 10/10/2019 with no obstruction  This can be evaluated when PET scan is performed as indicated on EGD report  2/1/22 PET CT  Hypermetabolic distal esophageal carcinoma which extends into the proximal stomach with paraesophageal/perigastric/upper abdominal andry metastatic disease  Concentric FDG activity involving the cecum/proximal ascending colon of uncertain clinical significance  Given the localized nature of FDG activity, consider correlation with screening colonoscopy to exclude underlying colonic malignancy  Indeterminant non-FDG avid avid filling defect within right lower lobe bronchus with associated more distal tree-in-bud infiltrate  Findings could reflect mucous plugging with obstructive malignant process of low metabolic activity not excluded  3 8 cm infrarenal abdominal aortic aneurysm  2/3/22 Dr Kendall Motley, Surgical Oncology  recommend that he undergo neoadjuvant chemo radiation  Follow-up imaging would then determine surgical intervention  Would tolerate esophagectomy  Plan for port and jejunostomy tube placement  2/5/22- 2/12/22 Hospital admission- Malnutrition; left ankle pain- Vascular consulted, significant PAD; esophageal cancer    2/6/22 placement of J-tube  2/6/22 EGD with Push enteroscopy  Obstructing esophageal cancer from 32-40 cm from the incisors; small masses appear in the cardia as well, status post through the scope balloon dilation of the esophagus to facilitate passage of the enteroscope  Status post feeding tube placement either into the distal duodenum or proximal jejunum     RECOMMENDATION:  1  Can begin enteral feedings tomorrow  2   Okay to give water and meds through the PEJ tube    2/14/22 Dr Yaniv Parr, Medical Oncology  Recommended to start chemotherapy in 3 weeks  Patient and daughter refused to sign chemotherapy consent and do not want to start chemotherapy in 3 weeks and declined follow up; patient will call office when ready to start treatment    22 [facility] placement    Upcomin55 Dr Sae Adams, Surgical Oncology        Oncology History Overview Note   Who presented to his PCP with dysphagia and regurgitation of food on 22  In 2021, he started to have some dysphagia where he could not handle his secretions and felt food would get stuck  He had EGD with biopsy of 7 cm mass on 22 which showed 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  He had consult with Dr Sae Adams who recommended he undergo neoadjuvant chemo radiation then repeat imaging to determine surgical intervention  He is scheduled for port placement and insertion of jejunostomy tube 22  He is being referred by Dr Sae Adams and presents today for consult  22 Dr Josie Winters, PCP   Recommend [facility] consult for possible EGD     1/10/22 GI consult  -Will plan EGD with biopsy     -Patient will continue pantoprazole 40 mg daily  22 EGD with biopsy  Stomach, cardia:  - Gastric mucosa with reactive changes   - Separate fragment of necrotic debris with bacterial and fungal aggregates  - No definite malignancy identified  Esophagus, distal:  - 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  - MMR and Her2 studies are pending with results to follow in an addendum  HER2 IMMUNOHISTOCHEMICAL ANALYSIS FOR GASTRIC/ESOPHAGEAL ADENOCARCINOMA     Test Description                                        Result                                                     HER2/AMIE by IHC (clone 4B5)                  Negative, 0      22 CT C/A/P w contrast  New mid to distal esophageal wall thickening with large eccentric mass in the posterior distal esophagus extending into the GE junction and beyond the esophageal wall with pathologic lymph nodes anteriorly with central necrosis of esophageal malignancy      This correlates with the recent EGD finding of 1/13/2022  Right lower lobe endobronchial mass possibly mucous plugging with extension into the bronchial branches is chronic and was seen on prior CT of 10/10/2019 with no obstruction  This can be evaluated when PET scan is performed as indicated on EGD report  2/1/22 PET CT  Hypermetabolic distal esophageal carcinoma which extends into the proximal stomach with paraesophageal/perigastric/upper abdominal andry metastatic disease  Concentric FDG activity involving the cecum/proximal ascending colon of uncertain clinical significance  Given the localized nature of FDG activity, consider correlation with screening colonoscopy to exclude underlying colonic malignancy  Indeterminant non-FDG avid avid filling defect within right lower lobe bronchus with associated more distal tree-in-bud infiltrate  Findings could reflect mucous plugging with obstructive malignant process of low metabolic activity not excluded  3 8 cm infrarenal abdominal aortic aneurysm  2/3/22 Dr Specner Crane, Surgical Oncology  recommend that he undergo neoadjuvant chemo radiation  Follow-up imaging would then determine surgical intervention  Would tolerate esophagectomy  Plan for port and jejunostomy tube placement  2/5/22- 2/12/22 Hospital admission- Malnutrition; left ankle pain- Vascular consulted, significant PAD; esophageal cancer    2/6/22 placement of J-tube  2/6/22 EGD with Push enteroscopy  Obstructing esophageal cancer from 32-40 cm from the incisors; small masses appear in the cardia as well, status post through the scope balloon dilation of the esophagus to facilitate passage of the enteroscope  Status post feeding tube placement either into the distal duodenum or proximal jejunum     RECOMMENDATION:  1  Can begin enteral feedings tomorrow  2   Okay to give water and meds through the PEJ tube    2/14/22 Dr Sera Wray, Medical Oncology  Recommended to start chemotherapy in 3 weeks  Patient and daughter refused to sign chemotherapy consent and do not want to start chemotherapy in 3 weeks and declined follow up; patient will call office when ready to start treatment    22 [facility]    Upcomin55 Dr Fazal Banegas, Surgical Oncology       Malignant neoplasm of lower third of esophagus ([facility] [address] )   2022 Initial Diagnosis    Malignant neoplasm of lower third of esophagus ([facility] [address] )     2022 Biopsy    EGD:   Esophagus, distal:  - 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  RESULTS OF IMMUNOHISTOCHEMICAL ANALYSIS FOR MISMATCH REPAIR PROTEIN LOSS     INTERPRETATION: No loss of nuclear expression of MMR proteins: Low probability of MSI-H     Note: Background non-neoplastic tissue and/or internal control with intact nuclear expression  RESULTS:  Antibody          Clone               Description                           Results  MLH1               M1                   Mismatch repair protein       Intact nuclear expression  MSH2              [id number]       Mismatch repair protein       Intact nuclear expression  MSH6              44                     Mismatch repair protein       Intact nuclear expression  PMS2              [id number]           Mismatch repair protein       Intact nuclear expression     3/7/2022 -  Chemotherapy    CARBOplatin (PARAPLATIN) IVPB (GOG AUC DOSING), 181 mg, Intravenous, Once, 0 of 7 cycles  PACLItaxel (TAXOL) chemo IVPB, 50 mg/m2 = 91 2 mg, Intravenous, Once, 0 of 7 cycles         Review of Systems:  Review of Systems   Constitutional: Positive for activity change, appetite change, fatigue and unexpected weight change (30 pounds)  HENT: Positive for trouble swallowing (liquids only PO, feels nausea and indigestion with any oral intake)  J tube in place, bandage intact  Jevity 1 5 1400mls daily   Eyes: Negative  Respiratory: Negative  Cardiovascular: Negative      Gastrointestinal: Positive for nausea and vomiting (small amounts when drinking)  Endocrine: Negative  Genitourinary: Negative  Musculoskeletal: Positive for gait problem (ankle pain  using wheelchair  doing PT)  Skin: Positive for wound (sacral decubitus ulcer, per patient  )  Allergic/Immunologic: Negative  Neurological: Positive for weakness  Hematological: Negative  Psychiatric/Behavioral: Positive for dysphoric mood ("emotional rollercoaster" denies Si HI)  Clinical Trial: no    IPSS Questionnaire (AUA-7):  Pain assessment: 3    PFTno    Imaging:No images are attached to the encounter  Prior Radiationno    Teachingsim side effects    MSTYES    Implantable Devices (Port, pacemaker, pain stimulator)port    Hip Replacementno    Covid Vaccine Status2 doses    Health Maintenance   Topic Date Due    BMI: Followup Plan  Never done    DTaP,Tdap,and Td Vaccines (1 - Tdap) Never done    COVID-19 Vaccine (3 - Booster for Moderna series) 08/10/2021    Fall Risk  2022    Depression Screening  2022    Medicare Annual Wellness Visit (AWV)  2022    Lung Cancer Screening  2023    BMI: Adult  2023    Hepatitis C Screening  Completed    Pneumococcal Vaccine: 65+ Years  Completed    Influenza Vaccine  Completed    HIB Vaccine  Aged Out    Hepatitis B Vaccine  Aged Out    IPV Vaccine  Aged Out    Hepatitis A Vaccine  Aged Out    Meningococcal ACWY Vaccine  Aged Out    HPV Vaccine  Aged Out       Past Medical History:   Diagnosis Date    [facility]     esophageal ca    Pneumonia     Polio     Rib fractures        No past surgical history on file      Family History   Problem Relation Age of Onset    Dementia Mother     Heart disease Father        Social History     Tobacco Use    Smoking status: Former Smoker     Packs/day: 1 00     Years: 40 00     Pack years: 40 00     Types: Cigarettes     Quit date: 2022     Years since quittin 0    Smokeless tobacco: Never Used   Vaping Use    Vaping Use: Never used Substance Use Topics    Alcohol use: Never     Alcohol/week: 0 0 standard drinks     Comment: 0    Drug use: No          Current Outpatient Medications:     acetaminophen (TYLENOL) 325 mg tablet, Take 325 mg by mouth every 6 (six) hours as needed for mild pain, Disp: , Rfl:     ascorbic acid (VITAMIN C) 500 mg tablet, Take 500 mg by mouth daily, Disp: , Rfl:     aspirin 325 mg tablet, Take 325 mg by mouth 2 (two) times a day, Disp: , Rfl:     bisacodyl (Dulcolax) 10 mg suppository, Insert 10 mg into the rectum daily, Disp: , Rfl:     Cholecalciferol (VITAMIN D3 PO), Take by mouth, Disp: , Rfl:     cyanocobalamin (VITAMIN B-12) 500 MCG tablet, Take 500 mcg by mouth daily, Disp: , Rfl:     lidocaine (LIDODERM) 5 %, Apply 1 patch topically daily Remove & Discard patch within 12 hours or as directed by MD, Disp: , Rfl: 0    magnesium hydroxide (MILK OF MAGNESIA) 400 mg/5 mL oral suspension, Take 30 mL by mouth daily as needed for constipation, Disp: , Rfl:     Multiple Vitamins-Minerals (MULTIVITAMIN WITH MINERALS) tablet, Take 1 tablet by mouth daily, Disp: , Rfl:     oxyCODONE (ROXICODONE) 5 mg/5 mL solution, Take 5 mL (5 mg total) by mouth every 4 (four) hours as needed for moderate pain for up to 10 days Max Daily Amount: 30 mg, Disp: 300 mL, Rfl: 0    pantoprazole (PROTONIX) 40 mg tablet, Take 1 tablet (40 mg total) by mouth 2 (two) times a day, Disp: , Rfl: 0    triamcinolone (KENALOG) 0 5 % cream, Apply topically 2 (two) times a day, Disp: 30 g, Rfl: 0  No current facility-administered medications for this visit      No Known Allergies     Vitals:    02/17/22 0948   BP: 120/80   Pulse: 58   Resp: 14   Temp: 98 1 °F (36 7 °C)   SpO2: 99%   Weight: 60 3 kg (133 lb)       Pain Score:   3

## 2022-02-17 NOTE — PROGRESS NOTES
MSW to [facility] [address] [place] [address] requesting a return call to discuss pt's current capabilities and d/c plan  Oncology team would like pt to start with treatment quickly, MSW will attempt to work with the facility to coordinate care for pt

## 2022-02-17 NOTE — PROGRESS NOTES
Spoke with Saeedpaulo Souza today in [address] [place] [address] in regards to his nutrition  He is s/p Jtube placement on 2/6/22  He is currently residing at [facility] short term rehab where he is receiving nutrition via Jtube  He is unsure of the specifics of his enteral nutrition intake  Per STR documentation, Saeedpaulo Souza is receiving Jevity 1 5 @70mL/hr until 1400mL total volume is infused (~20hrs/day)  He receives 95mL pre TF infusion water flush plus 95mL water flush every 2hrs  This regimen provides: 1400 ML total volume, 5 9 cartons Jevity daily, 2100 kcal, 89g protein, 1064mL fluid, 2014mL total water  He is also consuming clear liquids PO  Saeed Souza explains that he receives Ensure Clear at his short term rehab but usually does not drink it as he does not enjoy the taste  He explains that he is consuming soup broth, water, jello, and sometimes vanilla ice cream by mouth  Bee Gallo is appreciative of conversation today

## 2022-02-17 NOTE — PROGRESS NOTES
Consultation - Radiation Oncology      MRA:[id number] : 1945  Encounter: [id number]  Patient Information: [address]  Chief Complaint   Patient presents with    Esophageal Cancer     Cancer Staging  Malignant neoplasm of lower third of esophagus [facility])  Staging form: Esophagus - Adenocarcinoma, AJCC 8th Edition  - Clinical stage from 2022: Stage III (cT3, cN1, cM0, G3) - Unsigned  Histologic grading system: 3 grade system           History of Present Illness    Blossom Harris 1945 is a 68 y o  male Who presented to his PCP with dysphagia and regurgitation of food on 22  In 2021, he started to have some dysphagia where he could not handle his secretions and felt food would get stuck  He had EGD with biopsy of 7 cm mass on 22 which showed 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  He had consult with Dr Ilda العراقي who recommended he undergo neoadjuvant chemo radiation then repeat imaging to determine surgical intervention  He is scheduled for port placement and insertion of jejunostomy tube 22  He is being referred by Dr Ilda العراقي and presents today for consult      22 Dr Tien Dahl, PCP   Recommend Melvina Anguiano consult for possible EGD      1/10/22 GI consult  -Will plan EGD with biopsy     -Patient will continue pantoprazole 40 mg daily         22 EGD with biopsy  Stomach, cardia:  - Gastric mucosa with reactive changes   - Separate fragment of necrotic debris with bacterial and fungal aggregates  - No definite malignancy identified       Esophagus, distal:  - 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    - MMR and Her2 studies are pending with results to follow in an addendum  HER2 IMMUNOHISTOCHEMICAL ANALYSIS FOR GASTRIC/ESOPHAGEAL ADENOCARCINOMA     Test Description                                        Result                                                     HER2/AMIE by IHC (clone 4B5)                  Negative, 0       1/24/22 CT C/A/P w contrast  New mid to distal esophageal wall thickening with large eccentric mass in the posterior distal esophagus extending into the GE junction and beyond the esophageal wall with pathologic lymph nodes anteriorly with central necrosis of esophageal malignancy     This correlates with the recent EGD finding of 1/13/2022  Right lower lobe endobronchial mass possibly mucous plugging with extension into the bronchial branches is chronic and was seen on prior CT of 10/10/2019 with no obstruction   This can be evaluated when PET scan is performed as indicated on EGD report      2/1/22 PET CT  Hypermetabolic distal esophageal carcinoma which extends into the proximal stomach with paraesophageal/perigastric/upper abdominal andry metastatic disease  Concentric FDG activity involving the cecum/proximal ascending colon of uncertain clinical significance   Given the localized nature of FDG activity, consider correlation with screening colonoscopy to exclude underlying colonic malignancy  Indeterminant non-FDG avid avid filling defect within right lower lobe bronchus with associated more distal tree-in-bud infiltrate   Findings could reflect mucous plugging with obstructive malignant process of low metabolic activity not excluded  3 8 cm infrarenal abdominal aortic aneurysm      2/3/22 Dr Mckayla Real, Surgical Oncology  recommend that he undergo neoadjuvant chemo radiation   Follow-up imaging would then determine surgical intervention  Would tolerate esophagectomy    Plan for port and jejunostomy tube placement      2/5/22- 2/12/22 Hospital admission- Malnutrition; left ankle pain- Vascular consulted, significant PAD; esophageal cancer     2/6/22 placement of J-tube  2/6/22 EGD with Push enteroscopy  Obstructing esophageal cancer from 32-40 cm from the incisors; small masses appear in the cardia as well, status post through the scope balloon dilation of the esophagus to facilitate passage of the enteroscope  Status post feeding tube placement either into the distal duodenum or proximal jejunum     RECOMMENDATION:  1  Can begin enteral feedings tomorrow  2  Okay to give water and meds through the PEJ tube     22 Dr Nay Hurt, Medical Oncology  Recommended to start chemotherapy in 3 weeks  Patient and daughter refused to sign chemotherapy consent and do not want to start chemotherapy in 3 weeks and declined follow up; patient will call office when ready to start treatment     22 Port placement     Upcomin55 Dr Naveen Linda, Surgical Oncology           Historical Information   Oncology History   Malignant neoplasm of lower third of esophagus ([address] )   2022 Initial Diagnosis    Malign[facility] lower third [facility] )     2022 Biopsy    EGD:   Esophagus, distal:  - 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  RESULTS OF IMMUNOHISTOCHEMICAL ANALYSIS FOR MISMATCH REPAIR PROTEIN LOSS     INTERPRETATION: No loss of nuclear expression of MMR proteins: Low probability of MSI-H     Note: Background non-neoplastic tissue and/or internal control with intact nuclear expression        RESULTS:  Antibody          Clone               Description                           Results  MLH1               M1                   Mismatch repair protein       Intact nuclear expression  MSH2              [id number]       Mismatch repair protein       Intact nuclear expression  MSH6              44                     Mismatch repair protein       Intact nuclear expression  PMS2              [id number]           Mismatch repair protein       Intact nuclear expression     3/7/2022 -  Chemotherapy    CARBOplatin (PARAPLATIN) IVPB (GOG AUC DOSING), 181 mg, Intravenous, Once, 0 of 7 cycles  PACLItaxel (TAXOL) chemo IVPB, 50 mg/m2 = 91 2 mg, Intravenous, Once, 0 of 7 cycles           Past Medical History:   Diagnosis Date    Cancer ([facility] [address] )     esophageal ca    Pneumonia     Polio     Rib fractures      No past surgical history on file      Family History   Problem Relation Age of Onset    Dementia Mother     Heart disease Father        Social History   Social History     Substance and Sexual Activity   Alcohol Use Never    Alcohol/week: 0 0 standard drinks    Comment: 0     Social History     Substance and Sexual Activity   Drug Use No     Social History     Tobacco Use   Smoking Status Former Smoker    Packs/day: 1 00    Years: 40 00    Pack years: 40 00    Types: Cigarettes    Quit date: 2022    Years since quittin 0   Smokeless Tobacco Never Used         Meds/Allergies     Current Outpatient Medications:     acetaminophen (TYLENOL) 325 mg tablet, Take 325 mg by mouth every 6 (six) hours as needed for mild pain, Disp: , Rfl:     ascorbic acid (VITAMIN C) 500 mg tablet, Take 500 mg by mouth daily, Disp: , Rfl:     aspirin 325 mg tablet, Take 325 mg by mouth 2 (two) times a day, Disp: , Rfl:     bisacodyl (Dulcolax) 10 mg suppository, Insert 10 mg into the rectum daily, Disp: , Rfl:     Cholecalciferol (VITAMIN D3 PO), Take by mouth, Disp: , Rfl:     cyanocobalamin (VITAMIN B-12) 500 MCG tablet, Take 500 mcg by mouth daily, Disp: , Rfl:     lidocaine (LIDODERM) 5 %, Apply 1 patch topically daily Remove & Discard patch within 12 hours or as directed by MD, Disp: , Rfl: 0    magnesium hydroxide (MILK OF MAGNESIA) 400 mg/5 mL oral suspension, Take 30 mL by mouth daily as needed for constipation, Disp: , Rfl:     Multiple Vitamins-Minerals (MULTIVITAMIN WITH MINERALS) tablet, Take 1 tablet by mouth daily, Disp: , Rfl:     oxyCODONE (ROXICODONE) 5 mg/5 mL solution, Take 5 mL (5 mg total) by mouth every 4 (four) hours as needed for moderate pain for up to 10 days Max Daily Amount: 30 mg, Disp: 300 mL, Rfl: 0    pantoprazole (PROTONIX) 40 mg tablet, Take 1 tablet (40 mg total) by mouth 2 (two) times a day, Disp: , Rfl: 0    triamcinolone (KENALOG) 0 5 % cream, Apply topically 2 (two) times a day, Disp: 30 g, Rfl: 0  No current facility-administered medications for this visit  No Known Allergies      Review of Systems   Constitutional: Positive for activity change, appetite change and fatigue  Negative for fever and unexpected weight change  HENT: Positive for trouble swallowing  Negative for congestion, drooling, mouth sores, sneezing, sore throat and voice change  Eyes: Negative  Respiratory: Negative for cough, chest tightness, shortness of breath and wheezing  Cardiovascular: Negative for chest pain, palpitations and leg swelling  Gastrointestinal: Negative for abdominal distention, abdominal pain, blood in stool, nausea and vomiting  Endocrine: Negative  Genitourinary: Negative for difficulty urinating, dysuria, flank pain and hematuria  Musculoskeletal: Positive for gait problem  Negative for arthralgias, back pain and joint swelling  Skin: Negative  Allergic/Immunologic: Negative  Neurological: Negative for dizziness, weakness, numbness and headaches  Hematological: Negative  Psychiatric/Behavioral: Negative  OBJECTIVE:   /80   Pulse 58   Temp 98 1 °F (36 7 °C)   Resp 14   Wt 60 3 kg (133 lb)   SpO2 99%   BMI 17 55 kg/m²   Pain Assessment:  1  Performance Status: Karnofsky: 70 - Cares for self; unable to carry on normal activity or do normal work     Physical Exam  Constitutional:       General: He is not in acute distress  Appearance: Normal appearance  He is ill-appearing  HENT:      Nose: No congestion  Mouth/Throat:      Pharynx: Oropharynx is clear  Eyes:      General: No scleral icterus  Extraocular Movements: Extraocular movements intact  Pupils: Pupils are equal, round, and reactive to light     Cardiovascular: Rate and Rhythm: Normal rate and regular rhythm  Heart sounds: Normal heart sounds  Pulmonary:      Effort: Pulmonary effort is normal       Breath sounds: Normal breath sounds  Abdominal:      Palpations: Abdomen is soft  There is no mass  Tenderness: There is no abdominal tenderness  Musculoskeletal:         General: No swelling  Normal range of motion  Cervical back: Normal range of motion and neck supple  Lymphadenopathy:      Cervical: No cervical adenopathy  Skin:     General: Skin is dry  Findings: Lesion present  No rash  Comments: Small sacral pressure sore   Neurological:      General: No focal deficit present  Mental Status: He is alert and oriented to person, place, and time  Mental status is at baseline  Gait: Gait abnormal ()  Psychiatric:         Mood and Affect: Mood normal          Behavior: Behavior normal             RESULTS  Lab Results    Chemistry        Component Value Date/Time    K 3 8 02/09/2022 0552     02/09/2022 0552    CO2 30 02/09/2022 0552    BUN 26 (H) 02/09/2022 0552    CREATININE 0 83 02/09/2022 0552        Component Value Date/Time    CALCIUM 8 9 02/09/2022 0552    ALKPHOS 66 02/07/2022 0535    AST 7 02/07/2022 0535    ALT 9 (L) 02/07/2022 0535            Lab Results   Component Value Date    WBC 10 15 02/09/2022    HGB 11 5 (L) 02/09/2022    HCT 36 0 (L) 02/09/2022    MCV 90 02/09/2022     02/09/2022         Imaging Studies  XR chest portable    Result Date: 2/16/2022  Narrative: CHEST INDICATION:   s/p port placement  COMPARISON:  Chest radiograph from 12/23/2018 and PET/CT from 2/1/2022  EXAM PERFORMED/VIEWS:  XR CHEST PORTABLE FINDINGS:  Left port at cavoatrial junction  Mild subcutaneous emphysema in the left chest wall at the site of insertion  Cardiomediastinal silhouette appears unremarkable  The lungs are clear  No pneumothorax or pleural effusion  Skinfold over the right hemithorax   Osseous structures appear within normal limits for patient age  Impression: No acute cardiopulmonary disease  Left port at cavoatrial junction with no pneumothorax  Workstation performed: [id number]     XR ankle 3+ views LEFT    Result Date: 2/6/2022  Narrative: LEFT ANKLE INDICATION:   pain, decreased ROM  COMPARISON:  None VIEWS:  XR ANKLE 3+ VW LEFT FINDINGS: There is no acute fracture or dislocation  No significant degenerative changes  No lytic or blastic osseous lesion  Soft tissues are unremarkable  Impression: No acute osseous abnormality  Workstation performed: [id number]     CT chest abdomen pelvis w contrast    Result Date: 1/26/2022  Narrative: CT CHEST, ABDOMEN AND PELVIS WITH IV CONTRAST INDICATION:   R13 10: Dysphagia, unspecified R63 4: Abnormal weight loss  COMPARISON:  Compared with 10/10/2019 TECHNIQUE: CT examination of the chest, abdomen and pelvis was performed  Axial, sagittal, and coronal 2D reformatted images were created from the source data and submitted for interpretation  Radiation dose length product (DLP) for this visit:  812 mGy-cm   This examination, like all CT scans performed in the [facility], was performed utilizing techniques to minimize radiation dose exposure, including the use of iterative reconstruction and automated exposure control  IV Contrast:  100 mL of iohexol (OMNIPAQUE) Enteric Contrast: Enteric contrast was administered  FINDINGS: CHEST LUNGS:  Lungs are clear  There is no tracheal or endobronchial lesion  PLEURA:  Unremarkable  HEART/GREAT VESSELS: Heart is unremarkable for patient's age  No thoracic aortic aneurysm  MEDIASTINUM AND BRANDEE:  Esophagus is dilated with proximal and mid esophageal circumferential wall thickening with distal esophagus demonstrating an eccentric mass posteriorly measuring 1 8 x 2 2 x 3 6 cm extending into the GE junction  Posterior to this however this is a 1 3 cm soft tissue nodule in series 2 image 37   Lymph node with central necrosis anterior to the GE junction measuring 1 2 x 2 4 x 3 5 cm  Extension beyond the esophageal wall seen  Smaller lymph node measuring 1 0 cm seen inferiorly  Right infrahilar soft tissue density measuring 1 0 x 1 4 cm in series 301 image 33 appears to be within the bronchial lumen and demonstrates a branching pattern on the coronal views and was seen on prior CT  This is nonocclusive    CHEST WALL AND LOWER NECK:   Unremarkable  ABDOMEN LIVER/BILIARY TREE:  Unremarkable  GALLBLADDER:  No calcified gallstones  No pericholecystic inflammatory change  SPLEEN:  Unremarkable  PANCREAS:  Unremarkable  ADRENAL GLANDS:  Unremarkable  KIDNEYS/URETERS:  Both kidneys enhance symmetrically  Left kidney is smaller with a lobulated contour and unchanged  No hydronephrosis  STOMACH AND BOWEL:  Stool filled colon  APPENDIX:  No findings to suggest appendicitis  ABDOMINOPELVIC CAVITY:  No ascites  No pneumoperitoneum  No lymphadenopathy  VESSELS:  Aneurysmal infrarenal abdominal aorta measuring 3 3 cm and unchanged PELVIS REPRODUCTIVE ORGANS:  Enlarged heterogeneous prostate measuring 6 0 x 5 4 cm  URINARY BLADDER:  Unremarkable  ABDOMINAL WALL/INGUINAL REGIONS:  Unremarkable  OSSEOUS STRUCTURES: 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 and unchanged  Severe right hip degenerative changes  No acute fracture or destructive osseous lesion  Impression: New mid to distal esophageal wall thickening with large eccentric mass in the posterior distal esophagus extending into the GE junction and beyond the esophageal wall with pathologic lymph nodes anteriorly with central necrosis of esophageal malignancy   This correlates with the recent EGD finding of 1/13/2022  Right lower lobe endobronchial mass possibly mucous plugging with extension into the bronchial branches is chronic and was seen on prior CT of 10/10/2019 with no obstruction    This can be evaluated when PET scan is performed as indicated on EGD report  The study was marked in EPIC for significant notification  Workstation performed: [id number]     EGD with Push Enteroscopy    Addendum Date: 2/7/2022 Addendum:    [address] [facility] [address] [phone] DATE OF SERVICE: 2/07/22 PHYSICIAN(S): Bisi Thibodeaux DO Proceduralist Obstructing esophageal cancer INDICATION: Malignant neoplasm of lower third of esophagus ([address] ), Weight loss, Mild protein-calorie malnutrition ([address] ), Dysphagia, unspecified type POST-OP DIAGNOSIS: See the impression below  PREPROCEDURE: Informed consent was obtained for the procedure, including sedation  Risks of perforation, hemorrhage, adverse drug reaction and aspiration were discussed  The patient was placed in the left lateral decubitus position  Patient was explained about the risks and benefits of the procedure  Risks including but not limited to bleeding, infection, and perforation were explained in detail  Also explained about less than 100% sensitivity with the exam and other alternatives  DETAILS OF PROCEDURE: Patient was taken to the procedure room where a time out was performed to confirm correct patient and correct procedure  The patient underwent monitored anesthesia care, which was administered by an anesthesia professional  The patient's blood pressure, oxygen, respirations, heart rate and level of consciousness were monitored throughout the procedure  The scope was advanced to the proximal part of the jejunum  The procedure was performed without an overtube  Fluoroscopy was not used  Retroflexion was performed in the cardia and fundus  The patient's estimated blood loss was minimal (<5 mL)  The procedure was moderately difficult due to esophageal cancer  The patient tolerated the procedure well  There were no apparent complications   ANESTHESIA INFORMATION: ASA: IV Anesthesia Type: IV Sedation with Anesthesia MEDICATIONS: No administrations occurring from 1121 to 1154 on 02/07/22 FINDINGS: Mass (traversable after intervention) measuring 8 mm in the lower third of the esophagus (32 cm from the incisors), covering the whole circumference; dilated with balloon dilator from 12 mm starting size Multiple fungating and malignant-appearing masses (traversable) in the cardia; bleeding occurred after intervention The fundus of the stomach, body of the stomach, incisura, antrum, prepyloric region and pylorus appeared normal  The duodenal bulb, 2nd part of the duodenum, 3rd part of the duodenum and 4th part of the duodenum appeared normal  The proximal jejunum appeared normal  PEJ tube placed using a deformable internal bolster via the pull technique after the site was identified via transillumination and needle passed through abdominal wall; distance from external bolster to external end of tube: 2 5 cm; scope reinserted to confirm placement SPECIMENS: * No specimens in log * IMPRESSION: 1  Obstructing esophageal cancer from 32-40 cm from the incisors; small masses appear in the cardia as well, status post through the scope balloon dilation of the esophagus to facilitate passage of the enteroscope 2  Status post feeding tube placement either into the distal duodenum or proximal jejunum RECOMMENDATION: 1  Can begin enteral feedings tomorrow 2  Okay to give water and meds through the PEJ tube  Cass Burgess DO [facility] [place], [place]    Result Date: 2/7/2022  Narrative:  [facility] [address] [facility] [address] [phone] DATE OF SERVICE: 2/07/22 PHYSICIAN(S): Cass Burgess DO Proceduralist Obstructing esophageal cancer INDICATION: Malignant neoplasm of lower third of esophagus ([address] ), Weight loss, Mild protein-calorie malnutrition ([address] ), Dysphagia, unspecified type POST-OP DIAGNOSIS: See the impression below  PREPROCEDURE: Informed consent was obtained for the procedure, including sedation    Risks of perforation, hemorrhage, adverse drug reaction and aspiration were discussed  The patient was placed in the left lateral decubitus position  Patient was explained about the risks and benefits of the procedure  Risks including but not limited to bleeding, infection, and perforation were explained in detail  Also explained about less than 100% sensitivity with the exam and other alternatives  DETAILS OF PROCEDURE: Patient was taken to the procedure room where a time out was performed to confirm correct patient and correct procedure  The patient underwent monitored anesthesia care, which was administered by an anesthesia professional  The patient's blood pressure, oxygen, respirations, heart rate and level of consciousness were monitored throughout the procedure  The scope was advanced to the proximal part of the jejunum  The procedure was performed without an overtube  Fluoroscopy was not used  Retroflexion was performed in the cardia and fundus  The patient's estimated blood loss was minimal (<5 mL)  The procedure was moderately difficult due to esophageal cancer  The patient tolerated the procedure well  There were no apparent complications   ANESTHESIA INFORMATION: ASA: IV Anesthesia Type: IV Sedation with Anesthesia MEDICATIONS: No administrations occurring from 1121 to 1154 on 02/07/22 FINDINGS: Mass (traversable after intervention) measuring 8 mm in the lower third of the esophagus (32 cm from the incisors), covering the whole circumference; dilated with balloon dilator from 12 mm starting size Multiple fungating and malignant-appearing masses (traversable) in the cardia; bleeding occurred after intervention The fundus of the stomach, body of the stomach, incisura, antrum, prepyloric region and pylorus appeared normal  The duodenal bulb, 2nd part of the duodenum, 3rd part of the duodenum and 4th part of the duodenum appeared normal  The proximal jejunum appeared normal  PEJ tube placed using a deformable internal bolster via the pull technique after the site was identified via transillumination and needle passed through abdominal wall; distance from external bolster to external end of tube: 2 5 cm; scope reinserted to confirm placement SPECIMENS: * No specimens in log *     Impression: 1  Obstructing esophageal cancer from 32-40 cm from the incisors; small masses appear in the cardia as well 2  Status post feeding tube placement either into the distal duodenum or proximal jejunum RECOMMENDATION: 1  Can begin enteral feedings tomorrow 2  Okay to give water and meds through the PEJ tube  DO Malvin Solorzano [facility] PET CT skull base to mid thigh    Result Date: 2/1/2022  Narrative: PET/CT SCAN INDICATION:  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   , distal esophageal carcinoma for staging MODIFIER: PI COMPARISON: CT of chest, abdomen, and pelvis dated 1/24/2022 CELL TYPE:  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 (bx distal esophagus 1/13/22) TECHNIQUE:   8 7 mCi F-18-FDG administered IV  Multiplanar attenuation corrected and non attenuation corrected PET images were acquired 60 minutes post injection  Contiguous, low dose, axial CT sections were obtained from the skull base through the femurs   Intravenous contrast material was not utilized  This examination, like all CT scans performed in the [facility], was performed utilizing techniques to minimize radiation dose exposure, including the use of iterative reconstruction and automated exposure control  Fasting serum glucose: 141 mg/dl FINDINGS: VISUALIZED BRAIN:   No acute abnormalities are seen  HEAD/NECK:   There is a physiologic distribution of FDG  No FDG avid cervical adenopathy is seen  CT images: Unremarkable  CHEST:   There is segmental FDG activity associated with mural thickening extending from the distal esophagus through the proximal stomach with max SUV of 10 9 consistent with patient's biopsy-proven esophageal carcinoma   On image 104 of series 3, there is mild FDG activity associated with soft tissue density posterior to the mid to distal esophagus and adjacent to the descending thoracic aorta measuring approximately 1 2 cm with Max SUV of 2 8 suspicious for possible posterior mediastinal/ paraesophageal andry metastatic disease  On image 97 of series 12, there is a small focus of mild FDG activity in the right hilar region without definite soft tissue correlate and with max SUV of 2 9 of uncertain clinical significance  This finding could reflect reactive/inflammatory lymph node although attention to this region on follow-up imaging is recommended to exclude developing andry metastatic disease  CT images: Atherosclerotic vascular calcifications including those of the coronary arteries are noted  Non-FDG avid filling defect within right lower lobe bronchus is noted on image 98 of series 3 with associated reticulonodular/tree-in-bud infiltrate most consistent with an infectious/inflammatory process such as pneumonia  ABDOMEN:   Best seen on image 135 is mildly hypermetabolic perigastric/gastrohepatic ligament soft tissue measuring 1 9 x 2 8 cm with Max SUV of 3 5 consistent with andry metastatic disease  Additional smaller non-FDG avid upper abdominal lymph nodes are noted which are suspicious for andry metastatic disease  There is nonuniform FDG activity involving the bowel with more prominent concentric FDG activity involving the cecum/proximal ascending colon with max SUV of 8 8 on image 196 of series 12  Consider correlation with screening colonoscopy as clinically indicated given the more localized nature of this FDG activity  CT images: Abdominal aortic aneurysm measuring 3 8 cm  Atherosclerotic vascular calcifications are noted  Asymmetric lobulated contour to the left kidney which appears mildly atrophic, particularly towards the upper pole  PELVIS: No FDG avid soft tissue lesions are seen  CT images: Enlarged prostate gland   OSSEOUS STRUCTURES: No FDG avid lesions are seen  CT images: Severe degenerative changes involving the right hip  Degenerative changes involving the spine  Possible osteopenia  Impression: 1  Hypermetabolic distal esophageal carcinoma which extends into the proximal stomach with paraesophageal/perigastric/upper abdominal andry metastatic disease  2   Concentric FDG activity involving the cecum/proximal ascending colon of uncertain clinical significance  Given the localized nature of FDG activity, consider correlation with screening colonoscopy to exclude underlying colonic malignancy  3   Indeterminant non-FDG avid avid filling defect within right lower lobe bronchus with associated more distal tree-in-bud infiltrate  Findings could reflect mucous plugging with obstructive malignant process of low metabolic activity not excluded  4   3 8 cm infrarenal abdominal aortic aneurysm  Please see above for details and additional findings  The study was marked in EPIC for significant notification  Workstation performed: [id number]     VAS lower limb arterial duplex, complete bilateral    Result Date: 2/7/2022  Narrative:  [facility] REPORT CLINICAL: Indications: Patient presents with an ulcer on his left ankle which started two months ago  The patient reports left lower extremity calf pain with rest  Operative History: No prior cardiovascular surgeries Risk Factors The patient has history of esophageal cancer and smoking (current) 1-2 ppd   Clinical Right Pressure:  141/ mm Hg, Left Pressure: IV site  FINDINGS:  Segment                Right                        Left                                          Impression  PSV (cm/s)  EDV  Impression  PSV (cm/s)  EDV  Common Femoral Artery                      76    0                      77    6  Prox Profunda          50-75%             153    0                      63    0  Prox SFA               Occluded             0    0                      79   14  Mid SFA Occluded             0    0                     110   25  Dist SFA               Occluded             0    0  >75%               621  148  Proximal Pop                               65   10                      34    8  Distal Pop             >75%               136   21                      37   12  Tibioperoneal                              25    4                      70   16  Prox Post Tibial                            9    0                               Dist Post Tibial       Occluded             0    0                      28   18  Prox  Ant  Tibial                          25    6  >75%               199   48  Dist  Ant  Tibial                          29   10                      32   17  Dist Peroneal                              20    6                                  CONCLUSION:  Impression:  RIGHT LOWER LIMB: Evidence suggestive of high grade stenosis vs occlusion of the proximal-distal superficial femoral artery with reconstitution at the proximal popliteal artery  50-75% stenosis noted in the profunda femoral artery and >75% stenosis noted in the distal popliteal artery  There is high grade stenosis vs occlusion of the distal posterior tibial artery  Evidence suggestive of Tibial-Peroneal diffuse disease  Ankle/Brachial index: 0 74 which is in the moderate disease category PVR/ PPG tracings are dampened  Metatarsal pressure of 99 mmHg Great toe pressure of 60 mmHg, within the healing range  LEFT LOWER LIMB: >75% stenosis noted in the distal superficial femoral artery and at the origin of the anterior tibial artery  Evidence suggestive of Tibial-Peroneal diffuse disease  Ankle/Brachial index: 0 56 which is in the severe disease category PVR/ PPG tracings are dampened  Metatarsal pressure of 51 mmHg Great toe pressure of 26 mmHg, below the healing range  No prior arterial duplex study for comparison    SIGNATURE: Electronically Signed by: Vinayak Acevedo MD, [id number]  on 2022-02-07 09:34:18 PM    CT lower extremity w contrast left    Result Date: 2/9/2022  Narrative: CT left tibia fibula with IV contrast INDICATION: Severe left ankle pain  COMPARISON: Plain film of the left ankle dated 2/5/2022  TECHNIQUE: CT examination of the above was performed  This examination, like all CT scans performed in the [facility], was performed utilizing techniques to minimize radiation dose exposure, including the use of iterative reconstruction  and automated exposure control software  Sagittal and coronal two dimensional reconstructed images were also submitted for interpretation  IV Contrast: 100 mL of iohexol (OMNIPAQUE) Rad dose  1361 mGy-cm FINDINGS: OSSEOUS STRUCTURES:  No fracture, dislocation or destructive osseous lesion  Age-appropriate degenerative changes noted across the ankle and knee  VISUALIZED MUSCULATURE:  Unremarkable  SOFT TISSUES:  Atherosclerotic disease noted  OTHER PERTINENT FINDINGS:  Thickened appearance to the peroneal tendons with anterior subluxation of the Proteus brevis at the lateral calcaneal tubercle there is a tendons appear intact but findings suggest tendinosis  Impression: No fracture or other acute osseous abnormality  Peroneal tendinosis identified with anterior subluxation of the brevis  Intact insertions are noted  No further suspected ligamentous or tendinous abnormality  Workstation performed: [id number]         Pathology:  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  ASSESSMENT  1   Malignant neoplasm of lower third of esophagus [facility])  Ambulatory referral to Radiation Oncology     Cancer Staging  Malignant neoplasm of lower third of esophagus [facility])  Staging form: Esophagus - Adenocarcinoma, AJCC 8th Edition  - Clinical stage from 2/14/2022: Stage III (cT3, cN1, cM0, G3) - Unsigned  Histologic grading system: 3 grade system        PLAN/DISCUSSION  No orders of the defined types were placed in this encounter  Holly Franklin is a 68y o  year old male with T2, N1 high-grade adenocarcinoma distal esophagus  We discussed with patient and his daughter who was on the phone the treatment course of 28 treatments total dose 50 4 Gy to the tumor bearing site in the distal esophagus and cardia stomach  This will be concurrent with chemotherapy  Patient already has feeding tube and Port-A-Cath  He is presently in rehabilitation program local nursing home and will inquire when he can be discharged so we can arrange free transportation for him for his daily treatments  Dr Sowmya Sullivan scheduled him to start chemotherapy in 2 - 3 weeks  Patient will return sometime next week for CT simulation treatment planning and will start treatments in coordination with start of his chemotherapy  Liu Mayorga MD  2/17/2022,11:06 AM      Portions of the record may have been created with voice recognition software  Occasional wrong word or "sound a like" substitutions may have occurred due to the inherent limitations of voice recognition software  Read the chart carefully and recognize, using context, where substitutions have occurred

## 2022-02-18 ENCOUNTER — TELEPHONE (OUTPATIENT)
Dept: HEMATOLOGY ONCOLOGY | Facility: CLINIC | Age: 77
End: 2022-02-18

## 2022-02-18 NOTE — TELEPHONE ENCOUNTER
I phoned [facility] to inquire whether they would be able to provide transport services from [address] [facility] home in [place]  Michael indicated that they are not able to transport patients to/from nursing homes, as nursing homes have contracted transportation

## 2022-02-23 ENCOUNTER — PATIENT OUTREACH (OUTPATIENT)
Dept: CASE MANAGEMENT | Facility: HOSPITAL | Age: 77
End: 2022-02-23

## 2022-02-23 ENCOUNTER — APPOINTMENT (OUTPATIENT)
Dept: RADIATION ONCOLOGY | Facility: CLINIC | Age: 77
End: 2022-02-23
Attending: RADIOLOGY
Payer: COMMERCIAL

## 2022-02-23 PROCEDURE — 77334 RADIATION TREATMENT AID(S): CPT | Performed by: RADIOLOGY

## 2022-02-23 PROCEDURE — 77399 UNLISTED PX MED RADJ PHYSICS: CPT | Performed by: RADIOLOGY

## 2022-02-23 NOTE — PROGRESS NOTES
MSW met with pt's daughter Juan Luis Zaman and then later pt in the [address] [place] [address] office this morning  Pt is still residing at [facility] [address] [place] for rehab and they are anticipating he will d/c back home this weekend  Pt has made great progress and is now walking very well with a standard RW  Juan Luis Zaman tells me that up until 3 weeks ago, pt was working for a local car dealership  She and her sister both live out of the area and didn't realize that he was losing weight and having a hard time eating, until they came to take him to an appointment for his ankle and realized how much he had declined  She says that she has talked to him about staying in the nursing home throughout his treatments, however he has refused to do so  She says that she does not have the capabilities to be a caregiver, and she is concerned about tx side effects  Pt will have concurrent chemo/radiation, and we discussed some of what could be expected although I did stress to her that everyone is different and we can't predict specifically what his side effects will be or how severe  She says that [address] [place] [address] will set up home health services for him at d/c, and she acknowledges that he still has a long road ahead of him with upcoming treatment, managing a feeding tube, and living alone  Pt's neighbor is close to him and does check in on him  She shared that pt lives in a split level home, so he will have some steps to navigate, however he does not use the lower level of the house for anything other than laundry  She also mentioned that she is encouraging him to not resume smoking, as he has been without cigarettes for about 3 weeks since being [facility]  Pt joined us in the lobby then after completing his SIM today  We reviewed that he is appropriate for [facility] as long as he is able to get himself out of the house and into the vehicle, which he agreed he felt comfortable doing    I explained being ready to go ahead of time and how the service works, he was in agreement with all terms and has already signed the terms of agreement  Pt is very friendly and appreciative of all of the help so far, and says that he cannot wait to get back home  MSW emailed [facility] with pt's upcoming appt dates for Rad Onc and Med Onc, they confirmed that he is scheduled at this time  He will consent for chemo at his appt on 3/7 and we will send over a completed schedule at that time  MSW did call pt's daughter Iza Biswas back to let her know that these are scheduled, however his vascular study and his PCP appt were not as they are not oncology related  She was appreciative of the help so far and says that she will make other arrangements for these appointments  MSW will continue to assist and support pt and his family throughout this process as needed, no other needs at this time

## 2022-03-01 ENCOUNTER — APPOINTMENT (OUTPATIENT)
Dept: RADIATION ONCOLOGY | Facility: CLINIC | Age: 77
End: 2022-03-01
Attending: RADIOLOGY
Payer: COMMERCIAL

## 2022-03-02 PROCEDURE — 77301 RADIOTHERAPY DOSE PLAN IMRT: CPT | Performed by: RADIOLOGY

## 2022-03-02 PROCEDURE — 77338 DESIGN MLC DEVICE FOR IMRT: CPT | Performed by: RADIOLOGY

## 2022-03-02 PROCEDURE — 77300 RADIATION THERAPY DOSE PLAN: CPT | Performed by: RADIOLOGY

## 2022-03-03 ENCOUNTER — TELEPHONE (OUTPATIENT)
Dept: FAMILY MEDICINE CLINIC | Facility: CLINIC | Age: 77
End: 2022-03-03

## 2022-03-03 NOTE — TELEPHONE ENCOUNTER
Calling re tube feeding     company never sent the bags so patient wasn't taught to change them    She is asking if she can make it daily to change the bag -until he's independent & able to change them himself ???   She just needs a verbal ok for this    please call her back asap    she is planning to go there today        [phone]

## 2022-03-04 ENCOUNTER — APPOINTMENT (OUTPATIENT)
Dept: RADIATION ONCOLOGY | Facility: CLINIC | Age: 77
End: 2022-03-04
Attending: RADIOLOGY
Payer: COMMERCIAL

## 2022-03-04 DIAGNOSIS — C15.5 MALIGNANT NEOPLASM OF LOWER THIRD OF ESOPHAGUS (HCC): Primary | ICD-10-CM

## 2022-03-06 NOTE — PROGRESS NOTES
Hematology/Oncology Progress Note    Date of Service: 3/7/2022    [facility]  [facility]   [address] [place] [address]    Hem/Onc Problem List:   1  GE junction adenocarcinoma    Chief Complaint:    Follow up regarding management of above    Assessment/Plan:     I personally reviewed the lab results, image studies results and other specialties/physicians consult notes and recommendations  I shared the findings with patient and family, discussed the diagnosis and management plan as below  1  GE junction adenocarcinoma, cT3 cN1, cM0, grade 3, stage III  EGD with biopsy consistent with 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  MMR proteins are normally expressed  HER2 negative by IHC  Dr Tamika Galo and Dr Rowdy Wagner have previously recommended concurrent chemo/RT  He is no longer in a nursing home  Patient since last visit seems to have improvement in performance status  He is S/P port placement  He will start chemo/RT as originally planned with carboplatin, paclitaxel as previously discussed  He understands to have lab work prior to each treatment (usually a few days prior to next tx)  He needs [facility] which we will arrange for him  Prior to second treatment, he will follow up with Dr Rowdy Wagner to ensure he tolerated treatment ok  I provided him a brief chemo education which he received more extensively at last visit  He was re-printed the medications above with side effects discussed  He signed chemo consent  I attempted to call patient's daughter for visit  However, no answer  2  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 cation  AJCC 8th Edition Cancer Stage :      Cancer Staging  Malignant neoplasm of lower third of esophagus (HCC)  Staging form: Esophagus - Adenocarcinoma, AJCC 8th Edition  - Clinical stage from 2/14/2022: Stage III (cT3, cN1, cM0, G3) - Unsigned  Histologic grading system: 3 grade system      Hematology/Oncology History:   · January 6, 2022 patient had a follow-up with primary care physician complaining of 6 weeks of dysphagia  This started with solid food then progressed into liquid diet  It was associated with nausea  Patient had a 30 lb weight loss within 1 year  Patient is an active smoker  · January 10, 2022 patient consulted GI   EGD on January 13, 2022 showed: FINDINGS:  · Fungating and malignant-appearing mass (traversable) measuring 70 mm in the lower third of the esophagus (34 cm from the incisors), covering the whole circumference,; bleeding occurred before and after intervention; performed cold forceps biopsy  · 3 fungating and multilobular masses (traversable) in the cardia, covering one third of the circumference,; bleeding occurred after intervention; performed partial removal by cold forceps biopsy  · The fundus of the stomach, body of the stomach, incisura, antrum, prepyloric region and pylorus appeared normal   · The duodenal bulb and 2nd part of the duodenum appeared normal   IMPRESSION:  1  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 with additional nodular lesions in the cardia of the stomach, biopsies pending, most likely adenocarcinoma   · January 13, 2010 to pathology from the EGD shows:  Final Diagnosis   A   Stomach, cardia:  - Gastric mucosa with reactive changes   - Separate fragment of necrotic debris with bacterial and fungal aggregates  - No definite malignancy identified       B  Esophagus, distal:  - 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  - MMR and Her2 studies are pending with results to follow in an addendum      C  MMR are normally expressed , MSI-low  D  HER2 negative by IHC       · January 24, 2022 CT scan chest abdomen pelvis with contrast:  IMPRESSION:     New mid to distal esophageal wall thickening with large eccentric mass in the posterior distal esophagus extending into the GE junction and beyond the esophageal wall with pathologic lymph nodes anteriorly with central necrosis of esophageal malignancy      This correlates with the recent EGD finding of 1/13/2022      Right lower lobe endobronchial mass possibly mucous plugging with extension into the bronchial branches is chronic and was seen on prior CT of 10/10/2019 with no obstruction   This can be evaluated when PET scan is performed as indicated on EGD report  · February 1, 2022 PET-CT scan:  IMPRESSION:     1   Hypermetabolic distal esophageal carcinoma which extends into the proximal stomach with paraesophageal/perigastric/upper abdominal andry metastatic disease      2   Concentric FDG activity involving the cecum/proximal ascending colon of uncertain clinical significance   Given the localized nature of FDG activity, consider correlation with screening colonoscopy to exclude underlying colonic malignancy      3   Indeterminant non-FDG avid avid filling defect within right lower lobe bronchus with associated more distal tree-in-bud infiltrate   Findings could reflect mucous plugging with obstructive malignant process of low metabolic activity not excluded      4   3 8 cm infrarenal abdominal aortic aneurysm  · February 3, 2022 Dr Elizabet lEizondo consulted patient recommendation of concurrent chemo radiation therapy in the neoadjuvant setting    · February 6, 2022, EGD with J-tube placement  Dr Mariza Magaña,   · February 7, 2022 duplex of lower extremity shows high-grade stenosis versus occlusion of the proximal distal superficial femoral artery reconstitution at the proximal popliteal artery  Stenosis in left lower extremity as well  History of Present Illiness:   Holly Franklin is a 68 y o  male with the above-noted HemOnc history who is here to sign chemo consent for treatment originally recommended  He feels well  Feels that he is much improved since last seen a few weeks prior  He is now discharged from nursing home  He lives at home alone, but has neighbors nearby to keep an eye on him per social work notes  No new HA, CP, SOB, ABD, N/V/D  No bleeding  He has feeding tube in place  Cannot receive nutrition by mouth currently per pt  Patient feels fine  No fever or chills  No exertional chest pain, diaphoresis or shortness  of breath  No cough and phlegm, no hemoptysis  Patient denied nausea  and vomiting  No abdominal pain  No diarrhea or constipation  No  symptoms  No headache or blurred vision  No seizure activity  Appetite good  No significant weight loss or weight gain  The patient denies bleeding anywhere  ROS: A 12-point of review of systems is obtained and other than the above is noncontributory  Objective:   VITALS:   /60 (BP Location: Left arm, Patient Position: Sitting, Cuff Size: Adult)   Pulse (!) 109   Temp (!) 97 1 °F (36 2 °C)   Resp 18   Ht 6' 1" (1 854 m)   Wt 55 3 kg (122 lb)   SpO2 93%   BMI 16 10 kg/m²     Physical EXAM:  General:  Alert, cooperative, no distress, appears stated age  Head:  Normocephalic, without obvious abnormality, atraumatic  Eyes:  Conjunctivae/corneas clear  PERRL, EOMs intact  No evidence of conjunctivitis     Throat: Lips, mucosa, and tongue normal   No bleeding from mouth  Neck: Supple, symmetrical, trachea midline    Lungs:   Clear to auscultation bilaterally   Respiratory effort easy, nonlabored    Heart:  Regular rate and rhythm, S1, S2 normal, no murmur  Abdomen:   Soft, non-tender,nondistended  Bowel sounds normal  No masses,  No organomegaly  Extremities:  Lymphatics: Extremities normal, atraumatic, no cyanosis or edema  No cervical, axillary or inguinal adenopathy   Skin: Skin color, texture, turgor normal  No rashes  Neurologic: A&Ox4  No focal neuro deficits       No Known Allergies    Past Medical History:   Diagnosis Date    Cancer ([address] )     esophageal ca    Pneumonia     Polio     Rib fractures        Past Surgical History:   Procedure Laterality Date    FL GUIDED CENTRAL VENOUS ACCESS DEVICE INSERTION  2022    TUNNELED VENOUS PORT PLACEMENT Left 2022    Procedure: INSERTION VENOUS PORT (PORT-A-CATH);   Surgeon: Christopher Reed MD;  [facility]: [facility];  Service: Surgical Oncology       Family History   Problem Relation Age of Onset    Dementia Mother     Heart disease Father        Social History     Socioeconomic History    Marital status: /Civil Union     Spouse name: Not on file    Number of children: Not on file    Years of education: Not on file    Highest education level: Not on file   Occupational History    Not on file   Tobacco Use    Smoking status: Former Smoker     Packs/day: 1 00     Years: 40 00     Pack years: 40 00     Types: Cigarettes     Quit date: 2022     Years since quittin 0    Smokeless tobacco: Never Used   Vaping Use    Vaping Use: Never used   Substance and Sexual Activity    Alcohol use: Never     Alcohol/week: 0 0 standard drinks     Comment: 0    Drug use: No    Sexual activity: Not Currently   Other Topics Concern    Not on file   Social History Narrative    Not on file     Social Determinants of Health     Financial Resource Strain: Not on file   Food Insecurity: No Food Insecurity    Worried About [address] in the Last Year: Never true    [address] [facility] [address] in the Last Year: Never true   Transportation Needs: No Transportation Needs    Lack of Transportation (Medical): No    Lack of Transportation (Non-Medical): No   Physical Activity: Not on file   Stress: Not on file   Social Connections: Not on file   Intimate Partner Violence: Not on file   Housing Stability: Low Risk     Unable to Pay for Housing in the Last Year: No    Number of Places Lived in the Last Year: 1    Unstable Housing in the Last Year: No       Current Outpatient Medications   Medication Sig Dispense Refill    acetaminophen (TYLENOL) 325 mg tablet Take 325 mg by mouth every 6 (six) hours as needed for mild pain      ascorbic acid (VITAMIN C) 500 mg tablet Take 500 mg by mouth daily      aspirin 325 mg tablet Take 325 mg by mouth 2 (two) times a day      bisacodyl (Dulcolax) 10 mg suppository Insert 10 mg into the rectum daily      Cholecalciferol (VITAMIN D3 PO) Take by mouth      cyanocobalamin (VITAMIN B-12) 500 MCG tablet Take 500 mcg by mouth daily      lidocaine (LIDODERM) 5 % Apply 1 patch topically daily Remove & Discard patch within 12 hours or as directed by MD  0    magnesium hydroxide (MILK OF MAGNESIA) 400 mg/5 mL oral suspension Take 30 mL by mouth daily as needed for constipation      Multiple Vitamins-Minerals (MULTIVITAMIN WITH MINERALS) tablet Take 1 tablet by mouth daily      pantoprazole (PROTONIX) 40 mg tablet Take 1 tablet (40 mg total) by mouth 2 (two) times a day  0    triamcinolone (KENALOG) 0 5 % cream Apply topically 2 (two) times a day 30 g 0     No current facility-administered medications for this visit  (Not in a hospital admission)      DATA REVIEW:    Pathology Result:    Final Diagnosis   Date Value Ref Range Status   01/13/2022   Final    A  Stomach, cardia:  - Gastric mucosa with reactive changes   - Separate fragment of necrotic debris with bacterial and fungal aggregates  - No definite malignancy identified       B  Esophagus, distal:  - 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  - MMR and Her2 studies are pending with results to follow in an addendum  Image Results: They are reviewed and documented in Hematology/Oncology history    FL guided central venous access device insertion  Narrative: C-arm - left chest port placement    INDICATION: 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  Procedure guidance  COMPARISON:  None    IMAGES:  2    FLUOROSCOPY TIME:   15 SECONDS    TECHNIQUE:    FINDINGS:    Fluoroscopy was provided for procedure guidance  Left chest port noted with tip overlying the caval atrial junction  No gross evidence of pneumothorax  Impression: Fluoroscopy provided for procedure guidance  Please see separate procedure note for details  Workstation performed: [id number]        LABS:  Lab data are reviewed and documented in HemOnc history  No results found for this or any previous visit (from the past 48 hour(s))            Osmel Calderon PA-C  3/7/2022, 6:46 PM

## 2022-03-07 ENCOUNTER — TELEPHONE (OUTPATIENT)
Dept: HEMATOLOGY ONCOLOGY | Facility: CLINIC | Age: 77
End: 2022-03-07

## 2022-03-07 ENCOUNTER — OFFICE VISIT (OUTPATIENT)
Dept: HEMATOLOGY ONCOLOGY | Facility: CLINIC | Age: 77
End: 2022-03-07
Payer: COMMERCIAL

## 2022-03-07 VITALS
DIASTOLIC BLOOD PRESSURE: 60 MMHG | TEMPERATURE: 97.1 F | HEART RATE: 109 BPM | RESPIRATION RATE: 18 BRPM | HEIGHT: 73 IN | BODY MASS INDEX: 16.17 KG/M2 | SYSTOLIC BLOOD PRESSURE: 122 MMHG | WEIGHT: 122 LBS | OXYGEN SATURATION: 93 %

## 2022-03-07 DIAGNOSIS — C15.5 MALIGNANT NEOPLASM OF LOWER THIRD OF ESOPHAGUS (HCC): ICD-10-CM

## 2022-03-07 DIAGNOSIS — D64.9 ANEMIA, UNSPECIFIED TYPE: ICD-10-CM

## 2022-03-07 DIAGNOSIS — C15.9 ESOPHAGEAL ADENOCARCINOMA (HCC): Primary | ICD-10-CM

## 2022-03-07 PROCEDURE — 1160F RVW MEDS BY RX/DR IN RCRD: CPT | Performed by: INTERNAL MEDICINE

## 2022-03-07 PROCEDURE — 99213 OFFICE O/P EST LOW 20 MIN: CPT | Performed by: INTERNAL MEDICINE

## 2022-03-07 PROCEDURE — 1036F TOBACCO NON-USER: CPT | Performed by: INTERNAL MEDICINE

## 2022-03-07 NOTE — TELEPHONE ENCOUNTER
This is a new start  Patient prefers mid morning any day of the week  Please schedule labs to be drawn through pt's port  Pt will be using [facility]  Please let me know of date changes  Thank you!

## 2022-03-08 DIAGNOSIS — C15.5 MALIGNANT NEOPLASM OF LOWER THIRD OF ESOPHAGUS (HCC): Primary | ICD-10-CM

## 2022-03-09 ENCOUNTER — DOCUMENTATION (OUTPATIENT)
Dept: HEMATOLOGY ONCOLOGY | Facility: CLINIC | Age: 77
End: 2022-03-09

## 2022-03-09 NOTE — PROGRESS NOTES
Called insurance & spoke to [facility] call ref# [id number] pt has the following benefits  Chemo & chemo drugs he has a 80/20 co-ins then out of pocket $6700 met $5917 48  Once the out of pocket is met all services are covered 100%  Radiation standard radiation has a $40 co-pay per [facility] & for non standard there is a $60 co-pay per dos then the co-ins & the out of pocket  After the out of pocket is met all services are covered 100%  Pt sd that he is on a fixed income so I talked to him about f/a thru the hospital & that I will have [place] reach out to him when he goes for radiation &  She can help him w/the application & that [facility] will reach out to him regarding chemo assistance    He thanked me for the call  Spoke to [facility] regarding the f/a application & she sd that she will reach out to the pt & help him

## 2022-03-10 ENCOUNTER — TELEPHONE (OUTPATIENT)
Dept: HEMATOLOGY ONCOLOGY | Facility: CLINIC | Age: 77
End: 2022-03-10

## 2022-03-10 NOTE — TELEPHONE ENCOUNTER
Returned phone call to [facility] in regards to excoriation under jtube wafer  Encouraged Therese to reach out to GI in regards to excoriation under wafer  Therese agreeable and stated she will apply barrier cream  Dr Willis Whitfield did not encouraged intervention but to reach out to GI

## 2022-03-11 ENCOUNTER — HOSPITAL ENCOUNTER (OUTPATIENT)
Dept: INFUSION CENTER | Facility: CLINIC | Age: 77
Discharge: HOME | End: 2022-03-11
Payer: COMMERCIAL

## 2022-03-11 ENCOUNTER — TELEPHONE (OUTPATIENT)
Dept: HEMATOLOGY ONCOLOGY | Facility: CLINIC | Age: 77
End: 2022-03-11

## 2022-03-11 DIAGNOSIS — C15.5 MALIGNANT NEOPLASM OF LOWER THIRD OF ESOPHAGUS (HCC): Primary | ICD-10-CM

## 2022-03-11 LAB
ALBUMIN SERPL BCP-MCNC: 3.1 G/DL (ref 3.5–5)
ALP SERPL-CCNC: 83 U/L (ref 46–116)
ALT SERPL W P-5'-P-CCNC: 19 U/L (ref 12–78)
ANION GAP SERPL CALCULATED.3IONS-SCNC: 8 MMOL/L (ref 4–13)
AST SERPL W P-5'-P-CCNC: 15 U/L (ref 5–45)
BASOPHILS # BLD AUTO: 0.06 THOUSANDS/ΜL (ref 0–0.1)
BASOPHILS NFR BLD AUTO: 1 % (ref 0–1)
BILIRUB SERPL-MCNC: 0.44 MG/DL (ref 0.2–1)
BUN SERPL-MCNC: 35 MG/DL (ref 5–25)
CALCIUM ALBUM COR SERPL-MCNC: 9.7 MG/DL (ref 8.3–10.1)
CALCIUM SERPL-MCNC: 9 MG/DL (ref 8.3–10.1)
CHLORIDE SERPL-SCNC: 101 MMOL/L (ref 100–108)
CO2 SERPL-SCNC: 34 MMOL/L (ref 21–32)
CREAT SERPL-MCNC: 1.01 MG/DL (ref 0.6–1.3)
EOSINOPHIL # BLD AUTO: 0.03 THOUSAND/ΜL (ref 0–0.61)
EOSINOPHIL NFR BLD AUTO: 0 % (ref 0–6)
ERYTHROCYTE [DISTWIDTH] IN BLOOD BY AUTOMATED COUNT: 14.9 % (ref 11.6–15.1)
GFR SERPL CREATININE-BSD FRML MDRD: 71 ML/MIN/1.73SQ M
GLUCOSE SERPL-MCNC: 178 MG/DL (ref 65–140)
HCT VFR BLD AUTO: 32.9 % (ref 36.5–49.3)
HGB BLD-MCNC: 10.4 G/DL (ref 12–17)
IMM GRANULOCYTES # BLD AUTO: 0.21 THOUSAND/UL (ref 0–0.2)
IMM GRANULOCYTES NFR BLD AUTO: 2 % (ref 0–2)
LYMPHOCYTES # BLD AUTO: 0.88 THOUSANDS/ΜL (ref 0.6–4.47)
LYMPHOCYTES NFR BLD AUTO: 8 % (ref 14–44)
MCH RBC QN AUTO: 29.1 PG (ref 26.8–34.3)
MCHC RBC AUTO-ENTMCNC: 31.6 G/DL (ref 31.4–37.4)
MCV RBC AUTO: 92 FL (ref 82–98)
MONOCYTES # BLD AUTO: 0.9 THOUSAND/ΜL (ref 0.17–1.22)
MONOCYTES NFR BLD AUTO: 8 % (ref 4–12)
NEUTROPHILS # BLD AUTO: 8.8 THOUSANDS/ΜL (ref 1.85–7.62)
NEUTS SEG NFR BLD AUTO: 81 % (ref 43–75)
NRBC BLD AUTO-RTO: 0 /100 WBCS
PLATELET # BLD AUTO: 471 THOUSANDS/UL (ref 149–390)
PMV BLD AUTO: 11.7 FL (ref 8.9–12.7)
POTASSIUM SERPL-SCNC: 3.7 MMOL/L (ref 3.5–5.3)
PROT SERPL-MCNC: 7.3 G/DL (ref 6.4–8.2)
RBC # BLD AUTO: 3.58 MILLION/UL (ref 3.88–5.62)
SODIUM SERPL-SCNC: 143 MMOL/L (ref 136–145)
WBC # BLD AUTO: 10.88 THOUSAND/UL (ref 4.31–10.16)

## 2022-03-11 PROCEDURE — 85025 COMPLETE CBC W/AUTO DIFF WBC: CPT | Performed by: INTERNAL MEDICINE

## 2022-03-11 PROCEDURE — 80053 COMPREHEN METABOLIC PANEL: CPT | Performed by: INTERNAL MEDICINE

## 2022-03-11 NOTE — PROGRESS NOTES
Pt presents for port a cath flush  Pt offers no complaints  Port accessed, flushed, labs drawn, saline locked and de-accessed without difficulty  AVS declined, Next appointment reviewed

## 2022-03-14 ENCOUNTER — APPOINTMENT (OUTPATIENT)
Dept: RADIATION ONCOLOGY | Facility: CLINIC | Age: 77
End: 2022-03-14
Attending: RADIOLOGY
Payer: COMMERCIAL

## 2022-03-14 PROCEDURE — 77014 CHG CT GUIDANCE PLACEMENT RAD THERAPY FIELDS: CPT | Performed by: RADIOLOGY

## 2022-03-14 PROCEDURE — 77386 HB NTSTY MODUL RAD TX DLVR CPLX: CPT | Performed by: RADIOLOGY

## 2022-03-14 PROCEDURE — 77427 RADIATION TX MANAGEMENT X5: CPT | Performed by: RADIOLOGY

## 2022-03-15 ENCOUNTER — HOSPITAL ENCOUNTER (OUTPATIENT)
Dept: INFUSION CENTER | Facility: CLINIC | Age: 77
Discharge: HOME | End: 2022-03-15
Payer: COMMERCIAL

## 2022-03-15 ENCOUNTER — APPOINTMENT (OUTPATIENT)
Dept: RADIATION ONCOLOGY | Facility: CLINIC | Age: 77
End: 2022-03-15
Attending: RADIOLOGY
Payer: COMMERCIAL

## 2022-03-15 VITALS
HEART RATE: 101 BPM | TEMPERATURE: 97.7 F | DIASTOLIC BLOOD PRESSURE: 63 MMHG | HEIGHT: 70 IN | BODY MASS INDEX: 17.49 KG/M2 | SYSTOLIC BLOOD PRESSURE: 131 MMHG | RESPIRATION RATE: 18 BRPM | WEIGHT: 122.2 LBS

## 2022-03-15 DIAGNOSIS — C15.5 MALIGNANT NEOPLASM OF LOWER THIRD OF ESOPHAGUS (HCC): Primary | ICD-10-CM

## 2022-03-15 PROCEDURE — 96413 CHEMO IV INFUSION 1 HR: CPT

## 2022-03-15 PROCEDURE — 77014 CHG CT GUIDANCE PLACEMENT RAD THERAPY FIELDS: CPT | Performed by: RADIOLOGY

## 2022-03-15 PROCEDURE — 96367 TX/PROPH/DG ADDL SEQ IV INF: CPT

## 2022-03-15 PROCEDURE — 77386 HB NTSTY MODUL RAD TX DLVR CPLX: CPT | Performed by: RADIOLOGY

## 2022-03-15 PROCEDURE — 96417 CHEMO IV INFUS EACH ADDL SEQ: CPT

## 2022-03-15 RX ORDER — SODIUM CHLORIDE 9 MG/ML
20 INJECTION, SOLUTION INTRAVENOUS ONCE
Status: COMPLETED | OUTPATIENT
Start: 2022-03-15 | End: 2022-03-15

## 2022-03-15 RX ADMIN — DIPHENHYDRAMINE HYDROCHLORIDE 25 MG: 50 INJECTION, SOLUTION INTRAMUSCULAR; INTRAVENOUS at 12:28

## 2022-03-15 RX ADMIN — DEXAMETHASONE SODIUM PHOSPHATE: 10 INJECTION, SOLUTION INTRAMUSCULAR; INTRAVENOUS at 12:00

## 2022-03-15 RX ADMIN — FAMOTIDINE 20 MG: 10 INJECTION, SOLUTION INTRAVENOUS at 12:55

## 2022-03-15 RX ADMIN — PACLITAXEL 84.6 MG: 6 INJECTION, SOLUTION, CONCENTRATE INTRAVENOUS at 13:35

## 2022-03-15 RX ADMIN — SODIUM CHLORIDE 20 ML/HR: 9 INJECTION, SOLUTION INTRAVENOUS at 12:00

## 2022-03-15 RX ADMIN — CARBOPLATIN 147.6 MG: 10 INJECTION, SOLUTION INTRAVENOUS at 14:45

## 2022-03-15 NOTE — PROGRESS NOTES
Pt presents for Taxol and Carboplatin offering no complaints  Pt tolerated treatment without incident  AVS printed  Next appointment reviewed

## 2022-03-16 ENCOUNTER — APPOINTMENT (OUTPATIENT)
Dept: RADIATION ONCOLOGY | Facility: CLINIC | Age: 77
End: 2022-03-16
Attending: RADIOLOGY
Payer: COMMERCIAL

## 2022-03-16 ENCOUNTER — TELEPHONE (OUTPATIENT)
Dept: HEMATOLOGY ONCOLOGY | Facility: CLINIC | Age: 77
End: 2022-03-16

## 2022-03-16 DIAGNOSIS — C15.5 MALIGNANT NEOPLASM OF LOWER THIRD OF ESOPHAGUS (HCC): ICD-10-CM

## 2022-03-16 DIAGNOSIS — R11.0 NAUSEA: Primary | ICD-10-CM

## 2022-03-16 DIAGNOSIS — E43 SEVERE PROTEIN-CALORIE MALNUTRITION (HCC): Primary | ICD-10-CM

## 2022-03-16 PROCEDURE — 77386 HB NTSTY MODUL RAD TX DLVR CPLX: CPT | Performed by: RADIOLOGY

## 2022-03-16 PROCEDURE — 77014 CHG CT GUIDANCE PLACEMENT RAD THERAPY FIELDS: CPT | Performed by: RADIOLOGY

## 2022-03-16 RX ORDER — ONDANSETRON 8 MG/1
8 TABLET, ORALLY DISINTEGRATING ORAL EVERY 8 HOURS PRN
Qty: 20 TABLET | Refills: 0 | Status: SHIPPED | OUTPATIENT
Start: 2022-03-16

## 2022-03-16 NOTE — TELEPHONE ENCOUNTER
----- Message from Brijesh Sheppard, RN sent at 3/16/2022 11:37 AM EDT -----  Regarding: IV hydration  Good morning,        Rad onc reached out to us and asked for us to talk to them about this patient  He's having vomiting  Reviewed Dr Willis Whitfield about this, sent new medication for pt to get, but he would like for patient to get fluids tomorrow  3/17/22    Pt is a [facility] pt and will be up there for Rad Onc for 1015am and will be there for 30min  Not sure if we can accommodate? ??      Please help      Thanks,   Demarco Zarco

## 2022-03-16 NOTE — TELEPHONE ENCOUNTER
Pt scheduled for hydration at 11am tomorrow, 3/17  Spoke to patient  He is aware to go up to infusion after he leaves radiation

## 2022-03-16 NOTE — TELEPHONE ENCOUNTER
Received TEAMS message in regards to pt reporting nausea and vomiting  Reviewed with Dr Chetan Jeffery, he would like to order dissolvable zofran for patient for administration through J tube  Scheduled fluids with infusion center and possible scheduled compazine  Will attempt to schedule appointment for 3/17/22 for IV Fluids  Pt agreeable and aware  Zofran pended for provider signature

## 2022-03-17 ENCOUNTER — APPOINTMENT (OUTPATIENT)
Dept: RADIATION ONCOLOGY | Facility: CLINIC | Age: 77
End: 2022-03-17
Attending: RADIOLOGY
Payer: COMMERCIAL

## 2022-03-17 ENCOUNTER — DOCUMENTATION (OUTPATIENT)
Dept: NUTRITION | Facility: CLINIC | Age: 77
End: 2022-03-17

## 2022-03-17 ENCOUNTER — HOSPITAL ENCOUNTER (OUTPATIENT)
Dept: INFUSION CENTER | Facility: CLINIC | Age: 77
Discharge: HOME | End: 2022-03-17
Payer: COMMERCIAL

## 2022-03-17 VITALS
DIASTOLIC BLOOD PRESSURE: 67 MMHG | HEART RATE: 94 BPM | SYSTOLIC BLOOD PRESSURE: 131 MMHG | TEMPERATURE: 97.8 F | RESPIRATION RATE: 18 BRPM

## 2022-03-17 DIAGNOSIS — C15.5 MALIGNANT NEOPLASM OF LOWER THIRD OF ESOPHAGUS (HCC): Primary | ICD-10-CM

## 2022-03-17 DIAGNOSIS — E43 SEVERE PROTEIN-CALORIE MALNUTRITION (HCC): ICD-10-CM

## 2022-03-17 PROCEDURE — 96361 HYDRATE IV INFUSION ADD-ON: CPT

## 2022-03-17 PROCEDURE — 77014 CHG CT GUIDANCE PLACEMENT RAD THERAPY FIELDS: CPT | Performed by: RADIOLOGY

## 2022-03-17 PROCEDURE — 77386 HB NTSTY MODUL RAD TX DLVR CPLX: CPT | Performed by: RADIOLOGY

## 2022-03-17 PROCEDURE — 96365 THER/PROPH/DIAG IV INF INIT: CPT

## 2022-03-17 RX ADMIN — DEXAMETHASONE SODIUM PHOSPHATE: 10 INJECTION, SOLUTION INTRAMUSCULAR; INTRAVENOUS at 12:02

## 2022-03-17 RX ADMIN — SODIUM CHLORIDE 1000 ML: 0.9 INJECTION, SOLUTION INTRAVENOUS at 11:35

## 2022-03-18 ENCOUNTER — HOSPITAL ENCOUNTER (OUTPATIENT)
Dept: INFUSION CENTER | Facility: CLINIC | Age: 77
Discharge: HOME | End: 2022-03-18
Payer: COMMERCIAL

## 2022-03-18 ENCOUNTER — TELEPHONE (OUTPATIENT)
Dept: GASTROENTEROLOGY | Facility: CLINIC | Age: 77
End: 2022-03-18

## 2022-03-18 ENCOUNTER — APPOINTMENT (OUTPATIENT)
Dept: RADIATION ONCOLOGY | Facility: CLINIC | Age: 77
End: 2022-03-18
Attending: RADIOLOGY
Payer: COMMERCIAL

## 2022-03-18 ENCOUNTER — TELEPHONE (OUTPATIENT)
Dept: NUTRITION | Facility: CLINIC | Age: 77
End: 2022-03-18

## 2022-03-18 ENCOUNTER — PATIENT OUTREACH (OUTPATIENT)
Dept: CASE MANAGEMENT | Facility: HOSPITAL | Age: 77
End: 2022-03-18

## 2022-03-18 DIAGNOSIS — C15.5 MALIGNANT NEOPLASM OF LOWER THIRD OF ESOPHAGUS (HCC): Primary | ICD-10-CM

## 2022-03-18 LAB
ALBUMIN SERPL BCP-MCNC: 3.1 G/DL (ref 3.5–5)
ALP SERPL-CCNC: 66 U/L (ref 46–116)
ALT SERPL W P-5'-P-CCNC: 19 U/L (ref 12–78)
ANION GAP SERPL CALCULATED.3IONS-SCNC: 3 MMOL/L (ref 4–13)
AST SERPL W P-5'-P-CCNC: 15 U/L (ref 5–45)
BASOPHILS # BLD AUTO: 0.03 THOUSANDS/ΜL (ref 0–0.1)
BASOPHILS NFR BLD AUTO: 0 % (ref 0–1)
BILIRUB SERPL-MCNC: 0.54 MG/DL (ref 0.2–1)
BUN SERPL-MCNC: 27 MG/DL (ref 5–25)
CALCIUM ALBUM COR SERPL-MCNC: 9.3 MG/DL (ref 8.3–10.1)
CALCIUM SERPL-MCNC: 8.6 MG/DL (ref 8.3–10.1)
CHLORIDE SERPL-SCNC: 101 MMOL/L (ref 100–108)
CO2 SERPL-SCNC: 32 MMOL/L (ref 21–32)
CREAT SERPL-MCNC: 0.7 MG/DL (ref 0.6–1.3)
EOSINOPHIL # BLD AUTO: 0.02 THOUSAND/ΜL (ref 0–0.61)
EOSINOPHIL NFR BLD AUTO: 0 % (ref 0–6)
ERYTHROCYTE [DISTWIDTH] IN BLOOD BY AUTOMATED COUNT: 15.2 % (ref 11.6–15.1)
GFR SERPL CREATININE-BSD FRML MDRD: 91 ML/MIN/1.73SQ M
GLUCOSE SERPL-MCNC: 144 MG/DL (ref 65–140)
HCT VFR BLD AUTO: 29.5 % (ref 36.5–49.3)
HGB BLD-MCNC: 9.3 G/DL (ref 12–17)
IMM GRANULOCYTES # BLD AUTO: 0.09 THOUSAND/UL (ref 0–0.2)
IMM GRANULOCYTES NFR BLD AUTO: 1 % (ref 0–2)
LYMPHOCYTES # BLD AUTO: 0.62 THOUSANDS/ΜL (ref 0.6–4.47)
LYMPHOCYTES NFR BLD AUTO: 6 % (ref 14–44)
MCH RBC QN AUTO: 29 PG (ref 26.8–34.3)
MCHC RBC AUTO-ENTMCNC: 31.5 G/DL (ref 31.4–37.4)
MCV RBC AUTO: 92 FL (ref 82–98)
MONOCYTES # BLD AUTO: 0.53 THOUSAND/ΜL (ref 0.17–1.22)
MONOCYTES NFR BLD AUTO: 5 % (ref 4–12)
NEUTROPHILS # BLD AUTO: 8.57 THOUSANDS/ΜL (ref 1.85–7.62)
NEUTS SEG NFR BLD AUTO: 88 % (ref 43–75)
NRBC BLD AUTO-RTO: 0 /100 WBCS
PLATELET # BLD AUTO: 323 THOUSANDS/UL (ref 149–390)
PMV BLD AUTO: 12 FL (ref 8.9–12.7)
POTASSIUM SERPL-SCNC: 3.8 MMOL/L (ref 3.5–5.3)
PROT SERPL-MCNC: 6.8 G/DL (ref 6.4–8.2)
RBC # BLD AUTO: 3.21 MILLION/UL (ref 3.88–5.62)
SODIUM SERPL-SCNC: 136 MMOL/L (ref 136–145)
WBC # BLD AUTO: 9.86 THOUSAND/UL (ref 4.31–10.16)

## 2022-03-18 PROCEDURE — 85025 COMPLETE CBC W/AUTO DIFF WBC: CPT | Performed by: RADIOLOGY

## 2022-03-18 PROCEDURE — 77386 HB NTSTY MODUL RAD TX DLVR CPLX: CPT | Performed by: RADIOLOGY

## 2022-03-18 PROCEDURE — 80053 COMPREHEN METABOLIC PANEL: CPT | Performed by: RADIOLOGY

## 2022-03-18 PROCEDURE — 77336 RADIATION PHYSICS CONSULT: CPT | Performed by: RADIOLOGY

## 2022-03-18 PROCEDURE — 77014 CHG CT GUIDANCE PLACEMENT RAD THERAPY FIELDS: CPT | Performed by: RADIOLOGY

## 2022-03-18 NOTE — TELEPHONE ENCOUNTER
Updated enteral nutrition order faxed to [facility] as well as [facility] today 3/18/22 at 1:50pm  Also contacted visiting RN Alexandra Mccloud and reviewed changes to enteral nutrition regimen

## 2022-03-18 NOTE — PROGRESS NOTES
Pt to clinic for port flush and lab draw via port, offers no complaints today, tolerated procedure without complications, aware of next appointment, port de-accessed, avs declined

## 2022-03-18 NOTE — PROGRESS NOTES
MSW met with pt in the [address] [place] [address] office today, he has been doing tx now for a bit and says that he's having a hard time today, feeling nauseous and lightheaded  He had hydration yesterday and says that that did help him significantly  Rad Onc RN did take his vitals today and notes no concerns  He says that he is doing well at home, but feels that he did not have enough of his tube feed yesterday and that is probably causing some of his nausea today  He says that he is doing well at home since coming back from [address] [place] [address], and is very complimentary of the [facility] transport service which he is using daily  MSW and pt completed an application for hospital FA today, which I submitted via email  I did let pt and his daughter (by phone) know that they will also need to provide 3 months of bank statements to support the application  Pt does not have online banking and says that he will look for statements over the weekend to bring to me next week  He was appreciative of the help today and denies any other needs

## 2022-03-21 ENCOUNTER — APPOINTMENT (OUTPATIENT)
Dept: RADIATION ONCOLOGY | Facility: CLINIC | Age: 77
End: 2022-03-21
Attending: RADIOLOGY
Payer: COMMERCIAL

## 2022-03-21 ENCOUNTER — APPOINTMENT (OUTPATIENT)
Dept: RADIATION ONCOLOGY | Facility: CLINIC | Age: 77
End: 2022-03-21
Payer: COMMERCIAL

## 2022-03-21 ENCOUNTER — PATIENT OUTREACH (OUTPATIENT)
Dept: CASE MANAGEMENT | Facility: HOSPITAL | Age: 77
End: 2022-03-21

## 2022-03-21 PROCEDURE — 77014 CHG CT GUIDANCE PLACEMENT RAD THERAPY FIELDS: CPT | Performed by: RADIOLOGY

## 2022-03-21 PROCEDURE — 77386 HB NTSTY MODUL RAD TX DLVR CPLX: CPT | Performed by: RADIOLOGY

## 2022-03-21 PROCEDURE — 77427 RADIATION TX MANAGEMENT X5: CPT | Performed by: RADIOLOGY

## 2022-03-21 NOTE — PROGRESS NOTES
Pt dropped off bank statements today to accompany his application for FA, MSW forwarded to the [facility] team via email to include with his application

## 2022-03-22 ENCOUNTER — APPOINTMENT (OUTPATIENT)
Dept: RADIATION ONCOLOGY | Facility: CLINIC | Age: 77
End: 2022-03-22
Attending: RADIOLOGY
Payer: COMMERCIAL

## 2022-03-22 ENCOUNTER — NUTRITION (OUTPATIENT)
Dept: NUTRITION | Facility: CLINIC | Age: 77
End: 2022-03-22

## 2022-03-22 ENCOUNTER — HOSPITAL ENCOUNTER (OUTPATIENT)
Dept: INFUSION CENTER | Facility: CLINIC | Age: 77
Discharge: HOME | End: 2022-03-22
Payer: COMMERCIAL

## 2022-03-22 VITALS
HEIGHT: 70 IN | WEIGHT: 126.8 LBS | RESPIRATION RATE: 16 BRPM | DIASTOLIC BLOOD PRESSURE: 65 MMHG | SYSTOLIC BLOOD PRESSURE: 135 MMHG | TEMPERATURE: 97.6 F | HEART RATE: 76 BPM | BODY MASS INDEX: 18.15 KG/M2

## 2022-03-22 DIAGNOSIS — C15.5 MALIGNANT NEOPLASM OF LOWER THIRD OF ESOPHAGUS (HCC): Primary | ICD-10-CM

## 2022-03-22 DIAGNOSIS — Z71.3 NUTRITIONAL COUNSELING: Primary | ICD-10-CM

## 2022-03-22 PROCEDURE — 96413 CHEMO IV INFUSION 1 HR: CPT

## 2022-03-22 PROCEDURE — 96367 TX/PROPH/DG ADDL SEQ IV INF: CPT

## 2022-03-22 PROCEDURE — 77386 HB NTSTY MODUL RAD TX DLVR CPLX: CPT | Performed by: RADIOLOGY

## 2022-03-22 PROCEDURE — 77014 CHG CT GUIDANCE PLACEMENT RAD THERAPY FIELDS: CPT | Performed by: RADIOLOGY

## 2022-03-22 PROCEDURE — 96417 CHEMO IV INFUS EACH ADDL SEQ: CPT

## 2022-03-22 RX ORDER — SODIUM CHLORIDE 9 MG/ML
20 INJECTION, SOLUTION INTRAVENOUS ONCE
Status: CANCELLED | OUTPATIENT
Start: 2022-04-05

## 2022-03-22 RX ORDER — SODIUM CHLORIDE 9 MG/ML
20 INJECTION, SOLUTION INTRAVENOUS ONCE
Status: COMPLETED | OUTPATIENT
Start: 2022-03-22 | End: 2022-03-22

## 2022-03-22 RX ADMIN — FAMOTIDINE 20 MG: 10 INJECTION, SOLUTION INTRAVENOUS at 12:17

## 2022-03-22 RX ADMIN — PACLITAXEL 85.8 MG: 6 INJECTION, SOLUTION, CONCENTRATE INTRAVENOUS at 12:49

## 2022-03-22 RX ADMIN — CARBOPLATIN 196 MG: 10 INJECTION, SOLUTION INTRAVENOUS at 14:00

## 2022-03-22 RX ADMIN — DEXAMETHASONE SODIUM PHOSPHATE: 10 INJECTION, SOLUTION INTRAMUSCULAR; INTRAVENOUS at 11:29

## 2022-03-22 RX ADMIN — SODIUM CHLORIDE 20 ML/HR: 0.9 INJECTION, SOLUTION INTRAVENOUS at 11:10

## 2022-03-22 RX ADMIN — DIPHENHYDRAMINE HYDROCHLORIDE 25 MG: 50 INJECTION, SOLUTION INTRAMUSCULAR; INTRAVENOUS at 11:53

## 2022-03-22 NOTE — PATIENT INSTRUCTIONS
Nutrition Rx & Recommendations:  · Sips of calorie containing liquids by mouth as tolerated  · Follow proper oral care; Try baking soda/salt water rinse recipe (mix 3/4 tsp salt + 1 tsp baking soda + 1 qt water; rinse with plain water after using) in Eating Hints book (pg 18)  Brush your teeth before/after meals & before bed  · Weigh yourself regularly  If you notice weight loss, make an effort to increase your daily food/calorie intake  If you continue to notice loss after these efforts, reach out to your dietitian to establish a plan to stabilize weight  · Tube Feeding Plan:     · TF Goal: Jevity 1 5 at 90 mL/hr via J-tube cycled over ~20 hours daily (until 7 5 cartons is infused)  · Water Flushin mL free water flush at the beginning and end of TF infusion (250 mL total) plus 125 mL free water flush 5 times per day (625 mL total); (total of 875 mL/day in flushes; flushes should be spread throughout the day and every 2-3 hours during TF infusion; will need to adjust flushes prn for IV fluids)  · Enteral Nutrition goal to provide: 1777 mL volume (7 5 cartons day), 2666 kcal, 113 grams protein, 1350 mL free water, 2225 mL total water daily      Follow Up Plan: 3/29/22 during infusion  Recommend Referral to Other Providers: none at this time

## 2022-03-22 NOTE — PROGRESS NOTES
Outpatient Oncology Nutrition Consultation   Type of Consult: Follow Up  Care Location: Infusion Center    Reason for referral: from [address] 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  RESULTS OF IMMUNOHISTOCHEMICAL ANALYSIS FOR MISMATCH REPAIR PROTEIN LOSS     INTERPRETATION: No loss of nuclear expression of MMR proteins: Low probability of MSI-H     Note: Background non-neoplastic tissue and/or internal control with intact nuclear expression        RESULTS:  Antibody          Clone               Description                           Results  MLH1               M1                   Mismatch repair protein       Intact nuclear expression  MSH2              [id number]       Mismatch repair protein       Intact nuclear expression  MSH6              44                     Mismatch repair protein       Intact nuclear expression  PMS2              [id number]           Mismatch repair protein       Intact nuclear expression     3/15/2022 -  Chemotherapy    CARBOplatin (PARAPLATIN) IVPB (GOG AUC DOSING), 147 6 mg, Intravenous, Once, 2 of 7 cycles  Administration: 147 6 mg (3/15/2022)  PACLItaxel (TAXOL) chemo IVPB, 50 mg/m2 = 84 6 mg, Intravenous, Once, 2 of 7 cycles  Administration: 84 6 mg (3/15/2022)       Past Medical & Surgical Hx:   Patient Active Problem List   Diagnosis    Smoking    Weight loss    Dysphagia    Dermatitis    Malignant neoplasm of lower third of esophagus ([facility] )    Mild protein-calorie malnutrition (HCC)    Left ankle pain    Severe protein-calorie malnutrition (HCC)    Chronic pain     Past Medical History:   Diagnosis Date    Cancer ([facility] )     esophageal ca    Pneumonia     Polio     Rib fractures      Past Surgical History:   Procedure Laterality Date    FL GUIDED CENTRAL VENOUS ACCESS DEVICE INSERTION  2/16/2022    TUNNELED VENOUS PORT PLACEMENT Left 2/16/2022    Procedure: INSERTION VENOUS PORT (PORT-A-CATH);   Surgeon: Sara Bal MD;  Location: [facility];  Service: Surgical Oncology       Review of Medications:   Vitamins, Supplements and Herbals: No, pt denies taking supplements    Current Outpatient Medications:     acetaminophen (TYLENOL) 325 mg tablet, Take 325 mg by mouth every 6 (six) hours as needed for mild pain, Disp: , Rfl:     ascorbic acid (VITAMIN C) 500 mg tablet, Take 500 mg by mouth daily, Disp: , Rfl:     aspirin 325 mg tablet, Take 325 mg by mouth 2 (two) times a day, Disp: , Rfl:     bisacodyl (Dulcolax) 10 mg suppository, Insert 10 mg into the rectum daily, Disp: , Rfl:     Cholecalciferol (VITAMIN D3 PO), Take by mouth, Disp: , Rfl:     cyanocobalamin (VITAMIN B-12) 500 MCG tablet, Take 500 mcg by mouth daily, Disp: , Rfl:     lidocaine (LIDODERM) 5 %, Apply 1 patch topically daily Remove & Discard patch within 12 hours or as directed by MD, Disp: , Rfl: 0    magnesium hydroxide (MILK OF MAGNESIA) 400 mg/5 mL oral suspension, Take 30 mL by mouth daily as needed for constipation, Disp: , Rfl:     Multiple Vitamins-Minerals (MULTIVITAMIN WITH MINERALS) tablet, Take 1 tablet by mouth daily, Disp: , Rfl:     ondansetron (Zofran ODT) 8 mg disintegrating tablet, Take 1 tablet (8 mg total) by mouth every 8 (eight) hours as needed for nausea or vomiting Through J tube not by mouth , Disp: 20 tablet, Rfl: 0    pantoprazole (PROTONIX) 40 mg tablet, Take 1 tablet (40 mg total) by mouth 2 (two) times a day, Disp: , Rfl: 0    triamcinolone (KENALOG) 0 5 % cream, Apply topically 2 (two) times a day, Disp: 30 g, Rfl: 0  No current facility-administered medications for this visit      Facility-Administered Medications Ordered in Other Visits:     CARBOplatin (PARAPLATIN) 196 mg in sodium chloride 0 9 % 250 mL IVPB, 196 mg, Intravenous, Once, Elle Pace MD    diphenhydrAMINE (BENADRYL) 25 mg in sodium chloride 0 9 % 50 mL IVPB, 25 mg, Intravenous, Once, Elle Pace MD    famotidine (PEPCID) 20 mg in sodium chloride 0 9 % 52 mL IVPB, 20 mg, Intravenous, Once, Elle Pace MD    ondansetron [facility]) 16 mg, dexamethasone (DECADRON) 10 mg in sodium chloride 0 9 % 50 mL IVPB, , Intravenous, Once, Elle Pace MD, Last Rate: 150 mL/hr at 03/22/22 1129, New Bag at 03/22/22 1129    PACLitaxel (TAXOL) 85 8 mg in sodium chloride 0 9 % 250 mL chemo IVPB, 50 mg/m2 (Treatment Plan Recorded), Intravenous, Once, Elle Pace MD    Most Recent Lab Results:   Lab Results   Component Value Date    WBC 9 86 03/18/2022    NEUTROABS 8 57 (H) 03/18/2022    ALT 19 03/18/2022    AST 15 03/18/2022    ALB 3 1 (L) 03/18/2022    SODIUM 136 03/18/2022    SODIUM 143 03/11/2022    K 3 8 03/18/2022    K 3 7 03/11/2022     03/18/2022    BUN 27 (H) 03/18/2022    BUN 35 (H) 03/11/2022    CREATININE 0 70 03/18/2022    CREATININE 1 01 03/11/2022    EGFR 91 03/18/2022    PHOS 3 5 02/07/2022    POCGLU 141 (H) 02/01/2022    GLUC 144 (H) 03/18/2022    CALCIUM 8 6 03/18/2022    MG 2 4 02/07/2022       Anthropometric Measurements:   Height: 70"  Ht Readings from Last 1 Encounters:   03/22/22 5' 10" (1 778 m)     Wt Readings from Last 20 Encounters:   03/22/22 57 5 kg (126 lb 12 8 oz)   03/15/22 55 4 kg (122 lb 3 2 oz)   03/07/22 55 3 kg (122 lb)   02/17/22 60 3 kg (133 lb)   02/16/22 58 8 kg (129 lb 9 6 oz)   02/14/22 61 2 kg (135 lb)   02/06/22 59 5 kg (131 lb 2 8 oz)   02/03/22 66 7 kg (147 lb)   01/13/22 67 kg (147 lb 11 3 oz)   01/10/22 66 4 kg (146 lb 6 4 oz)   01/06/22 65 8 kg (145 lb)   01/07/21 80 9 kg (178 lb 6 4 oz)   12/09/20 79 4 kg (175 lb)   10/18/19 78 8 kg (173 lb 12 8 oz)   10/10/19 78 9 kg (173 lb 15 1 oz)   01/03/19 83 5 kg (184 lb)   12/23/18 82 6 kg (182 lb)   08/13/18 87 3 kg (192 lb 6 4 oz)   07/16/18 85 3 kg (188 lb)   07/14/18 88 5 kg (195 lb)       Weight History:    Usual Weight: 175#   Varian: (3/14/22) 124#, (3/17/22) 120 5#, (3/21/22) 126#, (3/22/22) 127#      Home Scale: none     Oncology Nutrition-Anthropometrics      Nutrition from 3/22/2022 in [facility] Documentation from 3/17/2022 in [address] [place] [address] Dietitian Perlita   Patient [age] (years): 68 years 68 years   Patient (male) height (in): 79 in 79 in   Current weight (lbs): 127 lbs --   Current weight to be used for anthropometric calculations (kg) 57 7 kg --   BMI: 18 2 --   IBW male 166 lb 166 lb   IBW (kg) male 75 5 kg 75 5 kg   IBW % (male) 76 5 % --   Adjusted BW (male): 156 2 lbs --   Adjusted BW in kg (male): 71 kg --   % weight change after 1 week: 2 4 % --   Weight change after 1 week (lbs) 3 lbs --   % weight change after 1 month: -4 5 % --   Weight change after 1 month (lbs) -6 lbs --   % weight change after 3 months: -27 4 % --   Weight change after 3 months (lbs) -48 lbs --          Nutrition-Focused Physical Findings: severe muscle depletion ([place]) - hollowing/scooping/depression and severe muscle depletion (Clavicle) - protruding/visible bone    Food/Nutrition-Related History & Client/Social History:    Current Nutrition Impact Symptoms:  [x] Nausea -has zofran  [x] Reduced Appetite  [] Acid Reflux    [x] Vomiting  [x] Unintended Wt Loss-significant x3 months   [] Malabsorption    [] Diarrhea  [] Unintended Wt Gain -2 4% in the past week  [] Dumping Syndrome    [] Constipation  [] Thick Mucous/Secretions  [] Abdominal Pain    [] Dysgeusia (Altered Taste)  [x] Xerostomia (Dry Mouth)  [] Gas    [] Dysosmia (Altered Smell)  [] Thrush  [] Difficulty Chewing    [] Oral Mucositis (Sore Mouth)  [] Fatigue  [x] Hyperglycemia: BG 144mg/dL on 3/18/22  [] Odynophagia  [] Esophagitis  [] Other:    [x] Dysphagia -Jtube in place for nutrition  [] Early Satiety  [] No Problems Eating      Food Allergies & Intolerances: no    Current Diet: Tube Feeding; Liquids PO   Current Nutrition Intake: Increased since last visit  Appetite: Poor (PO)  Nutrition Route: PO and J-Tube  Oral Care: brushes BID  Activity level: Uses carney to walk, very weak  24 Hr Diet Recall:   Sips of water and coffee  Also was able to consume tomato soup PO yesterday  EN Recall:  DME: [facility]   Access Type: Jtube   Formula: Jevity 1 5  Method: Cyclic  Rate: 90 mL/hr until 7 5 cartons are infused (about 20hrs)   Flush: 95 mL free water flush pre infusion, plus 95mL free water every 2hr (950mL)  EN providin mL volume (7 5 cartons/day), 2662 kcal, 113g protein, 1350 mL free water, 2300mL total water  Oncology Nutrition-Estimated Needs      Nutrition from 3/22/2022 in [address] [place] [address] Telephone from 2/3/2022 in [facility] Oncology Dietitian Perlita   Weight type used Actual weight Actual weight   Weight in kilograms (kg) used for estimated needs 57 7 kg 66 8 kg   Energy needs formula:  35-40 kcal/kg 35-40 kcal/kg   Energy needs based on 35 kcal/k kcal 2339 kcal   Energy needs based on 40 kcal/k kcal 2673 kcal   Protein needs formula: 1 5-2 g/kg 1 5-2 g/kg   Protein needs based on 1 5 g/kg 87 g 100 g   Protein needs based on 2 g/kg 115 g 134 g   Fluid needs formula: 30-35 mL/kg 30-35 mL/kg   Fluid needs based on 30 mL/kg 1731 mL 2004 mL   Fluid needs in ounces 59 oz 68 oz   Fluid needs based on 35 mL/kg 2020 mL 2338 mL   Fluid needs in ounces 68 oz 79 oz           Discussion & Intervention:   Bess Glover was evaluated today for an RD follow up regarding wt loss, Esophageal Cancer and enteral nutrition    Bess Glover is currently undergoing tx for EC  Today Tristin Escalante explains that he is feeling better and has more energy since increasing his TF rate from 75mL/hr to 90mL/hr  He has also gained ~3# in the past few days  He explains that he has continued sips of water and coffee PO, and yesterday he was able to eat soup PO  Encouraged him to continue these trials of liquids PO as much as tolerated as well as intake of 7 5 cartons Jevity 1 5 daily to support weight management throughout his tx process  Enteral nutrition is needed for Toro's sole source of nutrition, recommend:   · TF Goal: Jevity 1 5 at 90 mL/hr via J-tube cycled over ~20 hours daily (until 7 5 cartons is infused)  · Water Flushin mL free water flush at the beginning and end of TF infusion (250 mL total) plus 125 mL free water flush 5 times per day (625 mL total); (total of 875 mL/day in flushes; flushes should be spread throughout the day and every 2-3 hours during TF infusion; will need to adjust flushes prn for IV fluids)  · Enteral Nutrition goal to provide: 1777 mL volume (7 5 cartons day), 2666 kcal, 113 grams protein, 1350 mL free water, 2225 mL total water daily  *Pt requires  an enteral feeding pump to administer TF due to J-Tube  Moving forward, Tristin Escalante was encouraged to continue liquid PO as much as tolerated and continue enteral nutrition intake at goal    Materials Provided: not applicable  All questions and concerns addressed during todays visit  Tristin Escalante has RD contact information  Nutrition Diagnosis:    Increased Nutrient Needs (kcal & pro) related to increased demand for nutrients and disease state as evidenced by cancer dx and pt undergoing tx for cancer     Patient has clinical indicators (or ASPEN criteria) consistent with severe protein-calorie malnutrition in the context of Chronic Illness as evidenced by >7 5% wt loss in 3 months, severe muscle depletion ([place]) - hollowing/scooping/depression and severe muscle depletion (Clavicle) - protruding/visible bone  Monitoring & Evaluation:   Goals:  · weight maintenance/stabilization  · pt to meet >/=75% estimated nutrition needs daily    · Progress Towards Goals: Progressing and Goal(s) Met    Nutrition Rx & Recommendations:  · Sips of calorie containing liquids by mouth as tolerated  · Follow proper oral care; Try baking soda/salt water rinse recipe (mix 3/4 tsp salt + 1 tsp baking soda + 1 qt water; rinse with plain water after using) in Eating Hints book (pg 18)  Brush your teeth before/after meals & before bed  · Weigh yourself regularly  If you notice weight loss, make an effort to increase your daily food/calorie intake  If you continue to notice loss after these efforts, reach out to your dietitian to establish a plan to stabilize weight  · Tube Feeding Plan:     · TF Goal: Jevity 1 5 at 90 mL/hr via J-tube cycled over ~20 hours daily (until 7 5 cartons is infused)  · Water Flushin mL free water flush at the beginning and end of TF infusion (250 mL total) plus 125 mL free water flush 5 times per day (625 mL total); (total of 875 mL/day in flushes; flushes should be spread throughout the day and every 2-3 hours during TF infusion; will need to adjust flushes prn for IV fluids)  · Enteral Nutrition goal to provide: 1777 mL volume (7 5 cartons day), 2666 kcal, 113 grams protein, 1350 mL free water, 2225 mL total water daily      Follow Up Plan: 3/29/22 during infusion  Recommend Referral to Other Providers: none at this time

## 2022-03-23 ENCOUNTER — APPOINTMENT (OUTPATIENT)
Dept: RADIATION ONCOLOGY | Facility: CLINIC | Age: 77
End: 2022-03-23
Attending: RADIOLOGY
Payer: COMMERCIAL

## 2022-03-23 PROCEDURE — 77386 HB NTSTY MODUL RAD TX DLVR CPLX: CPT | Performed by: RADIOLOGY

## 2022-03-23 PROCEDURE — 77014 CHG CT GUIDANCE PLACEMENT RAD THERAPY FIELDS: CPT | Performed by: RADIOLOGY

## 2022-03-24 ENCOUNTER — APPOINTMENT (OUTPATIENT)
Dept: RADIATION ONCOLOGY | Facility: CLINIC | Age: 77
End: 2022-03-24
Payer: COMMERCIAL

## 2022-03-24 ENCOUNTER — APPOINTMENT (OUTPATIENT)
Dept: RADIATION ONCOLOGY | Facility: CLINIC | Age: 77
End: 2022-03-24
Attending: RADIOLOGY
Payer: COMMERCIAL

## 2022-03-24 PROCEDURE — 77386 HB NTSTY MODUL RAD TX DLVR CPLX: CPT | Performed by: INTERNAL MEDICINE

## 2022-03-25 ENCOUNTER — TELEPHONE (OUTPATIENT)
Dept: HEMATOLOGY ONCOLOGY | Facility: CLINIC | Age: 77
End: 2022-03-25

## 2022-03-25 ENCOUNTER — OFFICE VISIT (OUTPATIENT)
Dept: HEMATOLOGY ONCOLOGY | Facility: CLINIC | Age: 77
End: 2022-03-25
Payer: COMMERCIAL

## 2022-03-25 ENCOUNTER — APPOINTMENT (OUTPATIENT)
Dept: RADIATION ONCOLOGY | Facility: CLINIC | Age: 77
End: 2022-03-25
Attending: RADIOLOGY
Payer: COMMERCIAL

## 2022-03-25 ENCOUNTER — HOSPITAL ENCOUNTER (OUTPATIENT)
Dept: INFUSION CENTER | Facility: CLINIC | Age: 77
Discharge: HOME | End: 2022-03-25
Payer: COMMERCIAL

## 2022-03-25 VITALS
OXYGEN SATURATION: 100 % | TEMPERATURE: 98 F | BODY MASS INDEX: 18.32 KG/M2 | HEIGHT: 70 IN | WEIGHT: 128 LBS | DIASTOLIC BLOOD PRESSURE: 72 MMHG | HEART RATE: 85 BPM | SYSTOLIC BLOOD PRESSURE: 142 MMHG

## 2022-03-25 DIAGNOSIS — C15.5 MALIGNANT NEOPLASM OF LOWER THIRD OF ESOPHAGUS (HCC): Primary | ICD-10-CM

## 2022-03-25 LAB
ALBUMIN SERPL BCP-MCNC: 3.3 G/DL (ref 3.5–5)
ALP SERPL-CCNC: 68 U/L (ref 46–116)
ALT SERPL W P-5'-P-CCNC: 17 U/L (ref 12–78)
ANION GAP SERPL CALCULATED.3IONS-SCNC: 6 MMOL/L (ref 4–13)
AST SERPL W P-5'-P-CCNC: 13 U/L (ref 5–45)
BASOPHILS # BLD AUTO: 0.01 THOUSANDS/ΜL (ref 0–0.1)
BASOPHILS NFR BLD AUTO: 0 % (ref 0–1)
BILIRUB SERPL-MCNC: 0.59 MG/DL (ref 0.2–1)
BUN SERPL-MCNC: 13 MG/DL (ref 5–25)
CALCIUM ALBUM COR SERPL-MCNC: 9.4 MG/DL (ref 8.3–10.1)
CALCIUM SERPL-MCNC: 8.8 MG/DL (ref 8.3–10.1)
CHLORIDE SERPL-SCNC: 102 MMOL/L (ref 100–108)
CO2 SERPL-SCNC: 26 MMOL/L (ref 21–32)
CREAT SERPL-MCNC: 0.56 MG/DL (ref 0.6–1.3)
EOSINOPHIL # BLD AUTO: 0.04 THOUSAND/ΜL (ref 0–0.61)
EOSINOPHIL NFR BLD AUTO: 1 % (ref 0–6)
ERYTHROCYTE [DISTWIDTH] IN BLOOD BY AUTOMATED COUNT: 16.2 % (ref 11.6–15.1)
GFR SERPL CREATININE-BSD FRML MDRD: 100 ML/MIN/1.73SQ M
GLUCOSE SERPL-MCNC: 137 MG/DL (ref 65–140)
HCT VFR BLD AUTO: 27.5 % (ref 36.5–49.3)
HGB BLD-MCNC: 9 G/DL (ref 12–17)
IMM GRANULOCYTES # BLD AUTO: 0.07 THOUSAND/UL (ref 0–0.2)
IMM GRANULOCYTES NFR BLD AUTO: 1 % (ref 0–2)
LYMPHOCYTES # BLD AUTO: 0.29 THOUSANDS/ΜL (ref 0.6–4.47)
LYMPHOCYTES NFR BLD AUTO: 5 % (ref 14–44)
MCH RBC QN AUTO: 29.5 PG (ref 26.8–34.3)
MCHC RBC AUTO-ENTMCNC: 32.7 G/DL (ref 31.4–37.4)
MCV RBC AUTO: 90 FL (ref 82–98)
MONOCYTES # BLD AUTO: 0.48 THOUSAND/ΜL (ref 0.17–1.22)
MONOCYTES NFR BLD AUTO: 9 % (ref 4–12)
NEUTROPHILS # BLD AUTO: 4.63 THOUSANDS/ΜL (ref 1.85–7.62)
NEUTS SEG NFR BLD AUTO: 84 % (ref 43–75)
NRBC BLD AUTO-RTO: 0 /100 WBCS
PLATELET # BLD AUTO: 276 THOUSANDS/UL (ref 149–390)
PMV BLD AUTO: 11.2 FL (ref 8.9–12.7)
POTASSIUM SERPL-SCNC: 4.1 MMOL/L (ref 3.5–5.3)
PROT SERPL-MCNC: 6.8 G/DL (ref 6.4–8.2)
RBC # BLD AUTO: 3.05 MILLION/UL (ref 3.88–5.62)
SODIUM SERPL-SCNC: 134 MMOL/L (ref 136–145)
WBC # BLD AUTO: 5.52 THOUSAND/UL (ref 4.31–10.16)

## 2022-03-25 PROCEDURE — 85025 COMPLETE CBC W/AUTO DIFF WBC: CPT | Performed by: INTERNAL MEDICINE

## 2022-03-25 PROCEDURE — 77386 HB NTSTY MODUL RAD TX DLVR CPLX: CPT | Performed by: INTERNAL MEDICINE

## 2022-03-25 PROCEDURE — 99214 OFFICE O/P EST MOD 30 MIN: CPT | Performed by: INTERNAL MEDICINE

## 2022-03-25 PROCEDURE — 80053 COMPREHEN METABOLIC PANEL: CPT | Performed by: INTERNAL MEDICINE

## 2022-03-25 PROCEDURE — 77336 RADIATION PHYSICS CONSULT: CPT | Performed by: RADIOLOGY

## 2022-03-25 NOTE — TELEPHONE ENCOUNTER
Patient is requesting for his lab appts to be closer to his radiation appts  He is requesting the following  4/1- 9:30am  4/8- 11am  4/15-9:30am    Please let us know if you're able to accommodate  Thank you!

## 2022-03-25 NOTE — PROGRESS NOTES
Hematology/Oncology Progress Note    Date of Service: 3/25/2022    [facility]  [facility]   [address]  [place] [id number]    Hem/Onc Problem List:   GE junction adenocarcinoma, cT3 cN1, M0, G3    Chief Complaint:    Management of GE junction adenocarcinoma on active chemotherapy and radiation therapy    Assessment/Plan:     I personally reviewed the lab results, image studies results and other specialties/physicians consult notes and recommendations  I shared the findings with patient and family, discussed the diagnosis and management plan as below  1  GE junction adenocarcinoma, cT3 cN1, cM0, grade 3, stage III  EGD with biopsy consistent with 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  MMR proteins are normally expressed  HER2 negative by IHC  Dr Brooke Ferguson consulted patient and recommended concurrent chemoradiation therapy  Patient currently stays in a nursing home  Poor performance status, ECOG 3/5  Patient has a J-tube placed for nutrition support  Neoadjuvant concurrent radiation therapy with weekly Carbo/Taxol started on March 15, 2022         Patient has had 2 cycles treatment and did very well  Patient will continue current management  Checking Lab before every cycle of chemo  2  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  J-tube is in place  The patient continues tube feeding  3  Left ankl nonhealing ulcer  Wound Care team continues  to follow patient  4  Normocytic normochromic anemia, multifactorial   Hemoglobin stable  Patient denies bleeding anywhere  We continue to monitor  5  Follow-up with MD in 3-4 weeks  Disclaimer: This document was prepared using [employer] Direct technology   If a word or phrase is confusing, or does not make sense, this is likely due to recognition error which was not discovered during the providers review  If you believe an error has occurred, please Contact me through [employer] service for vicenta? cation  AJCC 8th Edition Cancer Stage :      Cancer Staging  Malignant neoplasm of lower third of esophagus (HCC)  Staging form: Esophagus - Adenocarcinoma, AJCC 8th Edition  - Clinical stage from 2/14/2022: Stage III (cT3, cN1, cM0, G3) - Unsigned  Histologic grading system: 3 grade system      Hematology/Oncology History:   · January 6, 2022 patient had a follow-up with primary care physician complaining of 6 weeks of dysphagia  This started with solid food then progressed into liquid diet  It was associated with nausea  Patient had a 30 lb weight loss within 1 year  Patient is an active smoker  · January 10, 2022 patient consulted GI   EGD on January 13, 2022 showed: FINDINGS:  · Fungating and malignant-appearing mass (traversable) measuring 70 mm in the lower third of the esophagus (34 cm from the incisors), covering the whole circumference,; bleeding occurred before and after intervention; performed cold forceps biopsy  · 3 fungating and multilobular masses (traversable) in the cardia, covering one third of the circumference,; bleeding occurred after intervention; performed partial removal by cold forceps biopsy  · The fundus of the stomach, body of the stomach, incisura, antrum, prepyloric region and pylorus appeared normal   · The duodenal bulb and 2nd part of the duodenum appeared normal   IMPRESSION:  5  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 with additional nodular lesions in the cardia of the stomach, biopsies pending, most likely adenocarcinoma   · January 13, 2010 to pathology from the EGD shows:  Final Diagnosis   A  Stomach, cardia:  - Gastric mucosa with reactive changes   - Separate fragment of necrotic debris with bacterial and fungal aggregates  - No definite malignancy identified       B   Esophagus, distal:  - 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  - MMR and Her2 studies are pending with results to follow in an addendum  C  MMR are normally expressed , MSI-low  D  HER2 negative by IHC  · January 24, 2022 CT scan chest abdomen pelvis with contrast:  IMPRESSION:     New mid to distal esophageal wall thickening with large eccentric mass in the posterior distal esophagus extending into the GE junction and beyond the esophageal wall with pathologic lymph nodes anteriorly with central necrosis of esophageal malignancy   This correlates with the recent EGD finding of 1/13/2022      Right lower lobe endobronchial mass possibly mucous plugging with extension into the bronchial branches is chronic and was seen on prior CT of 10/10/2019 with no obstruction  This can be evaluated when PET scan is performed as indicated on EGD report  · February 1, 2022 PET-CT scan:  IMPRESSION:     1  Hypermetabolic distal esophageal carcinoma which extends into the proximal stomach with paraesophageal/perigastric/upper abdominal andry metastatic disease      2  Concentric FDG activity involving the cecum/proximal ascending colon of uncertain clinical significance  Given the localized nature of FDG activity, consider correlation with screening colonoscopy to exclude underlying colonic malignancy      3  Indeterminant non-FDG avid avid filling defect within right lower lobe bronchus with associated more distal tree-in-bud infiltrate  Findings could reflect mucous plugging with obstructive malignant process of low metabolic activity not excluded      4   3 8 cm infrarenal abdominal aortic aneurysm  · February 3, 2022 Dr Madelin Thurman consulted patient recommendation of concurrent chemo radiation therapy in the neoadjuvant setting  · February 6, 2022, EGD with J-tube placement   Dr Danny Darby, DO  · February 7, 2022 duplex of lower extremity shows high-grade stenosis versus occlusion of the proximal distal superficial femoral artery reconstitution at the proximal popliteal artery  Stenosis in left lower extremity as well  · March 15, 2022 patient started concurrent chemoradiation therapy with weekly carbo/Taxol    History of Present Illiness:   Ayah Patel is a 68 y o  male with the above-noted HemOnc history who is here for management of GE junction adenocarcinoma on concurrent chemoradiation therapy  Patient diagnosed GE junction adenocarcinoma in January 10, 2022  Biopsy consistent with moderate to poorly differentiated adenocarcinoma with focal signet ring features  MMR normally expressed  HER2 negative by IHC  Staging workup in January 24, 2022 showed new mid to distal esophageal wall thickening with large eccentric mass in the posterior distal esophagus extending into the GE junction and beyond the esophageal wall with pathologic lymph nodes anteriorly with central necrosis of esophageal malignancy  PET-CT scan in February 1, 2022 revealed hypermetabolic distal esophageal carcinoma which extends into the proximal stomach with periesophageal/perigastric/upper abdominal andry metastatic disease  There is a 3 8 cm infrarenal abdominal aortic aneurysm  Dr Amara Flores consulted patient February 3, 2022 recommendation of concurrent chemoradiation therapy in the neoadjuvant setting  J-tube placement was done by Dr Juan Bundy for nutrition support on February 6, 2022  Neoadjuvant concurrent chemoradiation therapy with weekly carbo Taxol started on March 15, 2022  Patient has had 2 cycles of treatment and did fairly well  Patient feels fine  He started gaining weight  He still has dysphagia  No fever or chills  No exertional chest pain, diaphoresis or shortness  of breath  No cough and phlegm, no hemoptysis  Patient denied nausea  and vomiting  No abdominal pain  No diarrhea or constipation  No  symptoms  No headache or blurred vision  No seizure activity  No significant weight loss or weight gain   The patient denies bleeding anywhere  ROS: A 12-point of review of systems is obtained and other than the above is noncontributory  Objective:   VITALS:   /72   Pulse 85   Temp 98 °F (36 7 °C)   Ht 5' 10" (1 778 m)   Wt 58 1 kg (128 lb)   SpO2 100%   BMI 18 37 kg/m²     Physical EXAM:  General:  Alert, cooperative, no distress, appears stated age  Head:  Normocephalic, without obvious abnormality, atraumatic  Eyes:  Conjunctivae/corneas clear  PERRL, EOMs intact  No evidence of conjunctivitis     Throat: Lips, mucosa, and tongue normal   No bleeding from mouth  Neck: Supple, symmetrical, trachea midline    Lungs:   Clear to auscultation bilaterally  Respiratory effort easy, nonlabored    Heart:  Regular rate and rhythm, S1, S2 normal, no murmur  Abdomen:   Soft, non-tender,nondistended  J-tube in-place  No signs of infection  Bowel sounds normal  No masses,  No organomegaly  Extremities:  Lymphatics: Extremities normal, atraumatic, no cyanosis or edema  No cervical, axillary or inguinal adenopathy   Skin: Skin color, texture, turgor normal  No rashes  Neurologic: A&Ox4  No focal neuro deficits       No Known Allergies    Past Medical History:   Diagnosis Date    Cancer ([address] )     esophageal ca    Pneumonia     Polio     Rib fractures        Past Surgical History:   Procedure Laterality Date    FL GUIDED CENTRAL VENOUS ACCESS DEVICE INSERTION  2/16/2022    TUNNELED VENOUS PORT PLACEMENT Left 2/16/2022    Procedure: INSERTION VENOUS PORT (PORT-A-CATH);   Surgeon: Lucille Branch MD;  Location: [facility];  Service: Surgical Oncology       Family History   Problem Relation Age of Onset    Dementia Mother     Heart disease Father        Social History     Socioeconomic History    Marital status: /Civil Union     Spouse name: Not on file    Number of children: Not on file    Years of education: Not on file    Highest education level: Not on file   Occupational History    Not on file   Tobacco Use  Smoking status: Former Smoker     Packs/day: 1 00     Years: 40 00     Pack years: 40 00     Types: Cigarettes     Quit date: 2022     Years since quittin 1    Smokeless tobacco: Never Used   Vaping Use    Vaping Use: Never used   Substance and Sexual Activity    Alcohol use: Never     Alcohol/week: 0 0 standard drinks     Comment: 0    Drug use: No    Sexual activity: Not Currently   Other Topics Concern    Not on file   Social History Narrative    Not on file     Social Determinants of Health     Financial Resource Strain: Not on file   Food Insecurity: No Food Insecurity    Worried About Running Out of Food in the Last Year: Never true    Jeanmarie of Food in the Last Year: Never true   Transportation Needs: No Transportation Needs    Lack of Transportation (Medical): No    Lack of Transportation (Non-Medical):  No   Physical Activity: Not on file   Stress: Not on file   Social Connections: Not on file   Intimate Partner Violence: Not on file   Housing Stability: Low Risk     Unable to Pay for Housing in the Last Year: No    Number of Places Lived in the Last Year: 1    Unstable Housing in the Last Year: No       Current Outpatient Medications   Medication Sig Dispense Refill    acetaminophen (TYLENOL) 325 mg tablet Take 325 mg by mouth every 6 (six) hours as needed for mild pain      ascorbic acid (VITAMIN C) 500 mg tablet Take 500 mg by mouth daily      aspirin 325 mg tablet Take 325 mg by mouth 2 (two) times a day      bisacodyl (Dulcolax) 10 mg suppository Insert 10 mg into the rectum daily      Cholecalciferol (VITAMIN D3 PO) Take by mouth      cyanocobalamin (VITAMIN B-12) 500 MCG tablet Take 500 mcg by mouth daily      lidocaine (LIDODERM) 5 % Apply 1 patch topically daily Remove & Discard patch within 12 hours or as directed by MD Faye    magnesium hydroxide (MILK OF MAGNESIA) 400 mg/5 mL oral suspension Take 30 mL by mouth daily as needed for constipation      Multiple Vitamins-Minerals (MULTIVITAMIN WITH MINERALS) tablet Take 1 tablet by mouth daily      ondansetron (Zofran ODT) 8 mg disintegrating tablet Take 1 tablet (8 mg total) by mouth every 8 (eight) hours as needed for nausea or vomiting Through J tube not by mouth  20 tablet 0    pantoprazole (PROTONIX) 40 mg tablet Take 1 tablet (40 mg total) by mouth 2 (two) times a day  0    triamcinolone (KENALOG) 0 5 % cream Apply topically 2 (two) times a day 30 g 0     No current facility-administered medications for this visit  (Not in a hospital admission)      DATA REVIEW:    Pathology Result:    Final Diagnosis   Date Value Ref Range Status   01/13/2022   Final    A  Stomach, cardia:  - Gastric mucosa with reactive changes   - Separate fragment of necrotic debris with bacterial and fungal aggregates  - No definite malignancy identified  B  Esophagus, distal:  - 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  - MMR and Her2 studies are pending with results to follow in an addendum  Image Results: They are reviewed and documented in Hematology/Oncology history    FL guided central venous access device insertion  Narrative: C-arm - left chest port placement    INDICATION: 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  Procedure guidance  COMPARISON:  None    IMAGES:  2    FLUOROSCOPY TIME:   15 SECONDS    TECHNIQUE:    FINDINGS:    Fluoroscopy was provided for procedure guidance  Left chest port noted with tip overlying the caval atrial junction  No gross evidence of pneumothorax  Impression: Fluoroscopy provided for procedure guidance  Please see separate procedure note for details  Workstation performed: [id number]        LABS:  Lab data are reviewed and documented in HemOnc history       Recent Results (from the past 48 hour(s))   CBC and differential    Collection Time: 03/25/22  9:21 AM   Result Value Ref Range    WBC 5 52 4 31 - 10 16 Thousand/uL    RBC 3 05 (L) 3 88 - 5 62 Million/uL    Hemoglobin 9 0 (L) 12 0 - 17 0 g/dL    Hematocrit 27 5 (L) 36 5 - 49 3 %    MCV 90 82 - 98 fL    MCH 29 5 26 8 - 34 3 pg    MCHC 32 7 31 4 - 37 4 g/dL    RDW 16 2 (H) 11 6 - 15 1 %    MPV 11 2 8 9 - 12 7 fL    Platelets 450 987 - 357 Thousands/uL    nRBC 0 /100 WBCs    Neutrophils Relative 84 (H) 43 - 75 %    Immat GRANS % 1 0 - 2 %    Lymphocytes Relative 5 (L) 14 - 44 %    Monocytes Relative 9 4 - 12 %    Eosinophils Relative 1 0 - 6 %    Basophils Relative 0 0 - 1 %    Neutrophils Absolute 4 63 1 85 - 7 62 Thousands/µL    Immature Grans Absolute 0 07 0 00 - 0 20 Thousand/uL    Lymphocytes Absolute 0 29 (L) 0 60 - 4 47 Thousands/µL    Monocytes Absolute 0 48 0 17 - 1 22 Thousand/µL    Eosinophils Absolute 0 04 0 00 - 0 61 Thousand/µL    Basophils Absolute 0 01 0 00 - 0 10 Thousands/µL   Comprehensive metabolic panel    Collection Time: 03/25/22  9:21 AM   Result Value Ref Range    Sodium 134 (L) 136 - 145 mmol/L    Potassium 4 1 3 5 - 5 3 mmol/L    Chloride 102 100 - 108 mmol/L    CO2 26 21 - 32 mmol/L    ANION GAP 6 4 - 13 mmol/L    BUN 13 5 - 25 mg/dL    Creatinine 0 56 (L) 0 60 - 1 30 mg/dL    Glucose 137 65 - 140 mg/dL    Calcium 8 8 8 3 - 10 1 mg/dL    Corrected Calcium 9 4 8 3 - 10 1 mg/dL    AST 13 5 - 45 U/L    ALT 17 12 - 78 U/L    Alkaline Phosphatase 68 46 - 116 U/L    Total Protein 6 8 6 4 - 8 2 g/dL    Albumin 3 3 (L) 3 5 - 5 0 g/dL    Total Bilirubin 0 59 0 20 - 1 00 mg/dL    eGFR 100 ml/min/1 73sq m             Arvind Franklin MD  3/25/2022, 11:30 AM

## 2022-03-28 ENCOUNTER — APPOINTMENT (OUTPATIENT)
Dept: RADIATION ONCOLOGY | Facility: CLINIC | Age: 77
End: 2022-03-28
Attending: RADIOLOGY
Payer: COMMERCIAL

## 2022-03-28 ENCOUNTER — APPOINTMENT (OUTPATIENT)
Dept: RADIATION ONCOLOGY | Facility: CLINIC | Age: 77
End: 2022-03-28
Payer: COMMERCIAL

## 2022-03-28 PROCEDURE — 77014 CHG CT GUIDANCE PLACEMENT RAD THERAPY FIELDS: CPT | Performed by: RADIOLOGY

## 2022-03-28 PROCEDURE — 77427 RADIATION TX MANAGEMENT X5: CPT | Performed by: RADIOLOGY

## 2022-03-28 PROCEDURE — 77386 HB NTSTY MODUL RAD TX DLVR CPLX: CPT | Performed by: RADIOLOGY

## 2022-03-29 ENCOUNTER — NUTRITION (OUTPATIENT)
Dept: NUTRITION | Facility: CLINIC | Age: 77
End: 2022-03-29

## 2022-03-29 ENCOUNTER — HOSPITAL ENCOUNTER (OUTPATIENT)
Dept: INFUSION CENTER | Facility: CLINIC | Age: 77
Discharge: HOME | End: 2022-03-29
Payer: COMMERCIAL

## 2022-03-29 ENCOUNTER — APPOINTMENT (OUTPATIENT)
Dept: RADIATION ONCOLOGY | Facility: CLINIC | Age: 77
End: 2022-03-29
Attending: RADIOLOGY
Payer: COMMERCIAL

## 2022-03-29 VITALS
WEIGHT: 133.4 LBS | RESPIRATION RATE: 18 BRPM | TEMPERATURE: 97.9 F | HEIGHT: 70 IN | SYSTOLIC BLOOD PRESSURE: 149 MMHG | HEART RATE: 84 BPM | BODY MASS INDEX: 19.1 KG/M2 | DIASTOLIC BLOOD PRESSURE: 66 MMHG

## 2022-03-29 DIAGNOSIS — Z71.3 NUTRITIONAL COUNSELING: Primary | ICD-10-CM

## 2022-03-29 DIAGNOSIS — C15.5 MALIGNANT NEOPLASM OF LOWER THIRD OF ESOPHAGUS (HCC): Primary | ICD-10-CM

## 2022-03-29 PROCEDURE — 96413 CHEMO IV INFUSION 1 HR: CPT

## 2022-03-29 PROCEDURE — 77386 HB NTSTY MODUL RAD TX DLVR CPLX: CPT | Performed by: RADIOLOGY

## 2022-03-29 PROCEDURE — 96367 TX/PROPH/DG ADDL SEQ IV INF: CPT

## 2022-03-29 PROCEDURE — 96417 CHEMO IV INFUS EACH ADDL SEQ: CPT

## 2022-03-29 RX ORDER — SODIUM CHLORIDE 9 MG/ML
20 INJECTION, SOLUTION INTRAVENOUS ONCE
Status: COMPLETED | OUTPATIENT
Start: 2022-03-29 | End: 2022-03-29

## 2022-03-29 RX ADMIN — DIPHENHYDRAMINE HYDROCHLORIDE 25 MG: 50 INJECTION, SOLUTION INTRAMUSCULAR; INTRAVENOUS at 09:16

## 2022-03-29 RX ADMIN — CARBOPLATIN 196 MG: 10 INJECTION, SOLUTION INTRAVENOUS at 11:54

## 2022-03-29 RX ADMIN — DEXAMETHASONE SODIUM PHOSPHATE: 10 INJECTION, SOLUTION INTRAMUSCULAR; INTRAVENOUS at 10:22

## 2022-03-29 RX ADMIN — PACLITAXEL 85.8 MG: 6 INJECTION, SOLUTION, CONCENTRATE INTRAVENOUS at 10:51

## 2022-03-29 RX ADMIN — SODIUM CHLORIDE 20 ML/HR: 0.9 INJECTION, SOLUTION INTRAVENOUS at 09:16

## 2022-03-29 RX ADMIN — FAMOTIDINE 20 MG: 10 INJECTION, SOLUTION INTRAVENOUS at 09:54

## 2022-03-29 NOTE — PROGRESS NOTES
Outpatient Oncology Nutrition Consultation   Type of Consult: Follow Up  Care Location: Infusion Center    Reason for referral: from [address] 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 1/13/22  · S/p Jtube placement 2/7/22  · RT began 3/14/22, EOT planned for 4/20/22  · Chemotherapy (carboplatin, taxol) began 3/15/22  Oncology History   Malignant neoplasm of lower third of esophagus ([facility] [address] )   1/13/2022 Initial Diagnosis    Malignant neoplasm of lower third of esophagus ([facility] [address] )     1/13/2022 Biopsy    EGD:   Esophagus, distal:  - 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  RESULTS OF IMMUNOHISTOCHEMICAL ANALYSIS FOR MISMATCH REPAIR PROTEIN LOSS     INTERPRETATION: No loss of nuclear expression of MMR proteins: Low probability of MSI-H     Note: Background non-neoplastic tissue and/or internal control with intact nuclear expression        RESULTS:  Antibody          Clone               Description                           Results  MLH1               M1                   Mismatch repair protein       Intact nuclear expression  MSH2              [id number]       Mismatch repair protein       Intact nuclear expression  MSH6              44                     Mismatch repair protein       Intact nuclear expression  PMS2              [id number]           Mismatch repair protein       Intact nuclear expression     3/15/2022 -  Chemotherapy    CARBOplatin (PARAPLATIN) IVPB (GOG AUC DOSING), 147 6 mg, Intravenous, Once, 3 of 7 cycles  Administration: 147 6 mg (3/15/2022), 196 mg (3/22/2022)  PACLItaxel (TAXOL) chemo IVPB, 50 mg/m2 = 84 6 mg, Intravenous, Once, 3 of 7 cycles  Administration: 84 6 mg (3/15/2022), 85 8 mg (3/22/2022)       Past Medical & Surgical Hx:   Patient Active Problem List   Diagnosis    Smoking    Weight loss    Dysphagia    Dermatitis    Malignant neoplasm of lower third of esophagus (HCC)    Mild protein-calorie malnutrition (HCC)    Left ankle pain    Severe protein-calorie malnutrition (HCC)    Chronic pain     Past Medical History:   Diagnosis Date    Cancer ([address] )     esophageal ca    Pneumonia     Polio     Rib fractures      Past Surgical History:   Procedure Laterality Date    FL GUIDED CENTRAL VENOUS ACCESS DEVICE INSERTION  2/16/2022    TUNNELED VENOUS PORT PLACEMENT Left 2/16/2022    Procedure: INSERTION VENOUS PORT (PORT-A-CATH);   Surgeon: Rony Melara MD;  Location: [facility];  Service: Surgical Oncology       Review of Medications:   Vitamins, Supplements and Herbals: No, pt denies taking supplements    Current Outpatient Medications:     acetaminophen (TYLENOL) 325 mg tablet, Take 325 mg by mouth every 6 (six) hours as needed for mild pain, Disp: , Rfl:     ascorbic acid (VITAMIN C) 500 mg tablet, Take 500 mg by mouth daily, Disp: , Rfl:     aspirin 325 mg tablet, Take 325 mg by mouth 2 (two) times a day, Disp: , Rfl:     bisacodyl (Dulcolax) 10 mg suppository, Insert 10 mg into the rectum daily, Disp: , Rfl:     Cholecalciferol (VITAMIN D3 PO), Take by mouth, Disp: , Rfl:     cyanocobalamin (VITAMIN B-12) 500 MCG tablet, Take 500 mcg by mouth daily, Disp: , Rfl:     lidocaine (LIDODERM) 5 %, Apply 1 patch topically daily Remove & Discard patch within 12 hours or as directed by MD, Disp: , Rfl: 0    magnesium hydroxide (MILK OF MAGNESIA) 400 mg/5 mL oral suspension, Take 30 mL by mouth daily as needed for constipation, Disp: , Rfl:     Multiple Vitamins-Minerals (MULTIVITAMIN WITH MINERALS) tablet, Take 1 tablet by mouth daily, Disp: , Rfl:     ondansetron (Zofran ODT) 8 mg disintegrating tablet, Take 1 tablet (8 mg total) by mouth every 8 (eight) hours as needed for nausea or vomiting Through J tube not by mouth , Disp: 20 tablet, Rfl: 0    pantoprazole (PROTONIX) 40 mg tablet, Take 1 tablet (40 mg total) by mouth 2 (two) times a day, Disp: , Rfl: 0    triamcinolone (KENALOG) 0 5 % cream, Apply topically 2 (two) times a day, Disp: 30 g, Rfl: 0  No current facility-administered medications for this visit      Facility-Administered Medications Ordered in Other Visits:     CARBOplatin (PARAPLATIN) 196 mg in sodium chloride 0 9 % 250 mL IVPB, 196 mg, Intravenous, Once, Radha Perdue MD    ondansetron (ZOFRAN) 16 mg, dexamethasone (DECADRON) 10 mg in sodium chloride 0 9 % 50 mL IVPB, , Intravenous, Once, Radha Perdue MD    PACLitaxel (TAXOL) 85 8 mg in sodium chloride 0 9 % 250 mL chemo IVPB, 50 mg/m2 (Treatment Plan Recorded), Intravenous, Once, Radha Perdue MD    Most Recent Lab Results:   Lab Results   Component Value Date    WBC 5 52 03/25/2022    NEUTROABS 4 63 03/25/2022    ALT 17 03/25/2022    AST 13 03/25/2022    ALB 3 3 (L) 03/25/2022    SODIUM 134 (L) 03/25/2022    SODIUM 136 03/18/2022    K 4 1 03/25/2022    K 3 8 03/18/2022     03/25/2022    BUN 13 03/25/2022    BUN 27 (H) 03/18/2022    CREATININE 0 56 (L) 03/25/2022    CREATININE 0 70 03/18/2022    EGFR 100 03/25/2022    PHOS 3 5 02/07/2022    POCGLU 141 (H) 02/01/2022    GLUC 137 03/25/2022    CALCIUM 8 8 03/25/2022    MG 2 4 02/07/2022       Anthropometric Measurements:   Height: 70"  Ht Readings from Last 1 Encounters:   03/29/22 5' 10" (1 778 m)     Wt Readings from Last 20 Encounters:   03/29/22 60 5 kg (133 lb 6 4 oz)   03/25/22 58 1 kg (128 lb)   03/22/22 57 5 kg (126 lb 12 8 oz)   03/15/22 55 4 kg (122 lb 3 2 oz)   03/07/22 55 3 kg (122 lb)   02/17/22 60 3 kg (133 lb)   02/16/22 58 8 kg (129 lb 9 6 oz)   02/14/22 61 2 kg (135 lb)   02/06/22 59 5 kg (131 lb 2 8 oz)   02/03/22 66 7 kg (147 lb)   01/13/22 67 kg (147 lb 11 3 oz)   01/10/22 66 4 kg (146 lb 6 4 oz)   01/06/22 65 8 kg (145 lb)   01/07/21 80 9 kg (178 lb 6 4 oz)   12/09/20 79 4 kg (175 lb)   10/18/19 78 8 kg (173 lb 12 8 oz)   10/10/19 78 9 kg (173 lb 15 1 oz) 01/03/19 83 5 kg (184 lb)   12/23/18 82 6 kg (182 lb)   08/13/18 87 3 kg (192 lb 6 4 oz)       Weight History:    Usual Weight: 175#   Varian: (3/14/22) 124#, (3/17/22) 120 5#, (3/21/22) 126#, (3/22/22) 127#, (3/24/22) 128#, (3/28/22) 131#, (3/29/22) 134#      Home Scale: none     Oncology Nutrition-Anthropometrics      Nutrition from 3/29/2022 in [address] [place] [address] Nutrition from 3/22/2022 in [place] [address] Oncology Dietitian [place]   Patient age (years): 68 years 68 years   Patient (male) height (in): 79 in 79 in   Current weight (lbs): 134 lbs 127 lbs   Current weight to be used for anthropometric calculations (kg) 60 9 kg 57 7 kg   BMI: 19 2 18 2   IBW male 166 lb 166 lb   IBW (kg) male 75 5 kg 75 5 kg   IBW % (male) 80 7 % 76 5 %   Adjusted BW (male): 158 lbs 156 2 lbs   Adjusted BW in kg (male): 71 8 kg 71 kg   % weight change after 1 week: 5 5 % 2 4 %   Weight change after 1 week (lbs) 7 lbs 3 lbs   % weight change after 1 month: 0 8 % -4 5 %   Weight change after 1 month (lbs) 1 lbs -6 lbs   % weight change after 3 months: -23 4 % -27 4 %   Weight change after 3 months (lbs) -41 lbs -48 lbs          Nutrition-Focused Physical Findings: severe muscle depletion ([place]) - hollowing/scooping/depression and severe muscle depletion (Clavicle) - protruding/visible bone    Food/Nutrition-Related History & Client/Social History:    Current Nutrition Impact Symptoms:  [] Nausea -has zofran  [x] Reduced Appetite  [] Acid Reflux    [] Vomiting  [x] Unintended Wt Loss-significant x3 months   [] Malabsorption    [] Diarrhea  [] Unintended Wt Gain  [] Dumping Syndrome    [] Constipation  [] Thick Mucous/Secretions  [] Abdominal Pain    [] Dysgeusia (Altered Taste)  [x] Xerostomia (Dry Mouth)  [] Gas    [] Dysosmia (Altered Smell)  [] Thrush  [] Difficulty Chewing    [] Oral Mucositis (Sore Mouth)  [] Fatigue  [x] Hyperglycemia: BG 144mg/dL on 3/18/22      [] Odynophagia  [] Esophagitis  [] Other:    [x] Dysphagia -Jtube in place for nutrition; tolerating soft/pureed   [] Early Satiety  [] No Problems Eating      Food Allergies & Intolerances: no    Current Diet: Soft/Moist, Pureed and Tube Feeding  Current Nutrition Intake: Increased since last visit  Appetite: Fair    Nutrition Route: PO and J-Tube  Oral Care: brushes BID  Activity level: Uses carney to walk, but reports that he is recently more steady on his feet  24 Hr Diet Recall:   Able to tolerate: cream of celery soup, chicken and vegetable soup pureed, pudding, scrambled eggs, cookies, and water  EN Recall:  DME: [facility]   Access Type: Jtube   Formula: Jevity 1 5  Method: Cyclic  Rate: 90 mL/hr until 7 5 cartons are infused (about 20hrs)   Flush: 95 mL free water flush pre infusion, plus 95mL free water every 2hr (950mL)  EN providin mL volume (7 5 cartons/day), 2662 kcal, 113g protein, 1350 mL free water, 2300mL total water  Oncology Nutrition-Estimated Needs      Nutrition from 3/29/2022 in [address] [place] [address] Nutrition from 3/22/2022 in [facility] Oncology Dietitian [place]   Weight type used Actual weight Actual weight   Weight in kilograms (kg) used for estimated needs 60 9 kg 57 7 kg   Energy needs formula:  35-40 kcal/kg 35-40 kcal/kg   Energy needs based on 35 kcal/k kcal 2020 kcal   Energy needs based on 40 kcal/k kcal 2309 kcal   Protein needs formula: 1 5-2 g/kg 1 5-2 g/kg   Protein needs based on 1 5 g/kg 91 g 87 g   Protein needs based on 2 g/kg 122 g 115 g   Fluid needs formula: 30-35 mL/kg 30-35 mL/kg   Fluid needs based on 30 mL/kg 1827 mL 1731 mL   Fluid needs in ounces 62 oz 59 oz   Fluid needs based on 35 mL/kg 2132 mL 2020 mL   Fluid needs in ounces 72 oz 68 oz           Discussion & Intervention:   Wilberto Fillers was evaluated today for an RD follow up regarding wt loss, Esophageal Cancer and enteral nutrition  Wilberto Fillers is currently undergoing tx for EC    Today Wilberto Fillers explains that he is feeling well  He has recently started trying various foods PO  He is able to tolerate soups, scrambled eggs, and pudding PO  Encouraged him to continue trials of soft and moist foods as tolerated by mouth  He continues to run his enteral nutrition at 90mL/hr for about 20 hrs until 7 5 cartons of Jevity 1 5 are infused  His weight is increasing and he is feeling more energy with increases in nutrition intake  Enteral nutrition is needed for Toro's sole source of nutrition, recommend:   · TF Goal: Jevity 1 5 at 90 mL/hr via J-tube cycled over ~20 hours daily (until 7 5 cartons is infused)  · Water Flushin mL free water flush at the beginning and end of TF infusion (250 mL total) plus 125 mL free water flush 5 times per day (625 mL total); (total of 875 mL/day in flushes; flushes should be spread throughout the day and every 2-3 hours during TF infusion; will need to adjust flushes prn for IV fluids)  · Enteral Nutrition goal to provide: 1777 mL volume (7 5 cartons day), 2666 kcal, 113 grams protein, 1350 mL free water, 2225 mL total water daily  *Pt requires  an enteral feeding pump to administer TF due to J-Tube  Moving forward, Lulalawson Siddiqui was encouraged to continue trials of soft/moist foods much as tolerated and continue enteral nutrition intake at goal    Materials Provided: not applicable  All questions and concerns addressed during todays visit  Babs Siddiqui has RD contact information  Nutrition Diagnosis:    Increased Nutrient Needs (kcal & pro) related to increased demand for nutrients and disease state as evidenced by cancer dx and pt undergoing tx for cancer     Patient has clinical indicators (or ASPEN criteria) consistent with severe protein-calorie malnutrition in the context of Chronic Illness as evidenced by >7 5% wt loss in 3 months, severe muscle depletion ([place]) - hollowing/scooping/depression and severe muscle depletion (Clavicle) - protruding/visible bone   Monitoring & Evaluation:   Goals:  · weight maintenance/stabilization  · pt to meet >/=75% estimated nutrition needs daily    · Progress Towards Goals: Progressing and Goal(s) Met    Nutrition Rx & Recommendations:  · Soft/moist easy to swallow foods: casseroles, chicken/egg/tuna salad with extra clark to add calories and moisture, oatmeal/cream of wheat made with whole milk, cottage cheese, whole milk [place] yogurt, scrambled eggs with cheese, avocado, macaroni and cheese, mashed potatoes made with butter and whole milk or dry milk powder, ground meat with extra sauce/gravy, canned fruit, peanut butter, cream soups (cream of chicken, cream of mushroom, broccoli cheddar), pudding made with whole milk, custard, ice cream, banana, milkshakes/smoothies  · Choose liquids with calories: whole milk, Fair[place] milk (higher protein/lactose-free milk), chocolate milk, 100% fruit juice, diluted juice, bone broth (higher protein broth), creamy soups, sports drinks (Gatorade, Poweraide, Pedialyte, etc ), [place] ice, popsicles, milkshakes, smoothies, oral nutrition supplements (Ensure, Boost, etc ), gelatin/Jello, etc   · Follow proper oral care; Try baking soda/salt water rinse recipe (mix 3/4 tsp salt + 1 tsp baking soda + 1 qt water; rinse with plain water after using) in Eating Hints book (pg 18)  Brush your teeth before/after meals & before bed  · Weigh yourself regularly  If you notice weight loss, make an effort to increase your daily food/calorie intake  If you continue to notice loss after these efforts, reach out to your dietitian to establish a plan to stabilize weight  · Tube Feeding Plan:     · TF Goal: Jevity 1 5 at 90 mL/hr via J-tube cycled over ~20 hours daily (until 7 5 cartons is infused)    · Water Flushin mL free water flush at the beginning and end of TF infusion (250 mL total) plus 125 mL free water flush 5 times per day (625 mL total); (total of 875 mL/day in flushes; flushes should be spread throughout the day and every 2-3 hours during TF infusion; will need to adjust flushes prn for IV fluids)  · Enteral Nutrition goal to provide: 1777 mL volume (7 5 cartons day), 2666 kcal, 113 grams protein, 1350 mL free water, 2225 mL total water daily      Follow Up Plan: 4/5/22 during infusion  Recommend Referral to Other Providers: none at this time

## 2022-03-29 NOTE — PATIENT INSTRUCTIONS
Nutrition Rx & Recommendations:  · Soft/moist easy to swallow foods: casseroles, chicken/egg/tuna salad with extra clark to add calories and moisture, oatmeal/cream of wheat made with whole milk, cottage cheese, whole milk [place] yogurt, scrambled eggs with cheese, avocado, macaroni and cheese, mashed potatoes made with butter and whole milk or dry milk powder, ground meat with extra sauce/gravy, canned fruit, peanut butter, cream soups (cream of chicken, cream of mushroom, broccoli cheddar), pudding made with whole milk, custard, ice cream, banana, milkshakes/smoothies  · Choose liquids with calories: whole milk, Fairlife milk (higher protein/lactose-free milk), chocolate milk, 100% fruit juice, diluted juice, bone broth (higher protein broth), creamy soups, sports drinks (Gatorade, Poweraide, Pedialyte, etc ), [place] ice, popsicles, milkshakes, smoothies, oral nutrition supplements (Ensure, Boost, etc ), gelatin/Jello, etc   · Follow proper oral care; Try baking soda/salt water rinse recipe (mix 3/4 tsp salt + 1 tsp baking soda + 1 qt water; rinse with plain water after using) in Eating Hints book (pg 18)  Brush your teeth before/after meals & before bed  · Weigh yourself regularly  If you notice weight loss, make an effort to increase your daily food/calorie intake  If you continue to notice loss after these efforts, reach out to your dietitian to establish a plan to stabilize weight  · Tube Feeding Plan:     · TF Goal: Jevity 1 5 at 90 mL/hr via J-tube cycled over ~20 hours daily (until 7 5 cartons is infused)  · Water Flushin mL free water flush at the beginning and end of TF infusion (250 mL total) plus 125 mL free water flush 5 times per day (625 mL total); (total of 875 mL/day in flushes; flushes should be spread throughout the day and every 2-3 hours during TF infusion; will need to adjust flushes prn for IV fluids)     · Enteral Nutrition goal to provide: 1777 mL volume (7 5 cartons day), 2666 kcal, 113 grams protein, 1350 mL free water, 2225 mL total water daily      Follow Up Plan: 4/5/22 during infusion  Recommend Referral to Other Providers: none at this time

## 2022-03-29 NOTE — PROGRESS NOTES
Treatment tolerated well without complications  No complaints offered  AVS provided  Left unit via star transport

## 2022-03-29 NOTE — PLAN OF CARE
Problem: Knowledge Deficit  Goal: Patient/family/caregiver demonstrates understanding of disease process, treatment plan, medications, and discharge instructions  Description: Complete learning assessment and assess knowledge base    Interventions:  - Provide teaching at level of understanding  - Provide teaching via preferred learning methods  Outcome: Progressing     Problem: GASTROINTESTINAL - ADULT  Goal: Minimal or absence of nausea and/or vomiting  Description: INTERVENTIONS:  - Administer ordered antiemetic medications as needed  - Provide nonpharmacologic comfort measures as appropriate  - Consider nutrition services referral to assist patient with adequate nutrition and appropriate food choices  Outcome: Progressing

## 2022-03-30 ENCOUNTER — TELEPHONE (OUTPATIENT)
Dept: GASTROENTEROLOGY | Facility: CLINIC | Age: 77
End: 2022-03-30

## 2022-03-30 ENCOUNTER — APPOINTMENT (OUTPATIENT)
Dept: RADIATION ONCOLOGY | Facility: CLINIC | Age: 77
End: 2022-03-30
Attending: RADIOLOGY
Payer: COMMERCIAL

## 2022-03-30 PROCEDURE — 77014 CHG CT GUIDANCE PLACEMENT RAD THERAPY FIELDS: CPT | Performed by: RADIOLOGY

## 2022-03-30 PROCEDURE — 77386 HB NTSTY MODUL RAD TX DLVR CPLX: CPT | Performed by: RADIOLOGY

## 2022-03-30 NOTE — TELEPHONE ENCOUNTER
Received form for [facility] to be signed and date  ( tube feed supplies ) faxed to [facility]  [id number]   Will scan form into patients chart

## 2022-03-31 ENCOUNTER — APPOINTMENT (OUTPATIENT)
Dept: RADIATION ONCOLOGY | Facility: CLINIC | Age: 77
End: 2022-03-31
Payer: COMMERCIAL

## 2022-03-31 ENCOUNTER — APPOINTMENT (OUTPATIENT)
Dept: RADIATION ONCOLOGY | Facility: CLINIC | Age: 77
End: 2022-03-31
Attending: RADIOLOGY
Payer: COMMERCIAL

## 2022-03-31 PROCEDURE — 77386 HB NTSTY MODUL RAD TX DLVR CPLX: CPT | Performed by: RADIOLOGY

## 2022-04-01 ENCOUNTER — APPOINTMENT (OUTPATIENT)
Dept: RADIATION ONCOLOGY | Facility: CLINIC | Age: 77
End: 2022-04-01
Attending: RADIOLOGY
Payer: COMMERCIAL

## 2022-04-01 ENCOUNTER — HOSPITAL ENCOUNTER (OUTPATIENT)
Dept: INFUSION CENTER | Facility: CLINIC | Age: 77
Discharge: HOME | End: 2022-04-01
Payer: COMMERCIAL

## 2022-04-01 DIAGNOSIS — C15.5 MALIGNANT NEOPLASM OF LOWER THIRD OF ESOPHAGUS (HCC): Primary | ICD-10-CM

## 2022-04-01 LAB
ALBUMIN SERPL BCP-MCNC: 3.1 G/DL (ref 3.5–5)
ALP SERPL-CCNC: 67 U/L (ref 46–116)
ALT SERPL W P-5'-P-CCNC: 16 U/L (ref 12–78)
ANION GAP SERPL CALCULATED.3IONS-SCNC: 7 MMOL/L (ref 4–13)
AST SERPL W P-5'-P-CCNC: 13 U/L (ref 5–45)
BASOPHILS # BLD AUTO: 0.01 THOUSANDS/ΜL (ref 0–0.1)
BASOPHILS NFR BLD AUTO: 0 % (ref 0–1)
BILIRUB SERPL-MCNC: 0.38 MG/DL (ref 0.2–1)
BUN SERPL-MCNC: 13 MG/DL (ref 5–25)
CALCIUM ALBUM COR SERPL-MCNC: 9.1 MG/DL (ref 8.3–10.1)
CALCIUM SERPL-MCNC: 8.4 MG/DL (ref 8.3–10.1)
CHLORIDE SERPL-SCNC: 101 MMOL/L (ref 100–108)
CO2 SERPL-SCNC: 28 MMOL/L (ref 21–32)
CREAT SERPL-MCNC: 0.46 MG/DL (ref 0.6–1.3)
EOSINOPHIL # BLD AUTO: 0.02 THOUSAND/ΜL (ref 0–0.61)
EOSINOPHIL NFR BLD AUTO: 1 % (ref 0–6)
ERYTHROCYTE [DISTWIDTH] IN BLOOD BY AUTOMATED COUNT: 17.7 % (ref 11.6–15.1)
GFR SERPL CREATININE-BSD FRML MDRD: 108 ML/MIN/1.73SQ M
GLUCOSE SERPL-MCNC: 138 MG/DL (ref 65–140)
HCT VFR BLD AUTO: 25.7 % (ref 36.5–49.3)
HGB BLD-MCNC: 8.6 G/DL (ref 12–17)
IMM GRANULOCYTES # BLD AUTO: 0.02 THOUSAND/UL (ref 0–0.2)
IMM GRANULOCYTES NFR BLD AUTO: 1 % (ref 0–2)
LYMPHOCYTES # BLD AUTO: 0.34 THOUSANDS/ΜL (ref 0.6–4.47)
LYMPHOCYTES NFR BLD AUTO: 8 % (ref 14–44)
MCH RBC QN AUTO: 30.4 PG (ref 26.8–34.3)
MCHC RBC AUTO-ENTMCNC: 33.5 G/DL (ref 31.4–37.4)
MCV RBC AUTO: 91 FL (ref 82–98)
MONOCYTES # BLD AUTO: 0.45 THOUSAND/ΜL (ref 0.17–1.22)
MONOCYTES NFR BLD AUTO: 11 % (ref 4–12)
NEUTROPHILS # BLD AUTO: 3.28 THOUSANDS/ΜL (ref 1.85–7.62)
NEUTS SEG NFR BLD AUTO: 79 % (ref 43–75)
NRBC BLD AUTO-RTO: 0 /100 WBCS
PLATELET # BLD AUTO: 198 THOUSANDS/UL (ref 149–390)
PMV BLD AUTO: 11.5 FL (ref 8.9–12.7)
POTASSIUM SERPL-SCNC: 4 MMOL/L (ref 3.5–5.3)
PROT SERPL-MCNC: 6.4 G/DL (ref 6.4–8.2)
RBC # BLD AUTO: 2.83 MILLION/UL (ref 3.88–5.62)
SODIUM SERPL-SCNC: 136 MMOL/L (ref 136–145)
WBC # BLD AUTO: 4.12 THOUSAND/UL (ref 4.31–10.16)

## 2022-04-01 PROCEDURE — 77014 CHG CT GUIDANCE PLACEMENT RAD THERAPY FIELDS: CPT | Performed by: RADIOLOGY

## 2022-04-01 PROCEDURE — 85025 COMPLETE CBC W/AUTO DIFF WBC: CPT | Performed by: INTERNAL MEDICINE

## 2022-04-01 PROCEDURE — 77386 HB NTSTY MODUL RAD TX DLVR CPLX: CPT | Performed by: RADIOLOGY

## 2022-04-01 PROCEDURE — 77336 RADIATION PHYSICS CONSULT: CPT | Performed by: RADIOLOGY

## 2022-04-01 PROCEDURE — 80053 COMPREHEN METABOLIC PANEL: CPT | Performed by: INTERNAL MEDICINE

## 2022-04-01 NOTE — PROGRESS NOTES
Patient here for central labs  Labs drawn from port and sent  Port accessed, flushed, and deaccessed per protocol   Aware of next appointment, declined AVS

## 2022-04-04 ENCOUNTER — APPOINTMENT (OUTPATIENT)
Dept: RADIATION ONCOLOGY | Facility: CLINIC | Age: 77
End: 2022-04-04
Attending: RADIOLOGY
Payer: COMMERCIAL

## 2022-04-04 ENCOUNTER — TELEPHONE (OUTPATIENT)
Dept: FAMILY MEDICINE CLINIC | Facility: CLINIC | Age: 77
End: 2022-04-04

## 2022-04-04 PROCEDURE — 77014 CHG CT GUIDANCE PLACEMENT RAD THERAPY FIELDS: CPT | Performed by: RADIOLOGY

## 2022-04-04 PROCEDURE — 77386 HB NTSTY MODUL RAD TX DLVR CPLX: CPT | Performed by: RADIOLOGY

## 2022-04-04 PROCEDURE — 77427 RADIATION TX MANAGEMENT X5: CPT | Performed by: RADIOLOGY

## 2022-04-04 NOTE — TELEPHONE ENCOUNTER
Madison Graham planned on discharging patient from services, however he has a sore on his left 5th digit    She believes it is a pressure ulcer, will continue to visit 2 x a week to care for this

## 2022-04-05 ENCOUNTER — HOSPITAL ENCOUNTER (OUTPATIENT)
Dept: INFUSION CENTER | Facility: CLINIC | Age: 77
Discharge: HOME | End: 2022-04-05
Payer: COMMERCIAL

## 2022-04-05 ENCOUNTER — APPOINTMENT (OUTPATIENT)
Dept: RADIATION ONCOLOGY | Facility: CLINIC | Age: 77
End: 2022-04-05
Attending: RADIOLOGY
Payer: COMMERCIAL

## 2022-04-05 ENCOUNTER — NUTRITION (OUTPATIENT)
Dept: NUTRITION | Facility: CLINIC | Age: 77
End: 2022-04-05

## 2022-04-05 VITALS
WEIGHT: 132.8 LBS | BODY MASS INDEX: 19.01 KG/M2 | HEART RATE: 77 BPM | TEMPERATURE: 97.9 F | HEIGHT: 70 IN | DIASTOLIC BLOOD PRESSURE: 69 MMHG | SYSTOLIC BLOOD PRESSURE: 131 MMHG | RESPIRATION RATE: 18 BRPM

## 2022-04-05 DIAGNOSIS — C15.5 MALIGNANT NEOPLASM OF LOWER THIRD OF ESOPHAGUS (HCC): Primary | ICD-10-CM

## 2022-04-05 DIAGNOSIS — Z71.3 NUTRITIONAL COUNSELING: Primary | ICD-10-CM

## 2022-04-05 PROCEDURE — 77386 HB NTSTY MODUL RAD TX DLVR CPLX: CPT | Performed by: RADIOLOGY

## 2022-04-05 PROCEDURE — 96417 CHEMO IV INFUS EACH ADDL SEQ: CPT

## 2022-04-05 PROCEDURE — 96413 CHEMO IV INFUSION 1 HR: CPT

## 2022-04-05 PROCEDURE — 77014 CHG CT GUIDANCE PLACEMENT RAD THERAPY FIELDS: CPT | Performed by: RADIOLOGY

## 2022-04-05 PROCEDURE — 96367 TX/PROPH/DG ADDL SEQ IV INF: CPT

## 2022-04-05 RX ORDER — SODIUM CHLORIDE 9 MG/ML
20 INJECTION, SOLUTION INTRAVENOUS ONCE
Status: COMPLETED | OUTPATIENT
Start: 2022-04-05 | End: 2022-04-05

## 2022-04-05 RX ORDER — SODIUM CHLORIDE 9 MG/ML
20 INJECTION, SOLUTION INTRAVENOUS ONCE
Status: CANCELLED | OUTPATIENT
Start: 2022-04-12

## 2022-04-05 RX ADMIN — PACLITAXEL 85.8 MG: 6 INJECTION, SOLUTION, CONCENTRATE INTRAVENOUS at 12:33

## 2022-04-05 RX ADMIN — FAMOTIDINE 20 MG: 10 INJECTION, SOLUTION INTRAVENOUS at 11:55

## 2022-04-05 RX ADMIN — CARBOPLATIN 196 MG: 10 INJECTION, SOLUTION INTRAVENOUS at 13:38

## 2022-04-05 RX ADMIN — DEXAMETHASONE SODIUM PHOSPHATE: 10 INJECTION, SOLUTION INTRAMUSCULAR; INTRAVENOUS at 11:02

## 2022-04-05 RX ADMIN — SODIUM CHLORIDE 20 ML/HR: 9 INJECTION, SOLUTION INTRAVENOUS at 10:50

## 2022-04-05 RX ADMIN — DIPHENHYDRAMINE HYDROCHLORIDE 25 MG: 50 INJECTION, SOLUTION INTRAMUSCULAR; INTRAVENOUS at 11:25

## 2022-04-05 NOTE — PROGRESS NOTES
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 1/13/22  · S/p Jtube placement 2/7/22  · RT began 3/14/22, EOT planned for 4/20/22  · Chemotherapy (carboplatin, taxol) began 3/15/22  Oncology History   Malignant neoplasm of lower third of esophagus ([facility] [address] )   1/13/2022 Initial Diagnosis    Malignant neoplasm of lower third of esophagus ([facility] [address] )     1/13/2022 Biopsy    EGD:   Esophagus, distal:  - 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  RESULTS OF IMMUNOHISTOCHEMICAL ANALYSIS FOR MISMATCH REPAIR PROTEIN LOSS     INTERPRETATION: No loss of nuclear expression of MMR proteins: Low probability of MSI-H     Note: Background non-neoplastic tissue and/or internal control with intact nuclear expression        RESULTS:  Antibody          Clone               Description                           Results  MLH1               M1                   Mismatch repair protein       Intact nuclear expression  MSH2              [id number]       Mismatch repair protein       Intact nuclear expression  MSH6              44                     Mismatch repair protein       Intact nuclear expression  PMS2              [id number]           Mismatch repair protein       Intact nuclear expression     3/15/2022 -  Chemotherapy    CARBOplatin (PARAPLATIN) IVPB (GOG AUC DOSING), 147 6 mg, Intravenous, Once, 4 of 7 cycles  Administration: 147 6 mg (3/15/2022), 196 mg (3/22/2022), 196 mg (3/29/2022)  PACLItaxel (TAXOL) chemo IVPB, 50 mg/m2 = 84 6 mg, Intravenous, Once, 4 of 7 cycles  Administration: 84 6 mg (3/15/2022), 85 8 mg (3/22/2022), 85 8 mg (3/29/2022)       Past Medical & Surgical Hx:   Patient Active Problem List   Diagnosis    Smoking  Weight loss    Dysphagia    Dermatitis    Malignant neoplasm of lower third of esophagus (HCC)    Mild protein-calorie malnutrition (HCC)    Left ankle pain    Severe protein-calorie malnutrition (HCC)    Chronic pain     Past Medical History:   Diagnosis Date    Cancer ([address] )     esophageal ca    Pneumonia     Polio     Rib fractures      Past Surgical History:   Procedure Laterality Date    FL GUIDED CENTRAL VENOUS ACCESS DEVICE INSERTION  2/16/2022    TUNNELED VENOUS PORT PLACEMENT Left 2/16/2022    Procedure: INSERTION VENOUS PORT (PORT-A-CATH);   Surgeon: Jerilyn Zapata MD;  Location: [facility];  Service: Surgical Oncology       Review of Medications:   Vitamins, Supplements and Herbals: No, pt denies taking supplements    Current Outpatient Medications:     acetaminophen (TYLENOL) 325 mg tablet, Take 325 mg by mouth every 6 (six) hours as needed for mild pain, Disp: , Rfl:     ascorbic acid (VITAMIN C) 500 mg tablet, Take 500 mg by mouth daily, Disp: , Rfl:     aspirin 325 mg tablet, Take 325 mg by mouth 2 (two) times a day, Disp: , Rfl:     bisacodyl (Dulcolax) 10 mg suppository, Insert 10 mg into the rectum daily, Disp: , Rfl:     Cholecalciferol (VITAMIN D3 PO), Take by mouth, Disp: , Rfl:     cyanocobalamin (VITAMIN B-12) 500 MCG tablet, Take 500 mcg by mouth daily, Disp: , Rfl:     lidocaine (LIDODERM) 5 %, Apply 1 patch topically daily Remove & Discard patch within 12 hours or as directed by MD, Disp: , Rfl: 0    magnesium hydroxide (MILK OF MAGNESIA) 400 mg/5 mL oral suspension, Take 30 mL by mouth daily as needed for constipation, Disp: , Rfl:     Multiple Vitamins-Minerals (MULTIVITAMIN WITH MINERALS) tablet, Take 1 tablet by mouth daily, Disp: , Rfl:     ondansetron (Zofran ODT) 8 mg disintegrating tablet, Take 1 tablet (8 mg total) by mouth every 8 (eight) hours as needed for nausea or vomiting Through J tube not by mouth , Disp: 20 tablet, Rfl: 0    pantoprazole (PROTONIX) 40 mg tablet, Take 1 tablet (40 mg total) by mouth 2 (two) times a day, Disp: , Rfl: 0    triamcinolone (KENALOG) 0 5 % cream, Apply topically 2 (two) times a day, Disp: 30 g, Rfl: 0  No current facility-administered medications for this visit      Most Recent Lab Results:   Lab Results   Component Value Date    WBC 4 12 (L) 04/01/2022    NEUTROABS 3 28 04/01/2022    ALT 16 04/01/2022    AST 13 04/01/2022    ALB 3 1 (L) 04/01/2022    SODIUM 136 04/01/2022    SODIUM 134 (L) 03/25/2022    K 4 0 04/01/2022    K 4 1 03/25/2022     04/01/2022    BUN 13 04/01/2022    BUN 13 03/25/2022    CREATININE 0 46 (L) 04/01/2022    CREATININE 0 56 (L) 03/25/2022    EGFR 108 04/01/2022    PHOS 3 5 02/07/2022    POCGLU 141 (H) 02/01/2022    GLUC 138 04/01/2022    CALCIUM 8 4 04/01/2022    MG 2 4 02/07/2022       Anthropometric Measurements:   Height: 70"  Ht Readings from Last 1 Encounters:   04/05/22 5' 10" (1 778 m)     Wt Readings from Last 20 Encounters:   04/05/22 60 2 kg (132 lb 12 8 oz)   03/29/22 60 5 kg (133 lb 6 4 oz)   03/25/22 58 1 kg (128 lb)   03/22/22 57 5 kg (126 lb 12 8 oz)   03/15/22 55 4 kg (122 lb 3 2 oz)   03/07/22 55 3 kg (122 lb)   02/17/22 60 3 kg (133 lb)   02/16/22 58 8 kg (129 lb 9 6 oz)   02/14/22 61 2 kg (135 lb)   02/06/22 59 5 kg (131 lb 2 8 oz)   02/03/22 66 7 kg (147 lb)   01/13/22 67 kg (147 lb 11 3 oz)   01/10/22 66 4 kg (146 lb 6 4 oz)   01/06/22 65 8 kg (145 lb)   01/07/21 80 9 kg (178 lb 6 4 oz)   12/09/20 79 4 kg (175 lb)   10/18/19 78 8 kg (173 lb 12 8 oz)   10/10/19 78 9 kg (173 lb 15 1 oz)   01/03/19 83 5 kg (184 lb)   12/23/18 82 6 kg (182 lb)       Weight History:    Usual Weight: 175#   Varian: (3/14/22) 124#, (3/17/22) 120 5#, (3/21/22) 126#, (3/22/22) 127#, (3/24/22) 128#, (3/28/22) 131#, (3/29/22) 134#, (3/30/22) 134#, (3/31/22) 134#, (4/4/22) 133#       Home Scale: none     Oncology Nutrition-Anthropometrics      Nutrition from 4/5/2022 in [facility] Oncology Dietitian [place] Nutrition from 3/29/2022 in [address] [place] [address]   Patient [age] (years): 68 years 68 years   Patient (male) height (in): 79 in 79 in   Current weight (lbs): 133 lbs 134 lbs   Current weight to be used for anthropometric calculations (kg) 60 5 kg 60 9 kg   BMI: 19 1 19 2   IBW male 166 lb 166 lb   IBW (kg) male 75 5 kg 75 5 kg   IBW % (male) 80 1 % 80 7 %   Adjusted BW (male): 157 8 lbs 158 lbs   Adjusted BW in kg (male): 71 7 kg 71 8 kg   % weight change after 1 week: -0 7 % 5 5 %   Weight change after 1 week (lbs) -1 lbs 7 lbs   % weight change after 1 month: 9 % 0 8 %   Weight change after 1 month (lbs) 11 lbs 1 lbs   % weight change after 3 months: -8 3 % -23 4 %   Weight change after 3 months (lbs) -12 lbs -41 lbs          Nutrition-Focused Physical Findings: severe muscle depletion ([place]) - hollowing/scooping/depression    Food/Nutrition-Related History & Client/Social History:    Current Nutrition Impact Symptoms:  [] Nausea -has zofran  [x] Reduced Appetite  [] Acid Reflux    [] Vomiting  [x] Unintended Wt Loss-significant x3 months   [] Malabsorption    [] Diarrhea  [] Unintended Wt Gain  [] Dumping Syndrome    [] Constipation  [] Thick Mucous/Secretions  [] Abdominal Pain    [] Dysgeusia (Altered Taste)  [x] Xerostomia (Dry Mouth)  [] Gas    [] Dysosmia (Altered Smell)  [] Thrush  [] Difficulty Chewing    [] Oral Mucositis (Sore Mouth)  [] Fatigue  [x] Hyperglycemia: BG 138mg/dL on 4/1/22  [] Odynophagia  [] Esophagitis  [] Other:    [x] Dysphagia -Jtube in place for nutrition; tolerating soft/pureed   [] Early Satiety  [] No Problems Eating      Food Allergies & Intolerances: no    Current Diet: Soft/Moist, Pureed and Tube Feeding  Current Nutrition Intake: Unchanged from last visit  Appetite: Fair    Nutrition Route: PO and J-Tube  Oral Care: brushes BID  Activity level: Uses carney to walk, but reports that he is recently more steady on his feet       24 Hr Diet Recall: Able to tolerate: chips, canned peaches, banana, apples cut into small pieces, pancakes, cream of celery soup, chicken and vegetable soup pureed, pudding, scrambled eggs with cheese, cookies, and water  EN Recall:  DME: [facility]   Access Type: Jtube   Formula: Jevity 1 5  Method: Cyclic  Rate: 90 mL/hr until 7 5 cartons are infused (about 20hrs)   Flush: 95 mL free water flush pre infusion, plus 95mL free water every 2hr (950mL)  EN providin mL volume (7 5 cartons/day), 2662 kcal, 113g protein, 1350 mL free water, 2300mL total water  Oncology Nutrition-Estimated Needs      Nutrition from 2022 in [address] [place] [address] Nutrition from 3/29/2022 in [place] [address] Oncology Dietitian [place]   Weight type used Actual weight Actual weight   Weight in kilograms (kg) used for estimated needs 60 5 kg 60 9 kg   Energy needs formula:  35-40 kcal/kg 35-40 kcal/kg   Energy needs based on 35 kcal/k kcal 2132 kcal   Energy needs based on 40 kcal/k kcal 2436 kcal   Protein needs formula: 1 5-2 g/kg 1 5-2 g/kg   Protein needs based on 1 5 g/kg 91 g 91 g   Protein needs based on 2 g/kg 121 g 122 g   Fluid needs formula: 30-35 mL/kg 30-35 mL/kg   Fluid needs based on 30 mL/kg 1815 mL 1827 mL   Fluid needs in ounces 61 oz 62 oz   Fluid needs based on 35 mL/kg 2118 mL 2132 mL   Fluid needs in ounces 72 oz 72 oz           Discussion & Intervention:   Adeline Taylor was evaluated today for an RD follow up regarding wt loss, Esophageal Cancer and enteral nutrition  Adeline Taylor is currently undergoing tx for EC  Today Adeline Taylor explains that he is feeling well  He continues to try various foods PO, he explains that he recently tried pancakes and was able tolerate them well  Encouraged him to continue oral intakes as much as tolerated and to add high calorie foods like cheese, butter, peanut butter whenever able   He continues to run his enteral nutrition at 90mL/hr for about 20 hrs until 7 5 cartons of Jevity 1 5 are infused and reports no issues with tolerance  Enteral nutrition is needed for Toro's main source of nutrition, recommend:   · TF Goal: Jevity 1 5 at 90 mL/hr via J-tube cycled over ~20 hours daily (until 7 5 cartons is infused)  · Water Flushin mL free water flush at the beginning and end of TF infusion (250 mL total) plus 125 mL free water flush 5 times per day (625 mL total); (total of 875 mL/day in flushes; flushes should be spread throughout the day and every 2-3 hours during TF infusion; will need to adjust flushes prn for IV fluids)  · Enteral Nutrition goal to provide: 1777 mL volume (7 5 cartons day), 2666 kcal, 113 grams protein, 1350 mL free water, 2225 mL total water daily  *Pt requires  an enteral feeding pump to administer TF due to J-Tube  Moving forward, Karson Monaco was encouraged to continue trials of soft/moist foods much as tolerated and continue enteral nutrition intake at goal    Materials Provided: not applicable  All questions and concerns addressed during todays visit  Karson Monaco has RD contact information  Nutrition Diagnosis:    Increased Nutrient Needs (kcal & pro) related to increased demand for nutrients and disease state as evidenced by cancer dx and pt undergoing tx for cancer   Patient has clinical indicators (or ASPEN criteria) consistent with severe protein-calorie malnutrition in the context of Chronic Illness as evidenced by >7 5% wt loss in 3 months and severe muscle depletion ([place]) - hollowing/scooping/depression    Monitoring & Evaluation:   Goals:  · weight maintenance/stabilization  · pt to meet >/=75% estimated nutrition needs daily    · Progress Towards Goals: Progressing and Goal(s) Met    Nutrition Rx & Recommendations:  · Soft/moist easy to swallow foods: casseroles, chicken/egg/tuna salad with extra clark to add calories and moisture, oatmeal/cream of wheat made with whole milk, cottage cheese, whole milk [place] yogurt, scrambled eggs with cheese, avocado, macaroni and cheese, mashed potatoes made with butter and whole milk or dry milk powder, ground meat with extra sauce/gravy, canned fruit, peanut butter, cream soups (cream of chicken, cream of mushroom, broccoli cheddar), pudding made with whole milk, custard, ice cream, banana, milkshakes/smoothies  · Choose liquids with calories: whole milk, Fairlife milk (higher protein/lactose-free milk), chocolate milk, 100% fruit juice, diluted juice, bone broth (higher protein broth), creamy soups, sports drinks (Gatorade, Poweraide, Pedialyte, etc ), [place] ice, popsicles, milkshakes, smoothies, oral nutrition supplements (Ensure, Boost, etc ), gelatin/Jello, etc   · Follow proper oral care; Try baking soda/salt water rinse recipe (mix 3/4 tsp salt + 1 tsp baking soda + 1 qt water; rinse with plain water after using) in Eating Hints book (pg 18)  Brush your teeth before/after meals & before bed  · Weigh yourself regularly  If you notice weight loss, make an effort to increase your daily food/calorie intake  If you continue to notice loss after these efforts, reach out to your dietitian to establish a plan to stabilize weight  · Tube Feeding Plan:     · TF Goal: Jevity 1 5 at 90 mL/hr via J-tube cycled over ~20 hours daily (until 7 5 cartons is infused)  · Water Flushin mL free water flush at the beginning and end of TF infusion (250 mL total) plus 125 mL free water flush 5 times per day (625 mL total); (total of 875 mL/day in flushes; flushes should be spread throughout the day and every 2-3 hours during TF infusion; will need to adjust flushes prn for IV fluids)  · Enteral Nutrition goal to provide: 1777 mL volume (7 5 cartons day), 2666 kcal, 113 grams protein, 1350 mL free water, 2225 mL total water daily      Follow Up Plan: 22 during infusion  Recommend Referral to Other Providers: none at this time

## 2022-04-05 NOTE — PROGRESS NOTES
Pt here for chemotherapy, taxol/carbo, offering no complaints  Left port accessed with positive blood return noted throughout treatment  Tolerated infusion without incident  Port flushed and de-accessed  AVS given    Walked out in stable condition

## 2022-04-05 NOTE — PATIENT INSTRUCTIONS
Nutrition Rx & Recommendations:  · Soft/moist easy to swallow foods: casseroles, chicken/egg/tuna salad with extra clark to add calories and moisture, oatmeal/cream of wheat made with whole milk, cottage cheese, whole milk [place] yogurt, scrambled eggs with cheese, avocado, macaroni and cheese, mashed potatoes made with butter and whole milk or dry milk powder, ground meat with extra sauce/gravy, canned fruit, peanut butter, cream soups (cream of chicken, cream of mushroom, broccoli cheddar), pudding made with whole milk, custard, ice cream, banana, milkshakes/smoothies  · Choose liquids with calories: whole milk, Fairlife milk (higher protein/lactose-free milk), chocolate milk, 100% fruit juice, diluted juice, bone broth (higher protein broth), creamy soups, sports drinks (Gatorade, Poweraide, Pedialyte, etc ), [place] ice, popsicles, milkshakes, smoothies, oral nutrition supplements (Ensure, Boost, etc ), gelatin/Jello, etc   · Follow proper oral care; Try baking soda/salt water rinse recipe (mix 3/4 tsp salt + 1 tsp baking soda + 1 qt water; rinse with plain water after using) in Eating Hints book (pg 18)  Brush your teeth before/after meals & before bed  · Weigh yourself regularly  If you notice weight loss, make an effort to increase your daily food/calorie intake  If you continue to notice loss after these efforts, reach out to your dietitian to establish a plan to stabilize weight  · Tube Feeding Plan:     · TF Goal: Jevity 1 5 at 90 mL/hr via J-tube cycled over ~20 hours daily (until 7 5 cartons is infused)  · Water Flushin mL free water flush at the beginning and end of TF infusion (250 mL total) plus 125 mL free water flush 5 times per day (625 mL total); (total of 875 mL/day in flushes; flushes should be spread throughout the day and every 2-3 hours during TF infusion; will need to adjust flushes prn for IV fluids)     · Enteral Nutrition goal to provide: 1777 mL volume (7 5 cartons day), 2666 kcal, 113 grams protein, 1350 mL free water, 2225 mL total water daily      Follow Up Plan: 4/12/22 during infusion  Recommend Referral to Other Providers: none at this time

## 2022-04-06 ENCOUNTER — APPOINTMENT (OUTPATIENT)
Dept: RADIATION ONCOLOGY | Facility: CLINIC | Age: 77
End: 2022-04-06
Attending: RADIOLOGY
Payer: COMMERCIAL

## 2022-04-06 ENCOUNTER — DOCUMENTATION (OUTPATIENT)
Dept: HEMATOLOGY ONCOLOGY | Facility: CLINIC | Age: 77
End: 2022-04-06

## 2022-04-06 DIAGNOSIS — C15.5 MALIGNANT NEOPLASM OF LOWER THIRD OF ESOPHAGUS (HCC): Primary | ICD-10-CM

## 2022-04-06 PROCEDURE — 77386 HB NTSTY MODUL RAD TX DLVR CPLX: CPT | Performed by: RADIOLOGY

## 2022-04-06 PROCEDURE — 77014 CHG CT GUIDANCE PLACEMENT RAD THERAPY FIELDS: CPT | Performed by: RADIOLOGY

## 2022-04-06 NOTE — PROGRESS NOTES
Reviewed patients treatment schedule  He has an end of treatment date for 4/26  I reached out to [facility] regarding a follow up with surg onc and also emailed Suhail Wil for assistance with a thoracic appointment  Entered order and scheduled PET scan for 5/16  Dr Edilberto Ibarra nurse will look for a follow up appt for patient and let me know    Will reach out to patient once all appointments in place

## 2022-04-07 ENCOUNTER — APPOINTMENT (OUTPATIENT)
Dept: RADIATION ONCOLOGY | Facility: CLINIC | Age: 77
End: 2022-04-07
Attending: RADIOLOGY
Payer: COMMERCIAL

## 2022-04-07 PROCEDURE — 77386 HB NTSTY MODUL RAD TX DLVR CPLX: CPT | Performed by: RADIOLOGY

## 2022-04-07 PROCEDURE — 77014 CHG CT GUIDANCE PLACEMENT RAD THERAPY FIELDS: CPT | Performed by: RADIOLOGY

## 2022-04-08 ENCOUNTER — TELEPHONE (OUTPATIENT)
Dept: SURGICAL ONCOLOGY | Facility: CLINIC | Age: 77
End: 2022-04-08

## 2022-04-08 ENCOUNTER — APPOINTMENT (OUTPATIENT)
Dept: RADIATION ONCOLOGY | Facility: CLINIC | Age: 77
End: 2022-04-08
Attending: RADIOLOGY
Payer: COMMERCIAL

## 2022-04-08 ENCOUNTER — HOSPITAL ENCOUNTER (OUTPATIENT)
Dept: INFUSION CENTER | Facility: CLINIC | Age: 77
Discharge: HOME | End: 2022-04-08
Payer: COMMERCIAL

## 2022-04-08 DIAGNOSIS — C15.5 MALIGNANT NEOPLASM OF LOWER THIRD OF ESOPHAGUS (HCC): Primary | ICD-10-CM

## 2022-04-08 LAB
ALBUMIN SERPL BCP-MCNC: 3.3 G/DL (ref 3.5–5)
ALP SERPL-CCNC: 66 U/L (ref 46–116)
ALT SERPL W P-5'-P-CCNC: 14 U/L (ref 12–78)
ANION GAP SERPL CALCULATED.3IONS-SCNC: 7 MMOL/L (ref 4–13)
AST SERPL W P-5'-P-CCNC: 10 U/L (ref 5–45)
BASOPHILS # BLD AUTO: 0.02 THOUSANDS/ΜL (ref 0–0.1)
BASOPHILS NFR BLD AUTO: 1 % (ref 0–1)
BILIRUB SERPL-MCNC: 0.48 MG/DL (ref 0.2–1)
BUN SERPL-MCNC: 15 MG/DL (ref 5–25)
CALCIUM ALBUM COR SERPL-MCNC: 9.4 MG/DL (ref 8.3–10.1)
CALCIUM SERPL-MCNC: 8.8 MG/DL (ref 8.3–10.1)
CHLORIDE SERPL-SCNC: 101 MMOL/L (ref 100–108)
CO2 SERPL-SCNC: 26 MMOL/L (ref 21–32)
CREAT SERPL-MCNC: 0.55 MG/DL (ref 0.6–1.3)
EOSINOPHIL # BLD AUTO: 0.01 THOUSAND/ΜL (ref 0–0.61)
EOSINOPHIL NFR BLD AUTO: 0 % (ref 0–6)
ERYTHROCYTE [DISTWIDTH] IN BLOOD BY AUTOMATED COUNT: 18.7 % (ref 11.6–15.1)
GFR SERPL CREATININE-BSD FRML MDRD: 101 ML/MIN/1.73SQ M
GLUCOSE SERPL-MCNC: 121 MG/DL (ref 65–140)
HCT VFR BLD AUTO: 24.2 % (ref 36.5–49.3)
HGB BLD-MCNC: 8 G/DL (ref 12–17)
IMM GRANULOCYTES # BLD AUTO: 0.03 THOUSAND/UL (ref 0–0.2)
IMM GRANULOCYTES NFR BLD AUTO: 1 % (ref 0–2)
LYMPHOCYTES # BLD AUTO: 0.28 THOUSANDS/ΜL (ref 0.6–4.47)
LYMPHOCYTES NFR BLD AUTO: 8 % (ref 14–44)
MCH RBC QN AUTO: 31 PG (ref 26.8–34.3)
MCHC RBC AUTO-ENTMCNC: 33.1 G/DL (ref 31.4–37.4)
MCV RBC AUTO: 94 FL (ref 82–98)
MONOCYTES # BLD AUTO: 0.33 THOUSAND/ΜL (ref 0.17–1.22)
MONOCYTES NFR BLD AUTO: 10 % (ref 4–12)
NEUTROPHILS # BLD AUTO: 2.71 THOUSANDS/ΜL (ref 1.85–7.62)
NEUTS SEG NFR BLD AUTO: 80 % (ref 43–75)
NRBC BLD AUTO-RTO: 0 /100 WBCS
PLATELET # BLD AUTO: 103 THOUSANDS/UL (ref 149–390)
PMV BLD AUTO: 11.3 FL (ref 8.9–12.7)
POTASSIUM SERPL-SCNC: 4.2 MMOL/L (ref 3.5–5.3)
PROT SERPL-MCNC: 6.5 G/DL (ref 6.4–8.2)
RBC # BLD AUTO: 2.58 MILLION/UL (ref 3.88–5.62)
SODIUM SERPL-SCNC: 134 MMOL/L (ref 136–145)
WBC # BLD AUTO: 3.38 THOUSAND/UL (ref 4.31–10.16)

## 2022-04-08 PROCEDURE — 80053 COMPREHEN METABOLIC PANEL: CPT | Performed by: INTERNAL MEDICINE

## 2022-04-08 PROCEDURE — 85025 COMPLETE CBC W/AUTO DIFF WBC: CPT | Performed by: INTERNAL MEDICINE

## 2022-04-08 PROCEDURE — 77386 HB NTSTY MODUL RAD TX DLVR CPLX: CPT | Performed by: RADIOLOGY

## 2022-04-08 PROCEDURE — 77014 CHG CT GUIDANCE PLACEMENT RAD THERAPY FIELDS: CPT | Performed by: RADIOLOGY

## 2022-04-08 PROCEDURE — 77336 RADIATION PHYSICS CONSULT: CPT | Performed by: RADIOLOGY

## 2022-04-08 NOTE — PROGRESS NOTES
Pt presents for labs offering no complaints  Labs obtained via port without difficulty  Port flushed per protocol  AVS declined  [facility] transport called for patient   Pt discharged in stable condition

## 2022-04-08 NOTE — TELEPHONE ENCOUNTER
Reached out to patient to review upcoming appointments  Explained the reasoning with PET-CT and the reasoning why he is seeing both Dr Magnolia Thompson and Dr Jed Melara  Patient appreciative of all the appointment details  Patient is going to bring his daughter with him to the follow up with Dr Magnolia Thompson, therefore he does not need a ride with [facility]

## 2022-04-11 ENCOUNTER — APPOINTMENT (OUTPATIENT)
Dept: RADIATION ONCOLOGY | Facility: CLINIC | Age: 77
End: 2022-04-11
Attending: RADIOLOGY
Payer: COMMERCIAL

## 2022-04-11 ENCOUNTER — OFFICE VISIT (OUTPATIENT)
Dept: HEMATOLOGY ONCOLOGY | Facility: CLINIC | Age: 77
End: 2022-04-11
Payer: COMMERCIAL

## 2022-04-11 ENCOUNTER — DOCUMENTATION (OUTPATIENT)
Dept: HEMATOLOGY ONCOLOGY | Facility: CLINIC | Age: 77
End: 2022-04-11

## 2022-04-11 VITALS
HEART RATE: 66 BPM | TEMPERATURE: 98.5 F | RESPIRATION RATE: 18 BRPM | BODY MASS INDEX: 18.61 KG/M2 | OXYGEN SATURATION: 96 % | WEIGHT: 130 LBS | SYSTOLIC BLOOD PRESSURE: 110 MMHG | DIASTOLIC BLOOD PRESSURE: 60 MMHG | HEIGHT: 70 IN

## 2022-04-11 DIAGNOSIS — C15.5 MALIGNANT NEOPLASM OF LOWER THIRD OF ESOPHAGUS (HCC): Primary | ICD-10-CM

## 2022-04-11 PROCEDURE — 77386 HB NTSTY MODUL RAD TX DLVR CPLX: CPT | Performed by: RADIOLOGY

## 2022-04-11 PROCEDURE — 77427 RADIATION TX MANAGEMENT X5: CPT | Performed by: RADIOLOGY

## 2022-04-11 PROCEDURE — 99214 OFFICE O/P EST MOD 30 MIN: CPT | Performed by: INTERNAL MEDICINE

## 2022-04-11 PROCEDURE — 77014 CHG CT GUIDANCE PLACEMENT RAD THERAPY FIELDS: CPT | Performed by: RADIOLOGY

## 2022-04-11 NOTE — PROGRESS NOTES
Telephone call to Central Scheduling to request a STAT read for the PET scheduled 5/16 for a follow up appointment 5/17    PET noted with same per Raheel

## 2022-04-11 NOTE — PROGRESS NOTES
Hematology/Oncology Progress Note    Date of Service: 4/11/2022    [facility]   [address] [facility] [id number]    Hem/Onc Problem List:   GE junction adenocarcinoma, cT3 cN1, M0, G3    Chief Complaint:    Management of GE junction adenocarcinoma on active chemotherapy and radiation therapy    Assessment/Plan:     I personally reviewed the lab results, image studies results and other specialties/physicians consult notes and recommendations  I shared the findings with patient and family, discussed the diagnosis and management plan as below  1  GE junction adenocarcinoma, cT3 cN1, cM0, grade 3, stage III  EGD with biopsy consistent with 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  MMR proteins are normally expressed  HER2 negative by IHC  Dr Zeina Gao consulted patient and recommended concurrent chemoradiation therapy  Patient currently stays in a nursing home  Poor performance status, ECOG 3/5  Patient has a J-tube placed for nutrition support  Neoadjuvant concurrent radiation therapy with weekly Carbo/Taxol started on March 15, 2022         Patient has had 4 cycles treatment and did very well except pancytopenia  Patient will continue current management  Checking Lab before every cycle of chemo  2  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  J-tube is in place  The patient continues tube feeding  3  Pancytopenia due to chemotherapy  ANC> 1000, platelet count more than 100,000  Hemoglobin stable  We continue to monitor  4  Normocytic normochromic anemia, multifactorial   Hemoglobin stable  Patient denies bleeding anywhere  We continue to monitor  5  Follow-up with MD in 3-4 weeks  Disclaimer: This document was prepared using [employer] technology   If a word or phrase is confusing, or does not make sense, this is likely due to recognition error which was not discovered during the providers review  If you believe an error has occurred, please Contact me through [employer] service for vicenta? cation  AJCC 8th Edition Cancer Stage :      Cancer Staging  Malignant neoplasm of lower third of esophagus (HCC)  Staging form: Esophagus - Adenocarcinoma, AJCC 8th Edition  - Clinical stage from 2/14/2022: Stage III (cT3, cN1, cM0, G3) - Unsigned  Histologic grading system: 3 grade system      Hematology/Oncology History:   · January 6, 2022 patient had a follow-up with primary care physician complaining of 6 weeks of dysphagia  This started with solid food then progressed into liquid diet  It was associated with nausea  Patient had a 30 lb weight loss within 1 year  Patient is an active smoker  · January 10, 2022 patient consulted GI   EGD on January 13, 2022 showed: FINDINGS:  · Fungating and malignant-appearing mass (traversable) measuring 70 mm in the lower third of the esophagus (34 cm from the incisors), covering the whole circumference,; bleeding occurred before and after intervention; performed cold forceps biopsy  · 3 fungating and multilobular masses (traversable) in the cardia, covering one third of the circumference,; bleeding occurred after intervention; performed partial removal by cold forceps biopsy  · The fundus of the stomach, body of the stomach, incisura, antrum, prepyloric region and pylorus appeared normal   · The duodenal bulb and 2nd part of the duodenum appeared normal   IMPRESSION:  5  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 with additional nodular lesions in the cardia of the stomach, biopsies pending, most likely adenocarcinoma   · January 13, 2010 to pathology from the EGD shows:  Final Diagnosis   A  Stomach, cardia:  - Gastric mucosa with reactive changes   - Separate fragment of necrotic debris with bacterial and fungal aggregates  - No definite malignancy identified       B   Esophagus, distal:  - 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  - MMR and Her2 studies are pending with results to follow in an addendum  C  MMR are normally expressed , MSI-low  D  HER2 negative by IHC  · January 24, 2022 CT scan chest abdomen pelvis with contrast:  IMPRESSION:     New mid to distal esophageal wall thickening with large eccentric mass in the posterior distal esophagus extending into the GE junction and beyond the esophageal wall with pathologic lymph nodes anteriorly with central necrosis of esophageal malignancy   This correlates with the recent EGD finding of 1/13/2022      Right lower lobe endobronchial mass possibly mucous plugging with extension into the bronchial branches is chronic and was seen on prior CT of 10/10/2019 with no obstruction  This can be evaluated when PET scan is performed as indicated on EGD report  · February 1, 2022 PET-CT scan:  IMPRESSION:     1  Hypermetabolic distal esophageal carcinoma which extends into the proximal stomach with paraesophageal/perigastric/upper abdominal andry metastatic disease      2  Concentric FDG activity involving the cecum/proximal ascending colon of uncertain clinical significance  Given the localized nature of FDG activity, consider correlation with screening colonoscopy to exclude underlying colonic malignancy      3  Indeterminant non-FDG avid avid filling defect within right lower lobe bronchus with associated more distal tree-in-bud infiltrate  Findings could reflect mucous plugging with obstructive malignant process of low metabolic activity not excluded      4   3 8 cm infrarenal abdominal aortic aneurysm  · February 3, 2022 Dr Live Vang consulted patient recommendation of concurrent chemo radiation therapy in the neoadjuvant setting  · February 6, 2022, EGD with J-tube placement   Dr Oliver Prom, DO  · February 7, 2022 duplex of lower extremity shows high-grade stenosis versus occlusion of the proximal distal superficial femoral artery reconstitution at the proximal popliteal artery  Stenosis in left lower extremity as well  · March 15, 2022 patient started concurrent chemoradiation therapy with weekly carbo/Taxol    History of Present Illiness:   Maggie De La Vega is a 68 y o  male with the above-noted HemOnc history who is here for management of GE junction adenocarcinoma on concurrent chemoradiation therapy  Patient diagnosed GE junction adenocarcinoma in January 10, 2022  Biopsy consistent with moderate to poorly differentiated adenocarcinoma with focal signet ring features  MMR normally expressed  HER2 negative by IHC  Staging workup in January 24, 2022 showed new mid to distal esophageal wall thickening with large eccentric mass in the posterior distal esophagus extending into the GE junction and beyond the esophageal wall with pathologic lymph nodes anteriorly with central necrosis of esophageal malignancy  PET-CT scan in February 1, 2022 revealed hypermetabolic distal esophageal carcinoma which extends into the proximal stomach with periesophageal/perigastric/upper abdominal andry metastatic disease  There is a 3 8 cm infrarenal abdominal aortic aneurysm  Dr Rjei Carter consulted patient February 3, 2022 recommendation of concurrent chemoradiation therapy in the neoadjuvant setting  J-tube placement was done by Dr Booker Jiménez for nutrition support on February 6, 2022  Neoadjuvant concurrent chemoradiation therapy with weekly carbo Taxol started on March 15, 2022  Patient has had 4 cycles of treatment and did fairly well  Lab showed pancytopenia due to chemoradiation therapy  Hemoglobin stable  ANC> 1000  Platelet count [id number]  Patient feels fine  He tolerated treatment very well  No fever or chills  No exertional chest pain, diaphoresis or shortness  of breath  No cough and phlegm, no hemoptysis  Patient denied nausea  and vomiting  No abdominal pain    No diarrhea or constipation  No  symptoms  No headache or blurred vision  No seizure activity  No significant weight loss or weight gain  The patient denies bleeding anywhere  ROS: A 12-point of review of systems is obtained and other than the above is noncontributory  Objective:   VITALS:   /60 (BP Location: Left arm, Cuff Size: Standard)   Pulse 66   Temp 98 5 °F (36 9 °C)   Resp 18   Ht 5' 10" (1 778 m)   Wt 59 kg (130 lb)   SpO2 96%   BMI 18 65 kg/m²     Physical EXAM:  General:  Alert, cooperative, no distress, appears stated age  Head:  Normocephalic, without obvious abnormality  Eyes:  Conjunctivae/corneas clear  PERRL, EOMs intact  No evidence of conjunctivitis     Throat: Lips, mucosa, and tongue normal   No bleeding from mouth  Neck: Supple, symmetrical, trachea midline    Lungs:   Clear to auscultation bilaterally  Respiratory effort easy, nonlabored    Heart:  Regular rate and rhythm, S1, S2 normal, no murmur  Abdomen:   Soft, non-tender,nondistended  J-tube in-place  No signs of infection  Bowel sounds normal  No masses,  No organomegaly  Extremities:  Lymphatics: Extremities normal, atraumatic, no cyanosis or edema  No cervical, axillary or inguinal adenopathy   Skin: Skin color, texture, turgor normal  No rashes  Neurologic: A&Ox4  No focal neuro deficits       No Known Allergies    Past Medical History:   Diagnosis Date    Cancer ([address] )     esophageal ca    Pneumonia     Polio     Rib fractures        Past Surgical History:   Procedure Laterality Date    FL GUIDED CENTRAL VENOUS ACCESS DEVICE INSERTION  2/16/2022    TUNNELED VENOUS PORT PLACEMENT Left 2/16/2022    Procedure: INSERTION VENOUS PORT (PORT-A-CATH);   Surgeon: Jerad Germain MD;  Location: [facility];  Service: Surgical Oncology       Family History   Problem Relation Age of Onset    Dementia Mother     Heart disease Father        Social History     Socioeconomic History    Marital status: /Civil [employer]     Spouse name: Not on file    Number of children: Not on file    Years of education: Not on file    Highest education level: Not on file   Occupational History    Not on file   Tobacco Use    Smoking status: Former Smoker     Packs/day: 1 00     Years: 40 00     Pack years: 40 00     Types: Cigarettes     Quit date: 2022     Years since quittin 1    Smokeless tobacco: Never Used   Vaping Use    Vaping Use: Never used   Substance and Sexual Activity    Alcohol use: Never     Alcohol/week: 0 0 standard drinks     Comment: 0    Drug use: No    Sexual activity: Not Currently   Other Topics Concern    Not on file   Social History Narrative    Not on file     Social Determinants of Health     Financial Resource Strain: Not on file   Food Insecurity: No Food Insecurity    Worried About [address] in the Last Year: Never true    [address] in the Last Year: Never true   Transportation Needs: No Transportation Needs    Lack of Transportation (Medical): No    Lack of Transportation (Non-Medical):  No   Physical Activity: Not on file   Stress: Not on file   Social Connections: Not on file   Intimate Partner Violence: Not on file   Housing Stability: Low Risk     Unable to Pay for Housing in the Last Year: No    Number of Places Lived in the Last Year: 1    Unstable Housing in the Last Year: No       Current Outpatient Medications   Medication Sig Dispense Refill    acetaminophen (TYLENOL) 325 mg tablet Take 325 mg by mouth every 6 (six) hours as needed for mild pain      ascorbic acid (VITAMIN C) 500 mg tablet Take 500 mg by mouth daily      aspirin 325 mg tablet Take 325 mg by mouth 2 (two) times a day      bisacodyl (Dulcolax) 10 mg suppository Insert 10 mg into the rectum daily      Cholecalciferol (VITAMIN D3 PO) Take by mouth      cyanocobalamin (VITAMIN B-12) 500 MCG tablet Take 500 mcg by mouth daily      lidocaine (LIDODERM) 5 % Apply 1 patch topically daily Remove & Discard patch within 12 hours or as directed by MD  0    magnesium hydroxide (MILK OF MAGNESIA) 400 mg/5 mL oral suspension Take 30 mL by mouth daily as needed for constipation      Multiple Vitamins-Minerals (MULTIVITAMIN WITH MINERALS) tablet Take 1 tablet by mouth daily      ondansetron (Zofran ODT) 8 mg disintegrating tablet Take 1 tablet (8 mg total) by mouth every 8 (eight) hours as needed for nausea or vomiting Through J tube not by mouth  20 tablet 0    pantoprazole (PROTONIX) 40 mg tablet Take 1 tablet (40 mg total) by mouth 2 (two) times a day  0    triamcinolone (KENALOG) 0 5 % cream Apply topically 2 (two) times a day 30 g 0     No current facility-administered medications for this visit  (Not in a hospital admission)      DATA REVIEW:    Pathology Result:    Final Diagnosis   Date Value Ref Range Status   01/13/2022   Final    A  Stomach, cardia:  - Gastric mucosa with reactive changes   - Separate fragment of necrotic debris with bacterial and fungal aggregates  - No definite malignancy identified  B  Esophagus, distal:  - 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  - MMR and Her2 studies are pending with results to follow in an addendum  Image Results: They are reviewed and documented in Hematology/Oncology history    FL guided central venous access device insertion  Narrative: C-arm - left chest port placement    INDICATION: 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  Procedure guidance  COMPARISON:  None    IMAGES:  2    FLUOROSCOPY TIME:   15 SECONDS    TECHNIQUE:    FINDINGS:    Fluoroscopy was provided for procedure guidance  Left chest port noted with tip overlying the caval atrial junction  No gross evidence of pneumothorax  Impression: Fluoroscopy provided for procedure guidance  Please see separate procedure note for details      Workstation performed: [id number]        LABS:  Lab data are reviewed and documented in HemOnc history  No results found for this or any previous visit (from the past 48 hour(s))            Dong Samuel MD  4/11/2022, 11:22 AM

## 2022-04-12 ENCOUNTER — APPOINTMENT (OUTPATIENT)
Dept: RADIATION ONCOLOGY | Facility: CLINIC | Age: 77
End: 2022-04-12
Attending: RADIOLOGY
Payer: COMMERCIAL

## 2022-04-12 ENCOUNTER — NUTRITION (OUTPATIENT)
Dept: NUTRITION | Facility: CLINIC | Age: 77
End: 2022-04-12

## 2022-04-12 ENCOUNTER — HOSPITAL ENCOUNTER (OUTPATIENT)
Dept: INFUSION CENTER | Facility: CLINIC | Age: 77
Discharge: HOME | End: 2022-04-12
Payer: COMMERCIAL

## 2022-04-12 ENCOUNTER — CONSULT (OUTPATIENT)
Dept: PALLIATIVE MEDICINE | Facility: CLINIC | Age: 77
End: 2022-04-12
Payer: COMMERCIAL

## 2022-04-12 VITALS
HEIGHT: 70 IN | TEMPERATURE: 98.1 F | SYSTOLIC BLOOD PRESSURE: 143 MMHG | HEART RATE: 91 BPM | BODY MASS INDEX: 18.61 KG/M2 | DIASTOLIC BLOOD PRESSURE: 65 MMHG | WEIGHT: 130 LBS | RESPIRATION RATE: 18 BRPM

## 2022-04-12 VITALS
TEMPERATURE: 98.5 F | OXYGEN SATURATION: 99 % | SYSTOLIC BLOOD PRESSURE: 110 MMHG | HEIGHT: 70 IN | HEART RATE: 89 BPM | DIASTOLIC BLOOD PRESSURE: 60 MMHG | WEIGHT: 130.4 LBS | RESPIRATION RATE: 20 BRPM | BODY MASS INDEX: 18.67 KG/M2

## 2022-04-12 DIAGNOSIS — G89.3 CANCER RELATED PAIN: ICD-10-CM

## 2022-04-12 DIAGNOSIS — C15.5 MALIGNANT NEOPLASM OF LOWER THIRD OF ESOPHAGUS (HCC): Primary | ICD-10-CM

## 2022-04-12 DIAGNOSIS — E44.1 MILD PROTEIN-CALORIE MALNUTRITION (HCC): ICD-10-CM

## 2022-04-12 DIAGNOSIS — G89.29 CHRONIC PAIN: ICD-10-CM

## 2022-04-12 DIAGNOSIS — C15.9 ESOPHAGEAL CANCER (HCC): ICD-10-CM

## 2022-04-12 DIAGNOSIS — Z51.5 PALLIATIVE CARE ENCOUNTER: ICD-10-CM

## 2022-04-12 DIAGNOSIS — Z71.3 NUTRITIONAL COUNSELING: Primary | ICD-10-CM

## 2022-04-12 PROCEDURE — 99203 OFFICE O/P NEW LOW 30 MIN: CPT | Performed by: STUDENT IN AN ORGANIZED HEALTH CARE EDUCATION/TRAINING PROGRAM

## 2022-04-12 PROCEDURE — 77014 CHG CT GUIDANCE PLACEMENT RAD THERAPY FIELDS: CPT | Performed by: RADIOLOGY

## 2022-04-12 PROCEDURE — 1036F TOBACCO NON-USER: CPT | Performed by: STUDENT IN AN ORGANIZED HEALTH CARE EDUCATION/TRAINING PROGRAM

## 2022-04-12 PROCEDURE — 96367 TX/PROPH/DG ADDL SEQ IV INF: CPT

## 2022-04-12 PROCEDURE — 1160F RVW MEDS BY RX/DR IN RCRD: CPT | Performed by: STUDENT IN AN ORGANIZED HEALTH CARE EDUCATION/TRAINING PROGRAM

## 2022-04-12 PROCEDURE — 77386 HB NTSTY MODUL RAD TX DLVR CPLX: CPT | Performed by: RADIOLOGY

## 2022-04-12 PROCEDURE — 96413 CHEMO IV INFUSION 1 HR: CPT

## 2022-04-12 PROCEDURE — 96417 CHEMO IV INFUS EACH ADDL SEQ: CPT

## 2022-04-12 RX ORDER — SODIUM CHLORIDE 9 MG/ML
20 INJECTION, SOLUTION INTRAVENOUS ONCE
Status: CANCELLED | OUTPATIENT
Start: 2022-04-26

## 2022-04-12 RX ORDER — SODIUM CHLORIDE 9 MG/ML
20 INJECTION, SOLUTION INTRAVENOUS ONCE
Status: COMPLETED | OUTPATIENT
Start: 2022-04-12 | End: 2022-04-12

## 2022-04-12 RX ADMIN — CARBOPLATIN 196 MG: 10 INJECTION INTRAVENOUS at 11:27

## 2022-04-12 RX ADMIN — FAMOTIDINE 20 MG: 10 INJECTION, SOLUTION INTRAVENOUS at 09:44

## 2022-04-12 RX ADMIN — DEXAMETHASONE SODIUM PHOSPHATE: 10 INJECTION, SOLUTION INTRAMUSCULAR; INTRAVENOUS at 08:55

## 2022-04-12 RX ADMIN — DIPHENHYDRAMINE HYDROCHLORIDE 25 MG: 50 INJECTION, SOLUTION INTRAMUSCULAR; INTRAVENOUS at 09:21

## 2022-04-12 RX ADMIN — SODIUM CHLORIDE 20 ML/HR: 0.9 INJECTION, SOLUTION INTRAVENOUS at 08:30

## 2022-04-12 RX ADMIN — PACLITAXEL 85.8 MG: 6 INJECTION, SOLUTION, CONCENTRATE INTRAVENOUS at 10:26

## 2022-04-12 NOTE — PROGRESS NOTES
Pt here for chemotherapy, offering no complaints  Left port accessed with positive blood return noted throughout treatment  Tolerated infusion without incident  Port flushed and de-accessed    AVS given   Walked out in stable condition

## 2022-04-12 NOTE — PROGRESS NOTES
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 1/13/22  · S/p Jtube placement 2/7/22  · RT began 3/14/22, EOT planned for 4/20/22  · Chemotherapy (carboplatin, taxol) began 3/15/22  Oncology History   Malignant neoplasm of lower third of esophagus ([facility] [address] )   1/13/2022 Initial Diagnosis    Malignant neoplasm of lower third of esophagus ([facility] [address] )     1/13/2022 Biopsy    EGD:   Esophagus, distal:  - 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  RESULTS OF IMMUNOHISTOCHEMICAL ANALYSIS FOR MISMATCH REPAIR PROTEIN LOSS     INTERPRETATION: No loss of nuclear expression of MMR proteins: Low probability of MSI-H     Note: Background non-neoplastic tissue and/or internal control with intact nuclear expression        RESULTS:  Antibody          Clone               Description                           Results  MLH1               M1                   Mismatch repair protein       Intact nuclear expression  MSH2              [id number]       Mismatch repair protein       Intact nuclear expression  MSH6              44                     Mismatch repair protein       Intact nuclear expression  PMS2              [id number]           Mismatch repair protein       Intact nuclear expression     3/15/2022 -  Chemotherapy    CARBOplatin (PARAPLATIN) IVPB (GOG AUC DOSING), 147 6 mg, Intravenous, Once, 5 of 7 cycles  Administration: 147 6 mg (3/15/2022), 196 mg (3/22/2022), 196 mg (3/29/2022), 196 mg (4/5/2022)  PACLItaxel (TAXOL) chemo IVPB, 50 mg/m2 = 84 6 mg, Intravenous, Once, 5 of 7 cycles  Administration: 84 6 mg (3/15/2022), 85 8 mg (3/22/2022), 85 8 mg (3/29/2022), 85 8 mg (4/5/2022)       Past Medical & Surgical Hx:   Patient Active Problem List   Diagnosis    Smoking    Weight loss    Dysphagia    Dermatitis    Malignant neoplasm of lower third of esophagus (HCC)    Mild protein-calorie malnutrition (HCC)    Left ankle pain    Severe protein-calorie malnutrition (HCC)    Chronic pain     Past Medical History:   Diagnosis Date    Cancer ([address] )     esophageal ca    Pneumonia     Polio     Rib fractures      Past Surgical History:   Procedure Laterality Date    FL GUIDED CENTRAL VENOUS ACCESS DEVICE INSERTION  2/16/2022    TUNNELED VENOUS PORT PLACEMENT Left 2/16/2022    Procedure: INSERTION VENOUS PORT (PORT-A-CATH);   Surgeon: Nancy Judge MD;  Location: [facility];  Service: Surgical Oncology       Review of Medications:   Vitamins, Supplements and Herbals: No, pt denies taking supplements    Current Outpatient Medications:     acetaminophen (TYLENOL) 325 mg tablet, Take 325 mg by mouth every 6 (six) hours as needed for mild pain, Disp: , Rfl:     ascorbic acid (VITAMIN C) 500 mg tablet, Take 500 mg by mouth daily, Disp: , Rfl:     aspirin 325 mg tablet, Take 325 mg by mouth 2 (two) times a day, Disp: , Rfl:     bisacodyl (Dulcolax) 10 mg suppository, Insert 10 mg into the rectum daily, Disp: , Rfl:     Cholecalciferol (VITAMIN D3 PO), Take by mouth, Disp: , Rfl:     cyanocobalamin (VITAMIN B-12) 500 MCG tablet, Take 500 mcg by mouth daily, Disp: , Rfl:     lidocaine (LIDODERM) 5 %, Apply 1 patch topically daily Remove & Discard patch within 12 hours or as directed by MD, Disp: , Rfl: 0    magnesium hydroxide (MILK OF MAGNESIA) 400 mg/5 mL oral suspension, Take 30 mL by mouth daily as needed for constipation, Disp: , Rfl:     Multiple Vitamins-Minerals (MULTIVITAMIN WITH MINERALS) tablet, Take 1 tablet by mouth daily, Disp: , Rfl:     ondansetron (Zofran ODT) 8 mg disintegrating tablet, Take 1 tablet (8 mg total) by mouth every 8 (eight) hours as needed for nausea or vomiting Through J tube not by mouth , Disp: 20 tablet, Rfl: 0    pantoprazole (PROTONIX) 40 mg tablet, Take 1 tablet (40 mg total) by mouth 2 (two) times a day, Disp: , Rfl: 0    triamcinolone (KENALOG) 0 5 % cream, Apply topically 2 (two) times a day, Disp: 30 g, Rfl: 0  No current facility-administered medications for this visit      Facility-Administered Medications Ordered in Other Visits:     CARBOplatin (PARAPLATIN) 196 mg in sodium chloride 0 9 % 250 mL IVPB, 196 mg, Intravenous, Once, Mirza Kauffman MD    famotidine (PEPCID) 20 mg in sodium chloride 0 9 % 52 mL IVPB, 20 mg, Intravenous, Once, Mirza Kauffman MD    PACLitaxel (TAXOL) 85 8 mg in sodium chloride 0 9 % 250 mL chemo IVPB, 50 mg/m2 (Treatment Plan Recorded), Intravenous, Once, Mirza Kauffman MD    Most Recent Lab Results:   Lab Results   Component Value Date    WBC 3 38 (L) 04/08/2022    NEUTROABS 2 71 04/08/2022    ALT 14 04/08/2022    AST 10 04/08/2022    ALB 3 3 (L) 04/08/2022    SODIUM 134 (L) 04/08/2022    SODIUM 136 04/01/2022    K 4 2 04/08/2022    K 4 0 04/01/2022     04/08/2022    BUN 15 04/08/2022    BUN 13 04/01/2022    CREATININE 0 55 (L) 04/08/2022    CREATININE 0 46 (L) 04/01/2022    EGFR 101 04/08/2022    PHOS 3 5 02/07/2022    POCGLU 141 (H) 02/01/2022    GLUC 121 04/08/2022    CALCIUM 8 8 04/08/2022    MG 2 4 02/07/2022       Anthropometric Measurements:   Height: 70"  Ht Readings from Last 1 Encounters:   04/12/22 5' 10" (1 778 m)     Wt Readings from Last 20 Encounters:   04/12/22 59 kg (130 lb)   04/11/22 59 kg (130 lb)   04/05/22 60 2 kg (132 lb 12 8 oz)   03/29/22 60 5 kg (133 lb 6 4 oz)   03/25/22 58 1 kg (128 lb)   03/22/22 57 5 kg (126 lb 12 8 oz)   03/15/22 55 4 kg (122 lb 3 2 oz)   03/07/22 55 3 kg (122 lb)   02/17/22 60 3 kg (133 lb)   02/16/22 58 8 kg (129 lb 9 6 oz)   02/14/22 61 2 kg (135 lb)   02/06/22 59 5 kg (131 lb 2 8 oz)   02/03/22 66 7 kg (147 lb)   01/13/22 67 kg (147 lb 11 3 oz)   01/10/22 66 4 kg (146 lb 6 4 oz)   01/06/22 65 8 kg (145 lb) 01/07/21 80 9 kg (178 lb 6 4 oz)   12/09/20 79 4 kg (175 lb)   10/18/19 78 8 kg (173 lb 12 8 oz)   10/10/19 78 9 kg (173 lb 15 1 oz)       Weight History:    Usual Weight: 175#   Varian: (3/14/22) 124#, (3/17/22) 120 5#, (3/21/22) 126#, (3/22/22) 127#, (3/24/22) 128#, (3/28/22) 131#, (3/29/22) 134#, (3/30/22) 134#, (3/31/22) 134#, (4/4/22) 133#, (4/5/22) 133#, (4/7/22) 132#, (4/11/22) 130#        Home Scale: none     Oncology Nutrition-Anthropometrics      Nutrition from 4/12/2022 in [address] [place] [address] Nutrition from 4/5/2022 in [facility] Oncology Dietitian Perlita   Patient age (years): 68 years 68 years   Patient (male) height (in): 79 in 79 in   Current weight (lbs): 130 lbs 133 lbs   Current weight to be used for anthropometric calculations (kg) 59 1 kg 60 5 kg   BMI: 18 7 19 1   IBW male 166 lb 166 lb   IBW (kg) male 75 5 kg 75 5 kg   IBW % (male) 78 3 % 80 1 %   Adjusted BW (male): 157 lbs 157 8 lbs   Adjusted BW in kg (male): 71 4 kg 71 7 kg   % weight change after 1 week: -2 1 % -0 7 %   Weight change after 1 week (lbs) -2 8 lbs -1 lbs   % weight change after 1 month: 6 4 % 9 %   Weight change after 1 month (lbs) 7 8 lbs 11 lbs   % weight change after 3 months: -11 2 % -8 3 %   Weight change after 3 months (lbs) -16 4 lbs -12 lbs   % weight change after 6 months: -25 2 % --   Weight change after 6 months (lbs) -43 9 lbs --          Nutrition-Focused Physical Findings: severe muscle depletion ([place]) - hollowing/scooping/depression    Food/Nutrition-Related History & Client/Social History:    Current Nutrition Impact Symptoms:  [] Nausea -has zofran  [] Reduced Appetite  [] Acid Reflux    [] Vomiting  [x] Unintended Wt Loss-significant x1 week, x3 months, 6 months  [] Malabsorption    [] Diarrhea  [] Unintended Wt Gain  [] Dumping Syndrome    [] Constipation  [] Thick Mucous/Secretions  [] Abdominal Pain    [] Dysgeusia (Altered Taste)  [x] Xerostomia (Dry Mouth)  [] Gas [] Dysosmia (Altered Smell)  [] Johnathon Mcnulty  [] Difficulty Chewing    [] Oral Mucositis (Sore Mouth)  [] Fatigue  [x] Hyperglycemia: BG 138mg/dL on 22  [] Odynophagia  [] Esophagitis  [] Other:    [x] Dysphagia -Jtube in place for nutrition [] Early Satiety  [] No Problems Eating      Food Allergies & Intolerances: no    Current Diet: Soft/Moist and Tube Feeding  Current Nutrition Intake: Increased since last visit (PO); TF unchanged   Appetite: Fair    Nutrition Route: PO and J-Tube  Oral Care: brushes BID  Activity level: Uses carney to walk, but reports that he is recently more steady on his feet  24 Hr Diet Recall:   -rené Leigh, french fries from [facility]; ate entire meal   -Sips of water    EN Recall:  DME: Miguelito   Access Type: Jtube   Formula: Jevity 1 5  Method: Cyclic  Rate: 90 mL/hr until 7 5 cartons are infused (about 20hrs)   Flush: 95 mL free water flush pre infusion, plus 95mL free water every 2hr (950mL)  EN providin mL volume (7 5 cartons/day), 2662 kcal, 113g protein, 1350 mL free water, 2300mL total water      Oncology Nutrition-Estimated Needs      Nutrition from 2022 in [address] [place] [address] Nutrition from 2022 in [place] [address] Oncology Dietitian Perlita   Weight type used Actual weight Actual weight   Weight in kilograms (kg) used for estimated needs 59 1 kg 60 5 kg   Energy needs formula:  35-40 kcal/kg 35-40 kcal/kg   Energy needs based on 35 kcal/k kcal 2116 kcal   Energy needs based on 40 kcal/k kcal 2418 kcal   Protein needs formula: 1 5-2 g/kg 1 5-2 g/kg   Protein needs based on 1 5 g/kg 89 g 91 g   Protein needs based on 2 g/kg 118 g 121 g   Fluid needs formula: 30-35 mL/kg 30-35 mL/kg   Fluid needs based on 30 mL/kg 1773 mL 1815 mL   Fluid needs in ounces 60 oz 61 oz   Fluid needs based on 35 mL/kg 2069 mL 2118 mL   Fluid needs in ounces 70 oz 72 oz           Discussion & Intervention:   Arvonia  was evaluated today for an RD follow up regarding wt loss, Esophageal Cancer and enteral nutrition  Paresh Camargo is currently undergoing tx for EC  Today Paresh Camargo explains that he recently made efforts to eat more by mouth  He recalls going to the diner near his home and ordering a cheeseburger, orquidea slaw, and french fries  He states that he ate the entire meal and tolerated it well  He plans to continue working on his oral intakes as he is more confident in his ability to eat by mouth now  He continues to run his enteral nutrition at 90mL/hr for about 20 hrs until 7 5 cartons of Jevity 1 5 are infused and reports no issues with tolerance  Reviewed 24 hour recall, which revealed an adequate enteral intake, and discussed ways to increase kcal, protein, and fluid intakes PO  Based on today's assessment, discussion/suggestions included: a soft, high calorie, high protein diet & food choices to include at all meals & snacks, adequate hydration & fluid choices, sipping on calorie containing beverages (examples include: adding whole milk or cream to coffee, oral nutrition supplements, juice, electrolyte replacement beverages, milk, etc ), utilizing oral nutrition supplements and individualized enteral nutrition recommendations & plan:    Enteral nutrition is needed for Toro's main source of nutrition, recommend:   · TF Goal: Jevity 1 5 at 90 mL/hr via J-tube cycled over ~20 hours daily (until 7 5 cartons is infused)  · Water Flushin mL free water flush at the beginning and end of TF infusion (250 mL total) plus 125 mL free water flush 5 times per day (625 mL total); (total of 875 mL/day in flushes; flushes should be spread throughout the day and every 2-3 hours during TF infusion; will need to adjust flushes prn for IV fluids)  · Enteral Nutrition goal to provide: 1777 mL volume (7 5 cartons day), 2666 kcal, 113 grams protein, 1350 mL free water, 2225 mL total water daily     *Pt requires  an enteral feeding pump to administer TF due to J-Tube  Moving forward, Henry Rodrigez was encouraged to continue soft/high calorie/high protein foods much as tolerated and continue enteral nutrition intake at goal    Materials Provided: not applicable  All questions and concerns addressed during todays visit  Henry Rodrigez has RD contact information  Nutrition Diagnosis:    Increased Nutrient Needs (kcal & pro) related to increased demand for nutrients and disease state as evidenced by cancer dx and pt undergoing tx for cancer   Patient has clinical indicators (or ASPEN criteria) consistent with severe protein-calorie malnutrition in the context of Chronic Illness as evidenced by >7 5% wt loss in 3 months and severe muscle depletion ([place]) - hollowing/scooping/depression  Monitoring & Evaluation:   Goals:  · weight maintenance/stabilization  · pt to meet >/=75% estimated nutrition needs daily  · increase oral intakes    · Progress Towards Goals: Progressing and Goal(s) Met    Nutrition Rx & Recommendations:  · Try to have at least 1 meal by mouth per day  Soft/moist easy to swallow foods: casseroles, chicken/egg/tuna salad with extra clark to add calories and moisture, oatmeal/cream of wheat made with whole milk, cottage cheese, whole milk [place] yogurt, scrambled eggs with cheese, avocado, macaroni and cheese, mashed potatoes made with butter and whole milk or dry milk powder, ground meat with extra sauce/gravy, canned fruit, peanut butter, cream soups (cream of chicken, cream of mushroom, broccoli cheddar), pudding made with whole milk, custard, ice cream, banana, milkshakes/smoothies    · Choose liquids with calories: whole milk, [place] milk (higher protein/lactose-free milk), chocolate milk, 100% fruit juice, diluted juice, bone broth (higher protein broth), creamy soups, sports drinks (Gatorade, Poweraide, Pedialyte, etc ), [place] ice, popsicles, milkshakes, smoothies, oral nutrition supplements (Ensure, Boost, etc ), gelatin/Jello, etc   · Follow proper oral care; Try baking soda/salt water rinse recipe (mix 3/4 tsp salt + 1 tsp baking soda + 1 qt water; rinse with plain water after using) in Eating Hints book (pg 18)  Brush your teeth before/after meals & before bed  · Weigh yourself regularly  If you notice weight loss, make an effort to increase your daily food/calorie intake  If you continue to notice loss after these efforts, reach out to your dietitian to establish a plan to stabilize weight  · Tube Feeding Plan:     · TF Goal: Jevity 1 5 at 90 mL/hr via J-tube cycled over ~20 hours daily (until 7 5 cartons is infused)  · Water Flushin mL free water flush at the beginning and end of TF infusion (250 mL total) plus 125 mL free water flush 5 times per day (625 mL total); (total of 875 mL/day in flushes; flushes should be spread throughout the day and every 2-3 hours during TF infusion; will need to adjust flushes prn for IV fluids)  · Enteral Nutrition goal to provide: 1777 mL volume (7 5 cartons day), 2666 kcal, 113 grams protein, 1350 mL free water, 2225 mL total water daily      Follow Up Plan: 22 follow up after radiation treatment   Recommend Referral to Other Providers: none at this time

## 2022-04-12 NOTE — PROGRESS NOTES
Outpatient Consultation - Palliative and Supportive Care   Elicia Sullivan 68 y o  male [id number]    Assessment & Plan  Problem List Items Addressed This Visit        Digestive    Malignant neoplasm of lower third of esophagus (HCC) - Primary       Other    Mild protein-calorie malnutrition (HCC)    Chronic pain    Cancer related pain    Palliative care encounter      Other Visit Diagnoses     Esophageal cancer ([facility] [address] )            #symptom management  No reported symptoms of distress identified at this time  Previously reported nausea/vomiting; resolved  ECOG 3  Continuous TF, managed by nutrition  BM Q2-3days  Adequate sleep    #goals of care   - introduced Palliative and Supportive Care [briefly introduced hospice model of care in contrast to palliative care]   - previously completed AD, not currently on file   - treatment focused care with no limitations at this time   - plan to continue concurrent chemoRT with plan for upcoming PET scan to determine surgical plan   - no current palliative care needs at this time, will follow on PRN basis    #psychosocial support   - emotional support provided   - , spouse  2 years ago [facility],  52 years]   - two adult children   - April [daughter]   - Coy Winkler [phone]   - three grandchildren, three great-grandchildren   - resides alone   - delivers automotive parts   - strong spiritual arcadio      Follow with [facility] team on as-needed basis  Cont[place] Sub[place]    [place] or [place]  reviewed: No red flags were identified; safe to proceed with prescription  [place] PDMP Review       Value Time User    PDMP Reviewed  Yes 2022 12:00 PM Jossue Mcnamara MD          Medications adjusted this encounter:  Requested Prescriptions      No prescriptions requested or ordered in this encounter     No orders of the defined types were placed in this encounter  There are no discontinued medications      PPS: %      Elicia Sullivan was seen today for symptoms and planning cares related to above illnesses  Above orders were sent electronically, or provided in hardcopy in clinic  I have reviewed the patient's controlled substance dispensing history in the Prescription Drug Monitoring Program in compliance with the [facility] regulations before prescribing any controlled substances  We appreciate the referral, and wish for him to continue to follow with us  If there are questions or concerns, please contact us through our clinic/answering service 24 hours a day, seven days a week  Francisco Cruz MD  [facility] Palliative and Supportive Care  [phone]        Visit Information    Accompanied By: No one    Source of History: Self, Medical record    History Limitations: None      History of Present Illness    Jesu Cabrera is a 68 y o  male who presents as a referral from MADELINE Garland of [facility] for 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  Consultation is requested for: symptom management, goal of care assessment and decisional support, emotional support in the setting of serious illness  Overall patient reports doing well, previously reported symptoms of nausea/vomiting and depressed mood resolved  Only reported symptoms of epistaxis and cold intolerance in bilateral hands occur on the day after chemotherapy infusions with spontaneous resolution same day  No currently reported pain  Reports hunger, tolerates PO intake, three meals/day  Currently weight stable with overall weight loss since diagnosis 3 months ago  Fragmented continuous TF given multiple appointments; reports plan for 20 hours/day with 15 hours actually acquired/day  Reports fatigue with decreased exercise tolerance  BM every 2-3 days, last was yesterday, passing flatus  Fragmented sleep, awakenings in the setting of nocturia, 5 hours/night, at baseline        Oncology History   Malignant neoplasm of lower third of esophagus (HCC) 1/13/2022 Initial Diagnosis    Malignant neoplasm of lower third of esophagus ([facility] [address] )     1/13/2022 Biopsy    EGD:   Esophagus, distal:  - 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  RESULTS OF IMMUNOHISTOCHEMICAL ANALYSIS FOR MISMATCH REPAIR PROTEIN LOSS     INTERPRETATION: No loss of nuclear expression of MMR proteins: Low probability of MSI-H     Note: Background non-neoplastic tissue and/or internal control with intact nuclear expression        RESULTS:  Antibody          Clone               Description                           Results  MLH1               M1                   Mismatch repair protein       Intact nuclear expression  MSH2              [id number]       Mismatch repair protein       Intact nuclear expression  MSH6              44                     Mismatch repair protein       Intact nuclear expression  PMS2              [id number]           Mismatch repair protein       Intact nuclear expression     3/15/2022 -  Chemotherapy    CARBOplatin (PARAPLATIN) IVPB (GOG AUC DOSING), 147 6 mg, Intravenous, Once, 5 of 7 cycles  Administration: 147 6 mg (3/15/2022), 196 mg (3/22/2022), 196 mg (3/29/2022), 196 mg (4/5/2022)  PACLItaxel (TAXOL) chemo IVPB, 50 mg/m2 = 84 6 mg, Intravenous, Once, 5 of 7 cycles  Administration: 84 6 mg (3/15/2022), 85 8 mg (3/22/2022), 85 8 mg (3/29/2022), 85 8 mg (4/5/2022)         Pertinent Palliative Care Domains    Physical Symptoms: ambulates    Psychological Symptoms: no anxiety, good insight, coping well    Social Aspects: support system daughters live 2 hours away, strong spiritual arcadio      Advance Directive and Living Will:   completed, not currently on file  Power of :   n/a  POLST:   n/a    Historical Data  Past Medical History:   Diagnosis Date    [facility]     esophageal ca    Pneumonia     Polio     Rib fractures      Past Surgical History:   Procedure Laterality Date    FL GUIDED CENTRAL VENOUS ACCESS DEVICE INSERTION  2022    TUNNELED VENOUS PORT PLACEMENT Left 2022    Procedure: INSERTION VENOUS PORT (PORT-A-CATH); Surgeon: Ammon Cooper MD;  Location: [facility];  Service: Surgical Oncology     Social History     Socioeconomic History    Marital status: /Civil Union     Spouse name: Not on file    Number of children: Not on file    Years of education: Not on file    Highest education level: Not on file   Occupational History    Not on file   Tobacco Use    Smoking status: Former Smoker     Packs/day: 1 00     Years: 40 00     Pack years: 40 00     Types: Cigarettes     Quit date: 2022     Years since quittin 1    Smokeless tobacco: Never Used   Vaping Use    Vaping Use: Never used   Substance and Sexual Activity    Alcohol use: Never     Alcohol/week: 0 0 standard drinks     Comment: 0    Drug use: No    Sexual activity: Not Currently   Other Topics Concern    Not on file   Social History Narrative    Not on file     Social Determinants of Health     Financial Resource Strain: Not on file   Food Insecurity: No Food Insecurity    Worried About [address] in the Last Year: Never true    [address] [employer] in the Last Year: Never true   Transportation Needs: No Transportation Needs    Lack of Transportation (Medical): No    Lack of Transportation (Non-Medical):  No   Physical Activity: Not on file   Stress: Not on file   Social Connections: Not on file   Intimate Partner Violence: Not on file   Housing Stability: Low Risk     Unable to Pay for Housing in the Last Year: No    Number of Places Lived in the Last Year: 1    Unstable Housing in the Last Year: No     Family History   Problem Relation Age of Onset    Dementia Mother     Heart disease Father      No Known Allergies  Current Outpatient Medications   Medication Sig Dispense Refill    acetaminophen (TYLENOL) 325 mg tablet Take 325 mg by mouth every 6 (six) hours as needed for mild pain      ascorbic acid (VITAMIN C) 500 mg tablet Take 500 mg by mouth daily      aspirin 325 mg tablet Take 325 mg by mouth 2 (two) times a day      bisacodyl (Dulcolax) 10 mg suppository Insert 10 mg into the rectum daily      Cholecalciferol (VITAMIN D3 PO) Take by mouth      cyanocobalamin (VITAMIN B-12) 500 MCG tablet Take 500 mcg by mouth daily      lidocaine (LIDODERM) 5 % Apply 1 patch topically daily Remove & Discard patch within 12 hours or as directed by MD  0    magnesium hydroxide (MILK OF MAGNESIA) 400 mg/5 mL oral suspension Take 30 mL by mouth daily as needed for constipation      Multiple Vitamins-Minerals (MULTIVITAMIN WITH MINERALS) tablet Take 1 tablet by mouth daily      ondansetron (Zofran ODT) 8 mg disintegrating tablet Take 1 tablet (8 mg total) by mouth every 8 (eight) hours as needed for nausea or vomiting Through J tube not by mouth  20 tablet 0    pantoprazole (PROTONIX) 40 mg tablet Take 1 tablet (40 mg total) by mouth 2 (two) times a day  0    triamcinolone (KENALOG) 0 5 % cream Apply topically 2 (two) times a day 30 g 0     No current facility-administered medications for this visit  Review of Systems   Constitutional: Positive for malaise/fatigue and weight loss  Negative for chills, decreased appetite and fever  HENT: Negative for congestion  Eyes: Negative for visual disturbance  Cardiovascular: Negative for chest pain  Respiratory: Negative for shortness of breath  Musculoskeletal: Negative for falls and neck pain  Gastrointestinal: Positive for constipation  Negative for abdominal pain, nausea and vomiting  Genitourinary: Negative for frequency  Neurological: Negative for headaches  Psychiatric/Behavioral: Negative for depression  The patient does not have insomnia  All other systems reviewed and are negative          Vital Signs    /60 (BP Location: Right arm)   Pulse 89   Temp 98 5 °F (36 9 °C) (Temporal)   Resp 20   Ht 5' 10" (1 778 m)   Wt 59 1 kg (130 lb 6 4 oz)   SpO2 99%   BMI 18 71 kg/m²     Physical Exam and Objective Data  Physical Exam  Vitals and nursing note reviewed  Constitutional:       General: He is awake  Appearance: He is cachectic  He is not diaphoretic  Comments: Chronically ill appearing in NAD  BMI 18 7  Non-toxic appearing   HENT:      Head: Atraumatic  Comments: Bitemporal wasting     Right Ear: External ear normal       Left Ear: External ear normal    Eyes:      Comments: No gaze preference   Cardiovascular:      Rate and Rhythm: Normal rate  Pulmonary:      Effort: No tachypnea, accessory muscle usage or respiratory distress  Comments: Completes full sentences without difficulty  Musculoskeletal:      Cervical back: Normal range of motion  Neurological:      General: No focal deficit present  Mental Status: He is alert and oriented to person, place, and time     Psychiatric:         Attention and Perception: Attention normal          Mood and Affect: Mood and affect normal          Speech: Speech normal          Cognition and Memory: Cognition and memory normal            Radiology and Laboratory:  I personally reviewed and interpreted the following results:    Most Recent COVID-19 Results:  Lab Results   Component Value Date/Time    SARSCOV2 Negative 02/10/2022 09:19 AM    SARSCOV2 Negative 02/06/2022 01:04 PM    Sona Rascon Not Detected 12/09/2020 03:34 PM       Most Recent Lab Work:  Lab Results   Component Value Date/Time    SODIUM 134 (L) 04/08/2022 10:58 AM    K 4 2 04/08/2022 10:58 AM    BUN 15 04/08/2022 10:58 AM    CREATININE 0 55 (L) 04/08/2022 10:58 AM    GLUC 121 04/08/2022 10:58 AM     Lab Results   Component Value Date/Time    AST 10 04/08/2022 10:58 AM    ALT 14 04/08/2022 10:58 AM    ALB 3 3 (L) 04/08/2022 10:58 AM     Lab Results   Component Value Date/Time    HGB 8 0 (L) 04/08/2022 10:58 AM    WBC 3 38 (L) 04/08/2022 10:58 AM     (L) 04/08/2022 10:58 AM    INR 1 04 10/10/2019 07:01 AM    PTT 28 10/10/2019 07:01 AM       Most Recent Imaging [last 30 days]:  No results found  40 minutes was spent face to face with Mellissa Maradiaga with greater than 50% of the time spent in counseling or coordination of care including discussions of provided medical updates, discussed palliative care, determined goals of care, determined social/family support, discussed plans of care, discussed symptom management, provided psychosocial support  Intro to palliative medicine  Intro to hospice medicine  Expanded goals of care discussion  PDMP Reviewed   All of the patient's questions were answered during this discussion

## 2022-04-12 NOTE — PATIENT INSTRUCTIONS
Nutrition Rx & Recommendations:  · Try to have at least 1 meal by mouth per day  Soft/moist easy to swallow foods: casseroles, chicken/egg/tuna salad with extra clark to add calories and moisture, oatmeal/cream of wheat made with whole milk, cottage cheese, whole milk [place] yogurt, scrambled eggs with cheese, avocado, macaroni and cheese, mashed potatoes made with butter and whole milk or dry milk powder, ground meat with extra sauce/gravy, canned fruit, peanut butter, cream soups (cream of chicken, cream of mushroom, broccoli cheddar), pudding made with whole milk, custard, ice cream, banana, milkshakes/smoothies  · Choose liquids with calories: whole milk, Fairlife milk (higher protein/lactose-free milk), chocolate milk, 100% fruit juice, diluted juice, bone broth (higher protein broth), creamy soups, sports drinks (Gatorade, Poweraide, Pedialyte, etc ), [place] ice, popsicles, milkshakes, smoothies, oral nutrition supplements (Ensure, Boost, etc ), gelatin/Jello, etc   · Follow proper oral care; Try baking soda/salt water rinse recipe (mix 3/4 tsp salt + 1 tsp baking soda + 1 qt water; rinse with plain water after using) in Eating Hints book (pg 18)  Brush your teeth before/after meals & before bed  · Weigh yourself regularly  If you notice weight loss, make an effort to increase your daily food/calorie intake  If you continue to notice loss after these efforts, reach out to your dietitian to establish a plan to stabilize weight  · Tube Feeding Plan:     · TF Goal: Jevity 1 5 at 90 mL/hr via J-tube cycled over ~20 hours daily (until 7 5 cartons is infused)  · Water Flushin mL free water flush at the beginning and end of TF infusion (250 mL total) plus 125 mL free water flush 5 times per day (625 mL total); (total of 875 mL/day in flushes; flushes should be spread throughout the day and every 2-3 hours during TF infusion; will need to adjust flushes prn for IV fluids)     · Enteral Nutrition goal to provide: 1777 mL volume (7 5 cartons day), 2666 kcal, 113 grams protein, 1350 mL free water, 2225 mL total water daily      Follow Up Plan: 4/18/22 follow up after radiation treatment  Recommend Referral to Other Providers: none at this time

## 2022-04-13 ENCOUNTER — APPOINTMENT (OUTPATIENT)
Dept: RADIATION ONCOLOGY | Facility: CLINIC | Age: 77
End: 2022-04-13
Attending: RADIOLOGY
Payer: COMMERCIAL

## 2022-04-13 PROCEDURE — 77014 CHG CT GUIDANCE PLACEMENT RAD THERAPY FIELDS: CPT | Performed by: RADIOLOGY

## 2022-04-13 PROCEDURE — 77386 HB NTSTY MODUL RAD TX DLVR CPLX: CPT | Performed by: RADIOLOGY

## 2022-04-14 ENCOUNTER — TELEPHONE (OUTPATIENT)
Dept: FAMILY MEDICINE CLINIC | Facility: CLINIC | Age: 77
End: 2022-04-14

## 2022-04-14 ENCOUNTER — APPOINTMENT (OUTPATIENT)
Dept: RADIATION ONCOLOGY | Facility: CLINIC | Age: 77
End: 2022-04-14
Attending: RADIOLOGY
Payer: COMMERCIAL

## 2022-04-14 PROCEDURE — 77014 CHG CT GUIDANCE PLACEMENT RAD THERAPY FIELDS: CPT | Performed by: RADIOLOGY

## 2022-04-14 PROCEDURE — 77386 HB NTSTY MODUL RAD TX DLVR CPLX: CPT | Performed by: RADIOLOGY

## 2022-04-14 NOTE — TELEPHONE ENCOUNTER
Bridget  [facility] - she is planning to discharge pt on 4/18  All issues resolved except pinky toe  Wound small, antibiotic working    Pt will complete chemo/radiation next week   Pt doing well

## 2022-04-15 ENCOUNTER — HOSPITAL ENCOUNTER (OUTPATIENT)
Dept: INFUSION CENTER | Facility: CLINIC | Age: 77
Discharge: HOME | End: 2022-04-15
Payer: COMMERCIAL

## 2022-04-15 ENCOUNTER — APPOINTMENT (OUTPATIENT)
Dept: RADIATION ONCOLOGY | Facility: CLINIC | Age: 77
End: 2022-04-15
Attending: RADIOLOGY
Payer: COMMERCIAL

## 2022-04-15 DIAGNOSIS — C15.5 MALIGNANT NEOPLASM OF LOWER THIRD OF ESOPHAGUS (HCC): Primary | ICD-10-CM

## 2022-04-15 LAB
ACANTHOCYTES BLD QL SMEAR: PRESENT
ALBUMIN SERPL BCP-MCNC: 3.4 G/DL (ref 3.5–5)
ALP SERPL-CCNC: 64 U/L (ref 46–116)
ALT SERPL W P-5'-P-CCNC: 16 U/L (ref 12–78)
ANION GAP SERPL CALCULATED.3IONS-SCNC: 9 MMOL/L (ref 4–13)
ANISOCYTOSIS BLD QL SMEAR: PRESENT
AST SERPL W P-5'-P-CCNC: 13 U/L (ref 5–45)
BILIRUB SERPL-MCNC: 0.43 MG/DL (ref 0.2–1)
BUN SERPL-MCNC: 18 MG/DL (ref 5–25)
BURR CELLS BLD QL SMEAR: PRESENT
CALCIUM ALBUM COR SERPL-MCNC: 9.1 MG/DL (ref 8.3–10.1)
CALCIUM SERPL-MCNC: 8.6 MG/DL (ref 8.3–10.1)
CHLORIDE SERPL-SCNC: 101 MMOL/L (ref 100–108)
CO2 SERPL-SCNC: 27 MMOL/L (ref 21–32)
CREAT SERPL-MCNC: 0.58 MG/DL (ref 0.6–1.3)
ERYTHROCYTE [DISTWIDTH] IN BLOOD BY AUTOMATED COUNT: 19.5 % (ref 11.6–15.1)
GFR SERPL CREATININE-BSD FRML MDRD: 99 ML/MIN/1.73SQ M
GLUCOSE SERPL-MCNC: 128 MG/DL (ref 65–140)
HCT VFR BLD AUTO: 22.9 % (ref 36.5–49.3)
HGB BLD-MCNC: 7.5 G/DL (ref 12–17)
LYMPHOCYTES # BLD AUTO: 8 % (ref 14–44)
MCH RBC QN AUTO: 30.6 PG (ref 26.8–34.3)
MCHC RBC AUTO-ENTMCNC: 32.8 G/DL (ref 31.4–37.4)
MCV RBC AUTO: 94 FL (ref 82–98)
MONOCYTES NFR BLD: 11 % (ref 4–12)
NEUTS BAND NFR BLD MANUAL: 1 % (ref 0–8)
NEUTS SEG NFR BLD AUTO: 80 % (ref 43–75)
OVALOCYTES BLD QL SMEAR: PRESENT
PLATELET # BLD AUTO: 85 THOUSANDS/UL (ref 149–390)
PLATELET BLD QL SMEAR: ABNORMAL
PMV BLD AUTO: 12.7 FL (ref 8.9–12.7)
POTASSIUM SERPL-SCNC: 4.3 MMOL/L (ref 3.5–5.3)
PROT SERPL-MCNC: 6.7 G/DL (ref 6.4–8.2)
RBC # BLD AUTO: 2.45 MILLION/UL (ref 3.88–5.62)
SODIUM SERPL-SCNC: 137 MMOL/L (ref 136–145)
WBC # BLD AUTO: 2.29 THOUSAND/UL (ref 4.31–10.16)

## 2022-04-15 PROCEDURE — 80053 COMPREHEN METABOLIC PANEL: CPT | Performed by: INTERNAL MEDICINE

## 2022-04-15 PROCEDURE — 77386 HB NTSTY MODUL RAD TX DLVR CPLX: CPT | Performed by: RADIOLOGY

## 2022-04-15 PROCEDURE — 85027 COMPLETE CBC AUTOMATED: CPT | Performed by: INTERNAL MEDICINE

## 2022-04-15 PROCEDURE — 77014 CHG CT GUIDANCE PLACEMENT RAD THERAPY FIELDS: CPT | Performed by: RADIOLOGY

## 2022-04-15 PROCEDURE — 85007 BL SMEAR W/DIFF WBC COUNT: CPT | Performed by: INTERNAL MEDICINE

## 2022-04-15 PROCEDURE — 77336 RADIATION PHYSICS CONSULT: CPT | Performed by: RADIOLOGY

## 2022-04-15 NOTE — PROGRESS NOTES
Pt presents for lab work  Port flushed with blood return noted  Blood work drawn and sent to lab  Saline locked and de accessed  Aware of future appts  AVS printed and given to pt

## 2022-04-18 ENCOUNTER — APPOINTMENT (OUTPATIENT)
Dept: RADIATION ONCOLOGY | Facility: CLINIC | Age: 77
End: 2022-04-18
Attending: RADIOLOGY
Payer: COMMERCIAL

## 2022-04-18 ENCOUNTER — NUTRITION (OUTPATIENT)
Dept: NUTRITION | Facility: CLINIC | Age: 77
End: 2022-04-18

## 2022-04-18 DIAGNOSIS — C15.5 MALIGNANT NEOPLASM OF LOWER THIRD OF ESOPHAGUS (HCC): Primary | ICD-10-CM

## 2022-04-18 DIAGNOSIS — Z71.3 NUTRITIONAL COUNSELING: Primary | ICD-10-CM

## 2022-04-18 PROCEDURE — 77386 HB NTSTY MODUL RAD TX DLVR CPLX: CPT | Performed by: RADIOLOGY

## 2022-04-18 PROCEDURE — 77014 CHG CT GUIDANCE PLACEMENT RAD THERAPY FIELDS: CPT | Performed by: RADIOLOGY

## 2022-04-18 NOTE — PROGRESS NOTES
Reached out to [place] infusion, spoke with Jerica Fuller to cancel this week's treatment appointment  Will keep rest of the schedule as is  Pt and STAR were both updated of plan

## 2022-04-18 NOTE — PATIENT INSTRUCTIONS
Nutrition Rx & Recommendations:  · Diet: High Calorie, High Protein (for high calorie foods see pages 52-53, and for high protein foods see pages 49-51 in your Eating Hints book)  · High calorie foods to try: peanut butter, whole milk, cheese, butter   (see pages 52-53 in your Eating Hints book)  · High protein foods to try: eggs, chicken, fish, beans/legumes, greek yogurt (see pages 49-51 in your Eating Hints book)  · Continue at least 3 meals per day, work on increasing snacks in between  Add high calorie foods to meals  Orgain oral nutrition supplement at least 2x daily  · Follow proper oral care; Try baking soda/salt water rinse recipe (mix 3/4 tsp salt + 1 tsp baking soda + 1 qt water; rinse with plain water after using) in Eating Hints book (pg 18)  Brush your teeth before/after meals & before bed  · Weigh yourself regularly  If you notice weight loss, make an effort to increase your daily food/calorie intake  If you continue to notice loss after these efforts, reach out to your dietitian to establish a plan to stabilize weight  · Tube Feeding Plan:     · TF Goal: Jevity 1 5 at 90 mL/hr via J-tube cycled over ~20 hours daily (until 7 5 cartons is infused)  · Water Flushin mL free water flush at the beginning and end of TF infusion (250 mL total) plus 125 mL free water flush 5 times per day (625 mL total); (total of 875 mL/day in flushes; flushes should be spread throughout the day and every 2-3 hours during TF infusion; will need to adjust flushes prn for IV fluids)  · Enteral Nutrition goal to provide: 1777 mL volume (7 5 cartons day), 2666 kcal, 113 grams protein, 1350 mL free water, 2225 mL total water daily      Follow Up Plan: 22 follow up  during infusion   Recommend Referral to Other Providers: none at this time

## 2022-04-18 NOTE — PROGRESS NOTES
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 1/13/22  · S/p Jtube placement 2/7/22  · RT began 3/14/22, EOT planned for 4/20/22  · Chemotherapy (carboplatin, taxol) began 3/15/22  Oncology History   Malignant neoplasm of lower third of esophagus ([facility] [address] )   1/13/2022 Initial Diagnosis    Malignant neoplasm of lower third of esophagus ([facility] [address] )     1/13/2022 Biopsy    EGD:   Esophagus, distal:  - 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  RESULTS OF IMMUNOHISTOCHEMICAL ANALYSIS FOR MISMATCH REPAIR PROTEIN LOSS     INTERPRETATION: No loss of nuclear expression of MMR proteins: Low probability of MSI-H     Note: Background non-neoplastic tissue and/or internal control with intact nuclear expression        RESULTS:  Antibody          Clone               Description                           Results  MLH1               M1                   Mismatch repair protein       Intact nuclear expression  MSH2              [id number]       Mismatch repair protein       Intact nuclear expression  MSH6              44                     Mismatch repair protein       Intact nuclear expression  PMS2              [id number]           Mismatch repair protein       Intact nuclear expression     3/15/2022 -  Chemotherapy    CARBOplatin (PARAPLATIN) IVPB (GOG AUC DOSING), 147 6 mg, Intravenous, Once, 5 of 7 cycles  Administration: 147 6 mg (3/15/2022), 196 mg (3/22/2022), 196 mg (3/29/2022), 196 mg (4/5/2022), 196 mg (4/12/2022)  PACLItaxel (TAXOL) chemo IVPB, 50 mg/m2 = 84 6 mg, Intravenous, Once, 5 of 7 cycles  Administration: 84 6 mg (3/15/2022), 85 8 mg (3/22/2022), 85 8 mg (3/29/2022), 85 8 mg (4/5/2022), 85 8 mg (4/12/2022) Past Medical & Surgical Hx:   Patient Active Problem List   Diagnosis    Smoking    Weight loss    Dysphagia    Dermatitis    Malignant neoplasm of lower third of esophagus (HCC)    Mild protein-calorie malnutrition (HCC)    Left ankle pain    Severe protein-calorie malnutrition (HCC)    Chronic pain    Cancer related pain    Palliative care encounter     Past Medical History:   Diagnosis Date    Cancer ([address] )     esophageal ca    Pneumonia     Polio     Rib fractures      Past Surgical History:   Procedure Laterality Date    FL GUIDED CENTRAL VENOUS ACCESS DEVICE INSERTION  2/16/2022    TUNNELED VENOUS PORT PLACEMENT Left 2/16/2022    Procedure: INSERTION VENOUS PORT (PORT-A-CATH);   Surgeon: Martina Snwoden MD;  Location: [facility];  Service: Surgical Oncology       Review of Medications:   Vitamins, Supplements and Herbals: No, pt denies taking supplements    Current Outpatient Medications:     acetaminophen (TYLENOL) 325 mg tablet, Take 325 mg by mouth every 6 (six) hours as needed for mild pain, Disp: , Rfl:     ascorbic acid (VITAMIN C) 500 mg tablet, Take 500 mg by mouth daily, Disp: , Rfl:     aspirin 325 mg tablet, Take 325 mg by mouth 2 (two) times a day, Disp: , Rfl:     bisacodyl (Dulcolax) 10 mg suppository, Insert 10 mg into the rectum daily, Disp: , Rfl:     Cholecalciferol (VITAMIN D3 PO), Take by mouth, Disp: , Rfl:     cyanocobalamin (VITAMIN B-12) 500 MCG tablet, Take 500 mcg by mouth daily, Disp: , Rfl:     lidocaine (LIDODERM) 5 %, Apply 1 patch topically daily Remove & Discard patch within 12 hours or as directed by MD, Disp: , Rfl: 0    magnesium hydroxide (MILK OF MAGNESIA) 400 mg/5 mL oral suspension, Take 30 mL by mouth daily as needed for constipation, Disp: , Rfl:     Multiple Vitamins-Minerals (MULTIVITAMIN WITH MINERALS) tablet, Take 1 tablet by mouth daily, Disp: , Rfl:     ondansetron (Zofran ODT) 8 mg disintegrating tablet, Take 1 tablet (8 mg total) by mouth every 8 (eight) hours as needed for nausea or vomiting Through J tube not by mouth , Disp: 20 tablet, Rfl: 0    pantoprazole (PROTONIX) 40 mg tablet, Take 1 tablet (40 mg total) by mouth 2 (two) times a day, Disp: , Rfl: 0    triamcinolone (KENALOG) 0 5 % cream, Apply topically 2 (two) times a day, Disp: 30 g, Rfl: 0    Most Recent Lab Results:   Lab Results   Component Value Date    WBC 2 29 (L) 04/15/2022    NEUTROABS 2 71 04/08/2022    ALT 16 04/15/2022    AST 13 04/15/2022    ALB 3 4 (L) 04/15/2022    SODIUM 137 04/15/2022    SODIUM 134 (L) 04/08/2022    K 4 3 04/15/2022    K 4 2 04/08/2022     04/15/2022    BUN 18 04/15/2022    BUN 15 04/08/2022    CREATININE 0 58 (L) 04/15/2022    CREATININE 0 55 (L) 04/08/2022    EGFR 99 04/15/2022    PHOS 3 5 02/07/2022    POCGLU 141 (H) 02/01/2022    GLUC 128 04/15/2022    CALCIUM 8 6 04/15/2022    MG 2 4 02/07/2022       Anthropometric Measurements:   Height: 70"  Ht Readings from Last 1 Encounters:   04/12/22 5' 10" (1 778 m)     Wt Readings from Last 20 Encounters:   04/12/22 59 kg (130 lb)   04/12/22 59 1 kg (130 lb 6 4 oz)   04/11/22 59 kg (130 lb)   04/05/22 60 2 kg (132 lb 12 8 oz)   03/29/22 60 5 kg (133 lb 6 4 oz)   03/25/22 58 1 kg (128 lb)   03/22/22 57 5 kg (126 lb 12 8 oz)   03/15/22 55 4 kg (122 lb 3 2 oz)   03/07/22 55 3 kg (122 lb)   02/17/22 60 3 kg (133 lb)   02/16/22 58 8 kg (129 lb 9 6 oz)   02/14/22 61 2 kg (135 lb)   02/06/22 59 5 kg (131 lb 2 8 oz)   02/03/22 66 7 kg (147 lb)   01/13/22 67 kg (147 lb 11 3 oz)   01/10/22 66 4 kg (146 lb 6 4 oz)   01/06/22 65 8 kg (145 lb)   01/07/21 80 9 kg (178 lb 6 4 oz)   12/09/20 79 4 kg (175 lb)   10/18/19 78 8 kg (173 lb 12 8 oz)       Weight History:    Usual Weight: 175#   Varian: (3/14/22) 124#, (3/17/22) 120 5#, (3/21/22) 126#, (3/22/22) 127#, (3/24/22) 128#, (3/28/22) 131#, (3/29/22) 134#, (3/30/22) 134#, (3/31/22) 134#, (4/4/22) 133#, (4/5/22) 133#, (4/7/22) 132#, (4/11/22) 130#, (4/13/22) 130#, (4/14/22) 130 5#, (4/18/22) 130#       Home Scale: none     Oncology Nutrition-Anthropometrics      Nutrition from 4/18/2022 in [address] [place] [address] Nutrition from 4/12/2022 in [place] [address] Oncology Dietitian Perlita   Patient age (years): 68 years 68 years   Patient (male) height (in): 79 in 79 in   Current weight (lbs): 130 lbs 130 lbs   Current weight to be used for anthropometric calculations (kg) 59 1 kg 59 1 kg   BMI: 18 7 18 7   IBW male 166 lb 166 lb   IBW (kg) male 75 5 kg 75 5 kg   IBW % (male) 78 3 % 78 3 %   Adjusted BW (male): 157 lbs 157 lbs   Adjusted BW in kg (male): 71 4 kg 71 4 kg   % weight change after 1 week: 0 % -2 1 %   Weight change after 1 week (lbs) 0 lbs -2 8 lbs   % weight change after 1 month: 7 9 % 6 4 %   Weight change after 1 month (lbs) 9 5 lbs 7 8 lbs   % weight change after 3 months: -12 % -11 2 %   Weight change after 3 months (lbs) -17 7 lbs -16 4 lbs   % weight change after 6 months: -- -25 2 %   Weight change after 6 months (lbs) -- -43 9 lbs          Nutrition-Focused Physical Findings: severe muscle depletion ([place]) - hollowing/scooping/depression    Food/Nutrition-Related History & Client/Social History:    Current Nutrition Impact Symptoms:  [] Nausea -has zofran  [] Reduced Appetite  [] Acid Reflux    [] Vomiting  [x] Unintended Wt Loss-significant x1 week, x3 months [] Malabsorption    [] Diarrhea  [] Unintended Wt Gain  [] Dumping Syndrome    [] Constipation  [] Thick Mucous/Secretions  [] Abdominal Pain    [] Dysgeusia (Altered Taste)  [x] Xerostomia (Dry Mouth)  [] Gas    [] Dysosmia (Altered Smell)  [] Thrush  [] Difficulty Chewing    [] Oral Mucositis (Sore Mouth)  [] Fatigue  [x] Hyperglycemia: BG nonfasting 128mg/dL on 4/15/22      [] Odynophagia  [] Esophagitis  [] Other:    [] Dysphagia   -hx  -Jtube in place for nutrition [] Early Satiety  [] No Problems Eating      Food Allergies & Intolerances: no    Current Diet: Soft/Moist and Tube Feeding  Current Nutrition Intake: Increased since last visit (PO); TF unchanged   Appetite: Fair    Nutrition Route: PO and J-Tube  Oral Care: brushes BID  Activity level: Uses carney to walk, but reports that he is recently more steady on his feet  24 Hr Diet Recall:   Breakfast: oatmeal   Lunch: grilled cheese OR sausage and eggs   Dinner: ham, sweet potatoes  Snack: pudding     Beverages: coffee (8oz x1), water (did not quantify)    EN Recall:  DME: [facility]   Access Type: Jtube   Formula: Jevity 1 5  Method: Cyclic  Rate: 90 mL/hr until 7 5 cartons are infused (about 20hrs)   Flush: 95 mL free water flush pre infusion, plus 95mL free water every 2hr (950mL)  EN providin mL volume (7 5 cartons/day), 2662 kcal, 113g protein, 1350 mL free water, 2300mL total water  Oncology Nutrition-Estimated Needs      Nutrition from 2022 in [address] [place] [address] Nutrition from 2022 in [place] [address] Oncology Dietitian Perlita   Weight type used Actual weight Actual weight   Weight in kilograms (kg) used for estimated needs 59 1 kg 59 1 kg   Energy needs formula:  35-40 kcal/kg 35-40 kcal/kg   Energy needs based on 35 kcal/k kcal 2068 kcal   Energy needs based on 40 kcal/k kcal 2364 kcal   Protein needs formula: 1 5-2 g/kg 1 5-2 g/kg   Protein needs based on 1 5 g/kg 89 g 89 g   Protein needs based on 2 g/kg 118 g 118 g   Fluid needs formula: 30-35 mL/kg 30-35 mL/kg   Fluid needs based on 30 mL/kg 1773 mL 1773 mL   Fluid needs in ounces 60 oz 60 oz   Fluid needs based on 35 mL/kg 2069 mL 2069 mL   Fluid needs in ounces 70 oz 70 oz           Discussion & Intervention:   Jose Boothe was evaluated today for an RD follow up regarding wt loss, Esophageal Cancer and enteral nutrition  Jose Boothe is currently undergoing tx for EC  Today Jose Boothe explains that he continues his efforts to eat more by mouth, he explains that he is now consistently eating 3 meals per day   He also continues to run his enteral nutrition at 90mL/hr for about 20 hrs until 7 5 cartons of Jevity 1 5 are infused and reports no issues with tolerance  His current enteral nutrition intake + PO intakes are maintaining his weight  He would like to know when he can start decreasing his TF intake  Explained that at this time, my suggestion is to continue TF at current regimen and work on increasing oral intakes to gain weight  Suggested he add high calorie foods to his meals such as peanut butter, cheese, butter  Also suggested intake of oral nutrition supplements to aid in calorie and protein intake in between meals  He has Orgain oral nutrition supplement at home, suggested he have this 2x daily  When he is able to consistently gain weight with increasing oral intakes, we can start to slowly wean tube feeding  Félix Godinez is agreeable to this plan  Enteral nutrition recommendations:   · TF Goal: Jevity 1 5 at 90 mL/hr via J-tube cycled over ~20 hours daily (until 7 5 cartons is infused)  · Water Flushin mL free water flush at the beginning and end of TF infusion (250 mL total) plus 125 mL free water flush 5 times per day (625 mL total); (total of 875 mL/day in flushes; flushes should be spread throughout the day and every 2-3 hours during TF infusion; will need to adjust flushes prn for IV fluids)  · Enteral Nutrition goal to provide: 1777 mL volume (7 5 cartons day), 2666 kcal, 113 grams protein, 1350 mL free water, 2225 mL total water daily  *Pt requires  an enteral feeding pump to administer TF due to J-Tube  Moving forward, Félix Godinez was encouraged to continue soft/high calorie/high protein foods much as tolerated and continue enteral nutrition intake at goal    Materials Provided: not applicable  All questions and concerns addressed during todays visit  Félix Godinez has RD contact information      Nutrition Diagnosis:    Increased Nutrient Needs (kcal & pro) related to increased demand for nutrients and disease state as evidenced by cancer dx and pt undergoing tx for cancer   Patient has clinical indicators (or ASPEN criteria) consistent with severe protein-calorie malnutrition in the context of Chronic Illness as evidenced by >7 5% wt loss in 3 months and severe muscle depletion ([place]) - hollowing/scooping/depression  Monitoring & Evaluation:   Goals:  · weight maintenance/stabilization  · pt to meet >/=75% estimated nutrition needs daily  · increase oral intakes    · Progress Towards Goals: Progressing and Goal(s) Met    Nutrition Rx & Recommendations:  · Diet: High Calorie, High Protein (for high calorie foods see pages 52-53, and for high protein foods see pages 49-51 in your Eating Hints book)  · High calorie foods to try: peanut butter, whole milk, cheese, butter   (see pages 52-53 in your Eating Hints book)  · High protein foods to try: eggs, chicken, fish, beans/legumes, greek yogurt (see pages 49-51 in your Eating Hints book)  · Continue at least 3 meals per day, work on increasing snacks in between  Add high calorie foods to meals  Orgain oral nutrition supplement at least 2x daily  · Follow proper oral care; Try baking soda/salt water rinse recipe (mix 3/4 tsp salt + 1 tsp baking soda + 1 qt water; rinse with plain water after using) in Eating Hints book (pg 18)  Brush your teeth before/after meals & before bed  · Weigh yourself regularly  If you notice weight loss, make an effort to increase your daily food/calorie intake  If you continue to notice loss after these efforts, reach out to your dietitian to establish a plan to stabilize weight  · Tube Feeding Plan:     · TF Goal: Jevity 1 5 at 90 mL/hr via J-tube cycled over ~20 hours daily (until 7 5 cartons is infused)    · Water Flushin mL free water flush at the beginning and end of TF infusion (250 mL total) plus 125 mL free water flush 5 times per day (625 mL total); (total of 875 mL/day in flushes; flushes should be spread throughout the day and every 2-3 hours during TF infusion; will need to adjust flushes prn for IV fluids)  · Enteral Nutrition goal to provide: 1777 mL volume (7 5 cartons day), 2666 kcal, 113 grams protein, 1350 mL free water, 2225 mL total water daily      Follow Up Plan: 4/26/22 follow up  during infusion   Recommend Referral to Other Providers: none at this time

## 2022-04-18 NOTE — PROGRESS NOTES
Infusion reached out to office due to low hmg values  Reviewed with Dr Natalio Whiting  Per Dr Natalio Whiting recommendation  Deferred treatment this week due to low hmg and platelets  Called and notified patient of deferring of treatment for one week  Pt agreeable of plan and appreciative of call  Infusion notified  [facility] dispatched notified of patient treatment appointment on 4/19/22 to be cancelled  Will update [facility] and patient with new appointment date, if needed

## 2022-04-19 ENCOUNTER — APPOINTMENT (OUTPATIENT)
Dept: RADIATION ONCOLOGY | Facility: CLINIC | Age: 77
End: 2022-04-19
Attending: RADIOLOGY
Payer: COMMERCIAL

## 2022-04-19 ENCOUNTER — HOSPITAL ENCOUNTER (OUTPATIENT)
Dept: INFUSION CENTER | Facility: CLINIC | Age: 77
Discharge: HOME | End: 2022-04-19

## 2022-04-19 PROCEDURE — 77386 HB NTSTY MODUL RAD TX DLVR CPLX: CPT | Performed by: RADIOLOGY

## 2022-04-19 PROCEDURE — 77014 CHG CT GUIDANCE PLACEMENT RAD THERAPY FIELDS: CPT | Performed by: RADIOLOGY

## 2022-04-20 ENCOUNTER — APPOINTMENT (OUTPATIENT)
Dept: RADIATION ONCOLOGY | Facility: CLINIC | Age: 77
End: 2022-04-20
Attending: RADIOLOGY
Payer: COMMERCIAL

## 2022-04-20 PROCEDURE — 77336 RADIATION PHYSICS CONSULT: CPT | Performed by: RADIOLOGY

## 2022-04-20 PROCEDURE — 77386 HB NTSTY MODUL RAD TX DLVR CPLX: CPT | Performed by: RADIOLOGY

## 2022-04-22 ENCOUNTER — HOSPITAL ENCOUNTER (OUTPATIENT)
Dept: INFUSION CENTER | Facility: CLINIC | Age: 77
Discharge: HOME | End: 2022-04-22
Payer: COMMERCIAL

## 2022-04-22 DIAGNOSIS — C15.5 MALIGNANT NEOPLASM OF LOWER THIRD OF ESOPHAGUS (HCC): Primary | ICD-10-CM

## 2022-04-22 LAB
ALBUMIN SERPL BCP-MCNC: 3.4 G/DL (ref 3.5–5)
ALP SERPL-CCNC: 66 U/L (ref 46–116)
ALT SERPL W P-5'-P-CCNC: 16 U/L (ref 12–78)
ANION GAP SERPL CALCULATED.3IONS-SCNC: 7 MMOL/L (ref 4–13)
AST SERPL W P-5'-P-CCNC: 10 U/L (ref 5–45)
BASOPHILS # BLD AUTO: 0.01 THOUSANDS/ΜL (ref 0–0.1)
BASOPHILS NFR BLD AUTO: 0 % (ref 0–1)
BILIRUB SERPL-MCNC: 0.36 MG/DL (ref 0.2–1)
BUN SERPL-MCNC: 21 MG/DL (ref 5–25)
CALCIUM ALBUM COR SERPL-MCNC: 9 MG/DL (ref 8.3–10.1)
CALCIUM SERPL-MCNC: 8.5 MG/DL (ref 8.3–10.1)
CHLORIDE SERPL-SCNC: 102 MMOL/L (ref 100–108)
CO2 SERPL-SCNC: 27 MMOL/L (ref 21–32)
CREAT SERPL-MCNC: 0.7 MG/DL (ref 0.6–1.3)
EOSINOPHIL # BLD AUTO: 0.01 THOUSAND/ΜL (ref 0–0.61)
EOSINOPHIL NFR BLD AUTO: 0 % (ref 0–6)
ERYTHROCYTE [DISTWIDTH] IN BLOOD BY AUTOMATED COUNT: 20.9 % (ref 11.6–15.1)
GFR SERPL CREATININE-BSD FRML MDRD: 91 ML/MIN/1.73SQ M
GLUCOSE SERPL-MCNC: 122 MG/DL (ref 65–140)
HCT VFR BLD AUTO: 22.3 % (ref 36.5–49.3)
HGB BLD-MCNC: 7.3 G/DL (ref 12–17)
IMM GRANULOCYTES # BLD AUTO: 0.04 THOUSAND/UL (ref 0–0.2)
IMM GRANULOCYTES NFR BLD AUTO: 1 % (ref 0–2)
LYMPHOCYTES # BLD AUTO: 0.31 THOUSANDS/ΜL (ref 0.6–4.47)
LYMPHOCYTES NFR BLD AUTO: 9 % (ref 14–44)
MCH RBC QN AUTO: 31.6 PG (ref 26.8–34.3)
MCHC RBC AUTO-ENTMCNC: 32.7 G/DL (ref 31.4–37.4)
MCV RBC AUTO: 97 FL (ref 82–98)
MONOCYTES # BLD AUTO: 0.63 THOUSAND/ΜL (ref 0.17–1.22)
MONOCYTES NFR BLD AUTO: 19 % (ref 4–12)
NEUTROPHILS # BLD AUTO: 2.32 THOUSANDS/ΜL (ref 1.85–7.62)
NEUTS SEG NFR BLD AUTO: 71 % (ref 43–75)
NRBC BLD AUTO-RTO: 0 /100 WBCS
PLATELET # BLD AUTO: 139 THOUSANDS/UL (ref 149–390)
PMV BLD AUTO: 10.5 FL (ref 8.9–12.7)
POTASSIUM SERPL-SCNC: 4.4 MMOL/L (ref 3.5–5.3)
PROT SERPL-MCNC: 7 G/DL (ref 6.4–8.2)
RBC # BLD AUTO: 2.31 MILLION/UL (ref 3.88–5.62)
SODIUM SERPL-SCNC: 136 MMOL/L (ref 136–145)
WBC # BLD AUTO: 3.32 THOUSAND/UL (ref 4.31–10.16)

## 2022-04-22 PROCEDURE — 80053 COMPREHEN METABOLIC PANEL: CPT | Performed by: INTERNAL MEDICINE

## 2022-04-22 PROCEDURE — 85025 COMPLETE CBC W/AUTO DIFF WBC: CPT | Performed by: INTERNAL MEDICINE

## 2022-04-25 DIAGNOSIS — D64.81 ANEMIA DUE TO ANTINEOPLASTIC CHEMOTHERAPY: Primary | ICD-10-CM

## 2022-04-25 DIAGNOSIS — T45.1X5A ANEMIA DUE TO ANTINEOPLASTIC CHEMOTHERAPY: Primary | ICD-10-CM

## 2022-04-25 RX ORDER — SODIUM CHLORIDE 9 MG/ML
20 INJECTION, SOLUTION INTRAVENOUS ONCE
Status: CANCELLED | OUTPATIENT
Start: 2022-04-26

## 2022-04-25 RX ORDER — ACETAMINOPHEN 325 MG/1
650 TABLET ORAL ONCE
Status: CANCELLED | OUTPATIENT
Start: 2022-04-26

## 2022-04-25 NOTE — PROGRESS NOTES
Reviewed with infusion Stat Type and screen and Blood bank hold tube ordered for transfusion for 4/26/22

## 2022-04-26 ENCOUNTER — NUTRITION (OUTPATIENT)
Dept: NUTRITION | Facility: CLINIC | Age: 77
End: 2022-04-26

## 2022-04-26 ENCOUNTER — HOSPITAL ENCOUNTER (OUTPATIENT)
Dept: INFUSION CENTER | Facility: CLINIC | Age: 77
Discharge: HOME | End: 2022-04-26
Payer: COMMERCIAL

## 2022-04-26 VITALS
HEIGHT: 70 IN | DIASTOLIC BLOOD PRESSURE: 58 MMHG | RESPIRATION RATE: 18 BRPM | TEMPERATURE: 97.8 F | HEART RATE: 73 BPM | SYSTOLIC BLOOD PRESSURE: 117 MMHG | BODY MASS INDEX: 18.84 KG/M2 | WEIGHT: 131.61 LBS

## 2022-04-26 DIAGNOSIS — T45.1X5A ANEMIA DUE TO ANTINEOPLASTIC CHEMOTHERAPY: ICD-10-CM

## 2022-04-26 DIAGNOSIS — Z71.3 NUTRITIONAL COUNSELING: Primary | ICD-10-CM

## 2022-04-26 DIAGNOSIS — D64.81 ANEMIA DUE TO ANTINEOPLASTIC CHEMOTHERAPY: ICD-10-CM

## 2022-04-26 DIAGNOSIS — C15.5 MALIGNANT NEOPLASM OF LOWER THIRD OF ESOPHAGUS (HCC): Primary | ICD-10-CM

## 2022-04-26 LAB
ABO GROUP BLD: NORMAL
ABO GROUP BLD: NORMAL
BLD GP AB SCN SERPL QL: NEGATIVE
RH BLD: POSITIVE
RH BLD: POSITIVE
SPECIMEN EXPIRATION DATE: NORMAL

## 2022-04-26 PROCEDURE — 86920 COMPATIBILITY TEST SPIN: CPT

## 2022-04-26 PROCEDURE — 86901 BLOOD TYPING SEROLOGIC RH(D): CPT | Performed by: INTERNAL MEDICINE

## 2022-04-26 PROCEDURE — 96417 CHEMO IV INFUS EACH ADDL SEQ: CPT

## 2022-04-26 PROCEDURE — P9040 RBC LEUKOREDUCED IRRADIATED: HCPCS

## 2022-04-26 PROCEDURE — 86850 RBC ANTIBODY SCREEN: CPT | Performed by: INTERNAL MEDICINE

## 2022-04-26 PROCEDURE — 96367 TX/PROPH/DG ADDL SEQ IV INF: CPT

## 2022-04-26 PROCEDURE — 86900 BLOOD TYPING SEROLOGIC ABO: CPT | Performed by: INTERNAL MEDICINE

## 2022-04-26 PROCEDURE — 36430 TRANSFUSION BLD/BLD COMPNT: CPT

## 2022-04-26 PROCEDURE — 96413 CHEMO IV INFUSION 1 HR: CPT

## 2022-04-26 RX ORDER — DIPHENHYDRAMINE HCL 25 MG
25 CAPSULE ORAL ONCE
Status: CANCELLED
Start: 2022-04-26 | End: 2022-04-26

## 2022-04-26 RX ORDER — SODIUM CHLORIDE 9 MG/ML
20 INJECTION, SOLUTION INTRAVENOUS ONCE
Status: CANCELLED | OUTPATIENT
Start: 2022-05-03

## 2022-04-26 RX ORDER — ACETAMINOPHEN 325 MG/1
650 TABLET ORAL ONCE
Status: COMPLETED | OUTPATIENT
Start: 2022-04-26 | End: 2022-04-26

## 2022-04-26 RX ORDER — DIPHENHYDRAMINE HCL 25 MG
25 TABLET ORAL ONCE
Status: COMPLETED | OUTPATIENT
Start: 2022-04-26 | End: 2022-04-26

## 2022-04-26 RX ORDER — SODIUM CHLORIDE 9 MG/ML
20 INJECTION, SOLUTION INTRAVENOUS ONCE
Status: COMPLETED | OUTPATIENT
Start: 2022-04-26 | End: 2022-04-26

## 2022-04-26 RX ORDER — SODIUM CHLORIDE 9 MG/ML
20 INJECTION, SOLUTION INTRAVENOUS ONCE
Status: CANCELLED | OUTPATIENT
Start: 2022-04-26

## 2022-04-26 RX ORDER — ACETAMINOPHEN 325 MG/1
650 TABLET ORAL ONCE
Status: CANCELLED | OUTPATIENT
Start: 2022-04-26

## 2022-04-26 RX ADMIN — SODIUM CHLORIDE 20 ML/HR: 0.9 INJECTION, SOLUTION INTRAVENOUS at 09:13

## 2022-04-26 RX ADMIN — DEXAMETHASONE SODIUM PHOSPHATE: 10 INJECTION, SOLUTION INTRAMUSCULAR; INTRAVENOUS at 09:39

## 2022-04-26 RX ADMIN — CARBOPLATIN 200 MG: 10 INJECTION, SOLUTION INTRAVENOUS at 12:31

## 2022-04-26 RX ADMIN — SODIUM CHLORIDE 20 ML/HR: 0.9 INJECTION, SOLUTION INTRAVENOUS at 13:10

## 2022-04-26 RX ADMIN — DIPHENHYDRAMINE HYDROCHLORIDE 25 MG: 25 TABLET ORAL at 13:10

## 2022-04-26 RX ADMIN — FAMOTIDINE 20 MG: 10 INJECTION INTRAVENOUS at 10:24

## 2022-04-26 RX ADMIN — PACLITAXEL 87 MG: 6 INJECTION, SOLUTION, CONCENTRATE INTRAVENOUS at 11:18

## 2022-04-26 RX ADMIN — ACETAMINOPHEN 650 MG: 325 TABLET ORAL at 13:10

## 2022-04-26 RX ADMIN — DIPHENHYDRAMINE HYDROCHLORIDE 25 MG: 50 INJECTION, SOLUTION INTRAMUSCULAR; INTRAVENOUS at 10:02

## 2022-04-26 NOTE — PROGRESS NOTES
Pt to clinic for 1 unit of blood, offers no complaints today, tolerated transfusions without complications, VSS  AVS printed, next appointment reviewed

## 2022-04-26 NOTE — PROGRESS NOTES
Taxol and carboplatin given without incident  No S/S of adverse reaction  Pt will receive 1 unit of PRBC's prior to D/C today

## 2022-04-26 NOTE — PATIENT INSTRUCTIONS
Nutrition Rx & Recommendations:  · Diet: High Calorie, High Protein (for high calorie foods see pages 52-53, and for high protein foods see pages 49-51 in your Eating Hints book)  · High calorie foods to try: peanut butter, whole milk, cheese, butter, olive oil  (see pages 52-53 in your Eating Hints book)  · High protein foods to try: eggs, chicken, fish, beans/legumes, greek yogurt (see pages 49-51 in your Eating Hints book)  · Continue at least 3 meals per day, work on increasing snacks in between  Add high calorie foods to meals  Orgain oral nutrition supplement at least 2x daily  · Follow proper oral care; Try baking soda/salt water rinse recipe (mix 3/4 tsp salt + 1 tsp baking soda + 1 qt water; rinse with plain water after using) in Eating Hints book (pg 18)  Brush your teeth before/after meals & before bed  · Weigh yourself regularly  If you notice weight loss, make an effort to increase your daily food/calorie intake  If you continue to notice loss after these efforts, reach out to your dietitian to establish a plan to stabilize weight  · Tube Feeding Plan:     · TF Goal: Jevity 1 5 at 90 mL/hr via J-tube cycled over ~20 hours daily (until 7 5 cartons is infused)  · Water Flushin mL free water flush at the beginning and end of TF infusion (250 mL total) plus 125 mL free water flush 5 times per day (625 mL total); (total of 875 mL/day in flushes; flushes should be spread throughout the day and every 2-3 hours during TF infusion; will need to adjust flushes prn for IV fluids)  · Enteral Nutrition goal to provide: 1777 mL volume (7 5 cartons day), 2666 kcal, 113 grams protein, 1350 mL free water, 2225 mL total water daily      Follow Up Plan: 22 phone follow up    Recommend Referral to Other Providers: none at this time

## 2022-04-26 NOTE — PROGRESS NOTES
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 1/13/22  · S/p Jtube placement 2/7/22  · RT began 3/14/22, EOT 4/20/22  · Chemotherapy (carboplatin, taxol) began 3/15/22  Oncology History   Malignant neoplasm of lower third of esophagus ([facility] [address] )   1/13/2022 Initial Diagnosis    Malignant neoplasm of lower third of esophagus ([facility] [address] )     1/13/2022 Biopsy    EGD:   Esophagus, distal:  - 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  RESULTS OF IMMUNOHISTOCHEMICAL ANALYSIS FOR MISMATCH REPAIR PROTEIN LOSS     INTERPRETATION: No loss of nuclear expression of MMR proteins: Low probability of MSI-H     Note: Background non-neoplastic tissue and/or internal control with intact nuclear expression        RESULTS:  Antibody          Clone               Description                           Results  MLH1               M1                   Mismatch repair protein       Intact nuclear expression  MSH2              [id number]       Mismatch repair protein       Intact nuclear expression  MSH6              44                     Mismatch repair protein       Intact nuclear expression  PMS2              [id number]           Mismatch repair protein       Intact nuclear expression     3/15/2022 -  Chemotherapy    CARBOplatin (PARAPLATIN) IVPB (GOG AUC DOSING), 147 6 mg, Intravenous, Once, 6 of 7 cycles  Administration: 147 6 mg (3/15/2022), 196 mg (3/22/2022), 196 mg (3/29/2022), 196 mg (4/5/2022), 196 mg (4/12/2022)  PACLItaxel (TAXOL) chemo IVPB, 50 mg/m2 = 84 6 mg, Intravenous, Once, 6 of 7 cycles  Administration: 84 6 mg (3/15/2022), 85 8 mg (3/22/2022), 85 8 mg (3/29/2022), 85 8 mg (4/5/2022), 85 8 mg (4/12/2022)       Past Medical & Surgical Hx:   Patient Active Problem List   Diagnosis    Smoking    Weight loss    Dysphagia    Dermatitis    Malignant neoplasm of lower third of esophagus (HCC)    Mild protein-calorie malnutrition (HCC)    Left ankle pain    Severe protein-calorie malnutrition (HCC)    Chronic pain    Cancer related pain    Palliative care encounter    Anemia due to antineoplastic chemotherapy     Past Medical History:   Diagnosis Date    Cancer ([address] )     esophageal ca    Pneumonia     Polio     Rib fractures      Past Surgical History:   Procedure Laterality Date    FL GUIDED CENTRAL VENOUS ACCESS DEVICE INSERTION  2/16/2022    TUNNELED VENOUS PORT PLACEMENT Left 2/16/2022    Procedure: INSERTION VENOUS PORT (PORT-A-CATH);   Surgeon: Sandra Hernandez MD;  Location: [facility];  Service: Surgical Oncology       Review of Medications:   Vitamins, Supplements and Herbals: No, pt denies taking supplements    Current Outpatient Medications:     acetaminophen (TYLENOL) 325 mg tablet, Take 325 mg by mouth every 6 (six) hours as needed for mild pain, Disp: , Rfl:     ascorbic acid (VITAMIN C) 500 mg tablet, Take 500 mg by mouth daily, Disp: , Rfl:     aspirin 325 mg tablet, Take 325 mg by mouth 2 (two) times a day, Disp: , Rfl:     bisacodyl (Dulcolax) 10 mg suppository, Insert 10 mg into the rectum daily, Disp: , Rfl:     Cholecalciferol (VITAMIN D3 PO), Take by mouth, Disp: , Rfl:     cyanocobalamin (VITAMIN B-12) 500 MCG tablet, Take 500 mcg by mouth daily, Disp: , Rfl:     lidocaine (LIDODERM) 5 %, Apply 1 patch topically daily Remove & Discard patch within 12 hours or as directed by MD, Disp: , Rfl: 0    magnesium hydroxide (MILK OF MAGNESIA) 400 mg/5 mL oral suspension, Take 30 mL by mouth daily as needed for constipation, Disp: , Rfl:     Multiple Vitamins-Minerals (MULTIVITAMIN WITH MINERALS) tablet, Take 1 tablet by mouth daily, Disp: , Rfl:     ondansetron (Zofran ODT) 8 mg disintegrating tablet, Take 1 tablet (8 mg total) by mouth every 8 (eight) hours as needed for nausea or vomiting Through J tube not by mouth , Disp: 20 tablet, Rfl: 0    pantoprazole (PROTONIX) 40 mg tablet, Take 1 tablet (40 mg total) by mouth 2 (two) times a day, Disp: , Rfl: 0    triamcinolone (KENALOG) 0 5 % cream, Apply topically 2 (two) times a day, Disp: 30 g, Rfl: 0  No current facility-administered medications for this visit      Facility-Administered Medications Ordered in Other Visits:     CARBOplatin (PARAPLATIN) 200 mg in sodium chloride 0 9 % 250 mL IVPB, 200 mg, Intravenous, Once, Anthony Sanford MD    PACLitaxel (TAXOL) 87 mg in sodium chloride 0 9 % 250 mL chemo IVPB, 50 mg/m2 (Treatment Plan Recorded), Intravenous, Once, Anthony Sanford MD    Most Recent Lab Results:   Lab Results   Component Value Date    WBC 3 32 (L) 04/22/2022    NEUTROABS 2 32 04/22/2022    ALT 16 04/22/2022    AST 10 04/22/2022    ALB 3 4 (L) 04/22/2022    SODIUM 136 04/22/2022    SODIUM 137 04/15/2022    K 4 4 04/22/2022    K 4 3 04/15/2022     04/22/2022    BUN 21 04/22/2022    BUN 18 04/15/2022    CREATININE 0 70 04/22/2022    CREATININE 0 58 (L) 04/15/2022    EGFR 91 04/22/2022    PHOS 3 5 02/07/2022    POCGLU 141 (H) 02/01/2022    GLUC 122 04/22/2022    CALCIUM 8 5 04/22/2022    MG 2 4 02/07/2022       Anthropometric Measurements:   Height: 70"  Ht Readings from Last 1 Encounters:   04/26/22 5' 10" (1 778 m)     Wt Readings from Last 20 Encounters:   04/26/22 59 7 kg (131 lb 9 8 oz)   04/12/22 59 kg (130 lb)   04/12/22 59 1 kg (130 lb 6 4 oz)   04/11/22 59 kg (130 lb)   04/05/22 60 2 kg (132 lb 12 8 oz)   03/29/22 60 5 kg (133 lb 6 4 oz)   03/25/22 58 1 kg (128 lb)   03/22/22 57 5 kg (126 lb 12 8 oz)   03/15/22 55 4 kg (122 lb 3 2 oz)   03/07/22 55 3 kg (122 lb)   02/17/22 60 3 kg (133 lb)   02/16/22 58 8 kg (129 lb 9 6 oz)   02/14/22 61 2 kg (135 lb)   02/06/22 59 5 kg (131 lb 2 8 oz)   02/03/22 66 7 kg (147 lb)   01/13/22 67 kg (147 lb 11 3 oz)   01/10/22 66 4 kg (146 lb 6 4 oz)   01/06/22 65 8 kg (145 lb)   01/07/21 80 9 kg (178 lb 6 4 oz)   12/09/20 79 4 kg (175 lb)       Weight History:    Usual Weight: 175#   Varian: (3/14/22) 124#, (3/17/22) 120 5#, (3/21/22) 126#, (3/22/22) 127#, (3/24/22) 128#, (3/28/22) 131#, (3/29/22) 134#, (3/30/22) 134#, (3/31/22) 134#, (4/4/22) 133#, (4/5/22) 133#, (4/7/22) 132#, (4/11/22) 130#, (4/13/22) 130#, (4/14/22) 130 5#, (4/18/22) 130#       Home Scale: none     Oncology Nutrition-Anthropometrics      Nutrition from 4/26/2022 in [place] [address] Oncology Dietitian [place] Nutrition from 4/18/2022 in [place] [address] Oncology Dietitian [place]   Patient age (years): 68 years 68 years   Patient (male) height (in): 79 in 79 in   Current weight (lbs): 131 6 lbs 130 lbs   Current weight to be used for anthropometric calculations (kg) 59 8 kg 59 1 kg   BMI: 18 9 18 7   IBW male 166 lb 166 lb   IBW (kg) male 75 5 kg 75 5 kg   IBW % (male) 79 3 % 78 3 %   Adjusted BW (male): 157 4 lbs 157 lbs   Adjusted BW in kg (male): 71 5 kg 71 4 kg   % weight change after 1 week: 1 2 % 0 %   Weight change after 1 week (lbs) 1 6 lbs 0 lbs   % weight change after 1 month: 2 8 % 7 9 %   Weight change after 1 month (lbs) 3 6 lbs 9 5 lbs   % weight change after 3 months: -10 9 % -12 %   Weight change after 3 months (lbs) -16 1 lbs -17 7 lbs          Nutrition-Focused Physical Findings: severe muscle depletion ([place]) - hollowing/scooping/depression    Food/Nutrition-Related History & Client/Social History:    Current Nutrition Impact Symptoms:  [] Nausea -has zofran  [] Reduced Appetite  [] Acid Reflux    [] Vomiting  [x] Unintended Wt Loss-significant x3 months [] Malabsorption    [] Diarrhea  [] Unintended Wt Gain  [] Dumping Syndrome    [] Constipation  [] Thick Mucous/Secretions  [] Abdominal Pain    [] Dysgeusia (Altered Taste)  [x] Xerostomia (Dry Mouth)  [] Gas    [] Dysosmia (Altered Smell)  [] Thrush  [] Difficulty Chewing [] Oral Mucositis (Sore Mouth)  [] Fatigue  [] Hyperglycemia: BG nonfasting 122mg/dL on 22  [] Odynophagia  [] Esophagitis  [] Other:    [] Dysphagia   -hx  -Jtube in place for nutrition [] Early Satiety  [] No Problems Eating      Food Allergies & Intolerances: no    Current Diet: Soft/Moist and Tube Feeding  Current Nutrition Intake: Unchanged from last visit   Appetite: Fair    Nutrition Route: PO and J-Tube  Oral Care: brushes BID  Activity level: Uses carney to walk, but reports that he is recently more steady on his feet  24 Hr Diet Recall:   Breakfast: oatmeal OR eggs and perry   Lunch: fruit salad: apples, banana, blueberries  Snack: small size chocolate bars   Dinner: ham, potatoes, cabbage   Snack: ice cream     Beverages: coffee (8oz x1), water (did not quantify)  Nutrition supplement: Orgain ready to drink shake (250 kcal, 16g pro) 1x daily     EN Recall:  [facility]   Access Type: Jtube   Formula: Jevity 1 5  Method: Cyclic  Rate: 90 mL/hr until 7 5 cartons are infused (about 20hrs)   Flush: 95 mL free water flush pre infusion, plus 95mL free water every 2hr (950mL)  EN providin mL volume (7 5 cartons/day), 2662 kcal, 113g protein, 1350 mL free water, 2300mL total water      Oncology Nutrition-Estimated Needs      Nutrition from 2022 in [address] [place] [address] Nutrition from 2022 in [place] [address] Oncology Dietitian [place]   Weight type used Actual weight Actual weight   Weight in kilograms (kg) used for estimated needs 59 8 kg 59 1 kg   Energy needs formula:  35-40 kcal/kg 35-40 kcal/kg   Energy needs based on 35 kcal/k kcal 2068 kcal   Energy needs based on 40 kcal/k kcal 2364 kcal   Protein needs formula: 1 5-2 g/kg 1 5-2 g/kg   Protein needs based on 1 5 g/kg 90 g 89 g   Protein needs based on 2 g/kg 120 g 118 g   Fluid needs formula: 30-35 mL/kg 30-35 mL/kg   Fluid needs based on 30 mL/kg 1800 mL 1773 mL   Fluid needs in ounces 61 oz 60 oz   Fluid needs based on 35 mL/kg 2100 mL 2069 mL   Fluid needs in ounces 71 oz 70 oz           Discussion & Intervention:   Joanie Glaser was evaluated today for an RD follow up regarding wt loss, Esophageal Cancer and enteral nutrition  Joanie Glaser is currently undergoing tx for EC  Today Joanie Glaser explains that he continues his efforts to eat more by mouth, he explains that he is able to eat anything by mouth without difficulty  He also continues to run his enteral nutrition at 90mL/hr for about 20 hrs until 7 5 cartons of Jevity 1 5 are infused and reports no issues with tolerance  Encouraged him to continue using his enteral nutrition at current regimen as it will help support weight gain  By mouth, we discussed increasing calorie intake by adding high calorie foods to meals such as cheese on eggs, peanut butter on crackers, adding extra olive oil or butter where applicable, increasing intake of Orgain  Enteral nutrition recommendations:   · TF Goal: Jevity 1 5 at 90 mL/hr via J-tube cycled over ~20 hours daily (until 7 5 cartons is infused)  · Water Flushin mL free water flush at the beginning and end of TF infusion (250 mL total) plus 125 mL free water flush 5 times per day (625 mL total); (total of 875 mL/day in flushes; flushes should be spread throughout the day and every 2-3 hours during TF infusion; will need to adjust flushes prn for IV fluids)  · Enteral Nutrition goal to provide: 1777 mL volume (7 5 cartons day), 2666 kcal, 113 grams protein, 1350 mL free water, 2225 mL total water daily  *Pt requires  an enteral feeding pump to administer TF due to J-Tube  Moving forward, Joanie Glaser was encouraged to continue soft/high calorie/high protein foods much as tolerated and continue enteral nutrition intake at goal    Materials Provided: not applicable  All questions and concerns addressed during todays visit  Joanie Glaser has RD contact information      Nutrition Diagnosis:    Increased Nutrient Needs (kcal & pro) related to increased demand for nutrients and disease state as evidenced by cancer dx and pt undergoing tx for cancer   Patient has clinical indicators (or ASPEN criteria) consistent with severe protein-calorie malnutrition in the context of Chronic Illness as evidenced by >7 5% wt loss in 3 months and severe muscle depletion ([place]) - hollowing/scooping/depression  Monitoring & Evaluation:   Goals:  · weight maintenance/stabilization  · pt to meet >/=75% estimated nutrition needs daily  · increase oral intakes    · Progress Towards Goals: Progressing and Goal(s) Met    Nutrition Rx & Recommendations:  · Diet: High Calorie, High Protein (for high calorie foods see pages 52-53, and for high protein foods see pages 49-51 in your Eating Hints book)  · High calorie foods to try: peanut butter, whole milk, cheese, butter, olive oil  (see pages 52-53 in your Eating Hints book)  · High protein foods to try: eggs, chicken, fish, beans/legumes, greek yogurt (see pages 49-51 in your Eating Hints book)  · Continue at least 3 meals per day, work on increasing snacks in between  Add high calorie foods to meals  Orgain oral nutrition supplement at least 2x daily  · Follow proper oral care; Try baking soda/salt water rinse recipe (mix 3/4 tsp salt + 1 tsp baking soda + 1 qt water; rinse with plain water after using) in Eating Hints book (pg 18)  Brush your teeth before/after meals & before bed  · Weigh yourself regularly  If you notice weight loss, make an effort to increase your daily food/calorie intake  If you continue to notice loss after these efforts, reach out to your dietitian to establish a plan to stabilize weight  · Tube Feeding Plan:     · TF Goal: Jevity 1 5 at 90 mL/hr via J-tube cycled over ~20 hours daily (until 7 5 cartons is infused)    · Water Flushin mL free water flush at the beginning and end of TF infusion (250 mL total) plus 125 mL free water flush 5 times per day (625 mL total); (total of 875 mL/day in flushes; flushes should be spread throughout the day and every 2-3 hours during TF infusion; will need to adjust flushes prn for IV fluids)  · Enteral Nutrition goal to provide: 1777 mL volume (7 5 cartons day), 2666 kcal, 113 grams protein, 1350 mL free water, 2225 mL total water daily      Follow Up Plan: 5/5/22 phone follow up    Recommend Referral to Other Providers: none at this time

## 2022-04-26 NOTE — PROGRESS NOTES
Changed IVPB benadryl pre medication of blood transfusion to PO benadryl  Patient had received IVPB benadryl prior to treatment  Per Dr Lennox Nephew recommendations  Infusion notified

## 2022-04-27 LAB
ABO GROUP BLD BPU: NORMAL
BPU ID: NORMAL
CROSSMATCH: NORMAL
UNIT DISPENSE STATUS: NORMAL
UNIT PRODUCT CODE: NORMAL
UNIT PRODUCT VOLUME: 350 ML
UNIT RH: NORMAL

## 2022-04-29 ENCOUNTER — DOCUMENTATION (OUTPATIENT)
Dept: HEMATOLOGY ONCOLOGY | Facility: CLINIC | Age: 77
End: 2022-04-29

## 2022-04-29 ENCOUNTER — HOSPITAL ENCOUNTER (OUTPATIENT)
Dept: INFUSION CENTER | Facility: CLINIC | Age: 77
Discharge: HOME | End: 2022-04-29

## 2022-04-29 NOTE — PROGRESS NOTES
Called pt & he sd that he did apply for the f/a & he submitted everything that needed to be submitted   Told him I will look into it & get back to him  Spoke to [email or web address] & she looked & at first couldn't find it so I called the pt & he sd that he gave thee info to matti at the  in radiation  Then jaden alfred that she has the info & she will process it  Called the pt back & told him that they found the paperwork

## 2022-05-02 ENCOUNTER — HOSPITAL ENCOUNTER (OUTPATIENT)
Dept: INFUSION CENTER | Facility: CLINIC | Age: 77
Discharge: HOME | End: 2022-05-02
Payer: COMMERCIAL

## 2022-05-02 ENCOUNTER — OFFICE VISIT (OUTPATIENT)
Dept: HEMATOLOGY ONCOLOGY | Facility: CLINIC | Age: 77
End: 2022-05-02
Payer: COMMERCIAL

## 2022-05-02 VITALS
TEMPERATURE: 98.5 F | DIASTOLIC BLOOD PRESSURE: 70 MMHG | SYSTOLIC BLOOD PRESSURE: 112 MMHG | BODY MASS INDEX: 19.33 KG/M2 | WEIGHT: 135 LBS | RESPIRATION RATE: 18 BRPM | HEIGHT: 70 IN

## 2022-05-02 DIAGNOSIS — C15.5 MALIGNANT NEOPLASM OF LOWER THIRD OF ESOPHAGUS (HCC): Primary | ICD-10-CM

## 2022-05-02 LAB
ALBUMIN SERPL BCP-MCNC: 3.2 G/DL (ref 3.5–5)
ALP SERPL-CCNC: 63 U/L (ref 46–116)
ALT SERPL W P-5'-P-CCNC: 15 U/L (ref 12–78)
ANION GAP SERPL CALCULATED.3IONS-SCNC: 5 MMOL/L (ref 4–13)
AST SERPL W P-5'-P-CCNC: 11 U/L (ref 5–45)
BASOPHILS # BLD AUTO: 0.01 THOUSANDS/ΜL (ref 0–0.1)
BASOPHILS NFR BLD AUTO: 0 % (ref 0–1)
BILIRUB SERPL-MCNC: 0.28 MG/DL (ref 0.2–1)
BUN SERPL-MCNC: 20 MG/DL (ref 5–25)
CALCIUM ALBUM COR SERPL-MCNC: 9.3 MG/DL (ref 8.3–10.1)
CALCIUM SERPL-MCNC: 8.7 MG/DL (ref 8.3–10.1)
CHLORIDE SERPL-SCNC: 102 MMOL/L (ref 100–108)
CO2 SERPL-SCNC: 29 MMOL/L (ref 21–32)
CREAT SERPL-MCNC: 0.62 MG/DL (ref 0.6–1.3)
EOSINOPHIL # BLD AUTO: 0.01 THOUSAND/ΜL (ref 0–0.61)
EOSINOPHIL NFR BLD AUTO: 0 % (ref 0–6)
ERYTHROCYTE [DISTWIDTH] IN BLOOD BY AUTOMATED COUNT: 19.8 % (ref 11.6–15.1)
GFR SERPL CREATININE-BSD FRML MDRD: 96 ML/MIN/1.73SQ M
GLUCOSE SERPL-MCNC: 129 MG/DL (ref 65–140)
HCT VFR BLD AUTO: 24.2 % (ref 36.5–49.3)
HGB BLD-MCNC: 7.9 G/DL (ref 12–17)
IMM GRANULOCYTES # BLD AUTO: 0.06 THOUSAND/UL (ref 0–0.2)
IMM GRANULOCYTES NFR BLD AUTO: 2 % (ref 0–2)
LYMPHOCYTES # BLD AUTO: 0.42 THOUSANDS/ΜL (ref 0.6–4.47)
LYMPHOCYTES NFR BLD AUTO: 12 % (ref 14–44)
MCH RBC QN AUTO: 32 PG (ref 26.8–34.3)
MCHC RBC AUTO-ENTMCNC: 32.6 G/DL (ref 31.4–37.4)
MCV RBC AUTO: 98 FL (ref 82–98)
MONOCYTES # BLD AUTO: 0.46 THOUSAND/ΜL (ref 0.17–1.22)
MONOCYTES NFR BLD AUTO: 13 % (ref 4–12)
NEUTROPHILS # BLD AUTO: 2.51 THOUSANDS/ΜL (ref 1.85–7.62)
NEUTS SEG NFR BLD AUTO: 73 % (ref 43–75)
NRBC BLD AUTO-RTO: 0 /100 WBCS
PLATELET # BLD AUTO: 171 THOUSANDS/UL (ref 149–390)
PMV BLD AUTO: 10.8 FL (ref 8.9–12.7)
POTASSIUM SERPL-SCNC: 4.2 MMOL/L (ref 3.5–5.3)
PROT SERPL-MCNC: 6.5 G/DL (ref 6.4–8.2)
RBC # BLD AUTO: 2.47 MILLION/UL (ref 3.88–5.62)
SODIUM SERPL-SCNC: 136 MMOL/L (ref 136–145)
WBC # BLD AUTO: 3.47 THOUSAND/UL (ref 4.31–10.16)

## 2022-05-02 PROCEDURE — 85025 COMPLETE CBC W/AUTO DIFF WBC: CPT

## 2022-05-02 PROCEDURE — 99214 OFFICE O/P EST MOD 30 MIN: CPT | Performed by: INTERNAL MEDICINE

## 2022-05-02 PROCEDURE — 80053 COMPREHEN METABOLIC PANEL: CPT

## 2022-05-02 NOTE — PROGRESS NOTES
Hematology/Oncology Progress Note    Date of Service: 5/2/2022    [facility]  [facility]   [address] [facility] [place] [address]    Hem/Onc Problem List:   GE junction adenocarcinoma, cT3 cN1, M0, G3    Chief Complaint:    Management of GE junction adenocarcinoma on active chemotherapy and radiation therapy    Assessment/Plan:     I personally reviewed the lab results, image studies results and other specialties/physicians consult notes and recommendations  I shared the findings with patient and family, discussed the diagnosis and management plan as below  1  GE junction adenocarcinoma, cT3 cN1, cM0, grade 3, stage III  EGD with biopsy consistent with 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  MMR proteins are normally expressed  HER2 negative by IHC  Dr Leatha Deleon consulted patient and recommended concurrent chemoradiation therapy  Patient currently stays in a nursing home  Poor performance status, ECOG 3/5  Patient has a J-tube placed for nutrition support  Neoadjuvant concurrent radiation therapy with weekly Carbo/Taxol started on March 15, 2022         Patient has had 6 cycles treatment  Cycle 7 chemotherapy postop for 1 week due to thrombocytopenia  Last chemotherapy due tomorrow  Patient will have a restaging PET-CT scan in May 16, 2022  Dr Leatha Deleon follow-up by the end of this month  2  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  J-tube is in place  The patient continues tube feeding  Patient tolerated the oral intake very well  3  Pancytopenia due to chemotherapy  ANC> 1000, platelet count more than 100,000  Hemoglobin stable  We continue to monitor  4  Normocytic normochromic anemia, multifactorial   Hemoglobin stable  Patient denies bleeding anywhere  We continue to monitor  5  Follow-up with MD in 4 weeks  Disclaimer:  This document was prepared using [employer] Fluency Direct technology  If a word or phrase is confusing, or does not make sense, this is likely due to recognition error which was not discovered during the providers review  If you believe an error has occurred, please Contact me through [employer] service for vicenta? cation  AJCC 8th Edition Cancer Stage :      Cancer Staging  Malignant neoplasm of lower third of esophagus (HCC)  Staging form: Esophagus - Adenocarcinoma, AJCC 8th Edition  - Clinical stage from 2/14/2022: Stage III (cT3, cN1, cM0, G3) - Unsigned  Histologic grading system: 3 grade system      Hematology/Oncology History:   · January 6, 2022 patient had a follow-up with primary care physician complaining of 6 weeks of dysphagia  This started with solid food then progressed into liquid diet  It was associated with nausea  Patient had a 30 lb weight loss within 1 year  Patient is an active smoker  · January 10, 2022 patient consulted GI   EGD on January 13, 2022 showed: FINDINGS:  · Fungating and malignant-appearing mass (traversable) measuring 70 mm in the lower third of the esophagus (34 cm from the incisors), covering the whole circumference,; bleeding occurred before and after intervention; performed cold forceps biopsy  · 3 fungating and multilobular masses (traversable) in the cardia, covering one third of the circumference,; bleeding occurred after intervention; performed partial removal by cold forceps biopsy  · The fundus of the stomach, body of the stomach, incisura, antrum, prepyloric region and pylorus appeared normal   · The duodenal bulb and 2nd part of the duodenum appeared normal   IMPRESSION:  5  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 with additional nodular lesions in the cardia of the stomach, biopsies pending, most likely adenocarcinoma   · January 13, 2010 to pathology from the EGD shows:  Final Diagnosis   A   Stomach, cardia:  - Gastric mucosa with reactive changes   - Separate fragment of necrotic debris with bacterial and fungal aggregates  - No definite malignancy identified       B  Esophagus, distal:  - 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  - MMR and Her2 studies are pending with results to follow in an addendum  C  MMR are normally expressed , MSI-low  D  HER2 negative by IHC  · January 24, 2022 CT scan chest abdomen pelvis with contrast:  IMPRESSION:     New mid to distal esophageal wall thickening with large eccentric mass in the posterior distal esophagus extending into the GE junction and beyond the esophageal wall with pathologic lymph nodes anteriorly with central necrosis of esophageal malignancy   This correlates with the recent EGD finding of 1/13/2022      Right lower lobe endobronchial mass possibly mucous plugging with extension into the bronchial branches is chronic and was seen on prior CT of 10/10/2019 with no obstruction  This can be evaluated when PET scan is performed as indicated on EGD report  · February 1, 2022 PET-CT scan:  IMPRESSION:     1  Hypermetabolic distal esophageal carcinoma which extends into the proximal stomach with paraesophageal/perigastric/upper abdominal andry metastatic disease      2  Concentric FDG activity involving the cecum/proximal ascending colon of uncertain clinical significance  Given the localized nature of FDG activity, consider correlation with screening colonoscopy to exclude underlying colonic malignancy      3  Indeterminant non-FDG avid avid filling defect within right lower lobe bronchus with associated more distal tree-in-bud infiltrate  Findings could reflect mucous plugging with obstructive malignant process of low metabolic activity not excluded      4   3 8 cm infrarenal abdominal aortic aneurysm  · February 3, 2022 Dr Cherelle Farley consulted patient recommendation of concurrent chemo radiation therapy in the neoadjuvant setting    · February 6, 2022, EGD with J-tube placement  Dr Jessica Rocha, DO  · February 7, 2022 duplex of lower extremity shows high-grade stenosis versus occlusion of the proximal distal superficial femoral artery reconstitution at the proximal popliteal artery  Stenosis in left lower extremity as well  · March 15, 2022 patient started concurrent chemoradiation therapy with weekly carbo/Taxol  · Last radiation by the end of April 2022  Last chemotherapy was postponed for 1 week because of neutropenia  · Last radiation is scheduled on May 3, 2022  History of Present Illiness:   Ayah Patel is a 68 y o  male with the above-noted HemOnc history who is here for management of GE junction adenocarcinoma on concurrent chemoradiation therapy  Patient diagnosed GE junction adenocarcinoma in January 10, 2022  Biopsy consistent with moderate to poorly differentiated adenocarcinoma with focal signet ring features  MMR normally expressed  HER2 negative by IHC  Staging workup in January 24, 2022 showed new mid to distal esophageal wall thickening with large eccentric mass in the posterior distal esophagus extending into the GE junction and beyond the esophageal wall with pathologic lymph nodes anteriorly with central necrosis of esophageal malignancy  PET-CT scan in February 1, 2022 revealed hypermetabolic distal esophageal carcinoma which extends into the proximal stomach with periesophageal/perigastric/upper abdominal andry metastatic disease  There is a 3 8 cm infrarenal abdominal aortic aneurysm  Dr Amara Flores consulted patient February 3, 2022 recommendation of concurrent chemoradiation therapy in the neoadjuvant setting  J-tube placement was done by Dr Juan Bundy for nutrition support on February 6, 2022  Neoadjuvant concurrent chemoradiation therapy with weekly carbo Taxol started on March 15, 2022  Patient has had 6 cycles of treatment and did fairly well  Cycle 7 was postponed for 1 week because of neutropenia with platelet count 85  Lab showed pancytopenia due to chemoradiation therapy  Hemoglobin stable  ANC> 1000  Platelet count [id number]  Patient feels fine  He tolerated oral intake very well  He ate pizza yesterday  No fever or chills  No exertional chest pain, diaphoresis or shortness  of breath  No cough and phlegm, no hemoptysis  Patient denied nausea  and vomiting  No abdominal pain  No diarrhea or constipation  No  symptoms  No headache or blurred vision  No seizure activity  Patient gained 3-5 lb since last visit  The patient denies bleeding anywhere  ROS: A 12-point of review of systems is obtained and other than the above is noncontributory  Objective:   VITALS:   /70   Temp 98 5 °F (36 9 °C)   Resp 18   Ht 5' 10" (1 778 m)   Wt 61 2 kg (135 lb)   BMI 19 37 kg/m²     Physical EXAM:  General:  Alert, cooperative, no distress  Head:  Normocephalic, without obvious abnormality  Eyes:  Conjunctivae/corneas clear  PERRL, EOMs intact  No evidence of conjunctivitis     Throat: Lips, mucosa, and tongue normal   No bleeding from mouth  Neck: Supple, symmetrical    Lungs:   Clear to auscultation bilaterally  Respiratory effort easy, nonlabored    Heart:  Regular rate and rhythm, S1, S2 normal, no murmur  Abdomen:   Soft, non-tender,nondistended  J-tube in-place  No signs of infection  Bowel sounds normal  No masses,  No organomegaly  Extremities:  Lymphatics: Extremities normal, atraumatic, no cyanosis or edema  No cervical, axillary or inguinal adenopathy   Skin: No rashes  Neurologic: A&Ox4   No focal neuro deficits       No Known Allergies    Past Medical History:   Diagnosis Date    Cancer ([facility] [address] )     esophageal ca    Pneumonia     Polio     Rib fractures        Past Surgical History:   Procedure Laterality Date    FL GUIDED CENTRAL VENOUS ACCESS DEVICE INSERTION  2/16/2022    TUNNELED VENOUS PORT PLACEMENT Left 2/16/2022    Procedure: INSERTION VENOUS PORT (PORT-A-CATH); Surgeon: Ghassan Bennett MD;  Location: [facility];  Service: Surgical Oncology       Family History   Problem Relation Age of Onset    Dementia Mother     Heart disease Father        Social History     Socioeconomic History    Marital status: /Civil Union     Spouse name: Not on file    Number of children: Not on file    Years of education: Not on file    Highest education level: Not on file   Occupational History    Not on file   Tobacco Use    Smoking status: Former Smoker     Packs/day: 1 00     Years: 40 00     Pack years: 40 00     Types: Cigarettes     Quit date: 2022     Years since quittin 2    Smokeless tobacco: Never Used   Vaping Use    Vaping Use: Never used   Substance and Sexual Activity    Alcohol use: Never     Alcohol/week: 0 0 standard drinks     Comment: 0    Drug use: No    Sexual activity: Not Currently   Other Topics Concern    Not on file   Social History Narrative    Not on file     Social Determinants of Health     Financial Resource Strain: Not on file   Food Insecurity: No Food Insecurity    Worried About [address] in the Last Year: Never true    [address] in the Last Year: Never true   Transportation Needs: No Transportation Needs    Lack of Transportation (Medical): No    Lack of Transportation (Non-Medical):  No   Physical Activity: Not on file   Stress: Not on file   Social Connections: Not on file   Intimate Partner Violence: Not on file   Housing Stability: Low Risk     Unable to Pay for Housing in the Last Year: No    Number of Places Lived in the Last Year: 1    Unstable Housing in the Last Year: No       Current Outpatient Medications   Medication Sig Dispense Refill    acetaminophen (TYLENOL) 325 mg tablet Take 325 mg by mouth every 6 (six) hours as needed for mild pain      ascorbic acid (VITAMIN C) 500 mg tablet Take 500 mg by mouth daily      aspirin 325 mg tablet Take 325 mg by mouth 2 (two) times a day      bisacodyl (Dulcolax) 10 mg suppository Insert 10 mg into the rectum daily      Cholecalciferol (VITAMIN D3 PO) Take by mouth      cyanocobalamin (VITAMIN B-12) 500 MCG tablet Take 500 mcg by mouth daily      lidocaine (LIDODERM) 5 % Apply 1 patch topically daily Remove & Discard patch within 12 hours or as directed by MD Faye    magnesium hydroxide (MILK OF MAGNESIA) 400 mg/5 mL oral suspension Take 30 mL by mouth daily as needed for constipation      Multiple Vitamins-Minerals (MULTIVITAMIN WITH MINERALS) tablet Take 1 tablet by mouth daily      ondansetron (Zofran ODT) 8 mg disintegrating tablet Take 1 tablet (8 mg total) by mouth every 8 (eight) hours as needed for nausea or vomiting Through J tube not by mouth  20 tablet 0    pantoprazole (PROTONIX) 40 mg tablet Take 1 tablet (40 mg total) by mouth 2 (two) times a day  0    triamcinolone (KENALOG) 0 5 % cream Apply topically 2 (two) times a day 30 g 0     No current facility-administered medications for this visit  (Not in a hospital admission)      DATA REVIEW:    Pathology Result:    Final Diagnosis   Date Value Ref Range Status   01/13/2022   Final    A  Stomach, cardia:  - Gastric mucosa with reactive changes   - Separate fragment of necrotic debris with bacterial and fungal aggregates  - No definite malignancy identified  B  Esophagus, distal:  - 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  - MMR and Her2 studies are pending with results to follow in an addendum  Image Results: They are reviewed and documented in Hematology/Oncology history    FL guided central venous access device insertion  Narrative: C-arm - left chest port placement    INDICATION: 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  Procedure guidance  COMPARISON:  None    IMAGES:  2    FLUOROSCOPY TIME:   15 SECONDS    TECHNIQUE:    FINDINGS:    Fluoroscopy was provided for procedure guidance     Left chest port noted with tip overlying the caval atrial junction  No gross evidence of pneumothorax  Impression: Fluoroscopy provided for procedure guidance  Please see separate procedure note for details  Workstation performed: [id number]        LABS:  Lab data are reviewed and documented in HemOnc history  No results found for this or any previous visit (from the past 48 hour(s))            Chioma Lai MD  5/2/2022, 9:01 AM

## 2022-05-03 ENCOUNTER — HOSPITAL ENCOUNTER (OUTPATIENT)
Dept: INFUSION CENTER | Facility: CLINIC | Age: 77
Discharge: HOME | End: 2022-05-03
Payer: COMMERCIAL

## 2022-05-03 ENCOUNTER — PATIENT OUTREACH (OUTPATIENT)
Dept: CASE MANAGEMENT | Facility: HOSPITAL | Age: 77
End: 2022-05-03

## 2022-05-03 VITALS — BODY MASS INDEX: 19.32 KG/M2 | HEIGHT: 70 IN | WEIGHT: 134.92 LBS

## 2022-05-03 DIAGNOSIS — C15.5 MALIGNANT NEOPLASM OF LOWER THIRD OF ESOPHAGUS (HCC): Primary | ICD-10-CM

## 2022-05-03 PROCEDURE — 96417 CHEMO IV INFUS EACH ADDL SEQ: CPT

## 2022-05-03 PROCEDURE — 96413 CHEMO IV INFUSION 1 HR: CPT

## 2022-05-03 PROCEDURE — 96367 TX/PROPH/DG ADDL SEQ IV INF: CPT

## 2022-05-03 RX ORDER — SODIUM CHLORIDE 9 MG/ML
20 INJECTION, SOLUTION INTRAVENOUS ONCE
Status: COMPLETED | OUTPATIENT
Start: 2022-05-03 | End: 2022-05-03

## 2022-05-03 RX ADMIN — CARBOPLATIN 200 MG: 10 INJECTION, SOLUTION INTRAVENOUS at 13:28

## 2022-05-03 RX ADMIN — DEXAMETHASONE SODIUM PHOSPHATE: 10 INJECTION, SOLUTION INTRAMUSCULAR; INTRAVENOUS at 10:57

## 2022-05-03 RX ADMIN — FAMOTIDINE 20 MG: 10 INJECTION, SOLUTION INTRAVENOUS at 11:44

## 2022-05-03 RX ADMIN — DIPHENHYDRAMINE HYDROCHLORIDE 25 MG: 50 INJECTION, SOLUTION INTRAMUSCULAR; INTRAVENOUS at 11:21

## 2022-05-03 RX ADMIN — SODIUM CHLORIDE 20 ML/HR: 0.9 INJECTION, SOLUTION INTRAVENOUS at 10:38

## 2022-05-03 RX ADMIN — PACLITAXEL 87 MG: 6 INJECTION, SOLUTION, CONCENTRATE INTRAVENOUS at 12:13

## 2022-05-03 NOTE — PROGRESS NOTES
Most recent labs and note reviewed  Pts hgb 7 9  tc to Dr Selina Richter office  OK to treat placed  Pt tolerated todays taxol Carbo without adverse reaction  Port flushed and deaccessed  This is potentially pts last infusion  Scheduled port flush June 15th  Aware to call us if his schedule changes

## 2022-05-03 NOTE — PROGRESS NOTES
Received notification from Infusion in regards to patient HMG of 7 9 resulted and obtained on 5/2/22  Reviewed with Dr Lula Morales result of previous HMG on 4/22/22 of 7 3  Reviewed treatment plan with Dr Lula Morales  Per Dr Lula Morales recommendations, okay for treatment on 5/3/22 with HMG of 7 9  Infusion RN notified

## 2022-05-05 ENCOUNTER — NUTRITION (OUTPATIENT)
Dept: NUTRITION | Facility: CLINIC | Age: 77
End: 2022-05-05

## 2022-05-05 DIAGNOSIS — Z71.3 NUTRITIONAL COUNSELING: Primary | ICD-10-CM

## 2022-05-05 NOTE — PATIENT INSTRUCTIONS
Nutrition Rx & Recommendations:  · Diet: High Calorie, High Protein (for high calorie foods see pages 52-53, and for high protein foods see pages 49-51 in your Eating Hints book)  · Nutrition Supplements: Continue Orgain at least 2x daily  May use homemade shakes/smoothies as desired  If using a pre-made shake/bar, choose ones with >300 calories and >10 grams protein (ex  Ensure Complete, Ensure Enlive, Ensure Plus, Boost Plus, Boost Very High Calorie, etc )  · High calorie foods to try: peanut butter, whole milk, cheese, butter, olive oil  (see pages 52-53 in your Eating Hints book)  · High protein foods to try: eggs, chicken, fish, beans/legumes, greek yogurt (see pages 49-51 in your Eating Hints book)  · Follow proper oral care; Try baking soda/salt water rinse recipe (mix 3/4 tsp salt + 1 tsp baking soda + 1 qt water; rinse with plain water after using) in Eating Hints book (pg 18)  Brush your teeth before/after meals & before bed  · Weigh yourself regularly  If you notice weight loss, make an effort to increase your daily food/calorie intake  If you continue to notice loss after these efforts, reach out to your dietitian to establish a plan to stabilize weight  · Tube Feeding Plan: Begin tube feeding weaning  Tube Feeding Weaning Plan:  · Continue running TF at current rate (90mL/hr)  · Decrease the number of hours the tube feeding is running from 20hrs to 16hrs  This will decrease your total volume of TF consumed from 7 5 cartons down to 6 cartons  This will decrease your calorie intake by 530 calories  These calories will need to be consumed by mouth  · Examples of 530 calorie meals/snacks:   · Chicken/tuna/egg salad made with clark (1/2c) on bread or crackers, 8oz whole milk  · Homemade smoothie made with 2tbsp peanut butter, 1 banana, 4oz milk, 1/2c ice cream   · Grilled cheese sandwich on 2 slices bread, 1 cup of tomato soup  · Peanut butter and jelly sandwich on 2 slices bread    · Bagel with 3tbsp cream cheese or peanut butter  · 1/2c mixed nuts/trail mix, 8oz juice  · 1 (8oz) Boost Very High Calorie oral nutrition supplement, 1 5 (12 oz) Ensure Enlive  · Smoothie made with 1(8oz) Ensure Enlive + 1/2c ice cream   · Please weigh yourself every 2-3 days to ensure that you are able to maintain weight during this process  Enteral nutrition recommendations:   · TF Goal: Jevity 1 5 at 90 mL/hr via J-tube cycled over ~16 hours daily (until 6 cartons is infused)  · Water Flushin mL free water flush at the beginning and end of TF infusion (250 mL total)  Plus an additional 650mL fluid needed daily; this cane be administered via TF as free water flushes (ex: 125mL x 5 flushes) or this can be consumed by mouth, 650mL is ~22oz  · Enteral Nutrition goal to provide: 1422 mL volume (6 cartons day), 2130 kcal, 91 grams protein, 1081 mL free water, 1981 mL total water daily      Follow Up Plan: 22 phone follow up    Recommend Referral to Other Providers: none at this time

## 2022-05-05 NOTE — PROGRESS NOTES
Outpatient Oncology Nutrition Consultation   Type of Consult: Follow Up  Care Location: Telephone Call    Reason for referral: from 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  RESULTS OF IMMUNOHISTOCHEMICAL ANALYSIS FOR MISMATCH REPAIR PROTEIN LOSS     INTERPRETATION: No loss of nuclear expression of MMR proteins: Low probability of MSI-H     Note: Background non-neoplastic tissue and/or internal control with intact nuclear expression        RESULTS:  Antibody          Clone               Description                           Results  MLH1               M1                   Mismatch repair protein       Intact nuclear expression  MSH2              [id number]       Mismatch repair protein       Intact nuclear expression  MSH6              44                     Mismatch repair protein       Intact nuclear expression  PMS2              [id number]           Mismatch repair protein       Intact nuclear expression     3/15/2022 -  Chemotherapy    CARBOplatin (PARAPLATIN) IVPB (GOG AUC DOSING), 147 6 mg, Intravenous, Once, 7 of 7 cycles  Administration: 147 6 mg (3/15/2022), 196 mg (3/22/2022), 196 mg (3/29/2022), 196 mg (4/5/2022), 196 mg (4/12/2022), 200 mg (4/26/2022), 200 mg (5/3/2022)  PACLItaxel (TAXOL) chemo IVPB, 50 mg/m2 = 84 6 mg, Intravenous, Once, 7 of 7 cycles  Administration: 84 6 mg (3/15/2022), 85 8 mg (3/22/2022), 85 8 mg (3/29/2022), 85 8 mg (4/5/2022), 85 8 mg (4/12/2022), 87 mg (4/26/2022), 87 mg (5/3/2022)       Past Medical & Surgical Hx: current smoker  Patient Active Problem List   Diagnosis    Smoking    Weight loss    Dysphagia    Dermatitis    Malignant neoplasm of lower third of esophagus (HCC)    Mild protein-calorie malnutrition (HCC)    Left ankle pain    Severe protein-calorie malnutrition (HCC)    Chronic pain    Cancer related pain    Palliative care encounter    Anemia due to antineoplastic chemotherapy     Past Medical History:   Diagnosis Date    Cancer ([facility] [address] )     esophageal ca    Pneumonia     Polio     Rib fractures      Past Surgical History:   Procedure Laterality Date    FL GUIDED CENTRAL VENOUS ACCESS DEVICE INSERTION  2/16/2022    TUNNELED VENOUS PORT PLACEMENT Left 2/16/2022    Procedure: INSERTION VENOUS PORT (PORT-A-CATH);   Surgeon: Floyd Maddox MD;  Location: [facility];  Service: Surgical Oncology       Review of Medications:   Vitamins, Supplements and Herbals: No, pt denies taking supplements    Current Outpatient Medications:     acetaminophen (TYLENOL) 325 mg tablet, Take 325 mg by mouth every 6 (six) hours as needed for mild pain, Disp: , Rfl:     ascorbic acid (VITAMIN C) 500 mg tablet, Take 500 mg by mouth daily, Disp: , Rfl:     aspirin 325 mg tablet, Take 325 mg by mouth 2 (two) times a day, Disp: , Rfl:     bisacodyl (Dulcolax) 10 mg suppository, Insert 10 mg into the rectum daily, Disp: , Rfl:     Cholecalciferol (VITAMIN D3 PO), Take by mouth, Disp: , Rfl:     cyanocobalamin (VITAMIN B-12) 500 MCG tablet, Take 500 mcg by mouth daily, Disp: , Rfl:     lidocaine (LIDODERM) 5 %, Apply 1 patch topically daily Remove & Discard patch within 12 hours or as directed by MD, Disp: , Rfl: 0    magnesium hydroxide (MILK OF MAGNESIA) 400 mg/5 mL oral suspension, Take 30 mL by mouth daily as needed for constipation, Disp: , Rfl:     Multiple Vitamins-Minerals (MULTIVITAMIN WITH MINERALS) tablet, Take 1 tablet by mouth daily, Disp: , Rfl:     ondansetron (Zofran ODT) 8 mg disintegrating tablet, Take 1 tablet (8 mg total) by mouth every 8 (eight) hours as needed for nausea or vomiting Through J tube not by mouth , Disp: 20 tablet, Rfl: 0    pantoprazole (PROTONIX) 40 mg tablet, Take 1 tablet (40 mg total) by mouth 2 (two) times a day, Disp: , Rfl: 0    triamcinolone (KENALOG) 0 5 % cream, Apply topically 2 (two) times a day, Disp: 30 g, Rfl: 0    Most Recent Lab Results:   Lab Results   Component Value Date    WBC 3 47 (L) 05/02/2022    NEUTROABS 2 51 05/02/2022    ALT 15 05/02/2022    AST 11 05/02/2022    ALB 3 2 (L) 05/02/2022    SODIUM 136 05/02/2022    SODIUM 136 04/22/2022    K 4 2 05/02/2022    K 4 4 04/22/2022     05/02/2022    BUN 20 05/02/2022    BUN 21 04/22/2022    CREATININE 0 62 05/02/2022    CREATININE 0 70 04/22/2022    EGFR 96 05/02/2022    PHOS 3 5 02/07/2022    POCGLU 141 (H) 02/01/2022    GLUC 129 05/02/2022    CALCIUM 8 7 05/02/2022    MG 2 4 02/07/2022       Anthropometric Measurements:   Height: 70"  Ht Readings from Last 1 Encounters:   05/03/22 5' 10" (1 778 m)     Wt Readings from Last 20 Encounters:   05/03/22 61 2 kg (134 lb 14 7 oz)   05/02/22 61 2 kg (135 lb)   04/26/22 59 7 kg (131 lb 9 8 oz)   04/12/22 59 kg (130 lb)   04/12/22 59 1 kg (130 lb 6 4 oz)   04/11/22 59 kg (130 lb)   04/05/22 60 2 kg (132 lb 12 8 oz)   03/29/22 60 5 kg (133 lb 6 4 oz)   03/25/22 58 1 kg (128 lb)   03/22/22 57 5 kg (126 lb 12 8 oz)   03/15/22 55 4 kg (122 lb 3 2 oz)   03/07/22 55 3 kg (122 lb)   02/17/22 60 3 kg (133 lb)   02/16/22 58 8 kg (129 lb 9 6 oz)   02/14/22 61 2 kg (135 lb)   02/06/22 59 5 kg (131 lb 2 8 oz)   02/03/22 66 7 kg (147 lb)   01/13/22 67 kg (147 lb 11 3 oz)   01/10/22 66 4 kg (146 lb 6 4 oz)   01/06/22 65 8 kg (145 lb)     Weight History:    Usual Weight: 175#   Varian: (3/14/22) 124#, (3/17/22) 120 5#, (3/21/22) 126#, (3/22/22) 127#, (3/24/22) 128#, (3/28/22) 131#, (3/29/22) 134#, (3/30/22) 134#, (3/31/22) 134#, (4/4/22) 133#, (4/5/22) 133#, (4/7/22) 132#, (4/11/22) 130#, (4/13/22) 130#, (4/14/22) 130 5#, (4/18/22) 130#       Home Scale: none     Oncology Nutrition-Anthropometrics      Nutrition from 5/5/2022 in [address] [place] [address] Nutrition from 4/26/2022 in [place] [address] Oncology Dietitian [place]   Patient age (years): 68 years 68 years   Patient (male) height (in): 79 in 79 in   Current weight (lbs): 134 9 lbs 131 6 lbs   Current weight to be used for anthropometric calculations (kg) 61 3 kg 59 8 kg   BMI: 19 4 18 9   IBW male 166 lb 166 lb   IBW (kg) male 75 5 kg 75 5 kg   IBW % (male) 81 3 % 79 3 %   Adjusted BW (male): 158 2 lbs 157 4 lbs   Adjusted BW in kg (male): 71 9 kg 71 5 kg   % weight change after 1 week: 2 5 % 1 2 %   Weight change after 1 week (lbs) 3 3 lbs 1 6 lbs   % weight change after 1 month: 1 6 % 2 8 %   Weight change after 1 month (lbs) 2 1 lbs 3 6 lbs   % weight change after 3 months: 2 8 % -10 9 %   Weight change after 3 months (lbs) 3 7 lbs -16 1 lbs          Nutrition-Focused Physical Findings: n/a due to telephone call    Food/Nutrition-Related History & Client/Social History:    Current Nutrition Impact Symptoms:  [] Nausea -has zofran  [] Reduced Appetite  [] Acid Reflux    [] Vomiting  [x] Unintended Wt Loss [] Malabsorption    [] Diarrhea  [] Unintended Wt Gain  [] Dumping Syndrome    [] Constipation  [] Thick Mucous/Secretions  [] Abdominal Pain    [] Dysgeusia (Altered Taste)  [x] Xerostomia (Dry Mouth)  [] Gas    [] Dysosmia (Altered Smell)  [] Thrush  [] Difficulty Chewing    [] Oral Mucositis (Sore Mouth)  [] Fatigue  [x] Hyperglycemia: BG nonfasting 129mg/dL on 5/2/22      [] Odynophagia  [] Esophagitis  [] Other:    [] Dysphagia   -hx  -Jtube in place for nutrition [] Early Satiety  [] No Problems Eating      Food Allergies & Intolerances: no    Current Diet: Regular Diet, No Restrictions and Tube Feeding  Current Nutrition Intake: Increased since last visit   Appetite: Good, Fair    Nutrition Route: PO and J-Tube  Oral Care: brushes BID  Activity level: Uses carney to walk, but reports that he is recently more steady on his feet  24 Hr Diet Recall:   Breakfast: oatmeal OR eggs and perry   Lunch: grilled cheese and chicken noodle soup   Snack: slice of pizza with sausage and pepperoni    Dinner: perry, mashed potatoes, 2 ears of corn   Snack: ice cream     Beverages: coffee (8oz x1), water (did not quantify)  Nutrition supplement: Orgain ready to drink shake (250 kcal, 16g pro) 2x daily     EN Recall:  DME: [facility]   Access Type: Jtube   Formula: Jevity 1 5  Method: Cyclic  Rate: 90 mL/hr until 7 5 cartons are infused (about 20hrs)  Flush: 95 mL free water flush pre infusion, plus 95mL free water every 2hr (950mL)  EN providin mL volume (7 5 cartons/day), 2662 kcal, 113g protein, 1350 mL free water, 2300mL total water  Oncology Nutrition-Estimated Needs      Nutrition from 2022 in [address] [place] [address] Nutrition from 2022 in [place] [address] Oncology Dietitian [place]   Weight type used Actual weight Actual weight   Weight in kilograms (kg) used for estimated needs 61 3 kg 59 8 kg   Energy needs formula:  35-40 kcal/kg 35-40 kcal/kg   Energy needs based on 35 kcal/k kcal 2094 kcal   Energy needs based on 40 kcal/k kcal 2393 kcal   Protein needs formula: 1 5-2 g/kg 1 5-2 g/kg   Protein needs based on 1 5 g/kg 92 g 90 g   Protein needs based on 2 g/kg 123 g 120 g   Fluid needs formula: 30-35 mL/kg 30-35 mL/kg   Fluid needs based on 30 mL/kg 1842 mL 1800 mL   Fluid needs in ounces 62 oz 61 oz   Fluid needs based on 35 mL/kg 2149 mL 2100 mL   Fluid needs in ounces 73 oz 71 oz           Discussion & Intervention:   La Nena Aldridge was evaluated today for an RD follow up regarding wt loss, Esophageal Cancer and enteral nutrition  La Nena Aldridge is currently undergoing tx for EC    Today La Nena Aldridge explains that he continues his efforts to eat more by mouth, he explains that he is able to eat anything by mouth without difficulty  Yesterday he tried pizza and was able to eat without difficulty  He has also increased his intake or Orgain oral nutrition supplement from 1x daily to 2x daily  He continues to run his enteral nutrition at 90mL/hr for about 20 hrs until 7 5 cartons of Jevity 1 5 are infused and reports no issues with tolerance  His weight is increasing  Discussed initiation of tube feeding weaning to allow for increased oral intakes, plan is described below, Jose Boothe is agreeable  Tube Feeding Weaning Plan:  · Continue running TF at current rate (90mL/hr)  · Decrease the number of hours the tube feeding is running from 20hrs to 16hrs  This will decrease your total volume of TF consumed from 7 5 cartons down to 6 cartons  This will decrease your calorie intake by 530 calories  These calories will need to be consumed by mouth  · Examples of 530 calorie meals/snacks:   · Chicken/tuna/egg salad made with clark (1/2c) on bread or crackers, 8oz whole milk  · Homemade smoothie made with 2tbsp peanut butter, 1 banana, 4oz milk, 1/2c ice cream   · Grilled cheese sandwich on 2 slices bread, 1 cup of tomato soup  · Peanut butter and jelly sandwich on 2 slices bread  · Bagel with 3tbsp cream cheese or peanut butter  · 1/2c mixed nuts/trail mix, 8oz juice  · 1 (8oz) Boost Very High Calorie oral nutrition supplement, 1 5 (12 oz) Ensure Enlive  · Smoothie made with 1(8oz) Ensure Enlive + 1/2c ice cream   · Please weigh yourself every 2-3 days to ensure that you are able to maintain weight during this process  Enteral nutrition recommendations:   · TF Goal: Jevity 1 5 at 90 mL/hr via J-tube cycled over ~16 hours daily (until 6 cartons is infused)  · Water Flushin mL free water flush at the beginning and end of TF infusion (250 mL total)   Plus an additional 650mL fluid needed daily; this cane be administered via TF as free water flushes (ex: 125mL x 5 flushes) or this can be consumed by mouth, 650mL is ~22oz  · Enteral Nutrition goal to provide: 1422 mL volume (6 cartons day), 2130 kcal, 91 grams protein, 1081 mL free water, 1981 mL total water daily  *Pt requires  an enteral feeding pump to administer TF due to J-Tube  Moving forward, Félix Godinez was encouraged to increase oral intakes and initiate TF weaning  Materials Provided: not applicable  All questions and concerns addressed during todays visit  Félix Godinez has RD contact information  Nutrition Diagnosis:    Increased Nutrient Needs (kcal & pro) related to increased demand for nutrients and disease state as evidenced by recovering from cancer tx   Underweight related to physiological causes and s/p cancer treatment as evidenced by BMI 19 4 and 81 3% of IBW  Monitoring & Evaluation:   Goals:  · pt to meet >/=75% estimated nutrition needs daily  · weight gain of 1-2# per week  · wean tube feeding    · Progress Towards Goals: Progressing and Goal(s) Met    Nutrition Rx & Recommendations:  · Diet: High Calorie, High Protein (for high calorie foods see pages 52-53, and for high protein foods see pages 49-51 in your Eating Hints book)  · Nutrition Supplements: Continue Orgain at least 2x daily  May use homemade shakes/smoothies as desired  If using a pre-made shake/bar, choose ones with >300 calories and >10 grams protein (ex  Ensure Complete, Ensure Enlive, Ensure Plus, Boost Plus, Boost Very High Calorie, etc )  · High calorie foods to try: peanut butter, whole milk, cheese, butter, olive oil  (see pages 52-53 in your Eating Hints book)  · High protein foods to try: eggs, chicken, fish, beans/legumes, greek yogurt (see pages 49-51 in your Eating Hints book)  · Follow proper oral care; Try baking soda/salt water rinse recipe (mix 3/4 tsp salt + 1 tsp baking soda + 1 qt water; rinse with plain water after using) in Eating Hints book (pg 18)  Brush your teeth before/after meals & before bed  · Weigh yourself regularly  If you notice weight loss, make an effort to increase your daily food/calorie intake  If you continue to notice loss after these efforts, reach out to your dietitian to establish a plan to stabilize weight  · Tube Feeding Plan: Begin tube feeding weaning  Tube Feeding Weaning Plan:  · Continue running TF at current rate (90mL/hr)  · Decrease the number of hours the tube feeding is running from 20hrs to 16hrs  This will decrease your total volume of TF consumed from 7 5 cartons down to 6 cartons  This will decrease your calorie intake by 530 calories  These calories will need to be consumed by mouth  · Examples of 530 calorie meals/snacks:   · Chicken/tuna/egg salad made with clark (1/2c) on bread or crackers, 8oz whole milk  · Homemade smoothie made with 2tbsp peanut butter, 1 banana, 4oz milk, 1/2c ice cream   · Grilled cheese sandwich on 2 slices bread, 1 cup of tomato soup  · Peanut butter and jelly sandwich on 2 slices bread  · Bagel with 3tbsp cream cheese or peanut butter  · 1/2c mixed nuts/trail mix, 8oz juice  · 1 (8oz) Boost Very High Calorie oral nutrition supplement, 1 5 (12 oz) Ensure Enlive  · Smoothie made with 1(8oz) Ensure Enlive + 1/2c ice cream   · Please weigh yourself every 2-3 days to ensure that you are able to maintain weight during this process  Enteral nutrition recommendations:   · TF Goal: Jevity 1 5 at 90 mL/hr via J-tube cycled over ~16 hours daily (until 6 cartons is infused)  · Water Flushin mL free water flush at the beginning and end of TF infusion (250 mL total)  Plus an additional 650mL fluid needed daily; this cane be administered via TF as free water flushes (ex: 125mL x 5 flushes) or this can be consumed by mouth, 650mL is ~22oz     · Enteral Nutrition goal to provide: 1422 mL volume (6 cartons day), 2130 kcal, 91 grams protein, 1081 mL free water, 1981 mL total water daily     Follow Up Plan: 5/12/22 phone follow up    Recommend Referral to Other Providers: none at this time

## 2022-05-12 ENCOUNTER — NUTRITION (OUTPATIENT)
Dept: NUTRITION | Facility: CLINIC | Age: 77
End: 2022-05-12

## 2022-05-12 DIAGNOSIS — Z71.3 NUTRITIONAL COUNSELING: Primary | ICD-10-CM

## 2022-05-12 NOTE — PATIENT INSTRUCTIONS
Nutrition Rx & Recommendations:  Diet: High Calorie, High Protein (for high calorie foods see pages 52-53, and for high protein foods see pages 49-51 in your Eating Hints book)  Nutrition Supplements: Continue Orgain at least 2x daily  May use homemade shakes/smoothies as desired  If using a pre-made shake/bar, choose ones with >300 calories and >10 grams protein (ex  Ensure Complete, Ensure Enlive, Ensure Plus, Boost Plus, Boost Very High Calorie, etc )  High calorie foods to try: peanut butter, whole milk, cheese, butter, olive oil  (see pages 52-53 in your Eating Hints book)  High protein foods to try: eggs, chicken, fish, beans/legumes, greek yogurt (see pages 49-51 in your Eating Hints book)  Follow proper oral care; Try baking soda/salt water rinse recipe (mix 3/4 tsp salt + 1 tsp baking soda + 1 qt water; rinse with plain water after using) in Eating Hints book (pg 18)  Brush your teeth before/after meals & before bed  Weigh yourself regularly  If you notice weight loss, make an effort to increase your daily food/calorie intake  If you continue to notice loss after these efforts, reach out to your dietitian to establish a plan to stabilize weight  Tube Feeding Plan: Begin tube feeding weaning  Tube Feeding Weaning Plan:  Continue running TF at current rate (90mL/hr)  Decrease the number of hours the tube feeding is running from 16hrs to 12hrs  This will decrease your total volume of TF consumed from 6 cartons down to 4 5 cartons  This will decrease your calorie intake by 530 calories  These calories will need to be consumed by mouth  Examples of 530 calorie meals/snacks:   Chicken/tuna/egg salad made with clark (1/2c) on bread or crackers, 8oz whole milk  Homemade smoothie made with 2tbsp peanut butter, 1 banana, 4oz milk, 1/2c ice cream   Grilled cheese sandwich on 2 slices bread, 1 cup of tomato soup  Peanut butter and jelly sandwich on 2 slices bread    Bagel with 3tbsp cream cheese or peanut butter  1/2c mixed nuts/trail mix, 8oz juice   1 (8oz) Boost Very High Calorie oral nutrition supplement, 1 5 (12 oz) Ensure Enlive  Smoothie made with 1(8oz) Ensure Enlive + 1/2c ice cream   Please weigh yourself every 2-3 days to ensure that you are able to maintain weight during this process  Enteral nutrition recommendations:   TF Goal: Jevity 1 5 at 90 mL/hr via J-tube cycled over ~12 hours daily (until 4 5 cartons is infused)  Water Flushin mL free water flush at the beginning and end of TF infusion (250 mL total)  Plus an additional 1000mL fluid needed daily; this cane be administered via TF as free water flushes (ex: 125mL x 8 flushes) or this can be consumed by mouth, 1000mL is ~33oz  Enteral Nutrition goal to provide: 1066 mL volume (4 5 cartons day), 1600 kcal, 68 grams protein, 810 mL free water, 2060 mL total water daily         Follow Up Plan: 22 phone follow up    Recommend Referral to Other Providers: none at this time

## 2022-05-12 NOTE — PROGRESS NOTES
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 1/13/22  · S/p Jtube placement 2/7/22  · RT began 3/14/22, EOT 4/20/22  · Chemotherapy (carboplatin, taxol) 3/15/22-5/3/22  Oncology History   Malignant neoplasm of lower third of esophagus ([facility] [address] )   1/13/2022 Initial Diagnosis    Malignant neoplasm of lower third of esophagus ([facility] [address] )     1/13/2022 Biopsy    EGD:   Esophagus, distal:  - 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  RESULTS OF IMMUNOHISTOCHEMICAL ANALYSIS FOR MISMATCH REPAIR PROTEIN LOSS     INTERPRETATION: No loss of nuclear expression of MMR proteins: Low probability of MSI-H     Note: Background non-neoplastic tissue and/or internal control with intact nuclear expression        RESULTS:  Antibody          Clone               Description                           Results  MLH1               M1                   Mismatch repair protein       Intact nuclear expression  MSH2              [id number]       Mismatch repair protein       Intact nuclear expression  MSH6              44                     Mismatch repair protein       Intact nuclear expression  PMS2              [id number]           Mismatch repair protein       Intact nuclear expression     3/15/2022 -  Chemotherapy    CARBOplatin (PARAPLATIN) IVPB (GOG AUC DOSING), 147 6 mg, Intravenous, Once, 7 of 7 cycles  Administration: 147 6 mg (3/15/2022), 196 mg (3/22/2022), 196 mg (3/29/2022), 196 mg (4/5/2022), 196 mg (4/12/2022), 200 mg (4/26/2022), 200 mg (5/3/2022)  PACLItaxel (TAXOL) chemo IVPB, 50 mg/m2 = 84 6 mg, Intravenous, Once, 7 of 7 cycles  Administration: 84 6 mg (3/15/2022), 85 8 mg (3/22/2022), 85 8 mg (3/29/2022), 85 8 mg (4/5/2022), 85 8 mg (4/12/2022), 87 mg (4/26/2022), 87 mg (5/3/2022)       Past Medical & Surgical Hx: current smoker  Patient Active Problem List   Diagnosis    Smoking    Weight loss    Dysphagia    Dermatitis    Malignant neoplasm of lower third of esophagus (HCC)    Mild protein-calorie malnutrition (HCC)    Left ankle pain    Severe protein-calorie malnutrition (HCC)    Chronic pain    Cancer related pain    Palliative care encounter    Anemia due to antineoplastic chemotherapy     Past Medical History:   Diagnosis Date    Cancer ([facility] )     esophageal ca    Pneumonia     Polio     Rib fractures      Past Surgical History:   Procedure Laterality Date    FL GUIDED CENTRAL VENOUS ACCESS DEVICE INSERTION  2/16/2022    TUNNELED VENOUS PORT PLACEMENT Left 2/16/2022    Procedure: INSERTION VENOUS PORT (PORT-A-CATH);   Surgeon: Osmel Aguero MD;  Location: [facility];  Service: Surgical Oncology       Review of Medications:   Vitamins, Supplements and Herbals: No, pt denies taking supplements    Current Outpatient Medications:     acetaminophen (TYLENOL) 325 mg tablet, Take 325 mg by mouth every 6 (six) hours as needed for mild pain, Disp: , Rfl:     ascorbic acid (VITAMIN C) 500 mg tablet, Take 500 mg by mouth daily, Disp: , Rfl:     aspirin 325 mg tablet, Take 325 mg by mouth 2 (two) times a day, Disp: , Rfl:     bisacodyl (Dulcolax) 10 mg suppository, Insert 10 mg into the rectum daily, Disp: , Rfl:     Cholecalciferol (VITAMIN D3 PO), Take by mouth, Disp: , Rfl:     cyanocobalamin (VITAMIN B-12) 500 MCG tablet, Take 500 mcg by mouth daily, Disp: , Rfl:     lidocaine (LIDODERM) 5 %, Apply 1 patch topically daily Remove & Discard patch within 12 hours or as directed by MD, Disp: , Rfl: 0    magnesium hydroxide (MILK OF MAGNESIA) 400 mg/5 mL oral suspension, Take 30 mL by mouth daily as needed for constipation, Disp: , Rfl:     Multiple Vitamins-Minerals (MULTIVITAMIN WITH MINERALS) tablet, Take 1 tablet by mouth daily, Disp: , Rfl:     ondansetron (Zofran ODT) 8 mg disintegrating tablet, Take 1 tablet (8 mg total) by mouth every 8 (eight) hours as needed for nausea or vomiting Through J tube not by mouth , Disp: 20 tablet, Rfl: 0    pantoprazole (PROTONIX) 40 mg tablet, Take 1 tablet (40 mg total) by mouth 2 (two) times a day, Disp: , Rfl: 0    triamcinolone (KENALOG) 0 5 % cream, Apply topically 2 (two) times a day, Disp: 30 g, Rfl: 0    Most Recent Lab Results:   Lab Results   Component Value Date    WBC 3 47 (L) 05/02/2022    NEUTROABS 2 51 05/02/2022    ALT 15 05/02/2022    AST 11 05/02/2022    ALB 3 2 (L) 05/02/2022    SODIUM 136 05/02/2022    SODIUM 136 04/22/2022    K 4 2 05/02/2022    K 4 4 04/22/2022     05/02/2022    BUN 20 05/02/2022    BUN 21 04/22/2022    CREATININE 0 62 05/02/2022    CREATININE 0 70 04/22/2022    EGFR 96 05/02/2022    PHOS 3 5 02/07/2022    POCGLU 141 (H) 02/01/2022    GLUC 129 05/02/2022    CALCIUM 8 7 05/02/2022    MG 2 4 02/07/2022       Anthropometric Measurements:   Height: 70"  Ht Readings from Last 1 Encounters:   05/03/22 5' 10" (1 778 m)     Wt Readings from Last 20 Encounters:   05/03/22 61 2 kg (134 lb 14 7 oz)   05/02/22 61 2 kg (135 lb)   04/26/22 59 7 kg (131 lb 9 8 oz)   04/12/22 59 kg (130 lb)   04/12/22 59 1 kg (130 lb 6 4 oz)   04/11/22 59 kg (130 lb)   04/05/22 60 2 kg (132 lb 12 8 oz)   03/29/22 60 5 kg (133 lb 6 4 oz)   03/25/22 58 1 kg (128 lb)   03/22/22 57 5 kg (126 lb 12 8 oz)   03/15/22 55 4 kg (122 lb 3 2 oz)   03/07/22 55 3 kg (122 lb)   02/17/22 60 3 kg (133 lb)   02/16/22 58 8 kg (129 lb 9 6 oz)   02/14/22 61 2 kg (135 lb)   02/06/22 59 5 kg (131 lb 2 8 oz)   02/03/22 66 7 kg (147 lb)   01/13/22 67 kg (147 lb 11 3 oz)   01/10/22 66 4 kg (146 lb 6 4 oz)   01/06/22 65 8 kg (145 lb)     Weight History:    Usual Weight: 175#   Varian: (3/14/22) 124#, (3/17/22) 120 5#, (3/21/22) 126#, (3/22/22) 127#, (3/24/22) 128#, (3/28/22) 131#, (3/29/22) 134#, (3/30/22) 134#, (3/31/22) 134#, (4/4/22) 133#, (4/5/22) 133#, (4/7/22) 132#, (4/11/22) 130#, (4/13/22) 130#, (4/14/22) 130 5#, (4/18/22) 130#       Home Scale: (5/11/22) 138#       Oncology Nutrition-Anthropometrics    Flowsheet Row Nutrition from 5/12/2022 in [address] [place] [address] Nutrition from 5/5/2022 in [facility] Oncology Dietitian [place]   Patient age (years): 68 years 68 years   Patient (male) height (in): 79 in 79 in   Current weight (lbs): 134 9 lbs 134 9 lbs   Current weight to be used for anthropometric calculations (kg) 61 3 kg 61 3 kg   BMI: 19 4 19 4   IBW male 166 lb 166 lb   IBW (kg) male 75 5 kg 75 5 kg   IBW % (male) 81 3 % 81 3 %   Adjusted BW (male): 158 2 lbs 158 2 lbs   Adjusted BW in kg (male): 71 9 kg 71 9 kg   % weight change after 1 week: 2 5 % 2 5 %   Weight change after 1 week (lbs) 3 3 lbs 3 3 lbs   % weight change after 1 month: 1 6 % 1 6 %   Weight change after 1 month (lbs) 2 1 lbs 2 1 lbs   % weight change after 3 months: 2 8 % 2 8 %   Weight change after 3 months (lbs) 3 7 lbs 3 7 lbs          Nutrition-Focused Physical Findings: n/a due to telephone call    Food/Nutrition-Related History & Client/Social History:    Current Nutrition Impact Symptoms:  [] Nausea -has zofran  [] Reduced Appetite  [] Acid Reflux    [] Vomiting  [x] Unintended Wt Loss [] Malabsorption    [] Diarrhea  [] Unintended Wt Gain  [] Dumping Syndrome    [] Constipation  [] Thick Mucous/Secretions  [] Abdominal Pain    [] Dysgeusia (Altered Taste)  [x] Xerostomia (Dry Mouth)  [] Gas    [] Dysosmia (Altered Smell)  [] Thrush  [] Difficulty Chewing    [] Oral Mucositis (Sore Mouth)  [] Fatigue  [x] Hyperglycemia: BG nonfasting 129mg/dL on 5/2/22      [] Odynophagia  [] Esophagitis  [] Other:    [] Dysphagia   -hx  -Jtube in place for nutrition [] Early Satiety  [] No Problems Eating      Food Allergies & Intolerances: no    Current Diet: Regular Diet, No Restrictions and Tube Feeding  Current Nutrition Intake: Unchanged from last visit (PO intakes); enteral intake has decreased   Appetite: Good, Fair    Nutrition Route: PO and J-Tube  Oral Care: brushes BID  Activity level: Uses carney to walk, but reports that he is recently more steady on his feet  24 Hr Diet Recall:   Breakfast: tuna and tomato sandwich, OJ, coffee  Lunch: cream cheese and jelly on Blas bread sandwich   Dinner: mashed potatoes, roast beef, coleslaw   Snack: ice cream     Beverages: coffee (8oz x1), water (did not quantify), OJ (8oz x1)   Nutrition supplement: Orgain ready to drink shake (250 kcal, 16g pro) 1x daily     EN Recall:  DME: [facility]   Access Type: Jtube   Formula: Jevity 1 5  Method: Cyclic  Rate: 90 mL/hr until 6 cartons are infused (about 16hrs)  Flush: 95 mL free water flush pre infusion, plus 95mL free water every 2hr (760mL)  EN providin mL volume (6 cartons/day), 2133 kcal, 91g protein, 1081 mL free water, 1841mL total water  Oncology Nutrition-Estimated Needs    Flowsheet Row Nutrition from 2022 in [place] [address] Oncology Dietitian [place] Nutrition from 2022 in [place] [address] Oncology Dietitian [place]   Weight type used Actual weight Actual weight   Weight in kilograms (kg) used for estimated needs 61 3 kg 61 3 kg   Energy needs formula:  35-40 kcal/kg 35-40 kcal/kg   Energy needs based on 35 kcal/k kcal 2146 kcal   Energy needs based on 40 kcal/k kcal 2453 kcal   Protein needs formula: 1 5-2 g/kg 1 5-2 g/kg   Protein needs based on 1 5 g/kg 92 g 92 g   Protein needs based on 2 g/kg 123 g 123 g   Fluid needs formula: 30-35 mL/kg 30-35 mL/kg   Fluid needs based on 30 mL/kg 1842 mL 1842 mL   Fluid needs in ounces 62 oz 62 oz   Fluid needs based on 35 mL/kg 2149 mL 2149 mL   Fluid needs in ounces 73 oz 73 oz           Discussion & Intervention:   Familia Sanchez was evaluated today for an RD follow up regarding wt loss, Esophageal Cancer and enteral nutrition    Familia Sanchez is currently undergoing tx for EC  Today Giokrystle Duarte explains that he continues his efforts to eat by mouth as much as possible  He did decrease his enteral intake from 7 5 cartons Jevity 1 5 daily to 6 cartons daily  At this time he states that he is comfortable decreasing his enteral intake even more  Suggested decreasing the number of hours that his TF runs from 16hrs to 12hrs  This will decrease his total intake from 6 cartons Jevity 1 5 to 4 5 cartons, and his enteral calorie intake from 2133kcal down to 1600kcal  Reinforced that he will need to consume this deficit in calories by mouth in order to prevent weight loss  Kari Duarte verbalized understanding and is agreeable to plan  Tube Feeding Weaning Plan:  · Continue running TF at current rate (90mL/hr)  · Decrease the number of hours the tube feeding is running from 16hrs to 12hrs  This will decrease your total volume of TF consumed from 6 cartons down to 4 5 cartons  This will decrease your calorie intake by 530 calories  These calories will need to be consumed by mouth  · Examples of 530 calorie meals/snacks:   · Chicken/tuna/egg salad made with clark (1/2c) on bread or crackers, 8oz whole milk  · Homemade smoothie made with 2tbsp peanut butter, 1 banana, 4oz milk, 1/2c ice cream   · Grilled cheese sandwich on 2 slices bread, 1 cup of tomato soup  · Peanut butter and jelly sandwich on 2 slices bread  · Bagel with 3tbsp cream cheese or peanut butter  · 1/2c mixed nuts/trail mix, 8oz juice  · 1 (8oz) Boost Very High Calorie oral nutrition supplement, 1 5 (12 oz) Ensure Enlive  · Smoothie made with 1(8oz) Ensure Enlive + 1/2c ice cream   · Please weigh yourself every 2-3 days to ensure that you are able to maintain weight during this process  Enteral nutrition recommendations:   · TF Goal: Jevity 1 5 at 90 mL/hr via J-tube cycled over ~12 hours daily (until 4 5 cartons is infused)    · Water Flushin mL free water flush at the beginning and end of TF infusion (250 mL total)  Plus an additional 1000mL fluid needed daily; this cane be administered via TF as free water flushes (ex: 125mL x 8 flushes) or this can be consumed by mouth, 1000mL is ~33oz  · Enteral Nutrition goal to provide: 1066 mL volume (4 5 cartons day), 1600 kcal, 68 grams protein, 810 mL free water, 2060 mL total water daily  *Pt requires  an enteral feeding pump to administer TF due to J-Tube  Moving forward, Karen Waddell was encouraged to increase oral intakes and initiate TF weaning  Materials Provided: not applicable  All questions and concerns addressed during todays visit  Karen Waddell has RD contact information  Nutrition Diagnosis:    Increased Nutrient Needs (kcal & pro) related to increased demand for nutrients and disease state as evidenced by recovering from cancer tx   Underweight related to physiological causes and s/p cancer treatment as evidenced by BMI 19 4 and 81 3% of IBW  Monitoring & Evaluation:   Goals:  · pt to meet >/=75% estimated nutrition needs daily  · weight gain of 1-2# per week  · wean tube feeding    · Progress Towards Goals: Progressing and Goal(s) Met    Nutrition Rx & Recommendations:  · Diet: High Calorie, High Protein (for high calorie foods see pages 52-53, and for high protein foods see pages 49-51 in your Eating Hints book)  · Nutrition Supplements: Continue Orgain at least 2x daily  May use homemade shakes/smoothies as desired  If using a pre-made shake/bar, choose ones with >300 calories and >10 grams protein (ex  Ensure Complete, Ensure Enlive, Ensure Plus, Boost Plus, Boost Very High Calorie, etc )  · High calorie foods to try: peanut butter, whole milk, cheese, butter, olive oil    (see pages 52-53 in your Eating Hints book)  · High protein foods to try: eggs, chicken, fish, beans/legumes, greek yogurt (see pages 49-51 in your Eating Hints book)  · Follow proper oral care; Try baking soda/salt water rinse recipe (mix 3/4 tsp salt + 1 tsp baking soda + 1 qt water; rinse with plain water after using) in Eating Hints book (pg 18)  Brush your teeth before/after meals & before bed  · Weigh yourself regularly  If you notice weight loss, make an effort to increase your daily food/calorie intake  If you continue to notice loss after these efforts, reach out to your dietitian to establish a plan to stabilize weight  · Tube Feeding Plan: Begin tube feeding weaning  Tube Feeding Weaning Plan:  · Continue running TF at current rate (90mL/hr)  · Decrease the number of hours the tube feeding is running from 16hrs to 12hrs  This will decrease your total volume of TF consumed from 6 cartons down to 4 5 cartons  This will decrease your calorie intake by 530 calories  These calories will need to be consumed by mouth  · Examples of 530 calorie meals/snacks:   · Chicken/tuna/egg salad made with clark (1/2c) on bread or crackers, 8oz whole milk  · Homemade smoothie made with 2tbsp peanut butter, 1 banana, 4oz milk, 1/2c ice cream   · Grilled cheese sandwich on 2 slices bread, 1 cup of tomato soup  · Peanut butter and jelly sandwich on 2 slices bread  · Bagel with 3tbsp cream cheese or peanut butter  · 1/2c mixed nuts/trail mix, 8oz juice  · 1 (8oz) Boost Very High Calorie oral nutrition supplement, 1 5 (12 oz) Ensure Enlive  · Smoothie made with 1(8oz) Ensure Enlive + 1/2c ice cream   · Please weigh yourself every 2-3 days to ensure that you are able to maintain weight during this process  Enteral nutrition recommendations:   · TF Goal: Jevity 1 5 at 90 mL/hr via J-tube cycled over ~12 hours daily (until 4 5 cartons is infused)  · Water Flushin mL free water flush at the beginning and end of TF infusion (250 mL total)  Plus an additional 1000mL fluid needed daily; this cane be administered via TF as free water flushes (ex: 125mL x 8 flushes) or this can be consumed by mouth, 1000mL is ~33oz     · Enteral Nutrition goal to provide: 1066 mL volume (4 5 cartons day), 1600 kcal, 68 grams protein, 810 mL free water, 2060 mL total water daily         Follow Up Plan: 5/20/22 phone follow up    Recommend Referral to Other Providers: none at this time

## 2022-05-16 ENCOUNTER — HOSPITAL ENCOUNTER (OUTPATIENT)
Dept: RADIOLOGY | Age: 77
Discharge: HOME | End: 2022-05-16
Payer: COMMERCIAL

## 2022-05-16 DIAGNOSIS — C15.5 MALIGNANT NEOPLASM OF LOWER THIRD OF ESOPHAGUS (HCC): ICD-10-CM

## 2022-05-16 LAB — GLUCOSE SERPL-MCNC: 121 MG/DL (ref 65–140)

## 2022-05-16 PROCEDURE — 82948 REAGENT STRIP/BLOOD GLUCOSE: CPT

## 2022-05-16 PROCEDURE — A9552 F18 FDG: HCPCS

## 2022-05-16 PROCEDURE — 78815 PET IMAGE W/CT SKULL-THIGH: CPT

## 2022-05-16 PROCEDURE — G1004 CDSM NDSC: HCPCS

## 2022-05-19 ENCOUNTER — OFFICE VISIT (OUTPATIENT)
Dept: SURGICAL ONCOLOGY | Facility: CLINIC | Age: 77
End: 2022-05-19
Payer: COMMERCIAL

## 2022-05-19 VITALS
DIASTOLIC BLOOD PRESSURE: 70 MMHG | HEIGHT: 70 IN | HEART RATE: 74 BPM | BODY MASS INDEX: 19.81 KG/M2 | WEIGHT: 138.4 LBS | OXYGEN SATURATION: 100 % | SYSTOLIC BLOOD PRESSURE: 130 MMHG | RESPIRATION RATE: 14 BRPM

## 2022-05-19 DIAGNOSIS — C15.5 MALIGNANT NEOPLASM OF LOWER THIRD OF ESOPHAGUS (HCC): Primary | ICD-10-CM

## 2022-05-19 PROCEDURE — 1160F RVW MEDS BY RX/DR IN RCRD: CPT | Performed by: SURGERY

## 2022-05-19 PROCEDURE — 1036F TOBACCO NON-USER: CPT | Performed by: SURGERY

## 2022-05-19 PROCEDURE — 99215 OFFICE O/P EST HI 40 MIN: CPT | Performed by: SURGERY

## 2022-05-19 NOTE — LETTER
May 19, 2022     Regine Mora MD  [address]  1000 [facility]  [address]    Patient: Amara Weaver   YOB: 1945   Date of Visit: 5/19/2022       Dear Dr Lin Reveal: Thank you for referring Tru Hernandez to me for evaluation  Below are my notes for this consultation  If you have questions, please do not hesitate to call me  I look forward to following your patient along with you  Sincerely,        Paty Stokes MD        CC: MD Keegan Lopez MD Migdalia Notice, MD  5/19/2022  2:08 PM  Incomplete               Surgical Oncology Follow Up       [facility]  [facility] [place]  1305 [facility] 203  [address] [place] [address]  [facility]  1945  [id number]  [facility] [address] [facility] [place]  600 [facility] 203  [address]  [phone]    Diagnoses and all orders for this visit:    Malignant neoplasm of lower third of esophagus ([facility] [address] )        No chief complaint on file  No follow-ups on file  Oncology History   Malignant neoplasm of lower third of esophagus ([address] )   1/13/2022 Initial Diagnosis    Malignant neoplasm of lower third of esophagus ([address] )     1/13/2022 Biopsy    EGD:   Esophagus, distal:  - 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  RESULTS OF IMMUNOHISTOCHEMICAL ANALYSIS FOR MISMATCH REPAIR PROTEIN LOSS     INTERPRETATION: No loss of nuclear expression of MMR proteins: Low probability of MSI-H     Note: Background non-neoplastic tissue and/or internal control with intact nuclear expression        RESULTS:  Antibody          Clone               Description                           Results  MLH1               M1                   Mismatch repair protein       Intact nuclear expression  MSH2              [id number]       Mismatch repair protein       Intact nuclear expression  MSH6              44                     Mismatch repair protein       Intact nuclear expression  PMS2              [id number]           Mismatch repair protein       Intact nuclear expression     3/15/2022 -  Chemotherapy    CARBOplatin (PARAPLATIN) IVPB (GOG AUC DOSING), 147 6 mg, Intravenous, Once, 7 of 7 cycles  Administration: 147 6 mg (3/15/2022), 196 mg (3/22/2022), 196 mg (3/29/2022), 196 mg (4/5/2022), 196 mg (4/12/2022), 200 mg (4/26/2022), 200 mg (5/3/2022)  PACLItaxel (TAXOL) chemo IVPB, 50 mg/m2 = 84 6 mg, Intravenous, Once, 7 of 7 cycles  Administration: 84 6 mg (3/15/2022), 85 8 mg (3/22/2022), 85 8 mg (3/29/2022), 85 8 mg (4/5/2022), 85 8 mg (4/12/2022), 87 mg (4/26/2022), 87 mg (5/3/2022)         Staging:  Clinical T3 N1 M0 esophageal adenocarcinoma  Treatment history:  Chemo radiation  Current treatment:  Chemo radiation completed  Disease status:  Improved    History of Present Illness:  Patient returns in follow-up  He has completed his chemo radiation  He is now eating all food without any difficulty including meat and pizza and bread  No significant nausea or vomiting  He is maintaining his weight  He is still using his tube feeds for 12 hours a day  PET-CT from May 16, 2022 revealed decreased activity in the distal esophagus with an SUV of 3 6  The paraesophageal lymph nodes or less conspicuous  There was some soft tissue near the left parous off a MARCIE region distal to the left mainstem bronchus  There was also an small AP window node with an SUV of 2 3  There was a concern that these could be metastasis, but on my review, I suspect these are related to his radiation therapy  The gastrohepatic lymphadenopathy has improved  The FDG activity in the colon has improved as well  This was likely physiologic  I personally reviewed his films  He comes in now to discuss surgical therapy      Review of Systems  Complete ROS Surg Onc:   Complete ROS Surg Onc:   Constitutional: The patient denies new or recent history of general fatigue, no recent weight loss, no change in appetite  Eyes: No complaints of visual problems, no scleral icterus  ENT: no complaints of ear pain, no hoarseness, no difficulty swallowing,  no tinnitus and no new masses in head, oral cavity, or neck  Cardiovascular: No complaints of chest pain, no palpitations, no ankle edema  Respiratory: No complaints of shortness of breath, no cough  Gastrointestinal: No complaints of jaundice, no bloody stools, no pale stools  Genitourinary: No complaints of dysuria, no hematuria, no nocturia, no frequent urination, no urethral discharge  Musculoskeletal: No complaints of weakness, paralysis, joint stiffness or arthralgias  Integumentary: No complaints of rash, no new lesions  Neurological: No complaints of convulsions, no seizures, no dizziness  Hematologic/Lymphatic: No complaints of easy bruising  Endocrine:  No hot or cold intolerance  No polydipsia, polyphagia, or polyuria  Allergy/immunology:  No environmental allergies  No food allergies  Not immunocompromised  Skin:  No pallor or rash  No wound  Patient Active Problem List   Diagnosis    Smoking    Weight loss    Dysphagia    Dermatitis    Malignant neoplasm of lower third of esophagus (HCC)    Mild protein-calorie malnutrition (HCC)    Left ankle pain    Severe protein-calorie malnutrition (HCC)    Chronic pain    Cancer related pain    Palliative care encounter    Anemia due to antineoplastic chemotherapy     Past Medical History:   Diagnosis Date    Cancer ([facility] [address] )     esophageal ca    Pneumonia     Polio     Rib fractures      Past Surgical History:   Procedure Laterality Date    FL GUIDED CENTRAL VENOUS ACCESS DEVICE INSERTION  2/16/2022    TUNNELED VENOUS PORT PLACEMENT Left 2/16/2022    Procedure: INSERTION VENOUS PORT (PORT-A-CATH);   Surgeon: Campos Zayas MD;  [facility]; Service: Surgical Oncology     Family History   Problem Relation Age of Onset    Dementia Mother     Heart disease Father      Social History     Socioeconomic History    Marital status: /Civil Union     Spouse name: Not on file    Number of children: Not on file    Years of education: Not on file    Highest education level: Not on file   Occupational History    Not on file   Tobacco Use    Smoking status: Former Smoker     Packs/day: 1 00     Years: 40 00     Pack years: 40 00     Types: Cigarettes     Quit date: 2022     Years since quittin 2    Smokeless tobacco: Never Used   Vaping Use    Vaping Use: Never used   Substance and Sexual Activity    Alcohol use: Never     Alcohol/week: 0 0 standard drinks     Comment: 0    Drug use: No    Sexual activity: Not Currently   Other Topics Concern    Not on file   Social History Narrative    Not on file     Social Determinants of Health     Financial Resource Strain: Not on file   Food Insecurity: No Food Insecurity    Worried About [address] in the Last Year: Never true    [address] [employer] in the Last Year: Never true   Transportation Needs: No Transportation Needs    Lack of Transportation (Medical): No    Lack of Transportation (Non-Medical):  No   Physical Activity: Not on file   Stress: Not on file   Social Connections: Not on file   Intimate Partner Violence: Not on file   Housing Stability: Low Risk     Unable to Pay for Housing in the Last Year: No    Number of Places Lived in the Last Year: 1    Unstable Housing in the Last Year: No       Current Outpatient Medications:     acetaminophen (TYLENOL) 325 mg tablet, Take 325 mg by mouth every 6 (six) hours as needed for mild pain, Disp: , Rfl:     ascorbic acid (VITAMIN C) 500 mg tablet, Take 500 mg by mouth daily, Disp: , Rfl:     aspirin 325 mg tablet, Take 325 mg by mouth 2 (two) times a day, Disp: , Rfl:     bisacodyl (Dulcolax) 10 mg suppository, Insert 10 mg into the rectum daily, Disp: , Rfl:     Cholecalciferol (VITAMIN D3 PO), Take by mouth, Disp: , Rfl:     cyanocobalamin (VITAMIN B-12) 500 MCG tablet, Take 500 mcg by mouth daily, Disp: , Rfl:     lidocaine (LIDODERM) 5 %, Apply 1 patch topically daily Remove & Discard patch within 12 hours or as directed by MD, Disp: , Rfl: 0    magnesium hydroxide (MILK OF MAGNESIA) 400 mg/5 mL oral suspension, Take 30 mL by mouth daily as needed for constipation, Disp: , Rfl:     Multiple Vitamins-Minerals (MULTIVITAMIN WITH MINERALS) tablet, Take 1 tablet by mouth daily, Disp: , Rfl:     ondansetron (Zofran ODT) 8 mg disintegrating tablet, Take 1 tablet (8 mg total) by mouth every 8 (eight) hours as needed for nausea or vomiting Through J tube not by mouth , Disp: 20 tablet, Rfl: 0    pantoprazole (PROTONIX) 40 mg tablet, Take 1 tablet (40 mg total) by mouth 2 (two) times a day, Disp: , Rfl: 0    triamcinolone (KENALOG) 0 5 % cream, Apply topically 2 (two) times a day, Disp: 30 g, Rfl: 0  No Known Allergies  There were no vitals filed for this visit  Physical Exam  Constitutional: General appearance: The Patient is well-developed and well-nourished who appears the stated age in no acute distress  Patient is pleasant and talkative  HEENT:  Normocephalic  Sclerae are anicteric  Mucous membranes are moist  Neck is supple without adenopathy  No JVD  Chest: The lungs are clear to auscultation  Cardiac: Heart is regular rate  Abdomen: Abdomen is soft, non-tender, non-distended and without masses  Extremities: There is no clubbing or cyanosis  There is no edema  Symmetric  Neuro: Grossly nonfocal  Gait is normal      Lymphatic: No evidence of cervical adenopathy bilaterally  No evidence of axillary adenopathy bilaterally  No evidence of inguinal adenopathy bilaterally  Skin: Warm, anicteric      Psych:  Patient is pleasant and talkative  Breasts:        Pathology:  [unfilled]    Labs:      Imaging  NM PET CT skull base to mid thigh    Result Date: 5/16/2022  Narrative: PET/CT SCAN INDICATION:  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   , restaging post surgery for treatment management MODIFIER: PS COMPARISON: PET CT 2/1/2022 CELL TYPE:  Adenocarcinoma, 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 TECHNIQUE:   8 7 mCi F-18-FDG administered IV  Multiplanar attenuation corrected and non attenuation corrected PET images were acquired 60 minutes post injection  Contiguous, low dose, axial CT sections were obtained from the skull base through the femurs   Intravenous contrast material was not utilized  This examination, like all CT scans performed in the [facility], was performed utilizing techniques to minimize radiation dose exposure, including the use of iterative reconstruction and automated exposure control  Fasting serum glucose: 121 mg/dl FINDINGS: VISUALIZED BRAIN:   No acute abnormalities are seen  HEAD/NECK:   There is a physiologic distribution of FDG  No FDG avid cervical adenopathy is seen  CT images: Unremarkable  CHEST: Interval decreased FDG activity in the distal esophagus and proximal stomach, SUV ranging up to 3 6, prior SUV 10 9  This is compatible with at least partial response to therapy  Residual viable tumor is not excluded at this time  Previously visualized paraesophageal hypermetabolic soft tissue appears less conspicuous  There is however new hypermetabolic soft tissue along the left paraesophageal region, posterior to the distal left mainstem bronchus, and anterior to the descending thoracic aorta, SUV measuring 2 7  There is also a new hypermetabolic AP window node image 3/88 measuring 1 x 1 1 cm, SUV 2 3  These may represent new metastases   Right lower endobronchial nodular density is similar to prior exams dating back to 2019, but appears to demonstrate mild FDG activity, SUV 3, prior SUV 2 9  Probable incomplete mucous plugging in the distal right lower bronchial branches prominently in the superior segment appears similar, and without significant FDG uptake  CT images: Coronary atherosclerosis  ABDOMEN: Previously visualized gastrohepatic and metabolic soft tissue appears diminished, SUV measuring 1 9 in this region, prior SUV 3 5  Proximal right colon FDG activity appears diminished, SUV 3 7, prior SUV 8 8  This may have been physiologic  No new FDG avid soft tissue lesions are seen  CT images: PEG tube in place  Stable aneurysmal abdominal aorta measuring 3 8 x 3 3 cm  Stable lobulated left kidney  Mild fullness of the left renal pelvis  PELVIS: No FDG avid soft tissue lesions are seen  CT images: Prostatomegaly measuring 5 6 x 4 4 cm  OSSEOUS STRUCTURES: Increased activity in the right sternomanubrial joint, SUV 3 1, prior SUV 3 1, is likely degenerative/inflammatory  Otherwise no FDG avid lesions are seen  CT images: Spine degenerative change  L5 spondylolysis with grade 1 anterolisthesis on S1  Severe right hip degenerative change  Impression: 1  Interval decreased FDG activity in the distal esophagus and proximal stomach, suggesting at least partial response to therapy  Residual viable tumor may still be present  Continued PET CT follow-up recommended  2   Previously visualized distal paraesophageal and gastrohepatic hypermetabolic densities appear diminished  There are however new mildly hypermetabolic left paraesophageal and AP window lesions as above, which may represent new metastases  3   Right lower endobronchial soft tissue lesion is persistent from 2019, but demonstrates mild FDG activity  Clinical correlation recommended for suspected endobronchial polypoid mass  Workstation performed: [id number]     I reviewed the above laboratory and imaging data  Discussion/Summary:  55-year-old male with a clinical T3 N1 M0 esophageal adenocarcinoma    He is doing very well   I suspect his PET-CT findings are more related to treatment effect rather than metastasis  It appears that there is still residual disease based on the PET  I would recommend that he undergo esophagectomy  We discussed the multiple types of esophagectomy and the reasoning and rationale behind each approach  The risks of EGD and esophagectomy were explained and included bleeding, infection, recurrence, need for further surgery, wound complications, adjacent organ injury, anastomotic leak, sepsis, mi, DVT, stroke, pulmonary embolism, and death  Informed consent was obtained  We will schedule this at our earliest mutual convenience  We discussed that I would prefer transhiatal esophagectomy in this situation, to minimize his time under anesthesia  He is going to meet with thoracic surgery to discuss if a thoracoscopic approach would be beneficial   I will also have him obtain cardiac clearance as well as have him follow up with the surgical optimization clinic prior to any intervention  He is agreeable to this plan  All of his questions were answered  Sunday Hinton MD  5/19/2022  1:50 PM  Incomplete               Surgical Oncology Follow Up       [facility]  [facility] [address] [id number] [phone]    [facility]  1945  [id number]  [facility] ONC [facility] [address]  [address] [place] [address]  [phone]    Diagnoses and all orders for this visit:    Malignant neoplasm of lower third of esophagus ([facility] [address] )        No chief complaint on file  No follow-ups on file        Oncology History   Malignant neoplasm of lower third of esophagus ([address] )   1/13/2022 Initial Diagnosis    Malignant neoplasm of lower third of esophagus ([address] )     1/13/2022 Biopsy    EGD:   Esophagus, distal:  - 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  RESULTS OF IMMUNOHISTOCHEMICAL ANALYSIS FOR MISMATCH REPAIR PROTEIN LOSS     INTERPRETATION: No loss of nuclear expression of MMR proteins: Low probability of MSI-H     Note: Background non-neoplastic tissue and/or internal control with intact nuclear expression  RESULTS:  Antibody          Clone               Description                           Results  MLH1               M1                   Mismatch repair protein       Intact nuclear expression  MSH2              [id number]       Mismatch repair protein       Intact nuclear expression  MSH6              44                     Mismatch repair protein       Intact nuclear expression  PMS2              [id number]           Mismatch repair protein       Intact nuclear expression     3/15/2022 -  Chemotherapy    CARBOplatin (PARAPLATIN) IVPB (GOG AUC DOSING), 147 6 mg, Intravenous, Once, 7 of 7 cycles  Administration: 147 6 mg (3/15/2022), 196 mg (3/22/2022), 196 mg (3/29/2022), 196 mg (4/5/2022), 196 mg (4/12/2022), 200 mg (4/26/2022), 200 mg (5/3/2022)  PACLItaxel (TAXOL) chemo IVPB, 50 mg/m2 = 84 6 mg, Intravenous, Once, 7 of 7 cycles  Administration: 84 6 mg (3/15/2022), 85 8 mg (3/22/2022), 85 8 mg (3/29/2022), 85 8 mg (4/5/2022), 85 8 mg (4/12/2022), 87 mg (4/26/2022), 87 mg (5/3/2022)         Staging:  Clinical T3 N1 M0 esophageal adenocarcinoma  Treatment history:  Chemo radiation  Current treatment:  Chemo radiation completed  Disease status: ***    History of Present Illness: ***    Review of Systems  Complete ROS Surg Onc:   Complete ROS Surg Onc:   Constitutional: The patient denies new or recent history of general fatigue, no recent weight loss, no change in appetite  Eyes: No complaints of visual problems, no scleral icterus  ENT: no complaints of ear pain, no hoarseness, no difficulty swallowing,  no tinnitus and no new masses in head, oral cavity, or neck     Cardiovascular: No complaints of chest pain, no palpitations, no ankle edema  Respiratory: No complaints of shortness of breath, no cough  Gastrointestinal: No complaints of jaundice, no bloody stools, no pale stools  Genitourinary: No complaints of dysuria, no hematuria, no nocturia, no frequent urination, no urethral discharge  Musculoskeletal: No complaints of weakness, paralysis, joint stiffness or arthralgias  Integumentary: No complaints of rash, no new lesions  Neurological: No complaints of convulsions, no seizures, no dizziness  Hematologic/Lymphatic: No complaints of easy bruising  Endocrine:  No hot or cold intolerance  No polydipsia, polyphagia, or polyuria  Allergy/immunology:  No environmental allergies  No food allergies  Not immunocompromised  Skin:  No pallor or rash  No wound  Patient Active Problem List   Diagnosis    Smoking    Weight loss    Dysphagia    Dermatitis    Malignant neoplasm of lower third of esophagus (HCC)    Mild protein-calorie malnutrition (HCC)    Left ankle pain    Severe protein-calorie malnutrition (HCC)    Chronic pain    Cancer related pain    Palliative care encounter    Anemia due to antineoplastic chemotherapy     Past Medical History:   Diagnosis Date    Cancer ([facility] [address] )     esophageal ca    Pneumonia     Polio     Rib fractures      Past Surgical History:   Procedure Laterality Date    FL GUIDED CENTRAL VENOUS ACCESS DEVICE INSERTION  2/16/2022    TUNNELED VENOUS PORT PLACEMENT Left 2/16/2022    Procedure: INSERTION VENOUS PORT (PORT-A-CATH);   Surgeon: Osmel Aguero MD;  [facility];  Service: Surgical Oncology     Family History   Problem Relation Age of Onset    Dementia Mother     Heart disease Father      Social History     Socioeconomic History    Marital status: /Civil Union     Spouse name: Not on file    Number of children: Not on file    Years of education: Not on file    Highest education level: Not on file Occupational History    Not on file   Tobacco Use    Smoking status: Former Smoker     Packs/day: 1 00     Years: 40 00     Pack years: 40 00     Types: Cigarettes     Quit date: 2022     Years since quittin 2    Smokeless tobacco: Never Used   Vaping Use    Vaping Use: Never used   Substance and Sexual Activity    Alcohol use: Never     Alcohol/week: 0 0 standard drinks     Comment: 0    Drug use: No    Sexual activity: Not Currently   Other Topics Concern    Not on file   Social History Narrative    Not on file     Social Determinants of Health     Financial Resource Strain: Not on file   Food Insecurity: No Food Insecurity    Worried About Running Out of Food in the Last Year: Never true    Jean[employer]ie [employer] Food in the Last Year: Never true   Transportation Needs: No Transportation Needs    Lack of Transportation (Medical): No    Lack of Transportation (Non-Medical):  No   Physical Activity: Not on file   Stress: Not on file   Social Connections: Not on file   Intimate Partner Violence: Not on file   Housing Stability: Low Risk     Unable to Pay for Housing in the Last Year: No    Number of Places Lived in the Last Year: 1    Unstable Housing in the Last Year: No       Current Outpatient Medications:     acetaminophen (TYLENOL) 325 mg tablet, Take 325 mg by mouth every 6 (six) hours as needed for mild pain, Disp: , Rfl:     ascorbic acid (VITAMIN C) 500 mg tablet, Take 500 mg by mouth daily, Disp: , Rfl:     aspirin 325 mg tablet, Take 325 mg by mouth 2 (two) times a day, Disp: , Rfl:     bisacodyl (Dulcolax) 10 mg suppository, Insert 10 mg into the rectum daily, Disp: , Rfl:     Cholecalciferol (VITAMIN D3 PO), Take by mouth, Disp: , Rfl:     cyanocobalamin (VITAMIN B-12) 500 MCG tablet, Take 500 mcg by mouth daily, Disp: , Rfl:     lidocaine (LIDODERM) 5 %, Apply 1 patch topically daily Remove & Discard patch within 12 hours or as directed by MD, Disp: , Rfl: 0    magnesium hydroxide (MILK OF MAGNESIA) 400 mg/5 mL oral suspension, Take 30 mL by mouth daily as needed for constipation, Disp: , Rfl:     Multiple Vitamins-Minerals (MULTIVITAMIN WITH MINERALS) tablet, Take 1 tablet by mouth daily, Disp: , Rfl:     ondansetron (Zofran ODT) 8 mg disintegrating tablet, Take 1 tablet (8 mg total) by mouth every 8 (eight) hours as needed for nausea or vomiting Through J tube not by mouth , Disp: 20 tablet, Rfl: 0    pantoprazole (PROTONIX) 40 mg tablet, Take 1 tablet (40 mg total) by mouth 2 (two) times a day, Disp: , Rfl: 0    triamcinolone (KENALOG) 0 5 % cream, Apply topically 2 (two) times a day, Disp: 30 g, Rfl: 0  No Known Allergies  There were no vitals filed for this visit  Physical Exam  Constitutional: General appearance: The Patient is well-developed and well-nourished who appears the stated age in no acute distress  Patient is pleasant and talkative  HEENT:  Normocephalic  Sclerae are anicteric  Mucous membranes are moist  Neck is supple without adenopathy  No JVD  Chest: The lungs are clear to auscultation  Cardiac: Heart is regular rate  Abdomen: Abdomen is soft, non-tender, non-distended and without masses  Extremities: There is no clubbing or cyanosis  There is no edema  Symmetric  Neuro: Grossly nonfocal  Gait is normal      Lymphatic: No evidence of cervical adenopathy bilaterally  No evidence of axillary adenopathy bilaterally  No evidence of inguinal adenopathy bilaterally  Skin: Warm, anicteric  Psych:  Patient is pleasant and talkative    Breasts:        Pathology:  [unfilled]    Labs:      Imaging  NM PET CT skull base to mid thigh    Result Date: 5/16/2022  Narrative: PET/CT SCAN INDICATION:  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   , restaging post surgery for treatment management MODIFIER: PS COMPARISON: PET CT 2/1/2022 CELL TYPE:  Adenocarcinoma, 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 TECHNIQUE:   8 7 mCi F-18-FDG administered IV  Multiplanar attenuation corrected and non attenuation corrected PET images were acquired 60 minutes post injection  Contiguous, low dose, axial CT sections were obtained from the skull base through the femurs   Intravenous contrast material was not utilized  This examination, like all CT scans performed in the [facility], was performed utilizing techniques to minimize radiation dose exposure, including the use of iterative reconstruction and automated exposure control  Fasting serum glucose: 121 mg/dl FINDINGS: VISUALIZED BRAIN:   No acute abnormalities are seen  HEAD/NECK:   There is a physiologic distribution of FDG  No FDG avid cervical adenopathy is seen  CT images: Unremarkable  CHEST: Interval decreased FDG activity in the distal esophagus and proximal stomach, SUV ranging up to 3 6, prior SUV 10 9  This is compatible with at least partial response to therapy  Residual viable tumor is not excluded at this time  Previously visualized paraesophageal hypermetabolic soft tissue appears less conspicuous  There is however new hypermetabolic soft tissue along the left paraesophageal region, posterior to the distal left mainstem bronchus, and anterior to the descending thoracic aorta, SUV measuring 2 7  There is also a new hypermetabolic AP window node image 3/88 measuring 1 x 1 1 cm, SUV 2 3  These may represent new metastases  Right lower endobronchial nodular density is similar to prior exams dating back to 2019, but appears to demonstrate mild FDG activity, SUV 3, prior SUV 2 9  Probable incomplete mucous plugging in the distal right lower bronchial branches prominently in the superior segment appears similar, and without significant FDG uptake  CT images: Coronary atherosclerosis  ABDOMEN: Previously visualized gastrohepatic and metabolic soft tissue appears diminished, SUV measuring 1 9 in this region, prior SUV 3 5   Proximal right colon FDG activity appears diminished, SUV 3 7, prior SUV 8 8  This may have been physiologic  No new FDG avid soft tissue lesions are seen  CT images: PEG tube in place  Stable aneurysmal abdominal aorta measuring 3 8 x 3 3 cm  Stable lobulated left kidney  Mild fullness of the left renal pelvis  PELVIS: No FDG avid soft tissue lesions are seen  CT images: Prostatomegaly measuring 5 6 x 4 4 cm  OSSEOUS STRUCTURES: Increased activity in the right sternomanubrial joint, SUV 3 1, prior SUV 3 1, is likely degenerative/inflammatory  Otherwise no FDG avid lesions are seen  CT images: Spine degenerative change  L5 spondylolysis with grade 1 anterolisthesis on S1  Severe right hip degenerative change  Impression: 1  Interval decreased FDG activity in the distal esophagus and proximal stomach, suggesting at least partial response to therapy  Residual viable tumor may still be present  Continued PET CT follow-up recommended  2   Previously visualized distal paraesophageal and gastrohepatic hypermetabolic densities appear diminished  There are however new mildly hypermetabolic left paraesophageal and AP window lesions as above, which may represent new metastases  3   Right lower endobronchial soft tissue lesion is persistent from 2019, but demonstrates mild FDG activity  Clinical correlation recommended for suspected endobronchial polypoid mass  Workstation performed: [id number]     I reviewed the above laboratory and imaging data      Discussion/Summary: ***

## 2022-05-19 NOTE — PROGRESS NOTES
Surgical Oncology Follow Up       [facility]  [facility] [place]  [address] [facility] [address]  [address] [place] [id number] [phone]    Kathleen [place]  1945  [id number]  [facility] [place]  [address] [facility] [place] [address]  [address]  [phone]    Diagnoses and all orders for this visit:    Malignant neoplasm of lower third of esophagus ([facility] [address] )        No chief complaint on file  No follow-ups on file  Oncology History   Malignant neoplasm of lower third of esophagus ([facility] [address] )   1/13/2022 Initial Diagnosis    Malignant neoplasm of lower third of esophagus ([facility] [address] )     1/13/2022 Biopsy    EGD:   Esophagus, distal:  - 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  RESULTS OF IMMUNOHISTOCHEMICAL ANALYSIS FOR MISMATCH REPAIR PROTEIN LOSS     INTERPRETATION: No loss of nuclear expression of MMR proteins: Low probability of MSI-H     Note: Background non-neoplastic tissue and/or internal control with intact nuclear expression        RESULTS:  Antibody          Clone               Description                           Results  MLH1               M1                   Mismatch repair protein       Intact nuclear expression  MSH2              [id number]       Mismatch repair protein       Intact nuclear expression  MSH6              44                     Mismatch repair protein       Intact nuclear expression  PMS2              [id number]           Mismatch repair protein       Intact nuclear expression     3/15/2022 -  Chemotherapy    CARBOplatin (PARAPLATIN) IVPB (GOG AUC DOSING), 147 6 mg, Intravenous, Once, 7 of 7 cycles  Administration: 147 6 mg (3/15/2022), 196 mg (3/22/2022), 196 mg (3/29/2022), 196 mg (4/5/2022), 196 mg (4/12/2022), 200 mg (4/26/2022), 200 mg (5/3/2022)  PACLItaxel (TAXOL) chemo IVPB, 50 mg/m2 = 84 6 mg, Intravenous, Once, 7 of 7 cycles  Administration: 84 6 mg (3/15/2022), 85 8 mg (3/22/2022), 85 8 mg (3/29/2022), 85 8 mg (4/5/2022), 85 8 mg (4/12/2022), 87 mg (4/26/2022), 87 mg (5/3/2022)         Staging:  Clinical T3 N1 M0 esophageal adenocarcinoma  Treatment history:  Chemo radiation  Current treatment:  Chemo radiation completed  Disease status:  Improved    History of Present Illness:  Patient returns in follow-up  He has completed his chemo radiation  He is now eating all food without any difficulty including meat and pizza and bread  No significant nausea or vomiting  He is maintaining his weight  He is still using his tube feeds for 12 hours a day  PET-CT from May 16, 2022 revealed decreased activity in the distal esophagus with an SUV of 3 6  The paraesophageal lymph nodes or less conspicuous  There was some soft tissue near the left parous off a MARCIE region distal to the left mainstem bronchus  There was also an small AP window node with an SUV of 2 3  There was a concern that these could be metastasis, but on my review, I suspect these are related to his radiation therapy  The gastrohepatic lymphadenopathy has improved  The FDG activity in the colon has improved as well  This was likely physiologic  I personally reviewed his films  He comes in now to discuss surgical therapy  He completed his radiation 3 weeks ago, and his chemotherapy 2 weeks ago  Review of Systems  Complete ROS Surg Onc:   Complete ROS Surg Onc:   Constitutional: The patient denies new or recent history of general fatigue, no recent weight loss, no change in appetite  Eyes: No complaints of visual problems, no scleral icterus  ENT: no complaints of ear pain, no hoarseness, no difficulty swallowing,  no tinnitus and no new masses in head, oral cavity, or neck  Cardiovascular: No complaints of chest pain, no palpitations, no ankle edema  Respiratory: No complaints of shortness of breath, no cough  Gastrointestinal: No complaints of jaundice, no bloody stools, no pale stools  Genitourinary: No complaints of dysuria, no hematuria, no nocturia, no frequent urination, no urethral discharge  Musculoskeletal: No complaints of weakness, paralysis, joint stiffness or arthralgias  Integumentary: No complaints of rash, no new lesions  Neurological: No complaints of convulsions, no seizures, no dizziness  Hematologic/Lymphatic: No complaints of easy bruising  Endocrine:  No hot or cold intolerance  No polydipsia, polyphagia, or polyuria  Allergy/immunology:  No environmental allergies  No food allergies  Not immunocompromised  Skin:  No pallor or rash  No wound  Patient Active Problem List   Diagnosis    Smoking    Weight loss    Dysphagia    Dermatitis    Malignant neoplasm of lower third of esophagus (HCC)    Mild protein-calorie malnutrition (HCC)    Left ankle pain    Severe protein-calorie malnutrition (HCC)    Chronic pain    Cancer related pain    Palliative care encounter    Anemia due to antineoplastic chemotherapy     Past Medical History:   Diagnosis Date    Cancer ([facility] [address] )     esophageal ca    Pneumonia     Polio     Rib fractures      Past Surgical History:   Procedure Laterality Date    FL GUIDED CENTRAL VENOUS ACCESS DEVICE INSERTION  2/16/2022    TUNNELED VENOUS PORT PLACEMENT Left 2/16/2022    Procedure: INSERTION VENOUS PORT (PORT-A-CATH);   Surgeon: Buddy Baldwin MD;  [facility]: [facility];  Service: Surgical Oncology     Family History   Problem Relation Age of Onset    Dementia Mother     Heart disease Father      Social History     Socioeconomic History    Marital status: /Civil Union     Spouse name: Not on file    Number of children: Not on file    Years of education: Not on file    Highest education level: Not on file   Occupational History    Not on file   Tobacco Use    Smoking status: Former Smoker     Packs/day: 1 00     Years: 40 00 Pack years: 40 00     Types: Cigarettes     Quit date: 2022     Years since quittin 2    Smokeless tobacco: Never Used   Vaping Use    Vaping Use: Never used   Substance and Sexual Activity    Alcohol use: Never     Alcohol/week: 0 0 standard drinks     Comment: 0    Drug use: No    Sexual activity: Not Currently   Other Topics Concern    Not on file   Social History Narrative    Not on file     Social Determinants of Health     Financial Resource Strain: Not on file   Food Insecurity: No Food Insecurity    Worried About Running Out of Food in the Last Year: Never true    Jeanmarie of Food in the Last Year: Never true   Transportation Needs: No Transportation Needs    Lack of Transportation (Medical): No    Lack of Transportation (Non-Medical):  No   Physical Activity: Not on file   Stress: Not on file   Social Connections: Not on file   Intimate Partner Violence: Not on file   Housing Stability: Low Risk     Unable to Pay for Housing in the Last Year: No    Number of Places Lived in the Last Year: 1    Unstable Housing in the Last Year: No       Current Outpatient Medications:     acetaminophen (TYLENOL) 325 mg tablet, Take 325 mg by mouth every 6 (six) hours as needed for mild pain, Disp: , Rfl:     ascorbic acid (VITAMIN C) 500 mg tablet, Take 500 mg by mouth daily, Disp: , Rfl:     aspirin 325 mg tablet, Take 325 mg by mouth 2 (two) times a day, Disp: , Rfl:     bisacodyl (Dulcolax) 10 mg suppository, Insert 10 mg into the rectum daily, Disp: , Rfl:     Cholecalciferol (VITAMIN D3 PO), Take by mouth, Disp: , Rfl:     cyanocobalamin (VITAMIN B-12) 500 MCG tablet, Take 500 mcg by mouth daily, Disp: , Rfl:     lidocaine (LIDODERM) 5 %, Apply 1 patch topically daily Remove & Discard patch within 12 hours or as directed by MD, Disp: , Rfl: 0    magnesium hydroxide (MILK OF MAGNESIA) 400 mg/5 mL oral suspension, Take 30 mL by mouth daily as needed for constipation, Disp: , Rfl:     Multiple Vitamins-Minerals (MULTIVITAMIN WITH MINERALS) tablet, Take 1 tablet by mouth daily, Disp: , Rfl:     ondansetron (Zofran ODT) 8 mg disintegrating tablet, Take 1 tablet (8 mg total) by mouth every 8 (eight) hours as needed for nausea or vomiting Through J tube not by mouth , Disp: 20 tablet, Rfl: 0    pantoprazole (PROTONIX) 40 mg tablet, Take 1 tablet (40 mg total) by mouth 2 (two) times a day, Disp: , Rfl: 0    triamcinolone (KENALOG) 0 5 % cream, Apply topically 2 (two) times a day, Disp: 30 g, Rfl: 0  No Known Allergies  There were no vitals filed for this visit  Physical Exam  Constitutional: General appearance: The Patient is well-developed and well-nourished who appears the stated age in no acute distress  Patient is pleasant and talkative  HEENT:  Normocephalic  Sclerae are anicteric  Mucous membranes are moist  Neck is supple without adenopathy  No JVD  Chest: The lungs are clear to auscultation  Cardiac: Heart is regular rate  Abdomen: Abdomen is soft, non-tender, non-distended and without masses  Extremities: There is no clubbing or cyanosis  There is no edema  Symmetric  Neuro: Grossly nonfocal  Gait is normal      Lymphatic: No evidence of cervical adenopathy bilaterally  No evidence of axillary adenopathy bilaterally  No evidence of inguinal adenopathy bilaterally  Skin: Warm, anicteric  Psych:  Patient is pleasant and talkative  Breasts:        Pathology:  [unfilled]    Labs:      Imaging  NM PET CT skull base to mid thigh    Result Date: 5/16/2022  Narrative: PET/CT SCAN INDICATION:  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   , restaging post surgery for treatment management MODIFIER: PS COMPARISON: PET CT 2/1/2022 CELL TYPE:  Adenocarcinoma, 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 TECHNIQUE:   8 7 mCi F-18-FDG administered IV  Multiplanar attenuation corrected and non attenuation corrected PET images were acquired 60 minutes post injection  Contiguous, low dose, axial CT sections were obtained from the skull base through the femurs   Intravenous contrast material was not utilized  This examination, like all CT scans performed in the [facility], was performed utilizing techniques to minimize radiation dose exposure, including the use of iterative reconstruction and automated exposure control  Fasting serum glucose: 121 mg/dl FINDINGS: VISUALIZED BRAIN:   No acute abnormalities are seen  HEAD/NECK:   There is a physiologic distribution of FDG  No FDG avid cervical adenopathy is seen  CT images: Unremarkable  CHEST: Interval decreased FDG activity in the distal esophagus and proximal stomach, SUV ranging up to 3 6, prior SUV 10 9  This is compatible with at least partial response to therapy  Residual viable tumor is not excluded at this time  Previously visualized paraesophageal hypermetabolic soft tissue appears less conspicuous  There is however new hypermetabolic soft tissue along the left paraesophageal region, posterior to the distal left mainstem bronchus, and anterior to the descending thoracic aorta, SUV measuring 2 7  There is also a new hypermetabolic AP window node image 3/88 measuring 1 x 1 1 cm, SUV 2 3  These may represent new metastases  Right lower endobronchial nodular density is similar to prior exams dating back to 2019, but appears to demonstrate mild FDG activity, SUV 3, prior SUV 2 9  Probable incomplete mucous plugging in the distal right lower bronchial branches prominently in the superior segment appears similar, and without significant FDG uptake  CT images: Coronary atherosclerosis  ABDOMEN: Previously visualized gastrohepatic and metabolic soft tissue appears diminished, SUV measuring 1 9 in this region, prior SUV 3 5  Proximal right colon FDG activity appears diminished, SUV 3 7, prior SUV 8 8  This may have been physiologic  No new FDG avid soft tissue lesions are seen  CT images: PEG tube in place  Stable aneurysmal abdominal aorta measuring 3 8 x 3 3 cm  Stable lobulated left kidney  Mild fullness of the left renal pelvis  PELVIS: No FDG avid soft tissue lesions are seen  CT images: Prostatomegaly measuring 5 6 x 4 4 cm  OSSEOUS STRUCTURES: Increased activity in the right sternomanubrial joint, SUV 3 1, prior SUV 3 1, is likely degenerative/inflammatory  Otherwise no FDG avid lesions are seen  CT images: Spine degenerative change  L5 spondylolysis with grade 1 anterolisthesis on S1  Severe right hip degenerative change  Impression: 1  Interval decreased FDG activity in the distal esophagus and proximal stomach, suggesting at least partial response to therapy  Residual viable tumor may still be present  Continued PET CT follow-up recommended  2   Previously visualized distal paraesophageal and gastrohepatic hypermetabolic densities appear diminished  There are however new mildly hypermetabolic left paraesophageal and AP window lesions as above, which may represent new metastases  3   Right lower endobronchial soft tissue lesion is persistent from 2019, but demonstrates mild FDG activity  Clinical correlation recommended for suspected endobronchial polypoid mass  Workstation performed: [id number]     I reviewed the above laboratory and imaging data  Discussion/Summary:  51-year-old male with a clinical T3 N1 M0 esophageal adenocarcinoma  He is doing very well  I suspect his PET-CT findings are more related to treatment effect rather than metastasis  It appears that there is still residual disease based on the PET  I would recommend that he undergo esophagectomy  We discussed the multiple types of esophagectomy and the reasoning and rationale behind each approach    The risks of EGD and esophagectomy were explained and included bleeding, infection, recurrence, need for further surgery, wound complications, adjacent organ injury, anastomotic leak, sepsis, mi, DVT, stroke, pulmonary embolism, and death  Informed consent was obtained  We will schedule this at our earliest mutual convenience  We discussed that I would prefer transhiatal esophagectomy in this situation, to minimize his time under anesthesia  He is going to meet with thoracic surgery to discuss if a thoracoscopic approach would be beneficial   I will also have him obtain cardiac clearance as well as have him follow up with the surgical optimization clinic prior to any intervention  He is agreeable to this plan  All of his questions were answered

## 2022-05-19 NOTE — LETTER
May 19, 2022     Devyn Kiran MD  [address]  [address] [facility]  [address]    Patient: Marquita Nolan   YOB: 1945   Date of Visit: 5/19/2022       Dear Dr Bessie Schwab: Thank you for referring Rikki Gray to me for evaluation  Below are my notes for this consultation  If you have questions, please do not hesitate to call me  I look forward to following your patient along with you  Sincerely,        Campos Zayas MD        CC: MD Campos Cano MD Fredy Finch, MD  5/19/2022  2:10 PM  Sign when Signing Visit               Surgical Oncology Follow Up       [facility]  [facility] [address] [facility]  [address] [facility]  [address]  [place] [address]  [address]  1945  [phone]  [facility] [address] [facility]  [address] [facility]  [address]  [address] [place]  [phone]    Diagnoses and all orders for this visit:    Malignant neoplasm of lower third of esophagus ([facility] [address] )        No chief complaint on file  No follow-ups on file  Oncology History   Malignant neoplasm of lower third of esophagus ([address] )   1/13/2022 Initial Diagnosis    Malignant neoplasm of lower third of esophagus ([facility] [address] )     1/13/2022 Biopsy    EGD:   Esophagus, distal:  - 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  RESULTS OF IMMUNOHISTOCHEMICAL ANALYSIS FOR MISMATCH REPAIR PROTEIN LOSS     INTERPRETATION: No loss of nuclear expression of MMR proteins: Low probability of MSI-H     Note: Background non-neoplastic tissue and/or internal control with intact nuclear expression        RESULTS:  Antibody          Clone               Description                           Results  MLH1               M1                   Mismatch repair protein       Intact nuclear expression  MSH2              [id number]       Mismatch repair protein       Intact nuclear expression  MSH6              44                     Mismatch repair protein       Intact nuclear expression  PMS2              [id number]           Mismatch repair protein       Intact nuclear expression     3/15/2022 -  Chemotherapy    CARBOplatin (PARAPLATIN) IVPB (GOG AUC DOSING), 147 6 mg, Intravenous, Once, 7 of 7 cycles  Administration: 147 6 mg (3/15/2022), 196 mg (3/22/2022), 196 mg (3/29/2022), 196 mg (4/5/2022), 196 mg (4/12/2022), 200 mg (4/26/2022), 200 mg (5/3/2022)  PACLItaxel (TAXOL) chemo IVPB, 50 mg/m2 = 84 6 mg, Intravenous, Once, 7 of 7 cycles  Administration: 84 6 mg (3/15/2022), 85 8 mg (3/22/2022), 85 8 mg (3/29/2022), 85 8 mg (4/5/2022), 85 8 mg (4/12/2022), 87 mg (4/26/2022), 87 mg (5/3/2022)         Staging:  Clinical T3 N1 M0 esophageal adenocarcinoma  Treatment history:  Chemo radiation  Current treatment:  Chemo radiation completed  Disease status:  Improved    History of Present Illness:  Patient returns in follow-up  He has completed his chemo radiation  He is now eating all food without any difficulty including meat and pizza and bread  No significant nausea or vomiting  He is maintaining his weight  He is still using his tube feeds for 12 hours a day  PET-CT from May 16, 2022 revealed decreased activity in the distal esophagus with an SUV of 3 6  The paraesophageal lymph nodes or less conspicuous  There was some soft tissue near the left parous off a MARCIE region distal to the left mainstem bronchus  There was also an small AP window node with an SUV of 2 3  There was a concern that these could be metastasis, but on my review, I suspect these are related to his radiation therapy  The gastrohepatic lymphadenopathy has improved  The FDG activity in the colon has improved as well  This was likely physiologic  I personally reviewed his films  He comes in now to discuss surgical therapy    He completed his radiation 3 weeks ago, and his chemotherapy 2 weeks ago  Review of Systems  Complete ROS Surg Onc:   Complete ROS Surg Onc:   Constitutional: The patient denies new or recent history of general fatigue, no recent weight loss, no change in appetite  Eyes: No complaints of visual problems, no scleral icterus  ENT: no complaints of ear pain, no hoarseness, no difficulty swallowing,  no tinnitus and no new masses in head, oral cavity, or neck  Cardiovascular: No complaints of chest pain, no palpitations, no ankle edema  Respiratory: No complaints of shortness of breath, no cough  Gastrointestinal: No complaints of jaundice, no bloody stools, no pale stools  Genitourinary: No complaints of dysuria, no hematuria, no nocturia, no frequent urination, no urethral discharge  Musculoskeletal: No complaints of weakness, paralysis, joint stiffness or arthralgias  Integumentary: No complaints of rash, no new lesions  Neurological: No complaints of convulsions, no seizures, no dizziness  Hematologic/Lymphatic: No complaints of easy bruising  Endocrine:  No hot or cold intolerance  No polydipsia, polyphagia, or polyuria  Allergy/immunology:  No environmental allergies  No food allergies  Not immunocompromised  Skin:  No pallor or rash  No wound          Patient Active Problem List   Diagnosis    Smoking    Weight loss    Dysphagia    Dermatitis    Malignant neoplasm of lower third of esophagus (HCC)    Mild protein-calorie malnutrition (HCC)    Left ankle pain    Severe protein-calorie malnutrition (HCC)    Chronic pain    Cancer related pain    Palliative care encounter    Anemia due to antineoplastic chemotherapy     Past Medical History:   Diagnosis Date    Cancer ([address] )     esophageal ca    Pneumonia     Polio     Rib fractures      Past Surgical History:   Procedure Laterality Date    FL GUIDED CENTRAL VENOUS ACCESS DEVICE INSERTION  2/16/2022    TUNNELED VENOUS PORT PLACEMENT Left 2/16/2022    Procedure: INSERTION VENOUS PORT (PORT-A-CATH); Surgeon: Maryjo Ribera MD;  [facility];  Service: Surgical Oncology     Family History   Problem Relation Age of Onset    Dementia Mother     Heart disease Father      Social History     Socioeconomic History    Marital status: /Civil Union     Spouse name: Not on file    Number of children: Not on file    Years of education: Not on file    Highest education level: Not on file   Occupational History    Not on file   Tobacco Use    Smoking status: Former Smoker     Packs/day: 1 00     Years: 40 00     Pack years: 40 00     Types: Cigarettes     Quit date: 2022     Years since quittin 2    Smokeless tobacco: Never Used   Vaping Use    Vaping Use: Never used   Substance and Sexual Activity    Alcohol use: Never     Alcohol/week: 0 0 standard drinks     Comment: 0    Drug use: No    Sexual activity: Not Currently   Other Topics Concern    Not on file   Social History Narrative    Not on file     Social Determinants of Health     Financial Resource Strain: Not on file   Food Insecurity: No Food Insecurity    Worried About [address] in the Last Year: Never true    [address] [employer] in the Last Year: Never true   Transportation Needs: No Transportation Needs    Lack of Transportation (Medical): No    Lack of Transportation (Non-Medical):  No   Physical Activity: Not on file   Stress: Not on file   Social Connections: Not on file   Intimate Partner Violence: Not on file   Housing Stability: Low Risk     Unable to Pay for Housing in the Last Year: No    Number of Places Lived in the Last Year: 1    Unstable Housing in the Last Year: No       Current Outpatient Medications:     acetaminophen (TYLENOL) 325 mg tablet, Take 325 mg by mouth every 6 (six) hours as needed for mild pain, Disp: , Rfl:     ascorbic acid (VITAMIN C) 500 mg tablet, Take 500 mg by mouth daily, Disp: , Rfl:     aspirin 325 mg tablet, Take 325 mg by mouth 2 (two) times a day, Disp: , Rfl:     bisacodyl (Dulcolax) 10 mg suppository, Insert 10 mg into the rectum daily, Disp: , Rfl:     Cholecalciferol (VITAMIN D3 PO), Take by mouth, Disp: , Rfl:     cyanocobalamin (VITAMIN B-12) 500 MCG tablet, Take 500 mcg by mouth daily, Disp: , Rfl:     lidocaine (LIDODERM) 5 %, Apply 1 patch topically daily Remove & Discard patch within 12 hours or as directed by MD, Disp: , Rfl: 0    magnesium hydroxide (MILK OF MAGNESIA) 400 mg/5 mL oral suspension, Take 30 mL by mouth daily as needed for constipation, Disp: , Rfl:     Multiple Vitamins-Minerals (MULTIVITAMIN WITH MINERALS) tablet, Take 1 tablet by mouth daily, Disp: , Rfl:     ondansetron (Zofran ODT) 8 mg disintegrating tablet, Take 1 tablet (8 mg total) by mouth every 8 (eight) hours as needed for nausea or vomiting Through J tube not by mouth , Disp: 20 tablet, Rfl: 0    pantoprazole (PROTONIX) 40 mg tablet, Take 1 tablet (40 mg total) by mouth 2 (two) times a day, Disp: , Rfl: 0    triamcinolone (KENALOG) 0 5 % cream, Apply topically 2 (two) times a day, Disp: 30 g, Rfl: 0  No Known Allergies  There were no vitals filed for this visit  Physical Exam  Constitutional: General appearance: The Patient is well-developed and well-nourished who appears the stated age in no acute distress  Patient is pleasant and talkative  HEENT:  Normocephalic  Sclerae are anicteric  Mucous membranes are moist  Neck is supple without adenopathy  No JVD  Chest: The lungs are clear to auscultation  Cardiac: Heart is regular rate  Abdomen: Abdomen is soft, non-tender, non-distended and without masses  Extremities: There is no clubbing or cyanosis  There is no edema  Symmetric  Neuro: Grossly nonfocal  Gait is normal      Lymphatic: No evidence of cervical adenopathy bilaterally  No evidence of axillary adenopathy bilaterally  No evidence of inguinal adenopathy bilaterally  Skin: Warm, anicteric      Psych:  Patient is pleasant and talkative  Breasts:        Pathology:  [unfilled]    Labs:      Imaging  NM PET CT skull base to mid thigh    Result Date: 5/16/2022  Narrative: PET/CT SCAN INDICATION:  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   , restaging post surgery for treatment management MODIFIER: PS COMPARISON: PET CT 2/1/2022 CELL TYPE:  Adenocarcinoma, 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 TECHNIQUE:   8 7 mCi F-18-FDG administered IV  Multiplanar attenuation corrected and non attenuation corrected PET images were acquired 60 minutes post injection  Contiguous, low dose, axial CT sections were obtained from the skull base through the femurs   Intravenous contrast material was not utilized  This examination, like all CT scans performed in the [facility], was performed utilizing techniques to minimize radiation dose exposure, including the use of iterative reconstruction and automated exposure control  Fasting serum glucose: 121 mg/dl FINDINGS: VISUALIZED BRAIN:   No acute abnormalities are seen  HEAD/NECK:   There is a physiologic distribution of FDG  No FDG avid cervical adenopathy is seen  CT images: Unremarkable  CHEST: Interval decreased FDG activity in the distal esophagus and proximal stomach, SUV ranging up to 3 6, prior SUV 10 9  This is compatible with at least partial response to therapy  Residual viable tumor is not excluded at this time  Previously visualized paraesophageal hypermetabolic soft tissue appears less conspicuous  There is however new hypermetabolic soft tissue along the left paraesophageal region, posterior to the distal left mainstem bronchus, and anterior to the descending thoracic aorta, SUV measuring 2 7  There is also a new hypermetabolic AP window node image 3/88 measuring 1 x 1 1 cm, SUV 2 3  These may represent new metastases   Right lower endobronchial nodular density is similar to prior exams dating back to 2019, but appears to demonstrate mild FDG activity, SUV 3, prior SUV 2 9  Probable incomplete mucous plugging in the distal right lower bronchial branches prominently in the superior segment appears similar, and without significant FDG uptake  CT images: Coronary atherosclerosis  ABDOMEN: Previously visualized gastrohepatic and metabolic soft tissue appears diminished, SUV measuring 1 9 in this region, prior SUV 3 5  Proximal right colon FDG activity appears diminished, SUV 3 7, prior SUV 8 8  This may have been physiologic  No new FDG avid soft tissue lesions are seen  CT images: PEG tube in place  Stable aneurysmal abdominal aorta measuring 3 8 x 3 3 cm  Stable lobulated left kidney  Mild fullness of the left renal pelvis  PELVIS: No FDG avid soft tissue lesions are seen  CT images: Prostatomegaly measuring 5 6 x 4 4 cm  OSSEOUS STRUCTURES: Increased activity in the right sternomanubrial joint, SUV 3 1, prior SUV 3 1, is likely degenerative/inflammatory  Otherwise no FDG avid lesions are seen  CT images: Spine degenerative change  L5 spondylolysis with grade 1 anterolisthesis on S1  Severe right hip degenerative change  Impression: 1  Interval decreased FDG activity in the distal esophagus and proximal stomach, suggesting at least partial response to therapy  Residual viable tumor may still be present  Continued PET CT follow-up recommended  2   Previously visualized distal paraesophageal and gastrohepatic hypermetabolic densities appear diminished  There are however new mildly hypermetabolic left paraesophageal and AP window lesions as above, which may represent new metastases  3   Right lower endobronchial soft tissue lesion is persistent from 2019, but demonstrates mild FDG activity  Clinical correlation recommended for suspected endobronchial polypoid mass  Workstation performed: [id number]     I reviewed the above laboratory and imaging data  Discussion/Summary:  66-year-old male with a clinical T3 N1 M0 esophageal adenocarcinoma    He is doing very well  I suspect his PET-CT findings are more related to treatment effect rather than metastasis  It appears that there is still residual disease based on the PET  I would recommend that he undergo esophagectomy  We discussed the multiple types of esophagectomy and the reasoning and rationale behind each approach  The risks of EGD and esophagectomy were explained and included bleeding, infection, recurrence, need for further surgery, wound complications, adjacent organ injury, anastomotic leak, sepsis, mi, DVT, stroke, pulmonary embolism, and death  Informed consent was obtained  We will schedule this at our earliest mutual convenience  We discussed that I would prefer transhiatal esophagectomy in this situation, to minimize his time under anesthesia  He is going to meet with thoracic surgery to discuss if a thoracoscopic approach would be beneficial   I will also have him obtain cardiac clearance as well as have him follow up with the surgical optimization clinic prior to any intervention  He is agreeable to this plan  All of his questions were answered

## 2022-05-20 ENCOUNTER — TELEPHONE (OUTPATIENT)
Dept: ANESTHESIOLOGY | Facility: CLINIC | Age: 77
End: 2022-05-20

## 2022-05-20 ENCOUNTER — NUTRITION (OUTPATIENT)
Dept: NUTRITION | Facility: CLINIC | Age: 77
End: 2022-05-20

## 2022-05-20 DIAGNOSIS — Z71.3 NUTRITIONAL COUNSELING: Primary | ICD-10-CM

## 2022-05-20 NOTE — PROGRESS NOTES
Outpatient Oncology Nutrition Consultation   Type of Consult: Follow Up  Care Location: Telephone Call    Reason for referral: from 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 1/13/22  · S/p Jtube placement 2/7/22  · RT began 3/14/22, EOT 4/20/22  · Chemotherapy (carboplatin, taxol) 3/15/22-5/3/22  · Possibly esophagectomy in the near future  Oncology History   Malignant neoplasm of lower third of esophagus ([facility] [address] )   1/13/2022 Initial Diagnosis    Malignant neoplasm of lower third of esophagus ([facility] [address] )     1/13/2022 Biopsy    EGD:   Esophagus, distal:  - 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  RESULTS OF IMMUNOHISTOCHEMICAL ANALYSIS FOR MISMATCH REPAIR PROTEIN LOSS     INTERPRETATION: No loss of nuclear expression of MMR proteins: Low probability of MSI-H     Note: Background non-neoplastic tissue and/or internal control with intact nuclear expression        RESULTS:  Antibody          Clone               Description                           Results  MLH1               M1                   Mismatch repair protein       Intact nuclear expression  MSH2              [id number]       Mismatch repair protein       Intact nuclear expression  MSH6              44                     Mismatch repair protein       Intact nuclear expression  PMS2              [id number]           Mismatch repair protein       Intact nuclear expression     3/15/2022 -  Chemotherapy    CARBOplatin (PARAPLATIN) IVPB (GOG AUC DOSING), 147 6 mg, Intravenous, Once, 7 of 7 cycles  Administration: 147 6 mg (3/15/2022), 196 mg (3/22/2022), 196 mg (3/29/2022), 196 mg (4/5/2022), 196 mg (4/12/2022), 200 mg (4/26/2022), 200 mg (5/3/2022)  PACLItaxel (TAXOL) chemo IVPB, 50 mg/m2 = 84 6 mg, Intravenous, Once, 7 of 7 cycles  Administration: 84 6 mg (3/15/2022), 85 8 mg (3/22/2022), 85 8 mg (3/29/2022), 85 8 mg (4/5/2022), 85 8 mg (4/12/2022), 87 mg (4/26/2022), 87 mg (5/3/2022)       Past Medical & Surgical Hx: current smoker  Patient Active Problem List   Diagnosis    Smoking    Weight loss    Dysphagia    Dermatitis    Malignant neoplasm of lower third of esophagus (HCC)    Mild protein-calorie malnutrition (HCC)    Left ankle pain    Severe protein-calorie malnutrition (HCC)    Chronic pain    Cancer related pain    Palliative care encounter    Anemia due to antineoplastic chemotherapy     Past Medical History:   Diagnosis Date    Cancer ([facility] [address] )     esophageal ca    Pneumonia     Polio     Rib fractures      Past Surgical History:   Procedure Laterality Date    FL GUIDED CENTRAL VENOUS ACCESS DEVICE INSERTION  2/16/2022    TUNNELED VENOUS PORT PLACEMENT Left 2/16/2022    Procedure: INSERTION VENOUS PORT (PORT-A-CATH);   Surgeon: Sandra Hernandez MD;  Location: [facility];  Service: Surgical Oncology       Review of Medications:   Vitamins, Supplements and Herbals: No, pt denies taking supplements    Current Outpatient Medications:     acetaminophen (TYLENOL) 325 mg tablet, Take 325 mg by mouth every 6 (six) hours as needed for mild pain, Disp: , Rfl:     ascorbic acid (VITAMIN C) 500 mg tablet, Take 500 mg by mouth daily, Disp: , Rfl:     aspirin 325 mg tablet, Take 325 mg by mouth 2 (two) times a day (Patient not taking: Reported on 5/19/2022), Disp: , Rfl:     bisacodyl (DULCOLAX) 10 mg suppository, Insert 10 mg into the rectum daily, Disp: , Rfl:     Cholecalciferol (VITAMIN D3 PO), Take by mouth, Disp: , Rfl:     cyanocobalamin (VITAMIN B-12) 500 MCG tablet, Take 500 mcg by mouth daily, Disp: , Rfl:     lidocaine (LIDODERM) 5 %, Apply 1 patch topically daily Remove & Discard patch within 12 hours or as directed by MD (Patient not taking: Reported on 5/19/2022), Disp: , Rfl: 0    magnesium hydroxide (MILK OF MAGNESIA) 400 mg/5 mL oral suspension, Take 30 mL by mouth daily as needed for constipation (Patient not taking: Reported on 5/19/2022), Disp: , Rfl:     Multiple Vitamins-Minerals (MULTIVITAMIN WITH MINERALS) tablet, Take 1 tablet by mouth daily, Disp: , Rfl:     ondansetron (Zofran ODT) 8 mg disintegrating tablet, Take 1 tablet (8 mg total) by mouth every 8 (eight) hours as needed for nausea or vomiting Through J tube not by mouth   (Patient not taking: Reported on 5/19/2022), Disp: 20 tablet, Rfl: 0    pantoprazole (PROTONIX) 40 mg tablet, Take 1 tablet (40 mg total) by mouth 2 (two) times a day (Patient not taking: Reported on 5/19/2022), Disp: , Rfl: 0    triamcinolone (KENALOG) 0 5 % cream, Apply topically 2 (two) times a day (Patient not taking: Reported on 5/19/2022), Disp: 30 g, Rfl: 0    Most Recent Lab Results:   Lab Results   Component Value Date    WBC 3 47 (L) 05/02/2022    NEUTROABS 2 51 05/02/2022    ALT 15 05/02/2022    AST 11 05/02/2022    ALB 3 2 (L) 05/02/2022    SODIUM 136 05/02/2022    SODIUM 136 04/22/2022    K 4 2 05/02/2022    K 4 4 04/22/2022     05/02/2022    BUN 20 05/02/2022    BUN 21 04/22/2022    CREATININE 0 62 05/02/2022    CREATININE 0 70 04/22/2022    EGFR 96 05/02/2022    PHOS 3 5 02/07/2022    POCGLU 121 05/16/2022    GLUC 129 05/02/2022    CALCIUM 8 7 05/02/2022    MG 2 4 02/07/2022       Anthropometric Measurements:   Height: 70"  Ht Readings from Last 1 Encounters:   05/19/22 5' 10" (1 778 m)     Wt Readings from Last 20 Encounters:   05/19/22 62 8 kg (138 lb 6 4 oz)   05/03/22 61 2 kg (134 lb 14 7 oz)   05/02/22 61 2 kg (135 lb)   04/26/22 59 7 kg (131 lb 9 8 oz)   04/12/22 59 kg (130 lb)   04/12/22 59 1 kg (130 lb 6 4 oz)   04/11/22 59 kg (130 lb)   04/05/22 60 2 kg (132 lb 12 8 oz)   03/29/22 60 5 kg (133 lb 6 4 oz)   03/25/22 58 1 kg (128 lb)   03/22/22 57 5 kg (126 lb 12 8 oz)   03/15/22 55 4 kg (122 lb 3 2 oz)   03/07/22 55 3 kg (122 lb)   02/17/22 60 3 kg (133 lb)   02/16/22 58 8 kg (129 lb 9 6 oz) 02/14/22 61 2 kg (135 lb)   02/06/22 59 5 kg (131 lb 2 8 oz)   02/03/22 66 7 kg (147 lb)   01/13/22 67 kg (147 lb 11 3 oz)   01/10/22 66 4 kg (146 lb 6 4 oz)     Weight History:    Usual Weight: 175#   Varian: (3/14/22) 124#, (3/17/22) 120 5#, (3/21/22) 126#, (3/22/22) 127#, (3/24/22) 128#, (3/28/22) 131#, (3/29/22) 134#, (3/30/22) 134#, (3/31/22) 134#, (4/4/22) 133#, (4/5/22) 133#, (4/7/22) 132#, (4/11/22) 130#, (4/13/22) 130#, (4/14/22) 130 5#, (4/18/22) 130#       Home Scale: (5/11/22) 138#, (5/20/22) 139#     Oncology Nutrition-Anthropometrics    Flowsheet [facility] Nutrition from 5/20/2022 in [address] [place] [address] Nutrition from 5/12/2022 in [place] [address] Oncology Dietitian [place]   Patient age (years): 68 years 68 years   Patient (male) height (in): 79 in 79 in   Current weight (lbs): 138 4 lbs 134 9 lbs   Current weight to be used for anthropometric calculations (kg) 62 9 kg 61 3 kg   BMI: 19 9 19 4   IBW male 166 lb 166 lb   IBW (kg) male 75 5 kg 75 5 kg   IBW % (male) 83 4 % 81 3 %   Adjusted BW (male): 159 1 lbs 158 2 lbs   Adjusted BW in kg (male): 72 3 kg 71 9 kg   % weight change after 1 week: -- 2 5 %   Weight change after 1 week (lbs) -- 3 3 lbs   % weight change after 1 month: 6 5 % 1 6 %   Weight change after 1 month (lbs) 8 4 lbs 2 1 lbs   % weight change after 3 months: 4 1 % 2 8 %   Weight change after 3 months (lbs) 5 4 lbs 3 7 lbs          Nutrition-Focused Physical Findings: n/a due to telephone call    Food/Nutrition-Related History & Client/Social History:    Current Nutrition Impact Symptoms:  [] Nausea -has zofran  [] Reduced Appetite  [] Acid Reflux    [] Vomiting  [] Unintended Wt Loss- hx of; has started gaining weight back  [] Malabsorption    [] Diarrhea  [] Unintended Wt Gain  [] Dumping Syndrome    [] Constipation  [] Thick Mucous/Secretions  [] Abdominal Pain    [] Dysgeusia (Altered Taste)  [x] Xerostomia (Dry Mouth)  [] Gas    [] Dysosmia (Altered Smell)  [] [place] Cord  [] Difficulty Chewing    [] Oral Mucositis (Sore Mouth)  [] Fatigue  [x] Hyperglycemia: BG nonfasting 129mg/dL on 22  [] Odynophagia  [] Esophagitis  [] Other:    [] Dysphagia   -hx  -Jtube in place for nutrition [] Early Satiety  [] No Problems Eating      Food Allergies & Intolerances: no    Current Diet: Regular Diet, No Restrictions and Tube Feeding  Current Nutrition Intake: Unchanged from last visit (PO intakes); enteral intake has decreased   Appetite: Good, Fair    Nutrition Route: PO and J-Tube  Oral Care: brushes BID  Activity level: Uses carney to walk, but reports that he is recently more steady on his feet  [facility] Recall: has started eating smaller portions more often   Breakfast: tuna and tomato sandwich, OJ, coffee OR oatmeal OR eggs and perry   Lunch: cream cheese and jelly on Blas bread sandwich Or grilled cheese and soup   Dinner: mashed potatoes, roast beef, coleslaw Or ham and potatoes   Snack: ice cream Or pudding     Beverages: coffee (8oz x1), water (did not quantify), OJ (8oz x1)   Nutrition supplement: Orgain ready to drink shake (250 kcal, 16g pro) 1x daily     EN Recall:  DME: [facility]   Access Type: Jtube   Formula: Jevity 1 5  Method: Cyclic  Rate: 90 mL/hr for 10hrs  Flush: 95 mL free water flush pre and post infusion (190ml)  EN providin mL volume (3 8 cartons/day), 1350 kcal, 57 4g protein, 684 mL free water, 874mL total water      Oncology Nutrition-Estimated Needs    Flowsheet Row Nutrition from 2022 in [place] [address] Oncology Dietitian [place] Nutrition from 2022 in [place] [address] Oncology Dietitian [place]   Weight type used Actual weight Actual weight   Weight in kilograms (kg) used for estimated needs 62 9 kg 61 3 kg   Energy needs formula:  35-40 kcal/kg 35-40 kcal/kg   Energy needs based on 35 kcal/k kcal 2146 kcal   Energy needs based on 40 kcal/k kcal 2453 kcal   Protein needs formula: 1 5-2 g/kg 1 5-2 g/kg   Protein needs based on 1 5 g/kg 94 g 92 g   Protein needs based on 2 g/kg 126 g 123 g   Fluid needs formula: 30-35 mL/kg 30-35 mL/kg   Fluid needs based on 30 mL/kg 1881 mL 1842 mL   Fluid needs in ounces 64 oz 62 oz   Fluid needs based on 35 mL/kg 2195 mL 2149 mL   Fluid needs in ounces 74 oz 73 oz           Discussion & Intervention:   Aarti Darnell was evaluated today for an RD follow up regarding wt loss, Esophageal Cancer and enteral nutrition  Aarti Darnell has completed chemo/RT for esophageal cancer and will potentially be having a surgical resection next  Today Aarti Darnell explains that he continues his efforts to eat by mouth as much as possible  As of yesterday he has decreased his enteral intake  He is now running his tube feeding at 90mL/hr for 10hrs  This provides about 3 8 cartons of Jevity 1 5 (1350kcal, 57g pro)  He will continue this tube feeding regimen at this time  By mouth he continues to work on his oral intakes  He explains that he has adopted a diet of small/frequent meals which is working well for him  Reviewed 24 hour recall, which revealed an suboptimal po intake and enteral intake, and discussed ways to increase kcal, protein, and fluid intakes  Based on today's assessment, discussion included: fortifying foods for added kcal and protein (examples include: adding cheese to foods such as eggs, mashed potatoes, casseroles, etc ; Making oatmeal with whole milk rather than water; Making fortified mashed potatoes with cream, butter, dry milk powder, plain [place] yogurt, and cheese ), adequate hydration & fluid choices, sipping on calorie containing beverages (examples include: adding whole milk or cream to coffee, oral nutrition supplements, juice, electrolyte replacement beverages, milk, etc ), eating smaller more frequent meals every 2-3 hours (5-6 small meals/day), having consistent and planned snacks between meals and increasing oral nutrition supplements       Moving forward, Aarti Darnell was encouraged to increase oral intakes  Materials Provided: not applicable  All questions and concerns addressed during todays visit  Andres Pittman has RD contact information  Nutrition Diagnosis:    Increased Nutrient Needs (kcal & pro) related to increased demand for nutrients and disease state as evidenced by recovering from cancer tx   Underweight related to physiological causes and s/p cancer treatment as evidenced by BMI 19 9 and 83 4% of IBW  Monitoring & Evaluation:   Goals:  · pt to meet >/=75% estimated nutrition needs daily  · weight gain of 1-2# per week  · increase fluid intake  · increase oral intake    · Progress Towards Goals: Progressing and Goal(s) Met    Nutrition Rx & Recommendations:  · Diet: High Calorie, High Protein (for high calorie foods see pages 52-53, and for high protein foods see pages 49-51 in your Eating Hints book)  · Nutrition Supplements: Continue Orgain at least 2x daily  May use homemade shakes/smoothies as desired  If using a pre-made shake/bar, choose ones with >300 calories and >10 grams protein (ex  Ensure Complete, Ensure Enlive, Ensure Plus, Boost Plus, Boost Very High Calorie, etc )  · High calorie foods to try: peanut butter, whole milk, cheese, butter, olive oil  (see pages 52-53 in your Eating Hints book)  · High protein foods to try: eggs, chicken, fish, beans/legumes, greek yogurt (see pages 49-51 in your Eating Hints book)  · Follow proper oral care; Try baking soda/salt water rinse recipe (mix 3/4 tsp salt + 1 tsp baking soda + 1 qt water; rinse with plain water after using) in Eating Hints book (pg 18)  Brush your teeth before/after meals & before bed  · Weigh yourself regularly  If you notice weight loss, make an effort to increase your daily food/calorie intake  If you continue to notice loss after these efforts, reach out to your dietitian to establish a plan to stabilize weight  · Tube Feeding Plan: Begin tube feeding weaning        Tube Feeding Weaning Plan:  · Continue running TF at current rate (90mL/hr)  · Continue recent decrease in the number of hours the tube feeding is running, 10hrs  This has decreased your total volume of TF consumed from 4 5 cartons down to 3 8 cartons  This will decrease your calorie intake by 250 calories  These calories will need to be consumed by mouth  · Please weigh yourself every 2-3 days to ensure that you are able to maintain weight during this process  Enteral nutrition recommendations:   · TF Goal: Jevity 1 5 at 90 mL/hr via J-tube cycled over ~10 hours daily  · Water Flushin mL free water flush at the beginning and end of TF infusion (250 mL total)  Plus an additional 1000mL fluid needed daily; this cane be administered via TF as free water flushes (ex: 125mL x 8 flushes) or this can be consumed by mouth, 1000mL is ~33oz  · Enteral Nutrition goal to provide: 900 mL volume (3 8 cartons/day), 1350 kcal, 57 4g protein, 684 mL free water, 1934 mL total water daily         Follow Up Plan: 22 after radiation oncology follow up    Recommend Referral to Other Providers: none at this time

## 2022-05-20 NOTE — PATIENT INSTRUCTIONS
Nutrition Rx & Recommendations:  Diet: High Calorie, High Protein (for high calorie foods see pages 52-53, and for high protein foods see pages 49-51 in your Eating Hints book)  Nutrition Supplements: Continue Orgain at least 2x daily  May use homemade shakes/smoothies as desired  If using a pre-made shake/bar, choose ones with >300 calories and >10 grams protein (ex  Ensure Complete, Ensure Enlive, Ensure Plus, Boost Plus, Boost Very High Calorie, etc )  High calorie foods to try: peanut butter, whole milk, cheese, butter, olive oil  (see pages 52-53 in your Eating Hints book)  High protein foods to try: eggs, chicken, fish, beans/legumes, greek yogurt (see pages 49-51 in your Eating Hints book)  Follow proper oral care; Try baking soda/salt water rinse recipe (mix 3/4 tsp salt + 1 tsp baking soda + 1 qt water; rinse with plain water after using) in Eating Hints book (pg 18)  Brush your teeth before/after meals & before bed  Weigh yourself regularly  If you notice weight loss, make an effort to increase your daily food/calorie intake  If you continue to notice loss after these efforts, reach out to your dietitian to establish a plan to stabilize weight  Tube Feeding Plan: Begin tube feeding weaning  Tube Feeding Weaning Plan:  Continue running TF at current rate (90mL/hr)  Continue recent decrease in the number of hours the tube feeding is running, 10hrs  This has decreased your total volume of TF consumed from 4 5 cartons down to 3 8 cartons  This will decrease your calorie intake by 250 calories  These calories will need to be consumed by mouth  Please weigh yourself every 2-3 days to ensure that you are able to maintain weight during this process  Enteral nutrition recommendations:   TF Goal: Jevity 1 5 at 90 mL/hr via J-tube cycled over ~10 hours daily  Water Flushin mL free water flush at the beginning and end of TF infusion (250 mL total)   Plus an additional 1000mL fluid needed daily; this cane be administered via TF as free water flushes (ex: 125mL x 8 flushes) or this can be consumed by mouth, 1000mL is ~33oz  Enteral Nutrition goal to provide: 900 mL volume (3 8 cartons/day), 1350 kcal, 57 4g protein, 684 mL free water, 1934 mL total water daily         Follow Up Plan: 6/1/22 after radiation oncology follow up    Recommend Referral to Other Providers: none at this time

## 2022-05-25 ENCOUNTER — TELEPHONE (OUTPATIENT)
Dept: CARDIOLOGY CLINIC | Facility: CLINIC | Age: 77
End: 2022-05-25

## 2022-05-25 NOTE — TELEPHONE ENCOUNTER
Pt has appt With Dr Bessie Esparza 06/10/2022 for clearance  Form faxed from [facility] surgical oncology  Scanned in pt chart and original placed in your folder     Procedure: Esophagectomy  Surgery date: 06/22/2022  P: [phone]  C:[phone]

## 2022-05-27 ENCOUNTER — TELEPHONE (OUTPATIENT)
Dept: HEMATOLOGY ONCOLOGY | Facility: CLINIC | Age: 77
End: 2022-05-27

## 2022-05-27 NOTE — TELEPHONE ENCOUNTER
Reschedule Appointment     Who is calling in Patient    Doctor Appointment Scheduled with Dr Zaida Archibald date and time 06/02 at 10:00am   New date and time 06/06 at 11:20am   Location [facility]   Patient verbalized understanding    yes

## 2022-06-01 ENCOUNTER — NUTRITION (OUTPATIENT)
Dept: NUTRITION | Facility: CLINIC | Age: 77
End: 2022-06-01

## 2022-06-01 ENCOUNTER — APPOINTMENT (OUTPATIENT)
Dept: RADIATION ONCOLOGY | Facility: CLINIC | Age: 77
End: 2022-06-01
Attending: RADIOLOGY
Payer: COMMERCIAL

## 2022-06-01 VITALS
TEMPERATURE: 97.9 F | BODY MASS INDEX: 20.3 KG/M2 | OXYGEN SATURATION: 100 % | RESPIRATION RATE: 14 BRPM | SYSTOLIC BLOOD PRESSURE: 130 MMHG | DIASTOLIC BLOOD PRESSURE: 85 MMHG | WEIGHT: 141.5 LBS | HEART RATE: 89 BPM

## 2022-06-01 DIAGNOSIS — C15.5 MALIGNANT NEOPLASM OF LOWER THIRD OF ESOPHAGUS (HCC): Primary | ICD-10-CM

## 2022-06-01 DIAGNOSIS — Z71.3 NUTRITIONAL COUNSELING: Primary | ICD-10-CM

## 2022-06-01 PROCEDURE — 99211 OFF/OP EST MAY X REQ PHY/QHP: CPT | Performed by: RADIOLOGY

## 2022-06-01 PROCEDURE — 99213 OFFICE O/P EST LOW 20 MIN: CPT | Performed by: RADIOLOGY

## 2022-06-01 NOTE — PROGRESS NOTES
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 1/13/22  · S/p Jtube placement 2/7/22  · RT began 3/14/22, EOT 4/20/22  · Chemotherapy (carboplatin, taxol) 3/15/22-5/3/22  · Esophagectomy planned for 6/22/22  Oncology History   Malignant neoplasm of lower third of esophagus ([facility] [address] )   1/13/2022 Initial Diagnosis    Malignant neoplasm of lower third of esophagus ([facility] [address] )     1/13/2022 Biopsy    EGD:   Esophagus, distal:  - 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  RESULTS OF IMMUNOHISTOCHEMICAL ANALYSIS FOR MISMATCH REPAIR PROTEIN LOSS     INTERPRETATION: No loss of nuclear expression of MMR proteins: Low probability of MSI-H     Note: Background non-neoplastic tissue and/or internal control with intact nuclear expression        RESULTS:  Antibody          Clone               Description                           Results  MLH1               M1                   Mismatch repair protein       Intact nuclear expression  MSH2              [id number]       Mismatch repair protein       Intact nuclear expression  MSH6              44                     Mismatch repair protein       Intact nuclear expression  PMS2              [id number]           Mismatch repair protein       Intact nuclear expression     3/14/2022 - 4/20/2022 Radiation    Treatments:  Course: C1    Plan ID Energy Fractions Dose per Fraction (cGy) Dose Correction (cGy) Total Dose Delivered (cGy) Elapsed Days   Esophags2_ReV 6X 22 / 22 180 0 3,960 29   Esophagus 6X 3 / 25 180 0 540 2   Esophagus CD 6X 3 / 3 180 0 540 2      Treatment Dates:  3/14/2022 - 4/20/2022       3/15/2022 -  Chemotherapy    CARBOplatin (PARAPLATIN) IVPB (GOG AUC DOSING), 147 6 mg, Intravenous, Once, 7 of 7 cycles  Administration: 147 6 mg (3/15/2022), 196 mg (3/22/2022), 196 mg (3/29/2022), 196 mg (4/5/2022), 196 mg (4/12/2022), 200 mg (4/26/2022), 200 mg (5/3/2022)  PACLItaxel (TAXOL) chemo IVPB, 50 mg/m2 = 84 6 mg, Intravenous, Once, 7 of 7 cycles  Administration: 84 6 mg (3/15/2022), 85 8 mg (3/22/2022), 85 8 mg (3/29/2022), 85 8 mg (4/5/2022), 85 8 mg (4/12/2022), 87 mg (4/26/2022), 87 mg (5/3/2022)       Past Medical & Surgical Hx: current smoker  Patient Active Problem List   Diagnosis    Smoking    Weight loss    Dysphagia    Dermatitis    Malignant neoplasm of lower third of esophagus (HCC)    Mild protein-calorie malnutrition (HCC)    Left ankle pain    Severe protein-calorie malnutrition (HCC)    Chronic pain    Cancer related pain    Palliative care encounter    Anemia due to antineoplastic chemotherapy     Past Medical History:   Diagnosis Date    Cancer ([facility] [address] )     esophageal ca    Pneumonia     Polio     Rib fractures      Past Surgical History:   Procedure Laterality Date    FL GUIDED CENTRAL VENOUS ACCESS DEVICE INSERTION  2/16/2022    TUNNELED VENOUS PORT PLACEMENT Left 2/16/2022    Procedure: INSERTION VENOUS PORT (PORT-A-CATH);   Surgeon: Paty Stokes MD;  Location: [facility];  Service: Surgical Oncology       Review of Medications:   Vitamins, Supplements and Herbals: No, pt denies taking supplements    Current Outpatient Medications:     acetaminophen (TYLENOL) 325 mg tablet, Take 325 mg by mouth every 6 (six) hours as needed for mild pain, Disp: , Rfl:     ascorbic acid (VITAMIN C) 500 mg tablet, Take 500 mg by mouth daily, Disp: , Rfl:     aspirin 325 mg tablet, Take 325 mg by mouth 2 (two) times a day (Patient not taking: No sig reported), Disp: , Rfl:     bisacodyl (DULCOLAX) 10 mg suppository, Insert 10 mg into the rectum daily (Patient not taking: Reported on 6/1/2022), Disp: , Rfl:     Cholecalciferol (VITAMIN D3 PO), Take by mouth, Disp: , Rfl:     cyanocobalamin (VITAMIN B-12) 500 MCG tablet, Take 500 mcg by mouth daily, Disp: , Rfl:     lidocaine (LIDODERM) 5 %, Apply 1 patch topically daily Remove & Discard patch within 12 hours or as directed by MD (Patient not taking: No sig reported), Disp: , Rfl: 0    magnesium hydroxide (MILK OF MAGNESIA) 400 mg/5 mL oral suspension, Take 30 mL by mouth daily as needed for constipation (Patient not taking: No sig reported), Disp: , Rfl:     Multiple Vitamins-Minerals (MULTIVITAMIN WITH MINERALS) tablet, Take 1 tablet by mouth daily, Disp: , Rfl:     ondansetron (Zofran ODT) 8 mg disintegrating tablet, Take 1 tablet (8 mg total) by mouth every 8 (eight) hours as needed for nausea or vomiting Through J tube not by mouth   (Patient not taking: No sig reported), Disp: 20 tablet, Rfl: 0    pantoprazole (PROTONIX) 40 mg tablet, Take 1 tablet (40 mg total) by mouth 2 (two) times a day (Patient not taking: No sig reported), Disp: , Rfl: 0    triamcinolone (KENALOG) 0 5 % cream, Apply topically 2 (two) times a day (Patient not taking: No sig reported), Disp: 30 g, Rfl: 0    Most Recent Lab Results:   Lab Results   Component Value Date    WBC 3 47 (L) 05/02/2022    NEUTROABS 2 51 05/02/2022    ALT 15 05/02/2022    AST 11 05/02/2022    ALB 3 2 (L) 05/02/2022    SODIUM 136 05/02/2022    SODIUM 136 04/22/2022    K 4 2 05/02/2022    K 4 4 04/22/2022     05/02/2022    BUN 20 05/02/2022    BUN 21 04/22/2022    CREATININE 0 62 05/02/2022    CREATININE 0 70 04/22/2022    EGFR 96 05/02/2022    PHOS 3 5 02/07/2022    POCGLU 121 05/16/2022    GLUC 129 05/02/2022    CALCIUM 8 7 05/02/2022    MG 2 4 02/07/2022       Anthropometric Measurements:   Height: 70"  Ht Readings from Last 1 Encounters:   05/19/22 5' 10" (1 778 m)     Wt Readings from Last 20 Encounters:   06/01/22 64 2 kg (141 lb 8 oz)   05/19/22 62 8 kg (138 lb 6 4 oz)   05/03/22 61 2 kg (134 lb 14 7 oz)   05/02/22 61 2 kg (135 lb)   04/26/22 59 7 kg (131 lb 9 8 oz)   04/12/22 59 kg (130 lb)   04/12/22 59 1 kg (130 lb 6 4 oz)   04/11/22 59 kg (130 lb)   04/05/22 60 2 kg (132 lb 12 8 oz)   03/29/22 60 5 kg (133 lb 6 4 oz)   03/25/22 58 1 kg (128 lb)   03/22/22 57 5 kg (126 lb 12 8 oz)   03/15/22 55 4 kg (122 lb 3 2 oz)   03/07/22 55 3 kg (122 lb)   02/17/22 60 3 kg (133 lb)   02/16/22 58 8 kg (129 lb 9 6 oz)   02/14/22 61 2 kg (135 lb)   02/06/22 59 5 kg (131 lb 2 8 oz)   02/03/22 66 7 kg (147 lb)   01/13/22 67 kg (147 lb 11 3 oz)     Weight History:    Usual Weight: 175#   Varian: (3/14/22) 124#, (3/17/22) 120 5#, (3/21/22) 126#, (3/22/22) 127#, (3/24/22) 128#, (3/28/22) 131#, (3/29/22) 134#, (3/30/22) 134#, (3/31/22) 134#, (4/4/22) 133#, (4/5/22) 133#, (4/7/22) 132#, (4/11/22) 130#, (4/13/22) 130#, (4/14/22) 130 5#, (4/18/22) 130#       Home Scale: (5/11/22) 138#, (5/20/22) 139#     Oncology Nutrition-Anthropometrics    Flowsheet Row Nutrition from 6/1/2022 in [address] [place] [address] Dietitian [place] Nutrition from 5/20/20[facility] in [facility] [place]   Patient age (years): 68 years 68 years   Patient (male) height (in): 79 in 79 in   Current weight (lbs): 141 5 lbs 138 4 lbs   Current weight to be used for anthropometric calculations (kg) 64 3 kg 62 9 kg   BMI: 20 3 19 9   IBW male 166 lb 166 lb   IBW (kg) male 75 5 kg 75 5 kg   IBW % (male) 85 2 % 83 4 %   Adjusted BW (male): 159 9 lbs 159 1 lbs   Adjusted BW in kg (male): 72 7 kg 72 3 kg   % weight change after 1 month: 4 9 % 6 5 %   Weight change after 1 month (lbs) 6 6 lbs 8 4 lbs   % weight change after 3 months: 16 % 4 1 %   Weight change after 3 months (lbs) 19 5 lbs 5 4 lbs          Nutrition-Focused Physical Findings: n/a due to telephone call    Food/Nutrition-Related History & Client/Social History:    Current Nutrition Impact Symptoms:  [] Nausea -has zofran  [] Reduced Appetite  [] Acid Reflux    [] Vomiting  [] Unintended Wt Loss- hx of; has started gaining weight back  [] Malabsorption    [] Diarrhea  [] Unintended Wt Gain  [] Dumping Syndrome    [] Constipation  [] Thick Mucous/Secretions  [] Abdominal Pain    [] Dysgeusia (Altered Taste)  [x] Xerostomia (Dry Mouth)  [] Gas    [] Dysosmia (Altered Smell)  [] Thrush  [] Difficulty Chewing    [] Oral Mucositis (Sore Mouth)  [] Fatigue  [x] Hyperglycemia: BG nonfasting 129mg/dL on 22  [] Odynophagia  [] Esophagitis  [] Other:    [] Dysphagia   -hx  -Jtube in place for nutrition [] Early Satiety  [] No Problems Eating      Food Allergies & Intolerances: no    Current Diet: Regular Diet, No Restrictions and Tube Feeding  Current Nutrition Intake: Unchanged from last visit (PO intakes); enteral intake has increased    Appetite: Good, Fair    Nutrition Route: PO and J-Tube  Oral Care: brushes BID  Activity level: Uses carney to walk, but reports that he is recently more steady on his feet  Doing yard work  24 Hr Diet Recall: has started eating smaller portions more often   Breakfast: tuna and tomato sandwich, OJ, coffee OR oatmeal OR eggs and perry   Lunch: cream cheese and jelly on Blas bread sandwich Or grilled cheese and soup   Dinner: mashed potatoes, roast beef, coleslaw Or ham and potatoes   Snack: ice cream Or pudding     Beverages: coffee (8oz x1), water (8oz x2-3), OJ (8oz x1)   Nutrition supplement: Orgain ready to drink shake (250 kcal, 16g pro) 1x daily     EN Recall:  DME: [facility]   Access Type: Jtube   Formula: Jevity 1 5  Method: Cyclic  Rate: 90 mL/hr for 12hrs  Flush: 95 mL free water flush pre and post infusion (190ml)  EN providin mL volume (4 6 cartons/day), 1620 kcal, 69g protein, 821 mL free water, 1011mL total water      Oncology Nutrition-Estimated Needs    Flowsheet Row Nutrition from 2022 in [address] [place] [address] Nutrition from 2022 in [address] [place] [address]   Weight type used Actual weight Actual weight   Weight in kilograms (kg) used for estimated needs 64 3 kg 62 9 kg   Energy needs formula:  35-40 kcal/kg 35-40 kcal/kg   Energy needs based on 35 kcal/k kcal 2202 kcal   Energy needs based on 40 kcal/k kcal 2516 kcal   Protein needs formula: 1 5-2 g/kg 1 5-2 g/kg   Protein needs based on 1 5 g/kg 96 g 94 g   Protein needs based on 2 g/kg 129 g 126 g   Fluid needs formula: 30-35 mL/kg 30-35 mL/kg   Fluid needs based on 30 mL/kg 1935 mL 1881 mL   Fluid needs in ounces 65 oz 64 oz   Fluid needs based on 35 mL/kg 2258 mL 2195 mL   Fluid needs in ounces 76 oz 74 oz           Discussion & Intervention:   Hiwot Man was evaluated today for an RD follow up regarding wt loss, Esophageal Cancer and enteral nutrition  Hiwot Man has completed has completed chemo/RT for [facility], and is planned for esophagectomy on   Today Hiwot Man explains that he continues his efforts to eat by mouth as much as possible  He has also increased his enteral intake back to a 12hr cycle from a 10hr cycle  His weight is increasing and his strength/ability to perform physical tasks is also increasing  Reviewed 24 hour recall, which revealed an adequate nutrition intake, and discussed ways to optimize nutrient intake   Also reviewed concepts of advancing diet post esophagectomy: eat smaller and more frequent meals, eat slowly and chew foods well, limit cold/hot foods, include protein at all meals/snacks, avoid foods high in fiber- choose low fiber foods instead (white bread, fruit with no skin, canned fruits, soft and well-cooked vegetables (avoid corn, peas, beans, skins), limit raw fruits and vegetables and gradually increase as tolerated/allowed, avoid high sugar foods (high sugar foods are ones that have >10 grams of sugar/serving), try low sugar oral nutrition supplements such (Glucerna, Boost Optimum, Unjury, Ensure High Protein, Ensure Max Protein, Premier Protein, or Boost Glucose Control), Limit high fat and greasy/fried foods, avoid drinking while eating, find foods that you can tolerate/introduce new foods 1 at a time  Moving forward, Pablo Jacobo will continue current nutrition plan of care     Materials Provided: declined- Diet Post Esophagectomy handout   All questions and concerns addressed during todays visit  Pablo Jacobo has RD contact information  Nutrition Diagnosis:    Increased Nutrient Needs (kcal & pro) related to increased demand for nutrients and disease state as evidenced by recovering from cancer tx   Increased Nutrient Needs related to increased demand for nutrients as evidenced by desire to gain weight  Monitoring & Evaluation:   Goals:  · pt to meet >/=75% estimated nutrition needs daily  · weight gain of 1-2# per week  · increase fluid intake  · increase oral intake    · Progress Towards Goals: Progressing and Goal(s) Met    Nutrition Rx & Recommendations:  · Diet: High Calorie, High Protein (for high calorie foods see pages 52-53, and for high protein foods see pages 49-51 in your Eating Hints book)  · Nutrition Supplements: Continue Orgain at least 2x daily  May use homemade shakes/smoothies as desired  If using a pre-made shake/bar, choose ones with >300 calories and >10 grams protein (ex  Ensure Complete, Ensure Enlive, Ensure Plus, Boost Plus, Boost Very High Calorie, etc )  · High calorie foods to try: peanut butter, whole milk, cheese, butter, olive oil  (see pages 52-53 in your Eating Hints book)  · High protein foods to try: eggs, chicken, fish, beans/legumes, greek yogurt (see pages 49-51 in your Eating Hints book)  · Follow proper oral care; Try baking soda/salt water rinse recipe (mix 3/4 tsp salt + 1 tsp baking soda + 1 qt water; rinse with plain water after using) in Eating Hints book (pg 18)  Brush your teeth before/after meals & before bed  · Weigh yourself regularly  If you notice weight loss, make an effort to increase your daily food/calorie intake   If you continue to notice loss after these efforts, reach out to your dietitian to establish a plan to stabilize weight  · Tube Feeding Plan: Continue current TF regimen    · TF Goal: Jevity 1 5 at 90 mL/hr via J-tube cycled over ~12 hours daily (until 4 5 cartons is infused)  · Water Flushin mL free water flush at the beginning and end of TF infusion (250 mL total)  Plus an additional 1000mL fluid needed daily; this cane be administered via TF as free water flushes (ex: 125mL x 8 flushes) or this can be consumed by mouth, 1000mL is ~33oz  · Enteral Nutrition goal to provide: 1066 mL volume (4 5 cartons day), 1600 kcal, 68 grams protein, 810 mL free water, 2060 mL total water daily  Diet related to esophagectomy:      · Follow diet advancement per your doctors instructions   Once you are able to have solid foods: Start with very small amounts of soft, bland foods Avoid large quantities of food as this may cause abdominal pain, nausea, and vomiting   Small, frequent meals and snacks work best   [place] Eat slowly and chew your food very well   Limit very cold or very hot foods/drinks   Include protein with all meals and snacks   Avoid foods that are high in fiber (high fiber foods have >2-3 grams fiber/serving) - choose foods that are low in fiber instead  - Low fiber food examples: white bread, refined grains, fruit with no skin, canned fruits, soft and well-cooked vegetables (avoid corn, peas, beans, skins)  - A diet low in fiber will give your GI tract a chance to heal without irritating it  Slowly add more fiber back into your diet as you can tolerate it  - Limit raw fruits and vegetables and gradually increase as tolerated/allowed   The carbohydrate/sugar intake may be restricted depending on the extent of your surgery  - Avoid high sugar foods  High sugar foods are ones that have >10 grams of sugar/serving   - Some patients experience gas, bloating, abdominal pain and diarrhea with increased sugar intake   If these symptoms are apparent, try limiting some of the sweets/sugar in the diet  - Some patients may require a low-sugar nutrition supplement such as Glucerna, Boost Optimum, Unjury, Ensure High Protein, Ensure Max Protein, Premier Protein, or Boost Glucose Control   - NOTE: The following ingredients also are sugars and commonly found on food labels: dextrose, high fructose corn syrup, glucose, sucrose, molasses, and honey   Limit high fat and greasy/fried foods  Low-fat means <3 grams of total fat per 100 calories  - Fat also may increase gas, bloating, and diarrhea  - Try low-fat versions of foods such as 1% or skim milk and low-fat yogurt  Take the skin off poultry products, trim the fat off meat, and avoid foods that are fried or cooked in large amounts of oil   Drink plenty of fluids to maintain hydration (~64 fluid oz/day)  - Avoid drinking while eating, drink liquids 30 min before or 30-45 min after eating for the first few weeks so as to not fill the stomach with fluid instead of food or trigger dumping syndrome    - Avoid carbonated and alcoholic beverages   Drinking a nutrition supplement between meals may help maintain body weight  - It is possible that the first nutritional supplement tried is no longer tolerable   Try not to lie down flat after eating because your gut does not empty normally and reflux or heartburn could occur   Find foods that you can tolerate  Introduce new foods 1 at a time   As you feel more comfortable, you can add more and varied food to your diet in a gradual manner   Monitor your weight 1-2 times/week and take steps to maintain your weight   Normal eating and weight stabilization may not occur for weeks to months   Refer to Eating Hints book & Diet After an Esophagectomy or Gastrectomy for other meal/snack ideas and symptom management       Dumping Syndrome:   Symptoms: Epigastric fullness, nausea, vomiting, abdominal cramps, bloating, diarrhea, lightheadedness, diaphoresis, desire to lie down, borborygmi, pallor, palpitations   Limit foods high in concentrated sugars   Foods high in soluble fiber may help reduce symtoms   Lactose free foods may be better tolerated than foods with lactose   Small amounts (<1 tbsp) of butter, oil, or margarine can be added to food for energy but fried/greasy foods should be avoided   Drink liquids 30 minutes before or after meals, not during meals    Eat 5-6 small meals daily and avoid eating large portions   Eat slowly and chewing foods into very small pieces or until liquefied may help manage this condition       Follow Up Plan: 6/14/22 phone follow up    Recommend Referral to Other Providers: none at this time

## 2022-06-01 NOTE — PATIENT INSTRUCTIONS
Nutrition Rx & Recommendations:  Diet: High Calorie, High Protein (for high calorie foods see pages 52-53, and for high protein foods see pages 49-51 in your Eating Hints book)  Nutrition Supplements: Continue Orgain at least 2x daily  May use homemade shakes/smoothies as desired  If using a pre-made shake/bar, choose ones with >300 calories and >10 grams protein (ex  Ensure Complete, Ensure Enlive, Ensure Plus, Boost Plus, Boost Very High Calorie, etc )  High calorie foods to try: peanut butter, whole milk, cheese, butter, olive oil  (see pages 52-53 in your Eating Hints book)  High protein foods to try: eggs, chicken, fish, beans/legumes, greek yogurt (see pages 49-51 in your Eating Hints book)  Follow proper oral care; Try baking soda/salt water rinse recipe (mix 3/4 tsp salt + 1 tsp baking soda + 1 qt water; rinse with plain water after using) in Eating Hints book (pg 18)  Brush your teeth before/after meals & before bed  Weigh yourself regularly  If you notice weight loss, make an effort to increase your daily food/calorie intake  If you continue to notice loss after these efforts, reach out to your dietitian to establish a plan to stabilize weight  Tube Feeding Plan: Continue current TF regimen    TF Goal: Jevity 1 5 at 90 mL/hr via J-tube cycled over ~12 hours daily (until 4 5 cartons is infused)  Water Flushin mL free water flush at the beginning and end of TF infusion (250 mL total)  Plus an additional 1000mL fluid needed daily; this cane be administered via TF as free water flushes (ex: 125mL x 8 flushes) or this can be consumed by mouth, 1000mL is ~33oz  Enteral Nutrition goal to provide: 1066 mL volume (4 5 cartons day), 1600 kcal, 68 grams protein, 810 mL free water, 2060 mL total water daily  Diet related to esophagectomy:      Follow diet advancement per your doctors instructions    Once you are able to have solid foods: Start with very small amounts of soft, bland foods Avoid large quantities of food as this may cause abdominal pain, nausea, and vomiting  Small, frequent meals and snacks work best   Eat slowly and chew your food very well  Limit very cold or very hot foods/drinks  Include protein with all meals and snacks  Avoid foods that are high in fiber (high fiber foods have >2-3 grams fiber/serving) - choose foods that are low in fiber instead  Low fiber food examples: white bread, refined grains, fruit with no skin, canned fruits, soft and well-cooked vegetables (avoid corn, peas, beans, skins)  A diet low in fiber will give your GI tract a chance to heal without irritating it  Slowly add more fiber back into your diet as you can tolerate it  Limit raw fruits and vegetables and gradually increase as tolerated/allowed  The carbohydrate/sugar intake may be restricted depending on the extent of your surgery  Avoid high sugar foods  High sugar foods are ones that have >10 grams of sugar/serving  Some patients experience gas, bloating, abdominal pain and diarrhea with increased sugar intake  If these symptoms are apparent, try limiting some of the sweets/sugar in the diet  Some patients may require a low-sugar nutrition supplement such as Glucerna, Boost Optimum, Unjury, Ensure High Protein, Ensure Max Protein, Premier Protein, or Boost Glucose Control  NOTE: The following ingredients also are sugars and commonly found on food labels: dextrose, high fructose corn syrup, glucose, sucrose, molasses, and honey  Limit high fat and greasy/fried foods  Low-fat means <3 grams of total fat per 100 calories  Fat also may increase gas, bloating, and diarrhea  Try low-fat versions of foods such as 1% or skim milk and low-fat yogurt  Take the skin off poultry products, trim the fat off meat, and avoid foods that are fried or cooked in large amounts of oil  Drink plenty of fluids to maintain hydration (~64 fluid oz/day)    Avoid drinking while eating, drink liquids 30 min before or 30-45 min after eating for the first few weeks so as to not fill the stomach with fluid instead of food or trigger dumping syndrome  Avoid carbonated and alcoholic beverages  Drinking a nutrition supplement between meals may help maintain body weight  It is possible that the first nutritional supplement tried is no longer tolerable  Try not to lie down flat after eating because your gut does not empty normally and reflux or heartburn could occur  Find foods that you can tolerate  Introduce new foods 1 at a time  As you feel more comfortable, you can add more and varied food to your diet in a gradual manner  Monitor your weight 1-2 times/week and take steps to maintain your weight  Normal eating and weight stabilization may not occur for weeks to months  Refer to Eating Hints book & Diet After an Esophagectomy or Gastrectomy for other meal/snack ideas and symptom management  Dumping Syndrome:   Symptoms: Epigastric fullness, nausea, vomiting, abdominal cramps, bloating, diarrhea, lightheadedness, diaphoresis, desire to lie down, borborygmi, pallor, palpitations  Limit foods high in concentrated sugars  Foods high in soluble fiber may help reduce symtoms  Lactose free foods may be better tolerated than foods with lactose  Small amounts (<1 tbsp) of butter, oil, or margarine can be added to food for energy but fried/greasy foods should be avoided  Drink liquids 30 minutes before or after meals, not during meals   Eat 5-6 small meals daily and avoid eating large portions  Eat slowly and chewing foods into very small pieces or until liquefied may help manage this condition       Follow Up Plan: 6/14/22 phone follow up    Recommend Referral to Other Providers: none at this time

## 2022-06-01 NOTE — PROGRESS NOTES
Follow-up - Radiation Oncology   Ananth Elam 1945 68 y o  male [id number]      History of Present Illness   Cancer Staging  Malignant neoplasm of lower third of esophagus 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 on 22  He has a PEG tube  He returns today for one month EOT visit      22 PET CT  1  Interval decreased FDG activity in the distal esophagus and proximal stomach, suggesting at least partial response to therapy   Residual viable tumor may still be present   Continued PET CT follow-up recommended  2   Previously visualized distal paraesophageal and gastrohepatic hypermetabolic densities appear diminished   There are however new mildly hypermetabolic left paraesophageal and AP window lesions as above, which may represent new metastases    3   Right lower endobronchial soft tissue lesion is persistent from 2019, but demonstrates mild FDG activity   Clinical correlation recommended for suspected endobronchial polypoid mass      22 Dr Brooke Ferguson  recommend that he undergo esophagectomy  will schedule this at our earliest mutual convenience  He is going to meet with thoracic surgery to discuss if a thoracoscopic approach would be beneficial      Upcomin22 Medical Oncology  22 Thoracic surgery  2022 Esophagectomy scheduled           Interval History:  Patient has done well post treatment        Historical Information   Oncology History   Malignant neoplasm of lower third of esophagus ([address] )   2022 Initial Diagnosis    Malignant neoplasm of lower third of esophagus ([address] )     2022 Biopsy    EGD:   Esophagus, distal:  - 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  RESULTS OF IMMUNOHISTOCHEMICAL ANALYSIS FOR MISMATCH REPAIR PROTEIN LOSS     INTERPRETATION: No loss of nuclear expression of MMR proteins: Low probability of MSI-H     Note: Background non-neoplastic tissue and/or internal control with intact nuclear expression        RESULTS:  Antibody          Clone               Description                           Results  MLH1               M1                   Mismatch repair protein       Intact nuclear expression  MSH2              [id number]       Mismatch repair protein       Intact nuclear expression  MSH6              44                     Mismatch repair protein       Intact nuclear expression  PMS2              [id number]           Mismatch repair protein       Intact nuclear expression     3/14/2022 - 4/20/2022 Radiation    Treatments:  Course: C1    Plan ID Energy Fractions Dose per Fraction (cGy) Dose Correction (cGy) Total Dose Delivered (cGy) Elapsed Days   Esophags2_ReV 6X 22 / 22 180 0 3,960 29   Esophagus 6X 3 / 25 180 0 540 2   Esophagus CD 6X 3 / 3 180 0 540 2      Treatment Dates:  3/14/2022 - 4/20/2022       3/15/2022 -  Chemotherapy    CARBOplatin (PARAPLATIN) IVPB (GOG AUC DOSING), 147 6 mg, Intravenous, Once, 7 of 7 cycles  Administration: 147 6 mg (3/15/2022), 196 mg (3/22/2022), 196 mg (3/29/2022), 196 mg (4/5/2022), 196 mg (4/12/2022), 200 mg (4/26/2022), 200 mg (5/3/2022)  PACLItaxel (TAXOL) chemo IVPB, 50 mg/m2 = 84 6 mg, Intravenous, Once, 7 of 7 cycles  Administration: 84 6 mg (3/15/2022), 85 8 mg (3/22/2022), 85 8 mg (3/29/2022), 85 8 mg (4/5/2022), 85 8 mg (4/12/2022), 87 mg (4/26/2022), 87 mg (5/3/2022)         Past Medical History:   Diagnosis Date    Cancer ([facility] )     esophageal ca    Pneumonia     Polio     Rib fractures      Past Surgical History:   Procedure Laterality Date    FL GUIDED CENTRAL VENOUS ACCESS DEVICE INSERTION  2/16/2022    TUNNELED VENOUS PORT PLACEMENT Left 2022    Procedure: INSERTION VENOUS PORT (PORT-A-CATH); Surgeon: Josselin Roberts MD;  [facility];  Service: Surgical Oncology       Social History   Social History     Substance and Sexual Activity   Alcohol Use Never    Alcohol/week: 0 0 standard drinks    Comment: 0     Social History     Substance and Sexual Activity   Drug Use No     Social History     Tobacco Use   Smoking Status Former Smoker    Packs/day: 1 00    Years: 40 00    Pack years: 40 00    Types: Cigarettes    Quit date: 2022    Years since quittin 3   Smokeless Tobacco Never Used         Meds/Allergies     Current Outpatient Medications:     acetaminophen (TYLENOL) 325 mg tablet, Take 325 mg by mouth every 6 (six) hours as needed for mild pain, Disp: , Rfl:     ascorbic acid (VITAMIN C) 500 mg tablet, Take 500 mg by mouth daily, Disp: , Rfl:     cyanocobalamin (VITAMIN B-12) 500 MCG tablet, Take 500 mcg by mouth daily, Disp: , Rfl:     Multiple Vitamins-Minerals (MULTIVITAMIN WITH MINERALS) tablet, Take 1 tablet by mouth daily, Disp: , Rfl:     aspirin 325 mg tablet, Take 325 mg by mouth 2 (two) times a day (Patient not taking: No sig reported), Disp: , Rfl:     bisacodyl (DULCOLAX) 10 mg suppository, Insert 10 mg into the rectum daily (Patient not taking: Reported on 2022), Disp: , Rfl:     Cholecalciferol (VITAMIN D3 PO), Take by mouth, Disp: , Rfl:     lidocaine (LIDODERM) 5 %, Apply 1 patch topically daily Remove & Discard patch within 12 hours or as directed by MD (Patient not taking: No sig reported), Disp: , Rfl: 0    magnesium hydroxide (MILK OF MAGNESIA) 400 mg/5 mL oral suspension, Take 30 mL by mouth daily as needed for constipation (Patient not taking: No sig reported), Disp: , Rfl:     ondansetron (Zofran ODT) 8 mg disintegrating tablet, Take 1 tablet (8 mg total) by mouth every 8 (eight) hours as needed for nausea or vomiting Through J tube not by mouth   (Patient not taking: No sig reported), Disp: 20 tablet, Rfl: 0    pantoprazole (PROTONIX) 40 mg tablet, Take 1 tablet (40 mg total) by mouth 2 (two) times a day (Patient not taking: No sig reported), Disp: , Rfl: 0    triamcinolone (KENALOG) 0 5 % cream, Apply topically 2 (two) times a day (Patient not taking: No sig reported), Disp: 30 g, Rfl: 0  No Known Allergies      Review of Systems   Constitutional: Negative for activity change, appetite change, fatigue, fever and unexpected weight change  HENT: Negative for congestion, mouth sores, nosebleeds, rhinorrhea, sneezing, sore throat and trouble swallowing  Eyes: Negative  Respiratory: Negative for cough and shortness of breath  Cardiovascular: Negative for chest pain, palpitations and leg swelling  Gastrointestinal: Negative for abdominal distention, abdominal pain, blood in stool, diarrhea, nausea and vomiting  Endocrine: Negative  Genitourinary: Negative for difficulty urinating, dysuria, flank pain and hematuria  Musculoskeletal: Negative for arthralgias, back pain, gait problem and joint swelling  Allergic/Immunologic: Negative  Neurological: Negative for dizziness, weakness, numbness and headaches  Hematological: Negative  Psychiatric/Behavioral: Negative  OBJECTIVE:   /85   Pulse 89   Temp 97 9 °F (36 6 °C)   Resp 14   Wt 64 2 kg (141 lb 8 oz)   SpO2 100%   BMI 20 30 kg/m²   Pain Assessment:  0  Karnofsky: 90 - Able to carry on normal activity; minor signs or symptoms of disease     Physical Exam  Constitutional:       General: He is not in acute distress  Appearance: Normal appearance  He is normal weight  HENT:      Nose: No congestion or rhinorrhea  Mouth/Throat:      Pharynx: Oropharynx is clear  Eyes:      Extraocular Movements: Extraocular movements intact  Conjunctiva/sclera: Conjunctivae normal       Pupils: Pupils are equal, round, and reactive to light     Cardiovascular:      Rate and Rhythm: Normal rate and regular rhythm  Heart sounds: Normal heart sounds  Pulmonary:      Effort: Pulmonary effort is normal       Breath sounds: Normal breath sounds  Abdominal:      Palpations: Abdomen is soft  There is no mass  Tenderness: There is no abdominal tenderness  Musculoskeletal:         General: No swelling or tenderness  Normal range of motion  Cervical back: Normal range of motion and neck supple  No rigidity  Skin:     General: Skin is dry  Findings: No lesion or rash  Neurological:      General: No focal deficit present  Mental Status: He is alert and oriented to person, place, and time  Mental status is at baseline  Psychiatric:         Mood and Affect: Mood normal          Behavior: Behavior normal               RESULTS    Lab Results:   Recent Results (from the past 672 hour(s))   Fingerstick Glucose (POCT)    Collection Time: 05/16/22 11:30 AM   Result Value Ref Range    POC Glucose 121 65 - 140 mg/dl       Imaging Studies:NM PET CT skull base to mid thigh    Result Date: 5/16/2022  Narrative: PET/CT SCAN INDICATION:  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   , restaging post surgery for treatment management MODIFIER: PS COMPARISON: PET CT 2/1/2022 CELL TYPE:  Adenocarcinoma, 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 TECHNIQUE:   8 7 mCi F-18-FDG administered IV  Multiplanar attenuation corrected and non attenuation corrected PET images were acquired 60 minutes post injection  Contiguous, low dose, axial CT sections were obtained from the skull base through the femurs   Intravenous contrast material was not utilized  This examination, like all CT scans performed in the [facility], was performed utilizing techniques to minimize radiation dose exposure, including the use of iterative reconstruction and automated exposure control  Fasting serum glucose: 121 mg/dl FINDINGS: VISUALIZED BRAIN:   No acute abnormalities are seen   HEAD/NECK:   There is a physiologic distribution of FDG  No FDG avid cervical adenopathy is seen  CT images: Unremarkable  CHEST: Interval decreased FDG activity in the distal esophagus and proximal stomach, SUV ranging up to 3 6, prior SUV 10 9  This is compatible with at least partial response to therapy  Residual viable tumor is not excluded at this time  Previously visualized paraesophageal hypermetabolic soft tissue appears less conspicuous  There is however new hypermetabolic soft tissue along the left paraesophageal region, posterior to the distal left mainstem bronchus, and anterior to the descending thoracic aorta, SUV measuring 2 7  There is also a new hypermetabolic AP window node image 3/88 measuring 1 x 1 1 cm, SUV 2 3  These may represent new metastases  Right lower endobronchial nodular density is similar to prior exams dating back to 2019, but appears to demonstrate mild FDG activity, SUV 3, prior SUV 2 9  Probable incomplete mucous plugging in the distal right lower bronchial branches prominently in the superior segment appears similar, and without significant FDG uptake  CT images: Coronary atherosclerosis  ABDOMEN: Previously visualized gastrohepatic and metabolic soft tissue appears diminished, SUV measuring 1 9 in this region, prior SUV 3 5  Proximal right colon FDG activity appears diminished, SUV 3 7, prior SUV 8 8  This may have been physiologic  No new FDG avid soft tissue lesions are seen  CT images: PEG tube in place  Stable aneurysmal abdominal aorta measuring 3 8 x 3 3 cm  Stable lobulated left kidney  Mild fullness of the left renal pelvis  PELVIS: No FDG avid soft tissue lesions are seen  CT images: Prostatomegaly measuring 5 6 x 4 4 cm  OSSEOUS STRUCTURES: Increased activity in the right sternomanubrial joint, SUV 3 1, prior SUV 3 1, is likely degenerative/inflammatory  Otherwise no FDG avid lesions are seen  CT images: Spine degenerative change    L5 spondylolysis with grade 1 anterolisthesis on S1  Severe right hip degenerative change  Impression: 1  Interval decreased FDG activity in the distal esophagus and proximal stomach, suggesting at least partial response to therapy  Residual viable tumor may still be present  Continued PET CT follow-up recommended  2   Previously visualized distal paraesophageal and gastrohepatic hypermetabolic densities appear diminished  There are however new mildly hypermetabolic left paraesophageal and AP window lesions as above, which may represent new metastases  3   Right lower endobronchial soft tissue lesion is persistent from 2019, but demonstrates mild FDG activity  Clinical correlation recommended for suspected endobronchial polypoid mass  Workstation performed: [id number]           Assessment/Plan:  No orders of the defined types were placed in this encounter  Crystal Severin is a 68y o  year old male with stage III adenocarcinoma lower esophagus who finished chemoradiation therapy about a month ago  He is doing remarkably well with gaining weight and denies any problems swallowing  PET-CT scan which shows was performed only a month after completion of treatment normally we wait 2 months shows good partial response with decreased activity at the lower esophagus and cardia of stomach  Dr Live Vang revaluated him and recommended esophagectomy and scheduled for the 22nd of this month  He is seeing a physician next week what I believe will be for his medical clearance  He is naturally concern of the scope of the surgery and we explained to him this is done to remove any residual disease to complete his treatment program       He is planning to move to [place] to be with family  He thinks sometime in August   He will try to see us before he leaves      Huong Garcia MD  6/1/2022,1:18 PM    Portions of the record may have been created with voice recognition software   Occasional wrong word or "sound a like" substitutions may have occurred due to the inherent limitations of voice recognition software   Read the chart carefully and recognize, using context, where substitutions have occurred

## 2022-06-01 NOTE — PROGRESS NOTES
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 on 22  He has a PEG tube  He returns today for one month EOT visit  22 PET CT  1  Interval decreased FDG activity in the distal esophagus and proximal stomach, suggesting at least partial response to therapy  Residual viable tumor may still be present  Continued PET CT follow-up recommended  2   Previously visualized distal paraesophageal and gastrohepatic hypermetabolic densities appear diminished  There are however new mildly hypermetabolic left paraesophageal and AP window lesions as above, which may represent new metastases  3   Right lower endobronchial soft tissue lesion is persistent from 2019, but demonstrates mild FDG activity  Clinical correlation recommended for suspected endobronchial polypoid mass  22 Dr Ann Batch  recommend that he undergo esophagectomy  will schedule this at our earliest mutual convenience  He is going to meet with thoracic surgery to discuss if a thoracoscopic approach would be beneficial     Upcomin22 Medical Oncology  22 Thoracic surgery  2022 Esophagectomy scheduled      Follow up visit     Oncology History Overview Note   with a clinical T3 N1 M0 esophageal adenocarcinoma  He finished chemoradiation therapy for 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 on 22  He has a PEG tube  He returns today for one month EOT visit  22 PET CT  1  Interval decreased FDG activity in the distal esophagus and proximal stomach, suggesting at least partial response to therapy  Residual viable tumor may still be present  Continued PET CT follow-up recommended  2   Previously visualized distal paraesophageal and gastrohepatic hypermetabolic densities appear diminished    There are however new mildly hypermetabolic left paraesophageal and AP window lesions as above, which may represent new metastases  3   Right lower endobronchial soft tissue lesion is persistent from 2019, but demonstrates mild FDG activity  Clinical correlation recommended for suspected endobronchial polypoid mass  22 Dr Cathi Talavera  recommend that he undergo esophagectomy  will schedule this at our earliest mutual convenience  He is going to meet with thoracic surgery to discuss if a thoracoscopic approach would be beneficial     Upcomin22 Medical Oncology  22 Thoracic surgery     Malignant neoplasm of lower third of esophagus ([facility] [address] )   2022 Initial Diagnosis    Malignant neoplasm of lower third of esophagus ([facility] )     2022 Biopsy    EGD:   Esophagus, distal:  - 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  RESULTS OF IMMUNOHISTOCHEMICAL ANALYSIS FOR MISMATCH REPAIR PROTEIN LOSS     INTERPRETATION: No loss of nuclear expression of MMR proteins: Low probability of MSI-H     Note: Background non-neoplastic tissue and/or internal control with intact nuclear expression        RESULTS:  Antibody          Clone               Description                           Results  MLH1               M1                   Mismatch repair protein       Intact nuclear expression  MSH2              [id number]       Mismatch repair protein       Intact nuclear expression  MSH6              44                     Mismatch repair protein       Intact nuclear expression  PMS2              [id number]           Mismatch repair protein       Intact nuclear expression     3/14/2022 - 2022 Radiation    Treatments:  Course: C1    Plan ID Energy Fractions Dose per Fraction (cGy) Dose Correction (cGy) Total Dose Delivered (cGy) Elapsed Days   Esophags2_ReV 6X 22 / 22 180 0 3,960 29   Esophagus 6X 3 / 25 180 0 540 2   Esophagus CD 6X 3 / 3 180 0 540 2      Treatment Dates:  3/14/2022 - 2022       3/15/2022 -  Chemotherapy    CARBOplatin (PARAPLATIN) IVPB (GOG AUC DOSING), 147 6 mg, Intravenous, Once, 7 of 7 cycles  Administration: 147 6 mg (3/15/2022), 196 mg (3/22/2022), 196 mg (3/29/2022), 196 mg (4/5/2022), 196 mg (4/12/2022), 200 mg (4/26/2022), 200 mg (5/3/2022)  PACLItaxel (TAXOL) chemo IVPB, 50 mg/m2 = 84 6 mg, Intravenous, Once, 7 of 7 cycles  Administration: 84 6 mg (3/15/2022), 85 8 mg (3/22/2022), 85 8 mg (3/29/2022), 85 8 mg (4/5/2022), 85 8 mg (4/12/2022), 87 mg (4/26/2022), 87 mg (5/3/2022)         Review of Systems:  Review of Systems   Constitutional: Negative  HENT: Positive for nosebleeds (resolved)  PEG tube intact - 90mls per hour for 12 hours  Tolerating all foods orally in small amounts  Eyes: Negative  Respiratory: Negative  Cardiovascular: Negative  Gastrointestinal: Positive for nausea (only if eats too much at once  )  Endocrine: Negative  Genitourinary: Negative  Musculoskeletal: Positive for gait problem (walking without cane)  Skin: Negative  Allergic/Immunologic: Positive for environmental allergies  Neurological: Positive for numbness (fingers  90% improved  )  Hematological: Negative  Psychiatric/Behavioral: Negative          Clinical Trial: no    IPSS Questionnaire (AUA-7):    Teaching    Covid Vaccine Status    Health Maintenance   Topic Date Due    DTaP,Tdap,and Td Vaccines (1 - Tdap) Never done    COVID-19 Vaccine (3 - Moderna risk series) 04/07/2021    Fall Risk  01/07/2022    Medicare Annual Wellness Visit (AWV)  01/07/2022    Lung Cancer Screening  01/24/2023    Depression Screening  02/17/2023    BMI: Adult  05/19/2023    Hepatitis C Screening  Completed    Pneumococcal Vaccine: 65+ Years  Completed    Influenza Vaccine  Completed    HIB Vaccine  Aged Out    Hepatitis B Vaccine  Aged Out    IPV Vaccine  Aged Out    Hepatitis A Vaccine  Aged Out    Meningococcal ACWY Vaccine  Aged Out    HPV Vaccine  Aged Out     Patient Active Problem List   Diagnosis    Smoking    Weight loss  Dysphagia    Dermatitis    Malignant neoplasm of lower third of esophagus (HCC)    Mild protein-calorie malnutrition (HCC)    Left ankle pain    Severe protein-calorie malnutrition (HCC)    Chronic pain    Cancer related pain    Palliative care encounter    Anemia due to antineoplastic chemotherapy     Past Medical History:   Diagnosis Date    Cancer ([address] )     esophageal ca    Pneumonia     Polio     Rib fractures      Past Surgical History:   Procedure Laterality Date    FL GUIDED CENTRAL VENOUS ACCESS DEVICE INSERTION  2022    TUNNELED VENOUS PORT PLACEMENT Left 2022    Procedure: INSERTION VENOUS PORT (PORT-A-CATH); Surgeon: Paty Stokes MD;  [facility];  Service: Surgical Oncology     Family History   Problem Relation Age of Onset    Dementia Mother     Heart disease Father      Social History     Socioeconomic History    Marital status: /Civil Union     Spouse name: Not on file    Number of children: Not on file    Years of education: Not on file    Highest education level: Not on file   Occupational History    Not on file   Tobacco Use    Smoking status: Former Smoker     Packs/day: 1 00     Years: 40 00     Pack years: 40 00     Types: Cigarettes     Quit date: 2022     Years since quittin 3    Smokeless tobacco: Never Used   Vaping Use    Vaping Use: Never used   Substance and Sexual Activity    Alcohol use: Never     Alcohol/week: 0 0 standard drinks     Comment: 0    Drug use: No    Sexual activity: Not Currently   Other Topics Concern    Not on file   Social History Narrative    Not on file     Social Determinants of Health     Financial Resource Strain: Not on file   Food Insecurity: No Food Insecurity    Worried About [address] [employer] in the Last Year: Never true    [address] in the Last Year: Never true   Transportation Needs: No Transportation Needs    Lack of Transportation (Medical):  No    Lack of Transportation (Non-Medical): No   Physical Activity: Not on file   Stress: Not on file   Social Connections: Not on file   Intimate Partner Violence: Not on file   Housing Stability: Low Risk     Unable to Pay for Housing in the Last Year: No    Number of Places Lived in the Last Year: 1    Unstable Housing in the Last Year: No       Current Outpatient Medications:     acetaminophen (TYLENOL) 325 mg tablet, Take 325 mg by mouth every 6 (six) hours as needed for mild pain, Disp: , Rfl:     ascorbic acid (VITAMIN C) 500 mg tablet, Take 500 mg by mouth daily, Disp: , Rfl:     cyanocobalamin (VITAMIN B-12) 500 MCG tablet, Take 500 mcg by mouth daily, Disp: , Rfl:     Multiple Vitamins-Minerals (MULTIVITAMIN WITH MINERALS) tablet, Take 1 tablet by mouth daily, Disp: , Rfl:     aspirin 325 mg tablet, Take 325 mg by mouth 2 (two) times a day (Patient not taking: No sig reported), Disp: , Rfl:     bisacodyl (DULCOLAX) 10 mg suppository, Insert 10 mg into the rectum daily (Patient not taking: Reported on 6/1/2022), Disp: , Rfl:     Cholecalciferol (VITAMIN D3 PO), Take by mouth, Disp: , Rfl:     lidocaine (LIDODERM) 5 %, Apply 1 patch topically daily Remove & Discard patch within 12 hours or as directed by MD (Patient not taking: No sig reported), Disp: , Rfl: 0    magnesium hydroxide (MILK OF MAGNESIA) 400 mg/5 mL oral suspension, Take 30 mL by mouth daily as needed for constipation (Patient not taking: No sig reported), Disp: , Rfl:     ondansetron (Zofran ODT) 8 mg disintegrating tablet, Take 1 tablet (8 mg total) by mouth every 8 (eight) hours as needed for nausea or vomiting Through J tube not by mouth   (Patient not taking: No sig reported), Disp: 20 tablet, Rfl: 0    pantoprazole (PROTONIX) 40 mg tablet, Take 1 tablet (40 mg total) by mouth 2 (two) times a day (Patient not taking: No sig reported), Disp: , Rfl: 0    triamcinolone (KENALOG) 0 5 % cream, Apply topically 2 (two) times a day (Patient not taking: No sig reported), Disp: 30 g, Rfl: 0  No Known Allergies  Vitals:    06/01/22 1241   BP: 130/85   Pulse: 89   Resp: 14   Temp: 97 9 °F (36 6 °C)   SpO2: 100%   Weight: 64 2 kg (141 lb 8 oz)      Pain Score: 0-No pain

## 2022-06-02 ENCOUNTER — APPOINTMENT (OUTPATIENT)
Dept: PREADMISSION TESTING | Facility: HOSPITAL | Age: 77
DRG: 356 | End: 2022-06-02
Payer: COMMERCIAL

## 2022-06-02 ENCOUNTER — CONSULT (OUTPATIENT)
Dept: ANESTHESIOLOGY | Facility: CLINIC | Age: 77
End: 2022-06-02
Payer: COMMERCIAL

## 2022-06-02 ENCOUNTER — TELEPHONE (OUTPATIENT)
Dept: HEMATOLOGY ONCOLOGY | Facility: CLINIC | Age: 77
End: 2022-06-02

## 2022-06-02 VITALS
TEMPERATURE: 97.6 F | RESPIRATION RATE: 18 BRPM | DIASTOLIC BLOOD PRESSURE: 85 MMHG | HEART RATE: 80 BPM | SYSTOLIC BLOOD PRESSURE: 136 MMHG | WEIGHT: 142 LBS | BODY MASS INDEX: 18.82 KG/M2 | OXYGEN SATURATION: 100 % | HEIGHT: 73 IN

## 2022-06-02 DIAGNOSIS — C15.5 MALIGNANT NEOPLASM OF LOWER THIRD OF ESOPHAGUS (HCC): ICD-10-CM

## 2022-06-02 DIAGNOSIS — Z01.818 PRE-OP TESTING: ICD-10-CM

## 2022-06-02 DIAGNOSIS — D64.81 ANEMIA DUE TO ANTINEOPLASTIC CHEMOTHERAPY: ICD-10-CM

## 2022-06-02 DIAGNOSIS — R63.4 WEIGHT LOSS: ICD-10-CM

## 2022-06-02 DIAGNOSIS — Z01.89 ENCOUNTER FOR GERIATRIC ASSESSMENT: Primary | ICD-10-CM

## 2022-06-02 DIAGNOSIS — F17.200 SMOKING: ICD-10-CM

## 2022-06-02 DIAGNOSIS — E43 SEVERE PROTEIN-CALORIE MALNUTRITION (HCC): ICD-10-CM

## 2022-06-02 DIAGNOSIS — E44.1 MILD PROTEIN-CALORIE MALNUTRITION (HCC): ICD-10-CM

## 2022-06-02 DIAGNOSIS — Z51.5 PALLIATIVE CARE ENCOUNTER: ICD-10-CM

## 2022-06-02 DIAGNOSIS — T45.1X5A ANEMIA DUE TO ANTINEOPLASTIC CHEMOTHERAPY: ICD-10-CM

## 2022-06-02 PROCEDURE — 99215 OFFICE O/P EST HI 40 MIN: CPT | Performed by: NURSE PRACTITIONER

## 2022-06-02 NOTE — PATIENT INSTRUCTIONS
Complete you blood work   See your cardiologist   Prep for surgery   Stop smoking     As always we discussed having your BEST surgery, and BEST recovery  Surgery goals reviewed today  Breathing exercises   Patient was encouraged to begin lung exercises today  This could be accomplished through deep breathing and cough exercises  Patient was taught how to use an incentive spirometer  Return demonstration provided  Eating/nutrition   Encouraged patient to increase oral protein intake prior to surgery  Based on current weight of   , patient was instructed to consume    Grams of protein a day  This can be accomplished by consuming chicken, fish, tuna fish, cottage cheese, cheese, eggs, [place] yogurt, and protein shakes as needed  I encouraged use of protein shakes such ENLIVE  I also recommended making your own protein shakes with protein powder  Sleep/Stress management  Patient was encouraged to rest their body prior to surgery  Encouraged attempting to get 8 hours of sleep at night  Avoid stress  Avoid sick contacts  Encouraged to find a relaxing hobby such as reading, meditation, listening to music  Training exercises  Patient was encouraged to remain active as possible  Today bilateral lower extremity generic exercises were taught for muscle strengthening and balance  All exercises to be done sitting down

## 2022-06-02 NOTE — PRE-PROCEDURE INSTRUCTIONS
Pre-Surgery Instructions:   Medication Instructions    acetaminophen (TYLENOL) 325 mg tablet Instructed patient to continue taking as prescribed up to and including DOS with sips of water   ascorbic acid (VITAMIN C) 500 mg tablet Stop taking 7 days prior to surgery   Cholecalciferol (VITAMIN D3 PO) Stop taking 7 days prior to surgery   cyanocobalamin (VITAMIN B-12) 500 MCG tablet Stop taking 7 days prior to surgery   Multiple Vitamins-Minerals (MULTIVITAMIN WITH MINERALS) tablet Stop taking 7 days prior to surgery   ondansetron (Zofran ODT) 8 mg disintegrating tablet Instructed patient to continue taking as prescribed up to and including DOS with sips of water   pantoprazole (PROTONIX) 40 mg tablet Instructed patient to continue taking as prescribed up to and including DOS with sips of water  Med list reviewed as above  Also instructed pt not to start any new vitamins/supplements preoperatively and to avoid NSAID's  7 days prior to surgery  Tylenol is acceptable if needed  Pt given CHG surgical soap and verbalizes understanding of preoperative showering protocol  Reviewed [facility]  policy and pt understands that it may change at any time  All information within "My Surgical Experience" pamphlet reviewed and patient verbalizes understanding and compliance  All questions answered  See Geriatric Assessment below        Cognitive Assessment:   3   CAM: N/A   TUG <15 sec:   No   Falls (last 6 months):     Yes   Hand  score:   60 lbs of force  -Attempt 1:   60  -Attempt 2:   60  -Attempt 3:   60   Allan Total Score:    21   PHQ- 9 Depression Scale:   4   Nutrition Assessment Score:   5   METS:   5 81   Health goals:  -What are your overall health goals? (quit smoking, wt  loss, rest, decrease stress)   "be cancer free and move to [place]"    -What brings you strength? (family, friends, Scientology, health)   "family and friends"    -What activities are important to you? (exercise, reading, travel, work)   "fishing and feeding the birds"

## 2022-06-02 NOTE — PROGRESS NOTES
SURGICAL OPTIMIZATION CENTER   CONSULT: GERIATRIC SURGERY       Assessment/Plan:  · 68year old male referred to [facility] for pre- surgery optimization 2 2 advanced age  · Consult concerns: Patient needs optimization prior to surgery on  due to age  · Last chemo May 16th, 2022  · He is scheduled on 22  Case: [id number] Date/Time: 22 0900   Procedures:        ESOPHAGOGASTRODUODENOSCOPY (EGD) (N/A Esophagus)       ESOPHAGECTOMY (N/A Esophagus)   Anesthesia type: General   Diagnosis: Malignant neoplasm of lower third of esophagus ([address] ) [C15 5]   Pre-op diagnosis: Malignant neoplasm of lower third of esophagus ([address] ) [C15 5]   Location:  OR ROOM  MAIN OR   Surgeons: Paty Stokes MD       No problem-specific Assessment & Plan notes found for this encounter  Problem List Items Addressed This Visit        Digestive    Malignant neoplasm of lower third of esophagus ([address] )  · Seen today for surgery optimization & for geriatric screens  · Will need CC prior to surgery, scheduled for 6 10 22  · Needs to compete blood work- will on Monday next week   · Stop smoking now   · Continue feeding supplements per Nutrition:  Patient reports 20 lb weight gain & albumin 3 2  Access Type: Jtube   Formula: Jevity 1 5  Method: Cyclic  Rate: 90 mL/hr for 12hrs  Flush: 95 mL free water flush pre and post infusion (190ml)  EN providin mL volume (4 6 cartons/day), 1620 kcal, 69g protein, 821 mL free water, 1011mL total water  · Implemented BEST protocol  BEST   Breathing- instructed to exercise lungs prior to surgery via IS  Eat- discussed increasing protein intake prior to surgery   Sleep/stress- encouraged 8-10 sleep @ night, stress reduction, avoid sick contacts and handwashing   · Train- encouraged to remain active        Other    Smoking  · Refused smoking cessation at this time     · Instructed to stop smoking now   · Instructed to exercise lungs daily   · IS taught and given       Weight loss Relevant Orders    Prealbumin    Mild protein-calorie malnutrition ([address] )    Severe protein-calorie malnutrition ([facility] [address] [facility] )    Relevant Orders    Prealbumin  · Continue plan per nutrition  · Continue feeding supplements per Nutrition:    · Patient reports 20 lb weight gain & albumin 3 2  Access Type: Jtube   Formula: Jevity 1 5  Method: Cyclic  Rate: 90 mL/hr for 12hrs  Flush: 95 mL free water flush pre and post infusion (190ml)  EN providin mL volume (4 6 cartons/day), 1620 kcal, 69g protein, 821 mL free water, 1011mL total water  Anemia due to antineoplastic chemotherapy    Relevant Orders    CBC and Platelet  Will complete blood work next week   Await results      Encounter for geriatric assessment - Primary    Relevant Orders    Prealbumin  See Geriatric Assessment below    Recommend inpatient geriatric consult after surgery secondary to advanced age, history of falls, slow gait, malnutrition   Cognitive Assessment:  3    TUG <15 sec:  No   Falls (last 6 months):   Yes   Hand  score:  60  -Attempt 1:  60  -Attempt 2:  60  -Attempt 3:  60   Allan Total Score:  21   PHQ- 9 Depression Scale:  4   Nutrition Assessment Score:  5   Albumin 3 2   METS:  5 81  Health goals:  -What are your overall health goals? (quit smoking, wt  loss, rest, decrease stress)  To be cancer-free  -What brings you strength? (family, friends, Mos[employer], health)  Family and friends  -What activities are important to you? (exercise, reading, travel, work)  Feeding birds and fishing    High risk for post- op pneumonia 2 2 age, inpatient surgery, abdominal surgery, an active smoker  · Refused tobacco therapy at this time  · Instructed to stop smoking now  · Incentive spirometer taught to the patient and given  · Instructed to start lung exercises tonight  High fall risk 2 2 age, inpatient surgery, history of falls  · Fall precautions on admit  · Standard PT eval after surgery for safe discharge  · Today lower extremity exercises were taught to patient for muscle strengthening and balance  · Exercise routine developed, patient instructed to start tonight, all to be done sitting down only  High risk for malnutrition 2 2 age, inpatient surgery, abdominal surgery, health diagnosis, and recent 48 lb weight loss  · Continue plan of care per nutrition, detailed aboveNo    Be your BEST pathway   Breath, eat, sleep/stress relief, and tracking exercise           Other Visit Diagnoses     Pre-op testing        Relevant Orders    CBC and Platelet    Prealbumin  Await results of PAT             Subjective:      Patient ID: Ayah Patel is a 68 y o  male referred to [facility] for pre surgery geriatric screening 2 2 advanced age  History of esophageal CA  Was diagnosed back around [facility] of last year 2021  Since his diagnosis has had an unexpected weight loss of 48 lb, a feeding tube placement, he has been seeing nutrition, and now is planning to undergo an  esophagectomy  He is status post chemo and radiation  Last dose of chemo was May 16, 2022  During his chemo treatment his energy level was a 2/5  Today his energy level is 5/5  I met the patient in the [facility] today  We are seeing him due to his advanced age, upcoming surgery and planned hospital stay  Patient walked in independently  He drove to today's appointment  There were no cognitive concerns identified today  Slow gait  TUg is greater than 15 seconds  And he admits to having a fall in the last 6 months  He lives at home alone  He is independent with all ADLs  He is active  States he just power washed his deck the other day with no problems  His last chemo treatment was May 16, 2022  At that time he felt the chemo had wiped him out and he was exhausted  Now he feels much better and back to his normal baseline  He rates his energy level 5/5  He appears pre-frail, but hand  strength is well at 60 lb of force    Patient states he is very active  He needs to complete his blood work  I requested he do that today  He is asking if he could do it next week on Monday at [facility]  I have asked him not to do it any later than that  Await those results for any further needs  Previous blood work is showing anemia most likely related to his chemo infusions  And slightly low albumin at 3 2  Patient states his appetite is much better  He is gained 20 lb since this all started  He is seeing nutrition  I have recommend he continue with nutritional recommendations  Patient denies any questions   During chemo his energy 2/5  Now feel great back to normal 5/5  He needs to have cardiac clearance prior to his surgery  This is scheduled for Domitila 10, 2022  I have also started him on our best protocol to prepare for his surgery  As always we discussed having your BEST surgery, and BEST recovery  Surgery goals reviewed today  Breathing exercises   Patient was encouraged to begin lung exercises today  This could be accomplished through deep breathing and cough exercises  Patient was taught how to use an incentive spirometer  Return demonstration provided  Eating/nutrition   Encouraged patient to increase oral protein intake prior to surgery  Based on current weight of   , patient was instructed to consume    Grams of protein a day  This can be accomplished by consuming chicken, fish, tuna fish, cottage cheese, cheese, eggs, [place] yogurt, and protein shakes as needed  I encouraged use of protein shakes such ENLIVE  I also recommended making your own protein shakes with protein powder  Sleep/Stress management  Patient was encouraged to rest their body prior to surgery  Encouraged attempting to get 8 hours of sleep at night  Avoid stress  Avoid sick contacts  Encouraged to find a relaxing hobby such as reading, meditation, listening to music  Training exercises  Patient was encouraged to remain active as possible    Today bilateral lower extremity generic exercises were taught for muscle strengthening and balance  All exercises to be done sitting down  The following portions of the patient's history were reviewed and updated as appropriate: current medications, past family history, past medical history, past social history, past surgical history and problem list     Review of Systems   Constitutional: Negative for chills and fatigue  HENT: Negative for mouth sores, postnasal drip and sore throat  Respiratory: Negative for cough and shortness of breath  Cardiovascular: Negative for chest pain, palpitations and leg swelling  Gastrointestinal: Negative for diarrhea, nausea and vomiting  Genitourinary: Negative for difficulty urinating  Musculoskeletal: Negative for gait problem  Skin: Negative for color change, pallor, rash and wound  Neurological: Negative for dizziness, facial asymmetry and headaches  Psychiatric/Behavioral: Negative for agitation and behavioral problems  Objective:      /85 (BP Location: Left arm, Patient Position: Sitting)   Pulse 80   Temp 97 6 °F (36 4 °C) (Tympanic)   Resp 18   Ht 6' 1" (1 854 m)   Wt 64 4 kg (142 lb)   SpO2 100%   BMI 18 73 kg/m²          Physical Exam  HENT:      Head: Normocephalic  Nose: Nose normal       Mouth/Throat:      Mouth: Mucous membranes are moist    Eyes:      Pupils: Pupils are equal, round, and reactive to light  Cardiovascular:      Rate and Rhythm: Normal rate  Pulmonary:      Effort: Pulmonary effort is normal    Abdominal:      Palpations: Abdomen is soft  Musculoskeletal:         General: Normal range of motion  Cervical back: Normal range of motion  Skin:     General: Skin is warm and dry  Coloration: Skin is pale  Neurological:      General: No focal deficit present  Mental Status: He is alert and oriented to person, place, and time  Mental status is at baseline     Psychiatric:         Mood and Affect: Mood normal          Behavior: Behavior normal          Thought Content:  Thought content normal          Judgment: Judgment normal

## 2022-06-02 NOTE — TELEPHONE ENCOUNTER
CALL RETURN FORM   Reason for patient call? Patient is calling in requesting a call back from Dr Madelin Thurman to speak on his upcoming surgery  He would like to schedule an in person appointment    Patient's primary oncologist?  Addison Jackson    Name of person the patient was calling for?   Janeen Villafana    Any additional information to add, if applicable? n/a   Informed patient that the message will be forwarded to the team and someone will get back to them as soon as possible    Did you relay this information to the patient?  yes, patient can be reached back at [phone]

## 2022-06-02 NOTE — TELEPHONE ENCOUNTER
Called patient back and informed him that Dr Liz Wallis is at [facility] tomorrow then on a 2 week vacation  Asked patient if he had any questions I could answer and he said he prefers to talk to the surgeon  Virtual visit offered to patient  Visit will be a telephone call as patient does not have the means for a video visit

## 2022-06-03 ENCOUNTER — TELEMEDICINE (OUTPATIENT)
Dept: SURGICAL ONCOLOGY | Facility: CLINIC | Age: 77
End: 2022-06-03
Payer: COMMERCIAL

## 2022-06-03 DIAGNOSIS — C15.5 MALIGNANT NEOPLASM OF LOWER THIRD OF ESOPHAGUS (HCC): Primary | ICD-10-CM

## 2022-06-03 PROCEDURE — 99213 OFFICE O/P EST LOW 20 MIN: CPT | Performed by: SURGERY

## 2022-06-03 NOTE — LETTER
Domitila 3, 2022     Vee Bauman MD  [address]  [address] [facility]  [address]    Patient: Jayce Villalpando   YOB: 1945   Date of Visit: 6/3/2022       Dear Dr Alphonse Hammonds: Thank you for referring Martina Romero to me for evaluation  Below are my notes for this consultation  If you have questions, please do not hesitate to call me  I look forward to following your patient along with you  Sincerely,        Tessa Arriaga MD        CC: MD Tessa Fernandez MD  6/3/2022 10:09 AM  Sign when Signing Visit  Virtual Brief Visit    Patient is located in the following state in which I hold an active license [place]      Assessment/Plan:  Patient had his appointment and surgical optimization clinic and had several questions  He is concerned about having surgery given the extent of surgery  I did try to reassure him that I believe he will tolerate surgery and ultimately his quality of life will be good  He will need to continue to eat small frequent meals  He will need tube feeds for 1-2 months after surgery and hopefully, the feeding tube can then be removed  I have also counseled him that he should stop smoking  I also told him to keep his appointment with Dr Elaine Cantu  I explained that in [place] surgery after chemo radiation for adenocarcinoma is standard of care  If he does not have surgery, he would likely have immunotherapy, but given the likely residual disease seen on PET-CT it is unclear if there is viable disease and there is certainly a risk of metastasis with or without surgery  I answered all of his questions to the best of my ability  All of his questions were answered  Problem List Items Addressed This Visit        Digestive    Malignant neoplasm of lower third of esophagus ([facility] [address] ) - Primary          Recent Visits  No visits were found meeting these conditions    Showing recent visits within past 7 days and meeting all other requirements  Future Appointments  No visits were found meeting these conditions    Showing future appointments within next 150 days and meeting all other requirements         {Time Spent:57185}

## 2022-06-03 NOTE — PROGRESS NOTES
Virtual Brief Visit    Patient is located in the following state in which I hold an active license [place]      Assessment/Plan:  Patient had his appointment and surgical optimization clinic and had several questions  He is concerned about having surgery given the extent of surgery  I did try to reassure him that I believe he will tolerate surgery and ultimately his quality of life will be good  He will need to continue to eat small frequent meals  He will need tube feeds for 1-2 months after surgery and hopefully, the feeding tube can then be removed  I have also counseled him that he should stop smoking  I also told him to keep his appointment with Dr Eileen Munoz  I explained that [place] surgery after chemo radiation for adenocarcinoma is standard of care  If he does not have surgery, he would likely have immunotherapy, but given the likely residual disease seen on PET-CT it is unclear if there is viable disease and there is certainly a risk of metastasis with or without surgery  I answered all of his questions to the best of my ability  All of his questions were answered  Problem List Items Addressed This Visit        Digestive    Malignant neoplasm of lower third of esophagus ([facility] [address] ) - Primary          Recent Visits  No visits were found meeting these conditions  Showing recent visits within past 7 days and meeting all other requirements  Future Appointments  No visits were found meeting these conditions    Showing future appointments within next 150 days and meeting all other requirements         I spent 20 minutes with patient today in which greater than 50% of the time was spent in counseling/coordination of care regarding Esophagus cancer

## 2022-06-03 NOTE — LETTER
Domitila 3, 2022     Mahi Olea MD  [address]  [address] [facility]  [address]    Patient: Kathleen Wynn   YOB: 1945   Date of Visit: 6/3/2022       Dear Dr Caleb Mcmillan: Thank you for referring Lupe Lane to me for evaluation  Below are my notes for this consultation  If you have questions, please do not hesitate to call me  I look forward to following your patient along with you  Sincerely,        Meg Beard MD        CC: MD Mge Anne MD  6/3/2022 10:10 AM  Sign when Signing Visit  Virtual Brief Visit    Patient is located in the following state in which I hold an active license [place]      Assessment/Plan:  Patient had his appointment and surgical optimization clinic and had several questions  He is concerned about having surgery given the extent of surgery  I did try to reassure him that I believe he will tolerate surgery and ultimately his quality of life will be good  He will need to continue to eat small frequent meals  He will need tube feeds for 1-2 months after surgery and hopefully, the feeding tube can then be removed  I have also counseled him that he should stop smoking  I also told him to keep his appointment with Dr Nemiah Crigler  I explained that [place] surgery after chemo radiation for adenocarcinoma is standard of care  If he does not have surgery, he would likely have immunotherapy, but given the likely residual disease seen on PET-CT it is unclear if there is viable disease and there is certainly a risk of metastasis with or without surgery  I answered all of his questions to the best of my ability  All of his questions were answered  Problem List Items Addressed This Visit        Digestive    Malignant neoplasm of lower third of esophagus ([facility] [address] ) - Primary          Recent Visits  No visits were found meeting these conditions    Showing recent visits within past 7 days and meeting all other requirements  Future Appointments  No visits were found meeting these conditions    Showing future appointments within next 150 days and meeting all other requirements         I spent 20 minutes with patient today in which greater than 50% of the time was spent in counseling/coordination of care regarding Esophagus cancer

## 2022-06-06 ENCOUNTER — OFFICE VISIT (OUTPATIENT)
Dept: HEMATOLOGY ONCOLOGY | Facility: CLINIC | Age: 77
End: 2022-06-06
Payer: COMMERCIAL

## 2022-06-06 ENCOUNTER — HOSPITAL ENCOUNTER (OUTPATIENT)
Dept: INFUSION CENTER | Facility: CLINIC | Age: 77
Discharge: HOME | End: 2022-06-06
Payer: COMMERCIAL

## 2022-06-06 VITALS
DIASTOLIC BLOOD PRESSURE: 68 MMHG | RESPIRATION RATE: 18 BRPM | HEART RATE: 77 BPM | WEIGHT: 140 LBS | HEIGHT: 70 IN | OXYGEN SATURATION: 99 % | SYSTOLIC BLOOD PRESSURE: 132 MMHG | BODY MASS INDEX: 20.04 KG/M2 | TEMPERATURE: 98.1 F

## 2022-06-06 DIAGNOSIS — C15.5 MALIGNANT NEOPLASM OF LOWER THIRD OF ESOPHAGUS (HCC): Primary | ICD-10-CM

## 2022-06-06 LAB
ALBUMIN SERPL BCP-MCNC: 3.5 G/DL (ref 3.5–5)
ALP SERPL-CCNC: 81 U/L (ref 46–116)
ALT SERPL W P-5'-P-CCNC: 13 U/L (ref 12–78)
ANION GAP SERPL CALCULATED.3IONS-SCNC: 8 MMOL/L (ref 4–13)
AST SERPL W P-5'-P-CCNC: 12 U/L (ref 5–45)
BASOPHILS # BLD AUTO: 0.03 THOUSANDS/ΜL (ref 0–0.1)
BASOPHILS NFR BLD AUTO: 1 % (ref 0–1)
BILIRUB SERPL-MCNC: 0.4 MG/DL (ref 0.2–1)
BUN SERPL-MCNC: 22 MG/DL (ref 5–25)
CALCIUM SERPL-MCNC: 9 MG/DL (ref 8.3–10.1)
CHLORIDE SERPL-SCNC: 105 MMOL/L (ref 100–108)
CO2 SERPL-SCNC: 27 MMOL/L (ref 21–32)
CREAT SERPL-MCNC: 0.61 MG/DL (ref 0.6–1.3)
EOSINOPHIL # BLD AUTO: 0.14 THOUSAND/ΜL (ref 0–0.61)
EOSINOPHIL NFR BLD AUTO: 2 % (ref 0–6)
ERYTHROCYTE [DISTWIDTH] IN BLOOD BY AUTOMATED COUNT: 17.7 % (ref 11.6–15.1)
GFR SERPL CREATININE-BSD FRML MDRD: 97 ML/MIN/1.73SQ M
GLUCOSE SERPL-MCNC: 126 MG/DL (ref 65–140)
HCT VFR BLD AUTO: 28.2 % (ref 36.5–49.3)
HGB BLD-MCNC: 9.4 G/DL (ref 12–17)
IMM GRANULOCYTES # BLD AUTO: 0.05 THOUSAND/UL (ref 0–0.2)
IMM GRANULOCYTES NFR BLD AUTO: 1 % (ref 0–2)
LYMPHOCYTES # BLD AUTO: 0.46 THOUSANDS/ΜL (ref 0.6–4.47)
LYMPHOCYTES NFR BLD AUTO: 7 % (ref 14–44)
MCH RBC QN AUTO: 34.1 PG (ref 26.8–34.3)
MCHC RBC AUTO-ENTMCNC: 33.3 G/DL (ref 31.4–37.4)
MCV RBC AUTO: 102 FL (ref 82–98)
MONOCYTES # BLD AUTO: 0.84 THOUSAND/ΜL (ref 0.17–1.22)
MONOCYTES NFR BLD AUTO: 13 % (ref 4–12)
NEUTROPHILS # BLD AUTO: 5.13 THOUSANDS/ΜL (ref 1.85–7.62)
NEUTS SEG NFR BLD AUTO: 76 % (ref 43–75)
NRBC BLD AUTO-RTO: 0 /100 WBCS
PLATELET # BLD AUTO: 102 THOUSANDS/UL (ref 149–390)
PMV BLD AUTO: 10.1 FL (ref 8.9–12.7)
POTASSIUM SERPL-SCNC: 3.4 MMOL/L (ref 3.5–5.3)
PROT SERPL-MCNC: 6.9 G/DL (ref 6.4–8.2)
RBC # BLD AUTO: 2.76 MILLION/UL (ref 3.88–5.62)
SODIUM SERPL-SCNC: 140 MMOL/L (ref 136–145)
WBC # BLD AUTO: 6.65 THOUSAND/UL (ref 4.31–10.16)

## 2022-06-06 PROCEDURE — 80053 COMPREHEN METABOLIC PANEL: CPT

## 2022-06-06 PROCEDURE — 85025 COMPLETE CBC W/AUTO DIFF WBC: CPT

## 2022-06-06 PROCEDURE — 99214 OFFICE O/P EST MOD 30 MIN: CPT | Performed by: INTERNAL MEDICINE

## 2022-06-06 NOTE — PROGRESS NOTES
Central labs drawn per orders  Port flushed freely  Good blood return noted  Port deaccessed without issue  Patient tolerated well  Patient will call to schedule next lab draw due to needing labs prior to his upcoming surgery  Patient ambulated off unit without incident  All personal belongings taken with patient

## 2022-06-06 NOTE — PROGRESS NOTES
Hematology/Oncology Progress Note    Date of Service: 6/6/2022    7 [facility]   [address] [place] [id number]    Hem/Onc Problem List:   GE junction adenocarcinoma, cT3 cN1, M0, G3    Chief Complaint:    Management of GE junction adenocarcinoma on active chemotherapy and radiation therapy    Assessment/Plan:     I personally reviewed the lab results, image studies results and other specialties/physicians consult notes and recommendations  I shared the findings with patient and family, discussed the diagnosis and management plan as below  1  GE junction adenocarcinoma, cT3 cN1, cM0, grade 3, stage III  EGD with biopsy consistent with 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  MMR proteins are normally expressed  HER2 negative by IHC  Dr Reji Carter consulted patient and recommended concurrent chemoradiation therapy  Patient currently stays in a nursing home  Poor performance status, ECOG 3/5  Patient has a J-tube placed for nutrition support  Neoadjuvant concurrent radiation therapy with weekly Carbo/Taxol started on March 15, 2022  Last chemotherapy was on May 3, 2022  PET-CT scan restaging on May 16, 2022 showed interval decrease FDG activity in the GE junction        Dr Reji Carter consulted patient on May 19, 2022  Surgery is scheduled in 2-3 weeks  2  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  J-tube is in place  Patient tolerated the oral intake very well  I encouraged patient increasing oral intake to gain more weight before surgery  3  Pancytopenia due to chemotherapy  ANC normal, platelet count more than 100,000  Hemoglobin stable  We continue to monitor  4  Normocytic normochromic anemia, multifactorial   Hemoglobin stable  Patient denies bleeding anywhere  We continue to monitor  5  Follow-up with MD in 6 weeks    Patient is aware that I will leave this practice in 1 month  His care will be transferred to a new physician  Disclaimer: This document was prepared using [employer] technology  If a word or phrase is confusing, or does not make sense, this is likely due to recognition error which was not discovered during the providers review  If you believe an error has occurred, please Contact me through [employer] service for vicenta? cation  AJCC 8th Edition Cancer Stage :      Cancer Staging  Malignant neoplasm of lower third of esophagus (HCC)  Staging form: Esophagus - Adenocarcinoma, AJCC 8th Edition  - Clinical stage from 2/14/2022: Stage III (cT3, cN1, cM0, G3) - Unsigned  Histologic grading system: 3 grade system      Hematology/Oncology History:   · January 6, 2022 patient had a follow-up with primary care physician complaining of 6 weeks of dysphagia  This started with solid food then progressed into liquid diet  It was associated with nausea  Patient had a 30 lb weight loss within 1 year  Patient is an active smoker  · January 10, 2022 patient consulted GI   EGD on January 13, 2022 showed: FINDINGS:  · Fungating and malignant-appearing mass (traversable) measuring 70 mm in the lower third of the esophagus (34 cm from the incisors), covering the whole circumference,; bleeding occurred before and after intervention; performed cold forceps biopsy  · 3 fungating and multilobular masses (traversable) in the cardia, covering one third of the circumference,; bleeding occurred after intervention; performed partial removal by cold forceps biopsy  · The fundus of the stomach, body of the stomach, incisura, antrum, prepyloric region and pylorus appeared normal   · The duodenal bulb and 2nd part of the duodenum appeared normal   IMPRESSION:  5   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 with additional nodular lesions in the cardia of the stomach, biopsies pending, most likely adenocarcinoma   · January 13, 2010 to pathology from the EGD shows:  Final Diagnosis   A  Stomach, cardia:  - Gastric mucosa with reactive changes   - Separate fragment of necrotic debris with bacterial and fungal aggregates  - No definite malignancy identified       B  Esophagus, distal:  - 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  - MMR and Her2 studies are pending with results to follow in an addendum  C  MMR are normally expressed , MSI-low  D  HER2 negative by IHC  · January 24, 2022 CT scan chest abdomen pelvis with contrast:  IMPRESSION:     New mid to distal esophageal wall thickening with large eccentric mass in the posterior distal esophagus extending into the GE junction and beyond the esophageal wall with pathologic lymph nodes anteriorly with central necrosis of esophageal malignancy   This correlates with the recent EGD finding of 1/13/2022      Right lower lobe endobronchial mass possibly mucous plugging with extension into the bronchial branches is chronic and was seen on prior CT of 10/10/2019 with no obstruction  This can be evaluated when PET scan is performed as indicated on EGD report  · February 1, 2022 PET-CT scan:  IMPRESSION:     1  Hypermetabolic distal esophageal carcinoma which extends into the proximal stomach with paraesophageal/perigastric/upper abdominal andry metastatic disease      2  Concentric FDG activity involving the cecum/proximal ascending colon of uncertain clinical significance  Given the localized nature of FDG activity, consider correlation with screening colonoscopy to exclude underlying colonic malignancy      3  Indeterminant non-FDG avid avid filling defect within right lower lobe bronchus with associated more distal tree-in-bud infiltrate  Findings could reflect mucous plugging with obstructive malignant process of low metabolic activity not excluded      4   3 8 cm infrarenal abdominal aortic aneurysm    · February 3, 2022 Dr Javier Treadwell consulted patient recommendation of concurrent chemo radiation therapy in the neoadjuvant setting  · February 6, 2022, EGD with J-tube placement  Dr Anibal Nathan, DO  · February 7, 2022 duplex of lower extremity shows high-grade stenosis versus occlusion of the proximal distal superficial femoral artery reconstitution at the proximal popliteal artery  Stenosis in left lower extremity as well  · March 15, 2022 patient started concurrent chemoradiation therapy with weekly carbo/Taxol  · Last radiation by the end of April 2022  Last chemotherapy was postponed for 1 week because of neutropenia  · Last radiation on May 3, 2022  · May 16, 2022 PET-CT scan:  IMPRESSION:  1  Interval decreased FDG activity in the distal esophagus and proximal stomach, suggesting at least partial response to therapy  Residual viable tumor may still be present  Continued PET CT follow-up recommended  2   Previously visualized distal paraesophageal and gastrohepatic hypermetabolic densities appear diminished  There are however new mildly hypermetabolic left paraesophageal and AP window lesions as above, which may represent new metastases  3   Right lower endobronchial soft tissue lesion is persistent from 2019, but demonstrates mild FDG activity  Clinical correlation recommended for suspected endobronchial polypoid mass  · Domitila 3, 2022 Dr Andre Munguia discussed with patient about surgical option  History of Present Illiness:   Shelia Sow is a 68 y o  male with the above-noted HemOnc history who is here for management of GE junction adenocarcinoma, status post concurrent chemoradiation therapy  Patient diagnosed GE junction adenocarcinoma in January 10, 2022  Biopsy consistent with moderate to poorly differentiated adenocarcinoma with focal signet ring features  MMR normally expressed  HER2 negative by IHC      Staging workup in January 24, 2022 showed new mid to distal esophageal wall thickening with large eccentric mass in the posterior distal esophagus extending into the GE junction and beyond the esophageal wall with pathologic lymph nodes anteriorly with central necrosis of esophageal malignancy  PET-CT scan in February 1, 2022 revealed hypermetabolic distal esophageal carcinoma which extends into the proximal stomach with periesophageal/perigastric/upper abdominal andry metastatic disease  There is a 3 8 cm infrarenal abdominal aortic aneurysm  Dr Vandana Griffith consulted patient February 3, 2022 recommendation of concurrent chemoradiation therapy in the neoadjuvant setting  J-tube placement was done by Dr King Palencia for nutrition support on February 6, 2022  Neoadjuvant concurrent chemoradiation therapy with weekly carbo Taxol started on March 15, 2022  Patient has had 7 cycles of treatment and did fairly well  Cycle 7 was postponed for 1 week because of neutropenia with platelet count 85  Last chemo was given on May 3, 2022  PET-CT scan in May 16, 2022 showed interval decreased FDG activity in the GE junction  Lab showed pancytopenia due to chemoradiation therapy  Hemoglobin stable  ANC normal   Platelet count [id number]  Dr Vandana Griffith consulted patient and recommended esophagectomy  Patient feels fine  He tolerated oral intake very well  No fever or chills  No exertional chest pain, diaphoresis or shortness  of breath  No cough and phlegm, no hemoptysis  Patient denied nausea  and vomiting  No abdominal pain  No diarrhea or constipation  No  symptoms  No headache or blurred vision  No seizure activity  The patient denies bleeding anywhere  ROS: A 12-point of review of systems is obtained and other than the above is noncontributory  Objective:   VITALS:   /68 (BP Location: Left arm, Cuff Size: Standard)   Pulse 77   Temp 98 1 °F (36 7 °C)   Resp 18   Ht 5' 10" (1 778 m)   Wt 63 5 kg (140 lb)   SpO2 99%   BMI 20 09 kg/m²     Physical EXAM:  General:  Alert, cooperative, no distress     Head: Normocephalic, without obvious abnormality  Eyes:  Conjunctivae/corneas clear  PERRL, EOMs intact  No evidence of conjunctivitis     Throat: Lips, mucosa, and tongue normal   No bleeding from mouth  Neck: Supple, symmetrical    Lungs:   Clear to auscultation bilaterally  Respiratory effort easy, nonlabored    Heart:  Regular rate and rhythm, S1, S2 normal, no murmur  Abdomen:   Soft, non-tender,nondistended  J-tube in-place  No signs of infection  Bowel sounds normal  No masses,  No organomegaly  Extremities:  Lymphatics: Extremities normal, atraumatic, no cyanosis or edema  No cervical, axillary or inguinal adenopathy   Skin: No rashes  Neurologic: A&Ox4  No focal neuro deficits       No Known Allergies    Past Medical History:   Diagnosis Date    Cancer ([facility] [address] )     esophageal ca    Current smoker     since 15years old    History of blood transfusion     Pneumonia     Polio     Rib fractures        Past Surgical History:   Procedure Laterality Date    EGD      FL GUIDED CENTRAL VENOUS ACCESS DEVICE INSERTION  02/16/2022    JEJUNOSTOMY FEEDING TUBE      TUNNELED VENOUS PORT PLACEMENT Left 02/16/2022    Procedure: INSERTION VENOUS PORT (PORT-A-CATH);   Surgeon: Kanu Tate MD;  Location: [facility];  Service: Surgical Oncology       Family History   Problem Relation Age of Onset    Dementia Mother     Heart disease Father        Social History     Socioeconomic History    Marital status: /Civil Union     Spouse name: Not on file    Number of children: Not on file    Years of education: Not on file    Highest education level: Not on file   Occupational History    Not on file   Tobacco Use    Smoking status: Heavy Tobacco Smoker     Packs/day: 1 00     Years: 40 00     Pack years: 40 00     Types: Cigarettes    Smokeless tobacco: Never Used   Vaping Use    Vaping Use: Never used   Substance and Sexual Activity    Alcohol use: Never     Alcohol/week: 0 0 standard drinks Comment: 0    Drug use: Never    Sexual activity: Not Currently   Other Topics Concern    Not on file   Social History Narrative    Not on file     Social Determinants of Health     Financial Resource Strain: Not on file   Food Insecurity: No Food Insecurity    Worried About Running Out of Food in the Last Year: Never true    Jeanmarie of Food in the Last Year: Never true   Transportation Needs: No Transportation Needs    Lack of Transportation (Medical): No    Lack of Transportation (Non-Medical): No   Physical Activity: Not on file   Stress: Not on file   Social Connections: Not on file   Intimate Partner Violence: Not on file   Housing Stability: Low Risk     Unable to Pay for Housing in the Last Year: No    Number of Places Lived in the Last Year: 1    Unstable Housing in the Last Year: No       Current Outpatient Medications   Medication Sig Dispense Refill    pantoprazole (PROTONIX) 40 mg tablet Take 1 tablet (40 mg total) by mouth 2 (two) times a day (Patient taking differently: Take 40 mg by mouth 2 (two) times a day as needed)  0    acetaminophen (TYLENOL) 325 mg tablet Take 325 mg by mouth every 6 (six) hours as needed for mild pain      ascorbic acid (VITAMIN C) 500 mg tablet Take 500 mg by mouth daily      Cholecalciferol (VITAMIN D3 PO) Take by mouth      cyanocobalamin (VITAMIN B-12) 500 MCG tablet Take 500 mcg by mouth daily      Multiple Vitamins-Minerals (MULTIVITAMIN WITH MINERALS) tablet Take 1 tablet by mouth daily      ondansetron (Zofran ODT) 8 mg disintegrating tablet Take 1 tablet (8 mg total) by mouth every 8 (eight) hours as needed for nausea or vomiting Through J tube not by mouth  20 tablet 0     No current facility-administered medications for this visit  (Not in a hospital admission)      DATA REVIEW:    Pathology Result:    Final Diagnosis   Date Value Ref Range Status   01/13/2022   Final    A   Stomach, cardia:  - Gastric mucosa with reactive changes   - Separate fragment of necrotic debris with bacterial and fungal aggregates  - No definite malignancy identified  B  Esophagus, distal:  - 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  - MMR and Her2 studies are pending with results to follow in an addendum  Image Results: They are reviewed and documented in Hematology/Oncology history    NM PET CT skull base to mid thigh  Narrative: PET/CT SCAN    INDICATION:  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   , restaging post surgery for treatment management    MODIFIER: PS     COMPARISON: PET CT 2/1/2022    CELL TYPE:  Adenocarcinoma, 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    TECHNIQUE:   8 7 mCi F-18-FDG administered IV  Multiplanar attenuation corrected and non attenuation corrected PET images were acquired 60 minutes post injection  Contiguous, low dose, axial CT sections were obtained from the skull base through the   femurs   Intravenous contrast material was not utilized  This examination, like all CT scans performed in the [facility], was performed utilizing techniques to minimize radiation dose exposure, including the use of iterative   reconstruction and automated exposure control  Fasting serum glucose: 121 mg/dl    FINDINGS:     VISUALIZED BRAIN:     No acute abnormalities are seen  HEAD/NECK:     There is a physiologic distribution of FDG  No FDG avid cervical adenopathy is seen  CT images: Unremarkable  CHEST:  Interval decreased FDG activity in the distal esophagus and proximal stomach, SUV ranging up to 3 6, prior SUV 10 9  This is compatible with at least partial response to therapy  Residual viable tumor is not excluded at this time  Previously visualized paraesophageal hypermetabolic soft tissue appears less conspicuous      There is however new hypermetabolic soft tissue along the left paraesophageal region, posterior to the distal left mainstem bronchus, and anterior to the descending thoracic aorta, SUV measuring 2 7  There is also a new hypermetabolic AP window node   image 3/88 measuring 1 x 1 1 cm, SUV 2 3  These may represent new metastases  Right lower endobronchial nodular density is similar to prior exams dating back to 2019, but appears to demonstrate mild FDG activity, SUV 3, prior SUV 2 9  Probable incomplete mucous plugging in the distal right lower bronchial branches prominently in the superior segment appears similar, and without significant FDG uptake  CT images: Coronary atherosclerosis  ABDOMEN:  Previously visualized gastrohepatic and metabolic soft tissue appears diminished, SUV measuring 1 9 in this region, prior SUV 3 5  Proximal right colon FDG activity appears diminished, SUV 3 7, prior SUV 8 8  This may have been physiologic  No new FDG avid soft tissue lesions are seen  CT images: PEG tube in place  Stable aneurysmal abdominal aorta measuring 3 8 x 3 3 cm  Stable lobulated left kidney  Mild fullness of the left renal pelvis  PELVIS:   No FDG avid soft tissue lesions are seen  CT images: Prostatomegaly measuring 5 6 x 4 4 cm  OSSEOUS STRUCTURES:  Increased activity in the right sternomanubrial joint, SUV 3 1, prior SUV 3 1, is likely degenerative/inflammatory  Otherwise no FDG avid lesions are seen  CT images: Spine degenerative change  L5 spondylolysis with grade 1 anterolisthesis on S1  Severe right hip degenerative change  Impression: 1  Interval decreased FDG activity in the distal esophagus and proximal stomach, suggesting at least partial response to therapy  Residual viable tumor may still be present  Continued PET CT follow-up recommended  2   Previously visualized distal paraesophageal and gastrohepatic hypermetabolic densities appear diminished  There are however new mildly hypermetabolic left paraesophageal and AP window lesions as above, which may represent new metastases    3   Right lower endobronchial soft tissue lesion is persistent from 2019, but demonstrates mild FDG activity  Clinical correlation recommended for suspected endobronchial polypoid mass  Workstation performed: [id number]        LABS:  Lab data are reviewed and documented in HemOnc history  No results found for this or any previous visit (from the past 48 hour(s))            Alistair Richards MD  6/6/2022, 9:42 AM

## 2022-06-06 NOTE — PLAN OF CARE
Problem: Potential for Falls  Goal: Patient will remain free of falls  Description: INTERVENTIONS:  - Educate patient/family on patient safety including physical limitations  - Instruct patient to call for assistance with activity   - Consult OT/PT to assist with strengthening/mobility   - Keep Call bell within reach  - Keep bed low and locked with side rails adjusted as appropriate  - Keep care items and personal belongings within reach  - Initiate and maintain comfort rounds  - Make Fall Risk Sign visible to staff    - Apply yellow socks and bracelet for high fall risk patients  - Consider moving patient to room near nurses station  Outcome: Progressing     Problem: Knowledge Deficit  Goal: Patient/family/caregiver demonstrates understanding of disease process, treatment plan, medications, and discharge instructions  Description: Complete learning assessment and assess knowledge base    Interventions:  - Provide teaching at level of understanding  - Provide teaching via preferred learning methods  Outcome: Progressing

## 2022-06-07 ENCOUNTER — OFFICE VISIT (OUTPATIENT)
Dept: CARDIAC SURGERY | Facility: CLINIC | Age: 77
End: 2022-06-07
Payer: COMMERCIAL

## 2022-06-07 ENCOUNTER — TELEPHONE (OUTPATIENT)
Dept: HEMATOLOGY ONCOLOGY | Facility: CLINIC | Age: 77
End: 2022-06-07

## 2022-06-07 VITALS
SYSTOLIC BLOOD PRESSURE: 124 MMHG | RESPIRATION RATE: 18 BRPM | OXYGEN SATURATION: 98 % | HEIGHT: 70 IN | HEART RATE: 87 BPM | WEIGHT: 139 LBS | BODY MASS INDEX: 19.9 KG/M2 | TEMPERATURE: 96.8 F | DIASTOLIC BLOOD PRESSURE: 80 MMHG

## 2022-06-07 DIAGNOSIS — C15.5 MALIGNANT NEOPLASM OF LOWER THIRD OF ESOPHAGUS (HCC): Primary | ICD-10-CM

## 2022-06-07 PROCEDURE — 99205 OFFICE O/P NEW HI 60 MIN: CPT | Performed by: THORACIC SURGERY (CARDIOTHORACIC VASCULAR SURGERY)

## 2022-06-07 NOTE — LETTER
June 7, 2022     Juan M Galvin MD  [address]  [address] [facility]  [address]    Patient: Disha Ibarra   YOB: 1945   Date of Visit: 6/7/2022       Dear Dr Nomi Hogue: Thank you for referring Funmi Solomon to me for evaluation  Below are my notes for this consultation  If you have questions, please do not hesitate to call me  I look forward to following your patient along with you  Sincerely,        Jose Hatch MD        CC: MD Juliette Khan MD Drucilla Masters, MD  6/7/2022 10:50 AM  Incomplete  Thoracic Follow-Up  Assessment/Plan:    Malignant neoplasm of lower third of esophagus [facility])  Mr Brandee Dobson is is a 68-year-old male who presents my office for preoperative assessment regarding his esophageal cancer  He has a T3 N1 esophageal cancer  He is now status post chemotherapy and radiation  Today in the office, had a long conversation with the patient regarding the operative plan  At this time, we will plan to proceed with a transhiatal esophagectomy  He will undergo this on June 22, 2022  We did discuss the PET-CT scan which demonstrated some new areas of activity  I explained to him that if these appear to be stuck during his surgery, there is a very small chance that we would need to convert to a right-sided approach from the chest   I do not believe that this will be necessary but I did warn the patient that there is always a very small possibility of this happening  He understands  Consent was obtained in the office today  He will undergo preoperative lab work prior to the surgery  He will get this done within 2 weeks of the surgery  He is COVID vaccinated so he does not require a COVID test prior to surgery      Mariano Vazquez MD  Thoracic Surgery  (Available on [employer])  Office: [phone]         Diagnoses and all orders for this visit:    Malignant neoplasm of lower third of esophagus [facility])          Thoracic History       Oncology History   Malignant neoplasm of lower third of esophagus ([facility] [address] )   1/13/2022 Initial Diagnosis     Malignant neoplasm of lower third of esophagus [facility])      1/13/2022 Biopsy     EGD:   Esophagus, distal:  - 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      RESULTS OF IMMUNOHISTOCHEMICAL ANALYSIS FOR MISMATCH REPAIR PROTEIN LOSS     INTERPRETATION: No loss of nuclear expression of MMR proteins: Low probability of MSI-H     Note: Background non-neoplastic tissue and/or internal control with intact nuclear expression       RESULTS:  Antibody          Clone               Description                           Results  MLH1               M1                   Mismatch repair protein       Intact nuclear expression  MSH2              [id number]       Mismatch repair protein       Intact nuclear expression  MSH6              44                     Mismatch repair protein       Intact nuclear expression  PMS2              [id number]           Mismatch repair protein       Intact nuclear expression      3/15/2022 -  Chemotherapy     CARBOplatin (PARAPLATIN) IVPB (GOG AUC DOSING), 147 6 mg, Intravenous, Once, 7 of 7 cycles  Administration: 147 6 mg (3/15/2022), 196 mg (3/22/2022), 196 mg (3/29/2022), 196 mg (4/5/2022), 196 mg (4/12/2022), 200 mg (4/26/2022), 200 mg (5/3/2022)  PACLItaxel (TAXOL) chemo IVPB, 50 mg/m2 = 84 6 mg, Intravenous, Once, 7 of 7 cycles  Administration: 84 6 mg (3/15/2022), 85 8 mg (3/22/2022), 85 8 mg (3/29/2022), 85 8 mg (4/5/2022), 85 8 mg (4/12/2022), 87 mg (4/26/2022), 87 mg (5/3/2022)         3/14/2022 - 4/20/2022 Radiation     Treatments:  Course: C1     Plan ID Energy Fractions Dose per Fraction (cGy) Dose Correction (cGy) Total Dose Delivered (cGy) Elapsed Days   [id number]_ReV 6X 22 / 22 180 0 3,960 29   Esophagus 6X 3 / 25 180 0 540 2   Esophagus CD 6X 3 / 3 180 0 540 2      Treatment Dates:  3/14/2022 - 4/20/2022            Staging:  Clinical T3 N1 M0 esophageal adenocarcinoma  Treatment history:  Chemo radiation  Current treatment:  Chemo radiation completed  Disease status:  Improved       Subjective:    Patient ID: Marquita Nolan is a 68 y o  male  HPI  Mr Nadiya Alexander is is a 59-year-old male who presents my office for preoperative assessment regarding his esophageal cancer  He has a T3 N1 esophageal cancer  He is now status post chemotherapy and radiation  Today in the office, he is doing well  He is eating all foods without difficulty  His weight is stable  He is still smoking but he has cut down  No nausea or vomiting  He is using his tube feeds  I have personally reviewed his most recent PET-CT scan from May 16th  He appears to have had a good response to his chemotherapy  The max SUV has decreased significantly in the esophagus  However, he does have a new para esophageal mass that is just distal to the left mainstem bronchus  It does not appear to be attached to the bronchus  Additionally, he has a small AP window node that has an SUV of 2 3  There was concern that these could be metastatic lesions however, I believe that these are probably just reactive to his radiation  However, they also note an endobronchial lesion  Will plan to perform bronchoscopy during his procedure to assess this lesion  I have read all the most recent notes in his chart from Medical Oncology as well as Surgical Oncology  The following portions of the patient's history were reviewed and updated as appropriate: allergies, current medications, past family history, past medical history, past social history, past surgical history and problem list     Review of Systems   Constitutional: Negative  Negative for activity change, chills, fatigue, fever and unexpected weight change  HENT: Negative for sore throat, trouble swallowing and voice change  Eyes: Negative  Negative for visual disturbance     Respiratory: Negative for cough, chest tightness, shortness of breath, wheezing and stridor  Cardiovascular: Negative for chest pain and leg swelling  Gastrointestinal: Negative  Negative for nausea and vomiting  Endocrine: Negative  Genitourinary: Negative  Musculoskeletal: Negative  Skin: Negative  Allergic/Immunologic: Negative  Neurological: Negative for seizures, syncope, speech difficulty, weakness, light-headedness and headaches  Hematological: Negative for adenopathy  Psychiatric/Behavioral: Negative  Objective:   Physical Exam  Vitals and nursing note reviewed  Constitutional:       Appearance: Normal appearance  He is well-developed  He is not diaphoretic  HENT:      Head: Normocephalic and atraumatic  Nose: Nose normal  No congestion  Mouth/Throat:      Mouth: Mucous membranes are moist       Pharynx: Oropharynx is clear  Eyes:      Extraocular Movements: Extraocular movements intact  Pupils: Pupils are equal, round, and reactive to light  Neck:      Trachea: No tracheal deviation  Cardiovascular:      Rate and Rhythm: Normal rate and regular rhythm  Heart sounds: Normal heart sounds  No murmur heard  Pulmonary:      Effort: Pulmonary effort is normal  No respiratory distress  Breath sounds: Normal breath sounds  No stridor  No wheezing or rales  Chest:      Chest wall: No tenderness  Abdominal:      General: Bowel sounds are normal  There is no distension  Palpations: Abdomen is soft  Tenderness: There is no abdominal tenderness  Musculoskeletal:         General: Normal range of motion  Cervical back: Normal range of motion  Lymphadenopathy:      Cervical: No cervical adenopathy  Skin:     General: Skin is warm and dry  Coloration: Skin is not pale  Findings: No erythema or rash  Neurological:      Mental Status: He is alert and oriented to person, place, and time  Psychiatric:         Behavior: Behavior normal          Thought Content:  Thought content normal          Judgment: Judgment normal      /80 (BP Location: Right arm, Patient Position: Sitting, Cuff Size: Standard)   Pulse 87   Temp (!) 96 8 °F (36 °C)   Resp 18   Ht 5' 10" (1 778 m)   Wt 63 kg (139 lb)   SpO2 98%   BMI 19 94 kg/m²     No Chest XR results available for this patient  No CT Chest results available for this patient  CT chest abdomen pelvis w contrast    Result Date: 1/26/2022  Narrative CT CHEST, ABDOMEN AND PELVIS WITH IV CONTRAST INDICATION:   R13 10: Dysphagia, unspecified R63 4: Abnormal weight loss  COMPARISON:  Compared with 10/10/2019 TECHNIQUE: CT examination of the chest, abdomen and pelvis was performed  Axial, sagittal, and coronal 2D reformatted images were created from the source data and submitted for interpretation  Radiation dose length product (DLP) for this visit:  812 mGy-cm   This examination, like all CT scans performed in the [facility], was performed utilizing techniques to minimize radiation dose exposure, including the use of iterative reconstruction and automated exposure control  IV Contrast:  100 mL of iohexol (OMNIPAQUE) Enteric Contrast: Enteric contrast was administered  FINDINGS: CHEST LUNGS:  Lungs are clear  There is no tracheal or endobronchial lesion  PLEURA:  Unremarkable  HEART/GREAT VESSELS: Heart is unremarkable for patient's age  No thoracic aortic aneurysm  MEDIASTINUM AND BRANDEE:  Esophagus is dilated with proximal and mid esophageal circumferential wall thickening with distal esophagus demonstrating an eccentric mass posteriorly measuring 1 8 x 2 2 x 3 6 cm extending into the GE junction  Posterior to this however this is a 1 3 cm soft tissue nodule in series 2 image 37  Lymph node with central necrosis anterior to the GE junction measuring 1 2 x 2 4 x 3 5 cm  Extension beyond the esophageal wall seen  Smaller lymph node measuring 1 0 cm seen inferiorly   Right infrahilar soft tissue density measuring 1 0 x 1 4 cm in series 301 image 33 appears to be within the bronchial lumen and demonstrates a branching pattern on the coronal views and was seen on prior CT  This is nonocclusive    CHEST WALL AND LOWER NECK:   Unremarkable  ABDOMEN LIVER/BILIARY TREE:  Unremarkable  GALLBLADDER:  No calcified gallstones  No pericholecystic inflammatory change  SPLEEN:  Unremarkable  PANCREAS:  Unremarkable  ADRENAL GLANDS:  Unremarkable  KIDNEYS/URETERS:  Both kidneys enhance symmetrically  Left kidney is smaller with a lobulated contour and unchanged  No hydronephrosis  STOMACH AND BOWEL:  Stool filled colon  APPENDIX:  No findings to suggest appendicitis  ABDOMINOPELVIC CAVITY:  No ascites  No pneumoperitoneum  No lymphadenopathy  VESSELS:  Aneurysmal infrarenal abdominal aorta measuring 3 3 cm and unchanged PELVIS REPRODUCTIVE ORGANS:  Enlarged heterogeneous prostate measuring 6 0 x 5 4 cm  URINARY BLADDER:  Unremarkable  ABDOMINAL WALL/INGUINAL REGIONS:  Unremarkable  OSSEOUS STRUCTURES: 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 and unchanged  Severe right hip degenerative changes  No acute fracture or destructive osseous lesion  Impression New mid to distal esophageal wall thickening with large eccentric mass in the posterior distal esophagus extending into the GE junction and beyond the esophageal wall with pathologic lymph nodes anteriorly with central necrosis of esophageal malignancy   This correlates with the recent EGD finding of 1/13/2022  Right lower lobe endobronchial mass possibly mucous plugging with extension into the bronchial branches is chronic and was seen on prior CT of 10/10/2019 with no obstruction  This can be evaluated when PET scan is performed as indicated on EGD report  The study was marked in EPIC for significant notification   Workstation performed: [id number]      NM PET CT skull base to mid thigh    Result Date: 5/16/2022  Narrative PET/CT SCAN INDICATION:  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   , restaging post surgery for treatment management MODIFIER: PS COMPARISON: PET CT 2/1/2022 CELL TYPE:  Adenocarcinoma, 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 TECHNIQUE:   8 7 mCi F-18-FDG administered IV  Multiplanar attenuation corrected and non attenuation corrected PET images were acquired 60 minutes post injection  Contiguous, low dose, axial CT sections were obtained from the skull base through the femurs   Intravenous contrast material was not utilized  This examination, like all CT scans performed in the [facility], was performed utilizing techniques to minimize radiation dose exposure, including the use of iterative reconstruction and automated exposure control  Fasting serum glucose: 121 mg/dl FINDINGS: VISUALIZED BRAIN:   No acute abnormalities are seen  HEAD/NECK:   There is a physiologic distribution of FDG  No FDG avid cervical adenopathy is seen  CT images: Unremarkable  CHEST: Interval decreased FDG activity in the distal esophagus and proximal stomach, SUV ranging up to 3 6, prior SUV 10 9  This is compatible with at least partial response to therapy  Residual viable tumor is not excluded at this time  Previously visualized paraesophageal hypermetabolic soft tissue appears less conspicuous  There is however new hypermetabolic soft tissue along the left paraesophageal region, posterior to the distal left mainstem bronchus, and anterior to the descending thoracic aorta, SUV measuring 2 7  There is also a new hypermetabolic AP window node image 3/88 measuring 1 x 1 1 cm, SUV 2 3  These may represent new metastases  Right lower endobronchial nodular density is similar to prior exams dating back to 2019, but appears to demonstrate mild FDG activity, SUV 3, prior SUV 2 9  Probable incomplete mucous plugging in the distal right lower bronchial branches prominently in the superior segment appears similar, and without significant FDG uptake   CT images: Coronary atherosclerosis  ABDOMEN: Previously visualized gastrohepatic and metabolic soft tissue appears diminished, SUV measuring 1 9 in this region, prior SUV 3 5  Proximal right colon FDG activity appears diminished, SUV 3 7, prior SUV 8 8  This may have been physiologic  No new FDG avid soft tissue lesions are seen  CT images: PEG tube in place  Stable aneurysmal abdominal aorta measuring 3 8 x 3 3 cm  Stable lobulated left kidney  Mild fullness of the left renal pelvis  PELVIS: No FDG avid soft tissue lesions are seen  CT images: Prostatomegaly measuring 5 6 x 4 4 cm  OSSEOUS STRUCTURES: Increased activity in the right sternomanubrial joint, SUV 3 1, prior SUV 3 1, is likely degenerative/inflammatory  Otherwise no FDG avid lesions are seen  CT images: Spine degenerative change  L5 spondylolysis with grade 1 anterolisthesis on S1  Severe right hip degenerative change  Impression 1  Interval decreased FDG activity in the distal esophagus and proximal stomach, suggesting at least partial response to therapy  Residual viable tumor may still be present  Continued PET CT follow-up recommended  2   Previously visualized distal paraesophageal and gastrohepatic hypermetabolic densities appear diminished  There are however new mildly hypermetabolic left paraesophageal and AP window lesions as above, which may represent new metastases  3   Right lower endobronchial soft tissue lesion is persistent from 2019, but demonstrates mild FDG activity  Clinical correlation recommended for suspected endobronchial polypoid mass   Workstation performed: [id number]     NM PET CT skull base to mid thigh    Result Date: 2/1/2022  Narrative PET/CT SCAN INDICATION:  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   , distal esophageal carcinoma for staging MODIFIER: PI COMPARISON: CT of chest, abdomen, and pelvis dated 1/24/2022 CELL TYPE:  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 (bx distal esophagus 1/13/22) TECHNIQUE:   8 7 mCi F-18-FDG administered IV  Multiplanar attenuation corrected and non attenuation corrected PET images were acquired 60 minutes post injection  Contiguous, low dose, axial CT sections were obtained from the skull base through the femurs   Intravenous contrast material was not utilized  This examination, like all CT scans performed in the [facility], was performed utilizing techniques to minimize radiation dose exposure, including the use of iterative reconstruction and automated exposure control  Fasting serum glucose: 141 mg/dl FINDINGS: VISUALIZED BRAIN:   No acute abnormalities are seen  HEAD/NECK:   There is a physiologic distribution of FDG  No FDG avid cervical adenopathy is seen  CT images: Unremarkable  CHEST:   There is segmental FDG activity associated with mural thickening extending from the distal esophagus through the proximal stomach with max SUV of 10 9 consistent with patient's biopsy-proven esophageal carcinoma  On image 104 of series 3, there is mild FDG activity associated with soft tissue density posterior to the mid to distal esophagus and adjacent to the descending thoracic aorta measuring approximately 1 2 cm with Max SUV of 2 8 suspicious for possible posterior mediastinal/ paraesophageal andry metastatic disease  On image 97 of series 12, there is a small focus of mild FDG activity in the right hilar region without definite soft tissue correlate and with max SUV of 2 9 of uncertain clinical significance  This finding could reflect reactive/inflammatory lymph node although attention to this region on follow-up imaging is recommended to exclude developing andry metastatic disease  CT images: Atherosclerotic vascular calcifications including those of the coronary arteries are noted   Non-FDG avid filling defect within right lower lobe bronchus is noted on image 98 of series 3 with associated reticulonodular/tree-in-bud infiltrate most consistent with an infectious/inflammatory process such as pneumonia  ABDOMEN:   Best seen on image 135 is mildly hypermetabolic perigastric/gastrohepatic ligament soft tissue measuring 1 9 x 2 8 cm with Max SUV of 3 5 consistent with andry metastatic disease  Additional smaller non-FDG avid upper abdominal lymph nodes are noted which are suspicious for andry metastatic disease  There is nonuniform FDG activity involving the bowel with more prominent concentric FDG activity involving the cecum/proximal ascending colon with max SUV of 8 8 on image 196 of series 12  Consider correlation with screening colonoscopy as clinically indicated given the more localized nature of this FDG activity  CT images: Abdominal aortic aneurysm measuring 3 8 cm  Atherosclerotic vascular calcifications are noted  Asymmetric lobulated contour to the left kidney which appears mildly atrophic, particularly towards the upper pole  PELVIS: No FDG avid soft tissue lesions are seen  CT images: Enlarged prostate gland  OSSEOUS STRUCTURES: No FDG avid lesions are seen  CT images: Severe degenerative changes involving the right hip  Degenerative changes involving the spine  Possible osteopenia  Impression 1  Hypermetabolic distal esophageal carcinoma which extends into the proximal stomach with paraesophageal/perigastric/upper abdominal andry metastatic disease  2   Concentric FDG activity involving the cecum/proximal ascending colon of uncertain clinical significance  Given the localized nature of FDG activity, consider correlation with screening colonoscopy to exclude underlying colonic malignancy  3   Indeterminant non-FDG avid avid filling defect within right lower lobe bronchus with associated more distal tree-in-bud infiltrate  Findings could reflect mucous plugging with obstructive malignant process of low metabolic activity not excluded  4   3 8 cm infrarenal abdominal aortic aneurysm   Please see above for details and additional findings  The study was marked in EPIC for significant notification  Workstation performed: [id number]      No Barium Swallow results available for this patient  Jin Martin MD  6/7/2022 10:14 AM  Incomplete  Thoracic Follow-Up  Assessment/Plan:    No problem-specific Assessment & Plan notes found for this encounter  There are no diagnoses linked to this encounter        Thoracic History       Oncology History   Malignant neoplasm of lower third of esophagus ([address] )   1/13/2022 Initial Diagnosis     Malignant neoplasm of lower third of esophagus ([facility])      1/13/2022 Biopsy     EGD:   Esophagus, distal:  - 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      RESULTS OF IMMUNOHISTOCHEMICAL ANALYSIS FOR MISMATCH REPAIR PROTEIN LOSS     INTERPRETATION: No loss of nuclear expression of MMR proteins: Low probability of MSI-H     Note: Background non-neoplastic tissue and/or internal control with intact nuclear expression       RESULTS:  Antibody          Clone               Description                           Results  MLH1               M1                   Mismatch repair protein       Intact nuclear expression  MSH2              [id number]       Mismatch repair protein       Intact nuclear expression  MSH6              44                     Mismatch repair protein       Intact nuclear expression  PMS2              [id number]           Mismatch repair protein       Intact nuclear expression      3/15/2022 -  Chemotherapy     CARBOplatin (PARAPLATIN) IVPB (GOG AUC DOSING), 147 6 mg, Intravenous, Once, 7 of 7 cycles  Administration: 147 6 mg (3/15/2022), 196 mg (3/22/2022), 196 mg (3/29/2022), 196 mg (4/5/2022), 196 mg (4/12/2022), 200 mg (4/26/2022), 200 mg (5/3/2022)  PACLItaxel (TAXOL) chemo IVPB, 50 mg/m2 = 84 6 mg, Intravenous, Once, 7 of 7 cycles  Administration: 84 6 mg (3/15/2022), 85 8 mg (3/22/2022), 85 8 mg (3/29/2022), 85 8 mg (4/5/2022), 85 8 mg (4/12/2022), 87 mg (4/26/2022), 87 mg (5/3/2022)         3/14/2022 - 4/20/2022 Radiation     Treatments:  Course: C1     Plan ID Energy Fractions Dose per Fraction (cGy) Dose Correction (cGy) Total Dose Delivered (cGy) Elapsed Days   Esophags2_ReV 6X 22 / 22 180 0 3,960 29   Esophagus 6X 3 / 25 180 0 540 2   Esophagus CD 6X 3 / 3 180 0 540 2      Treatment Dates:  3/14/2022 - 4/20/2022            Staging:  Clinical T3 N1 M0 esophageal adenocarcinoma  Treatment history:  Chemo radiation  Current treatment:  Chemo radiation completed  Disease status:  Improved       Subjective:    Patient ID: Estella Munoz is a 68 y o  male  HPI  Mr Juana Man is is a 77-year-old male who presents my office for preoperative assessment regarding his esophageal cancer  He has a T3 N1 esophageal cancer  He is now status post chemotherapy and radiation  The following portions of the patient's history were reviewed and updated as appropriate: allergies, current medications, past family history, past medical history, past social history, past surgical history and problem list     Review of Systems      Objective:   Physical ExamResp 18   Ht 5' 10" (1 778 m)   Wt 63 kg (139 lb)   BMI 19 94 kg/m²     No Chest XR results available for this patient  No CT Chest results available for this patient  CT chest abdomen pelvis w contrast    Result Date: 1/26/2022  Narrative CT CHEST, ABDOMEN AND PELVIS WITH IV CONTRAST INDICATION:   R13 10: Dysphagia, unspecified R63 4: Abnormal weight loss  COMPARISON:  Compared with 10/10/2019 TECHNIQUE: CT examination of the chest, abdomen and pelvis was performed  Axial, sagittal, and coronal 2D reformatted images were created from the source data and submitted for interpretation  Radiation dose length product (DLP) for this visit:  812 mGy-cm     This examination, like all CT scans performed in the [facility], was performed utilizing techniques to minimize radiation dose exposure, including the use of iterative reconstruction and automated exposure control  IV Contrast:  100 mL of iohexol (OMNIPAQUE) Enteric Contrast: Enteric contrast was administered  FINDINGS: CHEST LUNGS:  Lungs are clear  There is no tracheal or endobronchial lesion  PLEURA:  Unremarkable  HEART/GREAT VESSELS: Heart is unremarkable for patient's age  No thoracic aortic aneurysm  MEDIASTINUM AND BRANDEE:  Esophagus is dilated with proximal and mid esophageal circumferential wall thickening with distal esophagus demonstrating an eccentric mass posteriorly measuring 1 8 x 2 2 x 3 6 cm extending into the GE junction  Posterior to this however this is a 1 3 cm soft tissue nodule in series 2 image 37  Lymph node with central necrosis anterior to the GE junction measuring 1 2 x 2 4 x 3 5 cm  Extension beyond the esophageal wall seen  Smaller lymph node measuring 1 0 cm seen inferiorly  Right infrahilar soft tissue density measuring 1 0 x 1 4 cm in series 301 image 33 appears to be within the bronchial lumen and demonstrates a branching pattern on the coronal views and was seen on prior CT  This is nonocclusive    CHEST WALL AND LOWER NECK:   Unremarkable  ABDOMEN LIVER/BILIARY TREE:  Unremarkable  GALLBLADDER:  No calcified gallstones  No pericholecystic inflammatory change  SPLEEN:  Unremarkable  PANCREAS:  Unremarkable  ADRENAL GLANDS:  Unremarkable  KIDNEYS/URETERS:  Both kidneys enhance symmetrically  Left kidney is smaller with a lobulated contour and unchanged  No hydronephrosis  STOMACH AND BOWEL:  Stool filled colon  APPENDIX:  No findings to suggest appendicitis  ABDOMINOPELVIC CAVITY:  No ascites  No pneumoperitoneum  No lymphadenopathy  VESSELS:  Aneurysmal infrarenal abdominal aorta measuring 3 3 cm and unchanged PELVIS REPRODUCTIVE ORGANS:  Enlarged heterogeneous prostate measuring 6 0 x 5 4 cm  URINARY BLADDER:  Unremarkable  ABDOMINAL WALL/INGUINAL REGIONS:  Unremarkable   OSSEOUS STRUCTURES: 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 and unchanged  Severe right hip degenerative changes  No acute fracture or destructive osseous lesion  Impression New mid to distal esophageal wall thickening with large eccentric mass in the posterior distal esophagus extending into the GE junction and beyond the esophageal wall with pathologic lymph nodes anteriorly with central necrosis of esophageal malignancy   This correlates with the recent EGD finding of 1/13/2022  Right lower lobe endobronchial mass possibly mucous plugging with extension into the bronchial branches is chronic and was seen on prior CT of 10/10/2019 with no obstruction  This can be evaluated when PET scan is performed as indicated on EGD report  The study was marked in EPIC for significant notification  Workstation performed: [id number]      NM PET CT skull base to mid thigh    Result Date: 5/16/2022  Narrative PET/CT SCAN INDICATION:  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   , restaging post surgery for treatment management MODIFIER: PS COMPARISON: PET CT 2/1/2022 CELL TYPE:  Adenocarcinoma, 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 TECHNIQUE:   8 7 mCi F-18-FDG administered IV  Multiplanar attenuation corrected and non attenuation corrected PET images were acquired 60 minutes post injection  Contiguous, low dose, axial CT sections were obtained from the skull base through the femurs   Intravenous contrast material was not utilized  This examination, like all CT scans performed in the [facility], was performed utilizing techniques to minimize radiation dose exposure, including the use of iterative reconstruction and automated exposure control  Fasting serum glucose: 121 mg/dl FINDINGS: VISUALIZED BRAIN:   No acute abnormalities are seen  HEAD/NECK:   There is a physiologic distribution of FDG  No FDG avid cervical adenopathy is seen  CT images: Unremarkable   CHEST: Interval decreased FDG activity in the distal esophagus and proximal stomach, SUV ranging up to 3 6, prior SUV 10 9  This is compatible with at least partial response to therapy  Residual viable tumor is not excluded at this time  Previously visualized paraesophageal hypermetabolic soft tissue appears less conspicuous  There is however new hypermetabolic soft tissue along the left paraesophageal region, posterior to the distal left mainstem bronchus, and anterior to the descending thoracic aorta, SUV measuring 2 7  There is also a new hypermetabolic AP window node image 3/88 measuring 1 x 1 1 cm, SUV 2 3  These may represent new metastases  Right lower endobronchial nodular density is similar to prior exams dating back to 2019, but appears to demonstrate mild FDG activity, SUV 3, prior SUV 2 9  Probable incomplete mucous plugging in the distal right lower bronchial branches prominently in the superior segment appears similar, and without significant FDG uptake  CT images: Coronary atherosclerosis  ABDOMEN: Previously visualized gastrohepatic and metabolic soft tissue appears diminished, SUV measuring 1 9 in this region, prior SUV 3 5  Proximal right colon FDG activity appears diminished, SUV 3 7, prior SUV 8 8  This may have been physiologic  No new FDG avid soft tissue lesions are seen  CT images: PEG tube in place  Stable aneurysmal abdominal aorta measuring 3 8 x 3 3 cm  Stable lobulated left kidney  Mild fullness of the left renal pelvis  PELVIS: No FDG avid soft tissue lesions are seen  CT images: Prostatomegaly measuring 5 6 x 4 4 cm  OSSEOUS STRUCTURES: Increased activity in the right sternomanubrial joint, SUV 3 1, prior SUV 3 1, is likely degenerative/inflammatory  Otherwise no FDG avid lesions are seen  CT images: Spine degenerative change  L5 spondylolysis with grade 1 anterolisthesis on S1  Severe right hip degenerative change  Impression 1    Interval decreased FDG activity in the distal esophagus and proximal stomach, suggesting at least partial response to therapy  Residual viable tumor may still be present  Continued PET CT follow-up recommended  2   Previously visualized distal paraesophageal and gastrohepatic hypermetabolic densities appear diminished  There are however new mildly hypermetabolic left paraesophageal and AP window lesions as above, which may represent new metastases  3   Right lower endobronchial soft tissue lesion is persistent from 2019, but demonstrates mild FDG activity  Clinical correlation recommended for suspected endobronchial polypoid mass  Workstation performed: [id number]     NM PET CT skull base to mid thigh    Result Date: 2/1/2022  Narrative PET/CT SCAN INDICATION:  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   , distal esophageal carcinoma for staging MODIFIER: PI COMPARISON: CT of chest, abdomen, and pelvis dated 1/24/2022 CELL TYPE:  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 (bx distal esophagus 1/13/22) TECHNIQUE:   8 7 mCi F-18-FDG administered IV  Multiplanar attenuation corrected and non attenuation corrected PET images were acquired 60 minutes post injection  Contiguous, low dose, axial CT sections were obtained from the skull base through the femurs   Intravenous contrast material was not utilized  This examination, like all CT scans performed in the [facility], was performed utilizing techniques to minimize radiation dose exposure, including the use of iterative reconstruction and automated exposure control  Fasting serum glucose: 141 mg/dl FINDINGS: VISUALIZED BRAIN:   No acute abnormalities are seen  HEAD/NECK:   There is a physiologic distribution of FDG  No FDG avid cervical adenopathy is seen  CT images: Unremarkable   CHEST:   There is segmental FDG activity associated with mural thickening extending from the distal esophagus through the proximal stomach with max SUV of 10 9 consistent with patient's biopsy-proven esophageal carcinoma  On image 104 of series 3, there is mild FDG activity associated with soft tissue density posterior to the mid to distal esophagus and adjacent to the descending thoracic aorta measuring approximately 1 2 cm with Max SUV of 2 8 suspicious for possible posterior mediastinal/ paraesophageal andry metastatic disease  On image 97 of series 12, there is a small focus of mild FDG activity in the right hilar region without definite soft tissue correlate and with max SUV of 2 9 of uncertain clinical significance  This finding could reflect reactive/inflammatory lymph node although attention to this region on follow-up imaging is recommended to exclude developing andry metastatic disease  CT images: Atherosclerotic vascular calcifications including those of the coronary arteries are noted  Non-FDG avid filling defect within right lower lobe bronchus is noted on image 98 of series 3 with associated reticulonodular/tree-in-bud infiltrate most consistent with an infectious/inflammatory process such as pneumonia  ABDOMEN:   Best seen on image 135 is mildly hypermetabolic perigastric/gastrohepatic ligament soft tissue measuring 1 9 x 2 8 cm with Max SUV of 3 5 consistent with andry metastatic disease  Additional smaller non-FDG avid upper abdominal lymph nodes are noted which are suspicious for andry metastatic disease  There is nonuniform FDG activity involving the bowel with more prominent concentric FDG activity involving the cecum/proximal ascending colon with max SUV of 8 8 on image 196 of series 12  Consider correlation with screening colonoscopy as clinically indicated given the more localized nature of this FDG activity  CT images: Abdominal aortic aneurysm measuring 3 8 cm  Atherosclerotic vascular calcifications are noted  Asymmetric lobulated contour to the left kidney which appears mildly atrophic, particularly towards the upper pole   PELVIS: No FDG avid soft tissue lesions are seen  CT images: Enlarged prostate gland  OSSEOUS STRUCTURES: No FDG avid lesions are seen  CT images: Severe degenerative changes involving the right hip  Degenerative changes involving the spine  Possible osteopenia  Impression 1  Hypermetabolic distal esophageal carcinoma which extends into the proximal stomach with paraesophageal/perigastric/upper abdominal andry metastatic disease  2   Concentric FDG activity involving the cecum/proximal ascending colon of uncertain clinical significance  Given the localized nature of FDG activity, consider correlation with screening colonoscopy to exclude underlying colonic malignancy  3   Indeterminant non-FDG avid avid filling defect within right lower lobe bronchus with associated more distal tree-in-bud infiltrate  Findings could reflect mucous plugging with obstructive malignant process of low metabolic activity not excluded  4   3 8 cm infrarenal abdominal aortic aneurysm  Please see above for details and additional findings  The study was marked in EPIC for significant notification  Workstation performed: [id number]      No Barium Swallow results available for this patient

## 2022-06-07 NOTE — ASSESSMENT & PLAN NOTE
Mr Chuck quinones is a 63-year-old male who presents my office for preoperative assessment regarding his esophageal cancer  He has a T3 N1 esophageal cancer  He is now status post chemotherapy and radiation  Today in the office, had a long conversation with the patient regarding the operative plan  At this time, we will plan to proceed with a transhiatal esophagectomy  He will undergo this on June 22, 2022  We did discuss the PET-CT scan which demonstrated some new areas of activity  I explained to him that if these appear to be stuck during his surgery, there is a very small chance that we would need to convert to a right-sided approach from the chest   I do not believe that this will be necessary but I did warn the patient that there is always a very small possibility of this happening  He understands  Consent was obtained in the office today  He will undergo preoperative lab work prior to the surgery  He will get this done within 2 weeks of the surgery  He is COVID vaccinated so he does not require a COVID test prior to surgery      Efra Willett MD  Thoracic Surgery  (Available on [employer])  Office: [phone]

## 2022-06-07 NOTE — PROGRESS NOTES
Thoracic Follow-Up  Assessment/Plan:    Malignant neoplasm of lower third of esophagus [facility])  Mr Man Moe is is a 68-year-old male who presents my office for preoperative assessment regarding his esophageal cancer  He has a T3 N1 esophageal cancer  He is now status post chemotherapy and radiation  Today in the office, had a long conversation with the patient regarding the operative plan  At this time, we will plan to proceed with a transhiatal esophagectomy  He will undergo this on June 22, 2022  We did discuss the PET-CT scan which demonstrated some new areas of activity  I explained to him that if these appear to be stuck during his surgery, there is a very small chance that we would need to convert to a right-sided approach from the chest   I do not believe that this will be necessary but I did warn the patient that there is always a very small possibility of this happening  He understands  Consent was obtained in the office today  He will undergo preoperative lab work prior to the surgery  He will get this done within 2 weeks of the surgery  He is COVID vaccinated so he does not require a COVID test prior to surgery  Pamela Shelby MD  Thoracic Surgery  (Available on [employer])  Office: [phone]         Diagnoses and all orders for this visit:    Malignant neoplasm of lower third of esophagus [facility])          Thoracic History       Oncology History   Malignant neoplasm of lower third of esophagus ([facility] [address] )   1/13/2022 Initial Diagnosis     Mal[facility] lower third [facility]      1/13/2022 Biopsy     EGD:   Esophagus, distal:  - 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      RESULTS OF IMMUNOHISTOCHEMICAL ANALYSIS FOR MISMATCH REPAIR PROTEIN LOSS     INTERPRETATION: No loss of nuclear expression of MMR proteins: Low probability of MSI-H     Note: Background non-neoplastic tissue and/or internal control with intact nuclear expression     RESULTS:  Antibody          Clone               Description                           Results  MLH1               M1                   Mismatch repair protein       Intact nuclear expression  MSH2              [id number]       Mismatch repair protein       Intact nuclear expression  MSH6              44                     Mismatch repair protein       Intact nuclear expression  PMS2              [id number]           Mismatch repair protein       Intact nuclear expression      3/15/2022 -  Chemotherapy     CARBOplatin (PARAPLATIN) IVPB (GOG AUC DOSING), 147 6 mg, Intravenous, Once, 7 of 7 cycles  Administration: 147 6 mg (3/15/2022), 196 mg (3/22/2022), 196 mg (3/29/2022), 196 mg (4/5/2022), 196 mg (4/12/2022), 200 mg (4/26/2022), 200 mg (5/3/2022)  PACLItaxel (TAXOL) chemo IVPB, 50 mg/m2 = 84 6 mg, Intravenous, Once, 7 of 7 cycles  Administration: 84 6 mg (3/15/2022), 85 8 mg (3/22/2022), 85 8 mg (3/29/2022), 85 8 mg (4/5/2022), 85 8 mg (4/12/2022), 87 mg (4/26/2022), 87 mg (5/3/2022)         3/14/2022 - 4/20/2022 Radiation     Treatments:  Course: C1     Plan ID Energy Fractions Dose per Fraction (cGy) Dose Correction (cGy) Total Dose Delivered (cGy) Elapsed Days   [id number] 6X 22 / 22 180 0 3,960 29   Esophagus 6X 3 / 25 180 0 540 2   Esophagus CD 6X 3 / 3 180 0 540 2      Treatment Dates:  3/14/2022 - 4/20/2022            Staging:  Clinical T3 N1 M0 esophageal adenocarcinoma  Treatment history:  Chemo radiation  Current treatment:  Chemo radiation completed  Disease status:  Improved       Subjective:    Patient ID: Florence Cadet is a 68 y o  male  HPI  Mr Umm Edmonds is is a 59-year-old male who presents my office for preoperative assessment regarding his esophageal cancer  He has a T3 N1 esophageal cancer  He is now status post chemotherapy and radiation  Today in the office, he is doing well  He is eating all foods without difficulty  His weight is stable  He is still smoking but he has cut down   No nausea or vomiting  He is using his tube feeds  I have personally reviewed his most recent PET-CT scan from May 16th  He appears to have had a good response to his chemotherapy  The max SUV has decreased significantly in the esophagus  However, he does have a new para esophageal mass that is just distal to the left mainstem bronchus  It does not appear to be attached to the bronchus  Additionally, he has a small AP window node that has an SUV of 2 3  There was concern that these could be metastatic lesions however, I believe that these are probably just reactive to his radiation  However, they also note an endobronchial lesion  Will plan to perform bronchoscopy during his procedure to assess this lesion  I have read all the most recent notes in his chart from Medical Oncology as well as Surgical Oncology  The following portions of the patient's history were reviewed and updated as appropriate: allergies, current medications, past family history, past medical history, past social history, past surgical history and problem list     Review of Systems   Constitutional: Negative  Negative for activity change, chills, fatigue, fever and unexpected weight change  HENT: Negative for sore throat, trouble swallowing and voice change  Eyes: Negative  Negative for visual disturbance  Respiratory: Negative for cough, chest tightness, shortness of breath, wheezing and stridor  Cardiovascular: Negative for chest pain and leg swelling  Gastrointestinal: Negative  Negative for nausea and vomiting  Endocrine: Negative  Genitourinary: Negative  Musculoskeletal: Negative  Skin: Negative  Allergic/Immunologic: Negative  Neurological: Negative for seizures, syncope, speech difficulty, weakness, light-headedness and headaches  Hematological: Negative for adenopathy  Psychiatric/Behavioral: Negative  Objective:   Physical Exam  Vitals and nursing note reviewed  Constitutional:       Appearance: Normal appearance  He is well-developed  He is not diaphoretic  HENT:      Head: Normocephalic and atraumatic  Nose: Nose normal  No congestion  Mouth/Throat:      Mouth: Mucous membranes are moist       Pharynx: Oropharynx is clear  Eyes:      Extraocular Movements: Extraocular movements intact  Pupils: Pupils are equal, round, and reactive to light  Neck:      Trachea: No tracheal deviation  Cardiovascular:      Rate and Rhythm: Normal rate and regular rhythm  Heart sounds: Normal heart sounds  No murmur heard  Pulmonary:      Effort: Pulmonary effort is normal  No respiratory distress  Breath sounds: Normal breath sounds  No stridor  No wheezing or rales  Chest:      Chest wall: No tenderness  Abdominal:      General: Bowel sounds are normal  There is no distension  Palpations: Abdomen is soft  Tenderness: There is no abdominal tenderness  Musculoskeletal:         General: Normal range of motion  Cervical back: Normal range of motion  Lymphadenopathy:      Cervical: No cervical adenopathy  Skin:     General: Skin is warm and dry  Coloration: Skin is not pale  Findings: No erythema or rash  Neurological:      Mental Status: He is alert and oriented to person, place, and time  Psychiatric:         Behavior: Behavior normal          Thought Content: Thought content normal          Judgment: Judgment normal      /80 (BP Location: Right arm, Patient Position: Sitting, Cuff Size: Standard)   Pulse 87   Temp (!) 96 8 °F (36 °C)   Resp 18   Ht 5' 10" (1 778 m)   Wt 63 kg (139 lb)   SpO2 98%   BMI 19 94 kg/m²     No Chest XR results available for this patient  No CT Chest results available for this patient  CT chest abdomen pelvis w contrast    Result Date: 1/26/2022  Narrative CT CHEST, ABDOMEN AND PELVIS WITH IV CONTRAST INDICATION:   R13 10: Dysphagia, unspecified R63 4: Abnormal weight loss  COMPARISON:  Compared with 10/10/2019 TECHNIQUE: CT examination of the chest, abdomen and pelvis was performed  Axial, sagittal, and coronal 2D reformatted images were created from the source data and submitted for interpretation  Radiation dose length product (DLP) for this visit:  812 mGy-cm   This examination, like all CT scans performed in the [facility], was performed utilizing techniques to minimize radiation dose exposure, including the use of iterative reconstruction and automated exposure control  IV Contrast:  100 mL of iohexol (OMNIPAQUE) Enteric Contrast: Enteric contrast was administered  FINDINGS: CHEST LUNGS:  Lungs are clear  There is no tracheal or endobronchial lesion  PLEURA:  Unremarkable  HEART/GREAT VESSELS: Heart is unremarkable for patient's age  No thoracic aortic aneurysm  MEDIASTINUM AND BRANDEE:  Esophagus is dilated with proximal and mid esophageal circumferential wall thickening with distal esophagus demonstrating an eccentric mass posteriorly measuring 1 8 x 2 2 x 3 6 cm extending into the GE junction  Posterior to this however this is a 1 3 cm soft tissue nodule in series 2 image 37  Lymph node with central necrosis anterior to the GE junction measuring 1 2 x 2 4 x 3 5 cm  Extension beyond the esophageal wall seen  Smaller lymph node measuring 1 0 cm seen inferiorly  Right infrahilar soft tissue density measuring 1 0 x 1 4 cm in series 301 image 33 appears to be within the bronchial lumen and demonstrates a branching pattern on the coronal views and was seen on prior CT  This is nonocclusive    CHEST WALL AND LOWER NECK:   Unremarkable  ABDOMEN LIVER/BILIARY TREE:  Unremarkable  GALLBLADDER:  No calcified gallstones  No pericholecystic inflammatory change  SPLEEN:  Unremarkable  PANCREAS:  Unremarkable  ADRENAL GLANDS:  Unremarkable  KIDNEYS/URETERS:  Both kidneys enhance symmetrically  Left kidney is smaller with a lobulated contour and unchanged   No hydronephrosis  STOMACH AND BOWEL:  Stool filled colon  APPENDIX:  No findings to suggest appendicitis  ABDOMINOPELVIC CAVITY:  No ascites  No pneumoperitoneum  No lymphadenopathy  VESSELS:  Aneurysmal infrarenal abdominal aorta measuring 3 3 cm and unchanged PELVIS REPRODUCTIVE ORGANS:  Enlarged heterogeneous prostate measuring 6 0 x 5 4 cm  URINARY BLADDER:  Unremarkable  ABDOMINAL WALL/INGUINAL REGIONS:  Unremarkable  OSSEOUS STRUCTURES: 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 and unchanged  Severe right hip degenerative changes  No acute fracture or destructive osseous lesion  Impression New mid to distal esophageal wall thickening with large eccentric mass in the posterior distal esophagus extending into the GE junction and beyond the esophageal wall with pathologic lymph nodes anteriorly with central necrosis of esophageal malignancy   This correlates with the recent EGD finding of 1/13/2022  Right lower lobe endobronchial mass possibly mucous plugging with extension into the bronchial branches is chronic and was seen on prior CT of 10/10/2019 with no obstruction  This can be evaluated when PET scan is performed as indicated on EGD report  The study was marked in EPIC for significant notification  Workstation performed: [id number]      NM PET CT skull base to mid thigh    Result Date: 5/16/2022  Narrative PET/CT SCAN INDICATION:  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   , restaging post surgery for treatment management MODIFIER: PS COMPARISON: PET CT 2/1/2022 CELL TYPE:  Adenocarcinoma, 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 TECHNIQUE:   8 7 mCi F-18-FDG administered IV  Multiplanar attenuation corrected and non attenuation corrected PET images were acquired 60 minutes post injection  Contiguous, low dose, axial CT sections were obtained from the skull base through the femurs   Intravenous contrast material was not utilized    This examination, like all CT scans performed in the [facility], was performed utilizing techniques to minimize radiation dose exposure, including the use of iterative reconstruction and automated exposure control  Fasting serum glucose: 121 mg/dl FINDINGS: VISUALIZED BRAIN:   No acute abnormalities are seen  HEAD/NECK:   There is a physiologic distribution of FDG  No FDG avid cervical adenopathy is seen  CT images: Unremarkable  CHEST: Interval decreased FDG activity in the distal esophagus and proximal stomach, SUV ranging up to 3 6, prior SUV 10 9  This is compatible with at least partial response to therapy  Residual viable tumor is not excluded at this time  Previously visualized paraesophageal hypermetabolic soft tissue appears less conspicuous  There is however new hypermetabolic soft tissue along the left paraesophageal region, posterior to the distal left mainstem bronchus, and anterior to the descending thoracic aorta, SUV measuring 2 7  There is also a new hypermetabolic AP window node image 3/88 measuring 1 x 1 1 cm, SUV 2 3  These may represent new metastases  Right lower endobronchial nodular density is similar to prior exams dating back to 2019, but appears to demonstrate mild FDG activity, SUV 3, prior SUV 2 9  Probable incomplete mucous plugging in the distal right lower bronchial branches prominently in the superior segment appears similar, and without significant FDG uptake  CT images: Coronary atherosclerosis  ABDOMEN: Previously visualized gastrohepatic and metabolic soft tissue appears diminished, SUV measuring 1 9 in this region, prior SUV 3 5  Proximal right colon FDG activity appears diminished, SUV 3 7, prior SUV 8 8  This may have been physiologic  No new FDG avid soft tissue lesions are seen  CT images: PEG tube in place  Stable aneurysmal abdominal aorta measuring 3 8 x 3 3 cm  Stable lobulated left kidney  Mild fullness of the left renal pelvis  PELVIS: No FDG avid soft tissue lesions are seen   CT images: Prostatomegaly measuring 5 6 x 4 4 cm  OSSEOUS STRUCTURES: Increased activity in the right sternomanubrial joint, SUV 3 1, prior SUV 3 1, is likely degenerative/inflammatory  Otherwise no FDG avid lesions are seen  CT images: Spine degenerative change  L5 spondylolysis with grade 1 anterolisthesis on S1  Severe right hip degenerative change  Impression 1  Interval decreased FDG activity in the distal esophagus and proximal stomach, suggesting at least partial response to therapy  Residual viable tumor may still be present  Continued PET CT follow-up recommended  2   Previously visualized distal paraesophageal and gastrohepatic hypermetabolic densities appear diminished  There are however new mildly hypermetabolic left paraesophageal and AP window lesions as above, which may represent new metastases  3   Right lower endobronchial soft tissue lesion is persistent from 2019, but demonstrates mild FDG activity  Clinical correlation recommended for suspected endobronchial polypoid mass  Workstation performed: [id number]     NM PET CT skull base to mid thigh    Result Date: 2/1/2022  Narrative PET/CT SCAN INDICATION:  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   , distal esophageal carcinoma for staging MODIFIER: PI COMPARISON: CT of chest, abdomen, and pelvis dated 1/24/2022 CELL TYPE:  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 (bx distal esophagus 1/13/22) TECHNIQUE:   8 7 mCi F-18-FDG administered IV  Multiplanar attenuation corrected and non attenuation corrected PET images were acquired 60 minutes post injection  Contiguous, low dose, axial CT sections were obtained from the skull base through the femurs   Intravenous contrast material was not utilized    This examination, like all CT scans performed in the [facility], was performed utilizing techniques to minimize radiation dose exposure, including the use of iterative reconstruction and automated exposure control  Fasting serum glucose: 141 mg/dl FINDINGS: VISUALIZED BRAIN:   No acute abnormalities are seen  HEAD/NECK:   There is a physiologic distribution of FDG  No FDG avid cervical adenopathy is seen  CT images: Unremarkable  CHEST:   There is segmental FDG activity associated with mural thickening extending from the distal esophagus through the proximal stomach with max SUV of 10 9 consistent with patient's biopsy-proven esophageal carcinoma  On image 104 of series 3, there is mild FDG activity associated with soft tissue density posterior to the mid to distal esophagus and adjacent to the descending thoracic aorta measuring approximately 1 2 cm with Max SUV of 2 8 suspicious for possible posterior mediastinal/ paraesophageal andry metastatic disease  On image 97 of series 12, there is a small focus of mild FDG activity in the right hilar region without definite soft tissue correlate and with max SUV of 2 9 of uncertain clinical significance  This finding could reflect reactive/inflammatory lymph node although attention to this region on follow-up imaging is recommended to exclude developing andry metastatic disease  CT images: Atherosclerotic vascular calcifications including those of the coronary arteries are noted  Non-FDG avid filling defect within right lower lobe bronchus is noted on image 98 of series 3 with associated reticulonodular/tree-in-bud infiltrate most consistent with an infectious/inflammatory process such as pneumonia  ABDOMEN:   Best seen on image 135 is mildly hypermetabolic perigastric/gastrohepatic ligament soft tissue measuring 1 9 x 2 8 cm with Max SUV of 3 5 consistent with andry metastatic disease  Additional smaller non-FDG avid upper abdominal lymph nodes are noted which are suspicious for andry metastatic disease    There is nonuniform FDG activity involving the bowel with more prominent concentric FDG activity involving the cecum/proximal ascending colon with max SUV of 8 8 on image 196 of series 12  Consider correlation with screening colonoscopy as clinically indicated given the more localized nature of this FDG activity  CT images: Abdominal aortic aneurysm measuring 3 8 cm  Atherosclerotic vascular calcifications are noted  Asymmetric lobulated contour to the left kidney which appears mildly atrophic, particularly towards the upper pole  PELVIS: No FDG avid soft tissue lesions are seen  CT images: Enlarged prostate gland  OSSEOUS STRUCTURES: No FDG avid lesions are seen  CT images: Severe degenerative changes involving the right hip  Degenerative changes involving the spine  Possible osteopenia  Impression 1  Hypermetabolic distal esophageal carcinoma which extends into the proximal stomach with paraesophageal/perigastric/upper abdominal andry metastatic disease  2   Concentric FDG activity involving the cecum/proximal ascending colon of uncertain clinical significance  Given the localized nature of FDG activity, consider correlation with screening colonoscopy to exclude underlying colonic malignancy  3   Indeterminant non-FDG avid avid filling defect within right lower lobe bronchus with associated more distal tree-in-bud infiltrate  Findings could reflect mucous plugging with obstructive malignant process of low metabolic activity not excluded  4   3 8 cm infrarenal abdominal aortic aneurysm  Please see above for details and additional findings  The study was marked in EPIC for significant notification  Workstation performed: [id number]      No Barium Swallow results available for this patient

## 2022-06-07 NOTE — TELEPHONE ENCOUNTER
MORRIS HUSTON CALLED AND HE NEEDS BLOOD WORK SCHEDULED ANYTIME AFTER TOMORROW EXCEPT FOR THE 10TH    PLEASE SCHEDULE STAR AS WELL

## 2022-06-09 ENCOUNTER — ANESTHESIA EVENT (OUTPATIENT)
Dept: PERIOP | Facility: HOSPITAL | Age: 77
DRG: 356 | End: 2022-06-09
Payer: COMMERCIAL

## 2022-06-10 ENCOUNTER — CONSULT (OUTPATIENT)
Dept: CARDIOLOGY CLINIC | Facility: CLINIC | Age: 77
End: 2022-06-10
Payer: COMMERCIAL

## 2022-06-10 VITALS
BODY MASS INDEX: 20.47 KG/M2 | SYSTOLIC BLOOD PRESSURE: 132 MMHG | OXYGEN SATURATION: 99 % | HEART RATE: 84 BPM | RESPIRATION RATE: 16 BRPM | HEIGHT: 70 IN | WEIGHT: 143 LBS | DIASTOLIC BLOOD PRESSURE: 74 MMHG

## 2022-06-10 DIAGNOSIS — C15.5 MALIGNANT NEOPLASM OF LOWER THIRD OF ESOPHAGUS (HCC): Primary | ICD-10-CM

## 2022-06-10 PROCEDURE — 99204 OFFICE O/P NEW MOD 45 MIN: CPT | Performed by: INTERNAL MEDICINE

## 2022-06-10 PROCEDURE — 93000 ELECTROCARDIOGRAM COMPLETE: CPT | Performed by: INTERNAL MEDICINE

## 2022-06-10 PROCEDURE — 1160F RVW MEDS BY RX/DR IN RCRD: CPT | Performed by: INTERNAL MEDICINE

## 2022-06-10 NOTE — TELEPHONE ENCOUNTER
Pt was in the office today   Clearance form was sign by Dr Chaya Kurtz faxed and conformation receipt scanned into pt's chart

## 2022-06-10 NOTE — PROGRESS NOTES
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 who has no history of cardiac disease but, unfortunately has developed esophageal cancer  At 1 point he could not eat  After chemotherapy and radiation, however, he is now able to eat pretty well  In the interim, he had a feeding tube which she still has for supplemental nutrition  He has never noted exertionally related pressure, tightness, squeezing, etc   In the chest   He has no history of coronary disease  He is somewhat limited by knee problems and sciatica but he prefers to be active      1  Malignant neoplasm of lower third of esophagus ([address] )  -     Ambulatory referral to Cardiology  -     POCT ECG      Patient Active Problem List   Diagnosis    Smoking    Weight loss    Dysphagia    Dermatitis    Malignant neoplasm of lower third of esophagus (HCC)    Mild protein-calorie malnutrition (HCC)    Left ankle pain    Severe protein-calorie malnutrition (HCC)    Chronic pain    Cancer related pain    Palliative care encounter    Anemia due to antineoplastic chemotherapy    Encounter for geriatric assessment     Past Medical History:   Diagnosis Date    Cancer [facility])     esophageal ca    Current smoker     since 15years old    History of blood transfusion     Pneumonia     Polio     Rib fractures      Social History     Socioeconomic History    Marital status: /Civil Union     Spouse name: Not on file    Number of children: Not on file    Years of education: Not on file    Highest education level: Not on file   Occupational History    Not on file   Tobacco Use    Smoking status: Heavy Tobacco Smoker     Packs/day: 1 00     Years: 40 00     Pack years: 40 00     Types: Cigarettes    Smokeless tobacco: Never Used   Vaping Use    Vaping Use: Never used   Substance and Sexual Activity    Alcohol use: Never     Alcohol/week: 0 0 standard drinks     Comment: 0    Drug use: Never    Sexual activity: Not Currently   Other Topics Concern    Not on file   Social History Narrative    Not on file     Social Determinants of Health     Financial Resource Strain: Not on file   Food Insecurity: No Food Insecurity    Worried About Running Out of Food in the Last Year: Never true    Jeanmarie of Food in the Last Year: Never true   Transportation Needs: No Transportation Needs    Lack of Transportation (Medical): No    Lack of Transportation (Non-Medical): No   Physical Activity: Not on file   Stress: Not on file   Social Connections: Not on file   Intimate Partner Violence: Not on file   Housing Stability: Low Risk     Unable to Pay for Housing in the Last Year: No    Number of Places Lived in the Last Year: 1    Unstable Housing in the Last Year: No      Family History   Problem Relation Age of Onset    Dementia Mother     Heart disease Father      Past Surgical History:   Procedure Laterality Date    EGD      FL GUIDED CENTRAL VENOUS ACCESS DEVICE INSERTION  02/16/2022    JEJUNOSTOMY FEEDING TUBE      TUNNELED VENOUS PORT PLACEMENT Left 02/16/2022    Procedure: INSERTION VENOUS PORT (PORT-A-CATH);   Surgeon: Josselin Roberts MD;  Location: [facility];  Service: Surgical Oncology       Current Outpatient Medications:     acetaminophen (TYLENOL) 325 mg tablet, Take 325 mg by mouth every 6 (six) hours as needed for mild pain, Disp: , Rfl:     ascorbic acid (VITAMIN C) 500 mg tablet, Take 500 mg by mouth daily, Disp: , Rfl:     Cholecalciferol (VITAMIN D3 PO), Take by mouth, Disp: , Rfl:     cyanocobalamin (VITAMIN B-12) 500 MCG tablet, Take 500 mcg by mouth daily, Disp: , Rfl:     Multiple Vitamins-Minerals (MULTIVITAMIN WITH MINERALS) tablet, Take 1 tablet by mouth daily, Disp: , Rfl:     pantoprazole (PROTONIX) 40 mg tablet, Take 1 tablet (40 mg total) by mouth 2 (two) times a day (Patient taking differently: Take 40 mg by mouth 2 (two) times a day as needed), Disp: , Rfl: 0    ondansetron (Zofran ODT) 8 mg disintegrating tablet, Take 1 tablet (8 mg total) by mouth every 8 (eight) hours as needed for nausea or vomiting Through J tube not by mouth   (Patient not taking: Reported on 6/10/2022), Disp: 20 tablet, Rfl: 0  No Known Allergies  Vitals:    06/10/22 1140   BP: 132/74   BP Location: Right arm   Patient Position: Sitting   Cuff Size: Standard   Pulse: 84   Resp: 16   SpO2: 99%   Weight: 64 9 kg (143 lb)   Height: 5' 10" (1 778 m)       Labs:  Hospital Outpatient Visit on 06/06/2022   Component Date Value    WBC 06/06/2022 6 65     RBC 06/06/2022 2 76 (A)    Hemoglobin 06/06/2022 9 4 (A)    Hematocrit 06/06/2022 28 2 (A)    MCV 06/06/2022 102 (A)    MCH 06/06/2022 34 1     MCHC 06/06/2022 33 3     RDW 06/06/2022 17 7 (A)    MPV 06/06/2022 10 1     Platelets 17/16/3421 102 (A)    nRBC 06/06/2022 0     Neutrophils Relative 06/06/2022 76 (A)    Immat GRANS % 06/06/2022 1     Lymphocytes Relative 06/06/2022 7 (A)    Monocytes Relative 06/06/2022 13 (A)    Eosinophils Relative 06/06/2022 2     Basophils Relative 06/06/2022 1     Neutrophils Absolute 06/06/2022 5 13     Immature Grans Absolute 06/06/2022 0 05     Lymphocytes Absolute 06/06/2022 0 46 (A)    Monocytes Absolute 06/06/2022 0 84     Eosinophils Absolute 06/06/2022 0 14     Basophils Absolute 06/06/2022 0 03     Sodium 06/06/2022 140     Potassium 06/06/2022 3 4 (A)    Chloride 06/06/2022 105     CO2 06/06/2022 27     ANION GAP 06/06/2022 8     BUN 06/06/2022 22     Creatinine 06/06/2022 0 61     Glucose 06/06/2022 126     Calcium 06/06/2022 9 0     AST 06/06/2022 12     ALT 06/06/2022 13     Alkaline Phosphatase 06/06/2022 81     Total Protein 06/06/2022 6 9     Albumin 06/06/2022 3 5     Total Bilirubin 06/06/2022 0 40     eGFR 06/06/2022 97    Hospital Outpatient Visit on 05/16/2022   Component Date Value    POC Glucose 05/16/2022 121    Hospital Outpatient Visit on 05/02/2022   Component Date Value    Sodium 05/02/2022 136     Potassium 05/02/2022 4 2     Chloride 05/02/2022 102     CO2 05/02/2022 29     ANION GAP 05/02/2022 5     BUN 05/02/2022 20     Creatinine 05/02/2022 0 62     Glucose 05/02/2022 129     Calcium 05/02/2022 8 7     Corrected Calcium 05/02/2022 9 3     AST 05/02/2022 11     ALT 05/02/2022 15     Alkaline Phosphatase 05/02/2022 63     Total Protein 05/02/2022 6 5     Albumin 05/02/2022 3 2 (A)    Total Bilirubin 05/02/2022 0 28     eGFR 05/02/2022 96     WBC 05/02/2022 3 47 (A)    RBC 05/02/2022 2 47 (A)    Hemoglobin 05/02/2022 7 9 (A)    Hematocrit 05/02/2022 24 2 (A)    MCV 05/02/2022 98     MCH 05/02/2022 32 0     MCHC 05/02/2022 32 6     RDW 05/02/2022 19 8 (A)    MPV 05/02/2022 10 8     Platelets 02/51/0287 171     nRBC 05/02/2022 0     Neutrophils Relative 05/02/2022 73     Immat GRANS % 05/02/2022 2     Lymphocytes Relative 05/02/2022 12 (A)    Monocytes Relative 05/02/2022 13 (A)    Eosinophils Relative 05/02/2022 0     Basophils Relative 05/02/2022 0     Neutrophils Absolute 05/02/2022 2 51     Immature Grans Absolute 05/02/2022 0 06     Lymphocytes Absolute 05/02/2022 0 42 (A)    Monocytes Absolute 05/02/2022 0 46     Eosinophils Absolute 05/02/2022 0 01     Basophils Absolute 05/02/2022 0 01    Hospital Outpatient Visit on 04/26/2022   Component Date Value    ABO Grouping 04/26/2022 A     Rh Factor 04/26/2022 Positive     Antibody Screen 04/26/2022 Negative     Specimen Expiration Date 04/26/2022 [id number]     ABO Grouping 04/26/2022 A     Rh Factor 04/26/2022 Positive     Unit Product Code 04/27/2022 [id number]     Unit Number 04/27/2022 [id number]     Unit ABO 04/27/2022 A     Unit [facility] 04/27/2022 POS     Crossmatch 04/27/2022 Compatible  Unit Dispense Status 04/27/2022 Presumed Trans     Unit Product Volume 04/27/2022 350    Hospital Outpatient Visit on 04/22/2022   Component Date Value    WBC 04/22/2022 3 32 (A)    RBC 04/22/2022 2 31 (A)    Hemoglobin 04/22/2022 7 3 (A)    Hematocrit 04/22/2022 22 3 (A)    MCV 04/22/2022 97     MCH 04/22/2022 31 6     MCHC 04/22/2022 32 7     RDW 04/22/2022 20 9 (A)    MPV 04/22/2022 10 5     Platelets 83/79/7529 139 (A)    nRBC 04/22/2022 0     Neutrophils Relative 04/22/2022 71     Immat GRANS % 04/22/2022 1     Lymphocytes Relative 04/22/2022 9 (A)    Monocytes Relative 04/22/2022 19 (A)    Eosinophils Relative 04/22/2022 0     Basophils Relative 04/22/2022 0     Neutrophils Absolute 04/22/2022 2 32     Immature Grans Absolute 04/22/2022 0 04     Lymphocytes Absolute 04/22/2022 0 31 (A)    Monocytes Absolute 04/22/2022 0 63     Eosinophils Absolute 04/22/2022 0 01     Basophils Absolute 04/22/2022 0 01     Sodium 04/22/2022 136     Potassium 04/22/2022 4 4     Chloride 04/22/2022 102     CO2 04/22/2022 27     ANION GAP 04/22/2022 7     BUN 04/22/2022 21     Creatinine 04/22/2022 0 70     Glucose 04/22/2022 122     Calcium 04/22/2022 8 5     Corrected Calcium 04/22/2022 9 0     AST 04/22/2022 10     ALT 04/22/2022 16     Alkaline Phosphatase 04/22/2022 66     Total Protein 04/22/2022 7 0     Albumin 04/22/2022 3 4 (A)    Total Bilirubin 04/22/2022 0 36     eGFR 04/22/2022 91    Hospital Outpatient Visit on 04/15/2022   Component Date Value    WBC 04/15/2022 2 29 (A)    RBC 04/15/2022 2 45 (A)    Hemoglobin 04/15/2022 7 5 (A)    Hematocrit 04/15/2022 22 9 (A)    MCV 04/15/2022 94     MCH 04/15/2022 30 6     MCHC 04/15/2022 32 8     RDW 04/15/2022 19 5 (A)    MPV 04/15/2022 12 7     Platelets 12/99/1618 85 (A)    Sodium 04/15/2022 137     Potassium 04/15/2022 4 3     Chloride 04/15/2022 101     CO2 04/15/2022 27     ANION GAP 04/15/2022 9     BUN 04/15/2022 18     Creatinine 04/15/2022 0 58 (A)    Glucose 04/15/2022 128     Calcium 04/15/2022 8 6     Corrected Calcium 04/15/2022 9 1     AST 04/15/2022 13     ALT 04/15/2022 16     Alkaline Phosphatase 04/15/2022 64     Total Protein 04/15/2022 6 7     Albumin 04/15/2022 3 4 (A)    Total Bilirubin 04/15/2022 0 43     eGFR 04/15/2022 99     Segmented % 04/15/2022 80 (A)    Bands % 04/15/2022 1     Lymphocytes % 04/15/2022 8 (A)    Monocytes % 04/15/2022 11     Acanthocytes 04/15/2022 Present     Anisocytosis 04/15/2022 Present     Cindy Cells 04/15/2022 Present     Ovalocytes 04/15/2022 Present     Platelet Estimate 23/75/3680 Decreased (A)     Imaging: NM PET CT skull base to mid thigh    Result Date: 5/16/2022  Narrative: PET/CT SCAN INDICATION:  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   , restaging post surgery for treatment management MODIFIER: PS COMPARISON: PET CT 2/1/2022 CELL TYPE:  Adenocarcinoma, 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 TECHNIQUE:   8 7 mCi F-18-FDG administered IV  Multiplanar attenuation corrected and non attenuation corrected PET images were acquired 60 minutes post injection  Contiguous, low dose, axial CT sections were obtained from the skull base through the femurs   Intravenous contrast material was not utilized  This examination, like all CT scans performed in the [facility], was performed utilizing techniques to minimize radiation dose exposure, including the use of iterative reconstruction and automated exposure control  Fasting serum glucose: 121 mg/dl FINDINGS: VISUALIZED BRAIN:   No acute abnormalities are seen  HEAD/NECK:   There is a physiologic distribution of FDG  No FDG avid cervical adenopathy is seen  CT images: Unremarkable  CHEST: Interval decreased FDG activity in the distal esophagus and proximal stomach, SUV ranging up to 3 6, prior SUV 10 9  This is compatible with at least partial response to therapy    Residual viable tumor is not excluded at this time  Previously visualized paraesophageal hypermetabolic soft tissue appears less conspicuous  There is however new hypermetabolic soft tissue along the left paraesophageal region, posterior to the distal left mainstem bronchus, and anterior to the descending thoracic aorta, SUV measuring 2 7  There is also a new hypermetabolic AP window node image 3/88 measuring 1 x 1 1 cm, SUV 2 3  These may represent new metastases  Right lower endobronchial nodular density is similar to prior exams dating back to 2019, but appears to demonstrate mild FDG activity, SUV 3, prior SUV 2 9  Probable incomplete mucous plugging in the distal right lower bronchial branches prominently in the superior segment appears similar, and without significant FDG uptake  CT images: Coronary atherosclerosis  ABDOMEN: Previously visualized gastrohepatic and metabolic soft tissue appears diminished, SUV measuring 1 9 in this region, prior SUV 3 5  Proximal right colon FDG activity appears diminished, SUV 3 7, prior SUV 8 8  This may have been physiologic  No new FDG avid soft tissue lesions are seen  CT images: PEG tube in place  Stable aneurysmal abdominal aorta measuring 3 8 x 3 3 cm  Stable lobulated left kidney  Mild fullness of the left renal pelvis  PELVIS: No FDG avid soft tissue lesions are seen  CT images: Prostatomegaly measuring 5 6 x 4 4 cm  OSSEOUS STRUCTURES: Increased activity in the right sternomanubrial joint, SUV 3 1, prior SUV 3 1, is likely degenerative/inflammatory  Otherwise no FDG avid lesions are seen  CT images: Spine degenerative change  L5 spondylolysis with grade 1 anterolisthesis on S1  Severe right hip degenerative change  Impression: 1  Interval decreased FDG activity in the distal esophagus and proximal stomach, suggesting at least partial response to therapy  Residual viable tumor may still be present  Continued PET CT follow-up recommended   2   Previously visualized distal paraesophageal and gastrohepatic hypermetabolic densities appear diminished  There are however new mildly hypermetabolic left paraesophageal and AP window lesions as above, which may represent new metastases  3   Right lower endobronchial soft tissue lesion is persistent from 2019, but demonstrates mild FDG activity  Clinical correlation recommended for suspected endobronchial polypoid mass  Workstation performed: [id number]       Review of Systems:  Review of Systems    Physical Exam:  132/74  He is thin  Skin warm and dry  Pupils equal   Carotids 2+ without bruits  Lungs clear  No murmurs or gallops  Rhythm regular  No organomegaly  Good pulses  No edema  Good strength in all extremities  EKG shows right bundle-branch block    Discussion/Summary:    1  No evidence of active cardiac disease other than chronic right bundle branch block  2  There is no cardiac impediment to his planned surgery and no further testing is necessary  He has cleared for esophageal surgery                Cristina Stallworth MD

## 2022-06-14 ENCOUNTER — NUTRITION (OUTPATIENT)
Dept: NUTRITION | Facility: CLINIC | Age: 77
End: 2022-06-14

## 2022-06-14 DIAGNOSIS — Z71.3 NUTRITIONAL COUNSELING: Primary | ICD-10-CM

## 2022-06-14 NOTE — PATIENT INSTRUCTIONS
Nutrition Rx & Recommendations:  Diet: High Calorie, High Protein (for high calorie foods see pages 52-53, and for high protein foods see pages 49-51 in your Eating Hints book)  High calorie foods to try: peanut butter, whole milk, cheese, butter, olive oil  (see pages 52-53 in your Eating Hints book)  High protein foods to try: eggs, chicken, fish, beans/legumes, greek yogurt (see pages 49-51 in your Eating Hints book)  Follow proper oral care; Try baking soda/salt water rinse recipe (mix 3/4 tsp salt + 1 tsp baking soda + 1 qt water; rinse with plain water after using) in Eating Hints book (pg 18)  Brush your teeth before/after meals & before bed  Weigh yourself regularly  If you notice weight loss, make an effort to increase your daily food/calorie intake  If you continue to notice loss after these efforts, reach out to your dietitian to establish a plan to stabilize weight  Tube Feeding Plan: Continue current TF regimen    TF Goal: Jevity 1 5 at 90 mL/hr via J-tube cycled over ~12 hours daily (until 4 5 cartons is infused)  Water Flushin mL free water flush at the beginning and end of TF infusion (250 mL total)  Plus an additional 1000mL fluid needed daily; this cane be administered via TF as free water flushes (ex: 125mL x 8 flushes) or this can be consumed by mouth, 1000mL is ~33oz  Enteral Nutrition goal to provide: 1066 mL volume (4 5 cartons day), 1600 kcal, 68 grams protein, 810 mL free water, 2060 mL total water daily  Diet related to esophagectomy:      Follow diet advancement per your doctors instructions  Once you are able to have solid foods: Start with very small amounts of soft, bland foods Avoid large quantities of food as this may cause abdominal pain, nausea, and vomiting  Small, frequent meals and snacks work best   Eat slowly and chew your food very well  Limit very cold or very hot foods/drinks  Include protein with all meals and snacks    Avoid foods that are high in fiber (high fiber foods have >2-3 grams fiber/serving) - choose foods that are low in fiber instead  Low fiber food examples: white bread, refined grains, fruit with no skin, canned fruits, soft and well-cooked vegetables (avoid corn, peas, beans, skins)  A diet low in fiber will give your GI tract a chance to heal without irritating it  Slowly add more fiber back into your diet as you can tolerate it  Limit raw fruits and vegetables and gradually increase as tolerated/allowed  The carbohydrate/sugar intake may be restricted depending on the extent of your surgery  Avoid high sugar foods  High sugar foods are ones that have >10 grams of sugar/serving  Some patients experience gas, bloating, abdominal pain and diarrhea with increased sugar intake  If these symptoms are apparent, try limiting some of the sweets/sugar in the diet  Some patients may require a low-sugar nutrition supplement such as Glucerna, Boost Optimum, Unjury, Ensure High Protein, Ensure Max Protein, Premier Protein, or Boost Glucose Control  NOTE: The following ingredients also are sugars and commonly found on food labels: dextrose, high fructose corn syrup, glucose, sucrose, molasses, and honey  Limit high fat and greasy/fried foods  Low-fat means <3 grams of total fat per 100 calories  Fat also may increase gas, bloating, and diarrhea  Try low-fat versions of foods such as 1% or skim milk and low-fat yogurt  Take the skin off poultry products, trim the fat off meat, and avoid foods that are fried or cooked in large amounts of oil  Drink plenty of fluids to maintain hydration (~64 fluid oz/day)  Avoid drinking while eating, drink liquids 30 min before or 30-45 min after eating for the first few weeks so as to not fill the stomach with fluid instead of food or trigger dumping syndrome  Avoid carbonated and alcoholic beverages  Drinking a nutrition supplement between meals may help maintain body weight     It is possible that the first nutritional supplement tried is no longer tolerable  Try not to lie down flat after eating because your gut does not empty normally and reflux or heartburn could occur  Find foods that you can tolerate  Introduce new foods 1 at a time  As you feel more comfortable, you can add more and varied food to your diet in a gradual manner  Monitor your weight 1-2 times/week and take steps to maintain your weight  Normal eating and weight stabilization may not occur for weeks to months  Refer to Eating Hints book & Diet After an Esophagectomy or Gastrectomy for other meal/snack ideas and symptom management  Dumping Syndrome:   Symptoms: Epigastric fullness, nausea, vomiting, abdominal cramps, bloating, diarrhea, lightheadedness, diaphoresis, desire to lie down, borborygmi, pallor, palpitations  Limit foods high in concentrated sugars  Foods high in soluble fiber may help reduce symtoms  Lactose free foods may be better tolerated than foods with lactose  Small amounts (<1 tbsp) of butter, oil, or margarine can be added to food for energy but fried/greasy foods should be avoided  Drink liquids 30 minutes before or after meals, not during meals   Eat 5-6 small meals daily and avoid eating large portions  Eat slowly and chewing foods into very small pieces or until liquefied may help manage this condition       Follow Up Plan: 7/12/22 phone follow up    Recommend Referral to Other Providers: none at this time

## 2022-06-14 NOTE — PROGRESS NOTES
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 1/13/22  · S/p Jtube placement 2/7/22  · RT began 3/14/22, EOT 4/20/22  · Chemotherapy (carboplatin, taxol) 3/15/22-5/3/22  · Esophagectomy planned for 6/22/22  Oncology History   Malignant neoplasm of lower third of esophagus ([facility] [address] )   1/13/2022 Initial Diagnosis    Malignant neoplasm of lower third of esophagus ([facility] [address] )     1/13/2022 Biopsy    EGD:   Esophagus, distal:  - 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  RESULTS OF IMMUNOHISTOCHEMICAL ANALYSIS FOR MISMATCH REPAIR PROTEIN LOSS     INTERPRETATION: No loss of nuclear expression of MMR proteins: Low probability of MSI-H     Note: Background non-neoplastic tissue and/or internal control with intact nuclear expression        RESULTS:  Antibody          Clone               Description                           Results  MLH1               M1                   Mismatch repair protein       Intact nuclear expression  MSH2              [id number]       Mismatch repair protein       Intact nuclear expression  MSH6              44                     Mismatch repair protein       Intact nuclear expression  PMS2              [id number]           Mismatch repair protein       Intact nuclear expression     3/14/2022 - 4/20/2022 Radiation    Treatments:  Course: C1    Plan ID Energy Fractions Dose per Fraction (cGy) Dose Correction (cGy) Total Dose Delivered (cGy) Elapsed Days   Esophags2_ReV 6X 22 / 22 180 0 3,960 29   Esophagus 6X 3 / 25 180 0 540 2   Esophagus CD 6X 3 / 3 180 0 540 2      Treatment Dates:  3/14/2022 - 4/20/2022       3/15/2022 -  Chemotherapy    CARBOplatin (PARAPLATIN) IVPB (GOG AUC DOSING), 147 6 mg, Intravenous, Once, 7 of 7 cycles  Administration: 147 6 mg (3/15/2022), 196 mg (3/22/2022), 196 mg (3/29/2022), 196 mg (4/5/2022), 196 mg (4/12/2022), 200 mg (4/26/2022), 200 mg (5/3/2022)  PACLItaxel (TAXOL) chemo IVPB, 50 mg/m2 = 84 6 mg, Intravenous, Once, 7 of 7 cycles  Administration: 84 6 mg (3/15/2022), 85 8 mg (3/22/2022), 85 8 mg (3/29/2022), 85 8 mg (4/5/2022), 85 8 mg (4/12/2022), 87 mg (4/26/2022), 87 mg (5/3/2022)       Past Medical & Surgical Hx: current smoker  Patient Active Problem List   Diagnosis    Smoking    Weight loss    Dysphagia    Dermatitis    Malignant neoplasm of lower third of esophagus (HCC)    Mild protein-calorie malnutrition (HCC)    Left ankle pain    Severe protein-calorie malnutrition (HCC)    Chronic pain    Cancer related pain    Palliative care encounter    Anemia due to antineoplastic chemotherapy    Encounter for geriatric assessment     Past Medical History:   Diagnosis Date    Cancer [facility])     esophageal ca    Current smoker     since 15years old    History of blood transfusion     Pneumonia     Polio     Rib fractures      Past Surgical History:   Procedure Laterality Date    EGD      FL GUIDED CENTRAL VENOUS ACCESS DEVICE INSERTION  02/16/2022    JEJUNOSTOMY FEEDING TUBE      TUNNELED VENOUS PORT PLACEMENT Left 02/16/2022    Procedure: INSERTION VENOUS PORT (PORT-A-CATH);   Surgeon: Dung Gentile MD;  Location: [facility];  Service: Surgical Oncology       Review of Medications:   Vitamins, Supplements and Herbals: No, pt denies taking supplements    Current Outpatient Medications:     acetaminophen (TYLENOL) 325 mg tablet, Take 325 mg by mouth every 6 (six) hours as needed for mild pain, Disp: , Rfl:     ascorbic acid (VITAMIN C) 500 mg tablet, Take 500 mg by mouth daily, Disp: , Rfl:     Cholecalciferol (VITAMIN D3 PO), Take by mouth, Disp: , Rfl:     cyanocobalamin (VITAMIN B-12) 500 MCG tablet, Take 500 mcg by mouth daily, Disp: , Rfl:    Multiple Vitamins-Minerals (MULTIVITAMIN WITH MINERALS) tablet, Take 1 tablet by mouth daily, Disp: , Rfl:     ondansetron (Zofran ODT) 8 mg disintegrating tablet, Take 1 tablet (8 mg total) by mouth every 8 (eight) hours as needed for nausea or vomiting Through J tube not by mouth   (Patient not taking: Reported on 6/10/2022), Disp: 20 tablet, Rfl: 0    pantoprazole (PROTONIX) 40 mg tablet, Take 1 tablet (40 mg total) by mouth 2 (two) times a day (Patient taking differently: Take 40 mg by mouth 2 (two) times a day as needed), Disp: , Rfl: 0    Most Recent Lab Results:   Lab Results   Component Value Date    WBC 6 65 06/06/2022    NEUTROABS 5 13 06/06/2022    ALT 13 06/06/2022    AST 12 06/06/2022    ALB 3 5 06/06/2022    SODIUM 140 06/06/2022    SODIUM 136 05/02/2022    K 3 4 (L) 06/06/2022    K 4 2 05/02/2022     06/06/2022    BUN 22 06/06/2022    BUN 20 05/02/2022    CREATININE 0 61 06/06/2022    CREATININE 0 62 05/02/2022    EGFR 97 06/06/2022    PHOS 3 5 02/07/2022    POCGLU 121 05/16/2022    GLUC 126 06/06/2022    CALCIUM 9 0 06/06/2022    MG 2 4 02/07/2022       Anthropometric Measurements:   Height: 70"  Ht Readings from Last 1 Encounters:   06/10/22 5' 10" (1 778 m)     Wt Readings from Last 20 Encounters:   06/10/22 64 9 kg (143 lb)   06/07/22 63 kg (139 lb)   06/06/22 63 5 kg (140 lb)   06/02/22 64 4 kg (142 lb)   06/01/22 64 2 kg (141 lb 8 oz)   05/19/22 62 8 kg (138 lb 6 4 oz)   05/03/22 61 2 kg (134 lb 14 7 oz)   05/02/22 61 2 kg (135 lb)   04/26/22 59 7 kg (131 lb 9 8 oz)   04/12/22 59 kg (130 lb)   04/12/22 59 1 kg (130 lb 6 4 oz)   04/11/22 59 kg (130 lb)   04/05/22 60 2 kg (132 lb 12 8 oz)   03/29/22 60 5 kg (133 lb 6 4 oz)   03/25/22 58 1 kg (128 lb)   03/22/22 57 5 kg (126 lb 12 8 oz)   03/15/22 55 4 kg (122 lb 3 2 oz)   03/07/22 55 3 kg (122 lb)   02/17/22 60 3 kg (133 lb)   02/16/22 58 8 kg (129 lb 9 6 oz)     Weight History:    Usual Weight: 175#   Varian: (3/14/22) 124#, (3/17/22) 120 5#, (3/21/22) 126#, (3/22/22) 127#, (3/24/22) 128#, (3/28/22) 131#, (3/29/22) 134#, (3/30/22) 134#, (3/31/22) 134#, (4/4/22) 133#, (4/5/22) 133#, (4/7/22) 132#, (4/11/22) 130#, (4/13/22) 130#, (4/14/22) 130 5#, (4/18/22) 130#       Home Scale: (5/11/22) 138#, (5/20/22) 139#    Oncology Nutrition-Anthropometrics    Flowsheet Row Nutrition from 6/1/2022 in [address] [place] [address] Nutrition from 5/20/2022 in [facility] Oncology Dietitian [place]   Patient age (years): 68 years 68 years   Patient (male) height (in): 79 in 79 in   Current weight (lbs): 141 5 lbs 138 4 lbs   Current weight to be used for anthropometric calculations (kg) 64 3 kg 62 9 kg   BMI: 20 3 19 9   IBW male 166 lb 166 lb   IBW (kg) male 75 5 kg 75 5 kg   IBW % (male) 85 2 % 83 4 %   Adjusted BW (male): 159 9 lbs 159 1 lbs   Adjusted BW in kg (male): 72 7 kg 72 3 kg   % weight change after 1 month: 4 9 % 6 5 %   Weight change after 1 month (lbs) 6 6 lbs 8 4 lbs   % weight change after 3 months: 16 % 4 1 %   Weight change after 3 months (lbs) 19 5 lbs 5 4 lbs          Nutrition-Focused Physical Findings: n/a due to telephone call    Food/Nutrition-Related History & Client/Social History:    Current Nutrition Impact Symptoms:  [] Nausea -has zofran  [] Reduced Appetite  [] Acid Reflux    [] Vomiting  [] Unintended Wt Loss- hx of; has started gaining weight back  [] Malabsorption    [] Diarrhea  [] Unintended Wt Gain  [] Dumping Syndrome    [] Constipation  [] Thick Mucous/Secretions  [] Abdominal Pain    [] Dysgeusia (Altered Taste)  [x] Xerostomia (Dry Mouth)  [] Gas    [] Dysosmia (Altered Smell)  [] Thrush  [] Difficulty Chewing    [] Oral Mucositis (Sore Mouth)  [] Fatigue  [x] Hyperglycemia: BG nonfasting 126mg/dL on 6/6/22      [] Odynophagia  [] Esophagitis  [] Other: hypokalemia 6/6/22   [] Dysphagia   -hx  -Jtube in place for nutrition [] Early Satiety  [] No Problems Eating      Food Allergies & Intolerances: no    Current Diet: Regular Diet, No Restrictions and Tube Feeding  Current Nutrition Intake: Unchanged from last visit    Appetite: Good, Fair    Nutrition Route: PO and J-Tube  Oral Care: brushes BID  Activity level: Uses carney to walk, but strength is increasing  Doing yard work more often  24 Hr Diet Recall:   Breakfast: Oatmeal OR eggs and perry  OJ and coffee with breakfast   Lunch: Tuna sandwich Or grilled cheese   Dinner: Tuna + macaroni salad OR steak and potatoes Or ham and potatoes   Snack: ice cream Or pudding     Beverages: coffee (8oz x1), water (8oz x2-3), OJ (8oz x1)   Nutrition supplement: none    EN Recall:  DME: [facility]   Access Type: Jtube   Formula: Jevity 1 5  Method: Cyclic  Rate: 90 mL/hr for 12hrs  Flush: 95 mL free water flush pre and post infusion (190ml)  EN providin mL volume (4 6 cartons/day), 1620 kcal, 69g protein, 821 mL free water, 1011mL total water  Oncology Nutrition-Estimated Needs    Flowsheet Row Nutrition from 2022 in [place] [address] [facility] Diet[facility] [place] Nutrition from 2022 in [place] [address] On[facility] Diet[facility] [place]   Weight type used Actual weight Actual weight   Weight in kilograms (kg) used for estimated needs 64 3 kg 62 9 kg   Energy needs formula:  35-40 kcal/kg 35-40 kcal/kg   Energy needs based on 35 kcal/k kcal 2202 kcal   Energy needs based on 40 kcal/k kcal 2516 kcal   Protein needs formula: 1 5-2 g/kg 1 5-2 g/kg   Protein needs based on 1 5 g/kg 96 g 94 g   Protein needs based on 2 g/kg 129 g 126 g   Fluid needs formula: 30-35 mL/kg 30-35 mL/kg   Fluid needs based on 30 mL/kg 1935 mL 1881 mL   Fluid needs in ounces 65 oz 64 oz   Fluid needs based on 35 mL/kg 2258 mL 2195 mL   Fluid needs in ounces 76 oz 74 oz           Discussion & Intervention:   Paresh Camargo was evaluated today for an RD follow up regarding wt loss, Esophageal Cancer and enteral nutrition    Paresh Camargo has completed has completed chemo/RT for EC, and is planned for esophagectomy on   Today [facility] explains that he continues to eat well by mouth without difficulty  He also continues to use enteral nutrition to aid in calorie and protein intake  His enteral nutrition remains unchanged  His weight has increased significantly over the past 3 month which he is pleased with  Reviewed 24 hour recall, which revealed an adequate nutrition intake, and discussed ways to optimize nutrient intake prior to surgery next week  Also reviewed concepts of advancing diet post esophagectomy: eat smaller and more frequent meals, eat slowly and chew foods well, limit cold/hot foods, include protein at all meals/snacks, avoid foods high in fiber- choose low fiber foods instead (white bread, fruit with no skin, canned fruits, soft and well-cooked vegetables (avoid corn, peas, beans, skins), limit raw fruits and vegetables and gradually increase as tolerated/allowed, avoid high sugar foods (high sugar foods are ones that have >10 grams of sugar/serving), try low sugar oral nutrition supplements such (Glucerna, Boost Optimum, Unjury, Ensure High Protein, Ensure Max Protein, Premier Protein, or Boost Glucose Control), Limit high fat and greasy/fried foods, avoid drinking while eating, find foods that you can tolerate/introduce new foods 1 at a time  In the event enteral nutrition is needed for Toro's sole source of nutrition post esophagectomy, recommend:   TF Goal: Jevity 1 5 at 100 mL/hr via J-tube cycled over 16 hours daily (until 6 8 cartons is infused)   Water Flushin mL free water flush at the beginning and end of TF infusion (250 mL total) plus 125 mL free water flush 5 times per day (625 mL total) (total of 875 mL/day in flushes; flushes should be spread throughout the day and every 2-3 hours during TF infusion; will need to adjust flushes prn for IV fluids)      Enteral Nutrition goal to provide: 1600 mL volume (6 8 cartons day), 2400 kcal, 102 grams protein, 1216 mL free water, 2091 mL total water daily   *Pt requires an enteral feeding pump to administer TF due to J-Tube  Moving forward, Pablo Jacobo will continue current nutrition plan of care     Materials Provided: all mailed to pt:  Diet Post Esophagectomy handout   All questions and concerns addressed during todays visit  Pablo Jacobo has RD contact information  Nutrition Diagnosis:    Increased Nutrient Needs (kcal & pro) related to increased demand for nutrients and disease state as evidenced by recovering from cancer tx   Increased Nutrient Needs related to increased demand for nutrients as evidenced by desire to gain weight  Monitoring & Evaluation:   Goals:  · pt to meet >/=75% estimated nutrition needs daily  · weight gain of 1-2# per week  · increase fluid intake  · increase oral intake    · Progress Towards Goals: Progressing and Goal(s) Met    Nutrition Rx & Recommendations:  · Diet: High Calorie, High Protein (for high calorie foods see pages 52-53, and for high protein foods see pages 49-51 in your Eating Hints book)  · High calorie foods to try: peanut butter, whole milk, cheese, butter, olive oil  (see pages 52-53 in your Eating Hints book)  · High protein foods to try: eggs, chicken, fish, beans/legumes, greek yogurt (see pages 49-51 in your Eating Hints book)  · Follow proper oral care; Try baking soda/salt water rinse recipe (mix 3/4 tsp salt + 1 tsp baking soda + 1 qt water; rinse with plain water after using) in Eating Hints book (pg 18)  Brush your teeth before/after meals & before bed  · Weigh yourself regularly  If you notice weight loss, make an effort to increase your daily food/calorie intake  If you continue to notice loss after these efforts, reach out to your dietitian to establish a plan to stabilize weight  · Tube Feeding Plan: Continue current TF regimen    · TF Goal: Jevity 1 5 at 90 mL/hr via J-tube cycled over ~12 hours daily (until 4 5 cartons is infused)    · Water Flushin mL free water flush at the beginning and end of TF infusion (250 mL total)  Plus an additional 1000mL fluid needed daily; this cane be administered via TF as free water flushes (ex: 125mL x 8 flushes) or this can be consumed by mouth, 1000mL is ~33oz  · Enteral Nutrition goal to provide: 1066 mL volume (4 5 cartons day), 1600 kcal, 68 grams protein, 810 mL free water, 2060 mL total water daily  Diet related to esophagectomy:      · Follow diet advancement per your doctors instructions   Once you are able to have solid foods: Start with very small amounts of soft, bland foods Avoid large quantities of food as this may cause abdominal pain, nausea, and vomiting   Small, frequent meals and snacks work best   Paulo Mars Eat slowly and chew your food very well   Limit very cold or very hot foods/drinks   Include protein with all meals and snacks   Avoid foods that are high in fiber (high fiber foods have >2-3 grams fiber/serving) - choose foods that are low in fiber instead  - Low fiber food examples: white bread, refined grains, fruit with no skin, canned fruits, soft and well-cooked vegetables (avoid corn, peas, beans, skins)  - A diet low in fiber will give your GI tract a chance to heal without irritating it  Slowly add more fiber back into your diet as you can tolerate it  - Limit raw fruits and vegetables and gradually increase as tolerated/allowed   The carbohydrate/sugar intake may be restricted depending on the extent of your surgery  - Avoid high sugar foods  High sugar foods are ones that have >10 grams of sugar/serving   - Some patients experience gas, bloating, abdominal pain and diarrhea with increased sugar intake  If these symptoms are apparent, try limiting some of the sweets/sugar in the diet    - Some patients may require a low-sugar nutrition supplement such as Glucerna, Boost Optimum, Unjury, Ensure High Protein, Ensure Max Protein, Premier Protein, or Boost Glucose Control   - NOTE: The following ingredients also are sugars and commonly found on food labels: dextrose, high fructose corn syrup, glucose, sucrose, molasses, and honey   Limit high fat and greasy/fried foods  Low-fat means <3 grams of total fat per 100 calories  - Fat also may increase gas, bloating, and diarrhea  - Try low-fat versions of foods such as 1% or skim milk and low-fat yogurt  Take the skin off poultry products, trim the fat off meat, and avoid foods that are fried or cooked in large amounts of oil   Drink plenty of fluids to maintain hydration (~64 fluid oz/day)  - Avoid drinking while eating, drink liquids 30 min before or 30-45 min after eating for the first few weeks so as to not fill the stomach with fluid instead of food or trigger dumping syndrome    - Avoid carbonated and alcoholic beverages   Drinking a nutrition supplement between meals may help maintain body weight  - It is possible that the first nutritional supplement tried is no longer tolerable   Try not to lie down flat after eating because your gut does not empty normally and reflux or heartburn could occur   Find foods that you can tolerate  Introduce new foods 1 at a time   As you feel more comfortable, you can add more and varied food to your diet in a gradual manner   Monitor your weight 1-2 times/week and take steps to maintain your weight   Normal eating and weight stabilization may not occur for weeks to months   Refer to Eating Hints book & Diet After an Esophagectomy or Gastrectomy for other meal/snack ideas and symptom management  Dumping Syndrome:   Symptoms: Epigastric fullness, nausea, vomiting, abdominal cramps, bloating, diarrhea, lightheadedness, diaphoresis, desire to lie down, borborygmi, pallor, palpitations   Limit foods high in concentrated sugars   Foods high in soluble fiber may help reduce symtoms   Lactose free foods may be better tolerated than foods with lactose     Small amounts (<1 tbsp) of butter, oil, or margarine can be added to food for energy but fried/greasy foods should be avoided   Drink liquids 30 minutes before or after meals, not during meals    Eat 5-6 small meals daily and avoid eating large portions   Eat slowly and chewing foods into very small pieces or until liquefied may help manage this condition       Follow Up Plan: 7/12/22 phone follow up    Recommend Referral to Other Providers: none at this time

## 2022-06-15 ENCOUNTER — HOSPITAL ENCOUNTER (OUTPATIENT)
Dept: INFUSION CENTER | Facility: CLINIC | Age: 77
Discharge: HOME | End: 2022-06-15
Payer: COMMERCIAL

## 2022-06-15 DIAGNOSIS — C15.5 MALIGNANT NEOPLASM OF LOWER THIRD OF ESOPHAGUS (HCC): Primary | ICD-10-CM

## 2022-06-15 LAB
ALBUMIN SERPL BCP-MCNC: 3.6 G/DL (ref 3.5–5)
ALP SERPL-CCNC: 85 U/L (ref 46–116)
ALT SERPL W P-5'-P-CCNC: 13 U/L (ref 12–78)
ANION GAP SERPL CALCULATED.3IONS-SCNC: 10 MMOL/L (ref 4–13)
APTT PPP: 46 SECONDS (ref 23–37)
AST SERPL W P-5'-P-CCNC: 11 U/L (ref 5–45)
BASOPHILS # BLD AUTO: 0.02 THOUSANDS/ΜL (ref 0–0.1)
BASOPHILS NFR BLD AUTO: 0 % (ref 0–1)
BILIRUB SERPL-MCNC: 0.3 MG/DL (ref 0.2–1)
BUN SERPL-MCNC: 17 MG/DL (ref 5–25)
CALCIUM SERPL-MCNC: 9.1 MG/DL (ref 8.3–10.1)
CHLORIDE SERPL-SCNC: 101 MMOL/L (ref 100–108)
CO2 SERPL-SCNC: 27 MMOL/L (ref 21–32)
CREAT SERPL-MCNC: 0.62 MG/DL (ref 0.6–1.3)
EOSINOPHIL # BLD AUTO: 0.11 THOUSAND/ΜL (ref 0–0.61)
EOSINOPHIL NFR BLD AUTO: 2 % (ref 0–6)
ERYTHROCYTE [DISTWIDTH] IN BLOOD BY AUTOMATED COUNT: 16.4 % (ref 11.6–15.1)
EST. AVERAGE GLUCOSE BLD GHB EST-MCNC: 94 MG/DL
GFR SERPL CREATININE-BSD FRML MDRD: 96 ML/MIN/1.73SQ M
GLUCOSE SERPL-MCNC: 119 MG/DL (ref 65–140)
HBA1C MFR BLD: 4.9 %
HCT VFR BLD AUTO: 30.2 % (ref 36.5–49.3)
HGB BLD-MCNC: 9.7 G/DL (ref 12–17)
IMM GRANULOCYTES # BLD AUTO: 0.04 THOUSAND/UL (ref 0–0.2)
IMM GRANULOCYTES NFR BLD AUTO: 1 % (ref 0–2)
INR PPP: 1 (ref 0.84–1.19)
LYMPHOCYTES # BLD AUTO: 0.59 THOUSANDS/ΜL (ref 0.6–4.47)
LYMPHOCYTES NFR BLD AUTO: 11 % (ref 14–44)
MCH RBC QN AUTO: 33.6 PG (ref 26.8–34.3)
MCHC RBC AUTO-ENTMCNC: 32.1 G/DL (ref 31.4–37.4)
MCV RBC AUTO: 105 FL (ref 82–98)
MONOCYTES # BLD AUTO: 0.82 THOUSAND/ΜL (ref 0.17–1.22)
MONOCYTES NFR BLD AUTO: 15 % (ref 4–12)
NEUTROPHILS # BLD AUTO: 3.99 THOUSANDS/ΜL (ref 1.85–7.62)
NEUTS SEG NFR BLD AUTO: 71 % (ref 43–75)
NRBC BLD AUTO-RTO: 0 /100 WBCS
PLATELET # BLD AUTO: 174 THOUSANDS/UL (ref 149–390)
PMV BLD AUTO: 10.3 FL (ref 8.9–12.7)
POTASSIUM SERPL-SCNC: 4.1 MMOL/L (ref 3.5–5.3)
PROT SERPL-MCNC: 7.2 G/DL (ref 6.4–8.2)
PROTHROMBIN TIME: 12.8 SECONDS (ref 11.6–14.5)
RBC # BLD AUTO: 2.89 MILLION/UL (ref 3.88–5.62)
SODIUM SERPL-SCNC: 138 MMOL/L (ref 136–145)
WBC # BLD AUTO: 5.57 THOUSAND/UL (ref 4.31–10.16)

## 2022-06-15 PROCEDURE — 80053 COMPREHEN METABOLIC PANEL: CPT

## 2022-06-15 PROCEDURE — 85610 PROTHROMBIN TIME: CPT

## 2022-06-15 PROCEDURE — 85730 THROMBOPLASTIN TIME PARTIAL: CPT

## 2022-06-15 PROCEDURE — 83036 HEMOGLOBIN GLYCOSYLATED A1C: CPT

## 2022-06-15 PROCEDURE — 85025 COMPLETE CBC W/AUTO DIFF WBC: CPT

## 2022-06-15 NOTE — PROGRESS NOTES
Patient presents for central venous labs  Patient offers no complaints  Confirmed with Lindley Boast RN that I am able to draw labs ordered by Dr Bettie Johnson for procedure on 6/22/22  Rola Barrera RN and patient aware that Type and screen cannot be drawn at this campus if [place] to [place] [facility]  Pt reports he is unable to make separate trip to [facility] [place] get type and screen done, Rola Barrera RN made aware  Port accessed, flushed, saline locked, labs drawn, port flushed and de-accessed  Band-aid placed on site  AVS declined, next appointment reviewed

## 2022-06-16 ENCOUNTER — TELEPHONE (OUTPATIENT)
Dept: HEMATOLOGY ONCOLOGY | Facility: CLINIC | Age: 77
End: 2022-06-16

## 2022-06-16 NOTE — TELEPHONE ENCOUNTER
CALL TRANSFER   Reason for patient call? Patient calling about surgery scheduled   Patient's primary physician? Dr Hernan Sanchez   RN call was transferred to and time it was transferred? Rosario Buckner 6/16 12PM   Informed patient that the message will be forwarded to the team and someone will get back to them as soon as possible    Did you relay this information to the patient?  yes

## 2022-06-22 ENCOUNTER — ANESTHESIA (OUTPATIENT)
Dept: PERIOP | Facility: HOSPITAL | Age: 77
DRG: 356 | End: 2022-06-22
Payer: COMMERCIAL

## 2022-06-22 ENCOUNTER — HOSPITAL ENCOUNTER (INPATIENT)
Facility: HOSPITAL | Age: 77
LOS: 10 days | Discharge: HOME HEALTH | DRG: 356 | End: 2022-07-02
Attending: SURGERY | Admitting: SURGERY
Payer: COMMERCIAL

## 2022-06-22 ENCOUNTER — PATIENT OUTREACH (OUTPATIENT)
Dept: HEMATOLOGY ONCOLOGY | Facility: CLINIC | Age: 77
End: 2022-06-22

## 2022-06-22 DIAGNOSIS — R13.10 DYSPHAGIA, UNSPECIFIED TYPE: ICD-10-CM

## 2022-06-22 DIAGNOSIS — Z51.5 PALLIATIVE CARE ENCOUNTER: ICD-10-CM

## 2022-06-22 DIAGNOSIS — R63.4 WEIGHT LOSS: ICD-10-CM

## 2022-06-22 DIAGNOSIS — Z01.89 ENCOUNTER FOR GERIATRIC ASSESSMENT: ICD-10-CM

## 2022-06-22 DIAGNOSIS — F17.200 SMOKING: Primary | ICD-10-CM

## 2022-06-22 DIAGNOSIS — E43 SEVERE PROTEIN-CALORIE MALNUTRITION (HCC): ICD-10-CM

## 2022-06-22 DIAGNOSIS — C15.5 MALIGNANT NEOPLASM OF LOWER THIRD OF ESOPHAGUS (HCC): ICD-10-CM

## 2022-06-22 LAB
ABO GROUP BLD: NORMAL
BLD GP AB SCN SERPL QL: NEGATIVE
RH BLD: POSITIVE
SPECIMEN EXPIRATION DATE: NORMAL

## 2022-06-22 PROCEDURE — 88305 TISSUE EXAM BY PATHOLOGIST: CPT | Performed by: PATHOLOGY

## 2022-06-22 PROCEDURE — 86850 RBC ANTIBODY SCREEN: CPT | Performed by: SURGERY

## 2022-06-22 PROCEDURE — 86920 COMPATIBILITY TEST SPIN: CPT

## 2022-06-22 PROCEDURE — 88313 SPECIAL STAINS GROUP 2: CPT | Performed by: PATHOLOGY

## 2022-06-22 PROCEDURE — 0DBU0ZX EXCISION OF OMENTUM, OPEN APPROACH, DIAGNOSTIC: ICD-10-PCS | Performed by: SURGERY

## 2022-06-22 PROCEDURE — 88341 IMHCHEM/IMCYTCHM EA ADD ANTB: CPT | Performed by: PATHOLOGY

## 2022-06-22 PROCEDURE — 88331 PATH CONSLTJ SURG 1 BLK 1SPC: CPT | Performed by: PATHOLOGY

## 2022-06-22 PROCEDURE — 88342 IMHCHEM/IMCYTCHM 1ST ANTB: CPT | Performed by: PATHOLOGY

## 2022-06-22 PROCEDURE — NC001 PR NO CHARGE

## 2022-06-22 PROCEDURE — 86901 BLOOD TYPING SEROLOGIC RH(D): CPT | Performed by: SURGERY

## 2022-06-22 PROCEDURE — 43235 EGD DIAGNOSTIC BRUSH WASH: CPT | Performed by: SURGERY

## 2022-06-22 PROCEDURE — 49203 PR EXCISION/DESTRUCTION OPEN ABDOMINAL TUMORS 5 CM: CPT | Performed by: SURGERY

## 2022-06-22 PROCEDURE — 0DJ08ZZ INSPECTION OF UPPER INTESTINAL TRACT, VIA NATURAL OR ARTIFICIAL OPENING ENDOSCOPIC: ICD-10-PCS | Performed by: SURGERY

## 2022-06-22 PROCEDURE — 86900 BLOOD TYPING SEROLOGIC ABO: CPT | Performed by: SURGERY

## 2022-06-22 PROCEDURE — NC001 PR NO CHARGE: Performed by: SURGERY

## 2022-06-22 PROCEDURE — C9290 INJ, BUPIVACAINE LIPOSOME: HCPCS | Performed by: THORACIC SURGERY (CARDIOTHORACIC VASCULAR SURGERY)

## 2022-06-22 RX ORDER — LABETALOL HYDROCHLORIDE 5 MG/ML
10 INJECTION, SOLUTION INTRAVENOUS EVERY 4 HOURS PRN
Status: DISCONTINUED | OUTPATIENT
Start: 2022-06-22 | End: 2022-07-02 | Stop reason: HOSPADM

## 2022-06-22 RX ORDER — ALBUTEROL SULFATE 2.5 MG/3ML
2.5 SOLUTION RESPIRATORY (INHALATION) ONCE AS NEEDED
Status: DISCONTINUED | OUTPATIENT
Start: 2022-06-22 | End: 2022-06-22 | Stop reason: HOSPADM

## 2022-06-22 RX ORDER — FENTANYL CITRATE 50 UG/ML
INJECTION, SOLUTION INTRAMUSCULAR; INTRAVENOUS AS NEEDED
Status: DISCONTINUED | OUTPATIENT
Start: 2022-06-22 | End: 2022-06-22

## 2022-06-22 RX ORDER — OXYCODONE HYDROCHLORIDE 5 MG/1
5 TABLET ORAL EVERY 4 HOURS PRN
Status: DISCONTINUED | OUTPATIENT
Start: 2022-06-22 | End: 2022-06-28

## 2022-06-22 RX ORDER — ONDANSETRON 2 MG/ML
INJECTION INTRAMUSCULAR; INTRAVENOUS AS NEEDED
Status: DISCONTINUED | OUTPATIENT
Start: 2022-06-22 | End: 2022-06-22

## 2022-06-22 RX ORDER — ALBUMIN, HUMAN INJ 5% 5 %
SOLUTION INTRAVENOUS CONTINUOUS PRN
Status: DISCONTINUED | OUTPATIENT
Start: 2022-06-22 | End: 2022-06-22

## 2022-06-22 RX ORDER — HYDROMORPHONE HCL/PF 1 MG/ML
0.5 SYRINGE (ML) INJECTION
Status: DISCONTINUED | OUTPATIENT
Start: 2022-06-22 | End: 2022-06-22 | Stop reason: HOSPADM

## 2022-06-22 RX ORDER — PROPOFOL 10 MG/ML
INJECTION, EMULSION INTRAVENOUS AS NEEDED
Status: DISCONTINUED | OUTPATIENT
Start: 2022-06-22 | End: 2022-06-22

## 2022-06-22 RX ORDER — ENOXAPARIN SODIUM 100 MG/ML
40 INJECTION SUBCUTANEOUS DAILY
Status: DISCONTINUED | OUTPATIENT
Start: 2022-06-23 | End: 2022-07-02 | Stop reason: HOSPADM

## 2022-06-22 RX ORDER — LIDOCAINE HYDROCHLORIDE 10 MG/ML
INJECTION, SOLUTION EPIDURAL; INFILTRATION; INTRACAUDAL; PERINEURAL AS NEEDED
Status: DISCONTINUED | OUTPATIENT
Start: 2022-06-22 | End: 2022-06-22

## 2022-06-22 RX ORDER — MAGNESIUM HYDROXIDE 1200 MG/15ML
LIQUID ORAL AS NEEDED
Status: DISCONTINUED | OUTPATIENT
Start: 2022-06-22 | End: 2022-06-22 | Stop reason: HOSPADM

## 2022-06-22 RX ORDER — BUPIVACAINE HYDROCHLORIDE 2.5 MG/ML
INJECTION, SOLUTION EPIDURAL; INFILTRATION; INTRACAUDAL AS NEEDED
Status: DISCONTINUED | OUTPATIENT
Start: 2022-06-22 | End: 2022-06-22 | Stop reason: HOSPADM

## 2022-06-22 RX ORDER — ROCURONIUM BROMIDE 10 MG/ML
INJECTION, SOLUTION INTRAVENOUS AS NEEDED
Status: DISCONTINUED | OUTPATIENT
Start: 2022-06-22 | End: 2022-06-22

## 2022-06-22 RX ORDER — GABAPENTIN 300 MG/1
300 CAPSULE ORAL 3 TIMES DAILY
Status: DISCONTINUED | OUTPATIENT
Start: 2022-06-22 | End: 2022-06-23

## 2022-06-22 RX ORDER — ONDANSETRON 2 MG/ML
4 INJECTION INTRAMUSCULAR; INTRAVENOUS EVERY 6 HOURS PRN
Status: DISCONTINUED | OUTPATIENT
Start: 2022-06-22 | End: 2022-07-02 | Stop reason: HOSPADM

## 2022-06-22 RX ORDER — METRONIDAZOLE 500 MG/100ML
500 INJECTION, SOLUTION INTRAVENOUS ONCE
Status: COMPLETED | OUTPATIENT
Start: 2022-06-22 | End: 2022-06-22

## 2022-06-22 RX ORDER — HYDROMORPHONE HCL/PF 1 MG/ML
0.5 SYRINGE (ML) INJECTION
Status: DISCONTINUED | OUTPATIENT
Start: 2022-06-22 | End: 2022-06-29

## 2022-06-22 RX ORDER — ONDANSETRON 2 MG/ML
4 INJECTION INTRAMUSCULAR; INTRAVENOUS ONCE AS NEEDED
Status: DISCONTINUED | OUTPATIENT
Start: 2022-06-22 | End: 2022-06-22 | Stop reason: HOSPADM

## 2022-06-22 RX ORDER — SODIUM CHLORIDE 9 MG/ML
INJECTION, SOLUTION INTRAVENOUS CONTINUOUS PRN
Status: DISCONTINUED | OUTPATIENT
Start: 2022-06-22 | End: 2022-06-22

## 2022-06-22 RX ORDER — FENTANYL CITRATE/PF 50 MCG/ML
25 SYRINGE (ML) INJECTION
Status: DISCONTINUED | OUTPATIENT
Start: 2022-06-22 | End: 2022-06-22 | Stop reason: HOSPADM

## 2022-06-22 RX ORDER — OXYCODONE HYDROCHLORIDE 10 MG/1
10 TABLET ORAL EVERY 4 HOURS PRN
Status: DISCONTINUED | OUTPATIENT
Start: 2022-06-22 | End: 2022-06-28

## 2022-06-22 RX ORDER — SODIUM CHLORIDE 9 MG/ML
INJECTION, SOLUTION INTRAVENOUS AS NEEDED
Status: DISCONTINUED | OUTPATIENT
Start: 2022-06-22 | End: 2022-06-22 | Stop reason: HOSPADM

## 2022-06-22 RX ORDER — LABETALOL HYDROCHLORIDE 5 MG/ML
INJECTION, SOLUTION INTRAVENOUS AS NEEDED
Status: DISCONTINUED | OUTPATIENT
Start: 2022-06-22 | End: 2022-06-22

## 2022-06-22 RX ORDER — METHOCARBAMOL 500 MG/1
500 TABLET, FILM COATED ORAL EVERY 8 HOURS SCHEDULED
Status: DISCONTINUED | OUTPATIENT
Start: 2022-06-22 | End: 2022-06-23

## 2022-06-22 RX ORDER — SODIUM CHLORIDE, SODIUM LACTATE, POTASSIUM CHLORIDE, CALCIUM CHLORIDE 600; 310; 30; 20 MG/100ML; MG/100ML; MG/100ML; MG/100ML
125 INJECTION, SOLUTION INTRAVENOUS CONTINUOUS
Status: DISCONTINUED | OUTPATIENT
Start: 2022-06-22 | End: 2022-06-23

## 2022-06-22 RX ORDER — SODIUM CHLORIDE, SODIUM LACTATE, POTASSIUM CHLORIDE, CALCIUM CHLORIDE 600; 310; 30; 20 MG/100ML; MG/100ML; MG/100ML; MG/100ML
75 INJECTION, SOLUTION INTRAVENOUS CONTINUOUS
Status: DISCONTINUED | OUTPATIENT
Start: 2022-06-22 | End: 2022-06-23

## 2022-06-22 RX ORDER — HYDROMORPHONE HCL/PF 1 MG/ML
SYRINGE (ML) INJECTION AS NEEDED
Status: DISCONTINUED | OUTPATIENT
Start: 2022-06-22 | End: 2022-06-22

## 2022-06-22 RX ORDER — ACETAMINOPHEN 325 MG/1
975 TABLET ORAL EVERY 8 HOURS SCHEDULED
Status: DISCONTINUED | OUTPATIENT
Start: 2022-06-22 | End: 2022-06-28

## 2022-06-22 RX ORDER — CEFAZOLIN SODIUM 2 G/50ML
2000 SOLUTION INTRAVENOUS ONCE
Status: COMPLETED | OUTPATIENT
Start: 2022-06-22 | End: 2022-06-22

## 2022-06-22 RX ORDER — DEXAMETHASONE SODIUM PHOSPHATE 10 MG/ML
INJECTION, SOLUTION INTRAMUSCULAR; INTRAVENOUS AS NEEDED
Status: DISCONTINUED | OUTPATIENT
Start: 2022-06-22 | End: 2022-06-22

## 2022-06-22 RX ORDER — PANTOPRAZOLE SODIUM 40 MG/1
40 TABLET, DELAYED RELEASE ORAL
Status: DISCONTINUED | OUTPATIENT
Start: 2022-06-22 | End: 2022-06-26

## 2022-06-22 RX ADMIN — SODIUM CHLORIDE, SODIUM LACTATE, POTASSIUM CHLORIDE, AND CALCIUM CHLORIDE 75 ML/HR: .6; .31; .03; .02 INJECTION, SOLUTION INTRAVENOUS at 11:25

## 2022-06-22 RX ADMIN — PHENYLEPHRINE HYDROCHLORIDE 30 MCG/MIN: 10 INJECTION INTRAVENOUS at 09:34

## 2022-06-22 RX ADMIN — HYDROMORPHONE HYDROCHLORIDE 0.5 MG: 1 INJECTION, SOLUTION INTRAMUSCULAR; INTRAVENOUS; SUBCUTANEOUS at 11:37

## 2022-06-22 RX ADMIN — SUGAMMADEX 200 MG: 100 INJECTION, SOLUTION INTRAVENOUS at 10:37

## 2022-06-22 RX ADMIN — PANTOPRAZOLE SODIUM 40 MG: 40 TABLET, DELAYED RELEASE ORAL at 16:57

## 2022-06-22 RX ADMIN — METHOCARBAMOL 500 MG: 500 TABLET, FILM COATED ORAL at 16:57

## 2022-06-22 RX ADMIN — PROPOFOL 50 MG: 10 INJECTION, EMULSION INTRAVENOUS at 08:37

## 2022-06-22 RX ADMIN — CEFAZOLIN SODIUM 2000 MG: 2 SOLUTION INTRAVENOUS at 08:35

## 2022-06-22 RX ADMIN — FENTANYL CITRATE 50 MCG: 50 INJECTION INTRAMUSCULAR; INTRAVENOUS at 08:28

## 2022-06-22 RX ADMIN — Medication 25 MCG: at 11:18

## 2022-06-22 RX ADMIN — METRONIDAZOLE: 500 INJECTION, SOLUTION INTRAVENOUS at 08:46

## 2022-06-22 RX ADMIN — PROPOFOL 130 MG: 10 INJECTION, EMULSION INTRAVENOUS at 08:28

## 2022-06-22 RX ADMIN — Medication 5 MG: at 10:00

## 2022-06-22 RX ADMIN — Medication 25 MCG: at 11:06

## 2022-06-22 RX ADMIN — METHOCARBAMOL 500 MG: 500 TABLET, FILM COATED ORAL at 21:31

## 2022-06-22 RX ADMIN — GABAPENTIN 300 MG: 300 CAPSULE ORAL at 21:31

## 2022-06-22 RX ADMIN — HYDROMORPHONE HYDROCHLORIDE 0.5 MG: 1 INJECTION, SOLUTION INTRAMUSCULAR; INTRAVENOUS; SUBCUTANEOUS at 10:49

## 2022-06-22 RX ADMIN — Medication 5 MG: at 10:23

## 2022-06-22 RX ADMIN — LIDOCAINE HYDROCHLORIDE 50 MG: 10 INJECTION, SOLUTION EPIDURAL; INFILTRATION; INTRACAUDAL; PERINEURAL at 08:28

## 2022-06-22 RX ADMIN — ACETAMINOPHEN 975 MG: 325 TABLET, FILM COATED ORAL at 16:57

## 2022-06-22 RX ADMIN — ONDANSETRON 4 MG: 2 INJECTION INTRAMUSCULAR; INTRAVENOUS at 08:28

## 2022-06-22 RX ADMIN — ACETAMINOPHEN 975 MG: 325 TABLET, FILM COATED ORAL at 21:32

## 2022-06-22 RX ADMIN — DEXAMETHASONE SODIUM PHOSPHATE 8 MG: 10 INJECTION, SOLUTION INTRAMUSCULAR; INTRAVENOUS at 08:28

## 2022-06-22 RX ADMIN — ROCURONIUM BROMIDE 50 MG: 10 INJECTION INTRAVENOUS at 08:29

## 2022-06-22 RX ADMIN — Medication 25 MCG: at 11:02

## 2022-06-22 RX ADMIN — ROCURONIUM BROMIDE 30 MG: 10 INJECTION INTRAVENOUS at 09:31

## 2022-06-22 RX ADMIN — SODIUM CHLORIDE, SODIUM LACTATE, POTASSIUM CHLORIDE, AND CALCIUM CHLORIDE 125 ML/HR: .6; .31; .03; .02 INJECTION, SOLUTION INTRAVENOUS at 06:44

## 2022-06-22 RX ADMIN — GABAPENTIN 300 MG: 300 CAPSULE ORAL at 16:57

## 2022-06-22 RX ADMIN — Medication 100 MCG: at 09:34

## 2022-06-22 RX ADMIN — SODIUM CHLORIDE: 9 INJECTION, SOLUTION INTRAVENOUS at 08:32

## 2022-06-22 RX ADMIN — FENTANYL CITRATE 50 MCG: 50 INJECTION INTRAMUSCULAR; INTRAVENOUS at 08:59

## 2022-06-22 RX ADMIN — ALBUMIN (HUMAN): 12.5 INJECTION, SOLUTION INTRAVENOUS at 09:31

## 2022-06-22 RX ADMIN — HYDROMORPHONE HYDROCHLORIDE 0.5 MG: 1 INJECTION, SOLUTION INTRAMUSCULAR; INTRAVENOUS; SUBCUTANEOUS at 09:04

## 2022-06-22 RX ADMIN — Medication 25 MCG: at 11:13

## 2022-06-22 NOTE — ANESTHESIA PREPROCEDURE EVALUATION
Procedure:  ESOPHAGOGASTRODUODENOSCOPY (EGD) (N/A Esophagus)  ESOPHAGECTOMY(transhiatal approach) (N/A Esophagus)    Relevant Problems   GI/HEPATIC   (+) Dysphagia   (+) Malignant neoplasm of lower third of esophagus (HCC)      HEMATOLOGY   (+) Anemia due to antineoplastic chemotherapy      NEURO/PSYCH   (+) Chronic pain      PULMONARY   (+) Smoking        Physical Exam    Airway    Mallampati score: II  TM Distance: >3 FB  Neck ROM: full     Dental       Cardiovascular  Cardiovascular exam normal    Pulmonary  Pulmonary exam normal     Other Findings        Anesthesia Plan  ASA Score- 3     Anesthesia Type- general with ASA Monitors  Additional Monitors: arterial line  Airway Plan: ETT  Plan Factors-Exercise tolerance (METS): >4 METS  Chart reviewed  EKG reviewed  Imaging results reviewed  Existing labs reviewed  Patient summary reviewed  Obstructive sleep apnea risk education given perioperatively  Induction- intravenous  Postoperative Plan- Plan for postoperative opioid use  Planned trial extubation    Informed Consent- Anesthetic plan and risks discussed with patient  I personally reviewed this patient with the CRNA  Discussed and agreed on the Anesthesia Plan with the CRNA  Darletta Pac

## 2022-06-22 NOTE — UTILIZATION REVIEW
Inpatient Admission Authorization Request   NOTIFICATION OF INPATIENT ADMISSION/INPATIENT AUTHORIZATION REQUEST   SERVICING FACILITY:   [facility]  Address: [address] [place] [address], [address] [place] [address]  Tax ID: [id number]  NPI: [id number]  Place of Service: Inpatient [address] [facility] Code: 24     ATTENDING PROVIDER:  Attending Name and NPI#: Jamal Warren Md [[id number]]  Address: [address] [place] [address], [address] [place] [address]  Phone: [phone]     UTILIZATION REVIEW CONTACT:  [facility], Utilization   Network Utilization Review Department  Phone: [phone]  Fax: [phone]  Email: [email or web address]     PHYSICIAN ADVISORY SERVICES:  FOR [id number] REVIEW - MEDICAL NECESSITY DENIAL  Phone: [phone]  Fax: [phone]  Email: [email or web address]     TYPE OF REQUEST:  Inpatient Status     ADMISSION INFORMATION:  ADMISSION DATE/TIME: 6/22/22 11:00 AM  PATIENT DIAGNOSIS CODE/DESCRIPTION:  Malignant neoplasm of lower third of esophagus ([facility] [address] ) [C15 5]  DISCHARGE DATE/TIME: No discharge date for patient encounter  IMPORTANT INFORMATION:  Please contact the [facility] directly with any questions or concerns regarding this request  Department voicemails are confidential     Send requests for admission clinical reviews, concurrent reviews, approvals, and administrative denials due to lack of clinical to fax [phone]

## 2022-06-22 NOTE — ANESTHESIA PROCEDURE NOTES
Arterial Line Insertion  Performed by: Bao Alvarez MD  Authorized by: Bao Alvarez MD   Consent: Written consent obtained  Consent given by: patient  Time out: Immediately prior to procedure a "time out" was called to verify the correct patient, procedure, equipment, support staff and site/side marked as required  Preparation: Patient was prepped and draped in the usual sterile fashion    Indications: hemodynamic monitoring  Orientation:  Left  Location: radial artery  Procedure Details:  Needle gauge: 20  Number of attempts: 1    Post-procedure:  Post-procedure: dressing applied  Waveform: good waveform and waveform confirmed  Post-procedure CNS: normal  Patient tolerance: Patient tolerated the procedure well with no immediate complications and patient tolerated the procedure well with no immediate complications

## 2022-06-22 NOTE — PROGRESS NOTES
Progress Note - General Surgery   Franky Haider 68 y o  male MRN: [id number]  Unit/Bed#: [facility] 928-01 Encounter: [id number]    Assessment:  Patient is a 68 y o  male initially diagnosed with T3 N1 M0 esophageal adenocarcinoma s/p neoadjuvant chemotherapy who presented to [facility] today for scheduled surgery of transhiatal esophagectomy  However, found to be Stage IV/peritoneal disease perioperatively  Now status post EGD, Exploratory laparotomy, Omental biopsy, and Tap block  POD 0  Afebrile, VSS except for postop HTN of (176-183)/(95-98)  UOP: 1230 mL from Garcia, clear yellow  Incision site is clean, dry, intact      Plan:  Discuss with patient surgery outcome and further options  Possible discharge on POD 1 or POD 2 pending patient remains medically stable  Regular diet  Encourage out of bed and ambulation  D/C Garcia later today or tomorrow  Prn labetalol IV 10mg Q4 for sbp > 160, hold for HR < 50  Prn Pain control with Dilaudid 0 5mg Q3 breakthrough, Oxycodone 5/10mg moderate/severe  DVT px SC Enoxaparin, SCD      Subjective/Objective     Subjective:   Patient only complains of penile pain where Garcia is inserted  Denies N/V/HA, SOB, chest pain, abdominal pain  Passing flatus  Objective:    Blood pressure (!) 183/98, pulse 98, temperature 97 9 °F (36 6 °C), resp  rate 12, height 5' 10" (1 778 m), weight 64 9 kg (143 lb), SpO2 100 %  ,Body mass index is 20 52 kg/m²  Intake/Output Summary (Last 24 hours) at 6/22/2022 1601  Last data filed at 6/22/2022 1301  Gross per 24 hour   Intake 950 ml   Output 1240 ml   Net -290 ml       Invasive Devices  Report    Central Venous Catheter Line  Duration           Port A Cath 02/16/22 Left Chest 126 days          Peripheral Intravenous Line  Duration           Peripheral IV 06/22/22 Dorsal (posterior); Right Forearm <1 day    Peripheral IV 06/22/22 Left Forearm <1 day    Peripheral IV 06/22/22 Right Forearm <1 day          Drain  Duration           Gastrostomy/Enterostomy PEG-jejunostomy 20 Fr   days    Urethral Catheter Temperature probe 16 Fr  <1 day                Physical Exam  Vitals and nursing note reviewed  Constitutional:       General: He is not in acute distress  Appearance: He is not ill-appearing, toxic-appearing or diaphoretic  HENT:      Head: Normocephalic and atraumatic  Right Ear: External ear normal       Left Ear: External ear normal       Nose: Nose normal       Comments: NC inserted  Eyes:      Extraocular Movements: Extraocular movements intact  Cardiovascular:      Rate and Rhythm: Normal rate and regular rhythm  Pulses: Normal pulses  Heart sounds: Normal heart sounds  No murmur heard  No friction rub  No gallop  Pulmonary:      Effort: Pulmonary effort is normal  No respiratory distress  Breath sounds: Normal breath sounds  Abdominal:      General: There is no distension  Palpations: Abdomen is soft  Tenderness: There is no abdominal tenderness  There is no guarding or rebound  Comments: Bowel sound present  Abdominal incision clean, dry, intact  No drainage or hematoma  PEG tube intact   Genitourinary:     Comments: Garcia in place  Musculoskeletal:      Cervical back: Normal range of motion  Right lower leg: No edema  Left lower leg: No edema  Skin:     General: Skin is warm and dry  Neurological:      Mental Status: He is alert     Psychiatric:         Mood and Affect: Mood normal          Behavior: Behavior normal              [address] [id number]  General Surgery

## 2022-06-22 NOTE — H&P
[facility]  1945  [id number]  [facility] [address]  [facility] [address]  [address] [place] [address]  [phone]     Diagnoses and all orders for this visit:     Malignant neoplasm of lower third of esophagus ([facility] )           No chief complaint on file         No follow-ups on file             Oncology History   Malignant neoplasm of lower third of esophagus ([facility] )   1/13/2022 Initial Diagnosis     Malignant ne[facility] of lower third of es[facility] ([facility] )      1/13/2022 Biopsy     EGD:   Esophagus, distal:  - 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      RESULTS OF IMMUNOHISTOCHEMICAL ANALYSIS FOR MISMATCH REPAIR PROTEIN LOSS     INTERPRETATION: No loss of nuclear expression of MMR proteins: Low probability of MSI-H     Note: Background non-neoplastic tissue and/or internal control with intact nuclear expression       RESULTS:  Antibody          Clone               Description                           Results  MLH1               M1                   Mismatch repair protein       Intact nuclear expression  MSH2              [id number]       Mismatch repair protein       Intact nuclear expression  MSH6              44                     Mismatch repair protein       Intact nuclear expression  PMS2              [id number]           Mismatch repair protein       Intact nuclear expression      3/15/2022 -  Chemotherapy     CARBOplatin (PARAPLATIN) IVPB (GOG AUC DOSING), 147 6 mg, Intravenous, Once, 7 of 7 cycles  Administration: 147 6 mg (3/15/2022), 196 mg (3/22/2022), 196 mg (3/29/2022), 196 mg (4/5/2022), 196 mg (4/12/2022), 200 mg (4/26/2022), 200 mg (5/3/2022)  PACLItaxel (TAXOL) chemo IVPB, 50 mg/m2 = 84 6 mg, Intravenous, Once, 7 of 7 cycles  Administration: 84 6 mg (3/15/2022), 85 8 mg (3/22/2022), 85 8 mg (3/29/2022), 85 8 mg (4/5/2022), 85 8 mg (4/12/2022), 87 mg (4/26/2022), 87 mg (5/3/2022)            Staging:  Clinical T3 N1 M0 esophageal adenocarcinoma  Treatment history:  Chemo radiation  Current treatment:  Chemo radiation completed  Disease status:  Improved     History of Present Illness:  Patient returns in follow-up  He has completed his chemo radiation  He is now eating all food without any difficulty including meat and pizza and bread  No significant nausea or vomiting  He is maintaining his weight  He is still using his tube feeds for 12 hours a day  PET-CT from May 16, 2022 revealed decreased activity in the distal esophagus with an SUV of 3 6  The paraesophageal lymph nodes or less conspicuous  There was some soft tissue near the left parous off a MARCIE region distal to the left mainstem bronchus  There was also an small AP window node with an SUV of 2 3  There was a concern that these could be metastasis, but on my review, I suspect these are related to his radiation therapy  The gastrohepatic lymphadenopathy has improved  The FDG activity in the colon has improved as well  This was likely physiologic  I personally reviewed his films  He comes in now to discuss surgical therapy  He completed his radiation 3 weeks ago, and his chemotherapy 2 weeks ago      Review of Systems  Complete ROS Surg Onc:   Complete ROS Surg Onc:   Constitutional: The patient denies new or recent history of general fatigue, no recent weight loss, no change in appetite  Eyes: No complaints of visual problems, no scleral icterus  ENT: no complaints of ear pain, no hoarseness, no difficulty swallowing,  no tinnitus and no new masses in head, oral cavity, or neck  Cardiovascular: No complaints of chest pain, no palpitations, no ankle edema  Respiratory: No complaints of shortness of breath, no cough  Gastrointestinal: No complaints of jaundice, no bloody stools, no pale stools     Genitourinary: No complaints of dysuria, no hematuria, no nocturia, no frequent urination, no urethral discharge  Musculoskeletal: No complaints of weakness, paralysis, joint stiffness or arthralgias  Integumentary: No complaints of rash, no new lesions  Neurological: No complaints of convulsions, no seizures, no dizziness  Hematologic/Lymphatic: No complaints of easy bruising  Endocrine:  No hot or cold intolerance  No polydipsia, polyphagia, or polyuria  Allergy/immunology:  No environmental allergies  No food allergies  Not immunocompromised  Skin:  No pallor or rash  No wound                Patient Active Problem List   Diagnosis    Smoking    Weight loss    Dysphagia    Dermatitis    Malignant neoplasm of lower third of esophagus (HCC)    Mild protein-calorie malnutrition (HCC)    Left ankle pain    Severe protein-calorie malnutrition (HCC)    Chronic pain    Cancer related pain    Palliative care encounter    Anemia due to antineoplastic chemotherapy      Medical History        Past Medical History:   Diagnosis Date    Cancer ([facility] [address] )       esophageal ca    Pneumonia      Polio      Rib fractures           Surgical History         Past Surgical History:   Procedure Laterality Date    FL GUIDED CENTRAL VENOUS ACCESS DEVICE INSERTION   2/16/2022    TUNNELED VENOUS PORT PLACEMENT Left 2/16/2022     Procedure: INSERTION VENOUS PORT (PORT-A-CATH);   Surgeon: Gabrielle Salamanca MD;  [facility];  Service: Surgical Oncology               Family History   Problem Relation Age of Onset    Dementia Mother      Heart disease Father        Social History               Socioeconomic History    Marital status: /Civil Union       Spouse name: Not on file    Number of children: Not on file    Years of education: Not on file    Highest education level: Not on file   Occupational History    Not on file   Tobacco Use    Smoking status: Former Smoker       Packs/day: 1 00       Years: 40 00       Pack years: 40 00       Types: Cigarettes       Quit date: 2/6/2022       Years since quittin 2    Smokeless tobacco: Never Used   Vaping Use    Vaping Use: Never used   Substance and Sexual Activity    Alcohol use: Never       Alcohol/week: 0 0 standard drinks       Comment: 0    Drug use: No    Sexual activity: Not Currently   Other Topics Concern    Not on file   Social History Narrative    Not on file      Social Determinants of Health          Financial Resource Strain: Not on file   Food Insecurity: No Food Insecurity    Worried About Running Out of Food in the Last Year: Never true    Jeanmarie of Food in the Last Year: Never true   Transportation Needs: No Transportation Needs    Lack of Transportation (Medical): No    Lack of Transportation (Non-Medical):  No   Physical Activity: Not on file   Stress: Not on file   Social Connections: Not on file   Intimate Partner Violence: Not on file   Housing Stability: Low Risk     Unable to Pay for Housing in the Last Year: No    Number of Places Lived in the Last Year: 1    Unstable Housing in the Last Year: No            Current Outpatient Medications:     acetaminophen (TYLENOL) 325 mg tablet, Take 325 mg by mouth every 6 (six) hours as needed for mild pain, Disp: , Rfl:     ascorbic acid (VITAMIN C) 500 mg tablet, Take 500 mg by mouth daily, Disp: , Rfl:     aspirin 325 mg tablet, Take 325 mg by mouth 2 (two) times a day, Disp: , Rfl:     bisacodyl (Dulcolax) 10 mg suppository, Insert 10 mg into the rectum daily, Disp: , Rfl:     Cholecalciferol (VITAMIN D3 PO), Take by mouth, Disp: , Rfl:     cyanocobalamin (VITAMIN B-12) 500 MCG tablet, Take 500 mcg by mouth daily, Disp: , Rfl:     lidocaine (LIDODERM) 5 %, Apply 1 patch topically daily Remove & Discard patch within 12 hours or as directed by MD, Disp: , Rfl: 0    magnesium hydroxide (MILK OF MAGNESIA) 400 mg/5 mL oral suspension, Take 30 mL by mouth daily as needed for constipation, Disp: , Rfl:     Multiple Vitamins-Minerals (MULTIVITAMIN WITH MINERALS) tablet, Take 1 tablet by mouth daily, Disp: , Rfl:     ondansetron (Zofran ODT) 8 mg disintegrating tablet, Take 1 tablet (8 mg total) by mouth every 8 (eight) hours as needed for nausea or vomiting Through J tube not by mouth , Disp: 20 tablet, Rfl: 0    pantoprazole (PROTONIX) 40 mg tablet, Take 1 tablet (40 mg total) by mouth 2 (two) times a day, Disp: , Rfl: 0    triamcinolone (KENALOG) 0 5 % cream, Apply topically 2 (two) times a day, Disp: 30 g, Rfl: 0  No Known Allergies  There were no vitals filed for this visit      Physical Exam  Constitutional: General appearance: The Patient is well-developed and well-nourished who appears the stated age in no acute distress  Patient is pleasant and talkative      HEENT:  Normocephalic  Sclerae are anicteric  Mucous membranes are moist  Neck is supple without adenopathy  No JVD      Chest: The lungs are clear to auscultation      Cardiac: Heart is regular rate      Abdomen: Abdomen is soft, non-tender, non-distended and without masses      Extremities: There is no clubbing or cyanosis  There is no edema  Symmetric  Neuro: Grossly nonfocal  Gait is normal      Lymphatic: No evidence of cervical adenopathy bilaterally  No evidence of axillary adenopathy bilaterally  No evidence of inguinal adenopathy bilaterally      Skin: Warm, anicteric     Psych:  Patient is pleasant and talkative  Breasts:           Pathology:  [unfilled]     Labs:        Imaging  NM PET CT skull base to mid thigh     Result Date: 5/16/2022  Narrative: PET/CT SCAN INDICATION:  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   , restaging post surgery for treatment management MODIFIER: PS COMPARISON: PET CT 2/1/2022 CELL TYPE:  Adenocarcinoma, 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 TECHNIQUE:   8 7 mCi F-18-FDG administered IV  Multiplanar attenuation corrected and non attenuation corrected PET images were acquired 60 minutes post injection   Contiguous, low dose, axial CT sections were obtained from the skull base through the femurs   Intravenous contrast material was not utilized  This examination, like all CT scans performed in the [facility], was performed utilizing techniques to minimize radiation dose exposure, including the use of iterative reconstruction and automated exposure control  Fasting serum glucose: 121 mg/dl FINDINGS: VISUALIZED BRAIN:   No acute abnormalities are seen  HEAD/NECK:   There is a physiologic distribution of FDG  No FDG avid cervical adenopathy is seen  CT images: Unremarkable  CHEST: Interval decreased FDG activity in the distal esophagus and proximal stomach, SUV ranging up to 3 6, prior SUV 10 9  This is compatible with at least partial response to therapy  Residual viable tumor is not excluded at this time  Previously visualized paraesophageal hypermetabolic soft tissue appears less conspicuous  There is however new hypermetabolic soft tissue along the left paraesophageal region, posterior to the distal left mainstem bronchus, and anterior to the descending thoracic aorta, SUV measuring 2 7  There is also a new hypermetabolic AP window node image 3/88 measuring 1 x 1 1 cm, SUV 2 3  These may represent new metastases  Right lower endobronchial nodular density is similar to prior exams dating back to 2019, but appears to demonstrate mild FDG activity, SUV 3, prior SUV 2 9  Probable incomplete mucous plugging in the distal right lower bronchial branches prominently in the superior segment appears similar, and without significant FDG uptake  CT images: Coronary atherosclerosis  ABDOMEN: Previously visualized gastrohepatic and metabolic soft tissue appears diminished, SUV measuring 1 9 in this region, prior SUV 3 5  Proximal right colon FDG activity appears diminished, SUV 3 7, prior SUV 8 8  This may have been physiologic  No new FDG avid soft tissue lesions are seen  CT images: PEG tube in place  Stable aneurysmal abdominal aorta measuring 3 8 x 3 3 cm    Stable lobulated left kidney  Mild fullness of the left renal pelvis  PELVIS: No FDG avid soft tissue lesions are seen  CT images: Prostatomegaly measuring 5 6 x 4 4 cm  OSSEOUS STRUCTURES: Increased activity in the right sternomanubrial joint, SUV 3 1, prior SUV 3 1, is likely degenerative/inflammatory  Otherwise no FDG avid lesions are seen  CT images: Spine degenerative change  L5 spondylolysis with grade 1 anterolisthesis on S1  Severe right hip degenerative change       Impression: 1  Interval decreased FDG activity in the distal esophagus and proximal stomach, suggesting at least partial response to therapy  Residual viable tumor may still be present  Continued PET CT follow-up recommended  2   Previously visualized distal paraesophageal and gastrohepatic hypermetabolic densities appear diminished  There are however new mildly hypermetabolic left paraesophageal and AP window lesions as above, which may represent new metastases  3   Right lower endobronchial soft tissue lesion is persistent from 2019, but demonstrates mild FDG activity  Clinical correlation recommended for suspected endobronchial polypoid mass  Workstation performed: [id number]      I reviewed the above laboratory and imaging data      Discussion/Summary:  26-year-old male with a clinical T3 N1 M0 esophageal adenocarcinoma  He is doing very well  I suspect his PET-CT findings are more related to treatment effect rather than metastasis  It appears that there is still residual disease based on the PET  I would recommend that he undergo esophagectomy  We discussed the multiple types of esophagectomy and the reasoning and rationale behind each approach  The risks of EGD and esophagectomy were explained and included bleeding, infection, recurrence, need for further surgery, wound complications, adjacent organ injury, anastomotic leak, sepsis, mi, DVT, stroke, pulmonary embolism, and death  Informed consent was obtained    We will schedule this at our earliest mutual convenience  We discussed that I would prefer transhiatal esophagectomy in this situation, to minimize his time under anesthesia  He is going to meet with thoracic surgery to discuss if a thoracoscopic approach would be beneficial   I will also have him obtain cardiac clearance as well as have him follow up with the surgical optimization clinic prior to any intervention  He is agreeable to this plan    All of his questions were answered

## 2022-06-22 NOTE — OP NOTE
OPERATIVE REPORT  PATIENT NAME: Debbie Rojas    :  [birth year]  MRN: [id number]  Pt Location: [facility] OR ROOM 08    SURGERY DATE: 2022    Surgeon(s) and Role:     * Kvng Gann MD - Primary     * Sonali Gaines MD - Assisting     * Duane Frias MD - Assisting    Preop Diagnosis:  Malignant neoplasm of lower third of esophagus ([address] ) [C15 5]    Post-Op Diagnosis Codes:     * Malignant neoplasm of lower third of esophagus ([address] ) [C15 5]    Procedure(s) (LRB):  ESOPHAGOGASTRODUODENOSCOPY (EGD) (N/A)  LAPAROTOMY EXPLORATORY; OMENTAL BIOPSY, TAP BLOCK (N/A)    Specimen(s):  ID Type Source Tests Collected by Time Destination   1 : OMENTAL NODULE Tissue Omentum TISSUE EXAM Kvng Gann MD 2022  9:24 AM        Estimated Blood Loss:   10 mL    Drains:  Gastrostomy/Enterostomy PEG-jejunostomy 20 Fr  LLQ (Active)   Surrounding Skin Dry; Intact; Non reddened 22 0718   Drain Status Clamped 22 0718   Dressing Status Open to air 22 0718   Number of days: 135       Urethral Catheter Latex 16 Fr  (Active)   Collection Container Standard drainage bag 22 0840   Securement Method Securing device (Describe) 22 0840   Number of days: 0       [REMOVED] NG/OG/Enteral Tube Orogastric 18 Fr Right mouth (Removed)   Number of days: 0       Anesthesia Type:   General    Operative Indications:  Malignant neoplasm of lower third of esophagus ([address] ) [C135]  59-year-old male with a clinical T3 N1 M0 esophageal adenocarcinoma  Was recommended that he undergo resection after completing neoadjuvant chemotherapy  Risks and benefits were explained  Informed consent was obtained  Patient was brought to the operating room  Operative Findings:  3-4 mm omental nodule was identified in the gastrocolic omentum  Biopsy revealed frozen section    Complications:   None    Procedure and Technique:  After identifying the patient, general anesthesia was achieved  A Garcia catheter was placed    Lines were placed by Anesthesia  We started by performing an EGD  This revealed some erythematous mucosa in the distal esophagus  Stomach appeared normal as well as pylorus  On retroflexion of the scope at the GE junction, there appeared to be some suspicious appearing mucosa in the cardia  The scope was then withdrawn and an orogastric tube was placed  Patient was appropriately padded and then he was prepped and draped in the usual sterile fashion  A formal time-out was performed  An upper midline incision was made  Using sharp dissection this was taken through the skin, subcutaneous tissue and through the fascia and into the peritoneal cavity  There was no evidence of any obvious liver metastasis  Nor did we see initially any obvious disease that would be concerning for peritoneal metastasis  The Bookwalter retractor was placed  The left lobe of the liver was mobilized by dividing the left triangular ligament  The liver was packed out of harm's way  We started by dissecting along the gastrocolic omentum preserving the right gastroepiploic artery  Once we were able to enter the lesser sac we were able to mobilize the stomach more superiorly  At this time, we started our mobilization more inferiorly towards the duodenum  As we protected the right gastroepiploic artery, we dissected the colon mesentery away from the stomach  As we did this we palpated a 3-4 mm nodule in the gastrocolic omentum as we were mobilizing the colon away from the stomach  This was suspicious  The nodule was elevated and the omental base was clamped  The nodule was excised and sent for frozen section  The omentum was ligated in this region with 2-0 silk suture  As we were waiting for the frozen section we continue with our mobilization by mobilizing the remaining stomach inferiorly to the level of the duodenum    A wide Kocher maneuver was now performed freeing up the entire duodenum from the hepatoduodenal ligament to the 3rd portion of the duodenum  A pack was placed in this region  At this time, we now started to mobilize the phrenoesophageal ligament and as we were dissecting on the right side, frozen section returned that the omental nodule was positive for adenocarcinoma  At this point I felt that this was beyond regional disease and was stage IV/peritoneal disease  Consequently we elected not to proceed with the esophagectomy  At this point the wound was now copiously irrigated  There was excellent hemostasis  The packs were removed  Sponge and needle counts were correct  Bookwalter retractor was removed  The fascia was approximated with 1  PDS suture starting at either end of the incision and closed centrally  At this time, we asked Anesthesia if they would perform TAP blocks, and they refused since he was not consented  Consequently the fascial incision was reopened  Intraperitoneal TAP blocks were performed by myself using Exparel mixed with lidocaine and saline  Once these were performed  The fascia was once again approximated at either end of the incision and secured centrally  Subcutaneous tissue was irrigated  Skin was approximated with interrupted 3 0 Vicryl suture subcutaneously and a running 4-0 Monocryl suture in a subcuticular fashion  Skin glue was used on the skin  Patient was then extubated and taken to the recovery room in stable condition having tolerated the procedure well  I was present and participated in all aspects of this procedure         I was present for the entire procedure    Patient Disposition:  PACU       SIGNATURE: Joce Booker MD  DATE: June 22, 2022  TIME: 10:43 AM

## 2022-06-22 NOTE — ANESTHESIA POSTPROCEDURE EVALUATION
Post-Op Assessment Note    CV Status:  Stable    Pain management: adequate     Mental Status:  Awake   Hydration Status:  Euvolemic   PONV Controlled:  Controlled   Airway Patency:  Patent   Two or more mitigation strategies used for obstructive sleep apnea   Post Op Vitals Reviewed: Yes      Staff: CRNA         No complications documented      BP   173/86   Temp  97 6   Pulse  80   Resp   12   SpO2   98%

## 2022-06-23 LAB
ANION GAP SERPL CALCULATED.3IONS-SCNC: 4 MMOL/L (ref 4–13)
BUN SERPL-MCNC: 15 MG/DL (ref 5–25)
CALCIUM SERPL-MCNC: 8.7 MG/DL (ref 8.3–10.1)
CHLORIDE SERPL-SCNC: 103 MMOL/L (ref 100–108)
CO2 SERPL-SCNC: 28 MMOL/L (ref 21–32)
CREAT SERPL-MCNC: 0.75 MG/DL (ref 0.6–1.3)
ERYTHROCYTE [DISTWIDTH] IN BLOOD BY AUTOMATED COUNT: 15.8 % (ref 11.6–15.1)
GFR SERPL CREATININE-BSD FRML MDRD: 89 ML/MIN/1.73SQ M
GLUCOSE SERPL-MCNC: 122 MG/DL (ref 65–140)
HCT VFR BLD AUTO: 28 % (ref 36.5–49.3)
HGB BLD-MCNC: 9.1 G/DL (ref 12–17)
MAGNESIUM SERPL-MCNC: 1.8 MG/DL (ref 1.6–2.6)
MCH RBC QN AUTO: 34.7 PG (ref 26.8–34.3)
MCHC RBC AUTO-ENTMCNC: 32.5 G/DL (ref 31.4–37.4)
MCV RBC AUTO: 107 FL (ref 82–98)
PHOSPHATE SERPL-MCNC: 2.7 MG/DL (ref 2.3–4.1)
PLATELET # BLD AUTO: 131 THOUSANDS/UL (ref 149–390)
PMV BLD AUTO: 11.3 FL (ref 8.9–12.7)
POTASSIUM SERPL-SCNC: 4 MMOL/L (ref 3.5–5.3)
RBC # BLD AUTO: 2.62 MILLION/UL (ref 3.88–5.62)
SODIUM SERPL-SCNC: 135 MMOL/L (ref 136–145)
WBC # BLD AUTO: 8.84 THOUSAND/UL (ref 4.31–10.16)

## 2022-06-23 PROCEDURE — NC001 PR NO CHARGE: Performed by: THORACIC SURGERY (CARDIOTHORACIC VASCULAR SURGERY)

## 2022-06-23 PROCEDURE — 99222 1ST HOSP IP/OBS MODERATE 55: CPT | Performed by: NURSE PRACTITIONER

## 2022-06-23 PROCEDURE — 85027 COMPLETE CBC AUTOMATED: CPT | Performed by: SURGERY

## 2022-06-23 PROCEDURE — 84100 ASSAY OF PHOSPHORUS: CPT | Performed by: SURGERY

## 2022-06-23 PROCEDURE — 97163 PT EVAL HIGH COMPLEX 45 MIN: CPT

## 2022-06-23 PROCEDURE — 99024 POSTOP FOLLOW-UP VISIT: CPT | Performed by: SURGERY

## 2022-06-23 PROCEDURE — 83735 ASSAY OF MAGNESIUM: CPT | Performed by: SURGERY

## 2022-06-23 PROCEDURE — 80048 BASIC METABOLIC PNL TOTAL CA: CPT | Performed by: SURGERY

## 2022-06-23 PROCEDURE — 97167 OT EVAL HIGH COMPLEX 60 MIN: CPT

## 2022-06-23 RX ORDER — METHOCARBAMOL 500 MG/1
500 TABLET, FILM COATED ORAL EVERY 6 HOURS SCHEDULED
Status: DISCONTINUED | OUTPATIENT
Start: 2022-06-23 | End: 2022-06-28

## 2022-06-23 RX ORDER — GABAPENTIN 100 MG/1
100 CAPSULE ORAL 3 TIMES DAILY
Status: DISCONTINUED | OUTPATIENT
Start: 2022-06-23 | End: 2022-06-28

## 2022-06-23 RX ADMIN — OXYCODONE HYDROCHLORIDE 10 MG: 10 TABLET ORAL at 04:03

## 2022-06-23 RX ADMIN — GABAPENTIN 100 MG: 100 CAPSULE ORAL at 21:25

## 2022-06-23 RX ADMIN — PANTOPRAZOLE SODIUM 40 MG: 40 TABLET, DELAYED RELEASE ORAL at 17:05

## 2022-06-23 RX ADMIN — HYDROMORPHONE HYDROCHLORIDE 0.5 MG: 1 INJECTION, SOLUTION INTRAMUSCULAR; INTRAVENOUS; SUBCUTANEOUS at 08:16

## 2022-06-23 RX ADMIN — ACETAMINOPHEN 975 MG: 325 TABLET, FILM COATED ORAL at 21:25

## 2022-06-23 RX ADMIN — ACETAMINOPHEN 975 MG: 325 TABLET, FILM COATED ORAL at 13:09

## 2022-06-23 RX ADMIN — OXYCODONE HYDROCHLORIDE 5 MG: 5 TABLET ORAL at 09:29

## 2022-06-23 RX ADMIN — METHOCARBAMOL 500 MG: 500 TABLET, FILM COATED ORAL at 11:41

## 2022-06-23 RX ADMIN — GABAPENTIN 300 MG: 300 CAPSULE ORAL at 08:15

## 2022-06-23 RX ADMIN — METHOCARBAMOL 500 MG: 500 TABLET, FILM COATED ORAL at 05:23

## 2022-06-23 RX ADMIN — ENOXAPARIN SODIUM 40 MG: 40 INJECTION SUBCUTANEOUS at 08:16

## 2022-06-23 RX ADMIN — GABAPENTIN 100 MG: 100 CAPSULE ORAL at 17:05

## 2022-06-23 RX ADMIN — PANTOPRAZOLE SODIUM 40 MG: 40 TABLET, DELAYED RELEASE ORAL at 08:15

## 2022-06-23 RX ADMIN — OXYCODONE HYDROCHLORIDE 5 MG: 5 TABLET ORAL at 17:06

## 2022-06-23 RX ADMIN — METHOCARBAMOL 500 MG: 500 TABLET, FILM COATED ORAL at 17:05

## 2022-06-23 NOTE — UTILIZATION REVIEW
Initial Clinical Review    Elective IP surgical procedure  Age/Sex: 68 y o  male  Surgery Date: 6/22/2022  Procedure:   ESOPHAGOGASTRODUODENOSCOPY (EGD)   LAPAROTOMY EXPLORATORY; OMENTAL BIOPSY, TAP BLOCK  Anesthesia: General  Operative Findings: 3-4 mm omental nodule was identified in the gastrocolic omentum  Biopsy revealed frozen section    POD#1 Progress Note: c/o pain around incision, no other complaints  Relatively low BP overnight but asymptomatic and urinated well  This morning BP improved to [id number]  Regular diet  Wean IVFs  D/c stephens  Incentive spirometry/ambulate  SCDs  [employer] prn  Start J-tube feeds - cycled  Jevity 1 5 @ 90 ml/hr 12 hr cycle  95 free water flush prior and after tube feeds daily      Admission Orders: Date/Time/Statement:   Admission Orders (From admission, onward)     Ordered        06/22/22 1100  Inpatient Admission  Once                      Orders Placed This Encounter   Procedures    Inpatient Admission     Standing Status:   Standing     Number of Occurrences:   1     Order Specific Question:   Level of Care     Answer:   Med Surg [16]     Order Specific Question:   Estimated length of stay     Answer:   Inpatient Only Surgery     Vital Signs:   Date/Time Temp Pulse Resp BP MAP (mmHg) SpO2 Calculated FIO2 (%) - Nasal Cannula Nasal Cannula O2 Flow Rate (L/min) O2 Device   06/23/22 07:36:49 99 1 °F (37 3 °C) 92 18 115/63 80 98 % -- -- --   06/23/22 01:34:34 -- 88 -- 96/55 69 96 % -- -- --   06/23/22 01:32:40 -- 89 -- 98/56 70 97 % -- -- --   06/22/22 22:56:24 98 5 °F (36 9 °C) 92 -- 94/54 67 94 % -- -- --   06/22/22 16:13:54 99 8 °F (37 7 °C) 101 -- 176/95 Abnormal  122 100 % -- -- --   06/22/22 16:07:38 99 8 °F (37 7 °C) 98 -- 176/95 Abnormal  122 100 % -- -- --   06/22/22 14:42:24 97 9 °F (36 6 °C) 98 -- 183/98 Abnormal  126 100 % -- -- --   06/22/22 1330 -- 84 12 151/74 104 100 % -- -- --   06/22/22 1300 -- 82 18 141/74 95 99 % 32 3 L/min Nasal cannula   06/22/22 1230 -- 78 17 155/69 97 99 % -- -- --   06/22/22 1200 -- 78 21 140/72 93 99 % 32 3 L/min Nasal cannula   06/22/22 1145 -- 82 19 154/81 111 99 % 32 3 L/min Nasal cannula   06/22/22 1130 97 2 °F (36 2 °C) Abnormal  74 12 143/64 101 98 % 32 3 L/min Nasal cannula   06/22/22 1123 -- 76 16 148/68 -- 95 % -- -- --   06/22/22 1115 -- 78 10 Abnormal  182/76 Abnormal  113 97 % 32 3 L/min Nasal cannula   06/22/22 1100 -- 78 10 Abnormal  165/78 116 98 % 32 3 L/min Nasal cannula   06/22/22 1051 97 6 °F (36 4 °C) 78 12 173/86 Abnormal  -- 99 % 32 3 L/min Nasal cannula   06/22/22 0623 97 4 °F (36 3 °C) Abnormal  77 16 148/68 -- 100 % -- -- None (Room air)       Pertinent Labs/Diagnostic Test Results:   Results from last 7 days   Lab Units 06/23/22  0527   WBC Thousand/uL 8 84   HEMOGLOBIN g/dL 9 1*   HEMATOCRIT % 28 0*   PLATELETS Thousands/uL 131*     Results from last 7 days   Lab Units 06/23/22  0527   SODIUM mmol/L 135*   POTASSIUM mmol/L 4 0   CHLORIDE mmol/L 103   CO2 mmol/L 28   ANION GAP mmol/L 4   BUN mg/dL 15   CREATININE mg/dL 0 75   EGFR ml/min/1 73sq m 89   CALCIUM mg/dL 8 7   MAGNESIUM mg/dL 1 8   PHOSPHORUS mg/dL 2 7     Results from last 7 days   Lab Units 06/23/22  0527   GLUCOSE RANDOM mg/dL 122     Scheduled Medications:  acetaminophen, 975 mg, Oral, Q8H JEANNE  enoxaparin, 40 mg, Subcutaneous, Daily  gabapentin, 100 mg, Oral, TID  methocarbamol, 500 mg, Oral, Q6H JEANNE  nicotine, 1 patch, Transdermal, Daily  pantoprazole, 40 mg, Oral, BID AC    PRN Meds:  HYDROmorphone, 0 5 mg, Intravenous, Q3H PRN 6/23 x1  labetalol, 10 mg, Intravenous, Q4H PRN  ondansetron, 4 mg, Intravenous, Q6H PRN  oxyCODONE, 10 mg, Oral, Q4H PRN 6/23 x1  oxyCODONE, 5 mg, Oral, Q4H PRN        Network Utilization Review Department  ATTENTION: Please call with any questions or concerns to [phone] and carefully listen to the prompts so that you are directed to the right person   All voicemails are confidential   Jonelle Quiroga all requests for admission clinical reviews, approved or denied determinations and any other requests to dedicated fax number below belonging to the campus where the patient is receiving treatment   List of dedicated fax numbers for the Facilities:  [address] [place] [address] DENIALS (Administrative/Medical Necessity) [phone]   [address]  [facility] (Maternity/NICU/Pediatrics) [phone]   [address] [place] [address]  [address] [facility] [place] [phone] [address] Bakari Solis 14[address] [facility] [place] [address] [phone]

## 2022-06-23 NOTE — OCCUPATIONAL THERAPY NOTE
Occupational Therapy Evaluation     Patient Name: Jc JASSO Date: 6/23/2022  Problem List  Principal Problem:    Malignant neoplasm of lower third of esophagus [facility])    Past Medical History  Past Medical History:   Diagnosis Date    Cancer [facility])     esophageal ca    Current smoker     since 15years old    History of blood transfusion     Pneumonia     Polio     Rib fractures      Past Surgical History  Past Surgical History:   Procedure Laterality Date    EGD      ESOPHAGOGASTRODUODENOSCOPY N/A 6/22/2022    Procedure: ESOPHAGOGASTRODUODENOSCOPY (EGD); Surgeon: Jamal Warren MD;  [facility];  Service: Surgical Oncology    FL GUIDED CENTRAL VENOUS ACCESS DEVICE INSERTION  02/16/2022    JEJUNOSTOMY FEEDING TUBE      LAPAROTOMY N/A 6/22/2022    Procedure: LAPAROTOMY EXPLORATORY; OMENTAL BIOPSY, TAP BLOCK;  Surgeon: Jamal Warren MD;  [facility];  Service: Surgical Oncology    TUNNELED VENOUS PORT PLACEMENT Left 02/16/2022    Procedure: INSERTION VENOUS PORT (PORT-A-CATH); Surgeon: Jamal Warren MD;  [facility];  Service: Surgical Oncology           06/23/22 0842   OT Last Visit   OT Visit Date 06/23/22   Note Type   Note type Evaluation   Restrictions/Precautions   Weight Bearing Precautions Per Order No   Other Precautions Multiple lines; Fall Risk;Pain   Pain Assessment   Pain Assessment Tool 0-10   Pain Score 8   Pain Location/Orientation Location: Abdomen   Hospital Pain Intervention(s) Repositioned; Ambulation/increased activity; Emotional support   Home Living   Type of [address] [place] [address] Two level;Bed/bath upstairs  (3+8 TANI)   Bathroom Shower/Tub Tub/shower unit   [employer] Standard  (pt reports BSC over toilet)   Bathroom Equipment Commode;Grab bars in shower; Shower chair   [address] Cane;Walker; Wheelchair-manual;Sock aid   Additional Comments pt reports using SPC PTA   Prior Function   Level of [place] Independent with ADLs and functional mobility   Lives With (S)  Alone   Receives Help From Neighbor;Family   ADL Assistance Independent   IADLs Independent   Falls in the last 6 months 0   Vocational Retired   Comments pt reports dtr will be coming to stay with him temporarily at d/c   Lifestyle   Autonomy PTA pt I for ADLs, IADLs, and fxnl mobility with SPC, + driving   Reciprocal Relationships daughter, neighbors   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   [address], watching sports on [address] ([place]) [email or web address] "I like the [employer]"   ADL   Where Assessed Edge of bed   Eating Assistance 7  Independent   Grooming Assistance 5  [address] [facility] 4  Minimal Assistance   UB Bathing Deficit Setup;Steadying;Supervision/safety; Increased time to complete   LB Bathing Assistance 4  Minimal Assistance   LB Bathing Deficit Setup;Steadying;Supervision/safety; Increased time to complete   UB Dressing Assistance 4  Minimal Assistance   UB Dressing Deficit Setup;Steadying;Supervision/safety; Increased time to complete   LB Dressing Assistance 4  Minimal Assistance   LB Dressing Deficit Setup;Steadying;Supervision/safety; Increased time to complete   Toileting Assistance  4  Minimal Assistance   Toileting Deficit Setup;Steadying;Supervison/safety; Increased time to complete   Bed Mobility   Supine to Sit 3  Moderate assistance   Additional items Assist x 1;HOB elevated; Increased time required   Sit to Supine Unable to assess   Transfers   Sit to Stand 4  Minimal assistance   Additional items HOB elevated; Increased time required   Stand to Sit 4  Minimal assistance   Additional items Armrests; Increased time required   Additional Comments transfers with RW   Functional Mobility   Functional Mobility 4  Minimal assistance   Additional items Rolling walker   Balance   Static Sitting Fair   Dynamic Sitting Fair   Static Standing Fair - Dynamic Standing Poor +   Ambulatory Poor +   Activity Tolerance   Activity Tolerance Patient limited by fatigue;Patient limited by pain   Medical Staff Made Aware PT Merlin Wynn   Nurse Made Aware RN approved for session   RUE Assessment   RUE Assessment WFL   LUE Assessment   LUE Assessment X  (ROM ~25 deg shoulder flexion, normal elbow flexion 2* pt reported hx of polio)   Hand Function   Gross Motor Coordination Functional   Fine Motor Coordination Functional   Cognition   Overall Cognitive Status WFL   Arousal/Participation Responsive; Cooperative   Attention Within functional limits   Orientation Level Oriented X4   Memory Within functional limits   Following Commands Follows one step commands without difficulty   Comments pt pleasant and cooperative t/o session, to noted to have some fatigue  Increased time for tasks   Assessment   Limitation Decreased ADL status; Decreased UE ROM; Decreased UE strength;Decreased Safe judgement during ADL;Decreased endurance;Decreased self-care trans;Decreased high-level ADLs   Prognosis Good   Assessment Pt is 68 y o  M presented to [facility] with esophageal cancer, s/p ex lap  Pt  has a past medical history of malignant neoplasm of lower third esophagus, malnutrition, dysphagia, and chronic pain  Pt lives alone in 2 story home with 3 + 8 TANI, pt reports bed/bath on second floor  PTA pt I with ADLs, IADLs, and + driving  Pt with active OT orders and up and OOB as tolerated activity orders  Pt seen as co-evaluation with PT due to comorbidities, medical complexity, and current deficits  Pt presents to session supine in bed  Pt performed Mod A x1 for bed mobility, Min A STS trx and fnxl mobility with RW  Pt currently requires Min A for UB/LB ADLs, I for eating and S for grooming  Pt limited by decreased endurance, L shoulder ROM, activity tolerance, fnxl mobility, and independence with ADLs/IADLs   Occupational performance areas to be addressed include dressing, bathing, grooming, and fnxl mobility  Pt to benefit from skilled OT services to address above deficits and improve overall independence, and maximize performance  OT recommendation at time of d/c to home with home OT, pending progress  The patient's raw score on the AM-PAC Daily Activity inpatient short form is 19, standardized score is 40 22, greater than 39 4  Patients at this level are likely to benefit from discharge to home  Please refer to the recommendation of the Occupational Therapist for safe discharge planning  Goals   Patient Goals to feel better   LTG Time Frame 10-14   Plan   Treatment Interventions ADL retraining;Functional transfer training;UE strengthening/ROM; Endurance training;Patient/family training;Equipment evaluation/education; Compensatory technique education;Continued evaluation; Energy conservation; Activityengagement   Goal Expiration Date 07/07/22   OT Frequency 3-5x/wk   Recommendation   OT Discharge Recommendation Home with home health rehabilitation  (pending progress)   AM-PAC Daily Activity Inpatient   Lower Body Dressing 3   Bathing 3   Toileting 3   Upper Body Dressing 3   Grooming 3   Eating 4   Daily Activity Raw Score 19   Daily Activity Standardized Score (Calc for Raw Score >=11) 40 22   [facility] Applied Cognition Inpatient   Following a Speech/Presentation 3   Understanding Ordinary Conversation 4   Taking Medications 4   Remembering Where Things Are Placed or Put Away 3   Remembering List of 4-5 Errands 3   Taking Care of Complicated Tasks 3   Applied Cognition Raw Score 20   Applied Cognition Standardized Score 41 76       OT Goals:    - Pt will perform fnxl trx with Mod I on/off all surfaces using AD/DME as needed  - Pt will perform UB ADLs Mod I using AD/DME as needed  - Pt will perform LB ADLs Mod I using AD/DME as needed  - Pt will perform toileting Mod I with G hygiene and using AD/DME as needed       - Pt will perform fnxl mob during ADL/IADL/meaningful tasks Mod I using AD/DME as needed  - Pt will demonstrate understanding of energy conversation techniques 100% of tx session     - Pt will perform fair+ static and dynamic sitting/standing balance during fnxl trx and ADL tasks  - Pt will engage in ongoing cognitive assessments with good participation to assist with safe d/c planning/recommendations  - Pt will maintain upright sitting position for at least 10 minutes during dynamic sitting activity with fair+ balance to improve ADL performance and reduce risk of falls          Tiffanie Barney, OTS

## 2022-06-23 NOTE — QUICK NOTE
Jejunostomy tube feeds - cycled  Restarted  Jevity 1 5 @ 90 ml/hr 12 hour cycle   95 free water flush prior and after tube feeds daily

## 2022-06-23 NOTE — CONSULTS
Consultation - Palliative and Supportive Care   Fatoumata Sequeira 68 y o  male [id number]    Patient Active Problem List   Diagnosis    Smoking    Weight loss    Dysphagia    Dermatitis    Malignant neoplasm of lower third of esophagus (HCC)    Mild protein-calorie malnutrition (HCC)    Left ankle pain    Severe protein-calorie malnutrition (HCC)    Chronic pain    Cancer related pain    Palliative care encounter    Anemia due to antineoplastic chemotherapy    Encounter for geriatric assessment     Active issues specifically addressed today include:   metastatic esophageal cancer  Cancer related pain  Severe protein calorie malnutrition      Plan:  1  Symptom management -  Cancer related pain/postoperative  -  Oxycodone 5mg -1 0 mg Q 4 hours prn (encouraged use)  -  Hydromorphone 0 5 mg for breakthrough pain     2  Goals -   -  Full cares no limits, ongoing goals of care conversations  -  Patient's current goal is to pursue treatments offered and move to [place]    -     Code Status: Full Code - Level 1   Decisional apparatus:  Patient is competent on my exam today  If competence is lost, patient's substitute decision maker would default to adult children, Adis Crocker and april  by [place] Act 169  Advance Directive / Living Will / POLST:  None on file     I have reviewed the patient's controlled substance dispensing history in the Prescription Drug Monitoring Program in compliance with the [facility] regulations before prescribing any controlled substances  We appreciate the invitation to be involved in this patient's care  We will continue to follow  Please do not hesitate to reach our on call provider through our clinic answering service at  should you have acute symptom control concerns  MADELINE Moon  Palliative and Supportive Care  Clinic/Answering Service: [phone]  You can find me on [employer]!        IDENTIFICATION:  Inpatient consult to Palliative Care  Consult performed by: April MADELINE Sanchez  Consult ordered by: Ivelisse Fernandez PA-C        Physician Requesting Consult: Joce Booker MD  Reason for Consult / Principal Problem: symptom review, goals of care, support in serious illness  Hx and PE limited by: no limitations  HISTORY OF PRESENT ILLNESS:       Ebenezer Washington is a 68 y o  male who was admitted on 6/21 for a scheduled esophagectomy with Dr Tamika Galo  The procedure was unable to be completed as omental metastasis were identified in the OR  An exploratory lap was performed with omental biopsy  The patient was previously seen by Dr Francisco Jones of palliative care in April  He was initially diagnosed with stage IIIC esophageal cancer in January 2022  He was treated with Carboplatin and taxol  PET CT imaging revealed at least a partial response to therapy  He was then recommended for esophagectomy  The patient states that he understands they found more cancer and states he is not sure "how much time he has left" and knows that cancer is not curable  He would pursue any further treatment offered at this time in order to have more time  He has a stated goal of moving to [place] to be close to his sister  Will having ongoing goals of care conversations tomorrow  The patient has a j-tube which was previously placed for supplemental nutrition  He states that the pain he has is incisional and post operative which worsens with movement, coughing  Prior to surgery he was not experiencing pain  Explained his current opioid regimen and encouraged use of medications  He states when he gets the oxycodone 10 mg it is very effective for pain  He is clear that he does not like to take pills  He only wants medication when absolutely necessary  He has two daughters who are very supportive  He has talked to them both and they know that more cancer was found during surgery  Review of Systems   Constitutional: Positive for malaise/fatigue  Gastrointestinal: Positive for abdominal pain  All other systems reviewed and are negative  Past Medical History:   Diagnosis Date    Cancer [facility])     esophageal ca    Current smoker     since 15years old    History of blood transfusion     Pneumonia     Polio     Rib fractures      Past Surgical History:   Procedure Laterality Date    EGD      ESOPHAGOGASTRODUODENOSCOPY N/A 6/22/2022    Procedure: ESOPHAGOGASTRODUODENOSCOPY (EGD); Surgeon: Georgie Hashimoto, MD;  [facility];  Service: Surgical Oncology    FL GUIDED CENTRAL VENOUS ACCESS DEVICE INSERTION  02/16/2022    JEJUNOSTOMY FEEDING TUBE      LAPAROTOMY N/A 6/22/2022    Procedure: LAPAROTOMY EXPLORATORY; OMENTAL BIOPSY, TAP BLOCK;  Surgeon: Georgie Hashimoto, MD;  [facility];  Service: Surgical Oncology    TUNNELED VENOUS PORT PLACEMENT Left 02/16/2022    Procedure: INSERTION VENOUS PORT (PORT-A-CATH);   Surgeon: Georgie Hashimoto, MD;  [facility];  Service: Surgical Oncology     Social History     Socioeconomic History    Marital status: /Civil Union     Spouse name: Not on file    Number of children: Not on file    Years of education: Not on file    Highest education level: Not on file   Occupational History    Not on file   Tobacco Use    Smoking status: Heavy Tobacco Smoker     Packs/day: 1 00     Years: 40 00     Pack years: 40 00     Types: Cigarettes    Smokeless tobacco: Never Used    Tobacco comment: last smoked cigarettes 3 days ago   Vaping Use    Vaping Use: Never used   Substance and Sexual Activity    Alcohol use: Never     Alcohol/week: 0 0 standard drinks     Comment: 0    Drug use: Never    Sexual activity: Not Currently   Other Topics Concern    Not on file   Social History Narrative    Not on file     Social Determinants of Health     Financial Resource Strain: Not on file   Food Insecurity: No Food Insecurity    Worried About Running Out of Food in the Last Year: Never true    Ran Out of Food in the Last Year: Never true   Transportation Needs: No Transportation Needs    Lack of Transportation (Medical): No    Lack of Transportation (Non-Medical): No   Physical Activity: Not on file   Stress: Not on file   Social Connections: Not on file   Intimate Partner Violence: Not on file   Housing Stability: Low Risk     Unable to Pay for Housing in the Last Year: No    Number of Places Lived in the Last Year: 1    Unstable Housing in the Last Year: No     Family History   Problem Relation Age of Onset    Dementia Mother     Heart disease Father        MEDICATIONS / ALLERGIES:    current meds:   Current Facility-Administered Medications   Medication Dose Route Frequency    acetaminophen (TYLENOL) tablet 975 mg  975 mg Oral Q8H [address]    enoxaparin (LOVENOX) subcutaneous injection 40 mg  40 mg Subcutaneous Daily    gabapentin (NEURONTIN) capsule 100 mg  100 mg Oral TID    HYDROmorphone (DILAUDID) injection 0 5 mg  0 5 mg Intravenous Q3H PRN    labetalol (NORMODYNE) injection 10 mg  10 mg Intravenous Q4H PRN    methocarbamol (ROBAXIN) tablet 500 mg  500 mg Oral Q6H [address]    nicotine (NICODERM CQ) 7 mg/24hr TD 24 hr patch 1 patch  1 patch Transdermal Daily    ondansetron (ZOFRAN) injection 4 mg  4 mg Intravenous Q6H PRN    oxyCODONE (ROXICODONE) immediate release tablet 10 mg  10 mg Oral Q4H PRN    oxyCODONE (ROXICODONE) IR tablet 5 mg  5 mg Oral Q4H PRN    pantoprazole (PROTONIX) EC tablet 40 mg  40 mg Oral BID AC       No Known Allergies    OBJECTIVE:    Physical Exam  Physical Exam  Vitals and nursing note reviewed  Constitutional:       General: He is awake  Appearance: Normal appearance  He is not ill-appearing  HENT:      Head: Normocephalic and atraumatic  Mouth/Throat:      Lips: Pink        Mouth: Mucous membranes are moist    Eyes:      General: Lids are normal       Conjunctiva/sclera: Conjunctivae normal    Neck:      Trachea: Trachea normal    Cardiovascular: Rate and Rhythm: Normal rate  Pulmonary:      Effort: Pulmonary effort is normal  No prolonged expiration, respiratory distress or retractions  Breath sounds: Normal breath sounds  Abdominal:      Palpations: Abdomen is soft  Comments: Incision intact    Musculoskeletal:      Cervical back: Full passive range of motion without pain  Right lower leg: No edema  Left lower leg: No edema  Comments: Equal extremity strength  Skin:     General: Skin is warm and dry  Neurological:      General: No focal deficit present  Mental Status: He is alert and oriented to person, place, and time  GCS: GCS eye subscore is 4  GCS verbal subscore is 5  GCS motor subscore is 6  Motor: Motor function is intact  Psychiatric:         Attention and Perception: Attention and perception normal          Speech: Speech normal          Behavior: Behavior is cooperative  Lab Results:   CBC:   Lab Results   Component Value Date    WBC 8 84 06/23/2022    HGB 9 1 (L) 06/23/2022    HCT 28 0 (L) 06/23/2022     (H) 06/23/2022     (L) 06/23/2022    MCH 34 7 (H) 06/23/2022    MCHC 32 5 06/23/2022    RDW 15 8 (H) 06/23/2022    MPV 11 3 06/23/2022   , CMP:   Lab Results   Component Value Date    SODIUM 135 (L) 06/23/2022    K 4 0 06/23/2022     06/23/2022    CO2 28 06/23/2022    BUN 15 06/23/2022    CREATININE 0 75 06/23/2022    CALCIUM 8 7 06/23/2022    EGFR 89 06/23/2022   , PT/PTT:No results found for: PT, PTT      Counseling / Coordination of Care    Total floor / unit time spent today 60+ minutes  Greater than 50% of total time was spent with the patient and / or family counseling and / or coordination of care   A description of the counseling / coordination of care: review of chart, review of symptoms, supportive stening offered in formation, goals of care

## 2022-06-23 NOTE — CASE MANAGEMENT
Case Management Assessment    Patient name Shari Arreaga  [facility] [id number]  : 1945 Date 2022       Current Admission Date: 2022  Current Admission Diagnosis:Malignant neoplasm of lower third of esophagus [facility])   Patient Active Problem List    Diagnosis Date Noted    Encounter for geriatric assessment 2022    Anemia due to antineoplastic chemotherapy 2022    Cancer related pain 2022    Palliative care encounter 2022    Chronic pain 2022    Severe protein-calorie malnutrition ([facility] ) 2022    Left ankle pain 2022    Mild protein-calorie malnutrition ([facility] [facility] ) 2022    Malignant neoplasm of lower third of esophagus ([facility] [address] [facility] ) 2022    Weight loss 2022    Dysphagia 2022    Dermatitis 2022    Smoking 2019      LOS (days): 1  Geometric Mean LOS (GMLOS) (days): 4 60  Days to GMLOS:3 4     OBJECTIVE:    Risk of Unplanned Readmission Score: 13 4         Current admission status: Inpatient       Preferred Pharmacy:   [id number] [place], [place] - [address] [employer]  Phone: [phone] Fax: Hubert Ochoa  [facility], [facility] [address]  Phone: [phone] Fax: [phone]    Primary Care Provider: Paul Henry MD    Primary Insurance: [facility]  Secondary Insurance:     ASSESSMENT:  [address] [facility], [address]   Primary Phone: [phone] (Mobile)                 Patient Information  Admitted from[de-identified] Home  Mental Status: Alert  During Assessment patient was accompanied by: Not accompanied during assessment  Assessment information provided by[de-identified] Patient  Primary Caregiver: Self  Support Systems: [id number] entry access options   Select all that apply : Stairs  Number of steps to enter home : 1  Type of Current Residence: Bi-level  Upon entering residence, is there a bedroom on the main floor (no further steps)?: Yes  Upon entering residence, is there a bathroom on the main floor (no further steps)?: Yes  Homeless/housing insecurity resource given?: N/A  Living Arrangements: Lives Alone    Activities of Daily Living Prior to Admission  Functional Status: Independent  Completes ADLs independently?: Yes  Ambulates independently?: Yes  Does patient use assisted devices?: Yes  Assisted Devices (DME) used: Straight Cane  Does patient currently own DME?: Yes  What DME does the patient currently own?: Straight Cane, Wheelchair, Walker, Shower Chair  Does patient have a history of Outpatient Therapy (PT/OT)?: No  Does the patient have a history of Short-Term Rehab?: Yes  Does patient have a history of HHC?: Yes  Does patient currently have [facility]?: No    Patient Information Continued  Income Source: Pension/prison  Food insecurity resource given?: N/A  Does patient receive dialysis treatments?: No  Does patient have a history of substance abuse?: No  Does patient have a history of Mental Health Diagnosis?: No    Means of Transportation  Means of Transport to [facility][de-identified] Other (Comment) ([facility])  Was application for public transport provided?: N/A        Pt is independent with ADLs, only uses a cane but owns other DME equipments  Pt uses [facility] for doctors appointments, and has a history with STR, and HHC  No psych or drug/alochol treatment  Daughter will transport home once stable

## 2022-06-23 NOTE — PLAN OF CARE
Problem: Nutrition/Hydration-ADULT  Goal: Nutrient/Hydration intake appropriate for improving, restoring or maintaining nutritional needs  Description: Monitor and assess patient's nutrition/hydration status for malnutrition  Collaborate with interdisciplinary team and initiate plan and interventions as ordered  Monitor patient's weight and dietary intake as ordered or per policy  Utilize nutrition screening tool and intervene as necessary  Determine patient's food preferences and provide high-protein, high-caloric foods as appropriate       Pt started back on tube feed regimen from home    INTERVENTIONS:  - Monitor oral intake, urinary output, labs, and treatment plans  - Assess nutrition and hydration status and recommend course of action  - Evaluate amount of meals eaten  - Assist patient with eating if necessary   - Allow adequate time for meals  - Recommend/ encourage appropriate diets, oral nutritional supplements, and vitamin/mineral supplements  - Order, calculate, and assess calorie counts as needed  - Recommend, monitor, and adjust tube feedings and TPN/PPN based on assessed needs  - Assess need for intravenous fluids  - Provide specific nutrition/hydration education as appropriate  - Include patient/family/caregiver in decisions related to nutrition  Outcome: Progressing

## 2022-06-23 NOTE — PLAN OF CARE
Problem: PHYSICAL THERAPY ADULT  Goal: Performs mobility at highest level of function for planned discharge setting  See evaluation for individualized goals  Description: Treatment/Interventions: ADL retraining, Functional transfer training, LE strengthening/ROM, Patient/family training, Equipment eval/education, Bed mobility, Gait training, Spoke to nursing, Spoke to case management, OT  Equipment Recommended: Phyllis Lopez       See flowsheet documentation for full assessment, interventions and recommendations  Note: Prognosis: Good  Problem List: Decreased strength, Decreased endurance, Impaired balance, Decreased mobility, Pain  Assessment: Pt seen for high complexity PT evaluation  Pt with active PT eval/treat and up and OOB as tolerated orders  Pt is a 68 y o  male who was admitted to [facility] on 6/22/2022 with malignant neoplasm of lower third of esophagus s/p ex-lap  Pt's current dx/ problem list include: malnutrition, chronic pain, anemia, dyspahgia  Comorbidities affecting pt's physical performance at time of assessment are as follows:  has a past medical history of Cancer ([address] ), Current smoker, History of blood transfusion, Pneumonia, Polio, and Rib fractures  Personal factors affecting pt at time of initial examination include: steps to enter environment, limited home support, advanced age, past experience, inability to perform IADLs, inability to perform ADLs, inability to ambulate household distances  Due to acute medical issues, ongoing medical workup for primary dx; pain, fall risk, increased reliance on more restrictive AD compared to baseline;  decreased activity tolerance compared to baseline, increased assistance needed from caregiver at current time, multiple lines, decline in overall functional mobility status; health management issues; note unstable clinical picture (high complexity)   At baseline, pt resides alone in bi-level home with 2+ 8 TANI and was independent prior to hospital admission  Currently, upon initial examination, pt  is requiring mod A for bed mobility skills; min A for functional transfers and min A for ambulation with RW  Currently, pt presents functioning below baseline and with overall mobility deficits 2* to: decreased LE strength/AROM; limited flexibility;  generalized weakness/ deconditioning; decreased endurance; decreased activity tolerance; decreased coordination; impaired balance; gait deviations; fatigue; impaired safety and judgement; limited insight into current deficits; bed/ chair alarms; multiple lines;as well as : weakness, decreased ROM, impaired balance, decreased endurance, gait deviations, pain, decreased activity tolerance, decreased functional mobility tolerance and fall risk  Pt currently at increased risk for falls  At the end of evaluation, pt was left sitting OOB in bedside chair with all needs in reach  Pt would benefit from continued skilled PT services while in hospital to address remaining limitations and maximize functional potential  PT to continue to follow pt and recommends home with HHPT vs STR pending progress  The patient's AM-PAC Basic Mobility Inpatient Short Form Raw Score is 16  A Raw score of less than or equal to 16 suggests the patient may benefit from discharge to post-acute rehabilitation services  Please also refer to the recommendation of the Physical Therapist for safe discharge planning  Barriers to Discharge: Inaccessible home environment, Decreased caregiver support        PT Discharge Recommendation: Home with home health rehabilitation (vs STR pending progress)          See flowsheet documentation for full assessment

## 2022-06-23 NOTE — PROGRESS NOTES
Progress Note - Oncology Surgery   Honorio Sanchez 68 y o  male MRN: [id number]  Unit/Bed#: [facility] 928-01 Encounter: [id number]    Assessment:  67 yo M with esophageal cancer, s/p ex lap  Relatively low BP overnight but asymptomatic and urinated well  This morning BP improved to cXT111    Plan:   Regular diet   Wean IVF   DC stephens    IS/Ambulation   Pain medication adjustment - consider APS consult   Will start tube feed    Subjective/Objective     Subjective:   Pain is worse today, no other complaints    Objective:    Blood pressure 96/55, pulse 88, temperature 98 5 °F (36 9 °C), resp  rate 12, height 5' 10" (1 778 m), weight 64 9 kg (143 lb), SpO2 96 %  ,Body mass index is 20 52 kg/m²  Intake/Output Summary (Last 24 hours) at 6/23/2022 0616  Last data filed at 6/23/2022 0601  Gross per 24 hour   Intake 950 ml   Output 2640 ml   Net -1690 ml       Invasive Devices  Report    Central Venous Catheter Line  Duration           Port A Cath 02/16/22 Left Chest 126 days          Peripheral Intravenous Line  Duration           Peripheral IV 06/22/22 Dorsal (posterior); Right Forearm <1 day    Peripheral IV 06/22/22 Left Forearm <1 day    Peripheral IV 06/22/22 Right Forearm <1 day          Drain  Duration           Gastrostomy/Enterostomy PEG-jejunostomy 20 Fr    days    Urethral Catheter Temperature probe 16 Fr  <1 day                Physical Exam:   Gen:  NAD  CV:  warm, well-perfused  Lungs: nl effort  Abd:  soft, ND, appropriately tender, incision cdi  Ext:  no CCE  Neuro: A&Ox3               Invalid input(s): LABGLOM

## 2022-06-23 NOTE — PROGRESS NOTES
Pt noted to have a decrease in BP from prior shift  Pt denying lightheaded, dizziness, or any other symptoms  Tiger text sent to Dr Kourtney ho sx resident to notify  See orders  Will continue to monitor pt

## 2022-06-23 NOTE — PHYSICAL THERAPY NOTE
Physical Therapy Evaluation     Patient's Name: Isidoro Fairbanks    Admitting Diagnosis  Malignant neoplasm of lower third of esophagus ([address] ) [C15 5]    Problem List  Patient Active Problem List   Diagnosis    Smoking    Weight loss    Dysphagia    Dermatitis    Malignant neoplasm of lower third of esophagus (HCC)    Mild protein-calorie malnutrition (HCC)    Left ankle pain    Severe protein-calorie malnutrition ([address] )    Chronic pain    Cancer related pain    Palliative care encounter    Anemia due to antineoplastic chemotherapy    Encounter for geriatric assessment       Past Medical History  Past Medical History:   Diagnosis Date    Cancer [facility])     esophageal ca    Current smoker     since 15years old    History of blood transfusion     Pneumonia     Polio     Rib fractures        Past Surgical History  Past Surgical History:   Procedure Laterality Date    EGD      ESOPHAGOGASTRODUODENOSCOPY N/A 6/22/2022    Procedure: ESOPHAGOGASTRODUODENOSCOPY (EGD); Surgeon: Christopher Reed MD;  [facility]: [facility];  Service: Surgical Oncology    FL GUIDED CENTRAL VENOUS ACCESS DEVICE INSERTION  02/16/2022    JEJUNOSTOMY FEEDING TUBE      LAPAROTOMY N/A 6/22/2022    Procedure: LAPAROTOMY EXPLORATORY; OMENTAL BIOPSY, TAP BLOCK;  Surgeon: Christopher Reed MD;  [facility];  Service: Surgical Oncology    TUNNELED VENOUS PORT PLACEMENT Left 02/16/2022    Procedure: INSERTION VENOUS PORT (PORT-A-CATH); Surgeon: Christopher Reed MD;  [facility];  Service: Surgical Oncology        06/23/22 0841   PT Last Visit   PT Visit Date 06/23/22   Note Type   Note type Evaluation   Pain Assessment   Pain Assessment Tool 0-10   Pain Score 8   Pain Location/Orientation Location: Abdomen   Hospital Pain Intervention(s) Repositioned; Ambulation/increased activity; Emotional support   Restrictions/Precautions   Weight Bearing Precautions Per Order No   Other Precautions Multiple lines; Fall Risk;Pain   Home Living   Type of Home House Home Layout Two level  (bi-level home; 2+ 8 steps to main living area with bedroom/ bathroom)   Bathroom Shower/Tub Tub/shower unit   Bathroom Toilet Standard   Bathroom Equipment Grab bars in shower; Shower chair;Commode   Home Equipment Walker;Cane;Wheelchair-manual;Sock aid  (pt reports using SPC PTA)   Prior Function   Level of [place] Independent with ADLs and functional mobility   Lives With Alone   Receives Help From Family; [address] in the last 6 months 0   Vocational Retired   Comments + ; reports daughter is planning to stay with pt after d/c for a few days to provide increased assistance   Cognition   Overall Cognitive Status WFL   Arousal/Participation Cooperative   Attention Within functional limits   Orientation Level Oriented X4   Memory Within functional limits   Following Commands Follows one step commands without difficulty   Comments pt pleasant and cooperative to participate in therapy session   RLE Assessment   RLE Assessment   (functionally 4-/5)   LLE Assessment   LLE Assessment   (functionally 4-/5)   Bed Mobility   Supine to Sit 3  Moderate assistance   Additional items Assist x 1;HOB elevated; Increased time required;Verbal cues;LE management   Sit to Supine Unable to assess   Additional Comments pt supine in bed upon arrival  Pt returned to sitting OOB in bedside chair with all needs wihtin reach   Transfers   Sit to Stand 4  Minimal assistance   Additional items Assist x 1; Armrests; Increased time required;Verbal cues   Stand to Sit 4  Minimal assistance   Additional items Assist x 1; Armrests; Increased time required;Verbal cues   Additional Comments transfers with RW; cues for hand placement and sequencing   Ambulation/Elevation   Gait pattern Excessively slow; Foward flexed; Short stride;Decreased foot clearance; Improper Weight shift   Gait Assistance 4  Minimal assist   Additional items Assist x 1;Verbal cues; Tactile cues Assistive Device Rolling walker   Distance 8ft   Balance   Static Sitting Fair   Dynamic Sitting Fair   Static Standing Fair -   Dynamic Standing Poor +   Ambulatory Poor +   Endurance Deficit   Endurance Deficit Yes   Activity Tolerance   Activity Tolerance Patient limited by fatigue;Patient limited by pain   Medical Staff Made Aware OT; co-eval performed this date 2* increased medical complexity and multiple co-morbidities   Nurse Made Aware RN cleared pt to participate in therapy session   Assessment   Prognosis Good   Problem List Decreased strength;Decreased endurance; Impaired balance;Decreased mobility;Pain   Assessment Pt seen for high complexity PT evaluation  Pt with active PT eval/treat and up and OOB as tolerated orders  Pt is a 68 y o  male who was admitted to [facility] on 6/22/2022 with malignant neoplasm of lower third of esophagus s/p ex-lap  Pt's current dx/ problem list include: malnutrition, chronic pain, anemia, dyspahgia  Comorbidities affecting pt's physical performance at time of assessment are as follows:  has a past medical history of Cancer ([address] ), Current smoker, History of blood transfusion, Pneumonia, Polio, and Rib fractures  Personal factors affecting pt at time of initial examination include: steps to enter environment, limited home support, advanced age, past experience, inability to perform IADLs, inability to perform ADLs, inability to ambulate household distances  Due to acute medical issues, ongoing medical workup for primary dx; pain, fall risk, increased reliance on more restrictive AD compared to baseline;  decreased activity tolerance compared to baseline, increased assistance needed from caregiver at current time, multiple lines, decline in overall functional mobility status; health management issues; note unstable clinical picture (high complexity)  At baseline, pt resides alone in bi-level home with 2+ 8 TANI and was independent prior to hospital admission  Currently, upon initial examination, pt  is requiring mod A for bed mobility skills; min A for functional transfers and min A for ambulation with RW  Currently, pt presents functioning below baseline and with overall mobility deficits 2* to: decreased LE strength/AROM; limited flexibility;  generalized weakness/ deconditioning; decreased endurance; decreased activity tolerance; decreased coordination; impaired balance; gait deviations; fatigue; impaired safety and judgement; limited insight into current deficits; bed/ chair alarms; multiple lines;as well as : weakness, decreased ROM, impaired balance, decreased endurance, gait deviations, pain, decreased activity tolerance, decreased functional mobility tolerance and fall risk  Pt currently at increased risk for falls  At the end of evaluation, pt was left sitting OOB in bedside chair with all needs in reach  Pt would benefit from continued skilled PT services while in hospital to address remaining limitations and maximize functional potential  PT to continue to follow pt and recommends home with HHPT vs STR pending progress  The patient's AM-PAC Basic Mobility Inpatient Short Form Raw Score is 16  A Raw score of less than or equal to 16 suggests the patient may benefit from discharge to post-acute rehabilitation services  Please also refer to the recommendation of the Physical Therapist for safe discharge planning  Barriers to Discharge Inaccessible home environment;Decreased caregiver support   Goals   Patient Goals to have less pain   STG Expiration Date 07/07/22   Short Term Goal #1 STG 1  Pt will be able to perform bed mobility tasks with mod I in order to improve overall functional mobility and assist in safe d/c  STG 2  Pt with sit EOB for at least 25 minutes at mod I level in order to strengthen abdominal musculature and assist in future transfers/ ambulation  STG 3   Pt will be able to perform functional transfer with mod I in order to improve overall functional mobility and assist in safe d/c  STG 4  Pt will be able to ambulate at least 250 feet with least restrictive device with mod I A in order to improve overall functional mobility and assist in safe d/c  STG 5  Pt will improve sitting/standing static/dynamic balance 1/2 grade in order to improve functional mobility and assist in safe d/c  STG 6  Pt will improve LE strength by 1/2 grade in order to improve functional mobility and assist in safe d/c  STG 7  Pt will be able to negotiate at least 8 stairs with least restrictive device with mod I A in order to improve overall functional mobility and assist in safe d/c    PT Treatment Day 0   Plan   Treatment/Interventions ADL retraining;Functional transfer training;LE strengthening/ROM; Patient/family training;Equipment eval/education; Bed mobility;Gait training;Spoke to nursing;Spoke to case management;OT   PT Frequency 3-5x/wk   Recommendation   PT Discharge Recommendation Home with home health rehabilitation  (vs STR pending progress)   Equipment Recommended Walker   Additional Comments (S)  not cleared for d/c home pending further ambulation and stair training   [address]   Turning in Bed Without Bedrails 3   Lying on Back to Sitting on Edge of Flat Bed 2   Moving Bed to Chair 3   Standing Up From Chair 3   Walk in Room 3   Climb 3-5 Stairs 2   Basic Mobility Inpatient Raw Score 16   Basic Mobility Standardized Score 38 32   Highest Level Of Mobility   -HLM Goal 5: Stand one or more mins   JH-HLM Achieved 5: Stand (1 or more minutes)           Marguerite Santiago, PT

## 2022-06-23 NOTE — PLAN OF CARE
Problem: OCCUPATIONAL THERAPY ADULT  Goal: Performs self-care activities at highest level of function for planned discharge setting  See evaluation for individualized goals  Description: Treatment Interventions: ADL retraining, Functional transfer training, UE strengthening/ROM, Endurance training, Patient/family training, Equipment evaluation/education, Compensatory technique education, Continued evaluation, Energy conservation, Activityengagement          See flowsheet documentation for full assessment, interventions and recommendations  Outcome: Progressing  Note: Limitation: Decreased ADL status, Decreased UE ROM, Decreased UE strength, Decreased Safe judgement during ADL, Decreased endurance, Decreased self-care trans, Decreased high-level ADLs  Prognosis: Good  Assessment: Pt is 68 y o  M presented to [facility] with esophageal cancer, s/p ex lap  Pt  has a past medical history of malignant neoplasm of lower third esophagus, malnutrition, dysphagia, and chronic pain  Pt lives alone in 2 story home with 3 + 8 TANI, pt reports bed/bath on second floor  PTA pt I with ADLs, IADLs, and + driving  Pt with active OT orders and up and OOB as tolerated activity orders  Pt seen as co-evaluation with PT due to comorbidities, medical complexity, and current deficits  Pt presents to session supine in bed  Pt performed Mod A x1 for bed mobility, Min A STS trx and fnxl mobility with RW  Pt currently requires Min A for UB/LB ADLs, I for eating and S for grooming  Pt limited by decreased endurance, L shoulder ROM, activity tolerance, fnxl mobility, and independence with ADLs/IADLs  Occupational performance areas to be addressed include dressing, bathing, grooming, and fnxl mobility  Pt to benefit from skilled OT services to address above deficits and improve overall independence, and maximize performance  OT recommendation at time of d/c to home with home OT, pending progress   The patient's raw score on the AM-PAC Daily Activity inpatient short form is 19, standardized score is 40 22, greater than 39 4  Patients at this level are likely to benefit from discharge to home  Please refer to the recommendation of the Occupational Therapist for safe discharge planning       OT Discharge Recommendation: Home with home health rehabilitation (pending progress)

## 2022-06-23 NOTE — PROGRESS NOTES
Progress Note - Oncology Surgery   Derl Nip 68 y o  male MRN: [id number]  Unit/Bed#: [facility] 928-01 Encounter: [id number]    Assessment:  69 yo M with esophageal cancer, s/p ex lap  Relatively low BP overnight but asymptomatic and urinated well  This morning BP improved to [id number]     Plan:  · Regular diet  · Wean IVF  · DC stephens   · IS/Ambulation  · Pain medication adjustment       Subjective/Objective     Subjective:   Complains pain around incision  No other complaints    Objective:    Blood pressure 96/55, pulse 88, temperature 98 5 °F (36 9 °C), resp  rate 12, height 5' 10" (1 778 m), weight 64 9 kg (143 lb), SpO2 96 %  ,Body mass index is 20 52 kg/m²  Intake/Output Summary (Last 24 hours) at 6/23/2022 7239  Last data filed at 6/23/2022 0601  Gross per 24 hour   Intake 950 ml   Output 2640 ml   Net -1690 ml       Invasive Devices  Report    Central Venous Catheter Line  Duration           Port A Cath 02/16/22 Left Chest 126 days          Peripheral Intravenous Line  Duration           Peripheral IV 06/22/22 Dorsal (posterior); Right Forearm <1 day    Peripheral IV 06/22/22 Left Forearm <1 day    Peripheral IV 06/22/22 Right Forearm <1 day          Drain  Duration           Gastrostomy/Enterostomy PEG-jejunostomy 20 Fr    days    Urethral Catheter Temperature probe 16 Fr  <1 day                Physical Exam:   Gen:  NAD  CV:  warm, well-perfused  Lungs: nl effort  Abd:  soft, appropriate T/ND, incision cdi  Ext:  no CCE  Neuro: A&Ox3               Invalid input(s): LABGLOM

## 2022-06-24 LAB
ABO GROUP BLD BPU: NORMAL
ABO GROUP BLD BPU: NORMAL
BPU ID: NORMAL
BPU ID: NORMAL
CROSSMATCH: NORMAL
CROSSMATCH: NORMAL
UNIT DISPENSE STATUS: NORMAL
UNIT DISPENSE STATUS: NORMAL
UNIT PRODUCT CODE: NORMAL
UNIT PRODUCT CODE: NORMAL
UNIT PRODUCT VOLUME: 350 ML
UNIT PRODUCT VOLUME: 350 ML
UNIT RH: NORMAL
UNIT RH: NORMAL

## 2022-06-24 PROCEDURE — 99024 POSTOP FOLLOW-UP VISIT: CPT | Performed by: SURGERY

## 2022-06-24 PROCEDURE — 97110 THERAPEUTIC EXERCISES: CPT

## 2022-06-24 PROCEDURE — 97116 GAIT TRAINING THERAPY: CPT

## 2022-06-24 PROCEDURE — 99232 SBSQ HOSP IP/OBS MODERATE 35: CPT | Performed by: NURSE PRACTITIONER

## 2022-06-24 RX ADMIN — METHOCARBAMOL 500 MG: 500 TABLET, FILM COATED ORAL at 00:12

## 2022-06-24 RX ADMIN — GABAPENTIN 100 MG: 100 CAPSULE ORAL at 21:30

## 2022-06-24 RX ADMIN — METHOCARBAMOL 500 MG: 500 TABLET, FILM COATED ORAL at 05:07

## 2022-06-24 RX ADMIN — OXYCODONE HYDROCHLORIDE 10 MG: 10 TABLET ORAL at 02:55

## 2022-06-24 RX ADMIN — ACETAMINOPHEN 975 MG: 325 TABLET, FILM COATED ORAL at 17:28

## 2022-06-24 RX ADMIN — METHOCARBAMOL 500 MG: 500 TABLET, FILM COATED ORAL at 11:24

## 2022-06-24 RX ADMIN — OXYCODONE HYDROCHLORIDE 10 MG: 10 TABLET ORAL at 09:40

## 2022-06-24 RX ADMIN — PANTOPRAZOLE SODIUM 40 MG: 40 TABLET, DELAYED RELEASE ORAL at 17:25

## 2022-06-24 RX ADMIN — GABAPENTIN 100 MG: 100 CAPSULE ORAL at 09:40

## 2022-06-24 RX ADMIN — GABAPENTIN 100 MG: 100 CAPSULE ORAL at 17:25

## 2022-06-24 RX ADMIN — PANTOPRAZOLE SODIUM 40 MG: 40 TABLET, DELAYED RELEASE ORAL at 05:08

## 2022-06-24 RX ADMIN — ACETAMINOPHEN 975 MG: 325 TABLET, FILM COATED ORAL at 05:08

## 2022-06-24 RX ADMIN — ENOXAPARIN SODIUM 40 MG: 40 INJECTION SUBCUTANEOUS at 09:40

## 2022-06-24 RX ADMIN — METHOCARBAMOL 500 MG: 500 TABLET, FILM COATED ORAL at 17:25

## 2022-06-24 NOTE — CASE MANAGEMENT
Case Management Discharge Planning Note    Patient name Daniel Bess  Location [facility] 928/[facility] 211-40 MRN [id number]  : 1945 Date 2022       Current Admission Date: 2022  Current Admission Diagnosis:Malignant neoplasm of lower third of esophagus [facility])   Patient Active Problem List    Diagnosis Date Noted    Encounter for geriatric assessment 2022    Anemia due to antineoplastic chemotherapy 2022    Cancer related pain 2022    Palliative care encounter 2022    Chronic pain 2022    Severe protein-calorie malnutrition ([facility] [address] ) 2022    Left ankle pain 2022    Mild protein-calorie malnutrition ([facility] [address] ) 2022    Malignant neoplasm of lower third of esophagus ([facility] [address] ) 2022    Weight loss 2022    Dysphagia 2022    Dermatitis 2022    Smoking 2019      LOS (days): 2  Geometric Mean LOS (GMLOS) (days): 4 60  Days to GMLOS:2 3     OBJECTIVE:  Risk of Unplanned Readmission Score: 13 61         Current admission status: Inpatient   Preferred Pharmacy:   [facility][id number]- [place], [place] - [address] [facility] [address]  Phone: [phone] Fax: [address], [address] [facility] [address] [email or web address]  Phone: [phone] Fax: [phone]    Primary Care Provider: Ana Bailey MD    Primary Insurance: [facility]  Secondary Insurance:     DISCHARGE DETAILS:    Discharge planning discussed with[de-identified] Pt  Freedom of Choice: Yes  Comments - Freedom of Choice: pt refused STR and requested d/c home w/ [facility]   Referral sent as requested via [facility] [address]     Were Treatment Team discharge recommendations reviewed with patient/caregiver?: Yes  Did patient/caregiver verbalize understanding of patient care needs?: Yes  Were patient/caregiver advised of the risks associated with not following Treatment Team discharge recommendations?: Yes         Requested Home Health Care         Is the patient interested in [facility] at discharge?: Yes  [address] requested[de-identified] Nursing, Occupational Therapy, Physical [address] Name[de-identified] [facility] Provider[de-identified] PCP  Home Health Services Needed[de-identified] Evaluate Functional Status and Safety, Gait/ADL Training, Post-Op Care and Assessment, Strengthening/Theraputic Exercises to Improve Function  Homebound Criteria Met[de-identified] Requires the Assistance of Another Person for Safe Ambulation or to Leave the Home, Uses an Assist Device (i e  cane, walker, etc)  Supporting Clincal Findings[de-identified] Limited Endurance         Other Referral/Resources/Interventions Provided:  Interventions: [facility]         Treatment Team Recommendation: Short Term Rehab  Discharge Destination Plan[de-identified] Home with [facility]

## 2022-06-24 NOTE — PLAN OF CARE
Problem: PHYSICAL THERAPY ADULT  Goal: Performs mobility at highest level of function for planned discharge setting  See evaluation for individualized goals  Description: Treatment/Interventions: ADL retraining, Functional transfer training, LE strengthening/ROM, Patient/family training, Equipment eval/education, Bed mobility, Gait training, Spoke to nursing, Spoke to case management, OT  Equipment Recommended: Nima Bo       See flowsheet documentation for full assessment, interventions and recommendations  Outcome: Progressing  Note: Prognosis: Good  Problem List: Decreased strength, Decreased endurance, Impaired balance, Decreased mobility, Pain  Assessment: Pt seen for PT treatment session this date  Therapy session focused on functional transfers, gait training, therapeutic exercise and endurance training in order to improve overall mobility and independence  Pt requires assist of 1 for all mobility performed this date  Pt requires increased A this date to complete STS to RW  Pt ambulating increased distances with min A using RW; cues for upright posture required  Pt making progress toward goals  Pt was left sitting OOB in bedside chiar at the end of PT session with all needs in reach  Pt would benefit from continued PT services while in hospital to address remaining limitations  PT to continue to follow pt and recommends rehab pending progress  The patient's AM-PAC Basic Mobility Inpatient Short Form Raw Score is 15  A Raw score of less than or equal to 16 suggests the patient may benefit from discharge to post-acute rehabilitation services  Please also refer to the recommendation of the Physical Therapist for safe discharge planning  Barriers to Discharge: Inaccessible home environment, Decreased caregiver support        PT Discharge Recommendation: Post acute rehabilitation services (pending progress)          See flowsheet documentation for full assessment

## 2022-06-24 NOTE — PROGRESS NOTES
Progress Note - Surgical Oncology  Jc Restrepo 68 y o  male MRN: [id number]  [facility] Encounter: [id number]    Assessment:  68 M status post exploratory laparotomy and biopsy of omental implants, found to have metastatic esophageal carcinoma  VS: avss, ROOM AIR     TF: Jevity 1 5, 90cc/hr for 12 hr cycled, 95cc FWF     No AM Labs    Plan:  -diet as tolerated  -continue cycled tube feeds  -continue oxycodone  for pain, APS input appreciated  -out of bed, ambulate  -pulmonary toilet  -DVT prophylaxis  -disposition planning: Plan to discharge home once pain adequately controlled, we will continue outpatient cycle tube feeds    Subjective/Objective       Subjective: No acute events overnight, passing flatus and ambulating, pain controlled on oral medication     Objective:     Blood pressure 140/78, pulse 84, temperature 98 8 °F (37 1 °C), resp  rate 18, height 5' 10" (1 778 m), weight 64 9 kg (143 lb), SpO2 95 %  ,Body mass index is 20 52 kg/m²  Intake/Output Summary (Last 24 hours) at 6/25/2022 8013  Last data filed at 6/25/2022 0401  Gross per 24 hour   Intake 180 ml   Output 525 ml   Net -345 ml       Invasive Devices  Report    Central Venous Catheter Line  Duration           Port A Cath 02/16/22 Left Chest 128 days          Peripheral Intravenous Line  Duration           Peripheral IV 06/22/22 Dorsal (posterior); Right Forearm 2 days    Peripheral IV 06/22/22 Left Forearm 2 days    Peripheral IV 06/22/22 Right Forearm 2 days          Drain  Duration           Gastrostomy/Enterostomy PEG-jejunostomy 20 Fr    days                Physical Exam:     General: NAD  HEENT: normocephalic, atraumatic   Cardiovascular: RRR  Respiratory: No distress, room air   Abdomen: soft, appropriately tender, minimally distended, incision c/d/i, J tube infusing feeds  Extremities: no clubbing, no cyanosis, chronic toe contractures   Neuro: AAOx3  Skin: warm, dry       Lab, Imaging and other studies:No labs  VTE Pharmacologic Prophylaxis: Enoxaparin (Lovenox)  VTE Mechanical Prophylaxis: sequential compression device

## 2022-06-24 NOTE — PHYSICAL THERAPY NOTE
PHYSICAL THERAPY NOTE          Patient Name: Susan Reid  [id number]'[id number] Date: 6/24/2022 06/24/22 1034   PT Last Visit   PT Visit Date 06/24/22   Note Type   Note Type Treatment   Pain Assessment   Pain Assessment Tool 0-10   Pain Score 8   Pain Location/Orientation Location: Abdomen   Hospital Pain Intervention(s) Repositioned; Ambulation/increased activity; Emotional support   Restrictions/Precautions   Weight Bearing Precautions Per Order No   Other Precautions Multiple lines; Fall Risk;Pain   General   Chart Reviewed Yes   Response to Previous Treatment Patient with no complaints from previous session  Family/Caregiver Present No   Cognition   Overall Cognitive Status WFL   Arousal/Participation Responsive; Cooperative   Attention Within functional limits   Orientation Level Oriented X4   Memory Within functional limits   Following Commands Follows one step commands without difficulty   Comments pt very pleasant and cooperative to participate in therapy session  Pt limited by pain this date   Bed Mobility   Supine to Sit Unable to assess   Sit to Supine Unable to assess   Additional Comments pt sitting EOB upon arrival  Pt sitting OOB in bedside chair with all needs wihtin reach   Transfers   Sit to Stand 3  Moderate assistance   Additional items Assist x 1; Increased time required;Verbal cues   Stand to Sit 4  Minimal assistance   Additional items Assist x 1; Increased time required;Verbal cues   Additional Comments transfers with RW; cues for hand placement and sequencing  Pt completes 2x STS throughout therapy session   Ambulation/Elevation   Gait pattern Excessively slow; Short stride; Foward flexed;Decreased foot clearance; Improper Weight shift; Forward Flexion   Gait Assistance 4  Minimal assist   Additional items Assist x 1;Verbal cues; Tactile cues   Assistive Device Rolling walker   Distance 2 x 60ft   Balance   Static Sitting Fair   Dynamic Sitting Fair   Static Standing Fair -   Dynamic Standing Poor +   Ambulatory Poor +   Endurance Deficit   Endurance Deficit Yes   Endurance Deficit Description pain, deconditioning, fatigue, generalized weakness   Activity Tolerance   Activity Tolerance Patient limited by fatigue;Patient limited by pain   Nurse Made Aware RN cleared pt to participate in therapy session   Exercises   Hip Abduction Sitting;Bilateral;10 reps;AROM   Hip Adduction Sitting;Bilateral;10 reps;AROM   Knee AROM Long Arc Quad Sitting;Bilateral;AROM;10 reps   Marching Sitting;Bilateral;10 reps;AROM   Assessment   Prognosis Good   Problem List Decreased strength;Decreased endurance; Impaired balance;Decreased mobility;Pain   Assessment Pt seen for PT treatment session this date  Therapy session focused on functional transfers, gait training, therapeutic exercise and endurance training in order to improve overall mobility and independence  Pt requires assist of 1 for all mobility performed this date  Pt requires increased A this date to complete STS to RW  Pt ambulating increased distances with min A using RW; cues for upright posture required  Pt making progress toward goals  Pt was left sitting OOB in bedside chiar at the end of PT session with all needs in reach  Pt would benefit from continued PT services while in hospital to address remaining limitations  PT to continue to follow pt and recommends rehab pending progress  The patient's AM-PAC Basic Mobility Inpatient Short Form Raw Score is 15  A Raw score of less than or equal to 16 suggests the patient may benefit from discharge to post-acute rehabilitation services  Please also refer to the recommendation of the Physical Therapist for safe discharge planning     Barriers to Discharge Inaccessible home environment;Decreased caregiver support   Goals   Patient Goals to have less pain   STG Expiration Date 07/07/22   PT Treatment Day 1   Plan   Treatment/Interventions ADL retraining;Functional transfer training;LE strengthening/ROM; Therapeutic exercise; Endurance training;Patient/family training;Equipment eval/education;Gait training;Spoke to nursing;Spoke to case management   Progress Progressing toward goals   PT Frequency 3-5x/wk   Recommendation   PT Discharge Recommendation Post acute rehabilitation services  (pending progress)   Equipment Recommended [facility] Recommended Wheeled walker   Additional Comments cleared to d/c to STR once medically stable   AM-PAC Basic Mobility Inpatient   Turning in Bed Without Bedrails 3   Lying on Back to Sitting on Edge of Flat Bed 2   Moving Bed to Chair 3   Standing Up From Chair 2   Walk in Room 3   Climb 3-5 Stairs 2   Basic Mobility Inpatient Raw Score 15   Basic Mobility Standardized Score 36 97   Highest Level Of Mobility   JH-HLM Goal 4: Move to chair/commode   JH-HLM Achieved 7: Walk 25 feet or more   Education   Education Provided Mobility training;Home exercise program;Assistive device   Patient Demonstrates acceptance/verbal understanding   End of Consult   Patient Position at End of Consult Bedside chair; All needs within reach     Giovanni Vang, PT, DPT

## 2022-06-24 NOTE — PROGRESS NOTES
Progress note - Palliative and Supportive Care   Elda Álvarez 68 y o  male [id number]    Patient Active Problem List   Diagnosis    Smoking    Weight loss    Dysphagia    Dermatitis    Malignant neoplasm of lower third of esophagus (HCC)    Mild protein-calorie malnutrition (HCC)    Left ankle pain    Severe protein-calorie malnutrition (HCC)    Chronic pain    Cancer related pain    Palliative care encounter    Anemia due to antineoplastic chemotherapy    Encounter for geriatric assessment     Active issues specifically addressed today include:   metastatic esophageal cancer  Cancer related pain  Severe protein calorie malnutrition     Plan:  1  Symptom management -  Cancer related pain/postoperative  -  Oxycodone 5mg -10 mg Q 4 hours prn (encouraged use)  -  Hydromorphone 0 5 mg for breakthrough pain (no use in past 24 hours)                2  Goals -   -  Full cares no limits, ongoing goals of care conversations  -  Patient's current goal is to pursue treatments offered and move to [place]   -  Patient states he has an advanced directive, requested copy  Code Status: Full code  - Level 1   Decisional apparatus:  Patient is competent on my exam today  If competence is lost, patient's substitute decision maker would default to adult children [place]  Advance Directive / Living Will / POLST:  Copy requested     Interval history:       Patient states pain is controlled  States he feels better today than yesterday  He is looking forward to getting OOB with PT today  Discussed outpatient follow up with Palliative care, patient is reluctant  He feels his symptoms are currently well controlled and states his wishes are clear in his advanced directive  The patient was appreciative of visit        MEDICATIONS / ALLERGIES:     current meds:   Current Facility-Administered Medications   Medication Dose Route Frequency    acetaminophen (TYLENOL) tablet 975 mg  975 mg Oral Q8H [address]    enoxaparin (LOVENOX) subcutaneous injection 40 mg  40 mg Subcutaneous Daily    gabapentin (NEURONTIN) capsule 100 mg  100 mg Oral TID    HYDROmorphone (DILAUDID) injection 0 5 mg  0 5 mg Intravenous Q3H PRN    labetalol (NORMODYNE) injection 10 mg  10 mg Intravenous Q4H PRN    methocarbamol (ROBAXIN) tablet 500 mg  500 mg Oral Q6H [address]    nicotine (NICODERM CQ) 7 mg/24hr TD 24 hr patch 1 patch  1 patch Transdermal Daily    ondansetron (ZOFRAN) injection 4 mg  4 mg Intravenous Q6H PRN    oxyCODONE (ROXICODONE) immediate release tablet 10 mg  10 mg Oral Q4H PRN    oxyCODONE (ROXICODONE) IR tablet 5 mg  5 mg Oral Q4H PRN    pantoprazole (PROTONIX) EC tablet 40 mg  40 mg Oral BID AC       No Known Allergies    OBJECTIVE:    Physical Exam  Physical Exam  Vitals and nursing note reviewed  Constitutional:       General: He is awake  Appearance: He is normal weight  HENT:      Head: Normocephalic and atraumatic  Jaw: There is normal jaw occlusion  Mouth/Throat:      Lips: Pink  Mouth: Mucous membranes are moist    Eyes:      General: Lids are normal       Extraocular Movements: Extraocular movements intact  Cardiovascular:      Rate and Rhythm: Normal rate and regular rhythm  Pulses: Normal pulses  Pulmonary:      [place]: Pulmonary effort is normal  No prolonged expiration, respiratory distress or retractions  Abdominal:      Comments: Incision intact MARGRET   Musculoskeletal:      Cervical back: Full passive range of motion without pain  Skin:     General: Skin is warm and dry  Neurological:      General: No focal deficit present  Mental Status: He is alert and oriented to person, place, and time  GCS: GCS eye subscore is 4  GCS verbal subscore is 5  GCS motor subscore is 6  Psychiatric:         Attention and Perception: Attention normal          Mood and Affect: Mood normal          Behavior: Behavior is cooperative           Cognition and Memory: Cognition and memory normal          Lab Results: CBC: No results found for: WBC, HGB, HCT, MCV, PLT, ADJUSTEDWBC, MCH, MCHC, RDW, MPV, NRBC, CMP: No results found for: SODIUM, K, CL, CO2, ANIONGAP, BUN, CREATININE, GLUCOSE, CALCIUM, AST, ALT, ALKPHOS, PROT, BILITOT, EGFR, PT/PTT:No results found for: PT, PTT      Counseling / Coordination of Care    Total floor / unit time spent today 25+  minutes  Greater than 50% of total time was spent with the patient and / or family counseling and / or coordination of care   A description of the counseling / coordination of care: review of symptoms, supportive listening, offered explanation/information,

## 2022-06-24 NOTE — PROGRESS NOTES
Progress Note - Surgical Oncology  Darrell Keen 68 y o  male MRN: [id number]  Unit/Bed#: [facility] 928-01 Encounter: [id number]      Objective:  Patient better pain controlled this morning  Denies nausea, no emesis  Said he ate a small amount of a regular diet yesterday  His cycle tube feeds have been started and are running 12 hour cycles  Patient was not able to get out of bed with minimal assistance this morning  Patient is urinating well on his own     2450 UA    Blood pressure 99/68, pulse (!) 116, temperature 100 °F (37 8 °C), resp  rate 18, height 5' 10" (1 778 m), weight 64 9 kg (143 lb), SpO2 95 %  ,Body mass index is 20 52 kg/m²  Intake/Output Summary (Last 24 hours) at 6/24/2022 0515  Last data filed at 6/24/2022 0300  Gross per 24 hour   Intake --   Output 3245 ml   Net -3245 ml       Invasive Devices  Report    Central Venous Catheter Line  Duration           Port A Cath 02/16/22 Left Chest 127 days          Peripheral Intravenous Line  Duration           Peripheral IV 06/22/22 Dorsal (posterior); Right Forearm 1 day    Peripheral IV 06/22/22 Left Forearm 1 day    Peripheral IV 06/22/22 Right Forearm 1 day          Drain  Duration           Gastrostomy/Enterostomy PEG-jejunostomy 20 Fr   days                Physical Exam:   Abdomen:  Is soft, does not appear distended, jejunostomy tube feeds are running at present, jejunostomy tube appears healthy, no erythema, surgical wound is clean and dry  Extremities:  No calf tenderness bilaterally and no pedal edema bilaterally, does have arthritic changes to the toes    Lab, Imaging and other studies:  Lab holiday today  VTE Pharmacologic Prophylaxis: Enoxaparin (Lovenox)  VTE Mechanical Prophylaxis: sequential compression device    Assessment:  POD # 2 exploratory laparotomy aborted esophagectomy due to omental Mets  Malnutrition    Plan:  1  Continued cycle tube feeds  2  Appreciate palliative Care consult for pain management  3   Continue regular diet whatever patient would like to eat  4  Continue to work with incentive spirometry  5  Continue Lovenox for DVT prophylaxis  6   Home soon

## 2022-06-25 PROCEDURE — 99024 POSTOP FOLLOW-UP VISIT: CPT | Performed by: SURGERY

## 2022-06-25 RX ADMIN — PANTOPRAZOLE SODIUM 40 MG: 40 TABLET, DELAYED RELEASE ORAL at 17:00

## 2022-06-25 RX ADMIN — METHOCARBAMOL 500 MG: 500 TABLET, FILM COATED ORAL at 05:23

## 2022-06-25 RX ADMIN — ENOXAPARIN SODIUM 40 MG: 40 INJECTION SUBCUTANEOUS at 08:07

## 2022-06-25 RX ADMIN — ONDANSETRON 4 MG: 2 INJECTION INTRAMUSCULAR; INTRAVENOUS at 22:25

## 2022-06-25 RX ADMIN — GABAPENTIN 100 MG: 100 CAPSULE ORAL at 21:01

## 2022-06-25 RX ADMIN — OXYCODONE HYDROCHLORIDE 5 MG: 5 TABLET ORAL at 21:01

## 2022-06-25 RX ADMIN — METHOCARBAMOL 500 MG: 500 TABLET, FILM COATED ORAL at 17:00

## 2022-06-25 RX ADMIN — ACETAMINOPHEN 975 MG: 325 TABLET, FILM COATED ORAL at 21:01

## 2022-06-25 RX ADMIN — ACETAMINOPHEN 975 MG: 325 TABLET, FILM COATED ORAL at 00:33

## 2022-06-25 RX ADMIN — PANTOPRAZOLE SODIUM 40 MG: 40 TABLET, DELAYED RELEASE ORAL at 08:07

## 2022-06-25 RX ADMIN — GABAPENTIN 100 MG: 100 CAPSULE ORAL at 17:00

## 2022-06-25 RX ADMIN — ACETAMINOPHEN 975 MG: 325 TABLET, FILM COATED ORAL at 05:23

## 2022-06-25 RX ADMIN — METHOCARBAMOL 500 MG: 500 TABLET, FILM COATED ORAL at 13:59

## 2022-06-25 RX ADMIN — ACETAMINOPHEN 975 MG: 325 TABLET, FILM COATED ORAL at 13:59

## 2022-06-25 RX ADMIN — GABAPENTIN 100 MG: 100 CAPSULE ORAL at 08:07

## 2022-06-25 RX ADMIN — METHOCARBAMOL 500 MG: 500 TABLET, FILM COATED ORAL at 00:33

## 2022-06-25 RX ADMIN — OXYCODONE HYDROCHLORIDE 5 MG: 5 TABLET ORAL at 17:00

## 2022-06-25 NOTE — PLAN OF CARE
Problem: MOBILITY - ADULT  Goal: Maintain or return to baseline ADL function  Description: INTERVENTIONS:  -  Assess patient's ability to carry out ADLs; assess patient's baseline for ADL function and identify physical deficits which impact ability to perform ADLs (bathing, care of mouth/teeth, toileting, grooming, dressing, etc )  - Assess/evaluate cause of self-care deficits   - Assess range of motion  - Assess patient's mobility; develop plan if impaired  - Assess patient's need for assistive devices and provide as appropriate  - Encourage maximum independence but intervene and supervise when necessary  - Involve family in performance of ADLs  - Assess for home care needs following discharge   - Consider OT consult to assist with ADL evaluation and planning for discharge  - Provide patient education as appropriate  Outcome: Progressing  Goal: Maintains/Returns to pre admission functional level  Description: INTERVENTIONS:  - Perform BMAT or MOVE assessment daily    - Set and communicate daily mobility goal to care team and patient/family/caregiver  - Collaborate with rehabilitation services on mobility goals if consulted  - Perform Range of Motion 3 times a day  - Reposition patient every 3 hours    - Dangle patient 3 times a day  - Stand patient 3 times a day  - Ambulate patient 3 times a day  - Out of bed to chair 3 times a day   - Out of bed for meals 3 times a day  - Out of bed for toileting  - Record patient progress and toleration of activity level   Outcome: Progressing     Problem: PAIN - ADULT  Goal: Verbalizes/displays adequate comfort level or baseline comfort level  Description: Interventions:  - Encourage patient to monitor pain and request assistance  - Assess pain using appropriate pain scale  - Administer analgesics based on type and severity of pain and evaluate response  - Implement non-pharmacological measures as appropriate and evaluate response  - Consider cultural and social influences on pain and pain management  - Notify physician/advanced practitioner if interventions unsuccessful or patient reports new pain  Outcome: Progressing     Problem: INFECTION - ADULT  Goal: Absence or prevention of progression during hospitalization  Description: INTERVENTIONS:  - Assess and monitor for signs and symptoms of infection  - Monitor lab/diagnostic results  - Monitor all insertion sites, i e  indwelling lines, tubes, and drains  - Monitor endotracheal if appropriate and nasal secretions for changes in amount and color  - [place] appropriate cooling/warming therapies per order  - Administer medications as ordered  - Instruct and encourage patient and family to use good hand hygiene technique  - Identify and instruct in appropriate isolation precautions for identified infection/condition  Outcome: Progressing  Goal: Absence of fever/infection during neutropenic period  Description: INTERVENTIONS:  - Monitor WBC    Outcome: Progressing     Problem: SAFETY ADULT  Goal: Maintain or return to baseline ADL function  Description: INTERVENTIONS:  -  Assess patient's ability to carry out ADLs; assess patient's baseline for ADL function and identify physical deficits which impact ability to perform ADLs (bathing, care of mouth/teeth, toileting, grooming, dressing, etc )  - Assess/evaluate cause of self-care deficits   - Assess range of motion  - Assess patient's mobility; develop plan if impaired  - Assess patient's need for assistive devices and provide as appropriate  - Encourage maximum independence but intervene and supervise when necessary  - Involve family in performance of ADLs  - Assess for home care needs following discharge   - Consider OT consult to assist with ADL evaluation and planning for discharge  - Provide patient education as appropriate  Outcome: Progressing  Goal: Maintains/Returns to pre admission functional level  Description: INTERVENTIONS:  - Perform BMAT or MOVE assessment daily    - Set and communicate daily mobility goal to care team and patient/family/caregiver  - Collaborate with rehabilitation services on mobility goals if consulted  - Perform Range of Motion 3 times a day  - Reposition patient every 3 hours    - Dangle patient 3 times a day  - Stand patient 3 times a day  - Ambulate patient 3 times a day  - Out of bed to chair 3 times a day   - Out of bed for meals 3 times a day  - Out of bed for toileting  - Record patient progress and toleration of activity level   Outcome: Progressing  Goal: Patient will remain free of falls  Description: INTERVENTIONS:  - Educate patient/family on patient safety including physical limitations  - Instruct patient to call for assistance with activity   - Consult OT/PT to assist with strengthening/mobility   - Keep Call bell within reach  - Keep bed low and locked with side rails adjusted as appropriate  - Keep care items and personal belongings within reach  - Initiate and maintain comfort rounds  - Make Fall Risk Sign visible to staff  - Offer Toileting every 2 Hours, in advance of need  - Initiate/Maintain alarm  - Obtain necessary fall risk management equipment  - Apply yellow socks and bracelet for high fall risk patients  - Consider moving patient to room near nurses station  Outcome: Progressing     Problem: DISCHARGE PLANNING  Goal: Discharge to home or other facility with appropriate resources  Description: INTERVENTIONS:  - Identify barriers to discharge w/patient and caregiver  - Arrange for needed discharge resources and transportation as appropriate  - Identify discharge learning needs (meds, wound care, etc )  - Arrange for interpretive services to assist at discharge as needed  - Refer to Case Management Department for coordinating discharge planning if the patient needs post-hospital services based on physician/advanced practitioner order or complex needs related to functional status, cognitive ability, or social support system  Outcome: Progressing     Problem: Potential for Falls  Goal: Patient will remain free of falls  Description: INTERVENTIONS:  - Educate patient/family on patient safety including physical limitations  - Instruct patient to call for assistance with activity   - Consult OT/PT to assist with strengthening/mobility   - Keep Call bell within reach  - Keep bed low and locked with side rails adjusted as appropriate  - Keep care items and personal belongings within reach  - Initiate and maintain comfort rounds  - Make Fall Risk Sign visible to staff  - Offer Toileting every 2 Hours, in advance of need  - Initiate/Maintain alarm  - Obtain necessary fall risk management equipment  - Apply yellow socks and bracelet for high fall risk patients  - Consider moving patient to room near nurses station  Outcome: Progressing     Problem: Nutrition/Hydration-ADULT  Goal: Nutrient/Hydration intake appropriate for improving, restoring or maintaining nutritional needs  Description: Monitor and assess patient's nutrition/hydration status for malnutrition  Collaborate with interdisciplinary team and initiate plan and interventions as ordered  Monitor patient's weight and dietary intake as ordered or per policy  Utilize nutrition screening tool and intervene as necessary  Determine patient's food preferences and provide high-protein, high-caloric foods as appropriate       INTERVENTIONS:  - Monitor oral intake, urinary output, labs, and treatment plans  - Assess nutrition and hydration status and recommend course of action  - Evaluate amount of meals eaten  - Assist patient with eating if necessary   - Allow adequate time for meals  - Recommend/ encourage appropriate diets, oral nutritional supplements, and vitamin/mineral supplements  - Order, calculate, and assess calorie counts as needed  - Recommend, monitor, and adjust tube feedings and TPN/PPN based on assessed needs  - Assess need for intravenous fluids  - Provide specific nutrition/hydration education as appropriate  - Include patient/family/caregiver in decisions related to nutrition  Outcome: Progressing     Problem: Knowledge Deficit  Goal: Patient/family/caregiver demonstrates understanding of disease process, treatment plan, medications, and discharge instructions  Description: Complete learning assessment and assess knowledge base    Interventions:  - Provide teaching at level of understanding  - Provide teaching via preferred learning methods  Outcome: Progressing

## 2022-06-25 NOTE — PLAN OF CARE
Problem: MOBILITY - ADULT  Goal: Maintain or return to baseline ADL function  Description: INTERVENTIONS:  -  Assess patient's ability to carry out ADLs; assess patient's baseline for ADL function and identify physical deficits which impact ability to perform ADLs (bathing, care of mouth/teeth, toileting, grooming, dressing, etc )  - Assess/evaluate cause of self-care deficits   - Assess range of motion  - Assess patient's mobility; develop plan if impaired  - Assess patient's need for assistive devices and provide as appropriate  - Encourage maximum independence but intervene and supervise when necessary  - Involve family in performance of ADLs  - Assess for home care needs following discharge   - Consider OT consult to assist with ADL evaluation and planning for discharge  - Provide patient education as appropriate  Outcome: Progressing     Problem: PAIN - ADULT  Goal: Verbalizes/displays adequate comfort level or baseline comfort level  Description: Interventions:  - Encourage patient to monitor pain and request assistance  - Assess pain using appropriate pain scale  - Administer analgesics based on type and severity of pain and evaluate response  - Implement non-pharmacological measures as appropriate and evaluate response  - Consider cultural and social influences on pain and pain management  - Notify physician/advanced practitioner if interventions unsuccessful or patient reports new pain  Outcome: Progressing     Problem: INFECTION - ADULT  Goal: Absence or prevention of progression during hospitalization  Description: INTERVENTIONS:  - Assess and monitor for signs and symptoms of infection  - Monitor lab/diagnostic results  - Monitor all insertion sites, i e  indwelling lines, tubes, and drains  - Monitor endotracheal if appropriate and nasal secretions for changes in amount and color  - [place] appropriate cooling/warming therapies per order  - Administer medications as ordered  - Instruct and encourage patient and family to use good hand hygiene technique  - Identify and instruct in appropriate isolation precautions for identified infection/condition  Outcome: Progressing  Goal: Absence of fever/infection during neutropenic period  Description: INTERVENTIONS:  - Monitor WBC    Outcome: Progressing     Problem: SAFETY ADULT  Goal: Maintain or return to baseline ADL function  Description: INTERVENTIONS:  -  Assess patient's ability to carry out ADLs; assess patient's baseline for ADL function and identify physical deficits which impact ability to perform ADLs (bathing, care of mouth/teeth, toileting, grooming, dressing, etc )  - Assess/evaluate cause of self-care deficits   - Assess range of motion  - Assess patient's mobility; develop plan if impaired  - Assess patient's need for assistive devices and provide as appropriate  - Encourage maximum independence but intervene and supervise when necessary  - Involve family in performance of ADLs  - Assess for home care needs following discharge   - Consider OT consult to assist with ADL evaluation and planning for discharge  - Provide patient education as appropriate  Outcome: Progressing  Goal: Maintains/Returns to pre admission functional level  Description: INTERVENTIONS:  - Perform BMAT or MOVE assessment daily    - Set and communicate daily mobility goal to care team and patient/family/caregiver  - Collaborate with rehabilitation services on mobility goals if consulted  - Perform Range of Motion 3 times a day  - Reposition patient every 3 hours    - Dangle patient 3 times a day  - Stand patient 3 times a day  - Ambulate patient 3 times a day  - Out of bed to chair 3 times a day   - Out of bed for meals 3 times a day  - Out of bed for toileting  - Record patient progress and toleration of activity level   Outcome: Progressing  Goal: Patient will remain free of falls  Description: INTERVENTIONS:  - Educate patient/family on patient safety including physical limitations  - Instruct patient to call for assistance with activity   - Consult OT/PT to assist with strengthening/mobility   - Keep Call bell within reach  - Keep bed low and locked with side rails adjusted as appropriate  - Keep care items and personal belongings within reach  - Initiate and maintain comfort rounds  - Make Fall Risk Sign visible to staff  - Apply yellow socks and bracelet for high fall risk patients  - Consider moving patient to room near nurses station  Outcome: Progressing     Problem: DISCHARGE PLANNING  Goal: Discharge to home or other facility with appropriate resources  Description: INTERVENTIONS:  - Identify barriers to discharge w/patient and caregiver  - Arrange for needed discharge resources and transportation as appropriate  - Identify discharge learning needs (meds, wound care, etc )  - Arrange for interpretive services to assist at discharge as needed  - Refer to Case Management Department for coordinating discharge planning if the patient needs post-hospital services based on physician/advanced practitioner order or complex needs related to functional status, cognitive ability, or social support system  Outcome: Progressing     Problem: Knowledge Deficit  Goal: Patient/family/caregiver demonstrates understanding of disease process, treatment plan, medications, and discharge instructions  Description: Complete learning assessment and assess knowledge base    Interventions:  - Provide teaching at level of understanding  - Provide teaching via preferred learning methods  Outcome: Progressing     Problem: Potential for Falls  Goal: Patient will remain free of falls  Description: INTERVENTIONS:  - Educate patient/family on patient safety including physical limitations  - Instruct patient to call for assistance with activity   - Consult OT/PT to assist with strengthening/mobility   - Keep Call bell within reach  - Keep bed low and locked with side rails adjusted as appropriate  - Keep care items and personal belongings within reach  - Initiate and maintain comfort rounds  - Make Fall Risk Sign visible to staff  - Apply yellow socks and bracelet for high fall risk patients  - Consider moving patient to room near nurses station  Outcome: Progressing     Problem: Nutrition/Hydration-ADULT  Goal: Nutrient/Hydration intake appropriate for improving, restoring or maintaining nutritional needs  Description: Monitor and assess patient's nutrition/hydration status for malnutrition  Collaborate with interdisciplinary team and initiate plan and interventions as ordered  Monitor patient's weight and dietary intake as ordered or per policy  Utilize nutrition screening tool and intervene as necessary  Determine patient's food preferences and provide high-protein, high-caloric foods as appropriate       INTERVENTIONS:  - Monitor oral intake, urinary output, labs, and treatment plans  - Assess nutrition and hydration status and recommend course of action  - Evaluate amount of meals eaten  - Assist patient with eating if necessary   - Allow adequate time for meals  - Recommend/ encourage appropriate diets, oral nutritional supplements, and vitamin/mineral supplements  - Order, calculate, and assess calorie counts as needed  - Recommend, monitor, and adjust tube feedings and TPN/PPN based on assessed needs  - Assess need for intravenous fluids  - Provide specific nutrition/hydration education as appropriate  - Include patient/family/caregiver in decisions related to nutrition  Outcome: Progressing

## 2022-06-26 LAB
ANION GAP SERPL CALCULATED.3IONS-SCNC: 5 MMOL/L (ref 4–13)
BUN SERPL-MCNC: 27 MG/DL (ref 5–25)
CALCIUM SERPL-MCNC: 9.2 MG/DL (ref 8.3–10.1)
CHLORIDE SERPL-SCNC: 99 MMOL/L (ref 100–108)
CO2 SERPL-SCNC: 29 MMOL/L (ref 21–32)
CREAT SERPL-MCNC: 0.8 MG/DL (ref 0.6–1.3)
ERYTHROCYTE [DISTWIDTH] IN BLOOD BY AUTOMATED COUNT: 15.1 % (ref 11.6–15.1)
GFR SERPL CREATININE-BSD FRML MDRD: 86 ML/MIN/1.73SQ M
GLUCOSE SERPL-MCNC: 259 MG/DL (ref 65–140)
HCT VFR BLD AUTO: 33.2 % (ref 36.5–49.3)
HGB BLD-MCNC: 11 G/DL (ref 12–17)
MAGNESIUM SERPL-MCNC: 2.1 MG/DL (ref 1.6–2.6)
MCH RBC QN AUTO: 33.7 PG (ref 26.8–34.3)
MCHC RBC AUTO-ENTMCNC: 33.1 G/DL (ref 31.4–37.4)
MCV RBC AUTO: 102 FL (ref 82–98)
PLATELET # BLD AUTO: 164 THOUSANDS/UL (ref 149–390)
PMV BLD AUTO: 10.9 FL (ref 8.9–12.7)
POTASSIUM SERPL-SCNC: 4.5 MMOL/L (ref 3.5–5.3)
RBC # BLD AUTO: 3.26 MILLION/UL (ref 3.88–5.62)
SODIUM SERPL-SCNC: 133 MMOL/L (ref 136–145)
WBC # BLD AUTO: 8.83 THOUSAND/UL (ref 4.31–10.16)

## 2022-06-26 PROCEDURE — 85027 COMPLETE CBC AUTOMATED: CPT | Performed by: SURGERY

## 2022-06-26 PROCEDURE — 83735 ASSAY OF MAGNESIUM: CPT | Performed by: SURGERY

## 2022-06-26 PROCEDURE — C9113 INJ PANTOPRAZOLE SODIUM, VIA: HCPCS | Performed by: SURGERY

## 2022-06-26 PROCEDURE — 80048 BASIC METABOLIC PNL TOTAL CA: CPT | Performed by: SURGERY

## 2022-06-26 PROCEDURE — 99024 POSTOP FOLLOW-UP VISIT: CPT | Performed by: SURGERY

## 2022-06-26 RX ORDER — PANTOPRAZOLE SODIUM 40 MG/10ML
40 INJECTION, POWDER, LYOPHILIZED, FOR SOLUTION INTRAVENOUS EVERY 12 HOURS SCHEDULED
Status: DISCONTINUED | OUTPATIENT
Start: 2022-06-26 | End: 2022-06-30

## 2022-06-26 RX ORDER — SODIUM CHLORIDE, SODIUM LACTATE, POTASSIUM CHLORIDE, CALCIUM CHLORIDE 600; 310; 30; 20 MG/100ML; MG/100ML; MG/100ML; MG/100ML
100 INJECTION, SOLUTION INTRAVENOUS CONTINUOUS
Status: DISCONTINUED | OUTPATIENT
Start: 2022-06-26 | End: 2022-06-26

## 2022-06-26 RX ORDER — METOCLOPRAMIDE HYDROCHLORIDE 5 MG/ML
10 INJECTION INTRAMUSCULAR; INTRAVENOUS EVERY 6 HOURS PRN
Status: DISCONTINUED | OUTPATIENT
Start: 2022-06-26 | End: 2022-06-28

## 2022-06-26 RX ORDER — DEXTROSE AND SODIUM CHLORIDE 5; .45 G/100ML; G/100ML
100 INJECTION, SOLUTION INTRAVENOUS CONTINUOUS
Status: DISCONTINUED | OUTPATIENT
Start: 2022-06-26 | End: 2022-06-28

## 2022-06-26 RX ADMIN — ACETAMINOPHEN 975 MG: 325 TABLET, FILM COATED ORAL at 13:53

## 2022-06-26 RX ADMIN — HYDROMORPHONE HYDROCHLORIDE 0.5 MG: 1 INJECTION, SOLUTION INTRAMUSCULAR; INTRAVENOUS; SUBCUTANEOUS at 08:15

## 2022-06-26 RX ADMIN — PANTOPRAZOLE SODIUM 40 MG: 40 INJECTION, POWDER, FOR SOLUTION INTRAVENOUS at 08:25

## 2022-06-26 RX ADMIN — METHOCARBAMOL 500 MG: 500 TABLET, FILM COATED ORAL at 11:08

## 2022-06-26 RX ADMIN — GABAPENTIN 100 MG: 100 CAPSULE ORAL at 17:08

## 2022-06-26 RX ADMIN — ONDANSETRON 4 MG: 2 INJECTION INTRAMUSCULAR; INTRAVENOUS at 03:43

## 2022-06-26 RX ADMIN — DEXTROSE AND SODIUM CHLORIDE 100 ML/HR: 5; .45 INJECTION, SOLUTION INTRAVENOUS at 11:08

## 2022-06-26 RX ADMIN — ONDANSETRON 4 MG: 2 INJECTION INTRAMUSCULAR; INTRAVENOUS at 09:34

## 2022-06-26 RX ADMIN — METOCLOPRAMIDE HYDROCHLORIDE 10 MG: 5 INJECTION INTRAMUSCULAR; INTRAVENOUS at 08:28

## 2022-06-26 RX ADMIN — DEXTROSE AND SODIUM CHLORIDE 100 ML/HR: 5; .45 INJECTION, SOLUTION INTRAVENOUS at 21:20

## 2022-06-26 RX ADMIN — OXYCODONE HYDROCHLORIDE 10 MG: 10 TABLET ORAL at 13:52

## 2022-06-26 RX ADMIN — METOCLOPRAMIDE HYDROCHLORIDE 10 MG: 5 INJECTION INTRAMUSCULAR; INTRAVENOUS at 00:13

## 2022-06-26 RX ADMIN — OXYCODONE HYDROCHLORIDE 10 MG: 10 TABLET ORAL at 09:33

## 2022-06-26 RX ADMIN — METHOCARBAMOL 500 MG: 500 TABLET, FILM COATED ORAL at 17:08

## 2022-06-26 RX ADMIN — METOCLOPRAMIDE HYDROCHLORIDE 10 MG: 5 INJECTION INTRAMUSCULAR; INTRAVENOUS at 14:33

## 2022-06-26 RX ADMIN — ENOXAPARIN SODIUM 40 MG: 40 INJECTION SUBCUTANEOUS at 08:14

## 2022-06-26 RX ADMIN — PANTOPRAZOLE SODIUM 40 MG: 40 INJECTION, POWDER, FOR SOLUTION INTRAVENOUS at 21:20

## 2022-06-26 RX ADMIN — SODIUM CHLORIDE, SODIUM LACTATE, POTASSIUM CHLORIDE, AND CALCIUM CHLORIDE 100 ML/HR: .6; .31; .03; .02 INJECTION, SOLUTION INTRAVENOUS at 05:54

## 2022-06-26 RX ADMIN — HYDROMORPHONE HYDROCHLORIDE 0.5 MG: 1 INJECTION, SOLUTION INTRAMUSCULAR; INTRAVENOUS; SUBCUTANEOUS at 11:17

## 2022-06-26 NOTE — PROGRESS NOTES
Progress Note - Oncology Surgery   Susan Reid 68 y o  male MRN: [id number]  Unit/Bed#: [facility] 928-01 Encounter: [id number]    Assessment:  68 M status post exploratory laparotomy and biopsy of omental implants, found to have metastatic esophageal carcinoma      He vomited overnight, abdomen is distended  TF was held and placed on NPO    Plan:  -NPO  -IVF  -Place NG tube if he vomits again  -continue oxycodone  for pain, APS input appreciated  -out of bed, ambulate  -pulmonary toilet  -DVT prophylaxis    Subjective/Objective     Subjective:   Vomited overnight, passing gas and had small BM, slightly nauseous    Objective:    Blood pressure (!) 171/96, pulse (!) 114, temperature 98 °F (36 7 °C), resp  rate 15, height 5' 10" (1 778 m), weight 64 9 kg (143 lb), SpO2 95 %  ,Body mass index is 20 52 kg/m²  Intake/Output Summary (Last 24 hours) at 6/26/2022 0727  Last data filed at 6/26/2022 0032  Gross per 24 hour   Intake 585 ml   Output 49 ml   Net 536 ml       Invasive Devices  Report    Central Venous Catheter Line  Duration           Port A Cath 02/16/22 Left Chest 129 days          Peripheral Intravenous Line  Duration           Peripheral IV 06/22/22 Left Forearm 3 days    Peripheral IV 06/26/22 Proximal;Right;Ventral (anterior) Forearm <1 day          Drain  Duration           Gastrostomy/Enterostomy PEG-jejunostomy 20 Fr    days                Physical Exam:   Gen:  NAD  CV:  warm, well-perfused  Lungs: nl effort  Abd:  soft, distended, incision cdi  Ext:  no CCE  Neuro: A&Ox3     Results from last 7 days   Lab Units 06/23/22  0527   WBC Thousand/uL 8 84   HEMOGLOBIN g/dL 9 1*   HEMATOCRIT % 28 0*   PLATELETS Thousands/uL 131*     Results from last 7 days   Lab Units 06/23/22  0527   POTASSIUM mmol/L 4 0   CHLORIDE mmol/L 103   CO2 mmol/L 28   BUN mg/dL 15   CREATININE mg/dL 0 75   CALCIUM mg/dL 8 7

## 2022-06-26 NOTE — PLAN OF CARE
Problem: MOBILITY - ADULT  Goal: Maintain or return to baseline ADL function  Description: INTERVENTIONS:  -  Assess patient's ability to carry out ADLs; assess patient's baseline for ADL function and identify physical deficits which impact ability to perform ADLs (bathing, care of mouth/teeth, toileting, grooming, dressing, etc )  - Assess/evaluate cause of self-care deficits   - Assess range of motion  - Assess patient's mobility; develop plan if impaired  - Assess patient's need for assistive devices and provide as appropriate  - Encourage maximum independence but intervene and supervise when necessary  - Involve family in performance of ADLs  - Assess for home care needs following discharge   - Consider OT consult to assist with ADL evaluation and planning for discharge  - Provide patient education as appropriate  Outcome: Progressing  Goal: Maintains/Returns to pre admission functional level  Description: INTERVENTIONS:  - Perform BMAT or MOVE assessment daily    - Set and communicate daily mobility goal to care team and patient/family/caregiver     - Collaborate with rehabilitation services on mobility goals if consulted  - Out of bed for toileting  - Record patient progress and toleration of activity level   Outcome: Progressing     Problem: SAFETY ADULT  Goal: Maintain or return to baseline ADL function  Description: INTERVENTIONS:  -  Assess patient's ability to carry out ADLs; assess patient's baseline for ADL function and identify physical deficits which impact ability to perform ADLs (bathing, care of mouth/teeth, toileting, grooming, dressing, etc )  - Assess/evaluate cause of self-care deficits   - Assess range of motion  - Assess patient's mobility; develop plan if impaired  - Assess patient's need for assistive devices and provide as appropriate  - Encourage maximum independence but intervene and supervise when necessary  - Involve family in performance of ADLs  - Assess for home care needs following discharge   - Consider OT consult to assist with ADL evaluation and planning for discharge  - Provide patient education as appropriate  Outcome: Progressing  Goal: Maintains/Returns to pre admission functional level  Description: INTERVENTIONS:  - Perform BMAT or MOVE assessment daily    - Set and communicate daily mobility goal to care team and patient/family/caregiver     - Collaborate with rehabilitation services on mobility goals if consulted  - Out of bed for toileting  - Record patient progress and toleration of activity level   Outcome: Progressing

## 2022-06-27 LAB
ANION GAP SERPL CALCULATED.3IONS-SCNC: 6 MMOL/L (ref 4–13)
ANISOCYTOSIS BLD QL SMEAR: PRESENT
BASOPHILS # BLD MANUAL: 0.04 THOUSAND/UL (ref 0–0.1)
BASOPHILS NFR MAR MANUAL: 1 % (ref 0–1)
BUN SERPL-MCNC: 35 MG/DL (ref 5–25)
CALCIUM SERPL-MCNC: 9.1 MG/DL (ref 8.3–10.1)
CHLORIDE SERPL-SCNC: 98 MMOL/L (ref 100–108)
CO2 SERPL-SCNC: 28 MMOL/L (ref 21–32)
CREAT SERPL-MCNC: 1 MG/DL (ref 0.6–1.3)
DOHLE BOD BLD QL SMEAR: PRESENT
EOSINOPHIL # BLD MANUAL: 0.04 THOUSAND/UL (ref 0–0.4)
EOSINOPHIL NFR BLD MANUAL: 1 % (ref 0–6)
ERYTHROCYTE [DISTWIDTH] IN BLOOD BY AUTOMATED COUNT: 15.1 % (ref 11.6–15.1)
GFR SERPL CREATININE-BSD FRML MDRD: 72 ML/MIN/1.73SQ M
GLUCOSE SERPL-MCNC: 223 MG/DL (ref 65–140)
HCT VFR BLD AUTO: 28.7 % (ref 36.5–49.3)
HGB BLD-MCNC: 9.7 G/DL (ref 12–17)
LYMPHOCYTES # BLD AUTO: 0.22 THOUSAND/UL (ref 0.6–4.47)
LYMPHOCYTES # BLD AUTO: 6 % (ref 14–44)
MAGNESIUM SERPL-MCNC: 2.3 MG/DL (ref 1.6–2.6)
MCH RBC QN AUTO: 34.3 PG (ref 26.8–34.3)
MCHC RBC AUTO-ENTMCNC: 33.8 G/DL (ref 31.4–37.4)
MCV RBC AUTO: 101 FL (ref 82–98)
METAMYELOCYTES NFR BLD MANUAL: 4 % (ref 0–1)
MONOCYTES # BLD AUTO: 1.23 THOUSAND/UL (ref 0–1.22)
MONOCYTES NFR BLD: 33 % (ref 4–12)
NEUTROPHILS # BLD MANUAL: 2.06 THOUSAND/UL (ref 1.85–7.62)
NEUTS BAND NFR BLD MANUAL: 37 % (ref 0–8)
NEUTS SEG NFR BLD AUTO: 18 % (ref 43–75)
OVALOCYTES BLD QL SMEAR: PRESENT
PLATELET # BLD AUTO: 168 THOUSANDS/UL (ref 149–390)
PLATELET BLD QL SMEAR: ADEQUATE
PMV BLD AUTO: 11.5 FL (ref 8.9–12.7)
POIKILOCYTOSIS BLD QL SMEAR: PRESENT
POLYCHROMASIA BLD QL SMEAR: PRESENT
POTASSIUM SERPL-SCNC: 4.7 MMOL/L (ref 3.5–5.3)
RBC # BLD AUTO: 2.83 MILLION/UL (ref 3.88–5.62)
RBC MORPH BLD: PRESENT
SODIUM SERPL-SCNC: 132 MMOL/L (ref 136–145)
WBC # BLD AUTO: 3.74 THOUSAND/UL (ref 4.31–10.16)

## 2022-06-27 PROCEDURE — 99024 POSTOP FOLLOW-UP VISIT: CPT | Performed by: SURGERY

## 2022-06-27 PROCEDURE — C9113 INJ PANTOPRAZOLE SODIUM, VIA: HCPCS | Performed by: SURGERY

## 2022-06-27 PROCEDURE — 85027 COMPLETE CBC AUTOMATED: CPT | Performed by: SURGERY

## 2022-06-27 PROCEDURE — 80048 BASIC METABOLIC PNL TOTAL CA: CPT | Performed by: SURGERY

## 2022-06-27 PROCEDURE — 85007 BL SMEAR W/DIFF WBC COUNT: CPT | Performed by: SURGERY

## 2022-06-27 PROCEDURE — 83735 ASSAY OF MAGNESIUM: CPT | Performed by: SURGERY

## 2022-06-27 RX ADMIN — GABAPENTIN 100 MG: 100 CAPSULE ORAL at 21:29

## 2022-06-27 RX ADMIN — ACETAMINOPHEN 975 MG: 325 TABLET, FILM COATED ORAL at 21:29

## 2022-06-27 RX ADMIN — ENOXAPARIN SODIUM 40 MG: 40 INJECTION SUBCUTANEOUS at 08:26

## 2022-06-27 RX ADMIN — METHOCARBAMOL 500 MG: 500 TABLET, FILM COATED ORAL at 17:12

## 2022-06-27 RX ADMIN — PANTOPRAZOLE SODIUM 40 MG: 40 INJECTION, POWDER, FOR SOLUTION INTRAVENOUS at 21:28

## 2022-06-27 RX ADMIN — PANTOPRAZOLE SODIUM 40 MG: 40 INJECTION, POWDER, FOR SOLUTION INTRAVENOUS at 08:26

## 2022-06-27 RX ADMIN — ACETAMINOPHEN 975 MG: 325 TABLET, FILM COATED ORAL at 14:03

## 2022-06-27 RX ADMIN — ONDANSETRON 4 MG: 2 INJECTION INTRAMUSCULAR; INTRAVENOUS at 08:26

## 2022-06-27 RX ADMIN — GABAPENTIN 100 MG: 100 CAPSULE ORAL at 17:12

## 2022-06-27 RX ADMIN — GABAPENTIN 100 MG: 100 CAPSULE ORAL at 08:26

## 2022-06-27 RX ADMIN — DEXTROSE AND SODIUM CHLORIDE 100 ML/HR: 5; .45 INJECTION, SOLUTION INTRAVENOUS at 06:09

## 2022-06-27 NOTE — PLAN OF CARE
Problem: MOBILITY - ADULT  Goal: Maintain or return to baseline ADL function  Description: INTERVENTIONS:  -  Assess patient's ability to carry out ADLs; assess patient's baseline for ADL function and identify physical deficits which impact ability to perform ADLs (bathing, care of mouth/teeth, toileting, grooming, dressing, etc )  - Assess/evaluate cause of self-care deficits   - Assess range of motion  - Assess patient's mobility; develop plan if impaired  - Assess patient's need for assistive devices and provide as appropriate  - Encourage maximum independence but intervene and supervise when necessary  - Involve family in performance of ADLs  - Assess for home care needs following discharge   - Consider OT consult to assist with ADL evaluation and planning for discharge  - Provide patient education as appropriate  Outcome: Progressing  Goal: Maintains/Returns to pre admission functional level  Description: INTERVENTIONS:  - Perform BMAT or MOVE assessment daily    - Set and communicate daily mobility goal to care team and patient/family/caregiver  - Collaborate with rehabilitation services on mobility goals if consulted  - Perform Range of Motion  times a day  - Reposition patient every  hours    - Dangle patient  times a day  - Stand patient  times a day  - Ambulate patient  times a day  - Out of bed to chair  times a day   - Out of bed for meals  times a day  - Out of bed for toileting  - Record patient progress and toleration of activity level   Outcome: Progressing     Problem: PAIN - ADULT  Goal: Verbalizes/displays adequate comfort level or baseline comfort level  Description: Interventions:  - Encourage patient to monitor pain and request assistance  - Assess pain using appropriate pain scale  - Administer analgesics based on type and severity of pain and evaluate response  - Implement non-pharmacological measures as appropriate and evaluate response  - Consider cultural and social influences on pain and pain management  - Notify physician/advanced practitioner if interventions unsuccessful or patient reports new pain  Outcome: Progressing     Problem: INFECTION - ADULT  Goal: Absence or prevention of progression during hospitalization  Description: INTERVENTIONS:  - Assess and monitor for signs and symptoms of infection  - Monitor lab/diagnostic results  - Monitor all insertion sites, i e  indwelling lines, tubes, and drains  - Monitor endotracheal if appropriate and nasal secretions for changes in amount and color  - [place] appropriate cooling/warming therapies per order  - Administer medications as ordered  - Instruct and encourage patient and family to use good hand hygiene technique  - Identify and instruct in appropriate isolation precautions for identified infection/condition  Outcome: Progressing  Goal: Absence of fever/infection during neutropenic period  Description: INTERVENTIONS:  - Monitor WBC    Outcome: Progressing     Problem: SAFETY ADULT  Goal: Maintain or return to baseline ADL function  Description: INTERVENTIONS:  -  Assess patient's ability to carry out ADLs; assess patient's baseline for ADL function and identify physical deficits which impact ability to perform ADLs (bathing, care of mouth/teeth, toileting, grooming, dressing, etc )  - Assess/evaluate cause of self-care deficits   - Assess range of motion  - Assess patient's mobility; develop plan if impaired  - Assess patient's need for assistive devices and provide as appropriate  - Encourage maximum independence but intervene and supervise when necessary  - Involve family in performance of ADLs  - Assess for home care needs following discharge   - Consider OT consult to assist with ADL evaluation and planning for discharge  - Provide patient education as appropriate  Outcome: Progressing  Goal: Maintains/Returns to pre admission functional level  Description: INTERVENTIONS:  - Perform BMAT or MOVE assessment daily    - Set and communicate daily mobility goal to care team and patient/family/caregiver  - Collaborate with rehabilitation services on mobility goals if consulted  - Perform Range of Motion  times a day  - Reposition patient every  hours    - Dangle patient  times a day  - Stand patient  times a day  - Ambulate patient  times a day  - Out of bed to chair  times a day   - Out of bed for meals  times a day  - Out of bed for toileting  - Record patient progress and toleration of activity level   Outcome: Progressing  Goal: Patient will remain free of falls  Description: INTERVENTIONS:  - Educate patient/family on patient safety including physical limitations  - Instruct patient to call for assistance with activity   - Consult OT/PT to assist with strengthening/mobility   - Keep Call bell within reach  - Keep bed low and locked with side rails adjusted as appropriate  - Keep care items and personal belongings within reach  - Initiate and maintain comfort rounds  - Make Fall Risk Sign visible to staff  - Offer Toileting every  Hours, in advance of need  - Initiate/Maintain alarm  - Obtain necessary fall risk management equipment:   - Apply yellow socks and bracelet for high fall risk patients  - Consider moving patient to room near nurses station  Outcome: Progressing     Problem: DISCHARGE PLANNING  Goal: Discharge to home or other facility with appropriate resources  Description: INTERVENTIONS:  - Identify barriers to discharge w/patient and caregiver  - Arrange for needed discharge resources and transportation as appropriate  - Identify discharge learning needs (meds, wound care, etc )  - Arrange for interpretive services to assist at discharge as needed  - Refer to Case Management Department for coordinating discharge planning if the patient needs post-hospital services based on physician/advanced practitioner order or complex needs related to functional status, cognitive ability, or social support system  Outcome: Progressing Problem: Knowledge Deficit  Goal: Patient/family/caregiver demonstrates understanding of disease process, treatment plan, medications, and discharge instructions  Description: Complete learning assessment and assess knowledge base  Interventions:  - Provide teaching at level of understanding  - Provide teaching via preferred learning methods  Outcome: Progressing     Problem: Potential for Falls  Goal: Patient will remain free of falls  Description: INTERVENTIONS:  - Educate patient/family on patient safety including physical limitations  - Instruct patient to call for assistance with activity   - Consult OT/PT to assist with strengthening/mobility   - Keep Call bell within reach  - Keep bed low and locked with side rails adjusted as appropriate  - Keep care items and personal belongings within reach  - Initiate and maintain comfort rounds  - Make Fall Risk Sign visible to staff  - Offer Toileting every  Hours, in advance of need  - Initiate/Maintain alarm  - Obtain necessary fall risk management equipmen  - Apply yellow socks and bracelet for high fall risk patients  - Consider moving patient to room near nurses station  Outcome: Progressing     Problem: Nutrition/Hydration-ADULT  Goal: Nutrient/Hydration intake appropriate for improving, restoring or maintaining nutritional needs  Description: Monitor and assess patient's nutrition/hydration status for malnutrition  Collaborate with interdisciplinary team and initiate plan and interventions as ordered  Monitor patient's weight and dietary intake as ordered or per policy  Utilize nutrition screening tool and intervene as necessary  Determine patient's food preferences and provide high-protein, high-caloric foods as appropriate       INTERVENTIONS:  - Monitor oral intake, urinary output, labs, and treatment plans  - Assess nutrition and hydration status and recommend course of action  - Evaluate amount of meals eaten  - Assist patient with eating if necessary   - Allow adequate time for meals  - Recommend/ encourage appropriate diets, oral nutritional supplements, and vitamin/mineral supplements  - Order, calculate, and assess calorie counts as needed  - Recommend, monitor, and adjust tube feedings and TPN/PPN based on assessed needs  - Assess need for intravenous fluids  - Provide specific nutrition/hydration education as appropriate  - Include patient/family/caregiver in decisions related to nutrition  Outcome: Progressing

## 2022-06-27 NOTE — PROGRESS NOTES
Progress Note - Surgical Oncology   Lizzie Bassett 68 y o  male MRN: [id number]  Unit/Bed#: [facility] 928-01 Encounter: [id number]    Assessment:  68 M status post exploratory laparotomy and biopsy of omental implants, found to have metastatic esophageal carcinoma      AVSS, room air      439cc UOP + voids     Plan:  -resume trickle TFs  -trial of sipsof clears  -NPO/NGT if worsening vomiting  -serial exams  -pain control  -OOB, ambulate   -DVT PPX  -Disposition planning     Subjective/Objective     Subjective: no further vomiting overnight, one small volume spi up this AM, nausea and distention improving    Objective:   Blood pressure 146/91, pulse 103, temperature 99 1 °F (37 3 °C), resp  rate 16, height 5' 10" (1 778 m), weight 64 9 kg (143 lb), SpO2 97 %  ,Body mass index is 20 52 kg/m²  Intake/Output Summary (Last 24 hours) at 6/27/2022 0651  Last data filed at 6/27/2022 6839  Gross per 24 hour   Intake 2504 99 ml   Output 439 ml   Net 2065 99 ml       Invasive Devices  Report    Central Venous Catheter Line  Duration           Port A Cath 02/16/22 Left Chest 130 days          Peripheral Intravenous Line  Duration           Peripheral IV 06/26/22 Proximal;Right;Ventral (anterior) Forearm 1 day          Drain  Duration           Gastrostomy/Enterostomy PEG-jejunostomy 20 Fr   days                Physical Exam:     General: NAD  HEENT: normocephalic, atraumatic   Cardiovascular: RRR 2+ radial pulses  Respiratory: No distress, room air   Abdomen: soft, approrpiately tender, minimally distended, incision c/d/i with staples   Extremities: No c/c/e  Neuro: AAOx3  Skin: warm, dry      Lab, Imaging and other studies:  I have personally reviewed pertinent lab results    , CBC:   Lab Results   Component Value Date    WBC 3 74 (L) 06/27/2022    HGB 9 7 (L) 06/27/2022    HCT 28 7 (L) 06/27/2022     (H) 06/27/2022     06/27/2022    MCH 34 3 06/27/2022    MCHC 33 8 06/27/2022    RDW 15 1 06/27/2022    MPV 11 5 06/27/2022   , CMP:   Lab Results   Component Value Date    SODIUM 132 (L) 06/27/2022    K 4 7 06/27/2022    CL 98 (L) 06/27/2022    CO2 28 06/27/2022    BUN 35 (H) 06/27/2022    CREATININE 1 00 06/27/2022    CALCIUM 9 1 06/27/2022    EGFR 72 06/27/2022     VTE Pharmacologic Prophylaxis: Enoxaparin (Lovenox)  VTE Mechanical Prophylaxis: sequential compression device

## 2022-06-27 NOTE — QUICK NOTE
6/27/2022 9:44 AM -  Gilbert Haskins's chart and case were reviewed by Mikaela Mora MD   Mode of review included electronic chart check       Per review, symptoms remain controlled on current regimen and no changes are made at this time  Please continue the regimen in place, and review our last note for details  For dispo plan, please review Case Management notes  Palliative care will follow up on 6/28  For urgent issues or any questions/concerns, please notify on-call provider via [employer]  You may also call our answering service 24/7 at   Mikaela Mora MD  Palliative and Supportive Care  Clinic/Answering Service: [phone]  You can find me on [employer]!

## 2022-06-27 NOTE — RESTORATIVE TECHNICIAN NOTE
Restorative Technician Note      Patient Name: Fatoumata Sequeira     Restorative Tech Visit Date: 6/27/2022  Note Type: Mobility  Patient Position Upon Consult: Supine  Activity Performed: Ambulated  Assistive Device: Roller walker  Patient Position at End of Consult: Bedside chair;  All needs within [facility]

## 2022-06-28 ENCOUNTER — TELEPHONE (OUTPATIENT)
Dept: HEMATOLOGY ONCOLOGY | Facility: CLINIC | Age: 77
End: 2022-06-28

## 2022-06-28 ENCOUNTER — APPOINTMENT (INPATIENT)
Dept: RADIOLOGY | Facility: HOSPITAL | Age: 77
DRG: 356 | End: 2022-06-28
Payer: COMMERCIAL

## 2022-06-28 LAB
ANION GAP SERPL CALCULATED.3IONS-SCNC: 4 MMOL/L (ref 4–13)
BUN SERPL-MCNC: 28 MG/DL (ref 5–25)
CALCIUM SERPL-MCNC: 8.8 MG/DL (ref 8.3–10.1)
CHLORIDE SERPL-SCNC: 97 MMOL/L (ref 100–108)
CO2 SERPL-SCNC: 30 MMOL/L (ref 21–32)
CREAT SERPL-MCNC: 0.86 MG/DL (ref 0.6–1.3)
ERYTHROCYTE [DISTWIDTH] IN BLOOD BY AUTOMATED COUNT: 14.8 % (ref 11.6–15.1)
GFR SERPL CREATININE-BSD FRML MDRD: 84 ML/MIN/1.73SQ M
GLUCOSE SERPL-MCNC: 169 MG/DL (ref 65–140)
HCT VFR BLD AUTO: 25.5 % (ref 36.5–49.3)
HGB BLD-MCNC: 8.3 G/DL (ref 12–17)
MAGNESIUM SERPL-MCNC: 2.3 MG/DL (ref 1.6–2.6)
MCH RBC QN AUTO: 33.1 PG (ref 26.8–34.3)
MCHC RBC AUTO-ENTMCNC: 32.5 G/DL (ref 31.4–37.4)
MCV RBC AUTO: 102 FL (ref 82–98)
PHOSPHATE SERPL-MCNC: 3 MG/DL (ref 2.3–4.1)
PLATELET # BLD AUTO: 164 THOUSANDS/UL (ref 149–390)
PMV BLD AUTO: 11.7 FL (ref 8.9–12.7)
POTASSIUM SERPL-SCNC: 3.9 MMOL/L (ref 3.5–5.3)
RBC # BLD AUTO: 2.51 MILLION/UL (ref 3.88–5.62)
SODIUM SERPL-SCNC: 131 MMOL/L (ref 136–145)
WBC # BLD AUTO: 4.46 THOUSAND/UL (ref 4.31–10.16)

## 2022-06-28 PROCEDURE — 97530 THERAPEUTIC ACTIVITIES: CPT

## 2022-06-28 PROCEDURE — 93005 ELECTROCARDIOGRAM TRACING: CPT

## 2022-06-28 PROCEDURE — 97110 THERAPEUTIC EXERCISES: CPT

## 2022-06-28 PROCEDURE — 99024 POSTOP FOLLOW-UP VISIT: CPT | Performed by: SURGERY

## 2022-06-28 PROCEDURE — 83735 ASSAY OF MAGNESIUM: CPT | Performed by: SURGERY

## 2022-06-28 PROCEDURE — 85027 COMPLETE CBC AUTOMATED: CPT | Performed by: PHYSICIAN ASSISTANT

## 2022-06-28 PROCEDURE — 84100 ASSAY OF PHOSPHORUS: CPT | Performed by: SURGERY

## 2022-06-28 PROCEDURE — 80048 BASIC METABOLIC PNL TOTAL CA: CPT | Performed by: SURGERY

## 2022-06-28 PROCEDURE — C9113 INJ PANTOPRAZOLE SODIUM, VIA: HCPCS | Performed by: SURGERY

## 2022-06-28 PROCEDURE — G1004 CDSM NDSC: HCPCS

## 2022-06-28 PROCEDURE — 97535 SELF CARE MNGMENT TRAINING: CPT

## 2022-06-28 PROCEDURE — 74176 CT ABD & PELVIS W/O CONTRAST: CPT

## 2022-06-28 RX ORDER — METOCLOPRAMIDE HYDROCHLORIDE 5 MG/ML
10 INJECTION INTRAMUSCULAR; INTRAVENOUS EVERY 6 HOURS SCHEDULED
Status: DISCONTINUED | OUTPATIENT
Start: 2022-06-28 | End: 2022-07-02 | Stop reason: HOSPADM

## 2022-06-28 RX ORDER — SODIUM CHLORIDE, SODIUM GLUCONATE, SODIUM ACETATE, POTASSIUM CHLORIDE, MAGNESIUM CHLORIDE, SODIUM PHOSPHATE, DIBASIC, AND POTASSIUM PHOSPHATE .53; .5; .37; .037; .03; .012; .00082 G/100ML; G/100ML; G/100ML; G/100ML; G/100ML; G/100ML; G/100ML
500 INJECTION, SOLUTION INTRAVENOUS ONCE
Status: COMPLETED | OUTPATIENT
Start: 2022-06-28 | End: 2022-06-28

## 2022-06-28 RX ORDER — ACETAMINOPHEN 650 MG/1
650 SUPPOSITORY RECTAL EVERY 6 HOURS PRN
Status: DISCONTINUED | OUTPATIENT
Start: 2022-06-28 | End: 2022-06-28

## 2022-06-28 RX ORDER — METOPROLOL TARTRATE 5 MG/5ML
5 INJECTION INTRAVENOUS ONCE
Status: COMPLETED | OUTPATIENT
Start: 2022-06-28 | End: 2022-06-28

## 2022-06-28 RX ORDER — SODIUM CHLORIDE, SODIUM GLUCONATE, SODIUM ACETATE, POTASSIUM CHLORIDE, MAGNESIUM CHLORIDE, SODIUM PHOSPHATE, DIBASIC, AND POTASSIUM PHOSPHATE .53; .5; .37; .037; .03; .012; .00082 G/100ML; G/100ML; G/100ML; G/100ML; G/100ML; G/100ML; G/100ML
100 INJECTION, SOLUTION INTRAVENOUS CONTINUOUS
Status: DISCONTINUED | OUTPATIENT
Start: 2022-06-28 | End: 2022-06-30

## 2022-06-28 RX ORDER — HYDROMORPHONE HCL/PF 1 MG/ML
0.5 SYRINGE (ML) INJECTION EVERY 2 HOUR PRN
Status: DISCONTINUED | OUTPATIENT
Start: 2022-06-28 | End: 2022-06-29

## 2022-06-28 RX ORDER — ACETAMINOPHEN 650 MG/1
650 SUPPOSITORY RECTAL EVERY 6 HOURS PRN
Status: DISCONTINUED | OUTPATIENT
Start: 2022-06-28 | End: 2022-06-29

## 2022-06-28 RX ADMIN — ENOXAPARIN SODIUM 40 MG: 40 INJECTION SUBCUTANEOUS at 08:19

## 2022-06-28 RX ADMIN — PANTOPRAZOLE SODIUM 40 MG: 40 INJECTION, POWDER, FOR SOLUTION INTRAVENOUS at 21:18

## 2022-06-28 RX ADMIN — METOCLOPRAMIDE HYDROCHLORIDE 10 MG: 5 INJECTION INTRAMUSCULAR; INTRAVENOUS at 12:18

## 2022-06-28 RX ADMIN — IOHEXOL 50 ML: 240 INJECTION, SOLUTION INTRATHECAL; INTRAVASCULAR; INTRAVENOUS; ORAL at 10:30

## 2022-06-28 RX ADMIN — ACETAMINOPHEN 650 MG: 650 SUPPOSITORY RECTAL at 07:59

## 2022-06-28 RX ADMIN — METHOCARBAMOL 500 MG: 500 TABLET, FILM COATED ORAL at 00:19

## 2022-06-28 RX ADMIN — DEXTROSE AND SODIUM CHLORIDE 100 ML/HR: 5; .45 INJECTION, SOLUTION INTRAVENOUS at 04:00

## 2022-06-28 RX ADMIN — PANTOPRAZOLE SODIUM 40 MG: 40 INJECTION, POWDER, FOR SOLUTION INTRAVENOUS at 08:00

## 2022-06-28 RX ADMIN — METOROPROLOL TARTRATE 5 MG: 5 INJECTION, SOLUTION INTRAVENOUS at 07:57

## 2022-06-28 RX ADMIN — METOCLOPRAMIDE HYDROCHLORIDE 10 MG: 5 INJECTION INTRAMUSCULAR; INTRAVENOUS at 17:23

## 2022-06-28 RX ADMIN — SODIUM CHLORIDE, SODIUM GLUCONATE, SODIUM ACETATE, POTASSIUM CHLORIDE, MAGNESIUM CHLORIDE, SODIUM PHOSPHATE, DIBASIC, AND POTASSIUM PHOSPHATE 100 ML/HR: .53; .5; .37; .037; .03; .012; .00082 INJECTION, SOLUTION INTRAVENOUS at 21:18

## 2022-06-28 RX ADMIN — SODIUM CHLORIDE, SODIUM GLUCONATE, SODIUM ACETATE, POTASSIUM CHLORIDE, MAGNESIUM CHLORIDE, SODIUM PHOSPHATE, DIBASIC, AND POTASSIUM PHOSPHATE 500 ML: .53; .5; .37; .037; .03; .012; .00082 INJECTION, SOLUTION INTRAVENOUS at 21:20

## 2022-06-28 NOTE — PLAN OF CARE
Problem: MOBILITY - ADULT  Goal: Maintain or return to baseline ADL function  Description: INTERVENTIONS:  -  Assess patient's ability to carry out ADLs; assess patient's baseline for ADL function and identify physical deficits which impact ability to perform ADLs (bathing, care of mouth/teeth, toileting, grooming, dressing, etc )  - Assess/evaluate cause of self-care deficits   - Assess range of motion  - Assess patient's mobility; develop plan if impaired  - Assess patient's need for assistive devices and provide as appropriate  - Encourage maximum independence but intervene and supervise when necessary  - Involve family in performance of ADLs  - Assess for home care needs following discharge   - Consider OT consult to assist with ADL evaluation and planning for discharge  - Provide patient education as appropriate  Outcome: Progressing  Goal: Maintains/Returns to pre admission functional level  Description: INTERVENTIONS:  - Perform BMAT or MOVE assessment daily    - Set and communicate daily mobility goal to care team and patient/family/caregiver  - Collaborate with rehabilitation services on mobility goals if consulted  - Perform Range of Motion  times a day  - Reposition patient every  hours    - Dangle patient  times a day  - Stand patient  times a day  - Ambulate patient  times a day  - Out of bed to chair  times a day   - Out of bed for meals  times a day  - Out of bed for toileting  - Record patient progress and toleration of activity level   Outcome: Progressing     Problem: PAIN - ADULT  Goal: Verbalizes/displays adequate comfort level or baseline comfort level  Description: Interventions:  - Encourage patient to monitor pain and request assistance  - Assess pain using appropriate pain scale  - Administer analgesics based on type and severity of pain and evaluate response  - Implement non-pharmacological measures as appropriate and evaluate response  - Consider cultural and social influences on pain and pain management  - Notify physician/advanced practitioner if interventions unsuccessful or patient reports new pain  Outcome: Progressing     Problem: INFECTION - ADULT  Goal: Absence or prevention of progression during hospitalization  Description: INTERVENTIONS:  - Assess and monitor for signs and symptoms of infection  - Monitor lab/diagnostic results  - Monitor all insertion sites, i e  indwelling lines, tubes, and drains  - Monitor endotracheal if appropriate and nasal secretions for changes in amount and color  - [place] appropriate cooling/warming therapies per order  - Administer medications as ordered  - Instruct and encourage patient and family to use good hand hygiene technique  - Identify and instruct in appropriate isolation precautions for identified infection/condition  Outcome: Progressing  Goal: Absence of fever/infection during neutropenic period  Description: INTERVENTIONS:  - Monitor WBC    Outcome: Progressing     Problem: SAFETY ADULT  Goal: Maintain or return to baseline ADL function  Description: INTERVENTIONS:  -  Assess patient's ability to carry out ADLs; assess patient's baseline for ADL function and identify physical deficits which impact ability to perform ADLs (bathing, care of mouth/teeth, toileting, grooming, dressing, etc )  - Assess/evaluate cause of self-care deficits   - Assess range of motion  - Assess patient's mobility; develop plan if impaired  - Assess patient's need for assistive devices and provide as appropriate  - Encourage maximum independence but intervene and supervise when necessary  - Involve family in performance of ADLs  - Assess for home care needs following discharge   - Consider OT consult to assist with ADL evaluation and planning for discharge  - Provide patient education as appropriate  Outcome: Progressing  Goal: Maintains/Returns to pre admission functional level  Description: INTERVENTIONS:  - Perform BMAT or MOVE assessment daily    - Set and communicate daily mobility goal to care team and patient/family/caregiver  - Collaborate with rehabilitation services on mobility goals if consulted  - Perform Range of Motion  times a day  - Reposition patient every  hours    - Dangle patient  times a day  - Stand patient  times a day  - Ambulate patient  times a day  - Out of bed to chair  times a day   - Out of bed for meals  times a day  - Out of bed for toileting  - Record patient progress and toleration of activity level   Outcome: Progressing  Goal: Patient will remain free of falls  Description: INTERVENTIONS:  - Educate patient/family on patient safety including physical limitations  - Instruct patient to call for assistance with activity   - Consult OT/PT to assist with strengthening/mobility   - Keep Call bell within reach  - Keep bed low and locked with side rails adjusted as appropriate  - Keep care items and personal belongings within reach  - Initiate and maintain comfort rounds  - Make Fall Risk Sign visible to staff  - Offer Toileting every  Hours, in advance of need  - Initiate/Maintain alarm  - Obtain necessary fall risk management equipment:   - Apply yellow socks and bracelet for high fall risk patients  - Consider moving patient to room near nurses station  Outcome: Progressing     Problem: DISCHARGE PLANNING  Goal: Discharge to home or other facility with appropriate resources  Description: INTERVENTIONS:  - Identify barriers to discharge w/patient and caregiver  - Arrange for needed discharge resources and transportation as appropriate  - Identify discharge learning needs (meds, wound care, etc )  - Arrange for interpretive services to assist at discharge as needed  - Refer to Case Management Department for coordinating discharge planning if the patient needs post-hospital services based on physician/advanced practitioner order or complex needs related to functional status, cognitive ability, or social support system  Outcome: Progressing Problem: Knowledge Deficit  Goal: Patient/family/caregiver demonstrates understanding of disease process, treatment plan, medications, and discharge instructions  Description: Complete learning assessment and assess knowledge base  Interventions:  - Provide teaching at level of understanding  - Provide teaching via preferred learning methods  Outcome: Progressing     Problem: Potential for Falls  Goal: Patient will remain free of falls  Description: INTERVENTIONS:  - Educate patient/family on patient safety including physical limitations  - Instruct patient to call for assistance with activity   - Consult OT/PT to assist with strengthening/mobility   - Keep Call bell within reach  - Keep bed low and locked with side rails adjusted as appropriate  - Keep care items and personal belongings within reach  - Initiate and maintain comfort rounds  - Make Fall Risk Sign visible to staff  - Offer Toileting every  Hours, in advance of need  - Initiate/Maintain alarm  - Obtain necessary fall risk management equipment  - Apply yellow socks and bracelet for high fall risk patients  - Consider moving patient to room near nurses station  Outcome: Progressing     Problem: Nutrition/Hydration-ADULT  Goal: Nutrient/Hydration intake appropriate for improving, restoring or maintaining nutritional needs  Description: Monitor and assess patient's nutrition/hydration status for malnutrition  Collaborate with interdisciplinary team and initiate plan and interventions as ordered  Monitor patient's weight and dietary intake as ordered or per policy  Utilize nutrition screening tool and intervene as necessary  Determine patient's food preferences and provide high-protein, high-caloric foods as appropriate       INTERVENTIONS:  - Monitor oral intake, urinary output, labs, and treatment plans  - Assess nutrition and hydration status and recommend course of action  - Evaluate amount of meals eaten  - Assist patient with eating if necessary - Allow adequate time for meals  - Recommend/ encourage appropriate diets, oral nutritional supplements, and vitamin/mineral supplements  - Order, calculate, and assess calorie counts as needed  - Recommend, monitor, and adjust tube feedings and TPN/PPN based on assessed needs  - Assess need for intravenous fluids  - Provide specific nutrition/hydration education as appropriate  - Include patient/family/caregiver in decisions related to nutrition  Outcome: Progressing

## 2022-06-28 NOTE — PHYSICAL THERAPY NOTE
PHYSICAL THERAPY NOTE          Patient Name: Hussein Sainz  VMKSV'N Date: 6/28/2022 06/28/22 1032   PT Last Visit   PT Visit Date 06/28/22   Note Type   Note Type Treatment   Pain Assessment   Pain Assessment Tool 0-10   Pain Score 3   Pain Location/Orientation Location: Abdomen   Hospital Pain Intervention(s) Repositioned; Ambulation/increased activity; Emotional support   Restrictions/Precautions   Weight Bearing Precautions Per Order No   Other Precautions Multiple lines; Fall Risk;Pain  (NGT)   General   Chart Reviewed Yes   Response to Previous Treatment Patient with no complaints from previous session  Family/Caregiver Present No   Cognition   Overall Cognitive Status WFL   Arousal/Participation Responsive; Cooperative   Attention Within functional limits   Orientation Level Oriented X4   Memory Within functional limits   Following Commands Follows one step commands without difficulty   Comments pt very pleasant and cooperative to participate in therapy session   Bed Mobility   Supine to Sit 4  Minimal assistance   Additional items Assist x 1;HOB elevated; Bedrails; Increased time required;Verbal cues;LE management   Sit to Supine 4  Minimal assistance   Additional items Assist x 1;HOB elevated; Bedrails; Increased time required;Verbal cues;LE management   Additional Comments pt supine in bed upon arrival  Pt returned to supine in bed with all needs wihtin reach   Transfers   Sit to Stand 3  Moderate assistance   Additional items Assist x 1; Increased time required;Verbal cues   Stand to Sit 4  Minimal assistance   Additional items Assist x 1; Increased time required;Verbal cues   Toilet transfer 3  Moderate assistance   Additional items Assist x 1; Increased time required;Verbal cues;Standard toilet  (retrieved BS for bathroom post therapy session)   Additional Comments transfers with RW; cues for hand placment and sequencing  Poor eccentric control noted   Ambulation/Elevation   Gait pattern Excessively slow; Short stride; Foward flexed;Decreased foot clearance; Improper Weight shift   Gait Assistance 4  Minimal assist   Additional items Assist x 1;Verbal cues; Tactile cues   Assistive Device Rolling walker   Distance 10ft; 5ft; 5ft   Balance   Static Sitting Fair   Dynamic Sitting Fair   Static Standing Fair -   Dynamic Standing Poor +   Ambulatory Poor +   Endurance Deficit   Endurance Deficit Yes   Endurance Deficit Description pain, deconditioning, generalized weakness/ fatigue   Activity Tolerance   Activity Tolerance Patient limited by fatigue;Patient limited by pain   Medical Staff Made Aware nabeel Bravo   Nurse Made Aware RN cleared pt to participate in therapy session   Exercises   Hip Abduction Sitting;Bilateral;10 reps;AROM   Hip Adduction Sitting;Bilateral;10 reps;AROM   Knee AROM Short Arc Quad Supine;Bilateral;10 reps;AROM   Knee AROM Long Arc Quad Sitting;Bilateral;10 reps;AROM   Ankle Pumps Supine;Bilateral;10 reps;AROM   Marching Sitting;Bilateral;10 reps;AROM   Assessment   Prognosis Good   Problem List Decreased strength;Decreased endurance; Impaired balance;Decreased mobility;Pain   Assessment Pt seen for PT treatment session this date  Therapy session focused on bed mobility, functional transfers, gait training and therapeutic exercise in order to improve overall mobility and independence  Pt requires assist of 1 for all mobility performed this date  Pt limited this date by increased fatigue and pain as well as NGT discomfort  Pt continues to require increased assistance to complete STS requiring bed elevation  Pt requires increased assistance from lower surface toilet- BSC provided at end of session  Pt only able to ambulate short distances in room this date limited by increased fatigue- agreeable however to perform LE therex program  Pt making progress toward goals   Pt was left supine in bed at the end of PT session with all needs in reach  Pt would benefit from continued PT services while in hospital to address remaining limitations  PT to continue to follow pt and recommends rehab pending progress  The patient's AM-PAC Basic Mobility Inpatient Short Form Raw Score is 14  A Raw score of less than or equal to 16 suggests the patient may benefit from discharge to post-acute rehabilitation services  Please also refer to the recommendation of the Physical Therapist for safe discharge planning  Barriers to Discharge Inaccessible home environment;Decreased caregiver support   Goals   Patient Goals to get the NGT out   STG Expiration Date 07/07/22   PT Treatment Day 2   Plan   Treatment/Interventions ADL retraining;Functional transfer training;LE strengthening/ROM; Patient/family training;Equipment eval/education; Bed mobility;Gait training;Spoke to nursing   Progress Progressing toward goals   PT Frequency 3-5x/wk   Recommendation   PT Discharge Recommendation Post acute rehabilitation services  (pending progress)   Equipment Recommended [facility] Recommended Wheeled walker   [address] Mobility Inpatient   Turning in Bed Without Bedrails 3   Lying on Back to Sitting on Edge of Flat Bed 2   Moving Bed to Chair 3   Standing Up From Chair 2   Walk in Room 3   Climb 3-5 Stairs 1   Basic Mobility Inpatient Raw Score 14   Basic Mobility Standardized Score 35 55   Highest Level Of Mobility   JH-HLM Goal 4: Move to chair/commode   JH-HLM Achieved 6: Walk 10 steps or more   Education   Education Provided Mobility training;Assistive device;Home exercise program   Patient Demonstrates acceptance/verbal understanding   End of Consult   Patient Position at End of Consult Supine; All needs within reach     Kennedy Para, PT, DPT

## 2022-06-28 NOTE — TELEPHONE ENCOUNTER
CALL TRANSFER   Reason for patient call? Patient's daughter April is calling because the patient is currently admitted at [facility] and she states the patient and family has many questions in regards to his care and progress  Patient's primary physician? Dr Nivia Nguyen RN call was transferred to and time it was transferred? Jerry Hand @ 1222pm    Informed patient that the message will be forwarded to the team and someone will get back to them as soon as possible    Did you relay this information to the patient?  Yes

## 2022-06-28 NOTE — OCCUPATIONAL THERAPY NOTE
Occupational Therapy Progress Note     Patient Name: Clint   ZXRGL'R Date: 6/28/2022  Problem List  Principal Problem:    Malignant neoplasm of lower third of esophagus ([address] )        06/28/22 9938   OT Last Visit   OT Visit Date 06/28/22   Note Type   Note Type Treatment   Restrictions/Precautions   Weight Bearing Precautions Per Order No   Other Precautions Multiple lines; Fall Risk;Pain  (NGT to suction)   Lifestyle   Autonomy PTA pt I for ADLs, IADLs, and fxnl mobility with SPC, + driving   Service to Others retired- worked on [employer], watching sports on TV   Pain Assessment   Pain Assessment Tool FLACC   Pain Location/Orientation Location: Abdomen  (+ NGT location)   Hospital Pain Intervention(s) Repositioned; Ambulation/increased activity; Emotional support   Pain Rating: FLACC (Rest) - Face 0   Pain Rating: FLACC (Rest) - Legs 0   Pain Rating: FLACC (Rest) - Activity 0   Pain Rating: FLACC (Rest) - Cry 0   Pain Rating: FLACC (Rest) - Consolability 0   Score: FLACC (Rest) 0   Pain Rating: FLACC (Activity) - Face 1   Pain Rating: FLACC (Activity) - Legs 0   Pain Rating: FLACC (Activity) - Activity 0   Pain Rating: FLACC (Activity) - Cry 0   Pain Rating: FLACC (Activity) - Consolability 0   Score: FLACC (Activity) 1   ADL   Where Assessed Supine, bed   Grooming Assistance 4  Minimal Assistance   Grooming Deficit Brushing hair   Grooming Comments + washing hair with shampoo cap   UB Dressing Assistance 4  Minimal Assistance   UB Dressing Deficit Thread RUE; Thread LUE   Bed Mobility   Supine to Sit 4  Minimal assistance   Additional items Assist x 1;HOB elevated; Bedrails; Increased time required;LE management   Sit to Supine 4  Minimal assistance   Additional items Assist x 1; Increased time required;LE management   Transfers   Additional Comments Pt defers OOB/further mobility at this time 2* pain and fatigue   Therapeutic Excerise-Strength   UE Strength Yes   Right Upper Extremity- Strength   R Shoulder Flexion; Extension   R Elbow Elbow flexion;Elbow extension   Equipment   (used soda can to provide weight)   R Weight/Reps/Sets 3 x 10   Left Upper Extremity-Strength   L Shoulder Flexion; Extension   L Elbow Elbow flexion;Elbow extension   Equipment   (used soda can to provide weight)   L Weights/Reps/Sets 3x10   LUE Strength Comment Pt required assistance for sh ROM 2* pt reported loss of deltoid from hx of polio   Cognition   Overall Cognitive Status [facility]   Arousal/Participation Alert; Responsive; Cooperative   Attention Within functional limits   Orientation Level Oriented X4   Memory Within functional limits   Following Commands Follows all commands and directions without difficulty   Comments Pt pleasant and coopertive t/o session   Activity Tolerance   Activity Tolerance Patient limited by fatigue   Medical Staff Made Aware RN clearance for session   Assessment   Assessment Patient participated in Skilled OT session this date with interventions consisting of ADL re training with the use of correct body mechnaics, Energy Conservation techniques, safety awareness and fall prevention techniques, therapeutic exercise to: increase functional use of BUEs, increase BUE muscle strength  and  therapeutic activities to: increase activity tolerance   Upon arrival patient was found supine in bed  Pt demonstrated the following tasks: MIN A sup <> sit, UB, and grooming  Pt defers OOB and LBD at this time 2* increased fatigue, despite encouragement  Pt also participated in UE therex, required A for LUE sh ROM  Patient continues to be functioning below baseline level, occupational performance remains limited secondary to factors listed above and increased risk for falls and injury  From OT standpoint, recommendation at time of d/c would be STR vs home with skilled therapy - pending progress    Patient to benefit from continued Occupational Therapy treatment while in the hospital to address deficits as defined above and maximize level of functional independence with ADLs and functional mobility  Pt was left after session with all current needs met  The patient's raw score on the AM-PAC Daily Activity inpatient short form is 19, standardized score is 40 22, greater than 39 4  Patients at this level are likely to benefit from discharge to home  Please refer to the recommendation of the Occupational Therapist for safe discharge planning  Plan   Treatment Interventions ADL retraining;Functional transfer training; Endurance training;UE strengthening/ROM; Patient/family training;Equipment evaluation/education; Compensatory technique education; Energy conservation; Activityengagement   Goal Expiration Date 07/07/22   OT Treatment Day 1   OT Frequency 3-5x/wk   Recommendation   OT Discharge Recommendation Post acute rehabilitation services  (vs home with skilled therapy - pending progress)   AM-PAC Daily Activity Inpatient   Lower Body Dressing 3   Bathing 3   Toileting 3   Upper Body Dressing 3   Grooming 3   Eating 4   Daily Activity Raw Score 19   Daily Activity Standardized Score (Calc for Raw Score >=11) 40 22   AM-[facility] Applied Cognition Inpatient   Following a Speech/Presentation 3   Understanding Ordinary Conversation 4   Taking Medications 4   Remembering Where Things Are Placed or Put Away 4   Remembering List of 4-5 Errands 3   Taking Care of Complicated Tasks 3   Applied Cognition Raw Score 21   Applied Cognition Standardized Score 44 3     Akash Romano MS, OTR/L

## 2022-06-28 NOTE — PROGRESS NOTES
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  HR 100s-120s, OVSS, room air       AM labs pending   Wbc prev 3 7 from 8 83  Hb prev 9 7 from 11 0   Plan:  -continue NPO/ sips of clears only   -continue holding tube feeds  -EKG for new tachycardia   -serial abdominal exams   -consider NGT decompression  -OOB, ambulate  -pulmonary toilet  - DVt PPx  -PT/OT: Home with [facility]  -disposition planning     Subjective/Objective       Subjective: remains distended, nauseous, vomited 3x in 24hrs, passing flatus but no BM  Objective:     Blood pressure 103/73, pulse (!) 125, temperature 98 1 °F (36 7 °C), resp  rate 16, height 5' 10" (1 778 m), weight 64 9 kg (143 lb), SpO2 96 %  ,Body mass index is 20 52 kg/m²  Intake/Output Summary (Last 24 hours) at 6/28/2022 0555  Last data filed at 6/28/2022 0542  Gross per 24 hour   Intake 1776 67 ml   Output --   Net 1776 67 ml       Invasive Devices  Report    Central Venous Catheter Line  Duration           Port A Cath 02/16/22 Left Chest 131 days          Peripheral Intravenous Line  Duration           Peripheral IV 06/26/22 Proximal;Right;Ventral (anterior) Forearm 2 days          Drain  Duration           Gastrostomy/Enterostomy PEG-jejunostomy 20 Fr    days                Physical Exam:   General:NAD  HEENT: normocephalic, atraumatic   Cardiovascular: Tachycardia, irregular   Respiratory: no distress, room air   Abdomen: soft, mild diffuse tenderness, remains distended, healing laparotomy   Extremities: No c/c/e  Neuro: AAOx3  Skin: warm, dry       Lab, Imaging and other studies:pending   VTE Pharmacologic Prophylaxis: Enoxaparin (Lovenox)  VTE Mechanical Prophylaxis: sequential compression device

## 2022-06-28 NOTE — PLAN OF CARE
Problem: OCCUPATIONAL THERAPY ADULT  Goal: Performs self-care activities at highest level of function for planned discharge setting  See evaluation for individualized goals  Description: Treatment Interventions: ADL retraining, Functional transfer training, Endurance training, UE strengthening/ROM, Patient/family training, Equipment evaluation/education, Compensatory technique education, Energy conservation, Activityengagement          See flowsheet documentation for full assessment, interventions and recommendations  Outcome: Progressing  Note: Limitation: Decreased ADL status, Decreased UE ROM, Decreased UE strength, Decreased Safe judgement during ADL, Decreased endurance, Decreased self-care trans, Decreased high-level ADLs  Prognosis: Good  Assessment: Patient participated in Skilled OT session this date with interventions consisting of ADL re training with the use of correct body mechnaics, Energy Conservation techniques, safety awareness and fall prevention techniques, therapeutic exercise to: increase functional use of BUEs, increase BUE muscle strength  and  therapeutic activities to: increase activity tolerance   Upon arrival patient was found supine in bed  Pt demonstrated the following tasks: MIN A sup <> sit, UB, and grooming  Pt defers OOB and LBD at this time 2* increased fatigue, despite encouragement  Pt also participated in UE therex, required A for LUE sh ROM  Patient continues to be functioning below baseline level, occupational performance remains limited secondary to factors listed above and increased risk for falls and injury  From OT standpoint, recommendation at time of d/c would be STR vs home with skilled therapy - pending progress  Patient to benefit from continued Occupational Therapy treatment while in the hospital to address deficits as defined above and maximize level of functional independence with ADLs and functional mobility   Pt was left after session with all current needs met  The patient's raw score on the AM-PAC Daily Activity inpatient short form is 19, standardized score is 40 22, greater than 39 4  Patients at this level are likely to benefit from discharge to home  Please refer to the recommendation of the Occupational Therapist for safe discharge planning       OT Discharge Recommendation: Post acute rehabilitation services (vs home with skilled therapy - pending progress)

## 2022-06-28 NOTE — RESTORATIVE TECHNICIAN NOTE
Restorative Technician Note      Patient Name: Matteo [place] [employer] Tech Visit Date: 6/28/2022  Note Type: Mobility  Patient Position Upon Consult: Supine  Activity Performed: Ambulated  Assistive Device: Roller walker  Patient Position at End of Consult:  Other (comment) (ambulated to transport stretcher in the cueva)    Cecil Almeida

## 2022-06-28 NOTE — UTILIZATION REVIEW
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  Assessment/Plan: remains distended, nauseous, vomited 3x in 24hrs, passing flatus but no BM  HR 100s-120s  On room air  Continue npo, sips of clears only  Continue holding tube feeds  EKG for new tachycardia  Serial abd exams  Consider NGT decompression  OOB/ambulate  Pulmonary toilet  SCDs  PT/OT recommending home with [facility]       Vital Signs:   Date/Time Temp Pulse Resp BP MAP (mmHg) SpO2 O2 Device   06/28/22 07:47:47 102 °F (38 9 °C) Abnormal  114 Abnormal  16 115/70 85 93 % --   06/28/22 05:09:31 98 1 °F (36 7 °C) 125 Abnormal  -- 103/73 83 96 % --   06/28/22 05:09:03 -- 122 Abnormal  16 103/73 83 95 % --   06/28/22 0350 -- 110 Abnormal  -- -- -- 93 % --   06/28/22 01:54:43 -- 117 Abnormal  16 100/71 81 96 % --   06/28/22 0124 -- 113 Abnormal  -- -- -- 95 % --   06/28/22 0035 -- 121 Abnormal  -- -- -- 95 % --   06/27/22 2145 -- 126 Abnormal  -- -- -- -- --   06/27/22 21:31:16 99 2 °F (37 3 °C) 132 Abnormal  16 124/87 99 94 % --   06/27/22 17:17:59 98 1 °F (36 7 °C) 96 -- 144/91 109 93 % --   06/27/22 08:06:52 98 7 °F (37 1 °C) 107 Abnormal  -- 141/93 109 88 % Abnormal  --   06/26/22 21:19:17 99 1 °F (37 3 °C) 103 16 146/91 109 97 % --   06/26/22 15:18:44 99 6 °F (37 6 °C) 98 20 150/92 111 95 % --   06/26/22 11:20:41 98 3 °F (36 8 °C) 101 18 164/94 117 93 % --   06/26/22 07:17:42 98 °F (36 7 °C) 114 Abnormal  15 171/96 Abnormal  121 95 % --   06/26/22 0002 -- -- -- 146/80 -- -- --       Pertinent Labs/Diagnostic Results:     Results from last 7 days   Lab Units 06/27/22  0542 06/26/22  0840 06/23/22  0527   WBC Thousand/uL 3 74* 8 83 8 84   HEMOGLOBIN g/dL 9 7* 11 0* 9 1*   HEMATOCRIT % 28 7* 33 2* 28 0*   PLATELETS Thousands/uL 168 164 131*   BANDS PCT % 37*  --   -- Results from last 7 days   Lab Units 06/28/22 0622 06/27/22  0542 06/26/22  0840 06/23/22  0527   SODIUM mmol/L 131* 132* 133* 135*   POTASSIUM mmol/L 3 9 4 7 4 5 4 0   CHLORIDE mmol/L 97* 98* 99* 103   CO2 mmol/L 30 28 29 28   ANION GAP mmol/L 4 6 5 4   BUN mg/dL 28* 35* 27* 15   CREATININE mg/dL 0 86 1 00 0 80 0 75   EGFR ml/min/1 73sq m 84 72 86 89   CALCIUM mg/dL 8 8 9 1 9 2 8 7   MAGNESIUM mg/dL 2 3 2 3 2 1 1 8   PHOSPHORUS mg/dL 3 0  --   --  2 7     Results from last 7 days   Lab Units 06/28/22 0622 06/27/22  0542 06/26/22  0840 06/23/22  0527   GLUCOSE RANDOM mg/dL 169* 223* 259* 122         Medications:   Scheduled Medications:  enoxaparin, 40 mg, Subcutaneous, Daily  metoclopramide, 10 mg, Intravenous, Q6H [address]  nicotine, 1 patch, Transdermal, Daily  pantoprazole, 40 mg, Intravenous, Q12H [address]    Continuous IV Infusions:  dextrose 5 % and sodium chloride 0 45 %, 100 mL/hr, Intravenous, Continuous    PRN Meds:  acetaminophen, 650 mg, Rectal, Q6H PRN 6/28 x1  HYDROmorphone, 0 5 mg, Intravenous, Q3H PRN 6/26 x2  HYDROmorphone, 0 5 mg, Intravenous, Q2H PRN  labetalol, 10 mg, Intravenous, Q4H PRN  Metoclopramide 10 mg Q6H PRN 6/26 x3  ondansetron, 4 mg, Intravenous, Q6H PRN 6/26 x2, 6/27 x1  Oxycodone IR 5 mg PO Q4H PRN 6/26 x2        Discharge Plan: D    Network Utilization Review Department  ATTENTION: Please call with any questions or concerns to [phone] and carefully listen to the prompts so that you are directed to the right person  All voicemails are confidential   Nivia Doing all requests for admission clinical reviews, approved or denied determinations and any other requests to dedicated fax number below belonging to the campus where the patient is receiving treatment   List of dedicated fax numbers for the Facilities:  [address] DENIALS (Administrative/Medical Necessity) [phone]   [address] (Maternity/NICU/Pediatrics) [phone] 5000 [facility] Christine Gonzalez [phone]   [address]  [phone]   [address] [facility] [facility] [address]307 [id number] [place] [address] Amy Sánchez 14[address] [phone]   [address] [phone]

## 2022-06-28 NOTE — PLAN OF CARE
Problem: PHYSICAL THERAPY ADULT  Goal: Performs mobility at highest level of function for planned discharge setting  See evaluation for individualized goals  Description: Treatment/Interventions: ADL retraining, Functional transfer training, LE strengthening/ROM, Patient/family training, Equipment eval/education, Bed mobility, Gait training, Spoke to nursing, Spoke to case management, OT  Equipment Recommended: Carmen Socks       See flowsheet documentation for full assessment, interventions and recommendations  Outcome: Progressing  Note: Prognosis: Good  Problem List: Decreased strength, Decreased endurance, Impaired balance, Decreased mobility, Pain  Assessment: Pt seen for PT treatment session this date  Therapy session focused on bed mobility, functional transfers, gait training and therapeutic exercise in order to improve overall mobility and independence  Pt requires assist of 1 for all mobility performed this date  Pt limited this date by increased fatigue and pain as well as NGT discomfort  Pt continues to require increased assistance to complete STS requiring bed elevation  Pt requires increased assistance from lower surface toilet- BSC provided at end of session  Pt only able to ambulate short distances in room this date limited by increased fatigue- agreeable however to perform LE therex program  Pt making progress toward goals  Pt was left supine in bed at the end of PT session with all needs in reach  Pt would benefit from continued PT services while in hospital to address remaining limitations  PT to continue to follow pt and recommends rehab pending progress  The patient's AM-PAC Basic Mobility Inpatient Short Form Raw Score is 14  A Raw score of less than or equal to 16 suggests the patient may benefit from discharge to post-acute rehabilitation services  Please also refer to the recommendation of the Physical Therapist for safe discharge planning    Barriers to Discharge: Inaccessible home environment, Decreased caregiver support        PT Discharge Recommendation: Post acute rehabilitation services (pending progress)          See flowsheet documentation for full assessment

## 2022-06-29 LAB
ANION GAP SERPL CALCULATED.3IONS-SCNC: 7 MMOL/L (ref 4–13)
ATRIAL RATE: 129 BPM
BASOPHILS # BLD AUTO: 0.02 THOUSANDS/ΜL (ref 0–0.1)
BASOPHILS NFR BLD AUTO: 0 % (ref 0–1)
BUN SERPL-MCNC: 19 MG/DL (ref 5–25)
CALCIUM SERPL-MCNC: 8.3 MG/DL (ref 8.3–10.1)
CHLORIDE SERPL-SCNC: 102 MMOL/L (ref 100–108)
CO2 SERPL-SCNC: 26 MMOL/L (ref 21–32)
CREAT SERPL-MCNC: 0.6 MG/DL (ref 0.6–1.3)
EOSINOPHIL # BLD AUTO: 0.02 THOUSAND/ΜL (ref 0–0.61)
EOSINOPHIL NFR BLD AUTO: 0 % (ref 0–6)
ERYTHROCYTE [DISTWIDTH] IN BLOOD BY AUTOMATED COUNT: 14.7 % (ref 11.6–15.1)
GFR SERPL CREATININE-BSD FRML MDRD: 97 ML/MIN/1.73SQ M
GLUCOSE SERPL-MCNC: 133 MG/DL (ref 65–140)
HCT VFR BLD AUTO: 25.4 % (ref 36.5–49.3)
HGB BLD-MCNC: 8.4 G/DL (ref 12–17)
IMM GRANULOCYTES # BLD AUTO: 0.08 THOUSAND/UL (ref 0–0.2)
IMM GRANULOCYTES NFR BLD AUTO: 1 % (ref 0–2)
LYMPHOCYTES # BLD AUTO: 0.29 THOUSANDS/ΜL (ref 0.6–4.47)
LYMPHOCYTES NFR BLD AUTO: 4 % (ref 14–44)
MAGNESIUM SERPL-MCNC: 2.2 MG/DL (ref 1.6–2.6)
MCH RBC QN AUTO: 33.2 PG (ref 26.8–34.3)
MCHC RBC AUTO-ENTMCNC: 33.1 G/DL (ref 31.4–37.4)
MCV RBC AUTO: 100 FL (ref 82–98)
MONOCYTES # BLD AUTO: 1.36 THOUSAND/ΜL (ref 0.17–1.22)
MONOCYTES NFR BLD AUTO: 16 % (ref 4–12)
NEUTROPHILS # BLD AUTO: 6.58 THOUSANDS/ΜL (ref 1.85–7.62)
NEUTS SEG NFR BLD AUTO: 79 % (ref 43–75)
NRBC BLD AUTO-RTO: 0 /100 WBCS
PLATELET # BLD AUTO: 194 THOUSANDS/UL (ref 149–390)
PMV BLD AUTO: 11.1 FL (ref 8.9–12.7)
POTASSIUM SERPL-SCNC: 4.1 MMOL/L (ref 3.5–5.3)
PR INTERVAL: 128 MS
QRS AXIS: -6 DEGREES
QRSD INTERVAL: 118 MS
QT INTERVAL: 338 MS
QTC INTERVAL: 495 MS
RBC # BLD AUTO: 2.53 MILLION/UL (ref 3.88–5.62)
SODIUM SERPL-SCNC: 135 MMOL/L (ref 136–145)
T WAVE AXIS: -7 DEGREES
VENTRICULAR RATE: 129 BPM
WBC # BLD AUTO: 8.35 THOUSAND/UL (ref 4.31–10.16)

## 2022-06-29 PROCEDURE — 83735 ASSAY OF MAGNESIUM: CPT | Performed by: PHYSICIAN ASSISTANT

## 2022-06-29 PROCEDURE — 93010 ELECTROCARDIOGRAM REPORT: CPT | Performed by: INTERNAL MEDICINE

## 2022-06-29 PROCEDURE — C9113 INJ PANTOPRAZOLE SODIUM, VIA: HCPCS | Performed by: SURGERY

## 2022-06-29 PROCEDURE — 99024 POSTOP FOLLOW-UP VISIT: CPT | Performed by: SURGERY

## 2022-06-29 PROCEDURE — 85025 COMPLETE CBC W/AUTO DIFF WBC: CPT | Performed by: PHYSICIAN ASSISTANT

## 2022-06-29 PROCEDURE — 80048 BASIC METABOLIC PNL TOTAL CA: CPT | Performed by: PHYSICIAN ASSISTANT

## 2022-06-29 RX ORDER — GABAPENTIN 100 MG/1
100 CAPSULE ORAL 3 TIMES DAILY
Status: DISCONTINUED | OUTPATIENT
Start: 2022-06-29 | End: 2022-07-02 | Stop reason: HOSPADM

## 2022-06-29 RX ORDER — ACETAMINOPHEN 325 MG/1
650 TABLET ORAL EVERY 6 HOURS SCHEDULED
Status: DISCONTINUED | OUTPATIENT
Start: 2022-06-29 | End: 2022-07-02 | Stop reason: HOSPADM

## 2022-06-29 RX ORDER — METHOCARBAMOL 500 MG/1
500 TABLET, FILM COATED ORAL EVERY 8 HOURS SCHEDULED
Status: DISCONTINUED | OUTPATIENT
Start: 2022-06-29 | End: 2022-07-02 | Stop reason: HOSPADM

## 2022-06-29 RX ORDER — HYDROMORPHONE HCL IN WATER/PF 6 MG/30 ML
0.2 PATIENT CONTROLLED ANALGESIA SYRINGE INTRAVENOUS
Status: DISCONTINUED | OUTPATIENT
Start: 2022-06-29 | End: 2022-06-29

## 2022-06-29 RX ADMIN — SODIUM CHLORIDE, SODIUM GLUCONATE, SODIUM ACETATE, POTASSIUM CHLORIDE, MAGNESIUM CHLORIDE, SODIUM PHOSPHATE, DIBASIC, AND POTASSIUM PHOSPHATE 100 ML/HR: .53; .5; .37; .037; .03; .012; .00082 INJECTION, SOLUTION INTRAVENOUS at 13:07

## 2022-06-29 RX ADMIN — METOCLOPRAMIDE HYDROCHLORIDE 10 MG: 5 INJECTION INTRAMUSCULAR; INTRAVENOUS at 12:58

## 2022-06-29 RX ADMIN — METOCLOPRAMIDE HYDROCHLORIDE 10 MG: 5 INJECTION INTRAMUSCULAR; INTRAVENOUS at 17:47

## 2022-06-29 RX ADMIN — PANTOPRAZOLE SODIUM 40 MG: 40 INJECTION, POWDER, FOR SOLUTION INTRAVENOUS at 08:38

## 2022-06-29 RX ADMIN — SODIUM CHLORIDE, SODIUM GLUCONATE, SODIUM ACETATE, POTASSIUM CHLORIDE, MAGNESIUM CHLORIDE, SODIUM PHOSPHATE, DIBASIC, AND POTASSIUM PHOSPHATE 100 ML/HR: .53; .5; .37; .037; .03; .012; .00082 INJECTION, SOLUTION INTRAVENOUS at 04:03

## 2022-06-29 RX ADMIN — SODIUM CHLORIDE, SODIUM GLUCONATE, SODIUM ACETATE, POTASSIUM CHLORIDE, MAGNESIUM CHLORIDE, SODIUM PHOSPHATE, DIBASIC, AND POTASSIUM PHOSPHATE 100 ML/HR: .53; .5; .37; .037; .03; .012; .00082 INJECTION, SOLUTION INTRAVENOUS at 22:07

## 2022-06-29 RX ADMIN — METHOCARBAMOL 500 MG: 500 TABLET ORAL at 13:01

## 2022-06-29 RX ADMIN — GABAPENTIN 100 MG: 100 CAPSULE ORAL at 12:58

## 2022-06-29 RX ADMIN — ACETAMINOPHEN 650 MG: 325 TABLET, FILM COATED ORAL at 12:58

## 2022-06-29 RX ADMIN — PANTOPRAZOLE SODIUM 40 MG: 40 INJECTION, POWDER, FOR SOLUTION INTRAVENOUS at 22:07

## 2022-06-29 RX ADMIN — GABAPENTIN 100 MG: 100 CAPSULE ORAL at 17:47

## 2022-06-29 RX ADMIN — ACETAMINOPHEN 650 MG: 325 TABLET, FILM COATED ORAL at 17:47

## 2022-06-29 RX ADMIN — METOCLOPRAMIDE HYDROCHLORIDE 10 MG: 5 INJECTION INTRAMUSCULAR; INTRAVENOUS at 05:37

## 2022-06-29 RX ADMIN — METOCLOPRAMIDE HYDROCHLORIDE 10 MG: 5 INJECTION INTRAMUSCULAR; INTRAVENOUS at 00:13

## 2022-06-29 RX ADMIN — ENOXAPARIN SODIUM 40 MG: 40 INJECTION SUBCUTANEOUS at 08:38

## 2022-06-29 NOTE — QUICK NOTE
NGT clamp trial less than 200 mls after 4 hour clamp  To start sips of clears with meds today    Will discontinue the dilaudid  Ordering Tylenol 650 mg q6 hrs; Gabapentin 100 mg q 8 hrs, Robaxin 500 mg q6  Patient to be OOB and ambulating the halls throughout the day

## 2022-06-29 NOTE — RESTORATIVE TECHNICIAN NOTE
Restorative Technician Note      Patient Name: Sheyla Irizarry     Restorative Tech Visit Date: 6/29/2022  Note Type: Mobility  Patient Position Upon Consult: Supine  Activity Performed: Ambulated  Assistive Device: Roller walker  Patient Position at End of Consult: Supine;  All needs within [facility]

## 2022-06-29 NOTE — PROGRESS NOTES
Progress Note - Surgical Oncology  Sheyla Irizarry 68 y o  male MRN: [id number]  Unit/Bed#: [facility] 928-01 Encounter: [id number]      Objective:  Patient states he feels better this morning  Complaining of the NG tube causing discomfort  Patient did have 1600 insertion of nasogastric tube  Only 1000 out the last 24 hours with 200 overnight  No flatus as of yet, no bowel movements  Urine output only recorded as 1 during the day  Jejunostomy tubes are held  Patient still feels somewhat bloated  Patient states he is not ambulating as often as she could  450 + 1 UA  1000 NGT    Blood pressure 111/67, pulse 92, temperature 99 3 °F (37 4 °C), resp  rate 20, height 5' 10" (1 778 m), weight 64 9 kg (143 lb), SpO2 94 %  ,Body mass index is 20 52 kg/m²  Intake/Output Summary (Last 24 hours) at 6/29/2022 0507  Last data filed at 6/29/2022 0154  Gross per 24 hour   Intake 2120 ml   Output 2350 ml   Net -230 ml       Invasive Devices  Report    Central Venous Catheter Line  Duration           Port A Cath 02/16/22 Left Chest 132 days          Peripheral Intravenous Line  Duration           Peripheral IV 06/26/22 Proximal;Right;Ventral (anterior) Forearm 2 days          Drain  Duration           Gastrostomy/Enterostomy PEG-jejunostomy 20 Fr   days    NG/OG/Enteral Tube Nasogastric Right nare <1 day                Physical Exam:   Patient sitting up in bed, alert, watching TV, in no apparent distress  Abdomen:  Still feels somewhat distended, tympanic,  no bowel sounds audible, midline incision is clean and dry, minimal incisional tenderness to palpation  Extremities:   There is no pedal edema bilaterally, there is no calf tenderness bilaterally, no upper extremity edema    Lab, Imaging and other studies:  Pending  VTE Pharmacologic Prophylaxis: Enoxaparin (Lovenox)  VTE Mechanical Prophylaxis: sequential compression device    Assessment:  POD # 7 exploratory laparotomy, 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

## 2022-06-29 NOTE — PLAN OF CARE
Problem: MOBILITY - ADULT  Goal: Maintain or return to baseline ADL function  Description: INTERVENTIONS:  -  Assess patient's ability to carry out ADLs; assess patient's baseline for ADL function and identify physical deficits which impact ability to perform ADLs (bathing, care of mouth/teeth, toileting, grooming, dressing, etc )  - Assess/evaluate cause of self-care deficits   - Assess range of motion  - Assess patient's mobility; develop plan if impaired  - Assess patient's need for assistive devices and provide as appropriate  - Encourage maximum independence but intervene and supervise when necessary  - Involve family in performance of ADLs  - Assess for home care needs following discharge   - Consider OT consult to assist with ADL evaluation and planning for discharge  - Provide patient education as appropriate  Outcome: Progressing  Goal: Maintains/Returns to pre admission functional level  Description: INTERVENTIONS:  - Perform BMAT or MOVE assessment daily    - Set and communicate daily mobility goal to care team and patient/family/caregiver  - Collaborate with rehabilitation services on mobility goals if consulted  - Perform Range of Motion times a day  - Reposition patient every  hours    - Dangle patient times a day  - Stand patient times a day  - Ambulate patient times a day  - Out of bed to chair times a day   - Out of bed for meals   Problem: PAIN - ADULT  Goal: Verbalizes/displays adequate comfort level or baseline comfort level  Description: Interventions:  - Encourage patient to monitor pain and request assistance  - Assess pain using appropriate pain scale  - Administer analgesics based on type and severity of pain and evaluate response  - Implement non-pharmacological measures as appropriate and evaluate response  - Consider cultural and social influences on pain and pain management  - Notify physician/advanced practitioner if interventions unsuccessful or patient reports new pain  Outcome: Progressing     Problem: INFECTION - ADULT  Goal: Absence or prevention of progression during hospitalization  Description: INTERVENTIONS:  - Assess and monitor for signs and symptoms of infection  - Monitor lab/diagnostic results  - Monitor all insertion sites, i e  indwelling lines, tubes, and drains  - Monitor endotracheal if appropriate and nasal secretions for changes in amount and color  - [place] appropriate cooling/warming therapies per order  - Administer medications as ordered  - Instruct and encourage patient and family to use good hand hygiene technique  - Identify and instruct in appropriate isolation precautions for identified infection/condition  Outcome: Progressing  Goal: Absence of fever/infection during neutropenic period  Description: INTERVENTIONS:  - Monitor WBC    Outcome: Progressing     Problem: SAFETY ADULT  Goal: Maintain or return to baseline ADL function  Description: INTERVENTIONS:  -  Assess patient's ability to carry out ADLs; assess patient's baseline for ADL function and identify physical deficits which impact ability to perform ADLs (bathing, care of mouth/teeth, toileting, grooming, dressing, etc )  - Assess/evaluate cause of self-care deficits   - Assess range of motion  - Assess patient's mobility; develop plan if impaired  - Assess patient's need for assistive devices and provide as appropriate  - Encourage maximum independence but intervene and supervise when necessary  - Involve family in performance of ADLs  - Assess for home care needs following discharge   - Consider OT consult to assist with ADL evaluation and planning for discharge  - Provide patient education as appropriate  Outcome: Progressing  Goal: Maintains/Returns to pre admission functional level  Description: INTERVENTIONS:  - Perform BMAT or MOVE assessment daily    - Set and communicate daily mobility goal to care team and patient/family/caregiver     - Collaborate with rehabilitation services on mobility goals if consulted  - Perform Range of Motion times a day  - Reposition patient every hours  - Dangle patient times a day  - Stand patient times a day  - Ambulate patient times a day  - Out of bed to chair times a day   - Out of bed for meals   Problem: DISCHARGE PLANNING  Goal: Discharge to home or other facility with appropriate resources  Description: INTERVENTIONS:  - Identify barriers to discharge w/patient and caregiver  - Arrange for needed discharge resources and transportation as appropriate  - Identify discharge learning needs (meds, wound care, etc )  - Arrange for interpretive services to assist at discharge as needed  - Refer to Case Management Department for coordinating discharge planning if the patient needs post-hospital services based on physician/advanced practitioner order or complex needs related to functional status, cognitive ability, or social support system  Outcome: Progressing     Problem: Knowledge Deficit  Goal: Patient/family/caregiver demonstrates understanding of disease process, treatment plan, medications, and discharge instructions  Description: Complete learning assessment and assess knowledge base    Interventions:  - Provide teaching at level of understanding  - Provide teaching via preferred learning methods  Outcome: Progressing     Problem: Potential for Falls  Goal: Patient will remain free of falls  Description: INTERVENTIONS:  - Educate patient/family on patient safety including physical limitations  - Instruct patient to call for assistance with activity   - Consult OT/PT to assist with strengthening/mobility   - Keep Call bell within reach  - Keep bed low and locked with side rails adjusted as appropriate  - Keep care items and personal belongings within reach  - Initiate and maintain comfort rounds  - Make Fall Risk Sign visible to staff  - Offer Toileting every Hours, in advance of need  - Initiate/Maintain alarm  - Obtain necessary fall risk management equipment:   - Apply yellow socks and bracelet for high fall risk patients  - Consider moving patient to room near nurses station  Outcome: Progressing   times a day  - Out of bed for toileting  - Record patient progress and toleration of activity level   Outcome: Progressing  Goal: Patient will remain free of falls  Description: INTERVENTIONS:  - Educate patient/family on patient safety including physical limitations  - Instruct patient to call for assistance with activity   - Consult OT/PT to assist with strengthening/mobility   - Keep Call bell within reach  - Keep bed low and locked with side rails adjusted as appropriate  - Keep care items and personal belongings within reach  - Initiate and maintain comfort rounds  - Make Fall Risk Sign visible to staff  - Offer Toileting every Hours, in advance of need  - Initiate/Maintain alarm  - Obtain necessary fall risk management equipment:   - Apply yellow socks and bracelet for high fall risk patients  - Consider moving patient to room near nurses station  Outcome: Progressing   times a day  - Out of bed for toileting  - Record patient progress and toleration of activity level   Outcome: Progressing

## 2022-06-29 NOTE — RESTORATIVE TECHNICIAN NOTE
Restorative Technician Note      Patient Name: Wilberto Crawford     Restorative Tech Visit Date: 6/29/2022  Note Type: Mobility  Patient Position Upon Consult: Supine  Activity Performed: Ambulated  Assistive Device: Roller walker  Patient Position at End of Consult: Supine;  All needs within reach; Bed/Chair alarm activated    Ankur Huddle

## 2022-06-29 NOTE — CASE MANAGEMENT
Case Management Discharge Planning Note    Patient name Len Erwin  [facility] MRN [id number]  : 1945 Date 2022       Current Admission Date: 2022  Current Admission Diagnosis:Malignant neoplasm of lower third of esophagus [facility])   Patient Active Problem List    Diagnosis Date Noted    Encounter for geriatric assessment 2022    Anemia due to antineoplastic chemotherapy 2022    Cancer related pain 2022    Palliative care encounter 2022    Chronic pain 2022    Severe protein-calorie malnutrition ([facility] ) 2022    Left ankle pain 2022    Mild protein-calorie malnutrition ([facility] ) 2022    Malignant neoplasm of lower third of esophagus ([facility] ) 2022    Weight loss 2022    Dysphagia 2022    Dermatitis 2022    Smoking 2019      LOS (days): 7  Geometric Mean LOS (GMLOS) (days): 4 60  Days to GMLOS:-2 7     OBJECTIVE:  Risk of Unplanned Readmission Score: 14 51         Current admission status: Inpatient   Preferred Pharmacy:   [facility]/pharmacy #[id number]- [place] - [facility] [address]  Phone: [phone] Fax: Gaspar Proc  Monae Manish [address] [facility] [address] [facility]  Phone: [phone] Fax: [phone]    Primary Care Provider: Samreen Malin MD    Primary Insurance: THE [facility]  Secondary Insurance:     DISCHARGE DETAILS:        Additional Comments: Per provider note, pt is not medically cleared for d/c

## 2022-06-30 PROCEDURE — 99024 POSTOP FOLLOW-UP VISIT: CPT | Performed by: SURGERY

## 2022-06-30 PROCEDURE — C9113 INJ PANTOPRAZOLE SODIUM, VIA: HCPCS | Performed by: SURGERY

## 2022-06-30 RX ORDER — PANTOPRAZOLE SODIUM 40 MG/1
40 TABLET, DELAYED RELEASE ORAL
Status: DISCONTINUED | OUTPATIENT
Start: 2022-06-30 | End: 2022-07-02 | Stop reason: HOSPADM

## 2022-06-30 RX ADMIN — ACETAMINOPHEN 650 MG: 325 TABLET, FILM COATED ORAL at 11:48

## 2022-06-30 RX ADMIN — METOCLOPRAMIDE HYDROCHLORIDE 10 MG: 5 INJECTION INTRAMUSCULAR; INTRAVENOUS at 23:57

## 2022-06-30 RX ADMIN — METHOCARBAMOL 500 MG: 500 TABLET ORAL at 05:23

## 2022-06-30 RX ADMIN — METOCLOPRAMIDE HYDROCHLORIDE 10 MG: 5 INJECTION INTRAMUSCULAR; INTRAVENOUS at 05:22

## 2022-06-30 RX ADMIN — ACETAMINOPHEN 650 MG: 325 TABLET, FILM COATED ORAL at 17:34

## 2022-06-30 RX ADMIN — ACETAMINOPHEN 650 MG: 325 TABLET, FILM COATED ORAL at 05:23

## 2022-06-30 RX ADMIN — GABAPENTIN 100 MG: 100 CAPSULE ORAL at 08:31

## 2022-06-30 RX ADMIN — ENOXAPARIN SODIUM 40 MG: 40 INJECTION SUBCUTANEOUS at 08:31

## 2022-06-30 RX ADMIN — PANTOPRAZOLE SODIUM 40 MG: 40 INJECTION, POWDER, FOR SOLUTION INTRAVENOUS at 08:31

## 2022-06-30 RX ADMIN — METOCLOPRAMIDE HYDROCHLORIDE 10 MG: 5 INJECTION INTRAMUSCULAR; INTRAVENOUS at 17:34

## 2022-06-30 RX ADMIN — METOCLOPRAMIDE HYDROCHLORIDE 10 MG: 5 INJECTION INTRAMUSCULAR; INTRAVENOUS at 00:11

## 2022-06-30 RX ADMIN — GABAPENTIN 100 MG: 100 CAPSULE ORAL at 17:34

## 2022-06-30 RX ADMIN — PANTOPRAZOLE SODIUM 40 MG: 40 TABLET, DELAYED RELEASE ORAL at 11:48

## 2022-06-30 RX ADMIN — METHOCARBAMOL 500 MG: 500 TABLET ORAL at 13:03

## 2022-06-30 RX ADMIN — METOCLOPRAMIDE HYDROCHLORIDE 10 MG: 5 INJECTION INTRAMUSCULAR; INTRAVENOUS at 11:48

## 2022-06-30 NOTE — RESTORATIVE TECHNICIAN NOTE
Restorative Technician Note      Patient Name: Sindy Aguero     Restorative Tech Visit Date: 6/30/2022  Note Type: Mobility  Patient Position Upon Consult: Supine  Activity Performed: Ambulated  Assistive Device: Roller walker  Patient Position at End of Consult: Bedside chair;  All needs within [facility][1]

## 2022-06-30 NOTE — RESTORATIVE TECHNICIAN NOTE
Restorative Technician Note      Patient Name: Wilberto Crawford     Restorative Tech Visit Date: 6/30/2022  Note Type: Mobility  Patient Position Upon Consult: Supine  Activity Performed: Ambulated  Assistive Device: Roller walker  Patient Position at End of Consult: Supine;  All needs within [facility]

## 2022-06-30 NOTE — PROGRESS NOTES
Progress Note - Surgical Oncology  Darrell Keen 68 y o  male MRN: [id number]  Unit/Bed#: [facility] 928-01 Encounter: [id number]      Objective:  Patient doing well this morning since his nasogastric tube was removed  He is passing flatus, having multiple bowel movements  States he is starving to eat something  Patient is ambulating in the halls with assistance now  Denies nausea, no emesis  Voiding well on his own  500 + 2 UA  2 BM's    Blood pressure 136/75, pulse 85, temperature 98 3 °F (36 8 °C), resp  rate 18, height 5' 10" (1 778 m), weight 64 9 kg (143 lb), SpO2 96 %  ,Body mass index is 20 52 kg/m²  Intake/Output Summary (Last 24 hours) at 6/30/2022 0052  Last data filed at 6/30/2022 0055  Gross per 24 hour   Intake 10 ml   Output 815 ml   Net -805 ml       Invasive Devices  Report    Central Venous Catheter Line  Duration           Port A Cath 02/16/22 Left Chest 133 days          Peripheral Intravenous Line  Duration           Peripheral IV 06/30/22 Right;Ventral (anterior) Forearm <1 day          Drain  Duration           Gastrostomy/Enterostomy PEG-jejunostomy 20 Fr   days                Physical Exam:   Abdomen:  Is soft, nondistended, midline surgical wound is clean and dry, minimal tenderness to palpation G-tube site intact dry  Extremities:  No calf tenderness bilateral no pedal edema and no upper extremity edema    Lab, Imaging and other studies:  Lab holiday today  VTE Pharmacologic Prophylaxis: Enoxaparin (Lovenox)  VTE Mechanical Prophylaxis: sequential compression device    Assessment:  POD # 8 exploratory laparotomy, 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

## 2022-07-01 LAB
ANION GAP SERPL CALCULATED.3IONS-SCNC: 7 MMOL/L (ref 4–13)
BASOPHILS # BLD MANUAL: 0 THOUSAND/UL (ref 0–0.1)
BASOPHILS NFR MAR MANUAL: 0 % (ref 0–1)
BUN SERPL-MCNC: 12 MG/DL (ref 5–25)
CALCIUM SERPL-MCNC: 8.5 MG/DL (ref 8.3–10.1)
CHLORIDE SERPL-SCNC: 107 MMOL/L (ref 100–108)
CO2 SERPL-SCNC: 26 MMOL/L (ref 21–32)
CREAT SERPL-MCNC: 0.58 MG/DL (ref 0.6–1.3)
EOSINOPHIL # BLD MANUAL: 0 THOUSAND/UL (ref 0–0.4)
EOSINOPHIL NFR BLD MANUAL: 0 % (ref 0–6)
ERYTHROCYTE [DISTWIDTH] IN BLOOD BY AUTOMATED COUNT: 14.6 % (ref 11.6–15.1)
GFR SERPL CREATININE-BSD FRML MDRD: 99 ML/MIN/1.73SQ M
GLUCOSE SERPL-MCNC: 151 MG/DL (ref 65–140)
HCT VFR BLD AUTO: 25.8 % (ref 36.5–49.3)
HGB BLD-MCNC: 8.5 G/DL (ref 12–17)
LYMPHOCYTES # BLD AUTO: 0.48 THOUSAND/UL (ref 0.6–4.47)
LYMPHOCYTES # BLD AUTO: 6 % (ref 14–44)
MCH RBC QN AUTO: 33.5 PG (ref 26.8–34.3)
MCHC RBC AUTO-ENTMCNC: 32.9 G/DL (ref 31.4–37.4)
MCV RBC AUTO: 102 FL (ref 82–98)
MONOCYTES # BLD AUTO: 0.24 THOUSAND/UL (ref 0–1.22)
MONOCYTES NFR BLD: 3 % (ref 4–12)
NEUTROPHILS # BLD MANUAL: 7.35 THOUSAND/UL (ref 1.85–7.62)
NEUTS BAND NFR BLD MANUAL: 7 % (ref 0–8)
NEUTS SEG NFR BLD AUTO: 84 % (ref 43–75)
OVALOCYTES BLD QL SMEAR: PRESENT
PLATELET # BLD AUTO: 265 THOUSANDS/UL (ref 149–390)
PLATELET BLD QL SMEAR: ADEQUATE
PMV BLD AUTO: 10.4 FL (ref 8.9–12.7)
POIKILOCYTOSIS BLD QL SMEAR: PRESENT
POTASSIUM SERPL-SCNC: 3.3 MMOL/L (ref 3.5–5.3)
RBC # BLD AUTO: 2.54 MILLION/UL (ref 3.88–5.62)
RBC MORPH BLD: PRESENT
SODIUM SERPL-SCNC: 140 MMOL/L (ref 136–145)
WBC # BLD AUTO: 8.08 THOUSAND/UL (ref 4.31–10.16)

## 2022-07-01 PROCEDURE — 97530 THERAPEUTIC ACTIVITIES: CPT

## 2022-07-01 PROCEDURE — 99232 SBSQ HOSP IP/OBS MODERATE 35: CPT | Performed by: NURSE PRACTITIONER

## 2022-07-01 PROCEDURE — 85007 BL SMEAR W/DIFF WBC COUNT: CPT | Performed by: PHYSICIAN ASSISTANT

## 2022-07-01 PROCEDURE — 97535 SELF CARE MNGMENT TRAINING: CPT

## 2022-07-01 PROCEDURE — 99024 POSTOP FOLLOW-UP VISIT: CPT | Performed by: PHYSICIAN ASSISTANT

## 2022-07-01 PROCEDURE — 97116 GAIT TRAINING THERAPY: CPT

## 2022-07-01 PROCEDURE — 85027 COMPLETE CBC AUTOMATED: CPT | Performed by: PHYSICIAN ASSISTANT

## 2022-07-01 PROCEDURE — 80048 BASIC METABOLIC PNL TOTAL CA: CPT | Performed by: PHYSICIAN ASSISTANT

## 2022-07-01 RX ORDER — POTASSIUM CHLORIDE 20MEQ/15ML
40 LIQUID (ML) ORAL ONCE
Status: COMPLETED | OUTPATIENT
Start: 2022-07-01 | End: 2022-07-01

## 2022-07-01 RX ORDER — CALCIUM CARBONATE 200(500)MG
500 TABLET,CHEWABLE ORAL DAILY PRN
Status: DISCONTINUED | OUTPATIENT
Start: 2022-07-01 | End: 2022-07-02 | Stop reason: HOSPADM

## 2022-07-01 RX ORDER — POTASSIUM CHLORIDE 20 MEQ/1
40 TABLET, EXTENDED RELEASE ORAL 2 TIMES DAILY
Status: DISCONTINUED | OUTPATIENT
Start: 2022-07-01 | End: 2022-07-01

## 2022-07-01 RX ADMIN — POTASSIUM CHLORIDE 40 MEQ: 1500 TABLET, EXTENDED RELEASE ORAL at 09:22

## 2022-07-01 RX ADMIN — ACETAMINOPHEN 650 MG: 325 TABLET, FILM COATED ORAL at 18:39

## 2022-07-01 RX ADMIN — PANTOPRAZOLE SODIUM 40 MG: 40 TABLET, DELAYED RELEASE ORAL at 05:10

## 2022-07-01 RX ADMIN — ENOXAPARIN SODIUM 40 MG: 40 INJECTION SUBCUTANEOUS at 09:28

## 2022-07-01 RX ADMIN — CALCIUM CARBONATE (ANTACID) CHEW TAB 500 MG 500 MG: 500 CHEW TAB at 02:20

## 2022-07-01 RX ADMIN — POTASSIUM CHLORIDE 40 MEQ: 20 SOLUTION ORAL at 18:41

## 2022-07-01 RX ADMIN — GABAPENTIN 100 MG: 100 CAPSULE ORAL at 16:15

## 2022-07-01 RX ADMIN — GABAPENTIN 100 MG: 100 CAPSULE ORAL at 09:22

## 2022-07-01 RX ADMIN — METHOCARBAMOL 500 MG: 500 TABLET ORAL at 21:31

## 2022-07-01 RX ADMIN — METOCLOPRAMIDE HYDROCHLORIDE 10 MG: 5 INJECTION INTRAMUSCULAR; INTRAVENOUS at 05:10

## 2022-07-01 RX ADMIN — GABAPENTIN 100 MG: 100 CAPSULE ORAL at 21:31

## 2022-07-01 RX ADMIN — METHOCARBAMOL 500 MG: 500 TABLET ORAL at 14:24

## 2022-07-01 RX ADMIN — METOCLOPRAMIDE HYDROCHLORIDE 10 MG: 5 INJECTION INTRAMUSCULAR; INTRAVENOUS at 18:34

## 2022-07-01 RX ADMIN — METOCLOPRAMIDE HYDROCHLORIDE 10 MG: 5 INJECTION INTRAMUSCULAR; INTRAVENOUS at 12:40

## 2022-07-01 NOTE — PLAN OF CARE
Problem: MOBILITY - ADULT  Goal: Maintain or return to baseline ADL function  Description: INTERVENTIONS:  -  Assess patient's ability to carry out ADLs; assess patient's baseline for ADL function and identify physical deficits which impact ability to perform ADLs (bathing, care of mouth/teeth, toileting, grooming, dressing, etc )  - Assess/evaluate cause of self-care deficits   - Assess range of motion  - Assess patient's mobility; develop plan if impaired  - Assess patient's need for assistive devices and provide as appropriate  - Encourage maximum independence but intervene and supervise when necessary  - Involve family in performance of ADLs  - Assess for home care needs following discharge   - Consider OT consult to assist with ADL evaluation and planning for discharge  - Provide patient education as appropriate  Outcome: Progressing     Problem: INFECTION - ADULT  Goal: Absence or prevention of progression during hospitalization  Description: INTERVENTIONS:  - Assess and monitor for signs and symptoms of infection  - Monitor lab/diagnostic results  - Monitor all insertion sites, i e  indwelling lines, tubes, and drains  - Monitor endotracheal if appropriate and nasal secretions for changes in amount and color  - [place] appropriate cooling/warming therapies per order  - Administer medications as ordered  - Instruct and encourage patient and family to use good hand hygiene technique  - Identify and instruct in appropriate isolation precautions for identified infection/condition  Outcome: Progressing

## 2022-07-01 NOTE — RESTORATIVE TECHNICIAN NOTE
Restorative Technician Note      Patient Name: Denise Esquivel     Restorative Tech Visit Date: 7/1/2022  Note Type: Mobility  Patient Position Upon Consult: Other (Comment) (in BR with nurse)  Activity Performed: Ambulated  Assistive Device: Roller walker  Patient Position at End of Consult: All needs within reach;  Supine      Lor Susi  Restorative Technician

## 2022-07-01 NOTE — QUICK NOTE
-Spoke with nutrition  We will restart jejunostomy tube feeds of Jevity 1 5 @ 40 mL/hour at noon today and increase every 4 hours by 10 mL to the goal of 90 per hour  This can run through the night until 7:00 a m  On July 2nd, 2022  Flush the J-tube with 95 mL of free water and cap  -  We will then start cycling the tube feeds again from 7:00 p m   To 7:00 a m  @ 90 mL/hour

## 2022-07-01 NOTE — PLAN OF CARE
Problem: OCCUPATIONAL THERAPY ADULT  Goal: Performs self-care activities at highest level of function for planned discharge setting  See evaluation for individualized goals  Description: Treatment Interventions: ADL retraining, Endurance training, Patient/family training, Compensatory technique education, Energy conservation, Activityengagement          See flowsheet documentation for full assessment, interventions and recommendations  Outcome: Adequate for Discharge  Note: Limitation: Decreased ADL status, Decreased UE ROM, Decreased UE strength, Decreased Safe judgement during ADL, Decreased endurance, Decreased self-care trans, Decreased high-level ADLs  Prognosis: Good  Assessment: Pt seen for OT tx session to address ADL retraining, fxnl transfer training, endurance training, compensatory technique education, energy conservation, and activity engagement  Pt presented sitting OOB in chair upon entry  Pt performed S for STS transfers and fxnl mobility with RW, S for UB ADLs, Min A for LB ADLs (pt educated on use of LHAE at home - reports already owning this equipment and plans on using long handled sponge upon d/c)  Pt left sitting in chair with all current needs met  OT recommendation to d/c to home with skilled home OT and increased social support when medically stable  Pt reports dtrs will be checking in, as well as supportive neighbors who can assist as needed  Pt with no questions or concerns about d/c home  No further acute OT needs  D/C OT  Please re-consult if needed  The patient's raw score on the AM-PAC Daily Activity inpatient short form is 20, standardized score is 42 03, greater than 39 4  Patients at this level are likely to benefit from discharge to home  Please refer to the recommendation of the Occupational Therapist for safe discharge planning       OT Discharge Recommendation: Home with home health rehabilitation  OT - OK to Discharge: Yes (when medically stable)

## 2022-07-01 NOTE — OCCUPATIONAL THERAPY NOTE
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   Intrinsic Gratification fishing, watching sports on TV   Pain Assessment   Pain Assessment Tool 0-10   Pain Score No Pain   ADL   Where Assessed Chair   Eating Assistance 7  Independent   Grooming Assistance 7  Independent   Grooming Deficit Brushing hair;Wash/dry hands  (+ washing hair with shampoo cap)   UB Bathing Assistance 5  Supervision/Setup   UB Bathing Deficit Setup;Supervision/safety; Increased time to complete; Chest;Right arm;Left arm; Abdomen;Perineal area;Right upper leg;Left upper leg   LB Bathing Assistance 4  Minimal Assistance   LB Bathing Deficit Setup;Steadying;Supervision/safety; Increased time to complete;Right lower leg including foot; Left lower leg including foot  (educated on use of LH sponge for bathing feet)   UB Dressing Assistance 5  Supervision/Setup   UB Dressing Deficit Supervision/safety   LB Dressing Assistance 5  Supervision/Setup   LB Dressing Deficit Supervision/safety  (don/doff shoes)   Toileting Comments Pt with difficulty reaching top of feet  Pt reports having LHAE (including long handled sponge) that he can use upon d/c  Transfers   Sit to Stand 5  Supervision  (initial stand Min A, additional 3 STS supervision)   Additional items Armrests; Increased time required   Stand to Sit 5  Supervision   Additional items Armrests; Increased time required   Additional Comments transfers with RW   Functional Mobility   Functional Mobility 5  Supervision   Additional items Rolling walker   Cognition   Overall Cognitive Status WFL   Arousal/Participation Alert; Responsive; Cooperative   Attention Within functional limits   Orientation Level Oriented X4   Memory Within functional limits   Following Commands Follows all commands and directions without difficulty   Comments pt very pleasant and cooperative t/o session   Activity Tolerance   Activity Tolerance Patient tolerated treatment well   Medical Staff Made Aware RN approved for session   Assessment   Assessment Pt seen for OT tx session to address ADL retraining, fxnl transfer training, endurance training, compensatory technique education, energy conservation, and activity engagement  Pt presented sitting OOB in chair upon entry  Pt performed S for STS transfers and fxnl mobility with RW, S for UB ADLs, Min A for LB ADLs (pt educated on use of LHAE at home - reports already owning this equipment and plans on using long handled sponge upon d/c)  Pt left sitting in chair with all current needs met  OT recommendation to d/c to home with skilled home OT and increased social support when medically stable  Pt reports dtrs will be checking in, as well as supportive neighbors who can assist as needed  Pt with no questions or concerns about d/c home  No further acute OT needs  D/C OT  Please re-consult if needed  The patient's raw score on the AM-PAC Daily Activity inpatient short form is 20, standardized score is 42 03, greater than 39 4  Patients at this level are likely to benefit from discharge to home  Please refer to the recommendation of the Occupational Therapist for safe discharge planning  Plan   Treatment Interventions ADL retraining; Endurance training;Patient/family training; Compensatory technique education; Energy conservation; Activityengagement   Goal Expiration Date 07/07/22   OT Treatment Day 2   OT Frequency 3-5x/wk   Recommendation   OT Discharge Recommendation Home with home health rehabilitation   OT - OK to Discharge Yes  (when medically stable)   AM-PAC Daily Activity Inpatient   Lower Body Dressing 3   Bathing 3   Toileting 3   Upper Body Dressing 3   Grooming 4   Eating 4   Daily Activity Raw Score 20   Daily Activity Standardized Score (Calc for Raw Score >=11) 42 03   AM-PAC Applied Cognition Inpatient   Following a Speech/Presentation 4   Understanding Ordinary Conversation 4   Taking Medications 4   Remembering Where Things Are Placed or Put Away 4   Remembering List of 4-5 Errands 4   Taking Care of Complicated Tasks 4   Applied Cognition Raw Score 24   Applied Cognition Standardized Score 62 21     Joi Mccarty, OTS

## 2022-07-01 NOTE — PHYSICAL THERAPY NOTE
Physical Therapy Progress Note       07/01/22 1350   PT Last Visit   PT Visit Date 07/01/22   Pain Assessment   Pain Assessment Tool 0-10   Pain Score No Pain   Restrictions/Precautions   Weight Bearing Precautions Per Order No   Other Precautions Fall Risk;Multiple lines   General   Chart Reviewed Yes   Response to Previous Treatment Patient with no complaints from previous session  Family/Caregiver Present No   Cognition   Overall Cognitive Status WFL   Arousal/Participation Alert; Responsive; Cooperative   Following Commands Follows all commands and directions without difficulty   Subjective   Subjective Patient in bed upon therapist arrival, patient agreeable to physical therapy, cleared by RN for session  Transfers   Sit to Stand 4  Minimal assistance  (cg/cs)   Additional items Assist x 1; Increased time required;Verbal cues   Stand to Sit 4  Minimal assistance  (cg/cs)   Additional items Assist x 1; Increased time required;Verbal cues   Toilet transfer 4  Minimal assistance   Additional items Assist x 1; Increased time required;Raised toilet seat  (use of grab bar)   Ambulation/Elevation   Gait pattern Excessively slow; Short stride; Foward flexed; Inconsistent ck;Decreased foot clearance; Improper Weight shift   Gait Assistance 4  Minimal assist   Additional items Assist x 1;Verbal cues; Tactile cues  (assist of 2nd for chair follow/lines)   Assistive Device Rolling walker   Distance [id number], one extended seated rest break   Balance   Static Sitting Fair   Dynamic Sitting Fair   Static Standing Fair -   Dynamic Standing Poor +   Ambulatory Poor +   Endurance Deficit   Endurance Deficit Yes   Endurance Deficit Description fatigue, weakness   Activity Tolerance   Activity Tolerance Patient tolerated treatment well;Patient limited by fatigue   Nurse Made Aware appropriate to see   Exercises   Hip Abduction Sitting;10 reps;AROM; Bilateral   Knee AROM Long Arc Quad Sitting;15 reps;AROM; Bilateral   Ankle Pumps Sitting;20 reps;AROM; Bilateral   Marching Sitting;10 reps;AROM; Bilateral   Assessment   Prognosis Good   Problem List Decreased strength;Decreased endurance; Impaired balance;Decreased mobility   Assessment Patient was minimal assist 1x for sit to stand transfer, however mainly cg/cs  Patient was able to ambulate increased distances this session with use of RW, minimal assist x1 with assist of 2nd for chair follow/lines  Patient required one extended seated rest break  Patient ambulates with a slow ck, shuffling noted  Patient was minimal assist for toilet transfer, with patient using increased UE support on grab rail to stand  Extended time required for toileting  Patient tolerated seated exercise well with patient educated on exercise program  Patient in bed at end of session, bed alarm activated, all needs within reach  Patient would continue to benefit from physical therapy to improve functional mobility until medically cleared  Goals   Patient Goals to get better   STG Expiration Date 07/07/22   Short Term Goal #1 STG 1  Pt will be able to perform bed mobility tasks with mod I in order to improve overall functional mobility and assist in safe d/c  STG 2  Pt with sit EOB for at least 25 minutes at mod I level in order to strengthen abdominal musculature and assist in future transfers/ ambulation  STG 3  Pt will be able to perform functional transfer with mod I in order to improve overall functional mobility and assist in safe d/c  STG 4  Pt will be able to ambulate at least 250 feet with least restrictive device with mod I A in order to improve overall functional mobility and assist in safe d/c  STG 5  Pt will improve sitting/standing static/dynamic balance 1/2 grade in order to improve functional mobility and assist in safe d/c  STG 6  Pt will improve LE strength by 1/2 grade in order to improve functional mobility and assist in safe d/c  STG 7   Pt will be able to negotiate at least 8 stairs with least restrictive device with mod I A in order to improve overall functional mobility and assist in safe d/c    PT Treatment Day 3   Plan   Treatment/Interventions Functional transfer training;LE strengthening/ROM; Therapeutic exercise; Endurance training;Bed mobility;Gait training;Spoke to nursing   Progress Progressing toward goals   PT Frequency 3-5x/wk   Recommendation   PT Discharge Recommendation Post acute rehabilitation services  (pending progress)   Equipment Recommended [facility] Recommended Wheeled walker   AM-PAC Basic Mobility Inpatient   Turning in Bed Without Bedrails 3   Lying on Back to Sitting on Edge of Flat Bed 2   Moving Bed to Chair 3   Standing Up From Chair 2   Walk in Room 3   Climb 3-5 Stairs 1   Basic Mobility Inpatient Raw Score 14   Basic Mobility Standardized Score 35 55   Highest Level Of Mobility   JH-HLM Goal 4: Move to chair/commode   JH-HLM Achieved 7: Walk 25 feet or more     An AM-PAC Basic Mobility standardized score less than 40 78 suggests the patient may benefit from discharge to post-acute rehab services      Kay Holden, PTA

## 2022-07-01 NOTE — PROGRESS NOTES
Patient's Tube Feedings (Jevity 1 5 @ 40 ml/hr) was started @ 12:00  The patient states he is now "feeling full"  The Physician Assistant, Kandis Velasco, was notified of the patient's feeling of fullness and the current rate of the Tube Feedings  The order for the Tube Feedings was to increase the rate by 10 ml every 4 hours  Since the patient has this feeling of fullness, the Physician Assistant is ordering for the Tube Feedings to remain @ the current rate, and the rate is not to be advanced

## 2022-07-01 NOTE — CASE MANAGEMENT
Case Management Discharge Planning Note    Patient name Blossom Harris  Location [facility] 928/[facility] 002-51 MRN [id number]  : 1945 Date 2022       Current Admission Date: 2022  Current Admission Diagnosis:Malignant neoplasm of lower third of esophagus [facility])   Patient Active Problem List    Diagnosis Date Noted    Encounter for geriatric assessment 2022    Anemia due to antineoplastic chemotherapy 2022    Cancer related pain 2022    Palliative care encounter 2022    Chronic pain 2022    Severe protein-calorie malnutrition ([facility] ) 2022    Left ankle pain 2022    Mild protein-calorie malnutrition ([facility] ) 2022    Malignant neoplasm of lower third of esophagus ([facility] ) 2022    Weight loss 2022    Dysphagia 2022    Dermatitis 2022    Smoking 2019      LOS (days): 9  Geometric Mean LOS (GMLOS) (days): 4 60  Days to GMLOS:-4 5     OBJECTIVE:  Risk of Unplanned Readmission Score: 13 74         Current admission status: Inpatient   Preferred Pharmacy:   [facility]/pharmacy #[id number]- [place] - [facility] [address]  Phone: [phone] Fax: Gaspar Proc  Monae Manish [address] [facility]  Phone: [phone] Fax: [phone]    Primary Care Provider: Gely Alegria MD    Primary Insurance: THE [facility]  Secondary Insurance:     DISCHARGE DETAILS:          Additional Comments: CM was made aware that pt will d/c home Sat, pending tolerating his tube feed

## 2022-07-01 NOTE — NUTRITION
07/01/22 2387   Recommendations/Interventions   Summary RN reports patient feeling better after large BM last pm  Patient's cyclic EN providing approximately 75% of nutritional needs at this time, remainder of needs to be met with po intake  Discussed with surgical PA in regards to large BM's; initiating cyclic EN at a lower rate today and then advancing to goal rate of 90 ml/hr, will monitor tolerance and loose stool  Interventions/Recommendations Adjust diet order;Continue EN as ordered   Recommendations to Provider Consider advancing diet to Regular

## 2022-07-01 NOTE — PROGRESS NOTES
Pt having stomach discomfort, tube feed have been put on hold at approx 0001 7/1 for an hour to see if it helps  Will revisit issue to see if tube feeds were cause of discomfort

## 2022-07-01 NOTE — PROGRESS NOTES
Progress Note - Surgical Oncology  Jc Restrepo 68 y o  male MRN: [id number]  Unit/Bed#: [facility] 928-01 Encounter: [id number]      Objective:  States he out or rule night  No nausea, but of fullness and a bloatedness feeling all night long into this morning  Patient states he has had 2 explosive bowel movements  Was able to only eat 1 cup of soup, tube feeds were initiated again last evening  There were placed on hold at midnight  Patient is on a 12 hour cycle of tube feeds  Patient was up and ambulating with assistance  Urinating more often than the 1 documentation  Patient had a CT scan on June 28th, 2022 revealing a postsurgical ileus  Since that time patient has been having bowel movements with the soft nondistended abdomen  1 UA  2 BM's    Blood pressure 156/83, pulse 94, temperature 98 1 °F (36 7 °C), resp  rate 20, height 5' 10" (1 778 m), weight 64 9 kg (143 lb), SpO2 94 %  ,Body mass index is 20 52 kg/m²  Intake/Output Summary (Last 24 hours) at 7/1/2022 0505  Last data filed at 6/30/2022 1831  Gross per 24 hour   Intake 400 ml   Output --   Net 400 ml       Invasive Devices  Report    Central Venous Catheter Line  Duration           Port A Cath 02/16/22 Left Chest 134 days          Peripheral Intravenous Line  Duration           Peripheral IV 06/30/22 Right;Ventral (anterior) Forearm 1 day          Drain  Duration           Gastrostomy/Enterostomy PEG-jejunostomy 20 Fr   days                Physical Exam:   Abdomen:  Appears distended this morning, tympanic to percussion, jejunostomy tube left abdomen clamped, generalized tenderness throughout abdomen small midline incision is clean and dry  Extremities:  No edema bilaterally lower extremities, no calf tenderness    Lab, Imaging and other studies:   Will order a BMP  VTE Pharmacologic Prophylaxis: Enoxaparin (Lovenox)  VTE Mechanical Prophylaxis: sequential compression device    Assessment:  POD # 9 exploratory laparotomy, 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  KUB  3  Continue ambulating the halls multiple times throughout the day  4  Check a m  Labs  5  Needs to work with incentive spirometry more  6   No narcotics

## 2022-07-01 NOTE — PROGRESS NOTES
Progress note - Palliative and Supportive Care   Len Erwin 68 y o  male [id number]    Patient Active Problem List   Diagnosis    Smoking    Weight loss    Dysphagia    Dermatitis    Malignant neoplasm of lower third of esophagus (HCC)    Mild protein-calorie malnutrition (HCC)    Left ankle pain    Severe protein-calorie malnutrition (HCC)    Chronic pain    Cancer related pain    Palliative care encounter    Anemia due to antineoplastic chemotherapy    Encounter for geriatric assessment     Active issues specifically addressed today include:  metastatic esophageal cancer  Cancer related pain  Severe protein calorie malnutrition    Plan:  1  Symptom management -   Cancer related pain/postoperative  -  Oxycodone 5mg -10 mg Q 4 hours prn     2  Goals -   -  Full cares no limits, ongoing goals of care conversations  -  Patient's current goal is to pursue treatments offered and move to [place]   -  Patient states he has an advanced directive, requested copy  -     Code Status: Full Code - Level 1   Decisional apparatus:  Patient is competent on my exam today  If competence is lost, patient's substitute decision maker would default to adult children [place]  Advance Directive / Living Will / POLST:  Copy requested    Interval history:      Patient reports feeling better overall  Looking forward to going home this weekend  States his pain is well controlled on current regimen        MEDICATIONS / ALLERGIES:     current meds:   Current Facility-Administered Medications   Medication Dose Route Frequency    acetaminophen (TYLENOL) tablet 650 mg  650 mg Oral Q6H [facility] & long term    calcium carbonate (TUMS) chewable tablet 500 mg  500 mg Oral Daily PRN    enoxaparin (LOVENOX) subcutaneous injection 40 mg  40 mg Subcutaneous Daily    gabapentin (NEURONTIN) capsule 100 mg  100 mg Oral TID    labetalol (NORMODYNE) injection 10 mg  10 mg Intravenous Q4H PRN    methocarbamol (ROBAXIN) tablet 500 mg  500 mg Oral Q8H [facility] skilled nursing    metoclopramide (REGLAN) injection 10 mg  10 mg Intravenous Q6H [facility]    nicotine (NICODERM CQ) 7 mg/24hr TD 24 hr patch 1 patch  1 patch Transdermal Daily    ondansetron (ZOFRAN) injection 4 mg  4 mg Intravenous Q6H PRN    pantoprazole (PROTONIX) EC tablet 40 mg  40 mg Oral Early Morning    potassium chloride (K-DUR,KLOR-CON) CR tablet 40 mEq  40 mEq Oral BID       No Known Allergies    OBJECTIVE:    Physical Exam  Physical Exam    Lab Results:   CBC:   Lab Results   Component Value Date    WBC 8 08 07/01/2022    HGB 8 5 (L) 07/01/2022    HCT 25 8 (L) 07/01/2022     (H) 07/01/2022     07/01/2022    MCH 33 5 07/01/2022    MCHC 32 9 07/01/2022    RDW 14 6 07/01/2022    MPV 10 4 07/01/2022   , CMP:   Lab Results   Component Value Date    SODIUM 140 07/01/2022    K 3 3 (L) 07/01/2022     07/01/2022    CO2 26 07/01/2022    BUN 12 07/01/2022    CREATININE 0 58 (L) 07/01/2022    CALCIUM 8 5 07/01/2022    EGFR 99 07/01/2022   , PT/PTT:No results found for: PT, PTT      Counseling / Coordination of Care    Total floor / unit time spent today 25+  minutes  Greater than 50% of total time was spent with the patient and / or family counseling and / or coordination of care  A description of the counseling / coordination of care: review of medications, support given, review chart

## 2022-07-01 NOTE — PLAN OF CARE
Problem: PHYSICAL THERAPY ADULT  Goal: Performs mobility at highest level of function for planned discharge setting  See evaluation for individualized goals  Description: Treatment/Interventions: ADL retraining, Functional transfer training, LE strengthening/ROM, Patient/family training, Equipment eval/education, Bed mobility, Gait training, Spoke to nursing, Spoke to case management, OT  Equipment Recommended: Yenni Pugh       See flowsheet documentation for full assessment, interventions and recommendations  Outcome: Progressing  Note: Prognosis: Good  Problem List: Decreased strength, Decreased endurance, Impaired balance, Decreased mobility  Assessment: Patient was minimal assist 1x for sit to stand transfer, however mainly cg/cs  Patient was able to ambulate increased distances this session with use of RW, minimal assist x1 with assist of 2nd for chair follow/lines  Patient required one extended seated rest break  Patient ambulates with a slow ck, shuffling noted  Patient was minimal assist for toilet transfer, with patient using increased UE support on grab rail to stand  Extended time required for toileting  Patient tolerated seated exercise well with patient educated on exercise program  Patient in bed at end of session, bed alarm activated, all needs within reach  Patient would continue to benefit from physical therapy to improve functional mobility until medically cleared  Barriers to Discharge: Inaccessible home environment, Decreased caregiver support        PT Discharge Recommendation: Post acute rehabilitation services (pending progress)          See flowsheet documentation for full assessment

## 2022-07-02 VITALS
HEIGHT: 70 IN | HEART RATE: 64 BPM | TEMPERATURE: 99.3 F | OXYGEN SATURATION: 94 % | BODY MASS INDEX: 20.47 KG/M2 | SYSTOLIC BLOOD PRESSURE: 127 MMHG | WEIGHT: 143 LBS | RESPIRATION RATE: 20 BRPM | DIASTOLIC BLOOD PRESSURE: 61 MMHG

## 2022-07-02 LAB
ANION GAP SERPL CALCULATED.3IONS-SCNC: 6 MMOL/L (ref 4–13)
BUN SERPL-MCNC: 10 MG/DL (ref 5–25)
CALCIUM SERPL-MCNC: 8.7 MG/DL (ref 8.3–10.1)
CHLORIDE SERPL-SCNC: 110 MMOL/L (ref 100–108)
CO2 SERPL-SCNC: 21 MMOL/L (ref 21–32)
CREAT SERPL-MCNC: 0.56 MG/DL (ref 0.6–1.3)
GFR SERPL CREATININE-BSD FRML MDRD: 100 ML/MIN/1.73SQ M
GLUCOSE SERPL-MCNC: 145 MG/DL (ref 65–140)
POTASSIUM SERPL-SCNC: 3.9 MMOL/L (ref 3.5–5.3)
SODIUM SERPL-SCNC: 137 MMOL/L (ref 136–145)

## 2022-07-02 PROCEDURE — 80048 BASIC METABOLIC PNL TOTAL CA: CPT | Performed by: PHYSICIAN ASSISTANT

## 2022-07-02 PROCEDURE — NC001 PR NO CHARGE: Performed by: STUDENT IN AN ORGANIZED HEALTH CARE EDUCATION/TRAINING PROGRAM

## 2022-07-02 RX ORDER — GABAPENTIN 100 MG/1
100 CAPSULE ORAL 3 TIMES DAILY
Qty: 30 CAPSULE | Refills: 0 | Status: SHIPPED | OUTPATIENT
Start: 2022-07-02 | End: 2022-08-01

## 2022-07-02 RX ORDER — ENOXAPARIN SODIUM 100 MG/ML
40 INJECTION SUBCUTANEOUS DAILY
Qty: 7.6 ML | Refills: 0 | Status: SHIPPED | OUTPATIENT
Start: 2022-07-03 | End: 2022-08-01

## 2022-07-02 RX ORDER — POTASSIUM CHLORIDE 20 MEQ/1
40 TABLET, EXTENDED RELEASE ORAL ONCE
Status: COMPLETED | OUTPATIENT
Start: 2022-07-02 | End: 2022-07-02

## 2022-07-02 RX ORDER — METHOCARBAMOL 500 MG/1
500 TABLET, FILM COATED ORAL EVERY 8 HOURS SCHEDULED
Qty: 30 TABLET | Refills: 0 | Status: SHIPPED | OUTPATIENT
Start: 2022-07-02 | End: 2022-08-01

## 2022-07-02 RX ADMIN — ONDANSETRON 4 MG: 2 INJECTION INTRAMUSCULAR; INTRAVENOUS at 07:39

## 2022-07-02 RX ADMIN — GABAPENTIN 100 MG: 100 CAPSULE ORAL at 08:09

## 2022-07-02 RX ADMIN — POTASSIUM CHLORIDE 40 MEQ: 1500 TABLET, EXTENDED RELEASE ORAL at 08:19

## 2022-07-02 RX ADMIN — ENOXAPARIN SODIUM 40 MG: 40 INJECTION SUBCUTANEOUS at 08:13

## 2022-07-02 NOTE — PROGRESS NOTES
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 on 6/22, post op course c/b ileus    Vitals stable, afebrile  Cr 0 56    Multiple UOP and stool    Plan:  - fulls/toast as tolerated  - c/w J tube feeds to goal, cycled 7p-7a  - lovenox, will continue on d/c  - pain control  - encourage ambulation  - IS  - anticipate d/c today w home VNA    Subjective/Objective   Subjective:   Doing well this morning, denies any abdominal pain, states he feels less bloated than yesterday, had some nausea this morning that since resolved, no emesis, otherwise tolerating tube feeds and limited PO  Continues to have bowel function, ambulating, using IS  Objective:     Blood pressure 129/72, pulse 90, temperature 98 4 °F (36 9 °C), resp  rate 18, height 5' 10" (1 778 m), weight 64 9 kg (143 lb), SpO2 97 %  ,Body mass index is 20 52 kg/m²  Intake/Output Summary (Last 24 hours) at 7/2/2022 8367  Last data filed at 7/1/2022 1900  Gross per 24 hour   Intake 705 ml   Output --   Net 705 ml       Invasive Devices  Report    Central Venous Catheter Line  Duration           Port A Cath 02/16/22 Left Chest 135 days          Peripheral Intravenous Line  Duration           Peripheral IV 06/30/22 Right;Ventral (anterior) Forearm 2 days          Drain  Duration           Gastrostomy/Enterostomy PEG-jejunostomy 20 Fr   days                Physical Exam:   General: NAD  Skin: Warm, dry, anicteric  HEENT: Normocephalic, atraumatic  CV: RRR, no m/r/g  Pulm: CTA b/l, no inc WOB  Abd: Soft, minimally distended, NT, incision c/d/i, J tube  MSK: Symmetric, no edema, no tenderness, no deformity  Neuro: AOx3, GCS 15    Lab, Imaging and other studies:  I have personally reviewed pertinent lab results    , CBC: No results found for: WBC, HGB, HCT, MCV, PLT, ADJUSTEDWBC, MCH, MCHC, RDW, MPV, NRBC, CMP:   Lab Results   Component Value Date    SODIUM 137 07/02/2022    K 3 9 07/02/2022     (H) 07/02/2022    CO2 21 07/02/2022    BUN 10 07/02/2022    CREATININE 0 56 (L) 07/02/2022    CALCIUM 8 7 07/02/2022    EGFR 100 07/02/2022     VTE Pharmacologic Prophylaxis: Sequential compression device (Venodyne)  and Enoxaparin (Lovenox)  VTE Mechanical Prophylaxis: sequential compression device

## 2022-07-02 NOTE — DISCHARGE SUMMARY
Discharge Summary - Darrell Keen 68 y o  male MRN: [id number]    [facility] Encounter: [id number]    Admission Date:   Admission Orders (From admission, onward)     Ordered        06/22/22 1100  Inpatient Admission  Once                        Admitting Diagnosis: Malignant neoplasm of lower third of esophagus ([address] ) [C15 5]    HPI:  49-year-old male with PMH of esophageal cancer (T3 N1), status post neoadjuvant chemoradiation therapy, presenting electively to [facility] for surgery on 06/22  He was taken to the OR on 06/22 for exploratory laparotomy, omental biopsy, tap blocks and intraoperative EGD, esophagectomy procedure was aborted at this time after finding omental metastases/nodule, biopsies taken at this time  Postoperatively on the floor patient had pain controlled, was restarted on his diet, had tube feeds re-initiated through his J-tube, was out of bed ambulating, had IV fluids weaned as he tolerating more p o  Intake and as tube feeds increased in rate  His tube feeds were changed to cycled overnight from 7p to 7a, otherwise remain the same in regards to type, goal rate, free water flushes  He was evaluated by PT/OT who determined he would need home health rehabilitation upon discharge  Palliative Care was consulted in the inpatient setting  APS was consulted to assist with pain management  Postoperative course significant for vomiting and abdominal distension on POD3 6/26, tube feeds held this time  Postoperative ileus suspected on POD4 6/27, kept NPO and tube feeds held at this time, encouraged ambulation and minimize narcotics, initiated on IVF  Additional episodes of emesis warranted NGT placement on the morning of 6/28 POD5, patient had relief with this, CT imaging at this time consistent with postsurgical ileus  He was able to have NGT removed on 06/29, initiated on sips of clear liquids with plans of slow advancement in diet, he tolerated this without additional nausea or emesis  On 06/30 he was started on a clear liquid diet, slowly advanced, cycle tube feeds were restarted, he was encouraged ambulate the halls  On 07/02 he was medically cleared for discharge, tolerating regular diet without nausea or emesis and reporting feeling less bloated, no longer nauseous or vomiting, tolerating cycled tube feeds, having bowel function with multiple bowel movements, up ambulating the halls  He will continue cycle tube feeds at home via his J-tube  He will continue taking Lovenox in the outpatient setting for 19 more days, Lovenox teaching to be done by home health care nursing tomorrow 7/3  He will follow-up with Dr Mckayla Real in the outpatient setting on 07/14  Procedures Performed: No orders of the defined types were placed in this encounter  6/22 for exploratory laparotomy, aborted esophagectomy, omental biopsy, tap blocks and intraoperative EGD    Summary of Hospital Course:  See above HPI    Significant Findings, Care, Treatment and Services Provided:  See above HPI    Complications:  See above HPI    Discharge Diagnosis:  See above HPI    Medical Problems             Resolved Problems  Date Reviewed: 6/7/2022   None                 Condition at Discharge: good         Discharge instructions/Information to patient and family:   See after visit summary for information provided to patient and family  Provisions for Follow-Up Care:  See after visit summary for information related to follow-up care and any pertinent home health orders  PCP: Rebecca Friedman MD    Disposition: See After Visit Summary for discharge disposition information  Planned Readmission: No      Discharge Statement   I spent 30 minutes discharging the patient  This time was spent on the day of discharge  I had direct contact with the patient on the day of discharge  Additional documentation is required if more than 30 minutes were spent on discharge       Discharge Medications:  See after visit summary for reconciled discharge medications provided to patient and family

## 2022-07-02 NOTE — CASE MANAGEMENT
Case Management Discharge Planning Note    Patient name Jc Restrepo  Location [facility] 928/[facility] 110-73 MRN [id number]  : 1945 Date 2022       Current Admission Date: 2022  Current Admission Diagnosis:Malignant neoplasm of lower third of esophagus [facility])   Patient Active Problem List    Diagnosis Date Noted    Encounter for geriatric assessment 2022    Anemia due to antineoplastic chemotherapy 2022    Cancer related pain 2022    Palliative care encounter 2022    Chronic pain 2022    Severe protein-calorie malnutrition ([facility] ) 2022    Left ankle pain 2022    Mild protein-calorie malnutrition ([facility] ) 2022    Malignant neoplasm of lower third of esophagus ([facility] [facility] ) 2022    Weight loss 2022    Dysphagia 2022    Dermatitis 2022    Smoking 2019      LOS (days): 10  Geometric Mean LOS (GMLOS) (days): 4 60  Days to GMLOS:-5 4     OBJECTIVE:  Risk of Unplanned Readmission Score: 13 76         Current admission status: Inpatient   Preferred Pharmacy:   [facility]/pharmacy #[id number]- [place] - [facility] [address]  Phone: [phone] Fax: Hubert Ochoa  [address]  Phone: [phone] Fax: [phone]    Primary Care Provider: Christophe Costa MD    Primary Insurance: THE [facility]  Secondary Insurance:     DISCHARGE DETAILS:                                          Other Referral/Resources/Interventions Provided:  Referral Comments: [facility]                                             IMM Given (Date):: 22  IMM Given to[de-identified] Patient  Family notified[de-identified] Call to pt and daughter Ruthie Syed, IMM placed in MR bin for filing

## 2022-07-05 NOTE — UTILIZATION REVIEW
Notification of Discharge   This is a Notification of Discharge from our facility [address]  Please be advised that this patient has been discharge from our facility  Below you will find the admission and discharge date and time including the patients disposition  UTILIZATION REVIEW CONTACT:  Linda Crouch  Utilization   Network Utilization Review Department  Phone: [phone] x carefully listen to the prompts  All voicemails are confidential   Email: [email or web address]     PHYSICIAN ADVISORY SERVICES:  FOR [id number] REVIEW - MEDICAL NECESSITY DENIAL  Phone: [phone]  Fax: [phone]  Email: [email or web address]     PRESENTATION DATE: 6/22/2022  5:57 AM  OBERVATION ADMISSION DATE:   INPATIENT ADMISSION DATE: 6/22/22 11:00 AM   DISCHARGE DATE: 7/2/2022 11:55 AM  DISPOSITION: [facility] with [address] [place] [address] Road INFORMATION:  Send all requests for admission clinical reviews, approved or denied determinations and any other requests to dedicated fax number below belonging to the campus where the patient is receiving treatment   List of dedicated fax numbers:  [address] DENIALS (Administrative/Medical Necessity) [phone]   [address] (Maternity/NICU/Pediatrics) [phone]   Ramy Langford [phone]   130 [facility] [phone]   [address] [place] [address] [phone]   2000 [facility] [address] [place] [address],[address] [phone]   [facility]  [phone]   [address] [place] [address], [facility]  2401 [facility] 1000 [facility] [phone]

## 2022-07-12 ENCOUNTER — NUTRITION (OUTPATIENT)
Dept: NUTRITION | Facility: CLINIC | Age: 77
End: 2022-07-12

## 2022-07-12 DIAGNOSIS — Z71.3 NUTRITIONAL COUNSELING: Primary | ICD-10-CM

## 2022-07-12 NOTE — PROGRESS NOTES
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  · S/p Jtube placement 2/7/22  · RT began 3/14/22, EOT 4/20/22  · Chemotherapy (carboplatin, taxol) 3/15/22-5/3/22  · Attempted esophagectomy 6/22/22, procedure aborted due to discovery of metastasis  Oncology History   Malignant neoplasm of lower third of esophagus ([facility] [address] )   1/13/2022 Initial Diagnosis    Malignant neoplasm of lower third of esophagus ([facility] [address] )     1/13/2022 Biopsy    EGD:   Esophagus, distal:  - 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  RESULTS OF IMMUNOHISTOCHEMICAL ANALYSIS FOR MISMATCH REPAIR PROTEIN LOSS     INTERPRETATION: No loss of nuclear expression of MMR proteins: Low probability of MSI-H     Note: Background non-neoplastic tissue and/or internal control with intact nuclear expression        RESULTS:  Antibody          Clone               Description                           Results  MLH1               M1                   Mismatch repair protein       Intact nuclear expression  MSH2              [id number]       Mismatch repair protein       Intact nuclear expression  MSH6              44                     Mismatch repair protein       Intact nuclear expression  PMS2              [id number]           Mismatch repair protein       Intact nuclear expression     3/14/2022 - 4/20/2022 Radiation    Treatments:  Course: C1    Plan ID Energy Fractions Dose per Fraction (cGy) Dose Correction (cGy) Total Dose Delivered (cGy) Elapsed Days   Esophags2_ReV 6X 22 / 22 180 0 3,960 29   Esophagus 6X 3 / 25 180 0 540 2   Esophagus CD 6X 3 / 3 180 0 540 2      Treatment Dates:  3/14/2022 - 4/20/2022       3/15/2022 - 5/3/2022 Chemotherapy CARBOplatin (PARAPLATIN) IVPB (GOG AUC DOSING), 147 6 mg, Intravenous, Once, 7 of 7 cycles  Administration: 147 6 mg (3/15/2022), 196 mg (3/22/2022), 196 mg (3/29/2022), 196 mg (4/5/2022), 196 mg (4/12/2022), 200 mg (4/26/2022), 200 mg (5/3/2022)  PACLItaxel (TAXOL) chemo IVPB, 50 mg/m2 = 84 6 mg, Intravenous, Once, 7 of 7 cycles  Administration: 84 6 mg (3/15/2022), 85 8 mg (3/22/2022), 85 8 mg (3/29/2022), 85 8 mg (4/5/2022), 85 8 mg (4/12/2022), 87 mg (4/26/2022), 87 mg (5/3/2022)       Past Medical & Surgical Hx: current smoker  Patient Active Problem List   Diagnosis    Smoking    Weight loss    Dysphagia    Dermatitis    Malignant neoplasm of lower third of esophagus (HCC)    Mild protein-calorie malnutrition (HCC)    Left ankle pain    Severe protein-calorie malnutrition (HCC)    Chronic pain    Cancer related pain    Palliative care encounter    Anemia due to antineoplastic chemotherapy    Encounter for geriatric assessment     Past Medical History:   Diagnosis Date    [facility])     esophageal ca    Current smoker     since 15years old    History of blood transfusion     Pneumonia     Polio     Rib fractures      Past Surgical History:   Procedure Laterality Date    EGD      ESOPHAGOGASTRODUODENOSCOPY N/A 6/22/2022    Procedure: ESOPHAGOGASTRODUODENOSCOPY (EGD); Surgeon: Gibson Kirkpatrick MD;  [facility];  Service: Surgical Oncology    FL GUIDED CENTRAL VENOUS ACCESS DEVICE INSERTION  02/16/2022    JEJUNOSTOMY FEEDING TUBE      LAPAROTOMY N/A 6/22/2022    Procedure: LAPAROTOMY EXPLORATORY; OMENTAL BIOPSY, TAP BLOCK;  Surgeon: Gibson Kirkpatrick MD;  [facility];  Service: Surgical Oncology    TUNNELED VENOUS PORT PLACEMENT Left 02/16/2022    Procedure: INSERTION VENOUS PORT (PORT-A-CATH);   Surgeon: Gibson Kirkpatrick MD;  [facility];  Service: Surgical Oncology       Review of Medications:   Vitamins, Supplements and Herbals: No, pt denies taking supplements    Current Outpatient Medications:     acetaminophen (TYLENOL) 325 mg tablet, Take 325 mg by mouth every 6 (six) hours as needed for mild pain, Disp: , Rfl:     ascorbic acid (VITAMIN C) 500 mg tablet, Take 500 mg by mouth daily, Disp: , Rfl:     Cholecalciferol (VITAMIN D3 PO), Take by mouth, Disp: , Rfl:     cyanocobalamin (VITAMIN B-12) 500 MCG tablet, Take 500 mcg by mouth daily, Disp: , Rfl:     enoxaparin (LOVENOX) 40 mg/0 4 mL, Inject 0 4 mL (40 mg total) under the skin daily for 19 days, Disp: 7 6 mL, Rfl: 0    gabapentin (NEURONTIN) 100 mg capsule, Take 1 capsule (100 mg total) by mouth 3 (three) times a day for 10 days, Disp: 30 capsule, Rfl: 0    methocarbamol (ROBAXIN) 500 mg tablet, Take 1 tablet (500 mg total) by mouth every 8 (eight) hours for 10 days, Disp: 30 tablet, Rfl: 0    Multiple Vitamins-Minerals (MULTIVITAMIN WITH MINERALS) tablet, Take 1 tablet by mouth daily, Disp: , Rfl:     ondansetron (Zofran ODT) 8 mg disintegrating tablet, Take 1 tablet (8 mg total) by mouth every 8 (eight) hours as needed for nausea or vomiting Through J tube not by mouth   (Patient not taking: No sig reported), Disp: 20 tablet, Rfl: 0    pantoprazole (PROTONIX) 40 mg tablet, Take 1 tablet (40 mg total) by mouth 2 (two) times a day (Patient taking differently: Take 40 mg by mouth 2 (two) times a day as needed), Disp: , Rfl: 0    Most Recent Lab Results:   Lab Results   Component Value Date    WBC 8 08 07/01/2022    NEUTROABS 6 58 06/29/2022    ALT 13 06/15/2022    AST 11 06/15/2022    ALB 3 6 06/15/2022    SODIUM 137 07/02/2022    SODIUM 140 07/01/2022    K 3 9 07/02/2022    K 3 3 (L) 07/01/2022     (H) 07/02/2022    BUN 10 07/02/2022    BUN 12 07/01/2022    CREATININE 0 56 (L) 07/02/2022    CREATININE 0 58 (L) 07/01/2022    EGFR 100 07/02/2022    PHOS 3 0 06/28/2022    PHOS 2 7 06/23/2022    POCGLU 121 05/16/2022    GLUC 145 (H) 07/02/2022    HGBA1C 4 9 06/15/2022    CALCIUM 8 7 07/02/2022 MG 2 2 06/29/2022       Anthropometric Measurements:   Height: 70"  Ht Readings from Last 1 Encounters:   06/22/22 5' 10" (1 778 m)     Wt Readings from Last 20 Encounters:   06/22/22 64 9 kg (143 lb)   06/10/22 64 9 kg (143 lb)   06/07/22 63 kg (139 lb)   06/06/22 63 5 kg (140 lb)   06/02/22 64 4 kg (142 lb)   06/01/22 64 2 kg (141 lb 8 oz)   05/19/22 62 8 kg (138 lb 6 4 oz)   05/03/22 61 2 kg (134 lb 14 7 oz)   05/02/22 61 2 kg (135 lb)   04/26/22 59 7 kg (131 lb 9 8 oz)   04/12/22 59 kg (130 lb)   04/12/22 59 1 kg (130 lb 6 4 oz)   04/11/22 59 kg (130 lb)   04/05/22 60 2 kg (132 lb 12 8 oz)   03/29/22 60 5 kg (133 lb 6 4 oz)   03/25/22 58 1 kg (128 lb)   03/22/22 57 5 kg (126 lb 12 8 oz)   03/15/22 55 4 kg (122 lb 3 2 oz)   03/07/22 55 3 kg (122 lb)   02/17/22 60 3 kg (133 lb)     Weight History:    Usual Weight: 175#   Varian: (3/14/22) 124#, (3/17/22) 120 5#, (3/21/22) 126#, (3/22/22) 127#, (3/24/22) 128#, (3/28/22) 131#, (3/29/22) 134#, (3/30/22) 134#, (3/31/22) 134#, (4/4/22) 133#, (4/5/22) 133#, (4/7/22) 132#, (4/11/22) 130#, (4/13/22) 130#, (4/14/22) 130 5#, (4/18/22) 130#       Home Scale: (5/11/22) 138#, (5/20/22) 139#, (7/12/22) 135#    Oncology Nutrition-Anthropometrics    Flowsheet Row Nutrition from 7/12/2022 in [address] [place] [address] Nutrition from 6/14/2022 in [facility] On[facility] Dietitian [place]   Patient age (years): 68 years 68 years   Patient (male) height (in): 79 in 79 in   Current weight (lbs): 135 lbs 143 lbs   Current weight to be used for anthropometric calculations (kg) 61 4 kg 65 kg   BMI: 19 4 20 5   IBW male 166 lb 166 lb   IBW (kg) male 75 5 kg 75 5 kg   IBW % (male) 81 3 % 86 1 %   Adjusted BW (male): 158 3 lbs 160 3 lbs   Adjusted BW in kg (male): 72 kg 72 9 kg   % weight change after 1 week: -- 2 1 %   Weight change after 1 week (lbs) -- 3 lbs   % weight change after 1 month: -5 6 % 6 %   Weight change after 1 month (lbs) -8 lbs 8 1 lbs   % weight change after 3 months: 3 8 % 17 2 %   Weight change after 3 months (lbs) 5 lbs 21 lbs          Nutrition-Focused Physical Findings: n/a due to telephone call    Food/Nutrition-Related History & Client/Social History:    Current Nutrition Impact Symptoms:  [] Nausea -has zofran  [] Reduced Appetite  [] Acid Reflux    [] Vomiting  [x] Unintended Wt Loss- pt states he lost weight after surgery likely due to only consuming clear liquids for a few days  [] Malabsorption    [] Diarrhea -hx of loose stool after surgery, more formed recently  [] Unintended Wt Gain  [] Dumping Syndrome    [] Constipation  [] Thick Mucous/Secretions  [] Abdominal Pain    [] Dysgeusia (Altered Taste)  [x] Xerostomia (Dry Mouth)  [] Gas    [] Dysosmia (Altered Smell)  [] Thrush  [] Difficulty Chewing    [] Oral Mucositis (Sore Mouth)  [] Fatigue  [x] Hyperglycemia: BG nonfasting 145mg/dL on 22  [] Odynophagia  [] Esophagitis  [] Other: hypokalemia 22   [] Dysphagia   -hx  -Jtube in place for nutrition [] Early Satiety  [] No Problems Eating      Food Allergies & Intolerances: no    Current Diet: Regular Diet, No Restrictions and Tube Feeding  Current Nutrition Intake: Unchanged from last visit    Appetite: Good, Fair    Nutrition Route: PO and J-Tube  Oral Care: brushes BID  Activity level: Uses carney to walk  25 Hr Diet Recall:   Breakfast: cereal with milk, diced peaches  Lunch: Tuna sandwich with soup   Dinner: pasta with shrimp    Snack: pudding or sliced fruit     Beverages: coffee (8oz x1), water (8oz x2-3), OJ (8oz x0-1)   Nutrition supplement: none    EN Recall:  DME: Miguelito   Access Type: Jtube   Formula: Jevity 1 5  Method: Cyclic  Rate: 90 mL/hr for 12hrs  Flush: 95 mL free water flush pre and post infusion (190ml)  EN providin mL volume (4 6 cartons/day), 1620 kcal, 69g protein, 821 mL free water, 1011mL total water      Oncology Nutrition-Estimated Needs    Flowsheet Row Nutrition from 2022 in [facility] Oncology Dietitian [place] Nutrition from 2022 in [facility] Oncology Dietitian [place]   Weight type used Actual weight Actual weight   Weight in kilograms (kg) used for estimated needs 61 4 kg 65 kg   Energy needs formula:  35-40 kcal/kg 35-40 kcal/kg   Energy needs based on 35 kcal/k kcal 2275 kcal   Energy needs based on 40 kcal/k kcal 2600 kcal   Protein needs formula: 1 5-2 g/kg 1 5-2 g/kg   Protein needs based on 1 5 g/kg 92 g 98 g   Protein needs based on 2 g/kg 123 g 130 g   Fluid needs formula: 30-35 mL/kg 30-35 mL/kg   Fluid needs based on 30 mL/kg 1842 mL 1950 mL   Fluid needs in ounces 62 oz 66 oz   Fluid needs based on 35 mL/kg 2149 mL 2275 mL   Fluid needs in ounces 73 oz 77 oz           Discussion & Intervention:   Nate Badillo was evaluated today for an RD follow up regarding wt loss, Esophageal Cancer and enteral nutrition  Nate Badillo has completed has completed chemo/RT for EC, and recently underwent attempted esophagectomy  Procedure was aborted due to discovery of metastatic disease  [place] [facility] [place] [facility] for next steps  Nate Badillo states that he is doing well at this time  He explains that after his surgery he was consuming clear liquids for a number of days and did lose some weight  However at this time he has adapted back to a regular diet and is not having difficulty eating  He also continues to use his Jtube to support his calorie and protein intake  Reviewed 24 hour recall, which revealed an suboptimal nutrition intake, and discussed ways to optimize nutrient intake to support weight gain   Based on today's assessment, discussion included: a high kcal/protein diet & food choices to include at all meals & snacks (Examples of high kcal foods: cheese, full-fat dairy products, nut butter, plant-based fats, coconut oil/milk, avocado, butter, cream soup, etc  Examples of high protein foods: eggs, chicken, fish, beans/legumes, nuts/nut butters, bone broth, etc ) , fortifying foods for added kcal and protein (examples include: adding cheese to foods such as eggs, mashed potatoes, casseroles, etc ; Making oatmeal with whole milk rather than water; Making fortified mashed potatoes with cream, butter, dry milk powder, plain [place] yogurt, and cheese ), adequate hydration & fluid choices, sipping on calorie containing beverages (examples include: adding whole milk or cream to coffee, oral nutrition supplements, juice, electrolyte replacement beverages, milk, etc ), eating smaller more frequent meals every 2-3 hours (5-6 small meals/day), having consistent and planned snacks between meals, eating when feeling most hungry, adding extra fat to foods while cooking such as butter, plant-based oil, coconut oil/milk, avocado, nut butters, etc , practicing proper feeding tube use & care and individualized enteral nutrition recommendations & plan:       Enteral Nutrition Goal:  · TF Goal: Jevity 1 5 at 90 mL/hr via J-tube cycled over ~12 hours daily (until 4 5 cartons is infused)  · Water Flushin mL free water flush at the beginning and end of TF infusion (250 mL total)  Plus an additional 1000mL fluid needed daily; this cane be administered via TF as free water flushes (ex: 125mL x 8 flushes) or this can be consumed by mouth, 1000mL is ~33oz  · Enteral Nutrition goal to provide: 1066 mL volume (4 5 cartons day), 1600 kcal, 68 grams protein, 810 mL free water, 2060 mL total water daily   *Pt requires an enteral feeding pump to administer TF due to J-Tube  Moving forward, Wilberto Doss will make efforts to increase oral intakes  Materials Provided: not applicable   All questions and concerns addressed during todays visit  Wilberto Doss has RD contact information  Nutrition Diagnosis:    Increased Nutrient Needs (kcal & pro) related to increased demand for nutrients and disease state as evidenced by recovering from cancer tx     Increased Nutrient Needs related to increased demand for nutrients as evidenced by desire to gain weight  Monitoring & Evaluation:   Goals:  · pt to meet >/=75% estimated nutrition needs daily  · weight gain of 1-2# per week  · increase fluid intake  · increase oral intake    · Progress Towards Goals: Progressing and Goal(s) Met    Nutrition Rx & Recommendations:  · Diet: High Calorie, High Protein (for high calorie foods see pages 52-53, and for high protein foods see pages 49-51 in your Eating Hints book)  · High calorie foods to try: peanut butter, whole milk, cheese, butter, olive oil  (see pages 52-53 in your Eating Hints book)  · High protein foods to try: eggs, chicken, fish, beans/legumes, greek yogurt (see pages 49-51 in your Eating Hints book)  · Follow proper oral care; Try baking soda/salt water rinse recipe (mix 3/4 tsp salt + 1 tsp baking soda + 1 qt water; rinse with plain water after using) in Eating Hints book (pg 18)  Brush your teeth before/after meals & before bed  · Weigh yourself regularly  If you notice weight loss, make an effort to increase your daily food/calorie intake  If you continue to notice loss after these efforts, reach out to your dietitian to establish a plan to stabilize weight  · Tube Feeding Plan: Continue current TF regimen    · TF Goal: Jevity 1 5 at 90 mL/hr via J-tube cycled over ~12 hours daily (until 4 5 cartons is infused)  · Water Flushin mL free water flush at the beginning and end of TF infusion (250 mL total)  Plus an additional 1000mL fluid needed daily; this cane be administered via TF as free water flushes (ex: 125mL x 8 flushes) or this can be consumed by mouth, 1000mL is ~33oz  · Enteral Nutrition goal to provide: 1066 mL volume (4 5 cartons day), 1600 kcal, 68 grams protein, 810 mL free water, 2060 mL total water daily      Follow Up Plan: 22 phone follow up    Recommend Referral to Other Providers: none at this time

## 2022-07-12 NOTE — PATIENT INSTRUCTIONS
Nutrition Rx & Recommendations:  Diet: High Calorie, High Protein (for high calorie foods see pages 52-53, and for high protein foods see pages 49-51 in your Eating Hints book)  High calorie foods to try: peanut butter, whole milk, cheese, butter, olive oil  (see pages 52-53 in your Eating Hints book)  High protein foods to try: eggs, chicken, fish, beans/legumes, greek yogurt (see pages 49-51 in your Eating Hints book)  Follow proper oral care; Try baking soda/salt water rinse recipe (mix 3/4 tsp salt + 1 tsp baking soda + 1 qt water; rinse with plain water after using) in Eating Hints book (pg 18)  Brush your teeth before/after meals & before bed  Weigh yourself regularly  If you notice weight loss, make an effort to increase your daily food/calorie intake  If you continue to notice loss after these efforts, reach out to your dietitian to establish a plan to stabilize weight  Tube Feeding Plan: Continue current TF regimen    TF Goal: Jevity 1 5 at 90 mL/hr via J-tube cycled over ~12 hours daily (until 4 5 cartons is infused)  Water Flushin mL free water flush at the beginning and end of TF infusion (250 mL total)  Plus an additional 1000mL fluid needed daily; this cane be administered via TF as free water flushes (ex: 125mL x 8 flushes) or this can be consumed by mouth, 1000mL is ~33oz  Enteral Nutrition goal to provide: 1066 mL volume (4 5 cartons day), 1600 kcal, 68 grams protein, 810 mL free water, 2060 mL total water daily      Follow Up Plan: 22 phone follow up    Recommend Referral to Other Providers: none at this time

## 2022-07-15 ENCOUNTER — OFFICE VISIT (OUTPATIENT)
Dept: SURGICAL ONCOLOGY | Facility: CLINIC | Age: 77
End: 2022-07-15

## 2022-07-15 VITALS
OXYGEN SATURATION: 97 % | BODY MASS INDEX: 18.84 KG/M2 | HEIGHT: 70 IN | WEIGHT: 131.6 LBS | SYSTOLIC BLOOD PRESSURE: 138 MMHG | TEMPERATURE: 98.4 F | HEART RATE: 86 BPM | RESPIRATION RATE: 17 BRPM | DIASTOLIC BLOOD PRESSURE: 78 MMHG

## 2022-07-15 DIAGNOSIS — C15.5 MALIGNANT NEOPLASM OF LOWER THIRD OF ESOPHAGUS (HCC): Primary | ICD-10-CM

## 2022-07-15 PROCEDURE — 99024 POSTOP FOLLOW-UP VISIT: CPT | Performed by: SURGERY

## 2022-07-15 NOTE — PROGRESS NOTES
Surgical Oncology Follow Up       [id number] [facility] [place]  [address]  [facility] [id number] [phone]    [facility]  1945  [id number]  [id number] [facility]ER [facility] [address]  [facility] [id number] [phone]    Diagnoses and all orders for this visit:    Malignant neoplasm of lower third of esophagus [facility])        Chief Complaint   Patient presents with    Post-op       No follow-ups on file  Oncology History   [facility]align[facility] lower third [facility] )   1/13/2022 Initial Diagnosis    Malignant neoplasm of lower third of esophagus ([facility] )     1/13/2022 Biopsy    EGD:   Esophagus, distal:  - 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  RESULTS OF IMMUNOHISTOCHEMICAL ANALYSIS FOR MISMATCH REPAIR PROTEIN LOSS     INTERPRETATION: No loss of nuclear expression of MMR proteins: Low probability of MSI-H     Note: Background non-neoplastic tissue and/or internal control with intact nuclear expression        RESULTS:  Antibody          Clone               Description                           Results  MLH1               M1                   Mismatch repair protein       Intact nuclear expression  MSH2              [id number]       Mismatch repair protein       Intact nuclear expression  MSH6              44                     Mismatch repair protein       Intact nuclear expression  PMS2              [id number]           Mismatch repair protein       Intact nuclear expression     3/14/2022 - 4/20/2022 Radiation    Treatments:  Course: C1    Plan ID Energy Fractions Dose per Fraction (cGy) Dose Correction (cGy) Total Dose Delivered (cGy) Elapsed Days   Esophags2_ReV 6X 22 / 22 180 0 3,960 29   Esophagus 6X 3 / 25 180 0 540 2   Esophagus CD 6X 3 / 3 180 0 540 2      Treatment Dates:  3/14/2022 - 4/20/2022       3/15/2022 - 5/3/2022 Chemotherapy    CARBOplatin (PARAPLATIN) IVPB (GOG AUC DOSING), 147 6 mg, Intravenous, Once, 7 of 7 cycles  Administration: 147 6 mg (3/15/2022), 196 mg (3/22/2022), 196 mg (3/29/2022), 196 mg (4/5/2022), 196 mg (4/12/2022), 200 mg (4/26/2022), 200 mg (5/3/2022)  PACLItaxel (TAXOL) chemo IVPB, 50 mg/m2 = 84 6 mg, Intravenous, Once, 7 of 7 cycles  Administration: 84 6 mg (3/15/2022), 85 8 mg (3/22/2022), 85 8 mg (3/29/2022), 85 8 mg (4/5/2022), 85 8 mg (4/12/2022), 87 mg (4/26/2022), 87 mg (5/3/2022)     6/22/2022 Surgery    Omental nodule biopsy:  A  Omentum, OMENTAL NODULE, intraoperative biopsy:  - Metastatic adenocarcinoma    Esophagectomy aborted         Staging:  Metastatic esophageal carcinoma  Peritoneal metastasis discovered at the time of surgery  Treatment history:  Chemoradiation  Current treatment:  Chemotherapy  Disease status:      History of Present Illness:  Patient returns after his exploratory laparotomy  At that time the esophagectomy was aborted secondary to a peritoneal metastasis  He comes in now  He has been eating liquids only  He is tolerating his tube feeds without difficulty  No fevers or chills  Review of Systems  Complete ROS Surg Onc:   Complete ROS Surg Onc:   Constitutional: The patient denies new or recent history of general fatigue, no recent weight loss, no change in appetite  Eyes: No complaints of visual problems, no scleral icterus  ENT: no complaints of ear pain, no hoarseness, no difficulty swallowing,  no tinnitus and no new masses in head, oral cavity, or neck  Cardiovascular: No complaints of chest pain, no palpitations, no ankle edema  Respiratory: No complaints of shortness of breath, no cough  Gastrointestinal: No complaints of jaundice, no bloody stools, no pale stools  Genitourinary: No complaints of dysuria, no hematuria, no nocturia, no frequent urination, no urethral discharge     Musculoskeletal: No complaints of weakness, paralysis, joint stiffness or arthralgias  Integumentary: No complaints of rash, no new lesions  Neurological: No complaints of convulsions, no seizures, no dizziness  Hematologic/Lymphatic: No complaints of easy bruising  Endocrine:  No hot or cold intolerance  No polydipsia, polyphagia, or polyuria  Allergy/immunology:  No environmental allergies  No food allergies  Not immunocompromised  Skin:  No pallor or rash  No wound  Patient Active Problem List   Diagnosis    Smoking    Weight loss    Dysphagia    Dermatitis    Malignant neoplasm of lower third of esophagus (HCC)    Mild protein-calorie malnutrition (HCC)    Left ankle pain    Severe protein-calorie malnutrition (HCC)    Chronic pain    Cancer related pain    Palliative care encounter    Anemia due to antineoplastic chemotherapy    Encounter for geriatric assessment     Past Medical History:   Diagnosis Date    Cancer [facility])     esophageal ca    Current smoker     since 15years old    History of blood transfusion     Pneumonia     Polio     Rib fractures      Past Surgical History:   Procedure Laterality Date    EGD      ESOPHAGOGASTRODUODENOSCOPY N/A 6/22/2022    Procedure: ESOPHAGOGASTRODUODENOSCOPY (EGD); Surgeon: Sarita Tovar MD;  [facility];  Service: Surgical Oncology    FL GUIDED CENTRAL VENOUS ACCESS DEVICE INSERTION  02/16/2022    JEJUNOSTOMY FEEDING TUBE      LAPAROTOMY N/A 6/22/2022    Procedure: LAPAROTOMY EXPLORATORY; OMENTAL BIOPSY, TAP BLOCK;  Surgeon: Sarita Tovar MD;  [facility];  Service: Surgical Oncology    TUNNELED VENOUS PORT PLACEMENT Left 02/16/2022    Procedure: INSERTION VENOUS PORT (PORT-A-CATH);   Surgeon: Sarita Tovar MD;  [facility];  Service: Surgical Oncology     Family History   Problem Relation Age of Onset    Dementia Mother     Heart disease Father      Social History     Socioeconomic History    Marital status: /Civil Union     Spouse name: Not on file    Number of children: Not on file    Years of education: Not on file    Highest education level: Not on file   Occupational History    Not on file   Tobacco Use    Smoking status: Heavy Tobacco Smoker     Packs/day: 1 00     Years: 40 00     Pack years: 40 00     Types: Cigarettes    Smokeless tobacco: Never Used    Tobacco comment: last smoked cigarettes 3 days ago   Vaping Use    Vaping Use: Never used   Substance and Sexual Activity    Alcohol use: Never     Alcohol/week: 0 0 standard drinks     Comment: 0    Drug use: Never    Sexual activity: Not Currently   Other Topics Concern    Not on file   Social History Narrative    Not on file     Social Determinants of Health     Financial Resource Strain: Not on file   Food Insecurity: No Food Insecurity    Worried About Running Out of Food in the Last Year: Never true    Jeanmarie of Food in the Last Year: Never true   Transportation Needs: No Transportation Needs    Lack of Transportation (Medical): No    Lack of Transportation (Non-Medical):  No   Physical Activity: Not on file   Stress: Not on file   Social Connections: Not on file   Intimate Partner Violence: Not on file   Housing Stability: Low Risk     Unable to Pay for Housing in the Last Year: No    Number of Places Lived in the Last Year: 1    Unstable Housing in the Last Year: No       Current Outpatient Medications:     acetaminophen (TYLENOL) 325 mg tablet, Take 325 mg by mouth every 6 (six) hours as needed for mild pain, Disp: , Rfl:     ascorbic acid (VITAMIN C) 500 mg tablet, Take 500 mg by mouth daily, Disp: , Rfl:     Cholecalciferol (VITAMIN D3 PO), Take by mouth, Disp: , Rfl:     cyanocobalamin (VITAMIN B-12) 500 MCG tablet, Take 500 mcg by mouth daily, Disp: , Rfl:     enoxaparin (LOVENOX) 40 mg/0 4 mL, Inject 0 4 mL (40 mg total) under the skin daily for 19 days, Disp: 7 6 mL, Rfl: 0    gabapentin (NEURONTIN) 100 mg capsule, Take 1 capsule (100 mg total) by mouth 3 (three) times a day for 10 days, Disp: 30 capsule, Rfl: 0    methocarbamol (ROBAXIN) 500 mg tablet, Take 1 tablet (500 mg total) by mouth every 8 (eight) hours for 10 days, Disp: 30 tablet, Rfl: 0    Multiple Vitamins-Minerals (MULTIVITAMIN WITH MINERALS) tablet, Take 1 tablet by mouth daily, Disp: , Rfl:     ondansetron (Zofran ODT) 8 mg disintegrating tablet, Take 1 tablet (8 mg total) by mouth every 8 (eight) hours as needed for nausea or vomiting Through J tube not by mouth  (Patient not taking: No sig reported), Disp: 20 tablet, Rfl: 0    pantoprazole (PROTONIX) 40 mg tablet, Take 1 tablet (40 mg total) by mouth 2 (two) times a day (Patient taking differently: Take 40 mg by mouth 2 (two) times a day as needed), Disp: , Rfl: 0  No Known Allergies  There were no vitals filed for this visit  Physical Exam  Constitutional: General appearance: The Patient is well-developed and well-nourished who appears the stated age in no acute distress  Patient is pleasant and talkative  HEENT:  Normocephalic  Sclerae are anicteric  Mucous membranes are moist    Abdomen: Abdomen is soft, non-tender, non-distended and without masses  Incision is C/D/I  Extremities: There is no clubbing or cyanosis  There is no edema  Symmetric  Neuro: Grossly nonfocal  Gait is normal      Skin: Warm, anicteric  Psych:  Patient is pleasant and talkative  Breasts:        Pathology:  [unfilled]    Labs:      Imaging  CT abdomen pelvis wo contrast    Result Date: 6/28/2022  Narrative: CT ABDOMEN AND PELVIS WITHOUT IV CONTRAST INDICATION:   r/o collection  Status post recent laparotomy dated 6/22/2022 status post jejunostomy tube placement COMPARISON:  PET scan dated 5/16/2022 and CT dated 1/24/2022 TECHNIQUE:  CT examination of the abdomen and pelvis was performed without intravenous contrast  This examination was performed without intravenous contrast in the context of the critical nationwide Omnipaque shortage   Axial, sagittal, and coronal 2D reformatted images were created from the source data and submitted for interpretation  Radiation dose length product (DLP) for this visit:  410 97 mGy-cm   This examination, like all CT scans performed in the [facility], was performed utilizing techniques to minimize radiation dose exposure, including the use of iterative  reconstruction and automated exposure control  Enteric contrast was administered  FINDINGS: ABDOMEN Limited examination due to artifact from the oral contrast and due to the absence of IV contrast LOWER CHEST:  New developing mild amount of partially imaged left-sided pleural effusion is visualized associated with adjacent left lower lobe dependent atelectatic changes and also associated with small patchy areas of confluent airspace groundglass opacities and consolidations seen within the left lower lobe and with smaller degree of tree in bud patterns with centrilobular airspace opacities seen within the right lower lobe and the right upper lobe/right middle lobe associated with mild amount of retained secretions seen within the right lower lobe bronchi suspicious for possible developing aspiration pneumonia  Small amount of new developing right-sided pleural effusion is visualized  Mild amount of partially imaged pneumomediastinum is seen within the anterior aspect of the mediastinum following the pericardium and also seen along the inferior sternum in this context of recent laparotomy  NG tube is seen extending into the proximal stomach associated with esophageal wall thickening identified in this context of esophageal neoplasm possibly associated with esophagitis  LIVER/BILIARY TREE:  Unremarkable  GALLBLADDER:  No calcified gallstones  No pericholecystic inflammatory change  SPLEEN:  Unremarkable  PANCREAS:  Unremarkable  ADRENAL GLANDS:  Unremarkable   KIDNEYS/URETERS:  Lobular contour of the left kidney is visualized including areas of cortical thinning/scarring with no hydronephrosis seen bilaterally including no hyperdense stones within both ureter and the partially distended bladder containing air bubbles and with minimal bladder wall thickening identified  STOMACH AND BOWEL:  There is no inflammatory changes around the colon with mild amount of fecal material identified  APPENDIX:  A normal appendix was visualized  ABDOMINOPELVIC CAVITY: Small amount of peritoneal fluid is seen within the abdomen and pelvis associated with mild amount of pneumoperitoneum extending into the abdominal wall in this context of recent laparotomy  This is associated with jejunojejunostomy tube and with interval air and fluid-filled mildly dilated small bowel loops involving large portion of the small bowel loops including the duodenum associated with minimal adjacent mesenteric fat stranding and with normal size/decompressed appearance of the distal ileum/terminal ileum with the transitional zone located within the right mid lateral peritoneal cavity seen around image 50 series 2  There is no peritoneal fluid collection identified  VESSELS:  Moderate degree of atherosclerosis is seen within the aorta extending into the coronary, abdominal and pelvic branches associated with infrarenal abdominal aortic aneurysm measuring approximately 3 9 x 3 5 cm in transverse and AP diameter PELVIS REPRODUCTIVE ORGANS:  Enlargement of the prostate is again visualized URINARY BLADDER:  Minimal bladder wall thickening associated with intraluminal air bubbles most likely iatrogenic in origin ABDOMINAL WALL/INGUINAL REGIONS:  Postsurgical changes related to recent laparotomy with cluster of soft tissue air bubbles without fluid collection identified  Mild degree of subcutaneous edematous changes is seen within the bilateral lateral and posterior abdominal wall most likely positional in origin  OSSEOUS STRUCTURES:  No acute fracture or destructive osseous lesion   Degenerative changes is seen within both sacroiliac joints and within both particularly the right hip with less involvement of the pubic symphysis  This is also seen within the spine including spondylolysis of L5 seen bilaterally responsible for grade 2  anterolisthesis  Otherwise there is no definite central canal stenosis with no disc herniation  Multilevel bilateral neural foraminal narrowing is visualized  Impression: Status post recent laparotomy responsible for mild amount of pneumoperitoneum extending into the visualized mediastinum /pneumomediastinum No peritoneal fluid collection identified  New developing bilateral particularly left-sided pleural effusion associated with multifocal airway and airspace disease suspicious for possible developing bilateral predominantly left lower lobe aspiration pneumonia  Air and fluid filled dilated small bowel loops as described above possibly related to postsurgical ileus which should be correlated with follow-up x-rays to confirm interval improvement/resolution  NG tube and jejunojejunostomy tube in correct location The study was marked in EPIC for immediate notification  Workstation performed: [id number]     I reviewed the above laboratory and imaging data  Discussion/Summary:  12-year-old male with recently discovered metastatic esophagus cancer  I had a long discussion with the patient  At this point, he is recovering well from surgery  He is scheduled to follow up with medical oncology in the next 2 weeks  He will discuss further adjuvant chemotherapy and immunotherapy with them  I have told him to start eating his diet without restrictions as he was doing preoperatively  Once his appetite and diet are back to normal, I then recommended that he stop his tube feeds  He can then have daily weights, and then once his weight is stable and all his caloric intake is by mouth we can consider removing his feeding tube  He still wishes to move to [place]    He will contact us once his appetite and weight have stabilized  All of his questions were answered

## 2022-07-15 NOTE — LETTER
July 15, 2022     Jacki Da Silva MD  [address]  [address] [facility]  [address]    Patient: Lizzie Bassett   YOB: 1945   Date of Visit: 7/15/2022       Dear Dr Josie Winters: Thank you for referring Umair Hernandez to me for evaluation  Below are my notes for this consultation  If you have questions, please do not hesitate to call me  I look forward to following your patient along with you  Sincerely,        Phillip Frazier MD        CC: MD Phillip Casas MD  7/15/2022 11:40 AM  Incomplete               Surgical Oncology Follow Up       [address] [facility][1] CANCER [facility] [address]  [phone]    Lizzie Bassett  1945  [id number]  [address] [facility] [id number] [phone]    Diagnoses and all orders for this visit:    Malignant neoplasm of lower third of esophagus [facility])        Chief Complaint   Patient presents with    Post-op       No follow-ups on file  Oncology History   Malignant neoplasm of lower third of esophagus ([facility] [address] )   1/13/2022 Initial Diagnosis    Malignant neoplasm of lower third of esophagus ([facility] [address] )     1/13/2022 Biopsy    EGD:   Esophagus, distal:  - 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  RESULTS OF IMMUNOHISTOCHEMICAL ANALYSIS FOR MISMATCH REPAIR PROTEIN LOSS     INTERPRETATION: No loss of nuclear expression of MMR proteins: Low probability of MSI-H     Note: Background non-neoplastic tissue and/or internal control with intact nuclear expression        RESULTS:  Antibody          Clone               Description                           Results  MLH1               M1                   Mismatch repair protein       Intact nuclear expression  MSH2              [id number]       Mismatch repair protein       Intact nuclear expression  MSH6              44                     Mismatch repair protein       Intact nuclear expression  PMS2              [id number]           Mismatch repair protein       Intact nuclear expression     3/14/2022 - 4/20/2022 Radiation    Treatments:  Course: C1    Plan ID Energy Fractions Dose per Fraction (cGy) Dose Correction (cGy) Total Dose Delivered (cGy) Elapsed Days   Esophags2_ReV 6X 22 / 22 180 0 3,960 29   Esophagus 6X 3 / 25 180 0 540 2   Esophagus CD 6X 3 / 3 180 0 540 2      Treatment Dates:  3/14/2022 - 4/20/2022       3/15/2022 - 5/3/2022 Chemotherapy    CARBOplatin (PARAPLATIN) IVPB (GOG AUC DOSING), 147 6 mg, Intravenous, Once, 7 of 7 cycles  Administration: 147 6 mg (3/15/2022), 196 mg (3/22/2022), 196 mg (3/29/2022), 196 mg (4/5/2022), 196 mg (4/12/2022), 200 mg (4/26/2022), 200 mg (5/3/2022)  PACLItaxel (TAXOL) chemo IVPB, 50 mg/m2 = 84 6 mg, Intravenous, Once, 7 of 7 cycles  Administration: 84 6 mg (3/15/2022), 85 8 mg (3/22/2022), 85 8 mg (3/29/2022), 85 8 mg (4/5/2022), 85 8 mg (4/12/2022), 87 mg (4/26/2022), 87 mg (5/3/2022)     6/22/2022 Surgery    Omental nodule biopsy:  A  Omentum, OMENTAL NODULE, intraoperative biopsy:  - Metastatic adenocarcinoma    Esophagectomy aborted         Staging:  Metastatic esophageal carcinoma  Peritoneal metastasis discovered at the time of surgery  Treatment history:  Chemoradiation  Current treatment:  Chemotherapy  Disease status:      History of Present Illness:  Patient returns after his exploratory laparotomy  At that time the esophagectomy was aborted secondary to a peritoneal metastasis  He comes in now  He has been eating liquids only  He is tolerating his tube feeds without difficulty  No fevers or chills  Review of Systems  Complete ROS Surg Onc:   Complete ROS Surg Onc:   Constitutional: The patient denies new or recent history of general fatigue, no recent weight loss, no change in appetite     Eyes: No complaints of visual problems, no scleral icterus  ENT: no complaints of ear pain, no hoarseness, no difficulty swallowing,  no tinnitus and no new masses in head, oral cavity, or neck  Cardiovascular: No complaints of chest pain, no palpitations, no ankle edema  Respiratory: No complaints of shortness of breath, no cough  Gastrointestinal: No complaints of jaundice, no bloody stools, no pale stools  Genitourinary: No complaints of dysuria, no hematuria, no nocturia, no frequent urination, no urethral discharge  Musculoskeletal: No complaints of weakness, paralysis, joint stiffness or arthralgias  Integumentary: No complaints of rash, no new lesions  Neurological: No complaints of convulsions, no seizures, no dizziness  Hematologic/Lymphatic: No complaints of easy bruising  Endocrine:  No hot or cold intolerance  No polydipsia, polyphagia, or polyuria  Allergy/immunology:  No environmental allergies  No food allergies  Not immunocompromised  Skin:  No pallor or rash  No wound  Patient Active Problem List   Diagnosis    Smoking    Weight loss    Dysphagia    Dermatitis    Malignant neoplasm of lower third of esophagus (HCC)    Mild protein-calorie malnutrition (HCC)    Left ankle pain    Severe protein-calorie malnutrition (HCC)    Chronic pain    Cancer related pain    Palliative care encounter    Anemia due to antineoplastic chemotherapy    Encounter for geriatric assessment     Past Medical History:   Diagnosis Date    Cancer [facility])     esophageal ca    Current smoker     since 15years old    History of blood transfusion     Pneumonia     Polio     Rib fractures      Past Surgical History:   Procedure Laterality Date    EGD      ESOPHAGOGASTRODUODENOSCOPY N/A 6/22/2022    Procedure: ESOPHAGOGASTRODUODENOSCOPY (EGD);   Surgeon: Rebeca Hubbard MD;  [facility];  Service: Surgical Oncology    FL GUIDED CENTRAL VENOUS ACCESS DEVICE INSERTION  02/16/2022    JEJUNOSTOMY FEEDING TUBE      LAPAROTOMY N/A 6/22/2022    Procedure: LAPAROTOMY EXPLORATORY; OMENTAL BIOPSY, TAP BLOCK;  Surgeon: Sheyla Ordaz MD;  [facility];  Service: Surgical Oncology    TUNNELED VENOUS PORT PLACEMENT Left 02/16/2022    Procedure: INSERTION VENOUS PORT (PORT-A-CATH); Surgeon: Sheyla Ordaz MD;  [facility];  Service: Surgical Oncology     Family History   Problem Relation Age of Onset    Dementia Mother     Heart disease Father      Social History     Socioeconomic History    Marital status: /Civil Union     Spouse name: Not on file    Number of children: Not on file    Years of education: Not on file    Highest education level: Not on file   Occupational History    Not on file   Tobacco Use    Smoking status: Heavy Tobacco Smoker     Packs/day: 1 00     Years: 40 00     Pack years: 40 00     Types: Cigarettes    Smokeless tobacco: Never Used    Tobacco comment: last smoked cigarettes 3 days ago   Vaping Use    Vaping Use: Never used   Substance and Sexual Activity    Alcohol use: Never     Alcohol/week: 0 0 standard drinks     Comment: 0    Drug use: Never    Sexual activity: Not Currently   Other Topics Concern    Not on file   Social History Narrative    Not on file     Social Determinants of Health     Financial Resource Strain: Not on file   Food Insecurity: No Food Insecurity    Worried About Running Out of Food in the Last Year: Never true    Jeanmarie of Food in the Last Year: Never true   Transportation Needs: No Transportation Needs    Lack of Transportation (Medical): No    Lack of Transportation (Non-Medical):  No   Physical Activity: Not on file   Stress: Not on file   Social Connections: Not on file   Intimate Partner Violence: Not on file   Housing Stability: Low Risk     Unable to Pay for Housing in the Last Year: No    Number of Places Lived in the Last Year: 1    Unstable Housing in the Last Year: No       Current Outpatient Medications:     acetaminophen (TYLENOL) 325 mg tablet, Take 325 mg by mouth every 6 (six) hours as needed for mild pain, Disp: , Rfl:     ascorbic acid (VITAMIN C) 500 mg tablet, Take 500 mg by mouth daily, Disp: , Rfl:     Cholecalciferol (VITAMIN D3 PO), Take by mouth, Disp: , Rfl:     cyanocobalamin (VITAMIN B-12) 500 MCG tablet, Take 500 mcg by mouth daily, Disp: , Rfl:     enoxaparin (LOVENOX) 40 mg/0 4 mL, Inject 0 4 mL (40 mg total) under the skin daily for 19 days, Disp: 7 6 mL, Rfl: 0    gabapentin (NEURONTIN) 100 mg capsule, Take 1 capsule (100 mg total) by mouth 3 (three) times a day for 10 days, Disp: 30 capsule, Rfl: 0    methocarbamol (ROBAXIN) 500 mg tablet, Take 1 tablet (500 mg total) by mouth every 8 (eight) hours for 10 days, Disp: 30 tablet, Rfl: 0    Multiple Vitamins-Minerals (MULTIVITAMIN WITH MINERALS) tablet, Take 1 tablet by mouth daily, Disp: , Rfl:     ondansetron (Zofran ODT) 8 mg disintegrating tablet, Take 1 tablet (8 mg total) by mouth every 8 (eight) hours as needed for nausea or vomiting Through J tube not by mouth  (Patient not taking: No sig reported), Disp: 20 tablet, Rfl: 0    pantoprazole (PROTONIX) 40 mg tablet, Take 1 tablet (40 mg total) by mouth 2 (two) times a day (Patient taking differently: Take 40 mg by mouth 2 (two) times a day as needed), Disp: , Rfl: 0  No Known Allergies  There were no vitals filed for this visit  Physical Exam  Constitutional: General appearance: The Patient is well-developed and well-nourished who appears the stated age in no acute distress  Patient is pleasant and talkative  HEENT:  Normocephalic  Sclerae are anicteric  Mucous membranes are moist    Abdomen: Abdomen is soft, non-tender, non-distended and without masses  Incision is C/D/I  Extremities: There is no clubbing or cyanosis  There is no edema  Symmetric  Neuro: Grossly nonfocal  Gait is normal      Skin: Warm, anicteric      Psych:  Patient is pleasant and talkative  Breasts:        Pathology:  [unfilled]    Labs:      Imaging  CT abdomen pelvis wo contrast    Result Date: 6/28/2022  Narrative: CT ABDOMEN AND PELVIS WITHOUT IV CONTRAST INDICATION:   r/o collection  Status post recent laparotomy dated 6/22/2022 status post jejunostomy tube placement COMPARISON:  PET scan dated 5/16/2022 and CT dated 1/24/2022 TECHNIQUE:  CT examination of the abdomen and pelvis was performed without intravenous contrast  This examination was performed without intravenous contrast in the context of the critical nationwide Omnipaque shortage  Axial, sagittal, and coronal 2D reformatted images were created from the source data and submitted for interpretation  Radiation dose length product (DLP) for this visit:  410 97 mGy-cm   This examination, like all CT scans performed in the [facility], was performed utilizing techniques to minimize radiation dose exposure, including the use of iterative  reconstruction and automated exposure control  Enteric contrast was administered  FINDINGS: ABDOMEN Limited examination due to artifact from the oral contrast and due to the absence of IV contrast LOWER CHEST:  New developing mild amount of partially imaged left-sided pleural effusion is visualized associated with adjacent left lower lobe dependent atelectatic changes and also associated with small patchy areas of confluent airspace groundglass opacities and consolidations seen within the left lower lobe and with smaller degree of tree in bud patterns with centrilobular airspace opacities seen within the right lower lobe and the right upper lobe/right middle lobe associated with mild amount of retained secretions seen within the right lower lobe bronchi suspicious for possible developing aspiration pneumonia  Small amount of new developing right-sided pleural effusion is visualized   Mild amount of partially imaged pneumomediastinum is seen within the anterior aspect of the mediastinum following the pericardium and also seen along the inferior sternum in this context of recent laparotomy  NG tube is seen extending into the proximal stomach associated with esophageal wall thickening identified in this context of esophageal neoplasm possibly associated with esophagitis  LIVER/BILIARY TREE:  Unremarkable  GALLBLADDER:  No calcified gallstones  No pericholecystic inflammatory change  SPLEEN:  Unremarkable  PANCREAS:  Unremarkable  ADRENAL GLANDS:  Unremarkable  KIDNEYS/URETERS:  Lobular contour of the left kidney is visualized including areas of cortical thinning/scarring with no hydronephrosis seen bilaterally including no hyperdense stones within both ureter and the partially distended bladder containing air bubbles and with minimal bladder wall thickening identified  STOMACH AND BOWEL:  There is no inflammatory changes around the colon with mild amount of fecal material identified  APPENDIX:  A normal appendix was visualized  ABDOMINOPELVIC CAVITY: Small amount of peritoneal fluid is seen within the abdomen and pelvis associated with mild amount of pneumoperitoneum extending into the abdominal wall in this context of recent laparotomy  This is associated with jejunojejunostomy tube and with interval air and fluid-filled mildly dilated small bowel loops involving large portion of the small bowel loops including the duodenum associated with minimal adjacent mesenteric fat stranding and with normal size/decompressed appearance of the distal ileum/terminal ileum with the transitional zone located within the right mid lateral peritoneal cavity seen around image 50 series 2  There is no peritoneal fluid collection identified   VESSELS:  Moderate degree of atherosclerosis is seen within the aorta extending into the coronary, abdominal and pelvic branches associated with infrarenal abdominal aortic aneurysm measuring approximately 3 9 x 3 5 cm in transverse and AP diameter PELVIS REPRODUCTIVE ORGANS:  Enlargement of the prostate is again visualized URINARY BLADDER:  Minimal bladder wall thickening associated with intraluminal air bubbles most likely iatrogenic in origin ABDOMINAL WALL/INGUINAL REGIONS:  Postsurgical changes related to recent laparotomy with cluster of soft tissue air bubbles without fluid collection identified  Mild degree of subcutaneous edematous changes is seen within the bilateral lateral and posterior abdominal wall most likely positional in origin  OSSEOUS STRUCTURES:  No acute fracture or destructive osseous lesion  Degenerative changes is seen within both sacroiliac joints and within both particularly the right hip with less involvement of the pubic symphysis  This is also seen within the spine including spondylolysis of L5 seen bilaterally responsible for grade 2  anterolisthesis  Otherwise there is no definite central canal stenosis with no disc herniation  Multilevel bilateral neural foraminal narrowing is visualized  Impression: Status post recent laparotomy responsible for mild amount of pneumoperitoneum extending into the visualized mediastinum /pneumomediastinum No peritoneal fluid collection identified  New developing bilateral particularly left-sided pleural effusion associated with multifocal airway and airspace disease suspicious for possible developing bilateral predominantly left lower lobe aspiration pneumonia  Air and fluid filled dilated small bowel loops as described above possibly related to postsurgical ileus which should be correlated with follow-up x-rays to confirm interval improvement/resolution  NG tube and jejunojejunostomy tube in correct location The study was marked in EPIC for immediate notification  Workstation performed: [id number]     I reviewed the above laboratory and imaging data  Discussion/Summary:  19-year-old male with recently discovered metastatic esophagus cancer  I had a long discussion with the patient    At this point, he is recovering well from surgery  He is scheduled to follow up with medical oncology in the next 2 weeks  He will discuss further adjuvant chemotherapy and immunotherapy with them  I have told him to start eating his diet without restrictions as he was doing preoperatively  Once his appetite and diet are back to normal, I then recommended that he stop his tube feeds  He can then have daily weights, and then once his weight is stable and all his caloric intake is by mouth we can consider removing his feeding tube  He still wishes to move to [place]  He will contact us once his appetite and weight have stabilized  All of his questions were answered

## 2022-07-28 ENCOUNTER — NUTRITION (OUTPATIENT)
Dept: NUTRITION | Facility: CLINIC | Age: 77
End: 2022-07-28

## 2022-07-28 DIAGNOSIS — Z71.3 NUTRITIONAL COUNSELING: Primary | ICD-10-CM

## 2022-07-28 NOTE — PATIENT INSTRUCTIONS
Nutrition Rx & Recommendations:  Diet: High Calorie, High Protein (for high calorie foods see pages 52-53, and for high protein foods see pages 49-51 in your Eating Hints book)  Incorporate physical activity as able/allowed  Follow proper oral care; Try baking soda/salt water rinse recipe (mix 3/4 tsp salt + 1 tsp baking soda + 1 qt water; rinse with plain water after using) in Eating Hints book (pg 18)  Brush your teeth before/after meals & before bed  Weigh yourself regularly  If you notice weight loss, make an effort to increase your daily food/calorie intake  If you continue to notice loss after these efforts, reach out to your dietitian to establish a plan to stabilize weight  Your syringes are each 60 mL or 2 fl oz  Always flush your feeding tube with 60 mL room-temp water 1-2 times daily while feeding tube is not in use  Ways to increase calorie and protein intake:   Eat when feeling most hungry  Choose foods that are easy to eat: yogurt, oatmeal, eggs, soup, cereal   Keep non perishable snacks nearby  5-6 small meals/snacks daily  Protein at all meals/snacks: eggs, chicken, fish, beans, nuts/nut butters   Add high calorie foods to meals: cheese, milk, olive oil, avocado, butter, peanut butter   Choose liquids with calories: whole milk, Fairlife milk (higher protein/lactose-free milk), chocolate milk, 100% fruit juice, diluted juice, bone broth (higher protein broth), creamy soups, sports drinks (Gatorade, Poweraide, Pedialyte, etc ), [place] ice, popsicles, milkshakes, smoothies, oral nutrition supplements (Ensure, Boost, Orgain etc ), gelatin/Jello, etc   Use oral nutrition supplements to make homemade smoothies or milkshakes  Choose oral nutrition supplements with >300 calories per serving        Follow Up Plan: 8/12/22 phone follow up    Recommend Referral to Other Providers: none at this time

## 2022-07-28 NOTE — PROGRESS NOTES
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  · S/p Jtube placement 2/7/22  · RT began 3/14/22, EOT 4/20/22  · Chemotherapy (carboplatin, taxol) 3/15/22-5/3/22  · Attempted esophagectomy 6/22/22, procedure aborted due to discovery of metastasis  · Follow up with [facility] 8/1/22 to discuss next steps  Oncology History   Malignant neoplasm of lower third of esophagus ([facility] [address] )   1/13/2022 Initial Diagnosis    Malignant neoplasm of lower third of esophagus ([facility] [address] )     1/13/2022 Biopsy    EGD:   Esophagus, distal:  - 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  RESULTS OF IMMUNOHISTOCHEMICAL ANALYSIS FOR MISMATCH REPAIR PROTEIN LOSS     INTERPRETATION: No loss of nuclear expression of MMR proteins: Low probability of MSI-H     Note: Background non-neoplastic tissue and/or internal control with intact nuclear expression        RESULTS:  Antibody          Clone               Description                           Results  MLH1               M1                   Mismatch repair protein       Intact nuclear expression  MSH2              [id number]       Mismatch repair protein       Intact nuclear expression  MSH6              44                     Mismatch repair protein       Intact nuclear expression  PMS2              [id number]           Mismatch repair protein       Intact nuclear expression     3/14/2022 - 4/20/2022 Radiation    Treatments:  Course: C1    Plan ID Energy Fractions Dose per Fraction (cGy) Dose Correction (cGy) Total Dose Delivered (cGy) Elapsed Days   Esophags2_ReV 6X 22 / 22 180 0 3,960 29   Esophagus 6X 3 / 25 180 0 540 2   Esophagus CD 6X 3 / 3 180 0 540 2      Treatment Dates:  3/14/2022 - 4/20/2022       3/15/2022 - 5/3/2022 Chemotherapy    CARBOplatin (PARAPLATIN) IVPB (GOG AUC DOSING), 147 6 mg, Intravenous, Once, 7 of 7 cycles  Administration: 147 6 mg (3/15/2022), 196 mg (3/22/2022), 196 mg (3/29/2022), 196 mg (4/5/2022), 196 mg (4/12/2022), 200 mg (4/26/2022), 200 mg (5/3/2022)  PACLItaxel (TAXOL) chemo IVPB, 50 mg/m2 = 84 6 mg, Intravenous, Once, 7 of 7 cycles  Administration: 84 6 mg (3/15/2022), 85 8 mg (3/22/2022), 85 8 mg (3/29/2022), 85 8 mg (4/5/2022), 85 8 mg (4/12/2022), 87 mg (4/26/2022), 87 mg (5/3/2022)     6/22/2022 Surgery    Omental nodule biopsy:  A  Omentum, OMENTAL NODULE, intraoperative biopsy:  - Metastatic adenocarcinoma    Esophagectomy aborted       Past Medical & Surgical Hx: current smoker  Patient Active Problem List   Diagnosis    Smoking    Weight loss    Dysphagia    Dermatitis    Malignant neoplasm of lower third of esophagus (HCC)    Mild protein-calorie malnutrition (HCC)    Left ankle pain    Severe protein-calorie malnutrition (HCC)    Chronic pain    Cancer related pain    Palliative care encounter    Anemia due to antineoplastic chemotherapy    Encounter for geriatric assessment     Past Medical History:   Diagnosis Date    Cancer [facility])     esophageal ca    Current smoker     since 15years old    History of blood transfusion     Pneumonia     Polio     Rib fractures      Past Surgical History:   Procedure Laterality Date    EGD      ESOPHAGOGASTRODUODENOSCOPY N/A 6/22/2022    Procedure: ESOPHAGOGASTRODUODENOSCOPY (EGD);   Surgeon: Enrique Vang MD;  [facility]: [facility];  Service: Surgical Oncology    FL GUIDED CENTRAL VENOUS ACCESS DEVICE INSERTION  02/16/2022    JEJUNOSTOMY FEEDING TUBE      LAPAROTOMY N/A 6/22/2022    Procedure: LAPAROTOMY EXPLORATORY; OMENTAL BIOPSY, TAP BLOCK;  Surgeon: Enrique Vang MD;  [facility];  Service: Surgical Oncology    TUNNELED VENOUS PORT PLACEMENT Left 02/16/2022    Procedure: Connor Marie VENOUS PORT (PORT-A-CATH); Surgeon: Adeline Ballard MD;  Location: [facility];  Service: Surgical Oncology       Review of Medications:   Vitamins, Supplements and Herbals: No, pt denies taking supplements    Current Outpatient Medications:     acetaminophen (TYLENOL) 325 mg tablet, Take 325 mg by mouth every 6 (six) hours as needed for mild pain, Disp: , Rfl:     ascorbic acid (VITAMIN C) 500 mg tablet, Take 500 mg by mouth daily (Patient not taking: Reported on 7/15/2022), Disp: , Rfl:     Cholecalciferol (VITAMIN D3 PO), Take by mouth (Patient not taking: Reported on 7/15/2022), Disp: , Rfl:     cyanocobalamin (VITAMIN B-12) 500 MCG tablet, Take 500 mcg by mouth daily (Patient not taking: Reported on 7/15/2022), Disp: , Rfl:     enoxaparin (LOVENOX) 40 mg/0 4 mL, Inject 0 4 mL (40 mg total) under the skin daily for 19 days, Disp: 7 6 mL, Rfl: 0    gabapentin (NEURONTIN) 100 mg capsule, Take 1 capsule (100 mg total) by mouth 3 (three) times a day for 10 days, Disp: 30 capsule, Rfl: 0    methocarbamol (ROBAXIN) 500 mg tablet, Take 1 tablet (500 mg total) by mouth every 8 (eight) hours for 10 days, Disp: 30 tablet, Rfl: 0    Multiple Vitamins-Minerals (MULTIVITAMIN WITH MINERALS) tablet, Take 1 tablet by mouth daily (Patient not taking: Reported on 7/15/2022), Disp: , Rfl:     ondansetron (Zofran ODT) 8 mg disintegrating tablet, Take 1 tablet (8 mg total) by mouth every 8 (eight) hours as needed for nausea or vomiting Through J tube not by mouth   (Patient not taking: No sig reported), Disp: 20 tablet, Rfl: 0    pantoprazole (PROTONIX) 40 mg tablet, Take 1 tablet (40 mg total) by mouth 2 (two) times a day (Patient not taking: Reported on 7/15/2022), Disp: , Rfl: 0    Most Recent Lab Results:   Lab Results   Component Value Date    WBC 8 08 07/01/2022    NEUTROABS 6 58 06/29/2022    ALT 13 06/15/2022    AST 11 06/15/2022    ALB 3 6 06/15/2022    SODIUM 137 07/02/2022    SODIUM 140 07/01/2022    K 3 9 07/02/2022    K 3 3 (L) 07/01/2022     (H) 07/02/2022    BUN 10 07/02/2022    BUN 12 07/01/2022    CREATININE 0 56 (L) 07/02/2022    CREATININE 0 58 (L) 07/01/2022    EGFR 100 07/02/2022    PHOS 3 0 06/28/2022    PHOS 2 7 06/23/2022    POCGLU 121 05/16/2022    GLUC 145 (H) 07/02/2022    HGBA1C 4 9 06/15/2022    CALCIUM 8 7 07/02/2022    MG 2 2 06/29/2022       Anthropometric Measurements:   Height: 70"  Ht Readings from Last 1 Encounters:   07/15/22 5' 10" (1 778 m)     Wt Readings from Last 27 Encounters:   07/15/22 59 7 kg (131 lb 9 6 oz)   06/22/22 64 9 kg (143 lb)   06/10/22 64 9 kg (143 lb)   06/07/22 63 kg (139 lb)   06/06/22 63 5 kg (140 lb)   06/02/22 64 4 kg (142 lb)   06/01/22 64 2 kg (141 lb 8 oz)   05/19/22 62 8 kg (138 lb 6 4 oz)   05/03/22 61 2 kg (134 lb 14 7 oz)   05/02/22 61 2 kg (135 lb)   04/26/22 59 7 kg (131 lb 9 8 oz)   04/12/22 59 kg (130 lb)   04/12/22 59 1 kg (130 lb 6 4 oz)   04/11/22 59 kg (130 lb)   04/05/22 60 2 kg (132 lb 12 8 oz)   03/29/22 60 5 kg (133 lb 6 4 oz)   03/25/22 58 1 kg (128 lb)   03/22/22 57 5 kg (126 lb 12 8 oz)   03/15/22 55 4 kg (122 lb 3 2 oz)   03/07/22 55 3 kg (122 lb)   02/17/22 60 3 kg (133 lb)   02/16/22 58 8 kg (129 lb 9 6 oz)   02/14/22 61 2 kg (135 lb)   02/06/22 59 5 kg (131 lb 2 8 oz)   02/03/22 66 7 kg (147 lb)   01/13/22 67 kg (147 lb 11 3 oz)   01/10/22 66 4 kg (146 lb 6 4 oz)     Weight History:    Usual Weight: 175#   Varian: (3/14/22) 124#, (3/17/22) 120 5#, (3/21/22) 126#, (3/22/22) 127#, (3/24/22) 128#, (3/28/22) 131#, (3/29/22) 134#, (3/30/22) 134#, (3/31/22) 134#, (4/4/22) 133#, (4/5/22) 133#, (4/7/22) 132#, (4/11/22) 130#, (4/13/22) 130#, (4/14/22) 130 5#, (4/18/22) 130#       Home Scale: (5/11/22) 138#, (5/20/22) 139#, (7/12/22) 135#, (7/28/22)129#    Oncology Nutrition-Anthropometrics    Flowsheet Row Nutrition from 7/28/2022 in [address] [place] [address] Nutrition from 7/12/2022 in [facility] [place]   Patient age (years): 68 years 68 years   Patient (male) height (in): 79 in 79 in   Current weight (lbs): 131 6 lbs 135 lbs   Current weight to be used for anthropometric calculations (kg) 59 8 kg 61 4 kg   BMI: 18 9 19 4   IBW male 166 lb 166 lb   IBW (kg) male 75 5 kg 75 5 kg   IBW % (male) 79 3 % 81 3 %   Adjusted BW (male): 157 4 lbs 158 3 lbs   Adjusted BW in kg (male): 71 5 kg 72 kg   % weight change after 1 month: -8 % -5 6 %   Weight change after 1 month (lbs) -11 4 lbs -8 lbs   % weight change after 3 months: 1 2 % 3 8 %   Weight change after 3 months (lbs) 1 6 lbs 5 lbs   % weight change after 6 months: -10 9 % --   Weight change after 6 months (lbs) -16 1 lbs --          Nutrition-Focused Physical Findings: n/a due to telephone call    Food/Nutrition-Related History & Client/Social History:    Current Nutrition Impact Symptoms:  [] Nausea -has zofran  [] Reduced Appetite  [] Acid Reflux    [] Vomiting  [x] Unintended Wt Loss- significant x1 month and x6 months  [] Malabsorption    [] Diarrhea  [] Unintended Wt Gain  [] Dumping Syndrome    [] Constipation  [] Thick Mucous/Secretions  [] Abdominal Pain    [] Dysgeusia (Altered Taste)  [x] Xerostomia (Dry Mouth)  [] Gas    [] Dysosmia (Altered Smell)  [] Thrush  [] Difficulty Chewing    [] Oral Mucositis (Sore Mouth)  [] Fatigue  [x] Hyperglycemia: BG nonfasting 145mg/dL on 7/2/22  [] Odynophagia  [] Esophagitis  [] Other: hypokalemia 7/1/22   [] Dysphagia  [] Early Satiety  [] No Problems Eating      Food Allergies & Intolerances: no    Current Diet: Regular Diet, No Restrictions  Current Nutrition Intake: Unchanged from last visit    Appetite: Good, Fair    Nutrition Route: PO  Oral Care: brushes BID  Activity level: Uses carney to walk   Energy is increasing "feeling better each day "     24 Hr Diet Recall:   Breakfast: cereal with milk, diced peaches OR eggs with toast   Lunch: Tuna sandwich with soup OR hamburger   Dinner: pasta with shrimp OR raviolis   Snack: pudding or sliced fruit     Beverages: coffee (8oz x1), water (8oz x2-3), OJ (8oz x0-1)   Nutrition supplement: Has Orgain (250 kcal, 16g pro) but has not been consuming    EN Recall:  Flushing tube QD  Has not used jtube for nutrition x3 weeks    Oncology Nutrition-Estimated Needs    Flowsheet Row Nutrition from 2022 in [address] [place] [address] [facility] [place]   Weight type used Actual weight Actual weight   Weight in kilograms (kg) used for estimated needs 59 8 kg 61 4 kg   Energy needs formula:  35-40 kcal/kg 35-40 kcal/kg   Energy needs based on 35 kcal/k kcal 2148 kcal   Energy needs based on 40 kcal/k kcal 2455 kcal   Protein needs formula: 1 5-2 g/kg 1 5-2 g/kg   Protein needs based on 1 5 g/kg 90 g 92 g   Protein needs based on 2 g/kg 120 g 123 g   Fluid needs formula: 30-35 mL/kg 30-35 mL/kg   Fluid needs based on 30 mL/kg 1800 mL 1842 mL   Fluid needs in ounces 61 oz 62 oz   Fluid needs based on 35 mL/kg 2100 mL 2149 mL   Fluid needs in ounces 71 oz 73 oz           Discussion & Intervention:   Nory Joel was evaluated today for an RD follow up regarding wt loss, Esophageal Cancer and enteral nutrition  Nory Joel has completed has completed chemo/RT for EC, and recently underwent attempted esophagectomy  Procedure was aborted due to discovery of metastatic disease  Consult with [facility] for next steps  Nory Joel states that he is doing well at this time  He explains that after his surgery he was consuming clear liquids for a number of days and did lose some weight  He is working on trying to gain some of the lost weight back but states that he is having difficulty  He also explains that he has not used his Jtube for nutrition for the past 3 weeks, he is trying to eat the best that he can by mouth  Reviewed 24 hour recall, which revealed an suboptimal nutrition intake   Discussed ways to increase calorie and protein intake to help support weight gain, suggestions include: eating smaller/more frequent meals, including high calorie foods in diet (cheese, whole milk, peanut butter, heavy cream), including high protein foods in diet (eggs, chicken, fish, beans/legumes, nuts/nut butters), eating when feeling most hungry, and keeping non perishable foods nearby to snack on  Reviewed choosing liquids with calories: whole milk, Fairlife milk (higher protein/lactose-free milk), chocolate milk, 100% fruit juice, diluted juice, bone broth (higher protein broth), creamy soups, sports drinks (Gatorade, Poweraide, Pedialyte, etc ), [place] ice, popsicles, milkshakes, smoothies, oral nutrition supplements (Ensure, Boost, Orgain etc ), gelatin/Jello, etc      Moving forward, Jemal Martinez was encouraged to increase kcal, protein, and fluid intakes  Materials Provided: not applicable   All questions and concerns addressed during todays visit  Jemal Martinez has RD contact information  Nutrition Diagnosis:    Inadequate Energy Intake related to physiological causes, disease state and treatment related issues as evidenced by food recall, wt loss and discussion with pt and/or family   Increased Nutrient Needs (kcal & pro) related to increased demand for nutrients and disease state as evidenced by recovering from cancer tx   Increased Nutrient Needs related to increased demand for nutrients as evidenced by desire to gain weight  Monitoring & Evaluation:   Goals:  · pt to meet >/=75% estimated nutrition needs daily  · weight gain of 1-2# per week  · increase calorie, protein, fluid intake    · Progress Towards Goals: Progressing and Goal(s) Met    Nutrition Rx & Recommendations:  · Diet: High Calorie, High Protein (for high calorie foods see pages 52-53, and for high protein foods see pages 49-51 in your Eating Hints book)  · Incorporate physical activity as able/allowed    · Follow proper oral care; Try baking soda/salt water rinse recipe (mix 3/4 tsp salt + 1 tsp baking soda + 1 qt water; rinse with plain water after using) in Eating Hints book (pg 18)  Brush your teeth before/after meals & before bed  · Weigh yourself regularly  If you notice weight loss, make an effort to increase your daily food/calorie intake  If you continue to notice loss after these efforts, reach out to your dietitian to establish a plan to stabilize weight  · Your syringes are each 60 mL or 2 fl oz  Always flush your feeding tube with 60 mL room-temp water 1-2 times daily while feeding tube is not in use  Ways to increase calorie and protein intake:    Eat when feeling most hungry   Choose foods that are easy to eat: yogurt, oatmeal, eggs, soup, cereal    Keep non perishable snacks nearby   5-6 small meals/snacks daily   Protein at all meals/snacks: eggs, chicken, fish, beans, nuts/nut butters    Add high calorie foods to meals: cheese, milk, olive oil, avocado, butter, peanut butter    Choose liquids with calories: whole milk, Fairlife milk (higher protein/lactose-free milk), chocolate milk, 100% fruit juice, diluted juice, bone broth (higher protein broth), creamy soups, sports drinks (Gatorade, Poweraide, Pedialyte, etc ), [place] ice, popsicles, milkshakes, smoothies, oral nutrition supplements (Ensure, Boost, Orgain etc ), gelatin/Jello, etc   o Use oral nutrition supplements to make homemade smoothies or milkshakes  o Choose oral nutrition supplements with >300 calories per serving        Follow Up Plan: 8/12/22 phone follow up    Recommend Referral to Other Providers: none at this time

## 2022-07-31 NOTE — PROGRESS NOTES
Hematology/Oncology Progress Note    Date of Service: 8/1/2022    [facility]  [facility]   [address] [facility] [place] [address]    Hem/Onc Problem List:   GE junction adenocarcinoma, cT3 cN1, M0, G3    Chief Complaint:    Management of GE junction adenocarcinoma    Assessment/Plan:   1  GE junction adenocarcinoma, cT3 cN1, cM0, grade 3, stage III  "EGD with biopsy consistent with 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   MMR proteins are normally expressed   HER2 negative by IHC   Dr Ayah Sarah consulted patient and recommended concurrent chemoradiation therapy  Poor performance status initially ECOG 3/5   Patient has a J-tube placed for nutrition support  Neoadjuvant concurrent radiation therapy with weekly Carbo/Taxol started on March 15, 2022  Last chemotherapy was on May 3, 2022  PET-CT scan restaging on May 16, 2022 showed interval decrease FDG activity in the GE junction "     Patient presented for esophagectomy by Dr Ayah Sarah on June 22, 2022  It was found that he had omental metastasis/nodules  These were biopsied and found to be esophageal adenocarcinoma  After finding of omental metastasis, esophagectomy was aborted  Patient presents today and definitely has improvement in ECOG status at 1  I did discuss this patient's case with my attending Dr Magdalena Hickman  It is not indicated at this time for patient to receive monotherapy immunotherapy  Options would be FOLFOX +nivolumab verses monotherapy docetaxel which dependent on his ECOG  Regardless, patient would need repeat full CT scan with contrast in 1-2 weeks as well as send out for Caris testing on omental biopsy to see if there were actionable markers which would evaluate for possibility of immunotherapy vs  other targeted treatments  Patient is adamant about not receiving further chemotherapy at this time    However, he may reconsider in a few weeks  He would like to obtain CT scan as well as Caris testing prior to deciding treatment as there is still a possibility of foregoing chemotherapy if Caris testing is positive for alternative option      2  Normocytic normochromic anemia, multifactorial  Patient denies bleeding anywhere  Most recent hemoglobin was 8 5,   Will continue to monitor with next labs  · Discussion of decision making    I personally reviewed the following lab results, the image studies, pathology, other specialty/physicians consult notes and recommendations, and outside medical records from [facility]  I had a lengthy discussion with the patient and shared the work-up findings  I spent 40 minutes reviewing the records (labs, clinician notes, outside records, medical history, ordering medicine/tests/procedures, interpreting the imaging/labs previously done) and coordination of care as well as direct time with the patient today, of which greater than 50% of the time was spent in counseling and coordination of care with the patient/family  · Plan/Labs  · Patient has metastatic esophageal adenocarcinoma, HER2 negative  Initially was found to be stage III, cT3, cN1, cM0  Recently, had esophagectomy aborted when omental metastasis were found  Biopsy was positive for esophageal adenocarcinoma  · Patient ECOG today is 1  Previously was 3      · I discussed this treatment plan with my attending Dr Jannet Aleman  Monotherapy with immunotherapy is not approved in patient's case at this time  Per [facility]CCN guidelines, treatment would be FOLFOX +nivolumab  We did also offer patient option of monotherapy docetaxel if he wanted less aggressive treatment  I discussed both treatment options in detail including frequency, need for blood work prior to each treatment, CADD pump use for fluorouracil, side effect profile, etc   Patient adamantly does not want to have chemotherapy at this time    He would like to 1st perform the CT chest, abdomen, pelvis with contrast as well as wait for the results of the Caris testing to return to see if there is actionable markers for targeted treatments  He is also planning on moving to [place] in September  He understands if he moves there, he will need to find an oncologist there  · Patient will follow-up in about 2 weeks to review above results  Ideally, patient would be seen by attending  However, if schedule does not allow this patient will be seen with attending at next visit  · I offered to call patient's daughter regarding the above plan  He deferred this for now  Follow Up:  2 weeks    All questions were answered to the patient's satisfaction during this encounter  The patient knows the contact information for our office and knows to reach out for any relevant concerns related to this encounter  They are to call for any temperature 100 4 or higher, new symptoms including but not restricted to shaking chills, decreased appetite, nausea, vomiting, diarrhea, increased fatigue, shortness of breath or chest pain, confusion, and not feeling the strength to come to the clinic  For all other listed problems and medical diagnosis in their chart - they are managed by PCP and/or other specialists, which the patient acknowledges  Thank you very much for your consultation and making us a part of this patient's care  We are continuing to follow closely with you  Please do not hesitate to reach out to me with any additional questions or concerns  AJCC 8th Edition Cancer Stage :      Cancer Staging  Malignant neoplasm of lower third of esophagus (HCC)  Staging form: Esophagus - Adenocarcinoma, AJCC 8th Edition  - Clinical stage from 2/14/2022: Stage III (cT3, cN1, cM0, G3) - Unsigned  Histologic grading system: 3 grade system      Hematology/Oncology History:   · January 6, 2022 patient had a follow-up with primary care physician complaining of 6 weeks of dysphagia    This started with solid food then progressed into liquid diet  It was associated with nausea  Patient had a 30 lb weight loss within 1 year  Patient is an active smoker  · January 10, 2022 patient consulted GI   EGD on January 13, 2022 showed: FINDINGS:  · Fungating and malignant-appearing mass (traversable) measuring 70 mm in the lower third of the esophagus (34 cm from the incisors), covering the whole circumference,; bleeding occurred before and after intervention; performed cold forceps biopsy  · 3 fungating and multilobular masses (traversable) in the cardia, covering one third of the circumference,; bleeding occurred after intervention; performed partial removal by cold forceps biopsy  · The fundus of the stomach, body of the stomach, incisura, antrum, prepyloric region and pylorus appeared normal   · The duodenal bulb and 2nd part of the duodenum appeared normal   IMPRESSION:  2  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 with additional nodular lesions in the cardia of the stomach, biopsies pending, most likely adenocarcinoma   · January 13, 2010 to pathology from the EGD shows:  Final Diagnosis   A  Stomach, cardia:  - Gastric mucosa with reactive changes   - Separate fragment of necrotic debris with bacterial and fungal aggregates  - No definite malignancy identified       B  Esophagus, distal:  - 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  - MMR and Her2 studies are pending with results to follow in an addendum      C   MMR are normally expressed , MSI-low  D  HER2 negative by IHC       · January 24, 2022 CT scan chest abdomen pelvis with contrast:  IMPRESSION:     New mid to distal esophageal wall thickening with large eccentric mass in the posterior distal esophagus extending into the GE junction and beyond the esophageal wall with pathologic lymph nodes anteriorly with central necrosis of esophageal malignancy      This correlates with the recent EGD finding of 1/13/2022      Right lower lobe endobronchial mass possibly mucous plugging with extension into the bronchial branches is chronic and was seen on prior CT of 10/10/2019 with no obstruction   This can be evaluated when PET scan is performed as indicated on EGD report  · February 1, 2022 PET-CT scan:  IMPRESSION:     1   Hypermetabolic distal esophageal carcinoma which extends into the proximal stomach with paraesophageal/perigastric/upper abdominal andry metastatic disease      2   Concentric FDG activity involving the cecum/proximal ascending colon of uncertain clinical significance   Given the localized nature of FDG activity, consider correlation with screening colonoscopy to exclude underlying colonic malignancy      3   Indeterminant non-FDG avid avid filling defect within right lower lobe bronchus with associated more distal tree-in-bud infiltrate   Findings could reflect mucous plugging with obstructive malignant process of low metabolic activity not excluded      4   3 8 cm infrarenal abdominal aortic aneurysm  · February 3, 2022 Dr Chandler Franklin consulted patient recommendation of concurrent chemo radiation therapy in the neoadjuvant setting  · February 6, 2022, EGD with J-tube placement  Dr Jennifer Woods, DO  · February 7, 2022 duplex of lower extremity shows high-grade stenosis versus occlusion of the proximal distal superficial femoral artery reconstitution at the proximal popliteal artery  Stenosis in left lower extremity as well  · March 15, 2022 patient started concurrent chemoradiation therapy with weekly carbo/Taxol  · Last radiation by the end of April 2022  Last chemotherapy was postponed for 1 week because of neutropenia  · Last radiation on May 3, 2022     · May 16, 2022 PET-CT scan:  IMPRESSION:  1   Interval decreased FDG activity in the distal esophagus and proximal stomach, suggesting at least partial response to therapy   Residual viable tumor may still be present   Continued PET CT follow-up recommended  2   Previously visualized distal paraesophageal and gastrohepatic hypermetabolic densities appear diminished   There are however new mildly hypermetabolic left paraesophageal and AP window lesions as above, which may represent new metastases  3   Right lower endobronchial soft tissue lesion is persistent from 2019, but demonstrates mild FDG activity   Clinical correlation recommended for suspected endobronchial polypoid mass  · Domitila 3, 2022 Dr Meg Ibarra discussed with patient about surgical option  · June 22, 2022, Patient presented for esophagectomy  He is s/p ex lap, omental biopsy, tap blocks, and intraoperative EGD  Esophagectomy procedure was aborted when omental metastasis/nodules were found  Biopsies during procedure were positive for metastatic adenocarcinoma  History of Present Illiness:   Johann Cancino is a 68 y o  male with the above-noted HemOnc history who is here to follow up regarding hx of esophageal adenocarcinoma  Patient was previously seen by Dr Carol Davila  Hx per his last note as below:    "Patient diagnosed GE junction adenocarcinoma in January 10, 2022  Biopsy consistent with moderate to poorly differentiated adenocarcinoma with focal signet ring features  MMR normally expressed    HER2 negative by IHC      Staging workup in January 24, 2022 showed new mid to distal esophageal wall thickening with large eccentric mass in the posterior distal esophagus extending into the GE junction and beyond the esophageal wall with pathologic lymph nodes anteriorly with central necrosis of esophageal malignancy      PET-CT scan in February 1, 2022 revealed hypermetabolic distal esophageal carcinoma which extends into the proximal stomach with periesophageal/perigastric/upper abdominal andry metastatic disease  Erika Escudero is a 3 8 cm infrarenal abdominal aortic aneurysm      Dr Meg Ibarra consulted patient February 3, 2022 recommendation of concurrent chemoradiation therapy in the neoadjuvant setting   J-tube placement was done by Dr Cyr [facility] nutrition support on February 6, 2022      Neoadjuvant concurrent chemoradiation therapy with weekly carbo Taxol started on March 15, 2022  Patient has had 7 cycles of treatment and did fairly well  Cycle 7 was postponed for 1 week because of neutropenia with platelet count 85  Last chemo was given on May 3, 2022  PET-CT scan in May 16, 2022 showed interval decreased FDG activity in the GE junction      Lab showed pancytopenia due to chemoradiation therapy  Hemoglobin stable  ANC normal   Platelet count [id number] "       6/22, patient presented for esophagectomy as planned by Dr Marlen Arriaga  He is status post ex lap, omental biopsy, tap blocks, intraoperative EGD  This off a JAK2 me procedure was aborted due to omental metastasis/nodule seen  Biopsy returned positive for esophageal a dental carcinoma  He presents to our office today to review next steps of treatment  Patient feels fine  No fever or chills  No exertional chest pain, diaphoresis or shortness  of breath  No cough and phlegm, no hemoptysis  Patient denied nausea  and vomiting  No abdominal pain  No diarrhea or constipation  No  symptoms  No headache or blurred vision  No seizure activity  Appetite good  No significant weight loss or weight gain  The patient denies bleeding anywhere  ROS: A 12-point of review of systems is obtained and other than the above is noncontributory  Objective:   VITALS:   /70   Pulse 83   Temp 97 9 °F (36 6 °C)   Resp 16   Ht 5' 10" (1 778 m)   Wt 58 5 kg (129 lb)   SpO2 98%   BMI 18 51 kg/m²     Physical EXAM:  General:  Alert, cooperative, no distress, appears stated age  + ambulating with cane    Head:  Normocephalic, without obvious abnormality, atraumatic  Eyes:  Conjunctivae/corneas clear  PERRL, EOMs intact  No evidence of conjunctivitis     Throat: Lips, mucosa, and tongue normal   No bleeding from mouth     Neck: Supple, symmetrical, trachea midline    Lungs:   Clear to auscultation bilaterally  Respiratory effort easy, nonlabored    Heart:  Regular rate and rhythm, S1, S2 normal, no murmur  Abdomen:   Soft, non-tender,nondistended  Bowel sounds normal  No masses,  No organomegaly  Extremities:  Lymphatics: Extremities normal, atraumatic, no cyanosis or edema  No cervical, axillary or inguinal adenopathy   Skin: Skin color, texture, turgor normal  No rashes  Neurologic: A&Ox4  No focal neuro deficits       No Known Allergies    Past Medical History:   Diagnosis Date    Cancer ([facility] [address] )     esophageal ca    Current smoker     since 15years old    History of blood transfusion     Pneumonia     Polio     Rib fractures        Past Surgical History:   Procedure Laterality Date    EGD      ESOPHAGOGASTRODUODENOSCOPY N/A 6/22/2022    Procedure: ESOPHAGOGASTRODUODENOSCOPY (EGD); Surgeon: Gabrielle Salamanca MD;  [facility];  Service: Surgical Oncology    FL GUIDED CENTRAL VENOUS ACCESS DEVICE INSERTION  02/16/2022    JEJUNOSTOMY FEEDING TUBE      LAPAROTOMY N/A 6/22/2022    Procedure: LAPAROTOMY EXPLORATORY; OMENTAL BIOPSY, TAP BLOCK;  Surgeon: Gabrielle Salamanca MD;  [facility];  Service: Surgical Oncology    TUNNELED VENOUS PORT PLACEMENT Left 02/16/2022    Procedure: INSERTION VENOUS PORT (PORT-A-CATH);   Surgeon: Gabrielle Salamanca MD;  [facility];  Service: Surgical Oncology       Family History   Problem Relation Age of Onset    Dementia Mother     Heart disease Father        Social History     Socioeconomic History    Marital status: /Civil Union     Spouse name: Not on file    Number of children: Not on file    Years of education: Not on file    Highest education level: Not on file   Occupational History    Not on file   Tobacco Use    Smoking status: Heavy Tobacco Smoker     Packs/day: 1 00     Years: 40 00     Pack years: 40 00     Types: Cigarettes    Smokeless tobacco: Never Used  Tobacco comment: last smoked cigarettes 3 days ago   Vaping Use    Vaping Use: Never used   Substance and Sexual Activity    Alcohol use: Never     Alcohol/week: 0 0 standard drinks     Comment: 0    Drug use: Never    Sexual activity: Not Currently   Other Topics Concern    Not on file   Social History Narrative    Not on file     Social Determinants of Health     Financial Resource Strain: Not on file   Food Insecurity: No Food Insecurity    Worried About Running Out of Food in the Last Year: Never true    Jeanmarie of Food in the Last Year: Never true   Transportation Needs: No Transportation Needs    Lack of Transportation (Medical): No    Lack of Transportation (Non-Medical): No   Physical Activity: Not on file   Stress: Not on file   Social Connections: Not on file   Intimate Partner Violence: Not on file   Housing Stability: Low Risk     Unable to Pay for Housing in the Last Year: No    Number of Places Lived in the Last Year: 1    Unstable Housing in the Last Year: No       Current Outpatient Medications   Medication Sig Dispense Refill    acetaminophen (TYLENOL) 325 mg tablet Take 325 mg by mouth every 6 (six) hours as needed for mild pain      Cholecalciferol (VITAMIN D3 PO) Take by mouth      cyanocobalamin (VITAMIN B-12) 500 MCG tablet Take 500 mcg by mouth daily      Multiple Vitamins-Minerals (MULTIVITAMIN WITH MINERALS) tablet Take 1 tablet by mouth daily      ascorbic acid (VITAMIN C) 500 mg tablet Take 500 mg by mouth daily (Patient not taking: No sig reported)      ondansetron (Zofran ODT) 8 mg disintegrating tablet Take 1 tablet (8 mg total) by mouth every 8 (eight) hours as needed for nausea or vomiting Through J tube not by mouth   (Patient not taking: No sig reported) 20 tablet 0    pantoprazole (PROTONIX) 40 mg tablet Take 1 tablet (40 mg total) by mouth 2 (two) times a day (Patient not taking: No sig reported)  0     No current facility-administered medications for this visit        (Not in a hospital admission)      DATA REVIEW:    Pathology Result:    Final Diagnosis   Date Value Ref Range Status   06/22/2022   Final    A  Omentum, OMENTAL NODULE, intraoperative biopsy:  - Metastatic adenocarcinoma  See Note  Comment: This is an appended report  These results have been appended to a previously preliminary verified report  01/13/2022   Final    A  Stomach, cardia:  - Gastric mucosa with reactive changes   - Separate fragment of necrotic debris with bacterial and fungal aggregates  - No definite malignancy identified  B  Esophagus, distal:  - 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  - MMR and Her2 studies are pending with results to follow in an addendum  Image Results: They are reviewed and documented in Hematology/Oncology history    CT abdomen pelvis wo contrast  Narrative: CT ABDOMEN AND PELVIS WITHOUT IV CONTRAST    INDICATION:   r/o collection  Status post recent laparotomy dated 6/22/2022 status post jejunostomy tube placement    COMPARISON:  PET scan dated 5/16/2022 and CT dated 1/24/2022    TECHNIQUE:  CT examination of the abdomen and pelvis was performed without intravenous contrast  This examination was performed without intravenous contrast in the context of the critical nationwide Omnipaque shortage  Axial, sagittal, and coronal 2D   reformatted images were created from the source data and submitted for interpretation  Radiation dose length product (DLP) for this visit:  410 97 mGy-cm   This examination, like all CT scans performed in the [facility], was performed utilizing techniques to minimize radiation dose exposure, including the use of iterative   reconstruction and automated exposure control  Enteric contrast was administered       FINDINGS:    ABDOMEN  Limited examination due to artifact from the oral contrast and due to the absence of IV contrast    LOWER CHEST:  New developing mild amount of partially imaged left-sided pleural effusion is visualized associated with adjacent left lower lobe dependent atelectatic changes and also associated with small patchy areas of confluent airspace groundglass   opacities and consolidations seen within the left lower lobe and with smaller degree of tree in bud patterns with centrilobular airspace opacities seen within the right lower lobe and the right upper lobe/right middle lobe associated with mild amount of   retained secretions seen within the right lower lobe bronchi suspicious for possible developing aspiration pneumonia  Small amount of new developing right-sided pleural effusion is visualized  Mild amount of partially imaged pneumomediastinum is seen within the anterior aspect of the mediastinum following the pericardium and also seen along the inferior sternum in this context of recent laparotomy     NG tube is seen extending into the proximal stomach associated with esophageal wall thickening identified in this context of esophageal neoplasm possibly associated with esophagitis  LIVER/BILIARY TREE:  Unremarkable  GALLBLADDER:  No calcified gallstones  No pericholecystic inflammatory change  SPLEEN:  Unremarkable  PANCREAS:  Unremarkable  ADRENAL GLANDS:  Unremarkable  KIDNEYS/URETERS:  Lobular contour of the left kidney is visualized including areas of cortical thinning/scarring with no hydronephrosis seen bilaterally including no hyperdense stones within both ureter and the partially distended bladder containing air   bubbles and with minimal bladder wall thickening identified  STOMACH AND BOWEL:  There is no inflammatory changes around the colon with mild amount of fecal material identified  APPENDIX:  A normal appendix was visualized      ABDOMINOPELVIC CAVITY:    Small amount of peritoneal fluid is seen within the abdomen and pelvis associated with mild amount of pneumoperitoneum extending into the abdominal wall in this context of recent laparotomy  This is associated with jejunojejunostomy tube and with interval air and fluid-filled mildly dilated small bowel loops involving large portion of the small bowel loops including the duodenum associated with minimal adjacent mesenteric fat stranding and   with normal size/decompressed appearance of the distal ileum/terminal ileum with the transitional zone located within the right mid lateral peritoneal cavity seen around image 50 series 2  There is no peritoneal fluid collection identified  VESSELS:  Moderate degree of atherosclerosis is seen within the aorta extending into the coronary, abdominal and pelvic branches associated with infrarenal abdominal aortic aneurysm measuring approximately 3 9 x 3 5 cm in transverse and AP diameter    PELVIS    REPRODUCTIVE ORGANS:  Enlargement of the prostate is again visualized    URINARY BLADDER:  Minimal bladder wall thickening associated with intraluminal air bubbles most likely iatrogenic in origin    ABDOMINAL WALL/INGUINAL REGIONS:  Postsurgical changes related to recent laparotomy with cluster of soft tissue air bubbles without fluid collection identified  Mild degree of subcutaneous edematous changes is seen within the bilateral lateral and posterior abdominal wall most likely positional in origin  OSSEOUS STRUCTURES:  No acute fracture or destructive osseous lesion  Degenerative changes is seen within both sacroiliac joints and within both particularly the right hip with less involvement of the pubic symphysis  This is also seen within the spine including spondylolysis of L5 seen bilaterally responsible for grade 2   anterolisthesis  Otherwise there is no definite central canal stenosis with no disc herniation  Multilevel bilateral neural foraminal narrowing is visualized    Impression: Status post recent laparotomy responsible for mild amount of pneumoperitoneum extending into the visualized mediastinum /pneumomediastinum    No peritoneal fluid collection identified  New developing bilateral particularly left-sided pleural effusion associated with multifocal airway and airspace disease suspicious for possible developing bilateral predominantly left lower lobe aspiration pneumonia  Air and fluid filled dilated small bowel loops as described above possibly related to postsurgical ileus which should be correlated with follow-up x-rays to confirm interval improvement/resolution  NG tube and jejunojejunostomy tube in correct location    The study was marked in EPIC for immediate notification  Workstation performed: [id number]        LABS:  Lab data are reviewed and documented in HemOnc history  No results found for this or any previous visit (from the past 48 hour(s))            Huan Ferrari PA-C  8/1/2022, 12:52 PM

## 2022-08-01 ENCOUNTER — OFFICE VISIT (OUTPATIENT)
Dept: HEMATOLOGY ONCOLOGY | Facility: CLINIC | Age: 77
End: 2022-08-01
Payer: COMMERCIAL

## 2022-08-01 VITALS
SYSTOLIC BLOOD PRESSURE: 106 MMHG | HEIGHT: 70 IN | WEIGHT: 129 LBS | RESPIRATION RATE: 16 BRPM | DIASTOLIC BLOOD PRESSURE: 70 MMHG | OXYGEN SATURATION: 98 % | HEART RATE: 83 BPM | BODY MASS INDEX: 18.47 KG/M2 | TEMPERATURE: 97.9 F

## 2022-08-01 DIAGNOSIS — C15.5 MALIGNANT NEOPLASM OF LOWER THIRD OF ESOPHAGUS (HCC): Primary | ICD-10-CM

## 2022-08-01 DIAGNOSIS — T45.1X5A ANEMIA DUE TO ANTINEOPLASTIC CHEMOTHERAPY: ICD-10-CM

## 2022-08-01 DIAGNOSIS — D64.81 ANEMIA DUE TO ANTINEOPLASTIC CHEMOTHERAPY: ICD-10-CM

## 2022-08-01 PROCEDURE — 99215 OFFICE O/P EST HI 40 MIN: CPT | Performed by: INTERNAL MEDICINE

## 2022-08-08 ENCOUNTER — HOSPITAL ENCOUNTER (OUTPATIENT)
Dept: CT IMAGING | Facility: HOSPITAL | Age: 77
Discharge: HOME | End: 2022-08-08
Payer: COMMERCIAL

## 2022-08-08 DIAGNOSIS — C15.5 MALIGNANT NEOPLASM OF LOWER THIRD OF ESOPHAGUS (HCC): ICD-10-CM

## 2022-08-08 PROCEDURE — 71260 CT THORAX DX C+: CPT

## 2022-08-08 PROCEDURE — 74177 CT ABD & PELVIS W/CONTRAST: CPT

## 2022-08-08 RX ADMIN — IOHEXOL 65 ML: 350 INJECTION, SOLUTION INTRAVENOUS at 09:11

## 2022-08-12 ENCOUNTER — NUTRITION (OUTPATIENT)
Dept: NUTRITION | Facility: CLINIC | Age: 77
End: 2022-08-12

## 2022-08-12 DIAGNOSIS — Z71.3 NUTRITIONAL COUNSELING: Primary | ICD-10-CM

## 2022-08-12 NOTE — PATIENT INSTRUCTIONS
Nutrition Rx & Recommendations:  Diet: High Calorie, High Protein (for high calorie foods see pages 52-53, and for high protein foods see pages 49-51 in your Eating Hints book)  Incorporate physical activity as able/allowed  Follow proper oral care; Try baking soda/salt water rinse recipe (mix 3/4 tsp salt + 1 tsp baking soda + 1 qt water; rinse with plain water after using) in Eating Hints book (pg 18)  Brush your teeth before/after meals & before bed  Weigh yourself regularly  If you notice weight loss, make an effort to increase your daily food/calorie intake  If you continue to notice loss after these efforts, reach out to your dietitian to establish a plan to stabilize weight  Your syringes are each 60 mL or 2 fl oz  Always flush your feeding tube with 60 mL room-temp water 1-2 times daily while feeding tube is not in use  Ways to increase calorie and protein intake:   Eat when feeling most hungry  Choose foods that are easy to eat: yogurt, oatmeal, eggs, soup, cereal   Keep non perishable snacks nearby  5-6 small meals/snacks daily  Protein at all meals/snacks: eggs, chicken, fish, beans, nuts/nut butters   Add high calorie foods to meals: cheese, milk, olive oil, avocado, butter, peanut butter   Choose liquids with calories: whole milk, [place] milk (higher protein/lactose-free milk), chocolate milk, 100% fruit juice, diluted juice, bone broth (higher protein broth), creamy soups, sports drinks (Gatorade, Poweraide, Pedialyte, etc ), [place] ice, popsicles, milkshakes, smoothies, oral nutrition supplements (Ensure, Boost, Orgain etc ), gelatin/Jello, etc   Use oral nutrition supplements to make homemade smoothies or milkshakes  Choose oral nutrition supplements with >300 calories per serving        Follow Up Plan: 9/2/22 phone follow up    Recommend Referral to Other Providers: none at this time

## 2022-08-16 ENCOUNTER — TELEPHONE (OUTPATIENT)
Dept: HEMATOLOGY ONCOLOGY | Facility: CLINIC | Age: 77
End: 2022-08-16

## 2022-08-16 NOTE — TELEPHONE ENCOUNTER
Call out to patient due to Caris testing results are not in  Reached out to Meaghan Salmon from [facility] and confirmed  Patient appointment rescheduled to 8/24/22 per Aston Monte PA-C review  Patient appreciative of call and agreeable to plan  [facility] was notified and updated

## 2022-08-23 NOTE — PROGRESS NOTES
Hematology/Oncology Progress Note    Date of Service: 8/24/2022    7 [facility]   [address] [place] [id number]    Hem/Onc Problem List:   GE junction adenocarcinoma, cT3 cN1, M0, G3    Chief Complaint:    Management of GE junction adenocarcinoma    Assessment/Plan:   1  GE junction adenocarcinoma, cT3 cN1, cM0, grade 3, stage III  "EGD with biopsy consistent with 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   MMR proteins are normally expressed   HER2 negative by IHC   Dr Rohan Beal consulted patient and recommended concurrent chemoradiation therapy  Poor performance status initially ECOG 3/5   Patient has a J-tube placed for nutrition support  Neoadjuvant concurrent radiation therapy with weekly Carbo/Taxol started on March 15, 2022  Last chemotherapy was on May 3, 2022  PET-CT scan restaging on May 16, 2022 showed interval decrease FDG activity in the GE junction "     Patient's Caris testing showed TMB biomarker high showing benefit for monotherapy Keytruda  Patient had a CT scan showing progression due to being off of treatment for about 3 months now  He has no dysphagia or concerning symptoms at this time as listed in interval history  Patient did have a stable endobronchial lesion which it was recommended to have a bronchoscopy  He defers this for now since he is moving [place]  He will discuss this with his next oncologist     I provided a thorough Education regarding [facility] today  Patient would receive Keytruda 400 mg every 6 weeks  Patient understands this is an immunotherapy systemic treatment    Possible side effects include but are not limited to the following:  Diarrhea from immunotherapy induced colitis, rash or other skin changes, hormone changes such as hypothyroidism, type 1 diabetes, adrenal insufficiency, pneumonitis, cardiac conditions, etc   I provided patient oncolink printout  Patient signed consent and would like to move forward with treatment  He will have new lab work prior to this upcoming treatment  He will follow-up with us 1 week after his 1st treatment  He is already establishing care with an oncologist in [place]  We will make sure to provide him all of his records for that next visit  2  Normocytic normochromic anemia, multifactorial  Patient denies bleeding anywhere  Most recent hemoglobin was 8 5,   Will follow on next labs upcoming  · Discussion of decision making    I personally reviewed the following lab results, the image studies, pathology, other specialty/physicians consult notes and recommendations, and outside medical records from [facility]  I had a lengthy discussion with the patient and shared the work-up findings  I spent 40 minutes reviewing the records (labs, clinician notes, outside records, medical history, ordering medicine/tests/procedures, interpreting the imaging/labs previously done) and coordination of care as well as direct time with the patient today, of which greater than 50% of the time was spent in counseling and coordination of care with the patient/family  · Plan/Labs  · Patient has metastatic esophageal adenocarcinoma, HER2 negative  Initially was found to be stage III, cT3, cN1, cM0     · Recently, had esophagectomy aborted when omental metastasis were found  Biopsy was positive for esophageal adenocarcinoma  Patient's recent CT showed progression with diffuse distal esophagitis/gastroesophageal wall thickening consistent with esophageal carcinoma  Interval progression of paraesophageal/mediastinal adenopathy was seen as well as para-aortic and upper abdominal lymph nodes  · He also had a right lower lobe stable polypoid endobronchial lesion  Bronchoscopy was recommended as this was previously FDG uptake positive  · Patient ECOG today is 1   Previously was 3      · I discussed this patient's treatment plan with my attending Dr Loretta Rivera  Patient's Caris testing revealed he would benefit from monotherapy with immunotherapy of Keytruda 400 mg once every 6 weeks  Patient will have lab work a few days prior to start of treatment  Patient was provided thorough Education regarding immunotherapy  He signed consent  · He would like to hold off on bronchoscopy and discuss this with his next oncologist   · Patient is going to move to [place] mid September  He is already working on obtaining an oncologist there  He will  the rest of his treatments in [place]  He will make sure to have all his records prior to transition there  · We will see patient 1 more time prior to him moving to [place]  Follow Up:  1 week after 1st treatment  All questions were answered to the patient's satisfaction during this encounter  The patient knows the contact information for our office and knows to reach out for any relevant concerns related to this encounter  They are to call for any temperature 100 4 or higher, new symptoms including but not restricted to shaking chills, decreased appetite, nausea, vomiting, diarrhea, increased fatigue, shortness of breath or chest pain, confusion, and not feeling the strength to come to the clinic  For all other listed problems and medical diagnosis in their chart - they are managed by PCP and/or other specialists, which the patient acknowledges  Thank you very much for your consultation and making us a part of this patient's care  We are continuing to follow closely with you  Please do not hesitate to reach out to me with any additional questions or concerns         AJCC 8th Edition Cancer Stage :      Cancer Staging  Malignant neoplasm of lower third of esophagus (HCC)  Staging form: Esophagus - Adenocarcinoma, AJCC 8th Edition  - Clinical stage from 2/14/2022: Stage III (cT3, cN1, cM0, G3) - Unsigned  Histologic grading system: 3 grade system      Hematology/Oncology History:   · January 6, 2022 patient had a follow-up with primary care physician complaining of 6 weeks of dysphagia  This started with solid food then progressed into liquid diet  It was associated with nausea  Patient had a 30 lb weight loss within 1 year  Patient is an active smoker  · January 10, 2022 patient consulted GI   EGD on January 13, 2022 showed: FINDINGS:  · Fungating and malignant-appearing mass (traversable) measuring 70 mm in the lower third of the esophagus (34 cm from the incisors), covering the whole circumference,; bleeding occurred before and after intervention; performed cold forceps biopsy  · 3 fungating and multilobular masses (traversable) in the cardia, covering one third of the circumference,; bleeding occurred after intervention; performed partial removal by cold forceps biopsy  · The fundus of the stomach, body of the stomach, incisura, antrum, prepyloric region and pylorus appeared normal   · The duodenal bulb and 2nd part of the duodenum appeared normal   IMPRESSION:  2  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 with additional nodular lesions in the cardia of the stomach, biopsies pending, most likely adenocarcinoma   · January 13, 2010 to pathology from the EGD shows:  Final Diagnosis   A  Stomach, cardia:  - Gastric mucosa with reactive changes   - Separate fragment of necrotic debris with bacterial and fungal aggregates  - No definite malignancy identified       B  Esophagus, distal:  - 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  - MMR and Her2 studies are pending with results to follow in an addendum      C   MMR are normally expressed , MSI-low  D  HER2 negative by IHC       · January 24, 2022 CT scan chest abdomen pelvis with contrast:  IMPRESSION:     New mid to distal esophageal wall thickening with large eccentric mass in the posterior distal esophagus extending into the GE junction and beyond the esophageal wall with pathologic lymph nodes anteriorly with central necrosis of esophageal malignancy      This correlates with the recent EGD finding of 1/13/2022      Right lower lobe endobronchial mass possibly mucous plugging with extension into the bronchial branches is chronic and was seen on prior CT of 10/10/2019 with no obstruction   This can be evaluated when PET scan is performed as indicated on EGD report  · February 1, 2022 PET-CT scan:  IMPRESSION:     1   Hypermetabolic distal esophageal carcinoma which extends into the proximal stomach with paraesophageal/perigastric/upper abdominal andry metastatic disease      2   Concentric FDG activity involving the cecum/proximal ascending colon of uncertain clinical significance   Given the localized nature of FDG activity, consider correlation with screening colonoscopy to exclude underlying colonic malignancy      3   Indeterminant non-FDG avid avid filling defect within right lower lobe bronchus with associated more distal tree-in-bud infiltrate   Findings could reflect mucous plugging with obstructive malignant process of low metabolic activity not excluded      4   3 8 cm infrarenal abdominal aortic aneurysm  · February 3, 2022 Dr Jose Lux consulted patient recommendation of concurrent chemo radiation therapy in the neoadjuvant setting  · February 6, 2022, EGD with J-tube placement  Dr Latricia Avendaño,   · February 7, 2022 duplex of lower extremity shows high-grade stenosis versus occlusion of the proximal distal superficial femoral artery reconstitution at the proximal popliteal artery  Stenosis in left lower extremity as well  · March 15, 2022 patient started concurrent chemoradiation therapy with weekly carbo/Taxol  · Last radiation by the end of April 2022  Last chemotherapy was postponed for 1 week because of neutropenia  · Last radiation on May 3, 2022     · May 16, 2022 PET-CT scan:  IMPRESSION:  1   Interval decreased FDG activity in the distal esophagus and proximal stomach, suggesting at least partial response to therapy   Residual viable tumor may still be present   Continued PET CT follow-up recommended  2   Previously visualized distal paraesophageal and gastrohepatic hypermetabolic densities appear diminished   There are however new mildly hypermetabolic left paraesophageal and AP window lesions as above, which may represent new metastases  3   Right lower endobronchial soft tissue lesion is persistent from 2019, but demonstrates mild FDG activity   Clinical correlation recommended for suspected endobronchial polypoid mass  · Domitila 3, 2022 Dr Daniele Quinones discussed with patient about surgical option  · June 22, 2022, Patient presented for esophagectomy  He is s/p ex lap, omental biopsy, tap blocks, and intraoperative EGD  Esophagectomy procedure was aborted when omental metastasis/nodules were found  Biopsies during procedure were positive for metastatic adenocarcinoma  · Caris testing showed PDL1 positive showing benefit for Opdivo in chemotherapy  TMB was high showing benefit for monotherapy Keytruda  · August 2022, CT c/a/p:  · IMPRESSION:  · 1  Diffuse distal esophagus/gastroesophageal wall thickening consistent with previously characterized esophageal carcinoma  Interval progression of paraesophageal/mediastinal adenopathy with a large subcarinal node measuring 3 1 x 2 1 cm  Increase in size of para-aortic and upper abdominal lymph nodes  Findings are worrisome for progressive metastatic disease  · 2  Right lower lobe stable polypoid endobronchial lesion  Recommend bronchoscopy correlation to exclude metastatic lesion if this remains clinically relevant  Increased FDG uptake was noted on prior PET/CT  History of Present Illiness:   Isidoro Fairbanks is a 68 y o  male with the above-noted HemOnc history who is here to follow up regarding hx of esophageal adenocarcinoma  Patient was previously seen by Dr Tammy Goodman   Hx per his last note as below:    "Patient diagnosed GE junction adenocarcinoma in January 10, 2022  Biopsy consistent with moderate to poorly differentiated adenocarcinoma with focal signet ring features  MMR normally expressed  HER2 negative by IHC      Staging workup in January 24, 2022 showed new mid to distal esophageal wall thickening with large eccentric mass in the posterior distal esophagus extending into the GE junction and beyond the esophageal wall with pathologic lymph nodes anteriorly with central necrosis of esophageal malignancy      PET-CT scan in February 1, 2022 revealed hypermetabolic distal esophageal carcinoma which extends into the proximal stomach with periesophageal/perigastric/upper abdominal andry metastatic disease  Beatriz Mcnally is a 3 8 cm infrarenal abdominal aortic aneurysm      Dr Debby Stewart consulted patient February 3, 2022 recommendation of concurrent chemoradiation therapy in the neoadjuvant setting   J-tube placement was done by Dr Edel Villanueva nutrition support on February 6, 2022      Neoadjuvant concurrent chemoradiation therapy with weekly carbo Taxol started on March 15, 2022  Patient has had 7 cycles of treatment and did fairly well  Cycle 7 was postponed for 1 week because of neutropenia with platelet count 85  Last chemo was given on May 3, 2022  PET-CT scan in May 16, 2022 showed interval decreased FDG activity in the GE junction      Lab showed pancytopenia due to chemoradiation therapy  Hemoglobin stable  ANC normal   Platelet count [id number] "       6/22, patient presented for esophagectomy as planned by Dr Debby Stewart  He is status post ex lap, omental biopsy, tap blocks, intraoperative EGD  This off a JAK2 me procedure was aborted due to omental metastasis/nodule seen  Biopsy returned positive for esophageal a dental carcinoma  He presents to our office today to review next steps of treatment  Interval history:  Patient's Caris testing showed benefit from monotherapy Keytruda    Patient had a CT chest, abdomen, pelvis showing progression of paraesophageal/mediastinal adenopathy with a large subcarinal node measuring 3 1 x 2 1 cm  Increase in size of para-aortic and upper abdominal lymph nodes were seen 2 worrisome for progressive disease  Also had a right lower lobe stable endobronchial lesion  It was recommended bronchoscopy to correlate to exclude metastatic lesion if this remains clinically relevant  Increased FDG uptake was seen on a prior PET scan regarding this  Patient is still planning to move to [place] mid September to be closer to family  He denies any constitutional symptoms such as fever, chills, night sweats, large lymphadenopathy, sudden weight loss  He has no dysphagia  He has a good appetite  No bleeding seen anywhere  No diarrhea or changes in bowel movements  Patient feels fine  No fever or chills  No exertional chest pain, diaphoresis or shortness  of breath  No cough and phlegm, no hemoptysis  Patient denied nausea  and vomiting  No abdominal pain  No diarrhea or constipation  No  symptoms  No headache or blurred vision  No seizure activity  Appetite good  No significant weight loss or weight gain  The patient denies bleeding anywhere  ROS: A 12-point of review of systems is obtained and other than the above is noncontributory  Objective:   VITALS:   /72   Pulse 62   Temp 98 1 °F (36 7 °C)   Resp 16   Ht 5' 10" (1 778 m)   Wt 59 kg (130 lb)   SpO2 100%   BMI 18 65 kg/m²     Physical EXAM:  General:  Alert, cooperative, no distress, appears stated age  + ambulating with cane    Head:  Normocephalic, without obvious abnormality, atraumatic  Eyes:  Conjunctivae/corneas clear  PERRL, EOMs intact  No evidence of conjunctivitis     Throat: Lips, mucosa, and tongue normal   No bleeding from mouth  Neck: Supple, symmetrical, trachea midline    Lungs:   Clear to auscultation bilaterally   Respiratory effort easy, nonlabored    Heart:  Regular rate and rhythm, S1, S2 normal, no murmur  Abdomen:   Soft, non-tender,nondistended  Bowel sounds normal  No masses,  No organomegaly  Extremities:  Lymphatics: Extremities normal, atraumatic, no cyanosis or edema  No cervical, axillary or inguinal adenopathy   Skin: Skin color, texture, turgor normal  No rashes  Neurologic: A&Ox4  No focal neuro deficits       No Known Allergies    Past Medical History:   Diagnosis Date    Cancer ([facility] [address] )     esophageal ca    Current smoker     since 15years old    History of blood transfusion     Pneumonia     Polio     Rib fractures        Past Surgical History:   Procedure Laterality Date    EGD      ESOPHAGOGASTRODUODENOSCOPY N/A 6/22/2022    Procedure: ESOPHAGOGASTRODUODENOSCOPY (EGD); Surgeon: Adeline Ballard MD;  [facility];  Service: Surgical Oncology    FL GUIDED CENTRAL VENOUS ACCESS DEVICE INSERTION  02/16/2022    JEJUNOSTOMY FEEDING TUBE      LAPAROTOMY N/A 6/22/2022    Procedure: LAPAROTOMY EXPLORATORY; OMENTAL BIOPSY, TAP BLOCK;  Surgeon: Adeline Ballard MD;  [facility];  Service: Surgical Oncology    TUNNELED VENOUS PORT PLACEMENT Left 02/16/2022    Procedure: INSERTION VENOUS PORT (PORT-A-CATH);   Surgeon: Adeline Ballard MD;  [facility];  Service: Surgical Oncology       Family History   Problem Relation Age of Onset    Dementia Mother     Heart disease Father        Social History     Socioeconomic History    Marital status: /Civil Union     Spouse name: Not on file    Number of children: Not on file    Years of education: Not on file    Highest education level: Not on file   Occupational History    Not on file   Tobacco Use    Smoking status: Heavy Tobacco Smoker     Packs/day: 1 00     Years: 40 00     Pack years: 40 00     Types: Cigarettes    Smokeless tobacco: Never Used    Tobacco comment: last smoked cigarettes 3 days ago   Vaping Use    Vaping Use: Never used   Substance and Sexual Activity    Alcohol use: Never     Alcohol/week: 0 0 standard drinks     Comment: 0    Drug use: Never    Sexual activity: Not Currently   Other Topics Concern    Not on file   Social History Narrative    Not on file     Social Determinants of Health     Financial Resource Strain: Not on file   Food Insecurity: No Food Insecurity    Worried About Running Out of Food in the Last Year: Never true    Jeanmarie of Food in the Last Year: Never true   Transportation Needs: No Transportation Needs    Lack of Transportation (Medical): No    Lack of Transportation (Non-Medical): No   Physical Activity: Not on file   Stress: Not on file   Social Connections: Not on file   Intimate Partner Violence: Not on file   Housing Stability: Low Risk     Unable to Pay for Housing in the Last Year: No    Number of Places Lived in the Last Year: 1    Unstable Housing in the Last Year: No       Current Outpatient Medications   Medication Sig Dispense Refill    acetaminophen (TYLENOL) 325 mg tablet Take 325 mg by mouth every 6 (six) hours as needed for mild pain      ascorbic acid (VITAMIN C) 500 mg tablet Take 500 mg by mouth daily      Cholecalciferol (VITAMIN D3 PO) Take by mouth      cyanocobalamin (VITAMIN B-12) 500 MCG tablet Take 500 mcg by mouth daily      Multiple Vitamins-Minerals (MULTIVITAMIN WITH MINERALS) tablet Take 1 tablet by mouth daily      ondansetron (Zofran ODT) 8 mg disintegrating tablet Take 1 tablet (8 mg total) by mouth every 8 (eight) hours as needed for nausea or vomiting Through J tube not by mouth  20 tablet 0    pantoprazole (PROTONIX) 40 mg tablet Take 1 tablet (40 mg total) by mouth 2 (two) times a day  0     No current facility-administered medications for this visit  (Not in a hospital admission)      DATA REVIEW:    Pathology Result:    Final Diagnosis   Date Value Ref Range Status   06/22/2022   Final    A  Omentum, OMENTAL NODULE, intraoperative biopsy:  - Metastatic adenocarcinoma  See Note  Comment: This is an appended report  These results have been appended to a previously preliminary verified report  01/13/2022   Final    A  Stomach, cardia:  - Gastric mucosa with reactive changes   - Separate fragment of necrotic debris with bacterial and fungal aggregates  - No definite malignancy identified  B  Esophagus, distal:  - 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  - MMR and Her2 studies are pending with results to follow in an addendum  Image Results: They are reviewed and documented in Hematology/Oncology history    CT chest abdomen pelvis w contrast  Narrative: CT CHEST, ABDOMEN AND PELVIS WITH IV CONTRAST    INDICATION:   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  COMPARISON:  6/20/2022 and 1/24/2022  Comparison is also made to head CT 5/16/2022    TECHNIQUE: CT examination of the chest, abdomen and pelvis was performed  Axial, sagittal, and coronal 2D reformatted images were created from the source data and submitted for interpretation  Radiation dose length product (DLP) for this visit:  608 mGy-cm   This examination, like all CT scans performed in the [facility], was performed utilizing techniques to minimize radiation dose exposure, including the use of iterative   reconstruction and automated exposure control  IV Contrast:  65 mL of iohexol (OMNIPAQUE)  Enteric Contrast: Enteric contrast was administered  FINDINGS:    CHEST    LUNGS:  Right lower lobe nodular endobronchial lesion on 601/92 measuring up to 8mm  Correlate with bronchoscopy  This was also noted on recent PET/CT  PLEURA:  Unremarkable  HEART/GREAT VESSELS: Heart is unremarkable for patient's age  No thoracic aortic aneurysm  MEDIASTINUM AND BRANDEE:  Diffuse thickening of the distal esophagus extending to the gastroesophageal junction consistent with previously characterized esophageal carcinoma   There is extensive mediastinal/paraesophageal adenopathy with a large subcarinal   node seen on series 2 image 27 measuring up to 3 1 x 2 1 cm  CHEST WALL AND LOWER NECK:  Unremarkable  ABDOMEN    LIVER/BILIARY TREE:  Enlarged fatty liver  GALLBLADDER:  No calcified gallstones  No pericholecystic inflammatory change  SPLEEN:  Unremarkable  PANCREAS:  Unremarkable  ADRENAL GLANDS:  Unremarkable  KIDNEYS/URETERS:  Mildly atrophic left kidney similar to prior study  STOMACH AND BOWEL:  Mild fecal stasis  Stable position of jejunostomy  APPENDIX:  No findings to suggest appendicitis  ABDOMINOPELVIC CAVITY:  Mildly prominent portacaval lymph node on 2/58, slightly increased in size from prior study  Mild interval increase in size of gastrohepatic nodules  VESSELS:  Left paraesophageal lymph node identified measuring up to 1 4 x 1 6 cm, previously 1 2 x 1 3 cm  Fusiform infrarenal AAA with atherosclerotic disease noted  The aorta measures up to 3 3 cm in AP dimension with termination at of the aneurysm at the bifurcation  PELVIS    REPRODUCTIVE ORGANS:  Prominent prostatomegaly  URINARY BLADDER:  Bladder distention noted  ABDOMINAL WALL/INGUINAL REGIONS:  Unremarkable  OSSEOUS STRUCTURES:  Chronic bilateral L5 pars defects with grade 1/2 anterolisthesis noted  Impression: 1  Diffuse distal esophagus/gastroesophageal wall thickening consistent with previously characterized esophageal carcinoma  Interval progression of paraesophageal/mediastinal adenopathy with a large subcarinal node measuring 3 1 x 2 1 cm  Increase in   size of para-aortic and upper abdominal lymph nodes  Findings are worrisome for progressive metastatic disease  2  Right lower lobe stable polypoid endobronchial lesion  Recommend bronchoscopy correlation to exclude metastatic lesion if this remains clinically relevant  Increased FDG uptake was noted on prior PET/CT      The study was marked in EPIC for significant notification  Workstation performed: [id number]        LABS:  Lab data are reviewed and documented in HemOnc history  No results found for this or any previous visit (from the past 48 hour(s))            Johnna Chahal PA-C  8/24/2022, 1:43 PM

## 2022-08-24 ENCOUNTER — OFFICE VISIT (OUTPATIENT)
Dept: HEMATOLOGY ONCOLOGY | Facility: CLINIC | Age: 77
End: 2022-08-24
Payer: COMMERCIAL

## 2022-08-24 ENCOUNTER — TELEPHONE (OUTPATIENT)
Dept: HEMATOLOGY ONCOLOGY | Facility: CLINIC | Age: 77
End: 2022-08-24

## 2022-08-24 VITALS
HEART RATE: 62 BPM | RESPIRATION RATE: 16 BRPM | HEIGHT: 70 IN | BODY MASS INDEX: 18.61 KG/M2 | TEMPERATURE: 98.1 F | WEIGHT: 130 LBS | SYSTOLIC BLOOD PRESSURE: 138 MMHG | DIASTOLIC BLOOD PRESSURE: 72 MMHG | OXYGEN SATURATION: 100 %

## 2022-08-24 DIAGNOSIS — C15.5 MALIGNANT NEOPLASM OF LOWER THIRD OF ESOPHAGUS (HCC): Primary | ICD-10-CM

## 2022-08-24 PROCEDURE — 99215 OFFICE O/P EST HI 40 MIN: CPT | Performed by: INTERNAL MEDICINE

## 2022-08-24 PROCEDURE — 1160F RVW MEDS BY RX/DR IN RCRD: CPT | Performed by: INTERNAL MEDICINE

## 2022-08-24 RX ORDER — SODIUM CHLORIDE 9 MG/ML
20 INJECTION, SOLUTION INTRAVENOUS ONCE
Status: CANCELLED | OUTPATIENT
Start: 2022-08-31

## 2022-08-24 NOTE — TELEPHONE ENCOUNTER
labs a few days prior to start of treatment  Will need infusion appt for lab work to take out of port     - schedule treatment   - F/U 1 week after treatment   - [facility] transport for everything

## 2022-08-24 NOTE — TELEPHONE ENCOUNTER
Please schedule 1 cycle of treatment for patient  Please also schedule labs  Patient is moving out of state mid September and will not need any additional txs scheduled  Thank you!

## 2022-08-29 ENCOUNTER — HOSPITAL ENCOUNTER (OUTPATIENT)
Dept: INFUSION CENTER | Facility: CLINIC | Age: 77
Discharge: HOME | End: 2022-08-29
Payer: COMMERCIAL

## 2022-08-29 DIAGNOSIS — C15.5 MALIGNANT NEOPLASM OF LOWER THIRD OF ESOPHAGUS (HCC): Primary | ICD-10-CM

## 2022-08-29 LAB
ALBUMIN SERPL BCP-MCNC: 3.8 G/DL (ref 3.5–5)
ALP SERPL-CCNC: 79 U/L (ref 46–116)
ALT SERPL W P-5'-P-CCNC: 11 U/L (ref 12–78)
ANION GAP SERPL CALCULATED.3IONS-SCNC: 10 MMOL/L (ref 4–13)
AST SERPL W P-5'-P-CCNC: 11 U/L (ref 5–45)
BASOPHILS # BLD AUTO: 0.03 THOUSANDS/ΜL (ref 0–0.1)
BASOPHILS NFR BLD AUTO: 0 % (ref 0–1)
BILIRUB SERPL-MCNC: 0.29 MG/DL (ref 0.2–1)
BUN SERPL-MCNC: 12 MG/DL (ref 5–25)
CALCIUM SERPL-MCNC: 9.3 MG/DL (ref 8.3–10.1)
CHLORIDE SERPL-SCNC: 102 MMOL/L (ref 96–108)
CO2 SERPL-SCNC: 27 MMOL/L (ref 21–32)
CREAT SERPL-MCNC: 0.63 MG/DL (ref 0.6–1.3)
EOSINOPHIL # BLD AUTO: 0.13 THOUSAND/ΜL (ref 0–0.61)
EOSINOPHIL NFR BLD AUTO: 1 % (ref 0–6)
ERYTHROCYTE [DISTWIDTH] IN BLOOD BY AUTOMATED COUNT: 13.9 % (ref 11.6–15.1)
GFR SERPL CREATININE-BSD FRML MDRD: 95 ML/MIN/1.73SQ M
GLUCOSE SERPL-MCNC: 73 MG/DL (ref 65–140)
HCT VFR BLD AUTO: 32.8 % (ref 36.5–49.3)
HGB BLD-MCNC: 10.7 G/DL (ref 12–17)
IMM GRANULOCYTES # BLD AUTO: 0.08 THOUSAND/UL (ref 0–0.2)
IMM GRANULOCYTES NFR BLD AUTO: 1 % (ref 0–2)
LYMPHOCYTES # BLD AUTO: 1.05 THOUSANDS/ΜL (ref 0.6–4.47)
LYMPHOCYTES NFR BLD AUTO: 11 % (ref 14–44)
MCH RBC QN AUTO: 31.8 PG (ref 26.8–34.3)
MCHC RBC AUTO-ENTMCNC: 32.6 G/DL (ref 31.4–37.4)
MCV RBC AUTO: 98 FL (ref 82–98)
MONOCYTES # BLD AUTO: 1.51 THOUSAND/ΜL (ref 0.17–1.22)
MONOCYTES NFR BLD AUTO: 16 % (ref 4–12)
NEUTROPHILS # BLD AUTO: 6.4 THOUSANDS/ΜL (ref 1.85–7.62)
NEUTS SEG NFR BLD AUTO: 71 % (ref 43–75)
NRBC BLD AUTO-RTO: 0 /100 WBCS
PLATELET # BLD AUTO: 195 THOUSANDS/UL (ref 149–390)
PMV BLD AUTO: 11.2 FL (ref 8.9–12.7)
POTASSIUM SERPL-SCNC: 4 MMOL/L (ref 3.5–5.3)
PROT SERPL-MCNC: 7.5 G/DL (ref 6.4–8.4)
RBC # BLD AUTO: 3.36 MILLION/UL (ref 3.88–5.62)
SODIUM SERPL-SCNC: 139 MMOL/L (ref 135–147)
T3FREE SERPL-MCNC: 2.19 PG/ML (ref 2.3–4.2)
TSH SERPL DL<=0.05 MIU/L-ACNC: 2.27 UIU/ML (ref 0.45–4.5)
WBC # BLD AUTO: 9.2 THOUSAND/UL (ref 4.31–10.16)

## 2022-08-29 PROCEDURE — 85025 COMPLETE CBC W/AUTO DIFF WBC: CPT | Performed by: INTERNAL MEDICINE

## 2022-08-29 PROCEDURE — 80053 COMPREHEN METABOLIC PANEL: CPT | Performed by: INTERNAL MEDICINE

## 2022-08-29 PROCEDURE — 84443 ASSAY THYROID STIM HORMONE: CPT | Performed by: INTERNAL MEDICINE

## 2022-08-29 PROCEDURE — 84481 FREE ASSAY (FT-3): CPT | Performed by: INTERNAL MEDICINE

## 2022-08-31 ENCOUNTER — HOSPITAL ENCOUNTER (OUTPATIENT)
Dept: INFUSION CENTER | Facility: CLINIC | Age: 77
Discharge: HOME | End: 2022-08-31
Payer: COMMERCIAL

## 2022-08-31 VITALS
BODY MASS INDEX: 18.65 KG/M2 | RESPIRATION RATE: 16 BRPM | HEART RATE: 68 BPM | TEMPERATURE: 97.1 F | DIASTOLIC BLOOD PRESSURE: 70 MMHG | SYSTOLIC BLOOD PRESSURE: 151 MMHG | HEIGHT: 70 IN

## 2022-08-31 DIAGNOSIS — C15.5 MALIGNANT NEOPLASM OF LOWER THIRD OF ESOPHAGUS (HCC): Primary | ICD-10-CM

## 2022-08-31 PROCEDURE — 96413 CHEMO IV INFUSION 1 HR: CPT

## 2022-08-31 RX ORDER — SODIUM CHLORIDE 9 MG/ML
20 INJECTION, SOLUTION INTRAVENOUS ONCE
Status: COMPLETED | OUTPATIENT
Start: 2022-08-31 | End: 2022-08-31

## 2022-08-31 RX ADMIN — SODIUM CHLORIDE 400 MG: 9 INJECTION, SOLUTION INTRAVENOUS at 12:41

## 2022-08-31 RX ADMIN — SODIUM CHLORIDE 20 ML/HR: 0.9 INJECTION, SOLUTION INTRAVENOUS at 12:41

## 2022-08-31 NOTE — PROGRESS NOTES
Pt to clinic for keytruda  Pt offers no complaints today  Tolerated infusions without complications  Pt aware of next appointment  Pt port accessed, flushed, and de-accessed with positive blood returned  AVS was received by pt  Pt ambulated out of clinic safely

## 2022-09-01 ENCOUNTER — OFFICE VISIT (OUTPATIENT)
Dept: SURGICAL ONCOLOGY | Facility: CLINIC | Age: 77
End: 2022-09-01
Payer: COMMERCIAL

## 2022-09-01 VITALS
HEART RATE: 77 BPM | RESPIRATION RATE: 16 BRPM | HEIGHT: 70 IN | DIASTOLIC BLOOD PRESSURE: 58 MMHG | WEIGHT: 129 LBS | OXYGEN SATURATION: 98 % | SYSTOLIC BLOOD PRESSURE: 108 MMHG | BODY MASS INDEX: 18.47 KG/M2 | TEMPERATURE: 97.7 F

## 2022-09-01 DIAGNOSIS — C15.5 MALIGNANT NEOPLASM OF LOWER THIRD OF ESOPHAGUS (HCC): Primary | ICD-10-CM

## 2022-09-01 PROCEDURE — 1160F RVW MEDS BY RX/DR IN RCRD: CPT | Performed by: SURGERY

## 2022-09-01 PROCEDURE — 99212 OFFICE O/P EST SF 10 MIN: CPT | Performed by: SURGERY

## 2022-09-01 NOTE — PROGRESS NOTES
Surgical Oncology Follow Up       [facility]  [facility] [place]  [address] [facility] [address]  [address] [place] [id number] [phone]    Wilberto   1945  [id number]  [facility] [place]  [address] [facility] [address]  [place]  [phone]    Diagnoses and all orders for this visit:    Malignant neoplasm of lower third of esophagus ([facility] [address] )        No chief complaint on file  No follow-ups on file  Oncology History   Malignant neoplasm of lower third of esophagus ([facility] [place] [address] )   1/13/2022 Initial Diagnosis    Malignant neoplasm of lower third of esophagus ([facility] [address] )     1/13/2022 Biopsy    EGD:   Esophagus, distal:  - 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  RESULTS OF IMMUNOHISTOCHEMICAL ANALYSIS FOR MISMATCH REPAIR PROTEIN LOSS     INTERPRETATION: No loss of nuclear expression of MMR proteins: Low probability of MSI-H     Note: Background non-neoplastic tissue and/or internal control with intact nuclear expression        RESULTS:  Antibody          Clone               Description                           Results  MLH1               M1                   Mismatch repair protein       Intact nuclear expression  MSH2              [id number]       Mismatch repair protein       Intact nuclear expression  MSH6              44                     Mismatch repair protein       Intact nuclear expression  PMS2              [id number]           Mismatch repair protein       Intact nuclear expression     3/14/2022 - 4/20/2022 Radiation    Treatments:  Course: C1    Plan ID Energy Fractions Dose per Fraction (cGy) Dose Correction (cGy) Total Dose Delivered (cGy) Elapsed Days   Esophags2_ReV 6X 22 / 22 180 0 3,960 29   Esophagus 6X 3 / 25 180 0 540 2   Esophagus CD 6X 3 / 3 180 0 540 2      Treatment Dates:  3/14/2022 - 4/20/2022       3/15/2022 - 5/3/2022 Chemotherapy    CARBOplatin (PARAPLATIN) IVPB (GOG AUC DOSING), 147 6 mg, Intravenous, Once, 7 of 7 cycles  Administration: 147 6 mg (3/15/2022), 196 mg (3/22/2022), 196 mg (3/29/2022), 196 mg (4/5/2022), 196 mg (4/12/2022), 200 mg (4/26/2022), 200 mg (5/3/2022)  PACLItaxel (TAXOL) chemo IVPB, 50 mg/m2 = 84 6 mg, Intravenous, Once, 7 of 7 cycles  Administration: 84 6 mg (3/15/2022), 85 8 mg (3/22/2022), 85 8 mg (3/29/2022), 85 8 mg (4/5/2022), 85 8 mg (4/12/2022), 87 mg (4/26/2022), 87 mg (5/3/2022)     6/22/2022 Surgery    Omental nodule biopsy:  A  Omentum, OMENTAL NODULE, intraoperative biopsy:  - Metastatic adenocarcinoma    Esophagectomy aborted     8/31/2022 -  Chemotherapy    pembrolizumab (KEYTRUDA) IVPB, 400 mg, Intravenous, Once, 1 of 6 cycles  Administration: 400 mg (8/31/2022)         Staging:  Metastatic esophageal carcinoma  Peritoneal metastasis discovered at the time of surgery  Treatment history:  Chemoradiation  Current treatment:  Chemotherapy  Disease status:         History of Present Illness:  Patient has started his immunotherapy  He is eating well  His weight is essentially stable  He is going to be moving in the next 3 weeks  He wishes to have his feeding tube removed  Review of Systems  Complete ROS Surg Onc:   Complete ROS Surg Onc:   Constitutional: The patient denies new or recent history of general fatigue, no recent weight loss, no change in appetite  Eyes: No complaints of visual problems, no scleral icterus  ENT: no complaints of ear pain, no hoarseness, no difficulty swallowing,  no tinnitus and no new masses in head, oral cavity, or neck  Cardiovascular: No complaints of chest pain, no palpitations, no ankle edema  Respiratory: No complaints of shortness of breath, no cough  Gastrointestinal: No complaints of jaundice, no bloody stools, no pale stools     Genitourinary: No complaints of dysuria, no hematuria, no nocturia, no frequent urination, no urethral discharge  Musculoskeletal: No complaints of weakness, paralysis, joint stiffness or arthralgias  Integumentary: No complaints of rash, no new lesions  Neurological: No complaints of convulsions, no seizures, no dizziness  Hematologic/Lymphatic: No complaints of easy bruising  Endocrine:  No hot or cold intolerance  No polydipsia, polyphagia, or polyuria  Allergy/immunology:  No environmental allergies  No food allergies  Not immunocompromised  Skin:  No pallor or rash  No wound  Patient Active Problem List   Diagnosis    Smoking    Weight loss    Dysphagia    Dermatitis    Malignant neoplasm of lower third of esophagus (HCC)    Mild protein-calorie malnutrition (HCC)    Left ankle pain    Severe protein-calorie malnutrition (HCC)    Chronic pain    Cancer related pain    Palliative care encounter    Anemia due to antineoplastic chemotherapy    Encounter for geriatric assessment     Past Medical History:   Diagnosis Date    Cancer [facility])     esophageal ca    Current smoker     since 15years old    History of blood transfusion     Pneumonia     Polio     Rib fractures      Past Surgical History:   Procedure Laterality Date    EGD      ESOPHAGOGASTRODUODENOSCOPY N/A 6/22/2022    Procedure: ESOPHAGOGASTRODUODENOSCOPY (EGD); Surgeon: Megan Payan MD;  [facility];  Service: Surgical Oncology    FL GUIDED CENTRAL VENOUS ACCESS DEVICE INSERTION  02/16/2022    JEJUNOSTOMY FEEDING TUBE      LAPAROTOMY N/A 6/22/2022    Procedure: LAPAROTOMY EXPLORATORY; OMENTAL BIOPSY, TAP BLOCK;  Surgeon: Megan Payan MD;  [facility];  Service: Surgical Oncology    TUNNELED VENOUS PORT PLACEMENT Left 02/16/2022    Procedure: INSERTION VENOUS PORT (PORT-A-CATH);   Surgeon: Megan Payan MD;  [facility];  Service: Surgical Oncology     Family History   Problem Relation Age of Onset    Dementia Mother     Heart disease Father Social History     Socioeconomic History    Marital status: /Civil Union     Spouse name: Not on file    Number of children: Not on file    Years of education: Not on file    Highest education level: Not on file   Occupational History    Not on file   Tobacco Use    Smoking status: Heavy Tobacco Smoker     Packs/day: 1 00     Years: 40 00     Pack years: 40 00     Types: Cigarettes    Smokeless tobacco: Never Used    Tobacco comment: last smoked cigarettes 3 days ago   Vaping Use    Vaping Use: Never used   Substance and Sexual Activity    Alcohol use: Never     Alcohol/week: 0 0 standard drinks     Comment: 0    Drug use: Never    Sexual activity: Not Currently   Other Topics Concern    Not on file   Social History Narrative    Not on file     Social Determinants of Health     Financial Resource Strain: Not on file   Food Insecurity: No Food Insecurity    Worried About Running Out of Food in the Last Year: Never true    Jeanmarie of Food in the Last Year: Never true   Transportation Needs: No Transportation Needs    Lack of Transportation (Medical): No    Lack of Transportation (Non-Medical):  No   Physical Activity: Not on file   Stress: Not on file   Social Connections: Not on file   Intimate Partner Violence: Not on file   Housing Stability: Low Risk     Unable to Pay for Housing in the Last Year: No    Number of Places Lived in the Last Year: 1    Unstable Housing in the Last Year: No       Current Outpatient Medications:     acetaminophen (TYLENOL) 325 mg tablet, Take 325 mg by mouth every 6 (six) hours as needed for mild pain, Disp: , Rfl:     ascorbic acid (VITAMIN C) 500 mg tablet, Take 500 mg by mouth daily, Disp: , Rfl:     Cholecalciferol (VITAMIN D3 PO), Take by mouth, Disp: , Rfl:     cyanocobalamin (VITAMIN B-12) 500 MCG tablet, Take 500 mcg by mouth daily, Disp: , Rfl:     Multiple Vitamins-Minerals (MULTIVITAMIN WITH MINERALS) tablet, Take 1 tablet by mouth daily, Disp: , Rfl:     ondansetron (Zofran ODT) 8 mg disintegrating tablet, Take 1 tablet (8 mg total) by mouth every 8 (eight) hours as needed for nausea or vomiting Through J tube not by mouth , Disp: 20 tablet, Rfl: 0    pantoprazole (PROTONIX) 40 mg tablet, Take 1 tablet (40 mg total) by mouth 2 (two) times a day, Disp: , Rfl: 0  No current facility-administered medications for this visit  No Known Allergies  Vitals:    09/01/22 0934   BP: 108/58   Pulse: 77   Resp: 16   Temp: 97 7 °F (36 5 °C)   SpO2: 98%       Physical Exam  Constitutional: General appearance: The Patient is well-developed and well-nourished who appears the stated age in no acute distress  Patient is pleasant and talkative  HEENT:  Normocephalic  Sclerae are anicteric  Mucous membranes are moist     Abdomen: Abdomen is soft, non-tender, non-distended and without masses  G-tube was removed  Extremities: There is no clubbing or cyanosis  There is no edema  Symmetric  Neuro: Grossly nonfocal  Gait is normal        Skin: Warm, anicteric  Psych:  Patient is pleasant and talkative  Breasts:        Pathology:  [unfilled]    Labs:      Imaging  CT chest abdomen pelvis w contrast    Result Date: 8/8/2022  Narrative: CT CHEST, ABDOMEN AND PELVIS WITH IV CONTRAST INDICATION:   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  COMPARISON:  6/20/2022 and 1/24/2022  Comparison is also made to head CT 5/16/2022 TECHNIQUE: CT examination of the chest, abdomen and pelvis was performed  Axial, sagittal, and coronal 2D reformatted images were created from the source data and submitted for interpretation  Radiation dose length product (DLP) for this visit:  608 mGy-cm   This examination, like all CT scans performed in the [facility], was performed utilizing techniques to minimize radiation dose exposure, including the use of iterative reconstruction and automated exposure control   IV Contrast:  65 mL of iohexol (OMNIPAQUE) Enteric Contrast: Enteric contrast was administered  FINDINGS: CHEST LUNGS:  Right lower lobe nodular endobronchial lesion on 601/92 measuring up to 8mm  Correlate with bronchoscopy  This was also noted on recent PET/CT  PLEURA:  Unremarkable  HEART/GREAT VESSELS: Heart is unremarkable for patient's age  No thoracic aortic aneurysm  MEDIASTINUM AND BRANDEE:  Diffuse thickening of the distal esophagus extending to the gastroesophageal junction consistent with previously characterized esophageal carcinoma  There is extensive mediastinal/paraesophageal adenopathy with a large subcarinal node seen on series 2 image 27 measuring up to 3 1 x 2 1 cm  CHEST WALL AND LOWER NECK:  Unremarkable  ABDOMEN LIVER/BILIARY TREE:  Enlarged fatty liver  GALLBLADDER:  No calcified gallstones  No pericholecystic inflammatory change  SPLEEN:  Unremarkable  PANCREAS:  Unremarkable  ADRENAL GLANDS:  Unremarkable  KIDNEYS/URETERS:  Mildly atrophic left kidney similar to prior study  STOMACH AND BOWEL:  Mild fecal stasis  Stable position of jejunostomy  APPENDIX:  No findings to suggest appendicitis  ABDOMINOPELVIC CAVITY:  Mildly prominent portacaval lymph node on 2/58, slightly increased in size from prior study  Mild interval increase in size of gastrohepatic nodules  VESSELS:  Left paraesophageal lymph node identified measuring up to 1 4 x 1 6 cm, previously 1 2 x 1 3 cm  Fusiform infrarenal AAA with atherosclerotic disease noted  The aorta measures up to 3 3 cm in AP dimension with termination at of the aneurysm at the bifurcation  PELVIS REPRODUCTIVE ORGANS:  Prominent prostatomegaly  URINARY BLADDER:  Bladder distention noted  ABDOMINAL WALL/INGUINAL REGIONS:  Unremarkable  OSSEOUS STRUCTURES:  Chronic bilateral L5 pars defects with grade 1/2 anterolisthesis noted  Impression: 1  Diffuse distal esophagus/gastroesophageal wall thickening consistent with previously characterized esophageal carcinoma   Interval progression of paraesophageal/mediastinal adenopathy with a large subcarinal node measuring 3 1 x 2 1 cm  Increase in size of para-aortic and upper abdominal lymph nodes  Findings are worrisome for progressive metastatic disease  2  Right lower lobe stable polypoid endobronchial lesion  Recommend bronchoscopy correlation to exclude metastatic lesion if this remains clinically relevant  Increased FDG uptake was noted on prior PET/CT  The study was marked in EPIC for significant notification  Workstation performed: [id number]     I reviewed the above laboratory and imaging data  Discussion/Summary:  66-year-old male with metastatic esophagus cancer  He is maintaining his weight  I did remove his feeding tube that was placed by GI  I will see him again in the future should the need arise  All of his questions were answered

## 2022-09-02 ENCOUNTER — NUTRITION (OUTPATIENT)
Dept: NUTRITION | Facility: CLINIC | Age: 77
End: 2022-09-02

## 2022-09-02 DIAGNOSIS — Z71.3 NUTRITIONAL COUNSELING: Primary | ICD-10-CM

## 2022-09-02 NOTE — PROGRESS NOTES
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  · Shweta Melani started 8/31/22  Will continue oncology care in [place]  Oncology History   Malignant neoplasm of lower third of esophagus ([facility] [address] )   1/13/2022 Initial Diagnosis    [facility] lower third [facility]     1/13/2022 Biopsy    EGD:   Esophagus, distal:  - 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  RESULTS OF IMMUNOHISTOCHEMICAL ANALYSIS FOR MISMATCH REPAIR PROTEIN LOSS     INTERPRETATION: No loss of nuclear expression of MMR proteins: Low probability of MSI-H     Note: Background non-neoplastic tissue and/or internal control with intact nuclear expression        RESULTS:  Antibody          Clone               Description                           Results  MLH1               M1                   Mismatch repair protein       Intact nuclear expression  MSH2              [id number]       Mismatch repair protein       Intact nuclear expression  MSH6              44                     Mismatch repair protein       Intact nuclear expression  PMS2              [id number]           Mismatch repair protein       Intact nuclear expression     3/14/2022 - 4/20/2022 Radiation    Treatments:  Course: C1    Plan ID Energy Fractions Dose per Fraction (cGy) Dose Correction (cGy) Total Dose Delivered (cGy) Elapsed Days   Esophags2_ReV 6X 22 / 22 180 0 3,960 29   Esophagus 6X 3 / 25 180 0 540 2   Esophagus CD 6X 3 / 3 180 0 540 2      Treatment Dates:  3/14/2022 - 4/20/2022       3/15/2022 - 5/3/2022 Chemotherapy    CARBOplatin (PARAPLATIN) IVPB (GOG AUC DOSING), 147 6 mg, Intravenous, Once, 7 of 7 cycles  Administration: 147 6 mg (3/15/2022), 196 mg (3/22/2022), 196 mg (3/29/2022), 196 mg (4/5/2022), 196 mg (4/12/2022), 200 mg (4/26/2022), 200 mg (5/3/2022)  PACLItaxel (TAXOL) chemo IVPB, 50 mg/m2 = 84 6 mg, Intravenous, Once, 7 of 7 cycles  Administration: 84 6 mg (3/15/2022), 85 8 mg (3/22/2022), 85 8 mg (3/29/2022), 85 8 mg (4/5/2022), 85 8 mg (4/12/2022), 87 mg (4/26/2022), 87 mg (5/3/2022)     6/22/2022 Surgery    Omental nodule biopsy:  A  Omentum, OMENTAL NODULE, intraoperative biopsy:  - Metastatic adenocarcinoma    Esophagectomy aborted     8/31/2022 -  Chemotherapy    pembrolizumab (KEYTRUDA) IVPB, 400 mg, Intravenous, Once, 1 of 6 cycles  Administration: 400 mg (8/31/2022)       Past Medical & Surgical Hx: current smoker  Patient Active Problem List   Diagnosis    Smoking    Weight loss    Dysphagia    Dermatitis    Malignant neoplasm of lower third of esophagus (HCC)    Mild protein-calorie malnutrition (HCC)    Left ankle pain    Severe protein-calorie malnutrition (HCC)    Chronic pain    Cancer related pain    Palliative care encounter    Anemia due to antineoplastic chemotherapy    Encounter for geriatric assessment     Past Medical History:   Diagnosis Date    Cancer [facility])     esophageal ca    Current smoker     since 15years old    History of blood transfusion     Pneumonia     Polio     Rib fractures      Past Surgical History:   Procedure Laterality Date    EGD      ESOPHAGOGASTRODUODENOSCOPY N/A 6/22/2022    Procedure: ESOPHAGOGASTRODUODENOSCOPY (EGD);   Surgeon: John Mccauley MD;  [facility]: [facility];  Service: Surgical Oncology    FL GUIDED CENTRAL VENOUS ACCESS DEVICE INSERTION  02/16/2022    JEJUNOSTOMY FEEDING TUBE      LAPAROTOMY N/A 6/22/2022    Procedure: Cecilio Stearns EXPLORATORY; OMENTAL BIOPSY, TAP BLOCK;  Surgeon: Rony Melara MD;  [facility];  Service: Surgical Oncology    TUNNELED VENOUS PORT PLACEMENT Left 02/16/2022    Procedure: INSERTION VENOUS PORT (PORT-A-CATH);   Surgeon: Rony Melara MD;  [facility];  Service: Surgical Oncology       Review of Medications:   Vitamins, Supplements and Herbals: No, pt denies taking supplements    Current Outpatient Medications:     acetaminophen (TYLENOL) 325 mg tablet, Take 325 mg by mouth every 6 (six) hours as needed for mild pain, Disp: , Rfl:     ascorbic acid (VITAMIN C) 500 mg tablet, Take 500 mg by mouth daily, Disp: , Rfl:     Cholecalciferol (VITAMIN D3 PO), Take by mouth, Disp: , Rfl:     cyanocobalamin (VITAMIN B-12) 500 MCG tablet, Take 500 mcg by mouth daily, Disp: , Rfl:     Multiple Vitamins-Minerals (MULTIVITAMIN WITH MINERALS) tablet, Take 1 tablet by mouth daily, Disp: , Rfl:     ondansetron (Zofran ODT) 8 mg disintegrating tablet, Take 1 tablet (8 mg total) by mouth every 8 (eight) hours as needed for nausea or vomiting Through J tube not by mouth , Disp: 20 tablet, Rfl: 0    pantoprazole (PROTONIX) 40 mg tablet, Take 1 tablet (40 mg total) by mouth 2 (two) times a day, Disp: , Rfl: 0    Most Recent Lab Results:   Lab Results   Component Value Date    WBC 9 20 08/29/2022    NEUTROABS 6 40 08/29/2022    ALT 11 (L) 08/29/2022    AST 11 08/29/2022    ALB 3 8 08/29/2022    SODIUM 139 08/29/2022    SODIUM 137 07/02/2022    K 4 0 08/29/2022    K 3 9 07/02/2022     08/29/2022    BUN 12 08/29/2022    BUN 10 07/02/2022    CREATININE 0 63 08/29/2022    CREATININE 0 56 (L) 07/02/2022    EGFR 95 08/29/2022    PHOS 3 0 06/28/2022    PHOS 2 7 06/23/2022    POCGLU 121 05/16/2022    GLUC 73 08/29/2022    HGBA1C 4 9 06/15/2022    CALCIUM 9 3 08/29/2022    MG 2 2 06/29/2022       Anthropometric Measurements:   Height: 70"  Ht Readings from Last 1 Encounters:   09/01/22 5' 10" (1 778 m)     [employer] Readings from Last 27 Encounters:   09/01/22 58 5 kg (129 lb)   08/24/22 59 kg (130 lb)   08/01/22 58 5 kg (129 lb)   07/15/22 59 7 kg (131 lb 9 6 oz)   06/22/22 64 9 kg (143 lb)   06/10/22 64 9 kg (143 lb)   06/07/22 63 kg (139 lb)   06/06/22 63 5 kg (140 lb)   06/02/22 64 4 kg (142 lb)   06/01/22 64 2 kg (141 lb 8 oz)   05/19/22 62 8 kg (138 lb 6 4 oz)   05/03/22 61 2 kg (134 lb 14 7 oz)   05/02/22 61 2 kg (135 lb)   04/26/22 59 7 kg (131 lb 9 8 oz)   04/12/22 59 kg (130 lb)   04/12/22 59 1 kg (130 lb 6 4 oz)   04/11/22 59 kg (130 lb)   04/05/22 60 2 kg (132 lb 12 8 oz)   03/29/22 60 5 kg (133 lb 6 4 oz)   03/25/22 58 1 kg (128 lb)   03/22/22 57 5 kg (126 lb 12 8 oz)   03/15/22 55 4 kg (122 lb 3 2 oz)   03/07/22 55 3 kg (122 lb)   02/17/22 60 3 kg (133 lb)   02/16/22 58 8 kg (129 lb 9 6 oz)   02/14/22 61 2 kg (135 lb)   02/06/22 59 5 kg (131 lb 2 8 oz)     Weight History:    Usual Weight: 175#   Varian: (3/14/22) 124#, (3/17/22) 120 5#, (3/21/22) 126#, (3/22/22) 127#, (3/24/22) 128#, (3/28/22) 131#, (3/29/22) 134#, (3/30/22) 134#, (3/31/22) 134#, (4/4/22) 133#, (4/5/22) 133#, (4/7/22) 132#, (4/11/22) 130#, (4/13/22) 130#, (4/14/22) 130 5#, (4/18/22) 130#       Home Scale: (5/11/22) 138#, (5/20/22) 139#, (7/12/22) 135#, (7/28/22)129#, (8/12/22) 130#    Oncology Nutrition-Anthropometrics    Flowsheet Row Nutrition from 8/12/2022 in [address] [place] [address] Dietitian [place] Nutrition from 7/28/2022 in [place] [address] Oncology Dietitian [place]   Patient age (years): 68 years 68 years   Patient (male) height (in): 79 in 79 in   Current weight (lbs): 129 lbs 131 6 lbs   Current weight to be used for anthropometric calculations (kg) 58 6 kg 59 8 kg   BMI: 18 5 18 9   IBW male 166 lb 166 lb   IBW (kg) male 75 5 kg 75 5 kg   IBW % (male) 77 7 % 79 3 %   Adjusted BW (male): 156 8 lbs 157 4 lbs   Adjusted BW in kg (male): 71 3 kg 71 5 kg   % weight change after 1 month: -2 % -8 %   Weight change after 1 month (lbs) -2 6 lbs -11 4 lbs   % weight change after 3 months: -4 4 % 1 2 %   Weight change after 3 months (lbs) -5 9 lbs 1 6 lbs   % weight change after 6 months: -12 2 % -10 9 %   Weight change after 6 months (lbs) -18 lbs -16 1 lbs          Nutrition-Focused Physical Findings: n/a due to telephone call    Food/Nutrition-Related History & Client/Social History:    Current Nutrition Impact Symptoms:  [] Nausea -has zofran  [] Reduced Appetite  [] Acid Reflux    [] Vomiting  [x] Unintended Wt Loss- significant x6 months  [] Malabsorption    [] Diarrhea  [] Unintended Wt Gain  [] Dumping Syndrome    [] Constipation  [] Thick Mucous/Secretions  [] Abdominal Pain    [] Dysgeusia (Altered Taste)  [x] Xerostomia (Dry Mouth)  [] Gas    [] Dysosmia (Altered Smell)  [] Thrush  [] Difficulty Chewing    [] Oral Mucositis (Sore Mouth)  [] Fatigue  [x] Hyperglycemia: BG nonfasting 145mg/dL on 7/2/22  [] Odynophagia  [] Esophagitis  [] Other: hypokalemia 7/1/22   [] Dysphagia  [] Early Satiety  [] No Problems Eating      Food Allergies & Intolerances: no    Current Diet: Regular Diet, No Restrictions  Current Nutrition Intake: Increased since last visit    Appetite: Good   Nutrition Route: PO  Oral Care: brushes BID  Activity level: Uses carney to walk  Energy is increasing, continues to feel stronger        25 Hr Diet Recall:   Breakfast: cereal with milk and fruit OR eggs with toast OR pancakes   Lunch: Tuna sandwich OR hamburger   Dinner: pasta/raviolis OR ham and potatoes  Snack: pudding or sliced fruit OR canned peaches     Beverages: coffee (8oz x1), water (8oz x2-3), OJ (8oz x0-1)   Nutrition supplement: Has Orgain (250 kcal, 16g pro) but has not been consuming regularly    Oncology Nutrition-Estimated Needs    Flowsheet Row Nutrition from 8/12/2022 in [address] [place] [address] Nutrition from 7/28/2022 in [address] [place] [address]   Weight type used Actual weight Actual weight   Weight in kilograms (kg) used for estimated needs 58 6 kg 59 8 kg   Energy needs formula:  35-40 kcal/kg 35-40 kcal/kg   Energy needs based on 35 kcal/k kcal 2094 kcal   Energy needs based on 40 kcal/k kcal 2393 kcal   Protein needs formula: 1 5-2 g/kg 1 5-2 g/kg   Protein needs based on 1 5 g/kg 88 g 90 g   Protein needs based on 2 g/kg 117 g 120 g   Fluid needs formula: 30-35 mL/kg 30-35 mL/kg   Fluid needs based on 30 mL/kg 1758 mL 1800 mL   Fluid needs in ounces 59 oz 61 oz   Fluid needs based on 35 mL/kg 2051 mL 2100 mL   Fluid needs in ounces 69 oz 71 oz           Discussion & Intervention:   Dulce Maria Alan was evaluated today for an RD follow up regarding wt loss, Esophageal Cancer and enteral nutrition  Dulce Maria Alan has completed has completed chemo/RT for [facility], recently underwent attempted esophagectomy which was aborted due to metastatic dx, and started [facility] 22  Jtube was removed 22  Today Jaye Parra explains that he is doing well  He is eating well and not having any difficulty  He is planning to move to [place] within the next few weeks and will be continuing treatment in [place]  His goal is to gain weight, he has been maintaining the same weight for 1 month  He is confident that he will be able to gain more weight once he moves and gets settled as he will be with family  He is appreciaitve of oncology nutrition services, encouraged him to reach out with any questions/concerns  Moving forward, Dulce Maria Alan was encouraged to increase kcal, protein, and fluid intakes  Materials Provided: not applicable   All questions and concerns addressed during todays visit  Dulce Maria Alan has RD contact information  Nutrition Diagnosis:    Inadequate Energy Intake related to physiological causes, disease state and treatment related issues as evidenced by food recall, wt loss and discussion with pt and/or family     Increased Nutrient Needs (kcal & pro) related to increased demand for nutrients and disease state as evidenced by recovering from cancer tx   Increased Nutrient Needs related to increased demand for nutrients as evidenced by desire to gain weight  Monitoring & Evaluation:   Goals:  · pt to meet >/=75% estimated nutrition needs daily  · weight gain of 1-2# per week  · increase calorie, protein, fluid intake    · Progress Towards Goals: Progressing and Not Met    Nutrition Rx & Recommendations:  · Diet: High Calorie, High Protein (for high calorie foods see pages 52-53, and for high protein foods see pages 49-51 in your Eating Hints book)  · Incorporate physical activity as able/allowed  · Follow proper oral care; Try baking soda/salt water rinse recipe (mix 3/4 tsp salt + 1 tsp baking soda + 1 qt water; rinse with plain water after using) in Eating Hints book (pg 18)  Brush your teeth before/after meals & before bed  · Weigh yourself regularly  If you notice weight loss, make an effort to increase your daily food/calorie intake  If you continue to notice loss after these efforts, reach out to your dietitian to establish a plan to stabilize weight  · Your syringes are each 60 mL or 2 fl oz  Always flush your feeding tube with 60 mL room-temp water 1-2 times daily while feeding tube is not in use  Ways to increase calorie and protein intake:    Eat when feeling most hungry      Choose foods that are easy to eat: yogurt, oatmeal, eggs, soup, cereal    Keep non perishable snacks nearby   5-6 small meals/snacks daily   Protein at all meals/snacks: eggs, chicken, fish, beans, nuts/nut butters    Add high calorie foods to meals: cheese, milk, olive oil, avocado, butter, peanut butter    Choose liquids with calories: whole milk, Fairlife milk (higher protein/lactose-free milk), chocolate milk, 100% fruit juice, diluted juice, bone broth (higher protein broth), creamy soups, sports drinks (Gatorade, Poweraide, Pedialyte, etc ), [place] ice, popsicles, milkshakes, smoothies, oral nutrition supplements (Ensure, Boost, Orgain etc ), gelatin/Jello, etc   o Use oral nutrition supplements to make homemade smoothies or milkshakes  o Choose oral nutrition supplements with >300 calories per serving        Follow Up Plan: PRN per patient request   Recommend Referral to Other Providers: none at this time

## 2022-09-02 NOTE — PATIENT INSTRUCTIONS
Nutrition Rx & Recommendations:  Diet: High Calorie, High Protein (for high calorie foods see pages 52-53, and for high protein foods see pages 49-51 in your Eating Hints book)  Incorporate physical activity as able/allowed  Follow proper oral care; Try baking soda/salt water rinse recipe (mix 3/4 tsp salt + 1 tsp baking soda + 1 qt water; rinse with plain water after using) in Eating Hints book (pg 18)  Brush your teeth before/after meals & before bed  Weigh yourself regularly  If you notice weight loss, make an effort to increase your daily food/calorie intake  If you continue to notice loss after these efforts, reach out to your dietitian to establish a plan to stabilize weight  Your syringes are each 60 mL or 2 fl oz  Always flush your feeding tube with 60 mL room-temp water 1-2 times daily while feeding tube is not in use  Ways to increase calorie and protein intake:   Eat when feeling most hungry  Choose foods that are easy to eat: yogurt, oatmeal, eggs, soup, cereal   Keep non perishable snacks nearby  5-6 small meals/snacks daily  Protein at all meals/snacks: eggs, chicken, fish, beans, nuts/nut butters   Add high calorie foods to meals: cheese, milk, olive oil, avocado, butter, peanut butter   Choose liquids with calories: whole milk, Fairlife milk (higher protein/lactose-free milk), chocolate milk, 100% fruit juice, diluted juice, bone broth (higher protein broth), creamy soups, sports drinks (Gatorade, Poweraide, Pedialyte, etc ), [place] ice, popsicles, milkshakes, smoothies, oral nutrition supplements (Ensure, Boost, Orgain etc ), gelatin/Jello, etc   Use oral nutrition supplements to make homemade smoothies or milkshakes  Choose oral nutrition supplements with >300 calories per serving        Follow Up Plan: PRN per patient request   Recommend Referral to Other Providers: none at this time

## 2022-09-14 ENCOUNTER — TELEPHONE (OUTPATIENT)
Dept: HEMATOLOGY ONCOLOGY | Facility: CLINIC | Age: 77
End: 2022-09-14

## 2022-09-14 NOTE — TELEPHONE ENCOUNTER
Appointment Cancellation Or Reschedule     Person calling In self   Provider [facility]   Office Visit Date and Time 8/26 1PM   Office Visit Location [place]   Did patient want to reschedule their office appointment? If so, when was it scheduled to? Yes, 10/5 1:30PM   Did you have STAR scheduled for this appointment? YES   Do you need STAR set up for your new appointment? If yes, please send to "PATIENT RIDESHARE" pool for STAR rescheduling YES   If you are cancelling appointment, can we notify STAR to cancel ride? If yes, please send to "PATIENT RIDESHARE" pool for STAR to cancel service yes   Is this patient calling to reschedule an infusion appointment? yes   When is their next infusion appointment? Need to schedule   Is this patient a Chemo patient? YES   Reason for Cancellation or Reschedule SCHEDULE CONFLICT     If the patient is a treatment patient, please route this to the office nurse  If the patient is not on treatment, please route to the office MA  If the patient is a surgical oncology patient, please route to surg/onc clinical pool

## 2022-09-15 ENCOUNTER — TELEPHONE (OUTPATIENT)
Dept: HEMATOLOGY ONCOLOGY | Facility: CLINIC | Age: 77
End: 2022-09-15

## 2022-09-15 DIAGNOSIS — C15.5 MALIGNANT NEOPLASM OF LOWER THIRD OF ESOPHAGUS (HCC): Primary | ICD-10-CM

## 2022-10-04 NOTE — PROGRESS NOTES
Hematology/Oncology Progress Note    Date of Service: 10/5/2022    [facility]  [facility]   [address] [facility] [place] [address]    Hem/Onc Problem List:   GE junction adenocarcinoma, cT3 cN1, M0, G3    Chief Complaint:    Management of GE junction adenocarcinoma    Assessment/Plan:   1  GE junction adenocarcinoma, cT3 cN1, cM0, grade 3, stage III  "EGD with biopsy consistent with 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   MMR proteins are normally expressed   HER2 negative by IHC   Dr Kendall Motley consulted patient and recommended concurrent chemoradiation therapy  Poor performance status initially ECOG 3/5   Patient has a J-tube placed for nutrition support  Neoadjuvant concurrent radiation therapy with weekly Carbo/Taxol started on March 15, 2022  Last chemotherapy was on May 3, 2022  PET-CT scan restaging on May 16, 2022 showed interval decrease FDG activity in the GE junction " Patient's Caris testing showed TMB biomarker high showing benefit for monotherapy Keytruda  Patient had a CT scan showing progression due to being off of treatment for about 3 months now  He has no dysphagia or concerning symptoms at this time as listed in interval history  Patient did have a stable endobronchial lesion which it was recommended to have a bronchoscopy  He defers this for now since he is moving [place]  He will discuss this with his next oncologist   Patient started Keytruda 400 mg Q 6 weeks, cycle 1 day 1 08/31/2022  Patient overall did well with 1st treatment  No side effects as mentioned in HPI  His next treatment is cycle 2, day 1 10/12/2022  Will have labs prior  He leaves for [place] 10/15/22  He has a possible oncologist there already who can  management for him seamlessly  2  Normocytic normochromic anemia, multifactorial  Patient denies bleeding anywhere    Most recent hemoglobin was 10 7, MCV 98  This is improved  We will continue to monitor  · Discussion of decision making    I personally reviewed the following lab results, the image studies, pathology, other specialty/physicians consult notes and recommendations, and outside medical records from [facility]  I had a lengthy discussion with the patient and shared the work-up findings  I spent 40 minutes reviewing the records (labs, clinician notes, outside records, medical history, ordering medicine/tests/procedures, interpreting the imaging/labs previously done) and coordination of care as well as direct time with the patient today, of which greater than 50% of the time was spent in counseling and coordination of care with the patient/family  · Plan/Labs  · Patient has metastatic esophageal adenocarcinoma, HER2 negative  Initially was found to be stage III, cT3, cN1, cM0     · Recently, had esophagectomy aborted when omental metastasis were found  Biopsy was positive for esophageal adenocarcinoma  Patient's recent CT showed progression with diffuse distal esophagitis/gastroesophageal wall thickening consistent with esophageal carcinoma  Interval progression of paraesophageal/mediastinal adenopathy was seen as well as para-aortic and upper abdominal lymph nodes  · He also had a right lower lobe stable polypoid endobronchial lesion  Bronchoscopy was recommended as this was previously FDG uptake positive  · Patient ECOG today is 1  Previously was 3    · Caris testing showed TMB high, benefit from monotherapy Keytruda  He started monotherapy Keytruda 600 mg once every 6 weeks 08/31/2022  · Next cycle is 10/12/2022  He will have labs prior which we will follow  He will continue treatments with next oncologist in [place]  He leaves 10/15/2022  Already has a possible oncologist in [place]  He has to see PCP prior to oncology appointment  He has his records already prepared for departure    ·   Silvino Lin was present at the end of this visit     Follow Up:  PRN  Patient will see oncologist in [place] for further management    All questions were answered to the patient's satisfaction during this encounter  The patient knows the contact information for our office and knows to reach out for any relevant concerns related to this encounter  They are to call for any temperature 100 4 or higher, new symptoms including but not restricted to shaking chills, decreased appetite, nausea, vomiting, diarrhea, increased fatigue, shortness of breath or chest pain, confusion, and not feeling the strength to come to the clinic  For all other listed problems and medical diagnosis in their chart - they are managed by PCP and/or other specialists, which the patient acknowledges  Thank you very much for your consultation and making us a part of this patient's care  We are continuing to follow closely with you  Please do not hesitate to reach out to me with any additional questions or concerns  AJCC 8th Edition Cancer Stage :      Cancer Staging  Malignant neoplasm of lower third of esophagus (HCC)  Staging form: Esophagus - Adenocarcinoma, AJCC 8th Edition  - Clinical stage from 2/14/2022: Stage III (cT3, cN1, cM0, G3) - Unsigned  Histologic grading system: 3 grade system      Hematology/Oncology History:   · January 6, 2022 patient had a follow-up with primary care physician complaining of 6 weeks of dysphagia  This started with solid food then progressed into liquid diet  It was associated with nausea  Patient had a 30 lb weight loss within 1 year  Patient is an active smoker  · January 10, 2022 patient consulted GI   EGD on January 13, 2022 showed:   FINDINGS:  · Fungating and malignant-appearing mass (traversable) measuring 70 mm in the lower third of the esophagus (34 cm from the incisors), covering the whole circumference,; bleeding occurred before and after intervention; performed cold forceps biopsy  · 3 fungating and multilobular masses (traversable) in the cardia, covering one third of the circumference,; bleeding occurred after intervention; performed partial removal by cold forceps biopsy  · The fundus of the stomach, body of the stomach, incisura, antrum, prepyloric region and pylorus appeared normal   · The duodenal bulb and 2nd part of the duodenum appeared normal   IMPRESSION:  2  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 with additional nodular lesions in the cardia of the stomach, biopsies pending, most likely adenocarcinoma   · January 13, 2010 to pathology from the EGD shows:  Final Diagnosis   A  Stomach, cardia:  - Gastric mucosa with reactive changes   - Separate fragment of necrotic debris with bacterial and fungal aggregates  - No definite malignancy identified       B  Esophagus, distal:  - 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  - MMR and Her2 studies are pending with results to follow in an addendum      C  MMR are normally expressed , MSI-low  D  HER2 negative by IHC       · January 24, 2022 CT scan chest abdomen pelvis with contrast:  IMPRESSION:     New mid to distal esophageal wall thickening with large eccentric mass in the posterior distal esophagus extending into the GE junction and beyond the esophageal wall with pathologic lymph nodes anteriorly with central necrosis of esophageal malignancy      This correlates with the recent EGD finding of 1/13/2022      Right lower lobe endobronchial mass possibly mucous plugging with extension into the bronchial branches is chronic and was seen on prior CT of 10/10/2019 with no obstruction   This can be evaluated when PET scan is performed as indicated on EGD report    · February 1, 2022 PET-CT scan:  IMPRESSION:     1   Hypermetabolic distal esophageal carcinoma which extends into the proximal stomach with paraesophageal/perigastric/upper abdominal andry metastatic disease      2   Concentric FDG activity involving the cecum/proximal ascending colon of uncertain clinical significance   Given the localized nature of FDG activity, consider correlation with screening colonoscopy to exclude underlying colonic malignancy      3   Indeterminant non-FDG avid avid filling defect within right lower lobe bronchus with associated more distal tree-in-bud infiltrate   Findings could reflect mucous plugging with obstructive malignant process of low metabolic activity not excluded      4   3 8 cm infrarenal abdominal aortic aneurysm  · February 3, 2022 Dr Melissa Harden consulted patient recommendation of concurrent chemo radiation therapy in the neoadjuvant setting  · February 6, 2022, EGD with J-tube placement  Dr Juarez Friend, DO  · February 7, 2022 duplex of lower extremity shows high-grade stenosis versus occlusion of the proximal distal superficial femoral artery reconstitution at the proximal popliteal artery  Stenosis in left lower extremity as well  · March 15, 2022 patient started concurrent chemoradiation therapy with weekly carbo/Taxol  · Last radiation by the end of April 2022  Last chemotherapy was postponed for 1 week because of neutropenia  · Last radiation on May 3, 2022  · May 16, 2022 PET-CT scan:  IMPRESSION:  1   Interval decreased FDG activity in the distal esophagus and proximal stomach, suggesting at least partial response to therapy   Residual viable tumor may still be present   Continued PET CT follow-up recommended  2   Previously visualized distal paraesophageal and gastrohepatic hypermetabolic densities appear diminished   There are however new mildly hypermetabolic left paraesophageal and AP window lesions as above, which may represent new metastases  3   Right lower endobronchial soft tissue lesion is persistent from 2019, but demonstrates mild FDG activity   Clinical correlation recommended for suspected endobronchial polypoid mass    · Domitila 3, 2022 Dr Melissa Harden discussed with patient about surgical option  · June 22, 2022, Patient presented for esophagectomy  He is s/p ex lap, omental biopsy, tap blocks, and intraoperative EGD  Esophagectomy procedure was aborted when omental metastasis/nodules were found  Biopsies during procedure were positive for metastatic adenocarcinoma  · Caris testing showed PDL1 positive showing benefit for Opdivo in chemotherapy  TMB was high showing benefit for monotherapy Keytruda  · August 2022, CT c/a/p:  · IMPRESSION:  · 1  Diffuse distal esophagus/gastroesophageal wall thickening consistent with previously characterized esophageal carcinoma  Interval progression of paraesophageal/mediastinal adenopathy with a large subcarinal node measuring 3 1 x 2 1 cm  Increase in size of para-aortic and upper abdominal lymph nodes  Findings are worrisome for progressive metastatic disease  · 2  Right lower lobe stable polypoid endobronchial lesion  Recommend bronchoscopy correlation to exclude metastatic lesion if this remains clinically relevant  Increased FDG uptake was noted on prior PET/CT  History of Present Illiness:   Beau Ladd is a 68 y o  male with the above-noted HemOnc history who is here to follow up regarding hx of esophageal adenocarcinoma  Patient was previously seen by Dr Yaniv Parr  Hx per his last note as below:    "Patient diagnosed GE junction adenocarcinoma in January 10, 2022  Biopsy consistent with moderate to poorly differentiated adenocarcinoma with focal signet ring features  MMR normally expressed    HER2 negative by IHC      Staging workup in January 24, 2022 showed new mid to distal esophageal wall thickening with large eccentric mass in the posterior distal esophagus extending into the GE junction and beyond the esophageal wall with pathologic lymph nodes anteriorly with central necrosis of esophageal malignancy      PET-CT scan in February 1, 2022 revealed hypermetabolic distal esophageal carcinoma which extends into the proximal stomach with periesophageal/perigastric/upper abdominal andry metastatic disease  Julio Boothe is a 3 8 cm infrarenal abdominal aortic aneurysm      Dr Laurie Stokes consulted patient February 3, 2022 recommendation of concurrent chemoradiation therapy in the neoadjuvant setting   J-tube placement was done by Dr Pancho Quiros nutrition support on February 6, 2022      Neoadjuvant concurrent chemoradiation therapy with weekly carbo Taxol started on March 15, 2022  Patient has had 7 cycles of treatment and did fairly well  Cycle 7 was postponed for 1 week because of neutropenia with platelet count 85  Last chemo was given on May 3, 2022  PET-CT scan in May 16, 2022 showed interval decreased FDG activity in the GE junction      Lab showed pancytopenia due to chemoradiation therapy  Hemoglobin stable  ANC normal   Platelet count [id number] "       6/22, patient presented for esophagectomy as planned by Dr Laurie Stokes  He is status post ex lap, omental biopsy, tap blocks, intraoperative EGD  This off a JAK2 me procedure was aborted due to omental metastasis/nodule seen  Biopsy returned positive for esophageal a dental carcinoma  He presents to our office today to review next steps of treatment  Patient's Caris testing showed benefit from monotherapy Keytruda  Patient had a CT chest, abdomen, pelvis showing progression of paraesophageal/mediastinal adenopathy with a large subcarinal node measuring 3 1 x 2 1 cm  Increase in size of para-aortic and upper abdominal lymph nodes were seen 2 worrisome for progressive disease  Also had a right lower lobe stable endobronchial lesion  It was recommended bronchoscopy to correlate to exclude metastatic lesion if this remains clinically relevant  Increased FDG uptake was seen on a prior PET scan regarding this  Patient started C1D1 Keytruda 400mg Q6 weeks 8/31/22  Interval hx:  Patient tolerated 1st treatment well without any severe side effects    No new headaches, vision change, chest pain, shortness breath, abdominal pain, nausea/vomiting/diarrhea, rashes  No bleeding anywhere  Patient has no problems swallowing solid foods or liquids  He actually has gained some weight  Last visit he was 130 lb  Today's visit he is 136 lb  He feels well  Patient feels fine  No fever or chills  No exertional chest pain, diaphoresis or shortness  of breath  No cough and phlegm, no hemoptysis  Patient denied nausea  and vomiting  No abdominal pain  No diarrhea or constipation  No  symptoms  No headache or blurred vision  No seizure activity  Appetite good  No significant weight loss or weight gain  The patient denies bleeding anywhere  ROS: A 12-point of review of systems is obtained and other than the above is noncontributory  Objective:   VITALS:   /70   Pulse 85   Temp 97 5 °F (36 4 °C)   Resp 18   Ht 5' 10" (1 778 m)   Wt 61 7 kg (136 lb)   SpO2 97%   BMI 19 51 kg/m²     Physical EXAM:  General:  Alert, cooperative, no distress, appears stated age  + ambulating with cane    Head:  Normocephalic, without obvious abnormality, atraumatic  Eyes:  Conjunctivae/corneas clear  PERRL, EOMs intact  No evidence of conjunctivitis     Throat: Lips, mucosa, and tongue normal   No bleeding from mouth  Neck: Supple, symmetrical, trachea midline    Lungs:   Clear to auscultation bilaterally  Respiratory effort easy, nonlabored    Heart:  Regular rate and rhythm, S1, S2 normal, no murmur  Abdomen:   Soft, non-tender,nondistended  Bowel sounds normal  No masses,  No organomegaly  Extremities:  Lymphatics: Extremities normal, atraumatic, no cyanosis or edema  No cervical, axillary or inguinal adenopathy   Skin: Skin color, texture, turgor normal  No rashes  Neurologic: A&Ox4   No focal neuro deficits       No Known Allergies    Past Medical History:   Diagnosis Date    Cancer ([facility] [address] )     esophageal ca    Current smoker     since 15years old    History of blood transfusion     Pneumonia  Polio     Rib fractures        Past Surgical History:   Procedure Laterality Date    EGD      ESOPHAGOGASTRODUODENOSCOPY N/A 6/22/2022    Procedure: ESOPHAGOGASTRODUODENOSCOPY (EGD); Surgeon: Gibson Kirkpatrick MD;  [facility];  Service: Surgical Oncology    FL GUIDED CENTRAL VENOUS ACCESS DEVICE INSERTION  02/16/2022    JEJUNOSTOMY FEEDING TUBE      LAPAROTOMY N/A 6/22/2022    Procedure: LAPAROTOMY EXPLORATORY; OMENTAL BIOPSY, TAP BLOCK;  Surgeon: Gibson Kirkpatrick MD;  [facility];  Service: Surgical Oncology    TUNNELED VENOUS PORT PLACEMENT Left 02/16/2022    Procedure: INSERTION VENOUS PORT (PORT-A-CATH); Surgeon: Gibson Kirkpatrick MD;  [facility];  Service: Surgical Oncology       Family History   Problem Relation Age of Onset    Dementia Mother     Heart disease Father        Social History     Socioeconomic History    Marital status: /Civil Union     Spouse name: Not on file    Number of children: Not on file    Years of education: Not on file    Highest education level: Not on file   Occupational History    Not on file   Tobacco Use    Smoking status: Heavy Tobacco Smoker     Packs/day: 1 00     Years: 40 00     Pack years: 40 00     Types: Cigarettes    Smokeless tobacco: Never Used    Tobacco comment: last smoked cigarettes 3 days ago   Vaping Use    Vaping Use: Never used   Substance and Sexual Activity    Alcohol use: Never     Alcohol/week: 0 0 standard drinks     Comment: 0    Drug use: Never    Sexual activity: Not Currently   Other Topics Concern    Not on file   Social History Narrative    Not on file     Social Determinants of Health     Financial Resource Strain: Not on file   Food Insecurity: No Food Insecurity    Worried About Running Out of Food in the Last Year: Never true    Jeanmarie of Food in the Last Year: Never true   Transportation Needs: No Transportation Needs    Lack of Transportation (Medical):  No    Lack of Transportation (Non-Medical): No   Physical Activity: Not on file   Stress: Not on file   Social Connections: Not on file   Intimate Partner Violence: Not on file   Housing Stability: Low Risk     Unable to Pay for Housing in the Last Year: No    Number of Places Lived in the Last Year: 1    Unstable Housing in the Last Year: No       Current Outpatient Medications   Medication Sig Dispense Refill    acetaminophen (TYLENOL) 325 mg tablet Take 325 mg by mouth every 6 (six) hours as needed for mild pain      ascorbic acid (VITAMIN C) 500 mg tablet Take 500 mg by mouth daily      Cholecalciferol (VITAMIN D3 PO) Take by mouth      cyanocobalamin (VITAMIN B-12) 500 MCG tablet Take 500 mcg by mouth daily      Multiple Vitamins-Minerals (MULTIVITAMIN WITH MINERALS) tablet Take 1 tablet by mouth daily      ondansetron (Zofran ODT) 8 mg disintegrating tablet Take 1 tablet (8 mg total) by mouth every 8 (eight) hours as needed for nausea or vomiting Through J tube not by mouth  20 tablet 0    pantoprazole (PROTONIX) 40 mg tablet Take 1 tablet (40 mg total) by mouth 2 (two) times a day  0     No current facility-administered medications for this visit  (Not in a hospital admission)      DATA REVIEW:    Pathology Result:    Final Diagnosis   Date Value Ref Range Status   06/22/2022   Final    A  Omentum, OMENTAL NODULE, intraoperative biopsy:  - Metastatic adenocarcinoma  See Note  Comment: This is an appended report  These results have been appended to a previously preliminary verified report  01/13/2022   Final    A  Stomach, cardia:  - Gastric mucosa with reactive changes   - Separate fragment of necrotic debris with bacterial and fungal aggregates  - No definite malignancy identified  B  Esophagus, distal:  - 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  - MMR and Her2 studies are pending with results to follow in an addendum  Image Results:   They are reviewed and documented in Hematology/Oncology history    CT chest abdomen pelvis w contrast  Narrative: CT CHEST, ABDOMEN AND PELVIS WITH IV CONTRAST    INDICATION:   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  COMPARISON:  6/20/2022 and 1/24/2022  Comparison is also made to head CT 5/16/2022    TECHNIQUE: CT examination of the chest, abdomen and pelvis was performed  Axial, sagittal, and coronal 2D reformatted images were created from the source data and submitted for interpretation  Radiation dose length product (DLP) for this visit:  608 mGy-cm   This examination, like all CT scans performed in the [facility], was performed utilizing techniques to minimize radiation dose exposure, including the use of iterative   reconstruction and automated exposure control  IV Contrast:  65 mL of iohexol (OMNIPAQUE)  Enteric Contrast: Enteric contrast was administered  FINDINGS:    CHEST    LUNGS:  Right lower lobe nodular endobronchial lesion on 601/92 measuring up to 8mm  Correlate with bronchoscopy  This was also noted on recent PET/CT  PLEURA:  Unremarkable  HEART/GREAT VESSELS: Heart is unremarkable for patient's age  No thoracic aortic aneurysm  MEDIASTINUM AND BRANDEE:  Diffuse thickening of the distal esophagus extending to the gastroesophageal junction consistent with previously characterized esophageal carcinoma  There is extensive mediastinal/paraesophageal adenopathy with a large subcarinal   node seen on series 2 image 27 measuring up to 3 1 x 2 1 cm  CHEST WALL AND LOWER NECK:  Unremarkable  ABDOMEN    LIVER/BILIARY TREE:  Enlarged fatty liver  GALLBLADDER:  No calcified gallstones  No pericholecystic inflammatory change  SPLEEN:  Unremarkable  PANCREAS:  Unremarkable  ADRENAL GLANDS:  Unremarkable  KIDNEYS/URETERS:  Mildly atrophic left kidney similar to prior study  STOMACH AND BOWEL:  Mild fecal stasis   Stable position of jejunostomy  APPENDIX:  No findings to suggest appendicitis  ABDOMINOPELVIC CAVITY:  Mildly prominent portacaval lymph node on 2/58, slightly increased in size from prior study  Mild interval increase in size of gastrohepatic nodules  VESSELS:  Left paraesophageal lymph node identified measuring up to 1 4 x 1 6 cm, previously 1 2 x 1 3 cm  Fusiform infrarenal AAA with atherosclerotic disease noted  The aorta measures up to 3 3 cm in AP dimension with termination at of the aneurysm at the bifurcation  PELVIS    REPRODUCTIVE ORGANS:  Prominent prostatomegaly  URINARY BLADDER:  Bladder distention noted  ABDOMINAL WALL/INGUINAL REGIONS:  Unremarkable  OSSEOUS STRUCTURES:  Chronic bilateral L5 pars defects with grade 1/2 anterolisthesis noted  Impression: 1  Diffuse distal esophagus/gastroesophageal wall thickening consistent with previously characterized esophageal carcinoma  Interval progression of paraesophageal/mediastinal adenopathy with a large subcarinal node measuring 3 1 x 2 1 cm  Increase in   size of para-aortic and upper abdominal lymph nodes  Findings are worrisome for progressive metastatic disease  2  Right lower lobe stable polypoid endobronchial lesion  Recommend bronchoscopy correlation to exclude metastatic lesion if this remains clinically relevant  Increased FDG uptake was noted on prior PET/CT  The study was marked in EPIC for significant notification  Workstation performed: [id number]        LABS:  Lab data are reviewed and documented in HemOnc history  No results found for this or any previous visit (from the past 48 hour(s))            Melanie Swenson  10/5/2022, 2:26 PM

## 2022-10-05 ENCOUNTER — OFFICE VISIT (OUTPATIENT)
Dept: HEMATOLOGY ONCOLOGY | Facility: CLINIC | Age: 77
End: 2022-10-05
Payer: COMMERCIAL

## 2022-10-05 VITALS
WEIGHT: 136 LBS | BODY MASS INDEX: 19.47 KG/M2 | SYSTOLIC BLOOD PRESSURE: 120 MMHG | RESPIRATION RATE: 18 BRPM | OXYGEN SATURATION: 97 % | HEART RATE: 85 BPM | HEIGHT: 70 IN | TEMPERATURE: 97.5 F | DIASTOLIC BLOOD PRESSURE: 70 MMHG

## 2022-10-05 DIAGNOSIS — D64.9 ANEMIA, UNSPECIFIED TYPE: ICD-10-CM

## 2022-10-05 DIAGNOSIS — C15.5 MALIGNANT NEOPLASM OF LOWER THIRD OF ESOPHAGUS (HCC): Primary | ICD-10-CM

## 2022-10-05 DIAGNOSIS — Z29.8 IMMUNOTHERAPY ENCOUNTER: ICD-10-CM

## 2022-10-05 PROCEDURE — 99213 OFFICE O/P EST LOW 20 MIN: CPT | Performed by: INTERNAL MEDICINE

## 2022-10-05 RX ORDER — SODIUM CHLORIDE 9 MG/ML
20 INJECTION, SOLUTION INTRAVENOUS ONCE
Status: CANCELLED | OUTPATIENT
Start: 2022-10-12

## 2022-10-10 ENCOUNTER — HOSPITAL ENCOUNTER (OUTPATIENT)
Dept: INFUSION CENTER | Facility: CLINIC | Age: 77
Discharge: HOME | End: 2022-10-10
Payer: COMMERCIAL

## 2022-10-10 DIAGNOSIS — C15.5 MALIGNANT NEOPLASM OF LOWER THIRD OF ESOPHAGUS (HCC): Primary | ICD-10-CM

## 2022-10-10 LAB
ALBUMIN SERPL BCP-MCNC: 3 G/DL (ref 3.5–5)
ALP SERPL-CCNC: 86 U/L (ref 46–116)
ALT SERPL W P-5'-P-CCNC: 12 U/L (ref 12–78)
ANION GAP SERPL CALCULATED.3IONS-SCNC: 7 MMOL/L (ref 4–13)
AST SERPL W P-5'-P-CCNC: 9 U/L (ref 5–45)
BASOPHILS # BLD AUTO: 0.02 THOUSANDS/ΜL (ref 0–0.1)
BASOPHILS NFR BLD AUTO: 0 % (ref 0–1)
BILIRUB SERPL-MCNC: 0.29 MG/DL (ref 0.2–1)
BUN SERPL-MCNC: 15 MG/DL (ref 5–25)
CALCIUM ALBUM COR SERPL-MCNC: 9.6 MG/DL (ref 8.3–10.1)
CALCIUM SERPL-MCNC: 8.8 MG/DL (ref 8.3–10.1)
CHLORIDE SERPL-SCNC: 102 MMOL/L (ref 96–108)
CO2 SERPL-SCNC: 27 MMOL/L (ref 21–32)
CREAT SERPL-MCNC: 0.65 MG/DL (ref 0.6–1.3)
EOSINOPHIL # BLD AUTO: 0.08 THOUSAND/ΜL (ref 0–0.61)
EOSINOPHIL NFR BLD AUTO: 1 % (ref 0–6)
ERYTHROCYTE [DISTWIDTH] IN BLOOD BY AUTOMATED COUNT: 12.8 % (ref 11.6–15.1)
GFR SERPL CREATININE-BSD FRML MDRD: 93 ML/MIN/1.73SQ M
GLUCOSE SERPL-MCNC: 98 MG/DL (ref 65–140)
HCT VFR BLD AUTO: 27 % (ref 36.5–49.3)
HGB BLD-MCNC: 9.1 G/DL (ref 12–17)
IMM GRANULOCYTES # BLD AUTO: 0.12 THOUSAND/UL (ref 0–0.2)
IMM GRANULOCYTES NFR BLD AUTO: 2 % (ref 0–2)
LYMPHOCYTES # BLD AUTO: 0.59 THOUSANDS/ΜL (ref 0.6–4.47)
LYMPHOCYTES NFR BLD AUTO: 9 % (ref 14–44)
MCH RBC QN AUTO: 31.3 PG (ref 26.8–34.3)
MCHC RBC AUTO-ENTMCNC: 33.7 G/DL (ref 31.4–37.4)
MCV RBC AUTO: 93 FL (ref 82–98)
MONOCYTES # BLD AUTO: 0.88 THOUSAND/ΜL (ref 0.17–1.22)
MONOCYTES NFR BLD AUTO: 13 % (ref 4–12)
NEUTROPHILS # BLD AUTO: 4.92 THOUSANDS/ΜL (ref 1.85–7.62)
NEUTS SEG NFR BLD AUTO: 75 % (ref 43–75)
NRBC BLD AUTO-RTO: 0 /100 WBCS
PLATELET # BLD AUTO: 239 THOUSANDS/UL (ref 149–390)
PMV BLD AUTO: 10.9 FL (ref 8.9–12.7)
POTASSIUM SERPL-SCNC: 4 MMOL/L (ref 3.5–5.3)
PROT SERPL-MCNC: 6.9 G/DL (ref 6.4–8.4)
RBC # BLD AUTO: 2.91 MILLION/UL (ref 3.88–5.62)
SODIUM SERPL-SCNC: 136 MMOL/L (ref 135–147)
T3FREE SERPL-MCNC: 1.74 PG/ML (ref 2.3–4.2)
TSH SERPL DL<=0.05 MIU/L-ACNC: 1.44 UIU/ML (ref 0.45–4.5)
WBC # BLD AUTO: 6.61 THOUSAND/UL (ref 4.31–10.16)

## 2022-10-10 PROCEDURE — 80053 COMPREHEN METABOLIC PANEL: CPT | Performed by: INTERNAL MEDICINE

## 2022-10-10 PROCEDURE — 85025 COMPLETE CBC W/AUTO DIFF WBC: CPT | Performed by: INTERNAL MEDICINE

## 2022-10-10 PROCEDURE — 84443 ASSAY THYROID STIM HORMONE: CPT | Performed by: INTERNAL MEDICINE

## 2022-10-10 PROCEDURE — 84481 FREE ASSAY (FT-3): CPT | Performed by: INTERNAL MEDICINE

## 2022-10-10 NOTE — PROGRESS NOTES
Pt presents for port labs, offers no complaints, port accessed, labs drawn, flushed, port de-accessed, AVS declined, pt is aware of next appt, d/c from unit stable

## 2022-10-12 ENCOUNTER — HOSPITAL ENCOUNTER (OUTPATIENT)
Dept: INFUSION CENTER | Facility: CLINIC | Age: 77
Discharge: HOME | End: 2022-10-12
Payer: COMMERCIAL

## 2022-10-12 VITALS
TEMPERATURE: 97.3 F | DIASTOLIC BLOOD PRESSURE: 59 MMHG | SYSTOLIC BLOOD PRESSURE: 124 MMHG | HEIGHT: 70 IN | RESPIRATION RATE: 17 BRPM | BODY MASS INDEX: 19.41 KG/M2 | WEIGHT: 135.6 LBS | HEART RATE: 78 BPM

## 2022-10-12 DIAGNOSIS — C15.5 MALIGNANT NEOPLASM OF LOWER THIRD OF ESOPHAGUS (HCC): Primary | ICD-10-CM

## 2022-10-12 PROCEDURE — 96413 CHEMO IV INFUSION 1 HR: CPT

## 2022-10-12 RX ORDER — SODIUM CHLORIDE 9 MG/ML
20 INJECTION, SOLUTION INTRAVENOUS ONCE
Status: COMPLETED | OUTPATIENT
Start: 2022-10-12 | End: 2022-10-12

## 2022-10-12 RX ADMIN — SODIUM CHLORIDE 400 MG: 9 INJECTION, SOLUTION INTRAVENOUS at 14:33

## 2022-10-12 RX ADMIN — SODIUM CHLORIDE 20 ML/HR: 9 INJECTION, SOLUTION INTRAVENOUS at 14:21

## 2022-10-12 NOTE — PROGRESS NOTES
Treatment tolerated well without complications  No complaints offered  AVS provided  Left unit in stable condition

## 2022-10-31 ENCOUNTER — PATIENT OUTREACH (OUTPATIENT)
Dept: CASE MANAGEMENT | Facility: HOSPITAL | Age: 77
End: 2022-10-31

## 2022-10-31 NOTE — PROGRESS NOTES
Chart review completed today, no contact with pt x6 months, pt is also noted to have moved to [place] earlier this month as has long been his plan  OncSW episode to be closed as of today

## 2022-11-10 ENCOUNTER — TELEPHONE (OUTPATIENT)
Dept: HEMATOLOGY ONCOLOGY | Facility: CLINIC | Age: 77
End: 2022-11-10

## 2022-11-10 NOTE — TELEPHONE ENCOUNTER
Call out to patient in regards to questions and concerns  Patient reviewed that patient needs a Working order for treatment  Patient received number at [phone] and fax number given for [facility] in [place]  Fax- [phone]    Call out to number patient provided  [facility] infusion center, call was transferred to office, left vm with RN teams number and hours of operation to further discuss

## 2022-11-10 NOTE — TELEPHONE ENCOUNTER
CALL RETURN FORM   Reason for patient call? Patient calling to speak with DONTE TIWARI [facility] regarding getting a work order  Patient has moved to [place]     Patient's primary oncologist? Yeimi Shahid    Name of person the patient was calling for? Yeimi Shahid   Any additional information to add, if applicable? N/A   Informed patient that the message will be forwarded to the team and someone will get back to them as soon as possible    Did you relay this information to the patient?  Yes
